# Patient Record
Sex: FEMALE | Race: BLACK OR AFRICAN AMERICAN | NOT HISPANIC OR LATINO | Employment: OTHER | URBAN - METROPOLITAN AREA
[De-identification: names, ages, dates, MRNs, and addresses within clinical notes are randomized per-mention and may not be internally consistent; named-entity substitution may affect disease eponyms.]

---

## 2017-01-10 ENCOUNTER — HOSPITAL ENCOUNTER (OUTPATIENT)
Dept: RADIOLOGY | Facility: HOSPITAL | Age: 67
Discharge: HOME | End: 2017-01-10
Attending: FAMILY MEDICINE
Payer: MEDICARE

## 2017-01-10 ENCOUNTER — CLINICAL SUPPORT (OUTPATIENT)
Dept: FAMILY MEDICINE | Facility: CLINIC | Age: 67
End: 2017-01-10
Payer: MEDICARE

## 2017-01-10 DIAGNOSIS — Z23 NEED FOR VACCINATION WITH 13-POLYVALENT PNEUMOCOCCAL CONJUGATE VACCINE: Primary | ICD-10-CM

## 2017-01-10 DIAGNOSIS — Z12.31 SCREENING MAMMOGRAM, ENCOUNTER FOR: ICD-10-CM

## 2017-01-10 PROCEDURE — 77067 SCR MAMMO BI INCL CAD: CPT | Mod: 26,,, | Performed by: RADIOLOGY

## 2017-01-10 PROCEDURE — 77067 SCR MAMMO BI INCL CAD: CPT | Mod: TC

## 2017-01-10 NOTE — PROGRESS NOTES
Pt received prevnar vaccine IM to left deltoid.  Instructed to wait for 15 minutes in patient waiting area to monitor for side effects

## 2017-01-13 ENCOUNTER — OFFICE VISIT (OUTPATIENT)
Dept: CARDIOLOGY | Facility: CLINIC | Age: 67
End: 2017-01-13
Payer: MEDICARE

## 2017-01-13 VITALS
WEIGHT: 175.94 LBS | HEART RATE: 86 BPM | DIASTOLIC BLOOD PRESSURE: 61 MMHG | BODY MASS INDEX: 32.37 KG/M2 | SYSTOLIC BLOOD PRESSURE: 131 MMHG | HEIGHT: 62 IN

## 2017-01-13 DIAGNOSIS — Z95.2 S/P AVR (AORTIC VALVE REPLACEMENT): ICD-10-CM

## 2017-01-13 DIAGNOSIS — I50.32 CHRONIC DIASTOLIC HEART FAILURE: ICD-10-CM

## 2017-01-13 DIAGNOSIS — I15.2 HYPERTENSION ASSOCIATED WITH DIABETES: ICD-10-CM

## 2017-01-13 DIAGNOSIS — I25.10 CORONARY ARTERY DISEASE INVOLVING NATIVE CORONARY ARTERY, ANGINA PRESENCE UNSPECIFIED, UNSPECIFIED WHETHER NATIVE OR TRANSPLANTED HEART: Primary | ICD-10-CM

## 2017-01-13 DIAGNOSIS — E11.59 HYPERTENSION ASSOCIATED WITH DIABETES: ICD-10-CM

## 2017-01-13 PROCEDURE — 99213 OFFICE O/P EST LOW 20 MIN: CPT | Mod: S$PBB,GC,,

## 2017-01-13 PROCEDURE — 99999 PR PBB SHADOW E&M-EST. PATIENT-LVL III: CPT | Mod: PBBFAC,GC,,

## 2017-01-13 PROCEDURE — 99213 OFFICE O/P EST LOW 20 MIN: CPT | Mod: PBBFAC

## 2017-01-13 NOTE — PROGRESS NOTES
Cardiology Clinic Note      Reason for Visit: follow-up for CAD    History of Present Illness: This is a 67 yo AAF with Hx of CAD (severe 3V disease not a candidate for PCI), HTN, CKD IV (transplant candidate), severe AS s/p TAVR (26mm CoreValve), DM, obese, falls. She presents for follow-up post-TAVR. She denies any complaints of chest pain, SOB, or orthopnea. She presents with her continual issue with LE weakness. The patient has undergone work-up for LE weakness but was discouraged from MRI due to TAVR. No other complaints of claudication as explanation for weakness, but might be due to statin though this is unlikely due to normal CPK/CRP.     EKG: NSR    Prior Cardiology Work up:   2D echo:   CONCLUSIONS     1 - Normal left ventricular systolic function (EF 60-65%).     2 - Mild left atrial enlargement.     3 - Left ventricular diastolic dysfunction.     4 - Normal right ventricular systolic function .     5 - S/p transcatheter AVR, effective prosthetic valve area corrected for BSA is 2.27 cm2.     6 - Mild to moderate mitral regurgitation.     Review of patient's allergies indicates:   Allergen Reactions    No known drug allergies      Past Medical History   Diagnosis Date    Acid reflux 1/7/2015    Anxiety 1/7/2015    Aortic valvar stenosis     Arthritis      osteo    Callus     Chronic anemia      followed by Dr. Addison    CKD (chronic kidney disease) stage 4, GFR 15-29 ml/min      followed by Nephrology    CKD (chronic kidney disease) stage 5, GFR less than 15 ml/min 8/7/2015    CKD (chronic kidney disease), stage IV 1/7/2015    Combined hyperlipidemia associated with type 2 diabetes mellitus     Coronary artery disease     Diabetes mellitus type II, controlled, with no complications      LBSL 108 today    Diabetes mellitus, type 2 - only on oral medications 1/7/2015    Encounter for blood transfusion     Gallbladder problem      gallbladder surgery    Heart murmur      Hyperparathyroidism, secondary renal 1/7/2015    Hypertension 8/7/2015    Hypertension associated with diabetes 11/12/2012    Kidney transplant candidate 1/7/2015    Overweight(278.02)     Urinary tract infection    .  Past Surgical History   Procedure Laterality Date    Hysterectomy  unknown    Ankle fracture surgery  1985    Cataract extraction w/  intraocular lens implant  2009    Yag cap -od      Bone biopsy      Cholecystectomy      Removal of colon polyps      Oophorectomy      Cystoscopy      Renal biopsy  10/2013    Eye surgery      Fracture surgery      Aortic valve replacement  05/2016     Family History   Problem Relation Age of Onset    Hypertension Mother     Heart disease Mother     Diabetes Mother     Glaucoma Mother     Aneurysm Father     Stroke Father     Kidney disease Sister     Heart disease Sister     Kidney disease Brother     Hypertension Brother     Diabetes Brother     Diabetes Sister     Kidney disease Sister     Hypertension Brother     Cancer Paternal Grandmother      breast    No Known Problems Maternal Aunt     No Known Problems Maternal Uncle     No Known Problems Paternal Aunt     No Known Problems Paternal Uncle     No Known Problems Maternal Grandmother     No Known Problems Maternal Grandfather     No Known Problems Paternal Grandfather     Anemia Neg Hx     Arrhythmia Neg Hx     Asthma Neg Hx     Clotting disorder Neg Hx     Fainting Neg Hx     Heart attack Neg Hx     Heart failure Neg Hx     Hyperlipidemia Neg Hx     Atrial Septal Defect Neg Hx      Social History   Substance Use Topics    Smoking status: Never Smoker    Smokeless tobacco: Never Used    Alcohol use No        (Not in a hospital admission)      Review of Systems:  Constitutional: negative  Respiratory: negative  Cardiovascular: negative  Gastrointestinal: negative    Vital Signs (Most Recent)  Pulse: 86 (01/13/17 1024)  BP: 131/61 (01/13/17 1024)    Physical  Exam:  General appearance: alert, appears stated age and cooperative  Head: Normocephalic, without obvious abnormality, atraumatic  Neck: no adenopathy, no carotid bruit, no JVD, supple, symmetrical, trachea midline and thyroid not enlarged, symmetric, no tenderness/mass/nodules  Lungs:  clear to auscultation bilaterally  Heart: regular rate and rhythm, S1, S2 normal, no murmur, click, rub or gallop  Abdomen: soft, non-tender; bowel sounds normal; no masses,  no organomegaly  Extremities: extremities normal, atraumatic, no cyanosis or edema  Pulses: 2+ and symmetric  Neurologic: Grossly normal    Laboratory:    Lab Results   Component Value Date    CHOL 177 09/27/2016    CHOL 274 (H) 09/06/2016    CHOL 181 06/03/2016     Lab Results   Component Value Date    HDL 53 09/27/2016    HDL 50 09/06/2016    HDL 51 06/03/2016     Lab Results   Component Value Date    LDLCALC 108.0 09/27/2016    LDLCALC 191.2 (H) 09/06/2016    LDLCALC 111.4 06/03/2016     Lab Results   Component Value Date    TRIG 80 09/27/2016    TRIG 164 (H) 09/06/2016    TRIG 93 06/03/2016     Lab Results   Component Value Date    CHOLHDL 29.9 09/27/2016    CHOLHDL 18.2 (L) 09/06/2016    CHOLHDL 28.2 06/03/2016         ASSESSMENT/PLAN:     // Follow-up for CAD in a 67 yo AAF with Hx of CAD (severe 3V disease not a candidate for PCI), HTN, CKD IV (transplant candidate), severe AS s/p TAVR (26mm CoreValve), DM, obese, falls.    - will recommend stopping statin for 1 month to see if this improves LE weakness, if weakness remains unchanged will switch to lipitor 40  - as regards to MRI safety with a Evolut CoreValve is as follows from the FDA:     Non-clinical testing demonstrated that the implant/device is MR Conditional. A patient with this implant/device can be scanned safely immediately  after placement under the following conditions:     -Static magnetic field of 3-Isi or less      -Maximum spatial gradient magnetic field of 720-Gauss/cm (a higher value  for the spatial gradient magnetic field may apply if properly calculated).     -Maximum MR system reported whole-body-averaged specific absorption rate (ANETA) of 2-W/kg for 15 minutes of scanning (per pulse sequence)    - would recommend discussing with radiologist regarding MRI settings at any particular location prior to MRI scan  - would continue ASA/plavix/imdur  - would continue losartan and nifedipine    Vicky Us MD  Cardiovascular Disease Fellow  PGY - VI

## 2017-01-13 NOTE — MR AVS SNAPSHOT
[facility]  [address]  [facility] [address]  Phone: [phone]                  Citlali Karimi   2017 10:40 AM   Office Visit    Description:  Female : 1950   Provider:  Vicyk Us MD   Department:  [facility]           Reason for Visit     S/P AVR ( aortic valve replacement)                To Do List           Future Appointments        Provider Department Dept Phone    2017 9:20 AM LAB[facility]ATORY, [place] [facility] [phone]    3/28/2017 8:15 AM LAB[facility]ATORY, [place] [facility] [phone]    2017 10:00 AM [facility]ATORY, [place] [facility] [phone]      Goals (5 Years of Data)     None      [facility] On Call     [facility] On Call Nurse Care Line -  Assistance  Registered nurses in the [facility] On Call Center provide clinical advisement, health education, appointment booking, and other advisory services.  Call for this free service at [phone].             Medications           Message regarding Medications     Verify the changes and/or additions to your medication regime listed below are the same as discussed with your clinician today.  If any of these changes or additions are incorrect, please notify your healthcare provider.             Verify that the below list of medications is an accurate representation of the medications you are currently taking.  If none reported, the list may be blank. If incorrect, please contact your healthcare provider. Carry this list with you in case of emergency.           Current Medications     allopurinol (ZYLOPRIM) 100 MG tablet Take 1 tablet (100 mg total) by mouth once daily.    aspirin (ECOTRIN) 81 MG EC tablet Take 81 mg by mouth once daily.    calcitRIOL (ROCALTROL) 0.5 MCG Cap TAKE ONE CAPSULE BY MOUTH DAILY    clopidogrel (PLAVIX) 75 mg tablet Take 4 pills tonight, the 1 pill daily    CRESTOR 40 mg Tab TAKE ONE TABLET BY MOUTH ONE  "TIME DAILY IN THE EVENING    cyanocobalamin (VITAMIN B-12) 1000 MCG tablet Take 1 tablet (1,000 mcg total) by mouth once daily.    ergocalciferol (ERGOCALCIFEROL) 50,000 unit Cap Take 1 capsule (50,000 Units total) by mouth every 30 days.    isosorbide mononitrate (IMDUR) 60 MG 24 hr tablet TAKE ONE TABLET BY MOUTH DAILY    losartan (COZAAR) 25 MG tablet TAKE ONE TABLET BY MOUTH DAILY    nifedipine (ADALAT CC) 90 MG TbSR Take 1 tablet (90 mg total) by mouth once daily.    omeprazole (PRILOSEC) 40 MG capsule Take 1 capsule (40 mg total) by mouth once daily.           Clinical Reference Information           Vital Signs - Last Recorded  Most recent update: 1/13/2017 10:25 AM by Jeri Gama MA    BP Pulse Ht Wt BMI    131/61 (BP Location: Left arm, Patient Position: Sitting, BP Method: Automatic) 86 5' 2" (1.575 m) 79.8 kg (175 lb 14.8 oz) 32.18 kg/m2      Blood Pressure          Most Recent Value    Right Arm BP - Sitting  137/65    Left Arm BP - Sitting  131/61    BP  131/61      Allergies as of 1/13/2017     No Known Drug Allergies      Immunizations Administered on Date of Encounter - 1/13/2017     None      Maintenance Dialysis History     Patient has no recorded history of maintenance dialysis.      Transplant Information        Txp Date Organ Coordinator Care Team     Kidney Luis F Whelan Jr., RN Referring Physician:  Magnus Vazquez MD   Current Nephrologist:  Magnus Vazquez MD         "

## 2017-01-18 ENCOUNTER — LAB VISIT (OUTPATIENT)
Dept: LAB | Facility: HOSPITAL | Age: 67
End: 2017-01-18
Attending: INTERNAL MEDICINE
Payer: MEDICARE

## 2017-01-18 DIAGNOSIS — D63.1 ANEMIA IN CHRONIC RENAL DISEASE: ICD-10-CM

## 2017-01-18 DIAGNOSIS — N18.9 ANEMIA IN CHRONIC RENAL DISEASE: ICD-10-CM

## 2017-01-18 LAB
BASOPHILS # BLD AUTO: 0.03 K/UL
BASOPHILS NFR BLD: 0.8 %
DIFFERENTIAL METHOD: ABNORMAL
EOSINOPHIL # BLD AUTO: 0 K/UL
EOSINOPHIL NFR BLD: 1 %
ERYTHROCYTE [DISTWIDTH] IN BLOOD BY AUTOMATED COUNT: 19.6 %
HCT VFR BLD AUTO: 32 %
HGB BLD-MCNC: 10.2 G/DL
LYMPHOCYTES # BLD AUTO: 1.4 K/UL
LYMPHOCYTES NFR BLD: 36 %
MCH RBC QN AUTO: 27.1 PG
MCHC RBC AUTO-ENTMCNC: 31.9 %
MCV RBC AUTO: 85 FL
MONOCYTES # BLD AUTO: 0.3 K/UL
MONOCYTES NFR BLD: 8.1 %
NEUTROPHILS # BLD AUTO: 2.2 K/UL
NEUTROPHILS NFR BLD: 54.1 %
PLATELET # BLD AUTO: 187 K/UL
PMV BLD AUTO: 9 FL
RBC # BLD AUTO: 3.76 M/UL
WBC # BLD AUTO: 3.97 K/UL

## 2017-01-18 PROCEDURE — 85025 COMPLETE CBC W/AUTO DIFF WBC: CPT | Mod: PO

## 2017-01-18 PROCEDURE — 36415 COLL VENOUS BLD VENIPUNCTURE: CPT | Mod: PO

## 2017-01-24 ENCOUNTER — OFFICE VISIT (OUTPATIENT)
Dept: PODIATRY | Facility: CLINIC | Age: 67
End: 2017-01-24
Payer: MEDICARE

## 2017-01-24 VITALS — BODY MASS INDEX: 32.37 KG/M2 | WEIGHT: 175.94 LBS | HEIGHT: 62 IN

## 2017-01-24 DIAGNOSIS — E11.49 TYPE II DIABETES MELLITUS WITH NEUROLOGICAL MANIFESTATIONS: Primary | ICD-10-CM

## 2017-01-24 DIAGNOSIS — L60.2 LONG TOENAIL: ICD-10-CM

## 2017-01-24 PROCEDURE — 99213 OFFICE O/P EST LOW 20 MIN: CPT | Mod: S$PBB,,, | Performed by: PODIATRIST

## 2017-01-24 PROCEDURE — 17999 UNLISTD PX SKN MUC MEMB SUBQ: CPT | Mod: CSM,,, | Performed by: PODIATRIST

## 2017-01-24 PROCEDURE — 99212 OFFICE O/P EST SF 10 MIN: CPT | Mod: PBBFAC,PO | Performed by: PODIATRIST

## 2017-01-24 PROCEDURE — 99999 PR PBB SHADOW E&M-EST. PATIENT-LVL II: CPT | Mod: PBBFAC,,, | Performed by: PODIATRIST

## 2017-01-24 NOTE — PROGRESS NOTES
Subjective:      Patient ID: Citlali Karimi is a 66 y.o. female.    Chief Complaint: Diabetic Foot Exam (AR Care and nails)    Citlali is a 66 y.o. female who presents to the clinic upon referral from Dr. Roberts  for evaluation and treatment of diabetic feet. Citlali has a past medical history of Acid reflux (1/7/2015); Anxiety (1/7/2015); Aortic valvar stenosis; Arthritis; Callus; Chronic anemia; CKD (chronic kidney disease) stage 4, GFR 15-29 ml/min; CKD (chronic kidney disease) stage 5, GFR less than 15 ml/min (8/7/2015); CKD (chronic kidney disease), stage IV (1/7/2015); Combined hyperlipidemia associated with type 2 diabetes mellitus; Coronary artery disease; Diabetes mellitus type II, controlled, with no complications; Diabetes mellitus, type 2 - only on oral medications (1/7/2015); Encounter for blood transfusion; Gallbladder problem; Heart murmur; Hyperparathyroidism, secondary renal (1/7/2015); Hypertension (8/7/2015); Hypertension associated with diabetes (11/12/2012); Kidney transplant candidate (1/7/2015); Overweight(278.02); and Urinary tract infection. Patient relates no major problem with feet. Only complaints today consist of long toenails.  Gradual onset, worsening over past several weeks, aggravated by increased weight bearing, shoe gear, pressure.  Periodic debridement/trimming helps symptoms.    PCP: Evelio Schuster MD    Date Last Seen by PCP:   Chief Complaint   Patient presents with    Diabetic Foot Exam     AR Care and nails         Current shoe gear: Casual shoes    Hemoglobin A1C   Date Value Ref Range Status   12/05/2016 6.6 (H) 4.5 - 6.2 % Final     Comment:     According to ADA guidelines, hemoglobin A1C <7.0% represents  optimal control in non-pregnant diabetic patients.  Different  metrics may apply to specific populations.   Standards of Medical Care in Diabetes - 2016.  For the purpose of screening for the presence of diabetes:  <5.7%     Consistent with the absence of  diabetes  5.7-6.4%  Consistent with increasing risk for diabetes   (prediabetes)  >or=6.5%  Consistent with diabetes  Currently no consensus exists for use of hemoglobin A1C  for diagnosis of diabetes for children.     06/03/2016 6.3 (H) 4.5 - 6.2 % Final   03/01/2016 6.6 (H) 4.5 - 6.2 % Final           Review of Systems   Constitution: Negative for chills, diaphoresis, fever, malaise/fatigue and night sweats.   Cardiovascular: Negative for claudication, cyanosis, leg swelling and syncope.   Skin: Negative for color change, dry skin, nail changes, rash, suspicious lesions and unusual hair distribution.   Musculoskeletal: Negative for falls, joint pain, joint swelling, muscle cramps, muscle weakness and stiffness.   Gastrointestinal: Negative for constipation, diarrhea, nausea and vomiting.   Neurological: Positive for sensory change. Negative for brief paralysis, disturbances in coordination, focal weakness, numbness, paresthesias and tremors.           Objective:      Physical Exam   Constitutional: She appears well-developed and well-nourished. She is cooperative. No distress.   Cardiovascular:   Pulses:       Popliteal pulses are 2+ on the right side, and 2+ on the left side.        Dorsalis pedis pulses are 2+ on the right side, and 2+ on the left side.        Posterior tibial pulses are 1+ on the right side, and 1+ on the left side.   Capillary refill 3 seconds all toes/distal feet, all toes/both feet warm to touch.      Negative lymphadenopathy bilateral popliteal fossa and tarsal tunnel.      Negavie lower extremity edema bilateral.     Musculoskeletal:        Right ankle: Normal. She exhibits normal range of motion, no swelling, no ecchymosis, no deformity, no laceration and normal pulse. Achilles tendon normal. Achilles tendon exhibits no pain, no defect and normal Mata's test results.   Normal angle, base, station of gait. All ten toes without clubbing, cyanosis, or signs of ischemia.  No pain to  palpation bilateral lower extremities.  Range of motion, stability, muscle strength, and muscle tone normal bilateral feet and legs.     Lymphadenopathy: No inguinal adenopathy noted on the right or left side.   Negative lymphadenopathy bilateral popliteal fossa and tarsal tunnel.   Neurological: She is alert. She has normal strength. She displays no atrophy and no tremor. A sensory deficit is present. She exhibits normal muscle tone. She displays no seizure activity. Gait normal.   Reflex Scores:       Patellar reflexes are 2+ on the right side and 2+ on the left side.       Achilles reflexes are 2+ on the right side and 2+ on the left side.  Negative tinel sign to percussion sural, superficial peroneal, deep peroneal, saphenous, and posterior tibial nerves right and left ankles and feet.    Decreased/absent vibratory sensation bilateral feet to 128Hz tuning fork.     Skin: Skin is warm, dry and intact. No abrasion, no bruising, no burn, no ecchymosis, no laceration, no lesion and no rash noted. She is not diaphoretic. No cyanosis or erythema. No pallor. Nails show no clubbing.     Skin is normal age and health appropriate color, turgor, texture, and temperature bilateral lower extremities without ulceration, hyperpigmentation, discoloration, masses nodules or cords palpated.  No ecchymosis, erythema, edema, or cardinal signs of infection bilateral lower extremities.      All ten toenails normal color and trophic qualities.              Assessment:       Encounter Diagnoses   Name Primary?    Type II diabetes mellitus with neurological manifestations Yes    Long toenail          Plan:       Citlali was seen today for diabetic foot exam.    Diagnoses and all orders for this visit:    Type II diabetes mellitus with neurological manifestations    Long toenail      I counseled the patient on her conditions, their implications and medical management.        - Shoe inspection. Diabetic Foot Education. Patient reminded of  the importance of good nutrition and blood sugar control to help prevent podiatric complications of diabetes. Patient instructed on proper foot hygeine. We discussed wearing proper shoe gear, daily foot inspections, never walking without protective shoe gear, never putting sharp instruments to feet, routine podiatric visits at least annually.    Non covered foot care.  - With patient's permission, nails were aggressively reduced and debrided x 10 to their soft tissue attachment mechanically, removing all offending nail and debris. Patient relates relief following the procedure. She will continue to monitor the areas daily, inspect her feet, wear protective shoe gear when ambulatory, moisturizer to maintain skin integrity and follow in this office p.r.n.          Return in about 1 year (around 1/24/2018) for AR exam.

## 2017-01-24 NOTE — MR AVS SNAPSHOT
[facility]  [address]  [place] [address]  Phone: [phone]                  Citlali Karimi   2017 2:45 PM   Office Visit    Description:  Female : 1950   Provider:  Tree Montero DPM   Department:  [facility]           Reason for Visit     Diabetic Foot Exam                To Do List           Future Appointments        Provider Department Dept Phone    3/28/2017 8:15 AM LAB[facility]ATORY, [place] [facility] [phone]    2017 10:00 AM [facility]ATORY, [place] [facility] [phone]      Goals (5 Years of Data)     None      Follow-Up and Disposition     Return in about 1 year (around 2018) for AR exam.      [facility]     [facility] Care [facility] -  Assistance  Registered nurses in the [facility] Center provide clinical advisement, health education, appointment booking, and other advisory services.  Call for this free service at [phone].             Medications           Message regarding Medications     Verify the changes and/or additions to your medication regime listed below are the same as discussed with your clinician today.  If any of these changes or additions are incorrect, please notify your healthcare provider.             Verify that the below list of medications is an accurate representation of the medications you are currently taking.  If none reported, the list may be blank. If incorrect, please contact your healthcare provider. Carry this list with you in case of emergency.           Current Medications     allopurinol (ZYLOPRIM) 100 MG tablet Take 1 tablet (100 mg total) by mouth once daily.    aspirin (ECOTRIN) 81 MG EC tablet Take 81 mg by mouth once daily.    calcitRIOL (ROCALTROL) 0.5 MCG Cap TAKE ONE CAPSULE BY MOUTH DAILY    clopidogrel (PLAVIX) 75 mg tablet Take 4 pills tonight, the 1 pill daily    cyanocobalamin (VITAMIN B-12) 1000 MCG tablet Take 1 tablet (1,000  "mcg total) by mouth once daily.    ergocalciferol (ERGOCALCIFEROL) 50,000 unit Cap Take 1 capsule (50,000 Units total) by mouth every 30 days.    isosorbide mononitrate (IMDUR) 60 MG 24 hr tablet TAKE ONE TABLET BY MOUTH DAILY    losartan (COZAAR) 25 MG tablet TAKE ONE TABLET BY MOUTH DAILY    nifedipine (ADALAT CC) 90 MG TbSR Take 1 tablet (90 mg total) by mouth once daily.    omeprazole (PRILOSEC) 40 MG capsule Take 1 capsule (40 mg total) by mouth once daily.    CRESTOR 40 mg Tab TAKE ONE TABLET BY MOUTH ONE TIME DAILY IN THE EVENING           Clinical Reference Information           Vital Signs - Last Recorded  Most recent update: 1/24/2017  2:30 PM by Tamiko Núñez LPN    Ht Wt BMI          5' 2" (1.575 m) 79.8 kg (175 lb 14.8 oz) 32.18 kg/m2        Allergies as of 1/24/2017     No Known Drug Allergies      Immunizations Administered on Date of Encounter - 1/24/2017     None      Maintenance Dialysis History     Patient has no recorded history of maintenance dialysis.      Transplant Information        Txp Date Organ Coordinator Care Team     Kidney Luis F Whelan Jr., RN Referring Physician:  Magnus Vazquez MD   Current Nephrologist:  Magnus Vazquez MD         "

## 2017-01-25 DIAGNOSIS — N18.9 ANEMIA IN CHRONIC RENAL DISEASE: Primary | ICD-10-CM

## 2017-01-25 DIAGNOSIS — D63.1 ANEMIA IN CHRONIC RENAL DISEASE: Primary | ICD-10-CM

## 2017-01-26 ENCOUNTER — OFFICE VISIT (OUTPATIENT)
Dept: NEUROLOGY | Facility: CLINIC | Age: 67
End: 2017-01-26
Payer: MEDICARE

## 2017-01-26 ENCOUNTER — TELEPHONE (OUTPATIENT)
Dept: HEMATOLOGY/ONCOLOGY | Facility: CLINIC | Age: 67
End: 2017-01-26

## 2017-01-26 VITALS
DIASTOLIC BLOOD PRESSURE: 80 MMHG | HEART RATE: 55 BPM | HEIGHT: 62 IN | RESPIRATION RATE: 18 BRPM | BODY MASS INDEX: 32.05 KG/M2 | TEMPERATURE: 97 F | WEIGHT: 174.19 LBS | SYSTOLIC BLOOD PRESSURE: 186 MMHG

## 2017-01-26 DIAGNOSIS — R26.2 AMBULATORY DYSFUNCTION: Primary | ICD-10-CM

## 2017-01-26 PROCEDURE — 99999 PR PBB SHADOW E&M-EST. PATIENT-LVL III: CPT | Mod: PBBFAC,,, | Performed by: PSYCHIATRY & NEUROLOGY

## 2017-01-26 PROCEDURE — 99213 OFFICE O/P EST LOW 20 MIN: CPT | Mod: PBBFAC,PN | Performed by: PSYCHIATRY & NEUROLOGY

## 2017-01-26 PROCEDURE — 99213 OFFICE O/P EST LOW 20 MIN: CPT | Mod: S$PBB,,, | Performed by: PSYCHIATRY & NEUROLOGY

## 2017-01-26 NOTE — MR AVS SNAPSHOT
[facility]  [address]  [place] [address]  Phone: [phone]  Fax: [phone]                  Citlali Karimi   2017 2:20 PM   Office Visit    Description:  Female : 1950   Provider:  Kristopher Hurd DO   Department:  [facility]           Diagnoses this Visit        Comments    Ambulatory dysfunction    -  Primary            To Do List           Future Appointments        Provider Department Dept Phone    2/15/2017 10:10 AM LAB[facility]ATORY, [place] [facility] [phone]    3/28/2017 8:15 AM LAB[facility]ATORY, [place] [facility] [phone]    2017 10:00 AM [facility]AT[facility], [place] [facility] [phone]      Goals (5 Years of Data)     None      Follow-Up and Disposition     Return in about 6 months (around 2017).      [facility]     [facility] Nurse Care Line -  Assistance  Registered nurses in the [facility] provide clinical advisement, health education, appointment booking, and other advisory services.  Call for this free service at [phone].             Medications           Message regarding Medications     Verify the changes and/or additions to your medication regime listed below are the same as discussed with your clinician today.  If any of these changes or additions are incorrect, please notify your healthcare provider.             Verify that the below list of medications is an accurate representation of the medications you are currently taking.  If none reported, the list may be blank. If incorrect, please contact your healthcare provider. Carry this list with you in case of emergency.           Current Medications     allopurinol (ZYLOPRIM) 100 MG tablet Take 1 tablet (100 mg total) by mouth once daily.    aspirin (ECOTRIN) 81 MG EC tablet Take 81 mg by mouth once daily.    calcitRIOL (ROCALTROL) 0.5 MCG Cap TAKE ONE CAPSULE BY MOUTH DAILY     "clopidogrel (PLAVIX) 75 mg tablet Take 4 pills tonight, the 1 pill daily    CRESTOR 40 mg Tab TAKE ONE TABLET BY MOUTH ONE TIME DAILY IN THE EVENING    cyanocobalamin (VITAMIN B-12) 1000 MCG tablet Take 1 tablet (1,000 mcg total) by mouth once daily.    ergocalciferol (ERGOCALCIFEROL) 50,000 unit Cap Take 1 capsule (50,000 Units total) by mouth every 30 days.    isosorbide mononitrate (IMDUR) 60 MG 24 hr tablet TAKE ONE TABLET BY MOUTH DAILY    losartan (COZAAR) 25 MG tablet TAKE ONE TABLET BY MOUTH DAILY    nifedipine (ADALAT CC) 90 MG TbSR Take 1 tablet (90 mg total) by mouth once daily.    omeprazole (PRILOSEC) 40 MG capsule Take 1 capsule (40 mg total) by mouth once daily.           Clinical Reference Information           Vital Signs - Last Recorded  Most recent update: 1/26/2017  2:08 PM by Lissette Haley MA    BP Pulse Temp Resp Ht Wt    (!) 186/80 (BP Location: Right arm, Patient Position: Sitting, BP Method: Automatic) (!) 55 97 °F (36.1 °C) (Oral) 18 5' 2" (1.575 m) 79 kg (174 lb 2.6 oz)    BMI                31.85 kg/m2          Blood Pressure          Most Recent Value    BP  (!)  186/80      Allergies as of 1/26/2017     No Known Drug Allergies      Immunizations Administered on Date of Encounter - 1/26/2017     None      Orders Placed During Today's Visit      Normal Orders This Visit    Ambulatory Referral to Physical/Occupational Therapy       Maintenance Dialysis History     Patient has no recorded history of maintenance dialysis.      Transplant Information        Txp Date Organ Coordinator Care Team     Kidney Luis F Whelan Jr., RN Referring Physician:  Magnus Vazquez MD   Current Nephrologist:  Magnus Vazquez MD         "

## 2017-01-26 NOTE — PROGRESS NOTES
"Subjective:         Patient ID: Citlali Karimi is a 66 y.o. right handed female who presents for follow up of balance difficulty    History of Present Illness    Pt initially seen in September for balance issues.  Did not appreciate any sensory loss or neuropathy on exam, mild distal atrophy, normal to increased DTR, hesitant and apprehensive walking. Referred to PT for ambulation, risk factor modification for vascular disease, detailed neuropsychological testing for subjective difficulty with memory. Did well in PT, improved functional scores, discharged from therapy.    Recently seen in cardiology, continues to c/o weakness in the LE.  Did not go for MRI, discouraged due to TAVR - see cardiology note regarding status of device as MR conditional    Severe CAD, stage IV CKD, DM2, chronic anemia, AV stenosis s/p TAVR, secondary hyperparathyroidism, obesity    She states that one week after finishing therapy, her walking deteriorated again to "the same old thing".  She admits to being apprehensive about falling, also thinks her legs are weak. Uses rolling walker at home.     Review of patient's allergies indicates:   Allergen Reactions    No known drug allergies      Current Outpatient Prescriptions   Medication Sig Dispense Refill    allopurinol (ZYLOPRIM) 100 MG tablet Take 1 tablet (100 mg total) by mouth once daily. 30 tablet 8    aspirin (ECOTRIN) 81 MG EC tablet Take 81 mg by mouth once daily.      calcitRIOL (ROCALTROL) 0.5 MCG Cap TAKE ONE CAPSULE BY MOUTH DAILY 90 capsule 5    clopidogrel (PLAVIX) 75 mg tablet Take 4 pills tonight, the 1 pill daily (Patient taking differently: 75 mg once. ) 30 tablet 11    CRESTOR 40 mg Tab TAKE ONE TABLET BY MOUTH ONE TIME DAILY IN THE EVENING 30 tablet 1    cyanocobalamin (VITAMIN B-12) 1000 MCG tablet Take 1 tablet (1,000 mcg total) by mouth once daily. 90 tablet 12    ergocalciferol (ERGOCALCIFEROL) 50,000 unit Cap Take 1 capsule (50,000 Units total) by mouth " every 30 days. 3 capsule 1    isosorbide mononitrate (IMDUR) 60 MG 24 hr tablet TAKE ONE TABLET BY MOUTH DAILY 30 tablet 9    losartan (COZAAR) 25 MG tablet TAKE ONE TABLET BY MOUTH DAILY 30 tablet 9    nifedipine (ADALAT CC) 90 MG TbSR Take 1 tablet (90 mg total) by mouth once daily. 30 tablet 11    omeprazole (PRILOSEC) 40 MG capsule Take 1 capsule (40 mg total) by mouth once daily. 30 capsule 11     No current facility-administered medications for this visit.          Review of Systems  Review of Systems    Objective:        Vitals:    01/26/17 1407   BP: (!) 186/80   Pulse: (!) 55   Resp: 18   Temp: 97 °F (36.1 °C)     Body mass index is 31.85 kg/(m^2).  Neurologic Exam     Motor Exam   Right arm pronator drift: absent  Left arm pronator drift: absent  Right leg tone: normal  Left leg tone: normal    Sensory Exam   Right leg light touch: normal  Left leg light touch: normal  Right leg vibration: normal  Left leg vibration: normal  Right leg proprioception: normal  Left leg proprioception: normal  Right leg pinprick: normal  Left leg pinprick: normal    Gait, Coordination, and Reflexes     Gait  Gait: shuffling (very apprehensive, hesitant to lift feet.  Short steps, arms to sides)    Tremor   Resting tremor: absent    Reflexes   Right biceps: 2+  Left biceps: 2+  Right patellar: 2+  Left patellar: 2+  Right achilles: 2+  Left achilles: 2+  Right plantar: normal  Left plantar: normal      Physical Exam   Neurological:   Reflex Scores:       Bicep reflexes are 2+ on the right side and 2+ on the left side.       Patellar reflexes are 2+ on the right side and 2+ on the left side.       Achilles reflexes are 2+ on the right side and 2+ on the left side.        Data Review:  Results for RUT MCCALLUM (MRN [id number]) as of 1/26/2017 14:20   Ref. Range 1/18/2017 09:00   WBC Latest Ref Range: 3.90 - 12.70 K/uL 3.97   RBC Latest Ref Range: 4.00 - 5.40 M/uL 3.76 (L)   Hemoglobin Latest Ref Range: 12.0 - 16.0 g/dL 10.2  (L)   Hematocrit Latest Ref Range: 37.0 - 48.5 % 32.0 (L)   MCV Latest Ref Range: 82 - 98 fL 85   MCH Latest Ref Range: 27.0 - 31.0 pg 27.1   MCHC Latest Ref Range: 32.0 - 36.0 % 31.9 (L)   RDW Latest Ref Range: 11.5 - 14.5 % 19.6 (H)   Platelets Latest Ref Range: 150 - 350 K/uL 187   MPV Latest Ref Range: 9.2 - 12.9 fL 9.0 (L)   Gran% Latest Ref Range: 38.0 - 73.0 % 54.1   Gran # Latest Ref Range: 1.8 - 7.7 K/uL 2.2   Lymph% Latest Ref Range: 18.0 - 48.0 % 36.0   Lymph # Latest Ref Range: 1.0 - 4.8 K/uL 1.4   Mono% Latest Ref Range: 4.0 - 15.0 % 8.1   Mono # Latest Ref Range: 0.3 - 1.0 K/uL 0.3   Eosinophil% Latest Ref Range: 0.0 - 8.0 % 1.0   Eos # Latest Ref Range: 0.0 - 0.5 K/uL 0.0   Basophil% Latest Ref Range: 0.0 - 1.9 % 0.8   Baso # Latest Ref Range: 0.00 - 0.20 K/uL 0.03     Assessment:        No weakness, sensory loss, abnormal tone or coordination.  She readily admits she is terrified of falling. Her short, cautious shuffling resembles how one might walk over ice - yet with encouragement she can take larger steps.  Would hold off on diagnostic studies, continue to work with PT to overcome fear of falling      Plan:         Ambulatory dysfunction  -     Ambulatory Referral to Physical/Occupational Therapy    Return in about 6 months (around 7/26/2017).       Thank you very much for the opportunity to assist in this patient's care.  If you have any questions or concerns, please do not hesitate to contact me at any time.     Sincerely,  Kristopher Hurd, DO

## 2017-01-26 NOTE — TELEPHONE ENCOUNTER
----- Message from David Addison MD sent at 1/25/2017  3:22 PM CST -----  Please let the patient know that her recent blood count looks okay.  She does not need any Procrit injection at this time.  I would like her to go to the [facility] again in 3 weeks and get CBC, iron studies, ESR, ferritin checked.  I just put the orders in.  We will call her with results to see what follow-up she needs.  Thank you.

## 2017-01-31 ENCOUNTER — OFFICE VISIT (OUTPATIENT)
Dept: FAMILY MEDICINE | Facility: CLINIC | Age: 67
End: 2017-01-31
Payer: MEDICARE

## 2017-01-31 VITALS
HEIGHT: 62 IN | SYSTOLIC BLOOD PRESSURE: 151 MMHG | HEART RATE: 63 BPM | TEMPERATURE: 98 F | WEIGHT: 174.63 LBS | DIASTOLIC BLOOD PRESSURE: 78 MMHG | BODY MASS INDEX: 32.14 KG/M2

## 2017-01-31 DIAGNOSIS — R29.898 WEAKNESS OF LOWER EXTREMITY, UNSPECIFIED LATERALITY: Primary | ICD-10-CM

## 2017-01-31 PROCEDURE — 99213 OFFICE O/P EST LOW 20 MIN: CPT | Mod: S$PBB,,, | Performed by: NURSE PRACTITIONER

## 2017-01-31 PROCEDURE — 99214 OFFICE O/P EST MOD 30 MIN: CPT | Mod: PBBFAC,PO | Performed by: NURSE PRACTITIONER

## 2017-01-31 PROCEDURE — 99999 PR PBB SHADOW E&M-EST. PATIENT-LVL IV: CPT | Mod: PBBFAC,,, | Performed by: NURSE PRACTITIONER

## 2017-01-31 NOTE — PROGRESS NOTES
Subjective:       Patient ID: Citlali Karimi is a 66 y.o. female.    Chief Complaint: Fall (11/2016)    Fall   The accident occurred more than 1 week ago (In November 2015). The fall occurred while walking. She fell from a height of 1 to 2 ft. She landed on hard floor. There was no blood loss. The point of impact was the buttocks. The patient is experiencing no pain. Pertinent negatives include no abdominal pain, bowel incontinence, fever, headaches, hearing loss, hematuria, loss of consciousness, nausea, numbness, tingling, visual change or vomiting. Associated symptoms comments: Pt states able to stand and ambulates but has to hold on to walk after a while. Treatments tried: Pt states has had bilateral leg weakness since fall; has done PT, which helped; states would like to try again.  The treatment provided moderate (Pt states has seen neurosurgery since her fall) relief.     Past Medical History   Diagnosis Date    Acid reflux 1/7/2015    Anxiety 1/7/2015    Aortic valvar stenosis     Arthritis      osteo    Callus     Chronic anemia      followed by Dr. Addison    CKD (chronic kidney disease) stage 4, GFR 15-29 ml/min      followed by Nephrology    CKD (chronic kidney disease) stage 5, GFR less than 15 ml/min 8/7/2015    CKD (chronic kidney disease), stage IV 1/7/2015    Combined hyperlipidemia associated with type 2 diabetes mellitus     Coronary artery disease     Diabetes mellitus type II, controlled, with no complications      LBSL 108 today    Diabetes mellitus, type 2 - only on oral medications 1/7/2015    Encounter for blood transfusion     Gallbladder problem      gallbladder surgery    Heart murmur     Hyperparathyroidism, secondary renal 1/7/2015    Hypertension 8/7/2015    Hypertension associated with diabetes 11/12/2012    Kidney transplant candidate 1/7/2015    Overweight(278.02)     Urinary tract infection      Social History     Social History    Marital status:       Spouse name: N/A    Number of children: N/A    Years of education: N/A     Occupational History    Retired  ([employer])      Social History Main Topics    Smoking status: Never Smoker    Smokeless tobacco: Never Used    Alcohol use No    Drug use: No    Sexual activity: Not on file     Social History Narrative    Retired - former principal     for 39 years    No children    No history of blood transfusions    No donors     Past Surgical History   Procedure Laterality Date    Hysterectomy  unknown    Ankle fracture surgery  1985    Cataract extraction w/  intraocular lens implant  2009    Yag cap -od      Bone biopsy      Cholecystectomy      Removal of colon polyps      Oophorectomy      Cystoscopy      Renal biopsy  10/2013    Eye surgery      Fracture surgery      Aortic valve replacement  05/2016       Review of Systems   Constitutional: Negative.  Negative for fever.   HENT: Negative.    Eyes: Negative.    Respiratory: Negative.    Cardiovascular: Negative.    Gastrointestinal: Negative.  Negative for abdominal pain, bowel incontinence, nausea and vomiting.   Endocrine: Negative.    Genitourinary: Negative.  Negative for hematuria.   Musculoskeletal:        Bilateral leg weakness since fall   Skin: Negative.    Allergic/Immunologic: Negative.    Neurological: Negative.  Negative for tingling, loss of consciousness, numbness and headaches.   Psychiatric/Behavioral: Negative.        Objective:      Physical Exam   Constitutional: She is oriented to person, place, and time. She appears well-developed and well-nourished.   HENT:   Head: Normocephalic.   Right Ear: External ear normal.   Left Ear: External ear normal.   Nose: Nose normal.   Mouth/Throat: Oropharynx is clear and moist.   Eyes: Conjunctivae are normal. Pupils are equal, round, and reactive to light.   Neck: Normal range of motion. Neck supple.   Cardiovascular: Normal rate, regular rhythm and normal heart  sounds.    Pulmonary/Chest: Effort normal and breath sounds normal.   Abdominal: Soft. Bowel sounds are normal.   Musculoskeletal: Normal range of motion.   Neurological: She is alert and oriented to person, place, and time.   Skin: Skin is warm and dry.   Psychiatric: She has a normal mood and affect. Her behavior is normal. Judgment and thought content normal.   Nursing note and vitals reviewed.      Assessment:       1. Weakness of lower extremity, unspecified laterality        Plan:           Citlali was seen today for fall.    Diagnoses and all orders for this visit:    Weakness of lower extremity, unspecified laterality  -     Ambulatory Referral to Physical/Occupational Therapy

## 2017-01-31 NOTE — MR AVS SNAPSHOT
[facility]  [address] [address]  Phone: [phone]  Fax: [phone]                  Citlali Karimi   2017 8:40 AM   Office Visit    Description:  Female : 1950   Provider:  Yenny Olmstead NP   Department:  [facility]           Reason for Visit     Fall           Diagnoses this Visit        Comments    Weakness of lower extremity, unspecified laterality    -  Primary            To Do List           Future Appointments        Provider Department Dept Phone    2/15/2017 10:10 AM [facility]AT[facility], [place] [facility] [phone]    3/6/2017 1:00 PM Galindo Louis MD [facility] [phone]    3/28/2017 8:15 AM [facility], [place] [facility] [phone]    2017 10:00 AM [facility], [place] [facility] [phone]      Goals (5 Years of Data)     None      [facility] On [facility]     [facility] Care Line -  Assistance  Registered nurses in the [facility] provide clinical advisement, health education, appointment booking, and other advisory services.  Call for this free service at [phone].             Medications           Message regarding Medications     Verify the changes and/or additions to your medication regime listed below are the same as discussed with your clinician today.  If any of these changes or additions are incorrect, please notify your healthcare provider.             Verify that the below list of medications is an accurate representation of the medications you are currently taking.  If none reported, the list may be blank. If incorrect, please contact your healthcare provider. Carry this list with you in case of emergency.           Current Medications     allopurinol (ZYLOPRIM) 100 MG tablet Take 1 tablet (100 mg total) by mouth once daily.    aspirin (ECOTRIN) 81 MG EC tablet Take 81 mg by mouth once daily.     "calcitRIOL (ROCALTROL) 0.5 MCG Cap TAKE ONE CAPSULE BY MOUTH DAILY    clopidogrel (PLAVIX) 75 mg tablet Take 4 pills tonight, the 1 pill daily    CRESTOR 40 mg Tab TAKE ONE TABLET BY MOUTH ONE TIME DAILY IN THE EVENING    cyanocobalamin (VITAMIN B-12) 1000 MCG tablet Take 1 tablet (1,000 mcg total) by mouth once daily.    ergocalciferol (ERGOCALCIFEROL) 50,000 unit Cap Take 1 capsule (50,000 Units total) by mouth every 30 days.    isosorbide mononitrate (IMDUR) 60 MG 24 hr tablet TAKE ONE TABLET BY MOUTH DAILY    losartan (COZAAR) 25 MG tablet TAKE ONE TABLET BY MOUTH DAILY    omeprazole (PRILOSEC) 40 MG capsule Take 1 capsule (40 mg total) by mouth once daily.    nifedipine (ADALAT CC) 90 MG TbSR Take 1 tablet (90 mg total) by mouth once daily.           Clinical Reference Information           Vital Signs - Last Recorded  Most recent update: 1/31/2017  8:51 AM by Fior Parker LPN    BP Pulse Temp Ht Wt BMI    (!) 151/78 63 98.4 °F (36.9 °C) 5' 2" (1.575 m) 79.2 kg (174 lb 9.7 oz) 31.94 kg/m2      Blood Pressure          Most Recent Value    BP  (!)  151/78      Allergies as of 1/31/2017     No Known Drug Allergies      Immunizations Administered on Date of Encounter - 1/31/2017     None      Orders Placed During Today's Visit      Normal Orders This Visit    Ambulatory Referral to Physical/Occupational Therapy       Maintenance Dialysis History     Patient has no recorded history of maintenance dialysis.      Transplant Information        Txp Date Organ Coordinator Care Team     Kidney Luis F Whelan Jr., RN Referring Physician:  Magnus Vazquez MD   Current Nephrologist:  Magnus Vazquez MD         "

## 2017-02-15 ENCOUNTER — LAB VISIT (OUTPATIENT)
Dept: LAB | Facility: HOSPITAL | Age: 67
End: 2017-02-15
Attending: INTERNAL MEDICINE
Payer: MEDICARE

## 2017-02-15 DIAGNOSIS — N18.9 ANEMIA IN CHRONIC RENAL DISEASE: ICD-10-CM

## 2017-02-15 DIAGNOSIS — D63.1 ANEMIA IN CHRONIC RENAL DISEASE: ICD-10-CM

## 2017-02-15 LAB
BASOPHILS # BLD AUTO: 0.03 K/UL
BASOPHILS NFR BLD: 0.8 %
DIFFERENTIAL METHOD: ABNORMAL
EOSINOPHIL # BLD AUTO: 0.1 K/UL
EOSINOPHIL NFR BLD: 2.1 %
ERYTHROCYTE [DISTWIDTH] IN BLOOD BY AUTOMATED COUNT: 19 %
ERYTHROCYTE [SEDIMENTATION RATE] IN BLOOD BY WESTERGREN METHOD: 45 MM/HR
FERRITIN SERPL-MCNC: 258 NG/ML
HCT VFR BLD AUTO: 31.8 %
HGB BLD-MCNC: 10 G/DL
IRON SERPL-MCNC: 54 UG/DL
LYMPHOCYTES # BLD AUTO: 1.2 K/UL
LYMPHOCYTES NFR BLD: 32.9 %
MCH RBC QN AUTO: 27 PG
MCHC RBC AUTO-ENTMCNC: 31.4 %
MCV RBC AUTO: 86 FL
MONOCYTES # BLD AUTO: 0.3 K/UL
MONOCYTES NFR BLD: 7.5 %
NEUTROPHILS # BLD AUTO: 2.1 K/UL
NEUTROPHILS NFR BLD: 56.7 %
PLATELET # BLD AUTO: 168 K/UL
PMV BLD AUTO: 9.8 FL
RBC # BLD AUTO: 3.71 M/UL
SATURATED IRON: 36 %
TOTAL IRON BINDING CAPACITY: 151 UG/DL
TRANSFERRIN SERPL-MCNC: 102 MG/DL
WBC # BLD AUTO: 3.74 K/UL

## 2017-02-15 PROCEDURE — 82728 ASSAY OF FERRITIN: CPT

## 2017-02-15 PROCEDURE — 85025 COMPLETE CBC W/AUTO DIFF WBC: CPT | Mod: PO

## 2017-02-15 PROCEDURE — 85651 RBC SED RATE NONAUTOMATED: CPT | Mod: PO

## 2017-02-15 PROCEDURE — 83540 ASSAY OF IRON: CPT

## 2017-02-15 PROCEDURE — 36415 COLL VENOUS BLD VENIPUNCTURE: CPT | Mod: PO

## 2017-02-15 RX ORDER — ROSUVASTATIN CALCIUM 40 MG/1
40 TABLET, COATED ORAL NIGHTLY
Qty: 30 TABLET | Refills: 1 | Status: SHIPPED | OUTPATIENT
Start: 2017-02-15 | End: 2017-03-28

## 2017-02-20 DIAGNOSIS — D63.1 ANEMIA IN CHRONIC RENAL DISEASE: Primary | ICD-10-CM

## 2017-02-20 DIAGNOSIS — N18.9 ANEMIA IN CHRONIC RENAL DISEASE: Primary | ICD-10-CM

## 2017-02-22 ENCOUNTER — TELEPHONE (OUTPATIENT)
Dept: HEMATOLOGY/ONCOLOGY | Facility: CLINIC | Age: 67
End: 2017-02-22

## 2017-02-22 NOTE — TELEPHONE ENCOUNTER
----- Message from David Addison MD sent at 2/20/2017  4:48 PM CST -----  Please let the patient know that recent lab results all look okay.  She does not need Procrit at this time.  I would like to see her back for follow-up in approximately 1 month at the '[place] [facility] with CBC on the day of her clinic visit.  Also please have her on the chemotherapy schedule for possible Procrit if she needs it on that day.  Thank you.

## 2017-02-22 NOTE — TELEPHONE ENCOUNTER
Called could not leave message for pt to call me back follow up schedule and mail to address on file

## 2017-02-24 ENCOUNTER — TELEPHONE (OUTPATIENT)
Dept: HEMATOLOGY/ONCOLOGY | Facility: CLINIC | Age: 67
End: 2017-02-24

## 2017-02-24 NOTE — TELEPHONE ENCOUNTER
Attempted to contact patient about appointments and results. Patient did not answer. No voicemail available.

## 2017-02-24 NOTE — TELEPHONE ENCOUNTER
----- Message from David Addison MD sent at 2/20/2017  4:48 PM CST -----  Please let the patient know that recent lab results all look okay.  She does not need Procrit at this time.  I would like to see her back for follow-up in approximately 1 month at the '[place] [facility] with CBC on the day of her clinic visit.  Also please have her on the chemotherapy schedule for possible Procrit if she needs it on that day.  Thank you.

## 2017-02-27 ENCOUNTER — TELEPHONE (OUTPATIENT)
Dept: HEMATOLOGY/ONCOLOGY | Facility: CLINIC | Age: 67
End: 2017-02-27

## 2017-02-27 NOTE — TELEPHONE ENCOUNTER
----- Message from David Addison MD sent at 2/20/2017  4:48 PM CST -----  Please let the patient know that recent lab results all look okay.  She does not need Procrit at this time.  I would like to see her back for follow-up in approximately 1 month at the '[place] [facility] with CBC on the day of her clinic visit.  Also please have her on the chemotherapy schedule for possible Procrit if she needs it on that day.  Thank you.

## 2017-03-15 ENCOUNTER — LAB VISIT (OUTPATIENT)
Dept: LAB | Facility: HOSPITAL | Age: 67
End: 2017-03-15
Attending: INTERNAL MEDICINE
Payer: MEDICARE

## 2017-03-15 DIAGNOSIS — N18.9 ANEMIA IN CHRONIC RENAL DISEASE: ICD-10-CM

## 2017-03-15 DIAGNOSIS — D63.1 ANEMIA IN CHRONIC RENAL DISEASE: ICD-10-CM

## 2017-03-15 LAB
BASOPHILS # BLD AUTO: 0.02 K/UL
BASOPHILS NFR BLD: 0.4 %
DIFFERENTIAL METHOD: ABNORMAL
EOSINOPHIL # BLD AUTO: 0 K/UL
EOSINOPHIL NFR BLD: 0.7 %
ERYTHROCYTE [DISTWIDTH] IN BLOOD BY AUTOMATED COUNT: 18.4 %
HCT VFR BLD AUTO: 30.2 %
HGB BLD-MCNC: 9.5 G/DL
LYMPHOCYTES # BLD AUTO: 1.4 K/UL
LYMPHOCYTES NFR BLD: 26.3 %
MCH RBC QN AUTO: 27.3 PG
MCHC RBC AUTO-ENTMCNC: 31.5 %
MCV RBC AUTO: 87 FL
MONOCYTES # BLD AUTO: 0.4 K/UL
MONOCYTES NFR BLD: 7.1 %
NEUTROPHILS # BLD AUTO: 3.5 K/UL
NEUTROPHILS NFR BLD: 65.5 %
PLATELET # BLD AUTO: 197 K/UL
PMV BLD AUTO: 9.4 FL
RBC # BLD AUTO: 3.48 M/UL
WBC # BLD AUTO: 5.36 K/UL

## 2017-03-15 PROCEDURE — 85025 COMPLETE CBC W/AUTO DIFF WBC: CPT | Mod: PO

## 2017-03-15 PROCEDURE — 36415 COLL VENOUS BLD VENIPUNCTURE: CPT | Mod: PO

## 2017-03-24 ENCOUNTER — INFUSION (OUTPATIENT)
Dept: INFUSION THERAPY | Facility: HOSPITAL | Age: 67
End: 2017-03-24
Attending: INTERNAL MEDICINE
Payer: MEDICARE

## 2017-03-24 ENCOUNTER — TELEPHONE (OUTPATIENT)
Dept: NEPHROLOGY | Facility: CLINIC | Age: 67
End: 2017-03-24

## 2017-03-24 ENCOUNTER — OFFICE VISIT (OUTPATIENT)
Dept: HEMATOLOGY/ONCOLOGY | Facility: CLINIC | Age: 67
End: 2017-03-24
Payer: MEDICARE

## 2017-03-24 VITALS
WEIGHT: 174.63 LBS | BODY MASS INDEX: 31.94 KG/M2 | DIASTOLIC BLOOD PRESSURE: 82 MMHG | SYSTOLIC BLOOD PRESSURE: 160 MMHG | TEMPERATURE: 99 F | OXYGEN SATURATION: 98 % | HEART RATE: 72 BPM

## 2017-03-24 DIAGNOSIS — D63.1 ANEMIA IN CHRONIC RENAL DISEASE: Primary | ICD-10-CM

## 2017-03-24 DIAGNOSIS — N18.9 ANEMIA IN CHRONIC RENAL DISEASE: Primary | ICD-10-CM

## 2017-03-24 PROCEDURE — 99214 OFFICE O/P EST MOD 30 MIN: CPT | Mod: S$PBB,,, | Performed by: INTERNAL MEDICINE

## 2017-03-24 PROCEDURE — 96372 THER/PROPH/DIAG INJ SC/IM: CPT

## 2017-03-24 PROCEDURE — 99999 PR PBB SHADOW E&M-EST. PATIENT-LVL II: CPT | Mod: PBBFAC,,, | Performed by: INTERNAL MEDICINE

## 2017-03-24 PROCEDURE — 63600175 PHARM REV CODE 636 W HCPCS: Mod: EC | Performed by: INTERNAL MEDICINE

## 2017-03-24 RX ADMIN — ERYTHROPOIETIN 20000 UNITS: 20000 INJECTION, SOLUTION INTRAVENOUS; SUBCUTANEOUS at 11:03

## 2017-03-24 NOTE — PROGRESS NOTES
Reason for visit: Follow up for anemia    HPI:   The patient is a 67-year-old  female who presents to the hematology oncology clinic today for follow up for anemia. She is currently undergoing physical therapy for generalized weakness and history of falls. She has been evaluated by neurology. She denies any back pain at this time. She denies any fever, chills, night sweats, loss of appetite or unintentional weight loss. She denies any chest pain or shortness of breath. She denies any fatigue. She denies any bowel or urinary complaints. She denies any melena, hematochezia, hematemesis, hemoptysis or hematuria. She did undergo valve replacement on May 10, 2016 at [facility] [place].  I have reviewed all of the patient's relevant clinical history available in EPIC.    PAST MEDICAL HISTORY:   1. Hypertension  2. Dyslipidemia  3. Type 2 diabetes mellitus  4. Vit b12 deficiency anemia  5. Severe aortic stenosis  6.  Nephrolithiasis  7.  Hematuria  8. CKD stage 4    SURGICAL HISTORY:   1. BON with BSO in 1980  2. Left ankle surgery due to accidental trauma in 1986  3. Bilateral cataract excision  4. Cholecystectomy  5. Transcatheter aortic valve replacement on 5/10/16    SOCIAL HISTORY: She denies tobacco use, alcohol use or recreational drug use. She is  and lives with her  in [place], [place]. She has 3 stepdaughters. 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    ALLERGIES: Reviewed on medication card.    MEDICATIONS: [Medcard has been reviewed and/or reconciled.]    REVIEW OF SYSTEMS:   GENERAL: [No fevers, chills or sweats. No fatigue, weight loss or loss of appetite.]  HEENT: [No blurred vision, tinnitus, nasal discharge, sorethroat or dysphagia.]  HEART: [No chest pain, palpitations or shortness of breath.]   LUNGS: [No cough, hemoptysis or breathing problems.]  ABDOMEN: [No abdominal pain, nausea, vomiting, diarrhea, constipation or melena.]  GENITOURINARY:  [No dysuria, bleeding or malodorous discharge.]  NEURO: [No headache, dizziness or vertigo.]  HEMATOLOGY: [No easy bruising, spontaneous bleeding or blood clots in the past].  MUSCULOSKELETAL: [No arthralgias, myalgias or bone pains.]  SKIN: [No rashes or skin lesions.]  PSYCHIATRY: [No depression or anxiety.]    PHYSICAL EXAMINATION:   VS: Reviewed on nurse's notes.  APPEARANCE: The patient is a well-developed, well-nourished and obese  female who appears in no acute distress. She was accompanied to this clinic visit by her .  HEENT: No scleral icterus. Both external auditory canals clear. No oral ulcers, lesions. Throat clear  HEAD: No sinus tenderness.  NECK: Supple. No palpable lymphadenopathy. Thyroid non-tender, no palpable masses  CHEST: Breath sounds clear bilaterally. No rales. No rhonchi. Unlabored respirations.  CARDIOVASCULAR: Normal S1, S2. Normal rate. Regular rhythm. 2/6 ESM noted  ABDOMEN: Bowel sounds normal. No tenderness. No abdominal distention. No hepatomegaly. No splenomegaly.  LYMPHATIC: No palpable supraclavicular, axillary nodes  EXTREMITIES: No clubbing, cyanosis, edema  SKIN: No lesions. No petechiae. No ecchymoses. No induration or nodules  NEUROLOGIC: No focal findings. Alert & Oriented x 3. Mood appropriate to affect    LABS:   Reviewed    IMPRESSION:  1. Chronic anemia  2. Chronic leukopenia without neutropenia  3. Alpha thalassemia carrier  4. History of vitamin B12 deficiency  5. CKD stage 4  6. Anemia due to CKD    PLAN:  1. I discussed the results of her most recent CBC in detail with her. She will continue procrit injections today as her Hb was <10 gm/dl. The patient has had an extensive workup for her chronic anemia in the past and no significant etiology for this has been found.  At this time it does appear likely that this might be related to her chronic kidney disease. She is agreeable to proceed with additional procrit injections as and when indicated.  The patient gets 20,000 units subcutaneously weekly x 4 weeks.   2. The patient has been taking vitamin B12 tablets.  3. The patient has had a chronic asymptomatic leukopenia. This has remained stable. This is likely benign in etiology and can be conservatively followed for now.  4. From the hematology oncology perspective the patient is cleared to proceed with her kidney transplantation when necessary.    Recheck labs in [facility] in 6 weeks. Possible procrit depending on results at follow up. She knows to call sooner for any additional questions or new problems.    David Addison MD

## 2017-03-24 NOTE — TELEPHONE ENCOUNTER
Called patient to get scheduled for lab work. No answer, left message for patient to return the call.

## 2017-03-24 NOTE — MR AVS SNAPSHOT
Patient Information     Patient Name Sex Citlali Shook Female 1950      Visit Information        Provider Department Dept Phone Center    3/24/2017 11:45 AM [facility] [phone] [facility]      Patient Instructions      [facility]  [address]  [address]  [phone] phone     [phone] fax  Hours of Operation: Monday- Friday 8:00am- 5:00pm  After hours phone  [phone]  Hematology / Oncology Physicians on call      Dr. Roel Silverman    Please call with any concerns regarding your appointment today.       Your Current Medications Are     allopurinol (ZYLOPRIM) 100 MG tablet    aspirin (ECOTRIN) 81 MG EC tablet    calcitRIOL (ROCALTROL) 0.5 MCG Cap    clopidogrel (PLAVIX) 75 mg tablet    cyanocobalamin (VITAMIN B-12) 1000 MCG tablet    ergocalciferol (ERGOCALCIFEROL) 50,000 unit Cap    isosorbide mononitrate (IMDUR) 60 MG 24 hr tablet    losartan (COZAAR) 25 MG tablet    nifedipine (ADALAT CC) 90 MG TbSR    omeprazole (PRILOSEC) 40 MG capsule    rosuvastatin (CRESTOR) 40 MG Tab      Facility-Administered Medications     epoetin carmen injection 20,000 Units      Appointments for Next Year     3/28/2017  8:15 AM FASTING LAB (5 min.) [facility]AT[facility], [place]    1. Do not eat or drink anything for TEN HOURS (10) PRIOR TO TEST. Do not chew gum or eat candy mints, even those claiming to be sugar free. Water is allowed but do not drink any other fluids 2. Take your regular daily medicines as your doctor has ordered. If you are diabetic, do not take your insulin or other diabetic medication until your blood is drawn and you are ready to eat. Your physician may have special instructions for diabetics. Check with your doctor if you have any questions.3. Alcoholic beverages are not allowed starting at 6:00pm the evening before your appointment.     3/28/2017  1:30 PM ESTABLISHED PATIENT (30 min.) [facility] Magnus Vazquez MD    Arrive at check-in approximately 15 minutes before your scheduled appointment time. Bring all outside medical records and imaging, along with a list of your current medications and insurance card.    (off [address]) 3th floor    3/31/2017  9:00 AM INFUSION 015 MIN (15 min.) [facility] CHAIR 05 [facility]    Arrive at check-in approximately 15 minutes before your scheduled appointment time. Bring all outside medical records and imaging, along with a list of your current medications and insurance card.    (off [address]) 1st floor    4/4/2017  3:00 PM ESTABLISHED PATIENT (20 min.) [facility] Galindo Louis MD    Arrive at check-in approximately 15 minutes before your scheduled appointment time. Bring all outside medical records and imaging, along with a list of your current medications and insurance card.    4/7/2017  9:00 AM INFUSION 015 MIN (15 min.) [facility] CHAIR 05 [facility]    Arrive at check-in approximately 15 minutes before your scheduled appointment time. Bring all outside medical records and imaging, along with a list of your current medications and insurance card.    (off [address]) 1st floor    4/13/2017  9:00 AM INFUSION 015 MIN (15 min.) [facility] CHAIR 01 [facility]    Arrive at check-in approximately 15 minutes before your scheduled appointment time. Bring all outside medical records and imaging, along with a list of your current medications and insurance card.    (off [address]) [facility] floor    5/5/2017  9:55 AM NON FASTING LAB (5 min.) [facility]    Arrive at check-in approximately 15 minutes before your scheduled appointment time. Bring all outside medical records and imaging, along with a list of your current medications and insurance card.    6/7/2017 10:00 AM NON FASTING LAB (5 min.) [facility]    Arrive at check-in approximately 15 minutes before your scheduled appointment time. Bring all outside medical records and imaging, along with a list of your current medications and insurance card.         Default Flowsheet Data (last 24 hours)      Amb Complex Vitals [place]        03/24/17 1040                Measurements    Weight 79.2 kg (174 lb 9.7 oz)        BP (!)  160/82        Temp 98.8 °F (37.1 °C)        Pulse 72        SpO2 98 %        Pain Assessment    Pain Score Zero                Allergies     No Known Drug Allergies       Medications You Received from 03/23/2017 1147 to 03/24/2017 1147        Date/Time Order Dose Route Action     03/24/2017 1144 epoetin carmen injection 20,000 Units 20,000 Units Subcutaneous Given      Current Discharge Medication List     Cannot display discharge medications since this is not an admission.

## 2017-03-27 ENCOUNTER — TELEPHONE (OUTPATIENT)
Dept: NEPHROLOGY | Facility: CLINIC | Age: 67
End: 2017-03-27

## 2017-03-27 NOTE — TELEPHONE ENCOUNTER
----- Message from Shivani Cochran sent at 3/27/2017  8:44 AM CDT -----  Contact: pt  Pt is calling Nurse staff regarding a request for lab orders to be seen in [place] today if  at all possible, or patient can do everything the same day. Pt call back to be advise  [phone] cell . thanks

## 2017-03-27 NOTE — TELEPHONE ENCOUNTER
Returned call to patient. Informed her that if she do her blood work in [place] on tomorrow the orders will not be back in time for Dr. Vazquez's appointment. Patient agreed to come in early to have her labs done at [facility].

## 2017-03-28 ENCOUNTER — LAB VISIT (OUTPATIENT)
Dept: LAB | Facility: HOSPITAL | Age: 67
End: 2017-03-28
Attending: FAMILY MEDICINE
Payer: MEDICARE

## 2017-03-28 ENCOUNTER — OFFICE VISIT (OUTPATIENT)
Dept: NEPHROLOGY | Facility: CLINIC | Age: 67
End: 2017-03-28
Payer: MEDICARE

## 2017-03-28 VITALS
WEIGHT: 175.5 LBS | HEIGHT: 62 IN | DIASTOLIC BLOOD PRESSURE: 66 MMHG | HEART RATE: 86 BPM | SYSTOLIC BLOOD PRESSURE: 140 MMHG | BODY MASS INDEX: 32.3 KG/M2

## 2017-03-28 DIAGNOSIS — N18.4 CHRONIC KIDNEY DISEASE (CKD), STAGE IV (SEVERE): Primary | ICD-10-CM

## 2017-03-28 DIAGNOSIS — E78.5 HYPERLIPIDEMIA, UNSPECIFIED HYPERLIPIDEMIA TYPE: ICD-10-CM

## 2017-03-28 DIAGNOSIS — N18.4 CHRONIC KIDNEY DISEASE (CKD), STAGE IV (SEVERE): ICD-10-CM

## 2017-03-28 DIAGNOSIS — N18.5 CKD (CHRONIC KIDNEY DISEASE) STAGE 5, GFR LESS THAN 15 ML/MIN: Primary | ICD-10-CM

## 2017-03-28 LAB
ALBUMIN SERPL BCP-MCNC: 3.7 G/DL
ALBUMIN SERPL BCP-MCNC: 3.7 G/DL
ALP SERPL-CCNC: 78 U/L
ALT SERPL W/O P-5'-P-CCNC: 11 U/L
ANION GAP SERPL CALC-SCNC: 10 MMOL/L
AST SERPL-CCNC: 14 U/L
BILIRUB DIRECT SERPL-MCNC: 0.1 MG/DL
BILIRUB SERPL-MCNC: 0.3 MG/DL
BUN SERPL-MCNC: 26 MG/DL
CALCIUM SERPL-MCNC: 10.4 MG/DL
CHLORIDE SERPL-SCNC: 113 MMOL/L
CHOLEST/HDLC SERPL: 4 {RATIO}
CO2 SERPL-SCNC: 22 MMOL/L
CREAT SERPL-MCNC: 2.7 MG/DL
EST. GFR  (AFRICAN AMERICAN): 20 ML/MIN/1.73 M^2
EST. GFR  (NON AFRICAN AMERICAN): 17 ML/MIN/1.73 M^2
GLUCOSE SERPL-MCNC: 98 MG/DL
HDL/CHOLESTEROL RATIO: 25 %
HDLC SERPL-MCNC: 216 MG/DL
HDLC SERPL-MCNC: 54 MG/DL
LDLC SERPL CALC-MCNC: 142 MG/DL
NONHDLC SERPL-MCNC: 162 MG/DL
PHOSPHATE SERPL-MCNC: 2.9 MG/DL
POTASSIUM SERPL-SCNC: 4.1 MMOL/L
PROT SERPL-MCNC: 7.6 G/DL
PTH-INTACT SERPL-MCNC: 219 PG/ML
SODIUM SERPL-SCNC: 145 MMOL/L
TRIGL SERPL-MCNC: 100 MG/DL
URATE SERPL-MCNC: 7.2 MG/DL

## 2017-03-28 PROCEDURE — 99999 PR PBB SHADOW E&M-EST. PATIENT-LVL III: CPT | Mod: PBBFAC,,, | Performed by: INTERNAL MEDICINE

## 2017-03-28 PROCEDURE — 99214 OFFICE O/P EST MOD 30 MIN: CPT | Mod: S$PBB,,, | Performed by: INTERNAL MEDICINE

## 2017-03-28 PROCEDURE — 99213 OFFICE O/P EST LOW 20 MIN: CPT | Mod: PBBFAC,PO | Performed by: INTERNAL MEDICINE

## 2017-03-28 RX ORDER — CLOPIDOGREL BISULFATE 75 MG/1
75 TABLET ORAL DAILY
COMMUNITY
End: 2017-07-11

## 2017-03-28 NOTE — MR AVS SNAPSHOT
[facility]  [address] [place] [address]  Phone: [phone]  Fax: [phone]                  Citlali Karimi   3/28/2017 1:30 PM   Office Visit    Description:  Female : 1950   Provider:  Magnus Vazquez MD   Department[facility]  [facility] - [facility]phrology           Reason for Visit     Chronic Kidney Disease     Follow-up           Diagnoses this Visit        Comments    Chronic kidney disease (CKD), stage IV (severe)    -  Primary            To Do List           Future Appointments        Provider Department Dept Phone    3/31/2017 9:00 AM CHAIR 05 [facility] [phone]    2017 3:00 PM Galindo Louis MD [facility] [phone]    2017 9:00 AM CHAIR 05 [facility] [phone]    2017 9:00 AM CHAIR 01 [facility] [phone]    2017 9:55 AM LABORATORY, [place] [facility] [phone]      Goals (5 Years of Data)     None      Follow-Up and Disposition     Return in about 4 months (around 2017).      [facility]     [facility] Nurse Care [facility] -  Assistance  Registered nurses in the [facility] provide clinical advisement, health education, appointment booking, and other advisory services.  Call for this free service at [phone].             Medications           Message regarding Medications     Verify the changes and/or additions to your medication regime listed below are the same as discussed with your clinician today.  If any of these changes or additions are incorrect, please notify your healthcare provider.        STOP taking these medications     rosuvastatin (CRESTOR) 40 MG Tab Take 1 tablet (40 mg total) by mouth every evening.           Verify that the below list of medications is an accurate representation of the medications you are currently taking.  If none reported, the list may be blank. If incorrect, please contact your  "healthcare provider. Carry this list with you in case of emergency.           Current Medications     allopurinol (ZYLOPRIM) 100 MG tablet Take 1 tablet (100 mg total) by mouth once daily.    aspirin (ECOTRIN) 81 MG EC tablet Take 81 mg by mouth once daily.    calcitRIOL (ROCALTROL) 0.5 MCG Cap TAKE ONE CAPSULE BY MOUTH DAILY    clopidogrel (PLAVIX) 75 mg tablet Take 75 mg by mouth once daily.    cyanocobalamin (VITAMIN B-12) 1000 MCG tablet Take 1 tablet (1,000 mcg total) by mouth once daily.    ergocalciferol (ERGOCALCIFEROL) 50,000 unit Cap Take 1 capsule (50,000 Units total) by mouth every 30 days.    isosorbide mononitrate (IMDUR) 60 MG 24 hr tablet TAKE ONE TABLET BY MOUTH DAILY    losartan (COZAAR) 25 MG tablet TAKE ONE TABLET BY MOUTH DAILY    nifedipine (ADALAT CC) 90 MG TbSR Take 1 tablet (90 mg total) by mouth once daily.    omeprazole (PRILOSEC) 40 MG capsule Take 1 capsule (40 mg total) by mouth once daily.           Clinical Reference Information           Your Vitals Were     BP Pulse Height Weight BMI    140/66 86 5' 2" (1.575 m) 79.6 kg (175 lb 7.8 oz) 32.1 kg/m2      Blood Pressure          Most Recent Value    BP  (!)  140/66      Allergies as of 3/28/2017     No Known Drug Allergies      Immunizations Administered on Date of Encounter - 3/28/2017     None      Orders Placed During Today's Visit     Future Labs/Procedures Expected by Expires    Protein / creatinine ratio, urine  3/28/2017 3/28/2018    PTH, intact  3/28/2017 5/27/2018    Renal function panel  3/28/2017 5/27/2018    Uric acid  3/28/2017 5/27/2018    Urinalysis  3/28/2017 3/28/2018      Maintenance Dialysis History     Patient has no recorded history of maintenance dialysis.      Transplant Information        Txp Date Organ Coordinator Care Team     Kidney Luis F Whelan Jr., RN Referring Physician:  Magnus Vazquez MD   Current Nephrologist:  Magnus Vazquez MD         Instructions    Avoid NSAID pain medications such as advil, " aleve, motrin, ibuprofen, naprosyn, meloxicam, diclofenac, mobic.     Fluid restriction about 40 ox a day     Low  salt diet                Language Assistance Services     ATTENTION: Language assistance services are available, free of charge. Please call [phone].      ATENCIÓN: Si habla susan, tiene a mccullough disposición servicios gratuitos de asistencia lingüística. Llame al [phone].     CHÚ Ý: N?u b?n nói Ti?ng Vi?t, có các d?ch v? h? tr? ngôn ng? mi?n phí dành cho b?n. G?i s? [phone].         [facility] complies with applicable Federal civil rights laws and does not discriminate on the basis of race, color, national origin, age, disability, or sex.

## 2017-03-28 NOTE — PROGRESS NOTES
Subjective:       Patient ID: Citlali Karimi is a 67 y.o.   female who presents for follow-up evaluation of CKD stage 4, HTN, SHPT, Anemia          Evelio Schuster MD      HPI: Citlali Karimi Is a pleasant 67-year-old  woman seen office today in f/u for above medical problems. I saw her about 3 months ago . Recent lab results were discussed with the patient and het  . Stable renal function with a serum creatinine of 2.7 mg/dL and GFR of about 20 mL per minute. Other provider notes in the interim were reviewed. S/p TAVR , Multiple family members with chronic kidney disease secondary to hypertension and diabetes. She denies recent hospitalizations or ER visits.  She has anemia of chronic kidney disease and receives Procrit injections in hematology Department.      Important Info :       She underwent a native kidney biopsy on 10/10/13. Final report of the kidney biopsy is negative for any specific etiology for acute renal failure. 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           Past Medical History:   Diagnosis Date    Acid reflux 1/7/2015    Anxiety 1/7/2015    Aortic valvar stenosis     Arthritis     osteo    Callus     Chronic anemia     followed by Dr. Addison    CKD (chronic kidney disease) stage 4, GFR 15-29 ml/min     followed by Nephrology    CKD (chronic kidney disease) stage 5, GFR less than 15 ml/min 8/7/2015    CKD (chronic kidney disease), stage IV 1/7/2015    Combined hyperlipidemia associated with type 2 diabetes mellitus     Coronary artery disease     Diabetes mellitus type II, controlled, with no complications     LBSL 108 today    Diabetes mellitus, type 2 - only on oral medications 1/7/2015    Encounter for blood transfusion     Gallbladder problem     gallbladder surgery    Heart murmur     Hyperparathyroidism, secondary renal 1/7/2015    Hypertension 8/7/2015    Hypertension associated with  diabetes 11/12/2012    Kidney transplant candidate 1/7/2015    Overweight(278.02)     Urinary tract infection          Current Outpatient Prescriptions on File Prior to Visit   Medication Sig Dispense Refill    allopurinol (ZYLOPRIM) 100 MG tablet Take 1 tablet (100 mg total) by mouth once daily. 30 tablet 8    aspirin (ECOTRIN) 81 MG EC tablet Take 81 mg by mouth once daily.      calcitRIOL (ROCALTROL) 0.5 MCG Cap TAKE ONE CAPSULE BY MOUTH DAILY 90 capsule 5    cyanocobalamin (VITAMIN B-12) 1000 MCG tablet Take 1 tablet (1,000 mcg total) by mouth once daily. 90 tablet 12    ergocalciferol (ERGOCALCIFEROL) 50,000 unit Cap Take 1 capsule (50,000 Units total) by mouth every 30 days. 3 capsule 1    isosorbide mononitrate (IMDUR) 60 MG 24 hr tablet TAKE ONE TABLET BY MOUTH DAILY 30 tablet 9    losartan (COZAAR) 25 MG tablet TAKE ONE TABLET BY MOUTH DAILY 30 tablet 9    nifedipine (ADALAT CC) 90 MG TbSR Take 1 tablet (90 mg total) by mouth once daily. 30 tablet 11    omeprazole (PRILOSEC) 40 MG capsule Take 1 capsule (40 mg total) by mouth once daily. 30 capsule 11    [DISCONTINUED] clopidogrel (PLAVIX) 75 mg tablet Take 4 pills tonight, the 1 pill daily (Patient taking differently: 75 mg once. ) 30 tablet 11    [DISCONTINUED] rosuvastatin (CRESTOR) 40 MG Tab Take 1 tablet (40 mg total) by mouth every evening. 30 tablet 1     No current facility-administered medications on file prior to visit.            Review of Systems  :      Constitutional: Negative for fever, activity change, appetite change , she has some fatigue.    HENT: Negative for congestion, sore throat, facial swelling, trouble swallowing and voice change.    Eyes: Negative for redness and visual disturbance.    Respiratory: Negative for apnea, cough, chest tightness, shortness of breath and wheezing.    Cardiovascular: Negative for chest pain, palpitations and leg swelling.    Gastrointestinal: Negative for nausea, vomiting, abdominal pain,  diarrhea, constipation, blood in stool and abdominal distention.    Genitourinary: Negative for dysuria, urgency, frequency, hematuria, flank pain, decreased urine volume, difficulty urinating and pelvic pain.    Musculoskeletal: Negative for back pain, joint swelling and gait problem.    Skin: Negative for color change and rash.    Neurological: Negative for dizziness, syncope, weakness and headaches.    Hematological: Does not bruise/bleed easily.    Psychiatric/Behavioral: Negative for behavioral problems, confusion and agitation. The patient is not nervous       Objective:         Vitals:    03/28/17 1336   BP: (!) 140/66   Pulse: 86       Weight 175 lbs , stable     Physical Exam  :    Constitutional: She is oriented to person, place, and time. She appears well-developed and well-nourished. No distress.    HENT: Head: Normocephalic and atraumatic.  Mouth/Throat: Oropharynx is clear and moist. No oropharyngeal exudate.    Eyes: Conjunctivae normal and EOM are normal. Pupils are equal, round, and reactive to light. No scleral icterus.    Neck: Normal range of motion. Neck supple. No JVD present. Carotid bruit is not present. No tracheal deviation present. No mass and no thyromegaly present.    Cardiovascular: Normal rate, regular rhythm and intact distal pulses. Exam reveals no gallop and no friction rub. No Murmurs    Pulmonary/Chest: Effort normal and breath sounds normal. No respiratory distress. She has no wheezes. She has no rales. She exhibits no tenderness.    Abdominal: Soft. Bowel sounds are normal. She exhibits no distension, no abdominal bruit, no ascites and no mass. There is no hepatosplenomegaly. There is no tenderness. There is no rebound, no guarding and no CVA tenderness.   Musculoskeletal: Normal range of motion. She exhibits no edema and no tenderness.   Lymphadenopathy: She has no cervical adenopathy.   Neurological: She is alert and oriented to person, place, and time. No cranial nerve  deficit. She exhibits normal muscle tone. Coordination normal.    She is in a wheel chair   Skin: Skin is warm and intact. No rash noted. No erythema. No pallor.   Psychiatric: She has a normal mood and affect. Her behavior is normal. Judgment normal.              Labs:    Lab Results   Component Value Date    CREATININE 2.7 (H) 03/28/2017    BUN 26 (H) 03/28/2017     03/28/2017    K 4.1 03/28/2017     (H) 03/28/2017    CO2 22 (L) 03/28/2017       Lab Results   Component Value Date    WBC 5.36 03/15/2017    HGB 9.5 (L) 03/15/2017    HCT 30.2 (L) 03/15/2017    MCV 87 03/15/2017     03/15/2017       Lab Results   Component Value Date    .0 (H) 12/20/2016    CALCIUM 10.4 03/28/2017    PHOS 2.9 03/28/2017       Lab Results   Component Value Date    ALBUMIN 3.7 03/28/2017    ALBUMIN 3.7 03/28/2017       Lab Results   Component Value Date    URICACID 7.2 (H) 03/28/2017       Lab Results   Component Value Date    HGBA1C 6.6 (H) 12/05/2016         Impression and plan - 67 -year-old  woman seen today in f/u for following medical problems ,       1. CKD stage 4 - Her serum creatinine fluctuates between 2 and 3 mg/dL ,  She has reflux nephropathy. I again advised patient to avoid NSAIDs. She is S/p renal biopsy 10/2013 - Kidney biopsy was negative for any specific cause. There is 40% interstitial fibrosis from reflux nephropathy. Most recent serum creatinine is 2.7 mg/dL.  recent serum creatinine is stable at 2.7 mg/dL.      2. Hypertension - Blood pressure is Controlled on current regimen. Will continue to follow.       3. Anemia of CKD - she is currently following with hematology department for the same. On Epogen        4. Aortic stenosis : She is closely followed by Cards, s/p TAVR       5. hyperparathyroidism - ? Primary , cont calcitriol 0.5 mcg daily. Follow serum calcium levels closely,       6. Diabetes mellitus type 2 - Well controlled. Off Metformin        7. Proteinuria -  mild, continue Cozaar.        8. Volume - discussed adequate fluid intake      9. Kidney stone - nonobstructive , not seen on recent renal U/S ,        10. Hyperuricemia - again discussed low purine diet , cont Allopurinol 100 mg daily.      11. Abnormal UA - no symptoms ,       More than 25 minutes was spent with the patient reviewing her lab results and discussing plan of care.  F/u in 4  months,       Magnus Vazquez MD

## 2017-03-28 NOTE — PATIENT INSTRUCTIONS
Avoid NSAID pain medications such as advil, aleve, motrin, ibuprofen, naprosyn, meloxicam, diclofenac, mobic.     Fluid restriction about 40 ox a day     Low  salt diet

## 2017-03-28 NOTE — PROGRESS NOTES
The lipid level is currently abnormal.   Due to this, I would like to ask the patient to start lipitor 40 mg a day mg po q day.     Give 1 month of medicine and refill it x 2 months and send the medicine in to the patient's pharmacy.  Book a lipid and liver panel in 3 months for the patient.    Ask the patient to follow a low fat diet and avoid red meat and fried foods.

## 2017-03-31 ENCOUNTER — INFUSION (OUTPATIENT)
Dept: INFUSION THERAPY | Facility: HOSPITAL | Age: 67
End: 2017-03-31
Attending: INTERNAL MEDICINE
Payer: MEDICARE

## 2017-03-31 VITALS
SYSTOLIC BLOOD PRESSURE: 146 MMHG | TEMPERATURE: 99 F | DIASTOLIC BLOOD PRESSURE: 68 MMHG | RESPIRATION RATE: 18 BRPM | HEART RATE: 80 BPM

## 2017-03-31 DIAGNOSIS — N18.9 ANEMIA IN CHRONIC RENAL DISEASE: Primary | ICD-10-CM

## 2017-03-31 DIAGNOSIS — D63.1 ANEMIA IN CHRONIC RENAL DISEASE: Primary | ICD-10-CM

## 2017-03-31 PROCEDURE — 96372 THER/PROPH/DIAG INJ SC/IM: CPT

## 2017-03-31 PROCEDURE — 63600175 PHARM REV CODE 636 W HCPCS: Mod: EC | Performed by: INTERNAL MEDICINE

## 2017-03-31 RX ORDER — NIFEDIPINE 90 MG/1
90 TABLET, FILM COATED, EXTENDED RELEASE ORAL DAILY
Qty: 30 TABLET | Refills: 11 | Status: ON HOLD | OUTPATIENT
Start: 2017-03-31 | End: 2018-06-11 | Stop reason: SDUPTHER

## 2017-03-31 RX ADMIN — ERYTHROPOIETIN 20000 UNITS: 20000 INJECTION, SOLUTION INTRAVENOUS; SUBCUTANEOUS at 08:03

## 2017-03-31 NOTE — MR AVS SNAPSHOT
Patient Information     Patient Name Sex Citlali Shook Female 1950      Visit Information        Provider Department Dept Phone Center    3/31/2017 9:00 AM [facility] [phone] [facility]      Patient Instructions      [facility]  [address]  [address] [address]  [phone] phone     [phone] fax  Hours of Operation: Monday- Friday 8:00am- 5:00pm  After hours phone  [phone]  Hematology / Oncology Physicians on call      Dr. Roel Silverman    Please call with any concerns regarding your appointment today.        FALL PREVENTION   Falls often occur due to slipping, tripping or losing your balance. Here are ways to reduce your risk of falling again.   Was there anything that caused your fall that can be fixed, removed or replaced?   Make your home safe by keeping walkways clear of objects you may trip over.   Use non-slip pads under rugs.   Do not walk in poorly lit areas.   Do not stand on chairs or wobbly ladders.   Use caution when reaching overhead or looking upward. This position can cause a loss of balance.   Be sure your shoes fit properly, have non-slip bottoms and are in good condition.   Be cautious when going up and down stairs, curbs, and when walking on uneven sidewalks.   If your balance is poor, consider using a cane or walker.   If your fall was related to alcohol use, stop or limit alcohol intake.   If your fall was related to use of sleeping medicines, talk to your doctor about this. You may need to reduce your dosage at bedtime if you awaken during the night to go to the bathroom.   To reduce the need for nighttime bathroom trips:   Avoid drinking fluids for several hours before going to bed   Empty your bladder before going to bed   Men can keep a urinal at the bedside   © 9841-0424 [employer], [address] [place] [address], [place] [address]. All  rights reserved. This information is not intended as a substitute for professional medical care. Always follow your healthcare professional's instructions.         Your Current Medications Are     allopurinol (ZYLOPRIM) 100 MG tablet    aspirin (ECOTRIN) 81 MG EC tablet    calcitRIOL (ROCALTROL) 0.5 MCG Cap    clopidogrel (PLAVIX) 75 mg tablet    cyanocobalamin (VITAMIN B-12) 1000 MCG tablet    ergocalciferol (ERGOCALCIFEROL) 50,000 unit Cap    isosorbide mononitrate (IMDUR) 60 MG 24 hr tablet    losartan (COZAAR) 25 MG tablet    nifedipine (ADALAT CC) 90 MG TbSR    omeprazole (PRILOSEC) 40 MG capsule      Facility-Administered Medications     epoetin carmen injection 20,000 Units      Appointments for Next Year     4/4/2017  3:00 PM ESTABLISHED PATIENT (20 min.) [facility] Galindo Louis MD    Arrive at check-in approximately 15 minutes before your scheduled appointment time. Bring all outside medical records and imaging, along with a list of your current medications and insurance card.    4/7/2017  9:00 AM INFUSION 015 MIN (15 min.) [facility] CHAIR 05 ON[facility]    Arrive at check-in approximately 15 minutes before your scheduled appointment time. Bring all outside medical records and imaging, along with a list of your current medications and insurance card.    (off [facility]) 1st floor    4/13/2017  9:00 AM INFUSION 015 MIN (15 min.) [facility] CHAIR 01 ONLH    Arrive at check-in approximately 15 minutes before your scheduled appointment time. Bring all outside medical records and imaging, along with a list of your current medications and insurance card.    (off [facility]) 1st floor    5/5/2017  9:55 AM NON FASTING LAB (5 min.) [facility], [place]    Arrive at check-in approximately 15 minutes before your scheduled appointment time. Bring all outside medical records and imaging, along with a list of your current medications and insurance  card.    6/7/2017 10:00 AM NON FASTING LAB (5 min.) [facility]    Arrive at check-in approximately 15 minutes before your scheduled appointment time. Bring all outside medical records and imaging, along with a list of your current medications and insurance card.    7/24/2017  8:35 AM NON FASTING LAB (5 min.) [facility]    Arrive at check-in approximately 15 minutes before your scheduled appointment time. Bring all outside medical records and imaging, along with a list of your current medications and insurance card.    7/24/2017  8:40 AM URINE (10 min.) [facility]    Arrive at check-in approximately 15 minutes before your scheduled appointment time. Bring all outside medical records and imaging, along with a list of your current medications and insurance card.    7/31/2017 10:00 AM ESTABLISHED PATIENT (30 min.) [facility] Magnus Vazquez MD    Arrive at check-in approximately 15 minutes before your scheduled appointment time. Bring all outside medical records and imaging, along with a list of your current medications and insurance card.    (off [employer]) 3th floor         Default Flowsheet Data (last 24 hours)      Amb Complex Vitals [place]        03/31/17 0853                Measurements    BP (!)  146/68        Temp 98.6 °F (37 °C)        Pulse 80        Resp 18        Pain Assessment    Pain Score Zero                Allergies     No Known Drug Allergies       Medications You Received from 03/30/2017 0901 to 03/31/2017 0901        Date/Time Order Dose Route Action     03/31/2017 0858 epoetin carmen injection 20,000 Units 20,000 Units Subcutaneous Given      Current Discharge Medication List     Cannot display discharge medications since this is not an admission.

## 2017-04-04 ENCOUNTER — OFFICE VISIT (OUTPATIENT)
Dept: CARDIOLOGY | Facility: CLINIC | Age: 67
End: 2017-04-04
Payer: MEDICARE

## 2017-04-04 VITALS — HEIGHT: 62 IN | DIASTOLIC BLOOD PRESSURE: 69 MMHG | SYSTOLIC BLOOD PRESSURE: 134 MMHG | HEART RATE: 93 BPM

## 2017-04-04 DIAGNOSIS — I50.32 CHRONIC DIASTOLIC HEART FAILURE: ICD-10-CM

## 2017-04-04 DIAGNOSIS — M79.605 PAIN IN BOTH LOWER EXTREMITIES: ICD-10-CM

## 2017-04-04 DIAGNOSIS — M79.604 PAIN IN BOTH LOWER EXTREMITIES: ICD-10-CM

## 2017-04-04 DIAGNOSIS — N18.5 CKD (CHRONIC KIDNEY DISEASE) STAGE 5, GFR LESS THAN 15 ML/MIN: Chronic | ICD-10-CM

## 2017-04-04 DIAGNOSIS — E11.59 HYPERTENSION ASSOCIATED WITH DIABETES: ICD-10-CM

## 2017-04-04 DIAGNOSIS — Z95.2 S/P AVR (AORTIC VALVE REPLACEMENT): Primary | ICD-10-CM

## 2017-04-04 DIAGNOSIS — E11.49 TYPE 2 DIABETES MELLITUS WITH NEUROLOGICAL MANIFESTATIONS: ICD-10-CM

## 2017-04-04 DIAGNOSIS — I15.2 HYPERTENSION ASSOCIATED WITH DIABETES: ICD-10-CM

## 2017-04-04 PROBLEM — M79.609 LIMB PAIN: Status: ACTIVE | Noted: 2017-04-04

## 2017-04-04 PROCEDURE — 99212 OFFICE O/P EST SF 10 MIN: CPT | Mod: PBBFAC,PO,TXP | Performed by: INTERNAL MEDICINE

## 2017-04-04 PROCEDURE — 99214 OFFICE O/P EST MOD 30 MIN: CPT | Mod: S$PBB,NTX,, | Performed by: INTERNAL MEDICINE

## 2017-04-04 PROCEDURE — 99999 PR PBB SHADOW E&M-EST. PATIENT-LVL II: CPT | Mod: PBBFAC,TXP,, | Performed by: INTERNAL MEDICINE

## 2017-04-04 NOTE — MR AVS SNAPSHOT
[facility]  [address]  [place] [address]  Phone: [phone]  Fax: [phone]                  Citlali Karimi   2017 3:00 PM   Office Visit    Description:  Female : 1950   Provider:  Galindo Louis MD   Department:  [facility]           Reason for Visit     Follow-up           Diagnoses this Visit        Comments    S/P AVR (aortic valve replacement)    -  Primary     Chronic diastolic heart failure         CKD (chronic kidney disease) stage 5, GFR less than 15 ml/min         Type 2 diabetes mellitus with neurological manifestations         Hypertension associated with diabetes         Pain in both lower extremities                To Do List           Future Appointments        Provider Department Dept Phone    2017 3:00 PM Galindo Louis MD [facility] [phone]    2017 9:00 AM CHAIR 05 ON[facility] [phone]    2017 9:00 AM CHAIR 01 [facility] [phone]    2017 9:55 AM LAB[facility]ATORY, [place] [facility] [phone]    2017 10:00 AM LAB[facility]ATORY, [place] [facility] [phone]      Goals (5 Years of Data)     None      Follow-Up and Disposition     Return in about 6 months (around 10/4/2017).      [facility]     [facility] Nurse Care Line -  Assistance  Unless otherwise directed by your provider, please contact [facility], our nurse care line that is available for  assistance.     Registered nurses in the [facility] provide: appointment scheduling, clinical advisement, health education, and other advisory services.  Call: [phone] (toll free)               Medications           Message regarding Medications     Verify the changes and/or additions to your medication regime listed below are the same as discussed with your clinician today.  If any of these changes or additions are incorrect,  "please notify your healthcare provider.             Verify that the below list of medications is an accurate representation of the medications you are currently taking.  If none reported, the list may be blank. If incorrect, please contact your healthcare provider. Carry this list with you in case of emergency.           Current Medications     allopurinol (ZYLOPRIM) 100 MG tablet Take 1 tablet (100 mg total) by mouth once daily.    aspirin (ECOTRIN) 81 MG EC tablet Take 81 mg by mouth once daily.    calcitRIOL (ROCALTROL) 0.5 MCG Cap TAKE ONE CAPSULE BY MOUTH DAILY    clopidogrel (PLAVIX) 75 mg tablet Take 75 mg by mouth once daily.    cyanocobalamin (VITAMIN B-12) 1000 MCG tablet Take 1 tablet (1,000 mcg total) by mouth once daily.    ergocalciferol (ERGOCALCIFEROL) 50,000 unit Cap Take 1 capsule (50,000 Units total) by mouth every 30 days.    isosorbide mononitrate (IMDUR) 60 MG 24 hr tablet TAKE ONE TABLET BY MOUTH DAILY    losartan (COZAAR) 25 MG tablet TAKE ONE TABLET BY MOUTH DAILY    nifedipine (ADALAT CC) 90 MG TbSR Take 1 tablet (90 mg total) by mouth once daily.    omeprazole (PRILOSEC) 40 MG capsule Take 1 capsule (40 mg total) by mouth once daily.           Clinical Reference Information           Your Vitals Were     BP Pulse Height             134/69 (BP Location: Right arm, Patient Position: Sitting, BP Method: Automatic) 93 5' 2" (1.575 m)         Blood Pressure          Most Recent Value    BP  134/69      Allergies as of 4/4/2017     No Known Drug Allergies      Immunizations Administered on Date of Encounter - 4/4/2017     None      Orders Placed During Today's Visit      Normal Orders This Visit    2D echo with color flow doppler     Future Labs/Procedures Expected by Expires    CAR Ultrasound doppler arterial legs bilat  As directed 4/4/2018      Maintenance Dialysis History     Patient has no recorded history of maintenance dialysis.      Transplant Information        Txp Date Organ " Coordinator Care Team     Kidney Luis F Whelan Jr., RN Referring Physician:  Magnus Vazquez MD   Current Nephrologist:  Magnus Vazquez MD         Language Assistance Services     ATTENTION: Language assistance services are available, free of charge. Please call [phone].      ATENCIÓN: Si habla español, tiene a mccullough disposición servicios gratuitos de asistencia lingüística. Llame al [phone].     CHÚ Ý: N?u b?n nói Ti?ng Vi?t, có các d?ch v? h? tr? ngôn ng? mi?n phí dành cho b?n. G?i s? [phone].         [facility] complies with applicable Federal civil rights laws and does not discriminate on the basis of race, color, national origin, age, disability, or sex.

## 2017-04-04 NOTE — PROGRESS NOTES
Subjective:   Patient ID:  Citlali Karimi is a 67 y.o. female who presents for follow-up since TAVR.  Patient denies CP, angina or anginal equivalent.Statins held secondary to LE weakness.    Hypertension   This is a chronic problem. The current episode started more than 1 year ago. The problem has been gradually improving since onset. The problem is controlled. Pertinent negatives include no chest pain, palpitations or shortness of breath. Past treatments include angiotensin blockers and calcium channel blockers. The current treatment provides moderate improvement. Compliance problems include exercise and medication side effects.    Hyperlipidemia   This is a chronic problem. The current episode started more than 1 year ago. The problem is controlled. Recent lipid tests were reviewed and are variable. Pertinent negatives include no chest pain or shortness of breath. She is currently on no antihyperlipidemic treatment. Compliance problems include medication side effects.        Review of Systems   Constitution: Negative. Negative for weight gain.   HENT: Negative.    Eyes: Negative.    Cardiovascular: Negative.  Negative for chest pain, leg swelling and palpitations.   Respiratory: Negative.  Negative for shortness of breath.    Endocrine: Negative.    Hematologic/Lymphatic: Negative.    Skin: Negative.    Musculoskeletal: Negative for muscle weakness.   Gastrointestinal: Negative.    Genitourinary: Negative.    Neurological: Negative.  Negative for dizziness.   Psychiatric/Behavioral: Negative.    Allergic/Immunologic: Negative.      Family History   Problem Relation Age of Onset    Hypertension Mother     Heart disease Mother     Diabetes Mother     Glaucoma Mother     Aneurysm Father     Stroke Father     Kidney disease Sister     Heart disease Sister     Kidney disease Brother     Hypertension Brother     Diabetes Brother     Diabetes Sister     Kidney disease Sister     Hypertension Brother      Cancer Paternal Grandmother      breast    No Known Problems Maternal Aunt     No Known Problems Maternal Uncle     No Known Problems Paternal Aunt     No Known Problems Paternal Uncle     No Known Problems Maternal Grandmother     No Known Problems Maternal Grandfather     No Known Problems Paternal Grandfather     Anemia Neg Hx     Arrhythmia Neg Hx     Asthma Neg Hx     Clotting disorder Neg Hx     Fainting Neg Hx     Heart attack Neg Hx     Heart failure Neg Hx     Hyperlipidemia Neg Hx     Atrial Septal Defect Neg Hx      Past Medical History:   Diagnosis Date    Acid reflux 1/7/2015    Anxiety 1/7/2015    Aortic valvar stenosis     Arthritis     osteo    Callus     Chronic anemia     followed by Dr. Addison    CKD (chronic kidney disease) stage 4, GFR 15-29 ml/min     followed by Nephrology    CKD (chronic kidney disease) stage 5, GFR less than 15 ml/min 8/7/2015    CKD (chronic kidney disease), stage IV 1/7/2015    Combined hyperlipidemia associated with type 2 diabetes mellitus     Coronary artery disease     Diabetes mellitus type II, controlled, with no complications     LBSL 108 today    Diabetes mellitus, type 2 - only on oral medications 1/7/2015    Encounter for blood transfusion     Gallbladder problem     gallbladder surgery    Heart murmur     Hyperparathyroidism, secondary renal 1/7/2015    Hypertension 8/7/2015    Hypertension associated with diabetes 11/12/2012    Kidney transplant candidate 1/7/2015    Overweight     Urinary tract infection      Current Outpatient Prescriptions on File Prior to Visit   Medication Sig Dispense Refill    allopurinol (ZYLOPRIM) 100 MG tablet Take 1 tablet (100 mg total) by mouth once daily. 30 tablet 8    aspirin (ECOTRIN) 81 MG EC tablet Take 81 mg by mouth once daily.      calcitRIOL (ROCALTROL) 0.5 MCG Cap TAKE ONE CAPSULE BY MOUTH DAILY 90 capsule 5    clopidogrel (PLAVIX) 75 mg tablet Take 75 mg by mouth once daily.       cyanocobalamin (VITAMIN B-12) 1000 MCG tablet Take 1 tablet (1,000 mcg total) by mouth once daily. 90 tablet 12    ergocalciferol (ERGOCALCIFEROL) 50,000 unit Cap Take 1 capsule (50,000 Units total) by mouth every 30 days. 3 capsule 1    isosorbide mononitrate (IMDUR) 60 MG 24 hr tablet TAKE ONE TABLET BY MOUTH DAILY 30 tablet 9    losartan (COZAAR) 25 MG tablet TAKE ONE TABLET BY MOUTH DAILY 30 tablet 9    nifedipine (ADALAT CC) 90 MG TbSR Take 1 tablet (90 mg total) by mouth once daily. 30 tablet 11    omeprazole (PRILOSEC) 40 MG capsule Take 1 capsule (40 mg total) by mouth once daily. 30 capsule 11     No current facility-administered medications on file prior to visit.      Review of patient's allergies indicates:   Allergen Reactions    No known drug allergies        Objective:     Physical Exam   Constitutional: She is oriented to person, place, and time. She appears well-developed and well-nourished.   HENT:   Head: Normocephalic and atraumatic.   Eyes: Conjunctivae and EOM are normal. Pupils are equal, round, and reactive to light.   Neck: Normal range of motion. Neck supple.   Cardiovascular: Normal rate, regular rhythm and intact distal pulses.    Murmur heard.   Systolic murmur is present with a grade of 1/6   Pulmonary/Chest: Effort normal and breath sounds normal.   Abdominal: Soft. Bowel sounds are normal.   Musculoskeletal: Normal range of motion.   Neurological: She is alert and oriented to person, place, and time.   Skin: Skin is warm and dry.   Psychiatric: She has a normal mood and affect.   Nursing note and vitals reviewed.      Assessment:     1. S/P AVR (aortic valve replacement)    2. Chronic diastolic heart failure    3. CKD (chronic kidney disease) stage 5, GFR less than 15 ml/min    4. Type 2 diabetes mellitus with neurological manifestations    5. Hypertension associated with diabetes    6. Pain in both lower extremities        Plan:     S/P AVR (aortic valve  replacement)    Chronic diastolic heart failure    CKD (chronic kidney disease) stage 5, GFR less than 15 ml/min    Type 2 diabetes mellitus with neurological manifestations    Hypertension associated with diabetes    Pain in both lower extremities  -     CAR Ultrasound doppler arterial legs bilat; Future    continue asa, plavix- TAVR  Continue losartan, nifidipine-htn  Echo  Vascular studies

## 2017-04-07 ENCOUNTER — INFUSION (OUTPATIENT)
Dept: INFUSION THERAPY | Facility: HOSPITAL | Age: 67
End: 2017-04-07
Payer: MEDICARE

## 2017-04-07 VITALS
HEART RATE: 81 BPM | TEMPERATURE: 98 F | SYSTOLIC BLOOD PRESSURE: 136 MMHG | RESPIRATION RATE: 18 BRPM | DIASTOLIC BLOOD PRESSURE: 70 MMHG

## 2017-04-07 DIAGNOSIS — N18.9 ANEMIA IN CHRONIC RENAL DISEASE: Primary | ICD-10-CM

## 2017-04-07 DIAGNOSIS — D63.1 ANEMIA IN CHRONIC RENAL DISEASE: Primary | ICD-10-CM

## 2017-04-07 PROCEDURE — 96372 THER/PROPH/DIAG INJ SC/IM: CPT

## 2017-04-07 PROCEDURE — 63600175 PHARM REV CODE 636 W HCPCS: Performed by: INTERNAL MEDICINE

## 2017-04-07 RX ADMIN — ERYTHROPOIETIN 20000 UNITS: 20000 INJECTION, SOLUTION INTRAVENOUS; SUBCUTANEOUS at 09:04

## 2017-04-07 NOTE — PATIENT INSTRUCTIONS
[facility]  [address]  [address] [address]  [phone] phone     [phone] fax  Hours of Operation: Monday- Friday 8:00am- 5:00pm  After hours phone  [phone]  Hematology / Oncology Physicians on call      Dr. Roel Silverman    Please call with any concerns regarding your appointment today.

## 2017-04-07 NOTE — MR AVS SNAPSHOT
Patient Information     Patient Name Sex Citlali Shook Female 1950      Visit Information        Provider Department Dept Phone Center    2017 9:00 AM [place] [facility] [phone] [facility]      Patient Instructions      [facility]  [address]  [address] [address]  [phone] phone     [phone] fax  Hours of Operation: Monday- Friday 8:00am- 5:00pm  After hours phone  [phone]  Hematology / Oncology Physicians on call      Dr. Roel Silverman    Please call with any concerns regarding your appointment today.       Your Current Medications Are     allopurinol (ZYLOPRIM) 100 MG tablet    aspirin (ECOTRIN) 81 MG EC tablet    calcitRIOL (ROCALTROL) 0.5 MCG Cap    clopidogrel (PLAVIX) 75 mg tablet    cyanocobalamin (VITAMIN B-12) 1000 MCG tablet    ergocalciferol (ERGOCALCIFEROL) 50,000 unit Cap    isosorbide mononitrate (IMDUR) 60 MG 24 hr tablet    losartan (COZAAR) 25 MG tablet    nifedipine (ADALAT CC) 90 MG TbSR    omeprazole (PRILOSEC) 40 MG capsule      Facility-Administered Medications     epoetin carmen injection 20,000 Units      Appointments for Next Year     2017  9:00 AM INFUSION 015 MIN (15 min.) [facility] 01 [facility]    Arrive at check-in approximately 15 minutes before your scheduled appointment time. Bring all outside medical records and imaging, along with a list of your current medications and insurance card.    (off [facility]) 1st floor    2017  9:45 AM COLOR FLOW DOPPLER ECHO (45 min.) [facility] SIMPSON, ECHO SCHEDULE    INSTRUCTIONS FOR COMPLETE ECHOCARDIOGRAM EXAM WITH COLOR FLOW DOPPLER You have been scheduled for a Complete Color Flow Doppler Echocardiogram. This test uses ultrasound waves (harmless sound waves) to give detailed images of heart size, function, and structure, and blood flow through the valves and  great vessels.  NO PREPARATION IS NECESSARY. YOU MAY EAT AND TAKE YOUR MEDICATIONS AS USUAL.  Please wear a two-piece outfit. You will be asked to undress from the waist up. Women will put on a patient gown opening to the front. Small pads (electrodes) will be put on your chest to monitor your heart beat. You will be asked to lie on your left side, and will need to remain quiet during the exam.  A transducer coated with a cool gel will be moved firmly over your chest. This transducer creates sound waves that make images of your heart. A computer changes the sound waves into images that are seen on a screen. You may hear loud noises from the echo machine; this is a normal occurrence. You may be asked to exhale and hold your breath for a few seconds. Air in your lungs can affect the images.  The images of your heart are recorded on video tape so the cardiologist can review them later. Your doctor will be informed of the test results. The test will take approximately one hour to complete.  If you are unable to keep your appointment, please call us at [phone] or [place] call [phone] so that another appointment can be scheduled for you. Please note that family members or friends are not allowed to accompany you while you are in the testing area.  Check your appointment slip for the location where the test will be done.    4/24/2017 10:30 AM ARTERIAL BILATERAL LEG (45 min.) [facility] AGUDELO, ECHO SCHEDULE    You have been scheduled for an arterial ultrasound.  This test uses ultrasound waves (harmless sound waves) to give detailed images of the arteries of the legs and to determine the presence of any plaque formation and characteristics and the blood flow within the arteries.  NO PREPARATION IS NECESSARY. YOU MAY EAT AND TAKE YOUR MEDICATION AS USUAL.  You will be asked to remove your pants, socks and shoes. You will be provided with a sheet to cover yourself. You will be asked to lie on your  back, and will need to remain quiet for the duration of the exam.  A transducer coated with a cool gel is placed at the groin and the artery is imaged throughout the course of the leg.  This transducer produces sound waves that create an image of the arteries in the leg. You may hear noises from the ultrasound machine; this is a normal occurrence.  A blood pressure reading will be taken on the arms and at the ankles.  The images are digitally recorded for the physician to review. Your doctor will be informed of the test results. His/her office will contact you with the results. The test can take up to 45 minutes to complete.  If you have been sick for one or two days before the test, and feel you may not be able to complete the test, or if you need to reschedule your appointment, please call us at the number below. Check your appointment slip for the location where the test will be done.  Please note that family members or friends are not allowed to accompany you while you are in the testing area.  Please be on time.    5/5/2017  9:55 AM NON FASTING LAB (5 min.) [facility], [place]    Arrive at check-in approximately 15 minutes before your scheduled appointment time. Bring all outside medical records and imaging, along with a list of your current medications and insurance card.    6/7/2017 10:00 AM NON FASTING LAB (5 min.) [facility], [place]    Arrive at check-in approximately 15 minutes before your scheduled appointment time. Bring all outside medical records and imaging, along with a list of your current medications and insurance card.    7/10/2017  9:40 AM ESTABLISHED PATIENT (20 min.) [facility] Galindo Louis MD    Arrive at check-in approximately 15 minutes before your scheduled appointment time. Bring all outside medical records and imaging, along with a list of your current medications and insurance card.    7/24/2017  8:35 AM  NON FASTING LAB (5 min.) [facility], [place]    Arrive at check-in approximately 15 minutes before your scheduled appointment time. Bring all outside medical records and imaging, along with a list of your current medications and insurance card.    7/24/2017  8:40 AM URINE (10 min.) [facility], [place]    Arrive at check-in approximately 15 minutes before your scheduled appointment time. Bring all outside medical records and imaging, along with a list of your current medications and insurance card.    7/31/2017 10:00 AM ESTABLISHED PATIENT (30 min.) [facility] Magnus Vazquez MD    Arrive at check-in approximately 15 minutes before your scheduled appointment time. Bring all outside medical records and imaging, along with a list of your current medications and insurance card.    [facility] 3th [facility]         Default Flowsheet Data (last 24 hours)      Amb Complex Vitals [facility]        04/07/17 0900                Measurements    /70        Temp 98.3 °F (36.8 °C)        Pulse 81        Resp 18        Pain Assessment    Pain Score Zero                Allergies     No Known Drug Allergies       Medications You Received from 04/06/2017 0922 to 04/07/2017 0922        Date/Time Order Dose Route Action     04/07/2017 0919 epoetin carmen injection 20,000 Units 20,000 Units Subcutaneous Given      Current Discharge Medication List     Cannot display discharge medications since this is not an admission.

## 2017-04-10 ENCOUNTER — LAB VISIT (OUTPATIENT)
Dept: LAB | Facility: HOSPITAL | Age: 67
End: 2017-04-10
Attending: INTERNAL MEDICINE
Payer: MEDICARE

## 2017-04-10 DIAGNOSIS — N18.4 CHRONIC KIDNEY DISEASE (CKD), STAGE IV (SEVERE): ICD-10-CM

## 2017-04-10 LAB
ALBUMIN SERPL BCP-MCNC: 3.5 G/DL
ANION GAP SERPL CALC-SCNC: 10 MMOL/L
BUN SERPL-MCNC: 28 MG/DL
CALCIUM SERPL-MCNC: 10.2 MG/DL
CHLORIDE SERPL-SCNC: 111 MMOL/L
CO2 SERPL-SCNC: 22 MMOL/L
CREAT SERPL-MCNC: 2.6 MG/DL
EST. GFR  (AFRICAN AMERICAN): 20.9 ML/MIN/1.73 M^2
EST. GFR  (NON AFRICAN AMERICAN): 18.2 ML/MIN/1.73 M^2
GLUCOSE SERPL-MCNC: 98 MG/DL
PHOSPHATE SERPL-MCNC: 3 MG/DL
POTASSIUM SERPL-SCNC: 4.3 MMOL/L
SODIUM SERPL-SCNC: 143 MMOL/L

## 2017-04-10 PROCEDURE — 36415 COLL VENOUS BLD VENIPUNCTURE: CPT | Mod: PO,TXP

## 2017-04-10 PROCEDURE — 80069 RENAL FUNCTION PANEL: CPT

## 2017-04-13 ENCOUNTER — INFUSION (OUTPATIENT)
Dept: INFUSION THERAPY | Facility: HOSPITAL | Age: 67
End: 2017-04-13
Attending: INTERNAL MEDICINE
Payer: MEDICARE

## 2017-04-13 VITALS
TEMPERATURE: 99 F | RESPIRATION RATE: 16 BRPM | SYSTOLIC BLOOD PRESSURE: 143 MMHG | HEART RATE: 84 BPM | WEIGHT: 175.5 LBS | BODY MASS INDEX: 32.1 KG/M2 | DIASTOLIC BLOOD PRESSURE: 79 MMHG

## 2017-04-13 DIAGNOSIS — D63.1 ANEMIA IN CHRONIC RENAL DISEASE: Primary | ICD-10-CM

## 2017-04-13 DIAGNOSIS — N18.9 ANEMIA IN CHRONIC RENAL DISEASE: Primary | ICD-10-CM

## 2017-04-13 PROCEDURE — 63600175 PHARM REV CODE 636 W HCPCS: Performed by: INTERNAL MEDICINE

## 2017-04-13 PROCEDURE — 96372 THER/PROPH/DIAG INJ SC/IM: CPT

## 2017-04-13 RX ADMIN — ERYTHROPOIETIN 20000 UNITS: 20000 INJECTION, SOLUTION INTRAVENOUS; SUBCUTANEOUS at 09:04

## 2017-04-13 NOTE — MR AVS SNAPSHOT
Patient Information     Patient Name Sex Citlali Shook Female 1950      Visit Information        Provider Department Dept Phone Center    2017 9:00 AM [facility] [facility] Infusion [phone] [facility]      Patient Instructions      [facility]  [address]  [address] [address]  [phone] phone     [phone] fax  Hours of Operation: Monday- Friday 8:00am- 5:00pm  After hours phone  [phone]  Hematology / Oncology Physicians on call      Dr. Roel Silverman    Please call with any concerns regarding your appointment today.       Your Current Medications Are     allopurinol (ZYLOPRIM) 100 MG tablet    aspirin (ECOTRIN) 81 MG EC tablet    calcitRIOL (ROCALTROL) 0.5 MCG Cap    clopidogrel (PLAVIX) 75 mg tablet    cyanocobalamin (VITAMIN B-12) 1000 MCG tablet    ergocalciferol (ERGOCALCIFEROL) 50,000 unit Cap    isosorbide mononitrate (IMDUR) 60 MG 24 hr tablet    losartan (COZAAR) 25 MG tablet    nifedipine (ADALAT CC) 90 MG TbSR    omeprazole (PRILOSEC) 40 MG capsule      Facility-Administered Medications     epoetin carmen injection 20,000 Units      Appointments for Next Year     2017  9:45 AM COLOR FLOW DOPPLER ECHO (45 min.) [facility] AGUDELO, ECHO SCHEDULE    INSTRUCTIONS FOR COMPLETE ECHOCARDIOGRAM EXAM WITH COLOR FLOW DOPPLER You have been scheduled for a Complete Color Flow Doppler Echocardiogram. This test uses ultrasound waves (harmless sound waves) to give detailed images of heart size, function, and structure, and blood flow through the valves and great vessels.  NO PREPARATION IS NECESSARY. YOU MAY EAT AND TAKE YOUR MEDICATIONS AS USUAL.  Please wear a two-piece outfit. You will be asked to undress from the waist up. Women will put on a patient gown opening to the front. Small pads (electrodes) will be put on your chest to monitor your heart beat. You will be  asked to lie on your left side, and will need to remain quiet during the exam.  A transducer coated with a cool gel will be moved firmly over your chest. This transducer creates sound waves that make images of your heart. A computer changes the sound waves into images that are seen on a screen. You may hear loud noises from the echo machine; this is a normal occurrence. You may be asked to exhale and hold your breath for a few seconds. Air in your lungs can affect the images.  The images of your heart are recorded on video tape so the cardiologist can review them later. Your doctor will be informed of the test results. The test will take approximately one hour to complete.  If you are unable to keep your appointment, please call us at [phone] or Tatiana call [phone] so that another appointment can be scheduled for you. Please note that family members or friends are not allowed to accompany you while you are in the testing area.  Check your appointment slip for the location where the test will be done.    4/24/2017 10:30 AM ARTERIAL BILATERAL LEG (45 min.) [facility] AGUDELO, ECHO SCHEDULE    You have been scheduled for an arterial ultrasound.  This test uses ultrasound waves (harmless sound waves) to give detailed images of the arteries of the legs and to determine the presence of any plaque formation and characteristics and the blood flow within the arteries.  NO PREPARATION IS NECESSARY. YOU MAY EAT AND TAKE YOUR MEDICATION AS USUAL.  You will be asked to remove your pants, socks and shoes. You will be provided with a sheet to cover yourself. You will be asked to lie on your back, and will need to remain quiet for the duration of the exam.  A transducer coated with a cool gel is placed at the groin and the artery is imaged throughout the course of the leg.  This transducer produces sound waves that create an image of the arteries in the leg. You may hear noises from the ultrasound machine;  this is a normal occurrence.  A blood pressure reading will be taken on the arms and at the ankles.  The images are digitally recorded for the physician to review. Your doctor will be informed of the test results. His/her office will contact you with the results. The test can take up to 45 minutes to complete.  If you have been sick for one or two days before the test, and feel you may not be able to complete the test, or if you need to reschedule your appointment, please call us at the number below. Check your appointment slip for the location where the test will be done.  Please note that family members or friends are not allowed to accompany you while you are in the testing area.  Please be on time.    5/5/2017  9:55 AM NON FASTING LAB (5 min.) [facility]    Arrive at check-in approximately 15 minutes before your scheduled appointment time. Bring all outside medical records and imaging, along with a list of your current medications and insurance card.    6/7/2017 10:00 AM NON FASTING LAB (5 min.) [facility]    Arrive at check-in approximately 15 minutes before your scheduled appointment time. Bring all outside medical records and imaging, along with a list of your current medications and insurance card.    7/10/2017  9:40 AM ESTABLISHED PATIENT (20 min.) [facility] Galindo Louis MD    Arrive at check-in approximately 15 minutes before your scheduled appointment time. Bring all outside medical records and imaging, along with a list of your current medications and insurance card.    7/24/2017  8:35 AM NON FASTING LAB (5 min.) [facility], [place]    Arrive at check-in approximately 15 minutes before your scheduled appointment time. Bring all outside medical records and imaging, along with a list of your current medications and insurance card.    7/24/2017  8:40 AM URINE (10 min.)  [facility]    Arrive at check-in approximately 15 minutes before your scheduled appointment time. Bring all outside medical records and imaging, along with a list of your current medications and insurance card.    7/31/2017 10:00 AM ESTABLISHED PATIENT (30 min.) [facility] Magnus Vazquez MD    Arrive at check-in approximately 15 minutes before your scheduled appointment time. Bring all outside medical records and imaging, along with a list of your current medications and insurance card.    [facility] 3th [facility]         Default Flowsheet Data (last 24 hours)      Amb Complex Vitals [facility]        04/13/17 0915                Measurements    Weight 79.6 kg (175 lb 7.8 oz)        BP (!)  143/79        Temp 98.6 °F (37 °C)        Pulse 84        Resp 16        Pain Assessment    Pain Score Zero                Allergies     No Known Drug Allergies       Medications You Received from 04/12/2017 0931 to 04/13/2017 0931        Date/Time Order Dose Route Action     04/13/2017 0929 epoetin carmen injection 20,000 Units 20,000 Units Subcutaneous Given      Current Discharge Medication List     Cannot display discharge medications since this is not an admission.

## 2017-04-13 NOTE — PATIENT INSTRUCTIONS
[facility]  [address]  [address] [address]  [phone] phone     [phone] fax  Hours of Operation: Monday- Friday 8:00am- 5:00pm  After hours phone  [phone]  Hematology / Oncology Physicians on call      Dr. Roel Silverman    Please call with any concerns regarding your appointment today.

## 2017-04-20 DIAGNOSIS — E78.5 HYPERLIPIDEMIA, UNSPECIFIED HYPERLIPIDEMIA TYPE: Primary | ICD-10-CM

## 2017-04-20 RX ORDER — ATORVASTATIN CALCIUM 40 MG/1
40 TABLET, FILM COATED ORAL DAILY
Qty: 30 TABLET | Refills: 2 | Status: SHIPPED | OUTPATIENT
Start: 2017-04-20 | End: 2017-05-18

## 2017-04-20 NOTE — TELEPHONE ENCOUNTER
----- Message from Evelio Schuster MD sent at 3/28/2017  4:16 PM CDT -----  The lipid level is currently abnormal.   Due to this, I would like to ask the patient to start lipitor 40 mg a day mg po q day.     Give 1 month of medicine and refill it x 2 months and send the medicine in to the patient's pharmacy.  Book a lipid and liver panel in 3 months for the patient.    Ask the patient to follow a low fat diet and avoid red meat and fried foods.

## 2017-04-24 ENCOUNTER — CLINICAL SUPPORT (OUTPATIENT)
Dept: CARDIOLOGY | Facility: CLINIC | Age: 67
End: 2017-04-24
Payer: MEDICARE

## 2017-04-24 ENCOUNTER — APPOINTMENT (OUTPATIENT)
Dept: CARDIOLOGY | Facility: CLINIC | Age: 67
End: 2017-04-24
Payer: MEDICARE

## 2017-04-24 DIAGNOSIS — M79.604 PAIN IN BOTH LOWER EXTREMITIES: ICD-10-CM

## 2017-04-24 DIAGNOSIS — M79.605 PAIN IN BOTH LOWER EXTREMITIES: ICD-10-CM

## 2017-04-24 LAB
MITRAL VALVE MOBILITY: NORMAL
RETIRED EF AND QEF - SEE NOTES: 60 (ref 55–65)

## 2017-04-24 PROCEDURE — 93306 TTE W/DOPPLER COMPLETE: CPT | Mod: 26,S$PBB,NTX, | Performed by: NUCLEAR MEDICINE

## 2017-04-24 PROCEDURE — 93925 LOWER EXTREMITY STUDY: CPT | Mod: PBBFAC,PO,NTX | Performed by: INTERNAL MEDICINE

## 2017-04-24 NOTE — TELEPHONE ENCOUNTER
I have signed for the following orders AND/OR meds.  Please call the patient and ask the patient to schedule the testing AND/OR inform about any medications that were sent.      Orders Placed This Encounter   Procedures    Lipid panel     Standing Status:   Future     Standing Expiration Date:   6/19/2018    Hepatic function panel     Standing Status:   Future     Standing Expiration Date:   6/19/2018         Medications Ordered This Encounter      atorvastatin (LIPITOR) 40 MG tablet          Sig: Take 1 tablet (40 mg total) by mouth once daily.          Dispense:  30 tablet          Refill:  2

## 2017-04-25 ENCOUNTER — TELEPHONE (OUTPATIENT)
Dept: FAMILY MEDICINE | Facility: CLINIC | Age: 67
End: 2017-04-25

## 2017-04-25 NOTE — TELEPHONE ENCOUNTER
----- Message from Zahida Alvarado sent at 4/25/2017  8:41 AM CDT -----  Contact: Roshni/Physical Therapy  Roshni needs to have pt's plan of care faxed to her. Fax number is [phone]. Roshni can be reached at [phone].

## 2017-04-30 DIAGNOSIS — Z76.82 ORGAN TRANSPLANT CANDIDATE: Primary | ICD-10-CM

## 2017-04-30 NOTE — PROGRESS NOTES
YEARLY LIST MANAGEMENT NOTE    Citlali Karimi's kidney transplant listing status reviewed.  Patient is due for follow-up appointments in June 2017.   Appointments will be scheduled per protocol.  HLA sample is past due at this time with sample last being received in November 2016

## 2017-05-02 DIAGNOSIS — N25.81 SECONDARY HYPERPARATHYROIDISM OF RENAL ORIGIN: ICD-10-CM

## 2017-05-02 RX ORDER — ERGOCALCIFEROL 1.25 MG/1
CAPSULE ORAL
Qty: 3 CAPSULE | Refills: 2 | Status: SHIPPED | OUTPATIENT
Start: 2017-05-02 | End: 2017-07-11

## 2017-05-05 ENCOUNTER — LAB VISIT (OUTPATIENT)
Dept: LAB | Facility: HOSPITAL | Age: 67
End: 2017-05-05
Attending: INTERNAL MEDICINE
Payer: MEDICARE

## 2017-05-05 DIAGNOSIS — D63.1 ANEMIA IN CHRONIC RENAL DISEASE: ICD-10-CM

## 2017-05-05 DIAGNOSIS — N18.9 ANEMIA IN CHRONIC RENAL DISEASE: ICD-10-CM

## 2017-05-05 LAB
BASOPHILS # BLD AUTO: 0.01 K/UL
BASOPHILS NFR BLD: 0.2 %
DIFFERENTIAL METHOD: ABNORMAL
EOSINOPHIL # BLD AUTO: 0.1 K/UL
EOSINOPHIL NFR BLD: 1.2 %
ERYTHROCYTE [DISTWIDTH] IN BLOOD BY AUTOMATED COUNT: 19 %
ERYTHROCYTE [SEDIMENTATION RATE] IN BLOOD BY WESTERGREN METHOD: 56 MM/HR
FERRITIN SERPL-MCNC: 186 NG/ML
HCT VFR BLD AUTO: 32 %
HGB BLD-MCNC: 10.3 G/DL
IRON SERPL-MCNC: 47 UG/DL
LYMPHOCYTES # BLD AUTO: 1.3 K/UL
LYMPHOCYTES NFR BLD: 29.4 %
MCH RBC QN AUTO: 28.2 PG
MCHC RBC AUTO-ENTMCNC: 32.2 %
MCV RBC AUTO: 88 FL
MONOCYTES # BLD AUTO: 0.3 K/UL
MONOCYTES NFR BLD: 6.7 %
NEUTROPHILS # BLD AUTO: 2.7 K/UL
NEUTROPHILS NFR BLD: 62.5 %
PLATELET # BLD AUTO: 241 K/UL
PMV BLD AUTO: 11 FL
RBC # BLD AUTO: 3.65 M/UL
SATURATED IRON: 30 %
TOTAL IRON BINDING CAPACITY: 158 UG/DL
TRANSFERRIN SERPL-MCNC: 107 MG/DL
WBC # BLD AUTO: 4.32 K/UL

## 2017-05-05 PROCEDURE — 82728 ASSAY OF FERRITIN: CPT | Mod: TXP

## 2017-05-05 PROCEDURE — 83540 ASSAY OF IRON: CPT | Mod: TXP

## 2017-05-05 PROCEDURE — 85025 COMPLETE CBC W/AUTO DIFF WBC: CPT | Mod: PO,TXP

## 2017-05-05 PROCEDURE — 36415 COLL VENOUS BLD VENIPUNCTURE: CPT | Mod: PO,TXP

## 2017-05-05 PROCEDURE — 85651 RBC SED RATE NONAUTOMATED: CPT | Mod: PO,TXP

## 2017-05-06 DIAGNOSIS — N18.9 ANEMIA IN CHRONIC RENAL DISEASE: Primary | ICD-10-CM

## 2017-05-06 DIAGNOSIS — D63.1 ANEMIA IN CHRONIC RENAL DISEASE: Primary | ICD-10-CM

## 2017-05-08 ENCOUNTER — TELEPHONE (OUTPATIENT)
Dept: HEMATOLOGY/ONCOLOGY | Facility: CLINIC | Age: 67
End: 2017-05-08

## 2017-05-08 NOTE — TELEPHONE ENCOUNTER
----- Message from David Addison MD sent at 5/6/2017 10:39 AM CDT -----  Please let the patient know that CBC results show hemoglobin greater than 10 g/dL.  Therefore she does not need Procrit at this time.  Iron studies also look okay.  Therefore no need of iron infusions at this time.  I would like to see the patient back for follow-up at the [facility] [facility] in 5 weeks with CBC on the day of clinic visit.  Please have her on the chemotherapy schedule for Procrit if needed on that day as well.  Thank you.

## 2017-05-12 ENCOUNTER — TELEPHONE (OUTPATIENT)
Dept: CARDIOLOGY | Facility: CLINIC | Age: 67
End: 2017-05-12

## 2017-05-18 ENCOUNTER — TELEPHONE (OUTPATIENT)
Dept: FAMILY MEDICINE | Facility: CLINIC | Age: 67
End: 2017-05-18

## 2017-05-18 NOTE — TELEPHONE ENCOUNTER
I advised patient of her results from the most recent L/L labs from Dr. Schuster. She states that Dr. Vazquez, and Dr. Armstrong did not want her taking Lipitor. Please advise.

## 2017-05-18 NOTE — TELEPHONE ENCOUNTER
It was just sent in, but she has not picked it up. Did you want her to try a different medication?

## 2017-05-18 NOTE — TELEPHONE ENCOUNTER
----- Message from Arianne Arvizu sent at 5/18/2017  2:26 PM CDT -----  Call pt at [phone]//returning your call ,please call me back//wing bruner

## 2017-05-24 ENCOUNTER — TELEPHONE (OUTPATIENT)
Dept: CARDIOLOGY | Facility: CLINIC | Age: 67
End: 2017-05-24

## 2017-05-29 RX ORDER — CALCITRIOL 0.5 UG/1
CAPSULE ORAL
Qty: 90 CAPSULE | Refills: 4 | Status: SHIPPED | OUTPATIENT
Start: 2017-05-29 | End: 2018-03-15 | Stop reason: HOSPADM

## 2017-06-07 ENCOUNTER — LAB VISIT (OUTPATIENT)
Dept: LAB | Facility: HOSPITAL | Age: 67
End: 2017-06-07
Attending: FAMILY MEDICINE
Payer: MEDICARE

## 2017-06-07 DIAGNOSIS — E11.9 CONTROLLED TYPE 2 DIABETES MELLITUS WITHOUT COMPLICATION, UNSPECIFIED LONG TERM INSULIN USE STATUS: ICD-10-CM

## 2017-06-07 PROCEDURE — 83036 HEMOGLOBIN GLYCOSYLATED A1C: CPT | Mod: TXP

## 2017-06-07 PROCEDURE — 36415 COLL VENOUS BLD VENIPUNCTURE: CPT | Mod: PO,TXP

## 2017-06-08 LAB
ESTIMATED AVG GLUCOSE: 131 MG/DL
HBA1C MFR BLD HPLC: 6.2 %

## 2017-06-09 ENCOUNTER — TELEPHONE (OUTPATIENT)
Dept: FAMILY MEDICINE | Facility: CLINIC | Age: 67
End: 2017-06-09

## 2017-06-09 DIAGNOSIS — E11.9 TYPE 2 DIABETES MELLITUS WITHOUT COMPLICATION, UNSPECIFIED LONG TERM INSULIN USE STATUS: Primary | ICD-10-CM

## 2017-06-09 NOTE — TELEPHONE ENCOUNTER
----- Message from Evelio Schuster MD sent at 6/9/2017  8:12 AM CDT -----  The a1c is normal.  Recheck in 6 months.

## 2017-06-12 NOTE — TELEPHONE ENCOUNTER
I have signed for the following orders AND/OR meds.  Please call the patient and ask the patient to schedule the testing AND/OR inform about any medications that were sent.      Orders Placed This Encounter   Procedures    Hemoglobin A1c     Standing Status:   Future     Standing Expiration Date:   8/8/2018

## 2017-06-13 ENCOUNTER — INFUSION (OUTPATIENT)
Dept: INFUSION THERAPY | Facility: HOSPITAL | Age: 67
End: 2017-06-13
Attending: INTERNAL MEDICINE
Payer: MEDICARE

## 2017-06-13 ENCOUNTER — OFFICE VISIT (OUTPATIENT)
Dept: HEMATOLOGY/ONCOLOGY | Facility: CLINIC | Age: 67
End: 2017-06-13
Payer: MEDICARE

## 2017-06-13 VITALS
HEART RATE: 78 BPM | WEIGHT: 170 LBS | BODY MASS INDEX: 31.28 KG/M2 | TEMPERATURE: 98 F | SYSTOLIC BLOOD PRESSURE: 131 MMHG | HEIGHT: 62 IN | DIASTOLIC BLOOD PRESSURE: 72 MMHG | OXYGEN SATURATION: 98 %

## 2017-06-13 DIAGNOSIS — D63.1 ANEMIA IN CHRONIC RENAL DISEASE: Primary | ICD-10-CM

## 2017-06-13 DIAGNOSIS — N18.9 ANEMIA IN CHRONIC RENAL DISEASE: Primary | ICD-10-CM

## 2017-06-13 DIAGNOSIS — D51.8 OTHER VITAMIN B12 DEFICIENCY ANEMIA: ICD-10-CM

## 2017-06-13 PROCEDURE — 1159F MED LIST DOCD IN RCRD: CPT | Mod: ,,, | Performed by: INTERNAL MEDICINE

## 2017-06-13 PROCEDURE — 99213 OFFICE O/P EST LOW 20 MIN: CPT | Mod: PBBFAC,PO,25 | Performed by: INTERNAL MEDICINE

## 2017-06-13 PROCEDURE — 96372 THER/PROPH/DIAG INJ SC/IM: CPT

## 2017-06-13 PROCEDURE — 99999 PR PBB SHADOW E&M-EST. PATIENT-LVL III: CPT | Mod: PBBFAC,,, | Performed by: INTERNAL MEDICINE

## 2017-06-13 PROCEDURE — 1126F AMNT PAIN NOTED NONE PRSNT: CPT | Mod: ,,, | Performed by: INTERNAL MEDICINE

## 2017-06-13 PROCEDURE — 99214 OFFICE O/P EST MOD 30 MIN: CPT | Mod: 25,S$PBB,, | Performed by: INTERNAL MEDICINE

## 2017-06-13 PROCEDURE — 63600175 PHARM REV CODE 636 W HCPCS: Mod: NTX | Performed by: INTERNAL MEDICINE

## 2017-06-13 RX ADMIN — EPOETIN ALFA 20000 UNITS: 20000 SOLUTION INTRAVENOUS; SUBCUTANEOUS at 11:06

## 2017-06-13 NOTE — PATIENT INSTRUCTIONS
[facility]  [address]  [address] [address]  [phone] phone     [phone] fax  Hours of Operation: Monday- Friday 8:00am- 5:00pm  After hours phone  [phone]  Hematology / Oncology Physicians on call      Dr. Roel Silverman    Please call with any concerns regarding your appointment today.

## 2017-06-13 NOTE — PROGRESS NOTES
Reason for visit: Follow up for anemia    HPI:   The patient is a 67-year-old  female who presents to the hematology oncology clinic today for follow up for anemia. She is currently undergoing physical therapy for generalized weakness and history of falls. She has been evaluated by neurology. She denies any back pain at this time. She denies any fever, chills, night sweats, loss of appetite or unintentional weight loss. She denies any chest pain or shortness of breath. She denies any fatigue. She denies any bowel or urinary complaints. She denies any melena, hematochezia, hematemesis, hemoptysis or hematuria. She did undergo valve replacement on May 10, 2016 at [facility] [place].  I have reviewed all of the patient's relevant clinical history available in EPIC.    PAST MEDICAL HISTORY:   1. Hypertension  2. Dyslipidemia  3. Type 2 diabetes mellitus  4. Vit b12 deficiency anemia  5. Severe aortic stenosis  6.  Nephrolithiasis  7.  Hematuria  8. CKD stage 4    SURGICAL HISTORY:   1. BON with BSO in 1980  2. Left ankle surgery due to accidental trauma in 1986  3. Bilateral cataract excision  4. Cholecystectomy  5. Transcatheter aortic valve replacement on 5/10/16    SOCIAL HISTORY: She denies tobacco use, alcohol use or recreational drug use. She is  and lives with her  in [place], [place]. She has 3 stepdaughters. 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    ALLERGIES: Reviewed on medication card.    MEDICATIONS: [Medcard has been reviewed and/or reconciled.]    REVIEW OF SYSTEMS:   GENERAL: [No fevers, chills or sweats. No fatigue, weight loss or loss of appetite.]  HEENT: [No blurred vision, tinnitus, nasal discharge, sorethroat or dysphagia.]  HEART: [No chest pain, palpitations or shortness of breath.]   LUNGS: [No cough, hemoptysis or breathing problems.]  ABDOMEN: [No abdominal pain, nausea, vomiting, diarrhea, constipation or melena.]  GENITOURINARY:  [No dysuria, bleeding or malodorous discharge.]  NEURO: [No headache, dizziness or vertigo.]  HEMATOLOGY: [No easy bruising, spontaneous bleeding or blood clots in the past].  MUSCULOSKELETAL: [No arthralgias, myalgias or bone pains.]  SKIN: [No rashes or skin lesions.]  PSYCHIATRY: [No depression or anxiety.]    PHYSICAL EXAMINATION:   VS: Reviewed on nurse's notes.  APPEARANCE: The patient is a well-developed, well-nourished and obese  female who appears in no acute distress. She was accompanied to this clinic visit by her .  HEENT: No scleral icterus. Both external auditory canals clear. No oral ulcers, lesions. Throat clear  HEAD: No sinus tenderness.  NECK: Supple. No palpable lymphadenopathy. Thyroid non-tender, no palpable masses  CHEST: Breath sounds clear bilaterally. No rales. No rhonchi. Unlabored respirations.  CARDIOVASCULAR: Normal S1, S2. Normal rate. Regular rhythm. 2/6 ESM noted  ABDOMEN: Bowel sounds normal. No tenderness. No abdominal distention. No hepatomegaly. No splenomegaly.  LYMPHATIC: No palpable supraclavicular, axillary nodes  EXTREMITIES: No clubbing, cyanosis, edema  SKIN: No lesions. No petechiae. No ecchymoses. No induration or nodules  NEUROLOGIC: No focal findings. Alert & Oriented x 3. Mood appropriate to affect    LABS:   Reviewed    IMPRESSION:  1. Chronic anemia  2. Chronic leukopenia without neutropenia  3. Alpha thalassemia carrier  4. History of vitamin B12 deficiency  5. CKD stage 4  6. Anemia due to CKD    PLAN:  1. I discussed the results of her most recent CBC in detail with her. She will continue procrit injections today as her Hb was <10 gm/dl. The patient has had an extensive workup for her chronic anemia in the past and no significant etiology for this has been found.  At this time it does appear likely that this might be related to her chronic kidney disease. She is agreeable to proceed with additional procrit injections as and when indicated.  The patient gets 20,000 units subcutaneously weekly x 4 weeks.   2. The patient has been taking vitamin B12 tablets.  3. The patient has had a chronic asymptomatic leukopenia. This has remained stable. This is likely benign in etiology and can be conservatively followed for now.  4. From the hematology oncology perspective the patient is cleared to proceed with her kidney transplantation when necessary.    Recheck labs in [facility] in 6 weeks. Possible procrit depending on results at follow up. She knows to call sooner for any additional questions or new problems.    David Addison MD

## 2017-06-20 ENCOUNTER — INFUSION (OUTPATIENT)
Dept: INFUSION THERAPY | Facility: HOSPITAL | Age: 67
End: 2017-06-20
Attending: INTERNAL MEDICINE
Payer: MEDICARE

## 2017-06-20 VITALS
BODY MASS INDEX: 31.28 KG/M2 | TEMPERATURE: 99 F | RESPIRATION RATE: 18 BRPM | DIASTOLIC BLOOD PRESSURE: 83 MMHG | HEIGHT: 62 IN | SYSTOLIC BLOOD PRESSURE: 173 MMHG | WEIGHT: 170 LBS | HEART RATE: 59 BPM

## 2017-06-20 DIAGNOSIS — Z76.82 AWAITING ORGAN TRANSPLANT STATUS: Primary | ICD-10-CM

## 2017-06-20 DIAGNOSIS — N18.9 ANEMIA IN CHRONIC RENAL DISEASE: Primary | ICD-10-CM

## 2017-06-20 DIAGNOSIS — D63.1 ANEMIA IN CHRONIC RENAL DISEASE: Primary | ICD-10-CM

## 2017-06-20 PROCEDURE — 96372 THER/PROPH/DIAG INJ SC/IM: CPT

## 2017-06-20 PROCEDURE — 63600175 PHARM REV CODE 636 W HCPCS: Performed by: INTERNAL MEDICINE

## 2017-06-20 RX ADMIN — EPOETIN ALFA 20000 UNITS: 20000 SOLUTION INTRAVENOUS; SUBCUTANEOUS at 09:06

## 2017-06-27 ENCOUNTER — TELEPHONE (OUTPATIENT)
Dept: FAMILY MEDICINE | Facility: CLINIC | Age: 67
End: 2017-06-27

## 2017-06-27 NOTE — TELEPHONE ENCOUNTER
----- Message from Skye Williamson sent at 6/27/2017  9:21 AM CDT -----  Contact: ida [phone]-sfhlhzupy-[phone]  Patient is currently on her way to be admitted to [facility] . Please call back at [phone].      Thanks,  Skye Williamson

## 2017-06-27 NOTE — TELEPHONE ENCOUNTER
----- Message from Jessica John sent at 6/27/2017  9:29 AM CDT -----  Contact: Patients Marog wall  Ms Aragon is returning a call, please call them back at [phone], patient is going to ER at [facility].

## 2017-06-27 NOTE — TELEPHONE ENCOUNTER
Patient's niece is calling to inform us that she is taking Ms. Karimi to the ER due to the fact Ms. Karimi is weak. States her vital signs are stable. She states she just wanted to communicate that with the pcp. I told her Dr. Schuster was out of the office for 2 weeks but that I would relay the message and for her to call if she need us.

## 2017-07-03 ENCOUNTER — TELEPHONE (OUTPATIENT)
Dept: CARDIOLOGY | Facility: CLINIC | Age: 67
End: 2017-07-03

## 2017-07-03 NOTE — TELEPHONE ENCOUNTER
Dr Louis is unavailable the week of the 17th in [place]. Will attempt to schedule patient at an alternate clinic if she agrees to it.  Unsuccessful at this time in reaching patient.

## 2017-07-03 NOTE — TELEPHONE ENCOUNTER
----- Message from Madeleine Van sent at 7/3/2017 11:14 AM CDT -----  Contact: ida marin/ [facility]/[phone]  Calling to reschedule the pt appt to the week of the 17th due to the pt being in [facility], nothing was available until 08/22/2017 but she requested to be worked in sooner/please call to advise/thanks

## 2017-07-10 ENCOUNTER — TELEPHONE (OUTPATIENT)
Dept: NEUROLOGY | Facility: CLINIC | Age: 67
End: 2017-07-10

## 2017-07-10 NOTE — TELEPHONE ENCOUNTER
----- Message from Yaritza Arvizu sent at 7/10/2017 11:37 AM CDT -----  Contact: Margo pt's Sulma  She's calling to see if pt can be worked into schedule between 7/24/17-8/7/17 for pt's 3-week hospital f/u, please advise [phone] (home) [phone] (work)

## 2017-07-10 NOTE — TELEPHONE ENCOUNTER
Pt was treated at [facility], Ct scan showed enlarged ventricles and abn ventricular flow. 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 Follow up scheduled. The pt has developed urinary incontinence since last seen in January.

## 2017-07-11 ENCOUNTER — LAB VISIT (OUTPATIENT)
Dept: LAB | Facility: HOSPITAL | Age: 67
End: 2017-07-11
Attending: INTERNAL MEDICINE
Payer: MEDICARE

## 2017-07-11 ENCOUNTER — OFFICE VISIT (OUTPATIENT)
Dept: CARDIOLOGY | Facility: CLINIC | Age: 67
End: 2017-07-11
Payer: MEDICARE

## 2017-07-11 VITALS
HEIGHT: 62 IN | HEART RATE: 91 BPM | DIASTOLIC BLOOD PRESSURE: 54 MMHG | SYSTOLIC BLOOD PRESSURE: 124 MMHG | BODY MASS INDEX: 30.73 KG/M2 | WEIGHT: 167 LBS

## 2017-07-11 DIAGNOSIS — I50.32 CHRONIC DIASTOLIC HEART FAILURE: ICD-10-CM

## 2017-07-11 DIAGNOSIS — Z95.2 S/P AVR (AORTIC VALVE REPLACEMENT): Primary | ICD-10-CM

## 2017-07-11 DIAGNOSIS — I15.2 HYPERTENSION ASSOCIATED WITH DIABETES: ICD-10-CM

## 2017-07-11 DIAGNOSIS — E11.59 HYPERTENSION ASSOCIATED WITH DIABETES: ICD-10-CM

## 2017-07-11 DIAGNOSIS — N18.5 CKD (CHRONIC KIDNEY DISEASE) STAGE 5, GFR LESS THAN 15 ML/MIN: Chronic | ICD-10-CM

## 2017-07-11 DIAGNOSIS — Z76.82 AWAITING ORGAN TRANSPLANT STATUS: ICD-10-CM

## 2017-07-11 PROCEDURE — 3044F HG A1C LEVEL LT 7.0%: CPT | Mod: NTX,,, | Performed by: INTERNAL MEDICINE

## 2017-07-11 PROCEDURE — 1159F MED LIST DOCD IN RCRD: CPT | Mod: NTX,,, | Performed by: INTERNAL MEDICINE

## 2017-07-11 PROCEDURE — 86833 HLA CLASS II HIGH DEFIN QUAL: CPT | Mod: TXP

## 2017-07-11 PROCEDURE — 3066F NEPHROPATHY DOC TX: CPT | Mod: NTX,,, | Performed by: INTERNAL MEDICINE

## 2017-07-11 PROCEDURE — 86832 HLA CLASS I HIGH DEFIN QUAL: CPT | Mod: TXP

## 2017-07-11 PROCEDURE — 99214 OFFICE O/P EST MOD 30 MIN: CPT | Mod: S$PBB,NTX,, | Performed by: INTERNAL MEDICINE

## 2017-07-11 PROCEDURE — 99212 OFFICE O/P EST SF 10 MIN: CPT | Mod: PBBFAC,PO,TXP | Performed by: INTERNAL MEDICINE

## 2017-07-11 PROCEDURE — 99999 PR PBB SHADOW E&M-EST. PATIENT-LVL II: CPT | Mod: PBBFAC,TXP,, | Performed by: INTERNAL MEDICINE

## 2017-07-11 RX ORDER — SODIUM BICARBONATE 650 MG/1
650 TABLET ORAL 2 TIMES DAILY
COMMUNITY
End: 2018-01-15 | Stop reason: SDUPTHER

## 2017-07-11 RX ORDER — ATORVASTATIN CALCIUM 20 MG/1
20 TABLET, FILM COATED ORAL DAILY
COMMUNITY
End: 2017-08-23

## 2017-07-11 RX ORDER — LACTULOSE 10 G/10G
30 SOLUTION ORAL
COMMUNITY
End: 2017-08-23

## 2017-07-11 NOTE — LETTER
IMPORTANT      July 14, 2017    Citlali Karimi  [address]  [place] [address]     Re: [id number]    Dear Mr/Mrs Karimi,    Thank you for choosing [facility] as your health care provider.  We are committed to assisting you with timely insurance filing and payment of your account.  To protect your liability, updated insurance information must be given to us at the time of service and we should be notified immediately if      · Your insurance benefits/plan changes.   You become eligible for any other benefits   Your current plan/coverage terms.    Also, please bring in a copy of your insurance premium payment if you have one of the following types of insurance:    · Coverage from a snf plan.  · Coverage from the Affordable Healthcare Act Plan.  · Coverage from a COBRA plan  · Premium paid by the National Kidney Foundation.    To ensure we have the correct insurance (Medical/Pharmacy) on file and to answer any questions regarding your benefits, please call us at [phone] or [phone] and ask to speak to the kidney  indicated below:      Transplant Dept  Best Lema   Heart   Alvina Matute   Kidney (A-K) and Lung  Brunoolivia Alexsander    Kidney (L-Z)  Qian Mendoza   Liver    We look forward to hearing from you soon.    Sincerely,      Transplant Financial Services

## 2017-07-11 NOTE — Clinical Note
July 11, 2017        Citlali FIERRO [address]  [place] [address]         Dear Citlali Karimi:    As part of our commitment to share important information with our transplant patients, we want to provide you with the most current kidney and kidney/pancreas transplant outcomes at the [facility] as published bi-annually by the Scientific Registry for Organ Recipients (SRTR).    For kidney transplants performed between 1/1/2014 and 6/30/2016 the 1-year patient and graft survival rates are as follows:   Kidney-Cadaveric Donor Kidney-Living Donor Kidney/Pancreas   Adults [facility] 1-Year Expected 1-Year National 1-Year [facility] 1-Year Expected 1-Year National 1-Year [facility] 1-Year Expected 1-Year National 1-Year   Graft Survival 93.09% 94.31% 93.92% 97.83% 97.54% 97.75% 98.18% 96.73% 96.15%   Patient Survival 96.56% 96.91% 96.57% 100% 98.88% 98.83% 98.18% 97.52% 97.54%         To learn more about transplant outcomes in the [place] you can go to www.srtr.org.     Please call the Kidney Transplant Office at [phone] or [phone] should you have any questions or if:     Your insurance changes in any way   Your address or phone number changes   There are changes in your health   You are in the hospital or Emergency Room for any reason      Sincerely,  Kidney Transplant Team

## 2017-07-11 NOTE — PROGRESS NOTES
Subjective:   Patient ID:  Citlali Karimi is a 67 y.o. female who presents for follow-up of echo-nml TAVR.  Patient denies CP, angina or anginal equivalent.[facility]  Vascular studies with mild PAD.    Hypertension   This is a chronic problem. The current episode started more than 1 year ago. The problem has been gradually improving since onset. The problem is controlled. Pertinent negatives include no chest pain, palpitations or shortness of breath. Past treatments include calcium channel blockers. The current treatment provides moderate improvement. There are no compliance problems.    Hyperlipidemia   This is a chronic problem. The current episode started more than 1 year ago. The problem is controlled. Recent lipid tests were reviewed and are variable. Pertinent negatives include no chest pain or shortness of breath. Current antihyperlipidemic treatment includes statins. The current treatment provides moderate improvement of lipids.       Review of Systems   Constitution: Negative. Negative for weight gain.   HENT: Negative.    Eyes: Negative.    Cardiovascular: Negative.  Negative for chest pain, leg swelling and palpitations.   Respiratory: Negative.  Negative for shortness of breath.    Endocrine: Negative.    Hematologic/Lymphatic: Negative.    Skin: Negative.    Musculoskeletal: Negative for muscle weakness.   Gastrointestinal: Negative.    Genitourinary: Negative.    Neurological: Negative.  Negative for dizziness.   Psychiatric/Behavioral: Negative.    Allergic/Immunologic: Negative.      Family History   Problem Relation Age of Onset    Hypertension Mother     Heart disease Mother     Diabetes Mother     Glaucoma Mother     Aneurysm Father     Stroke Father     Kidney disease Sister     Heart disease Sister     Kidney disease Brother     Hypertension Brother     Diabetes Brother     Diabetes Sister     Kidney disease Sister     Hypertension Brother      Cancer Paternal Grandmother      breast    No Known Problems Maternal Aunt     No Known Problems Maternal Uncle     No Known Problems Paternal Aunt     No Known Problems Paternal Uncle     No Known Problems Maternal Grandmother     No Known Problems Maternal Grandfather     No Known Problems Paternal Grandfather     Anemia Neg Hx     Arrhythmia Neg Hx     Asthma Neg Hx     Clotting disorder Neg Hx     Fainting Neg Hx     Heart attack Neg Hx     Heart failure Neg Hx     Hyperlipidemia Neg Hx     Atrial Septal Defect Neg Hx      Past Medical History:   Diagnosis Date    Acid reflux 1/7/2015    Anxiety 1/7/2015    Aortic valvar stenosis     Arthritis     osteo    Callus     Chronic anemia     followed by Dr. Addison    CKD (chronic kidney disease) stage 4, GFR 15-29 ml/min     followed by Nephrology    CKD (chronic kidney disease) stage 5, GFR less than 15 ml/min 8/7/2015    CKD (chronic kidney disease), stage IV 1/7/2015    Combined hyperlipidemia associated with type 2 diabetes mellitus     Coronary artery disease     Diabetes mellitus type II, controlled, with no complications     LBSL 108 today    Diabetes mellitus, type 2 - only on oral medications 1/7/2015    Encounter for blood transfusion     Gallbladder problem     gallbladder surgery    Heart murmur     Hyperparathyroidism, secondary renal 1/7/2015    Hypertension 8/7/2015    Hypertension associated with diabetes 11/12/2012    Kidney transplant candidate 1/7/2015    Overweight     Urinary tract infection      Current Outpatient Prescriptions on File Prior to Visit   Medication Sig Dispense Refill    aspirin (ECOTRIN) 81 MG EC tablet Take 81 mg by mouth once daily.      calcitRIOL (ROCALTROL) 0.5 MCG Cap TAKE ONE CAPSULE BY MOUTH DAILY 90 capsule 4    cyanocobalamin (VITAMIN B-12) 1000 MCG tablet Take 1 tablet (1,000 mcg total) by mouth once daily. 90 tablet 12    isosorbide mononitrate (IMDUR) 60 MG 24 hr tablet  TAKE ONE TABLET BY MOUTH DAILY 30 tablet 9    nifedipine (ADALAT CC) 90 MG TbSR Take 1 tablet (90 mg total) by mouth once daily. 30 tablet 11    omeprazole (PRILOSEC) 40 MG capsule Take 1 capsule (40 mg total) by mouth once daily. 30 capsule 11    [DISCONTINUED] allopurinol (ZYLOPRIM) 100 MG tablet Take 1 tablet (100 mg total) by mouth once daily. 30 tablet 8    [DISCONTINUED] clopidogrel (PLAVIX) 75 mg tablet Take 75 mg by mouth once daily.      [DISCONTINUED] ergocalciferol (ERGOCALCIFEROL) 50,000 unit Cap TAKE 1 CAPSULE BY MOUTH EVERY 30 DAYS. 3 capsule 2    [DISCONTINUED] losartan (COZAAR) 25 MG tablet TAKE ONE TABLET BY MOUTH DAILY 30 tablet 9     No current facility-administered medications on file prior to visit.      Review of patient's allergies indicates:   Allergen Reactions    Lipitor [atorvastatin]        Objective:     Physical Exam   Constitutional: She is oriented to person, place, and time. She appears well-developed and well-nourished.   HENT:   Head: Normocephalic and atraumatic.   Eyes: Conjunctivae and EOM are normal. Pupils are equal, round, and reactive to light.   Neck: Normal range of motion. Neck supple.   Cardiovascular: Normal rate, regular rhythm, normal heart sounds and intact distal pulses.    Pulmonary/Chest: Effort normal and breath sounds normal.   Abdominal: Soft. Bowel sounds are normal.   Musculoskeletal: Normal range of motion.   Neurological: She is alert and oriented to person, place, and time.   Skin: Skin is warm and dry.   Psychiatric: She has a normal mood and affect.   Nursing note and vitals reviewed.      Assessment:     1. S/P AVR (aortic valve replacement)    2. Chronic diastolic heart failure    3. CKD (chronic kidney disease) stage 5, GFR less than 15 ml/min    4. Hypertension associated with diabetes        Plan:     S/P AVR (aortic valve replacement)    Chronic diastolic heart failure    CKD (chronic kidney disease) stage 5, GFR less than 15  ml/min    Hypertension associated with diabetes      Continue asa- primary prevention  Continue statin-hlp  Continue nifidepene-htn

## 2017-07-12 ENCOUNTER — PATIENT OUTREACH (OUTPATIENT)
Dept: ADMINISTRATIVE | Facility: CLINIC | Age: 67
End: 2017-07-12

## 2017-07-12 ENCOUNTER — PATIENT OUTREACH (OUTPATIENT)
Dept: ADMINISTRATIVE | Facility: HOSPITAL | Age: 67
End: 2017-07-12

## 2017-07-12 NOTE — LETTER
July 12, 2017    Citlali OPAL Karimi  [address]  [place] [address]           [facility]  [address]  [facility] [address]  Phone: [phone] Dear Mrs. Karimi:    [facility] committed to your overall health.  To help you get the most out of each of your visits, we will review your information to make sure you are up to date on all of your recommended tests and/or procedures.      Evelio Schuster MD has found that you may be due for   Health Maintenance Due   Topic    Zoster Vaccine     DEXA SCAN       If you have had any of the above done at another facility, please bring the records or information with you so that your record at [facility] will be complete.    If you are currently taking medication, please bring it with you to your appointment for review.    We will be happy to assist you with scheduling any necessary appointments or you may contact the [facility] appointment desk at [phone] to schedule at your convenience.     Thank you for choosing [facility] for your healthcare needs,      If you have any questions or concerns, please don't hesitate to call.    Sincerely,    MARVA Andrew  Care Coordination Department  [facility]  [phone]

## 2017-07-12 NOTE — PATIENT INSTRUCTIONS
Fall Prevention  Falls often occur due to slipping, tripping or losing your balance. Millions of people fall every year and injure themselves. Here are ways to reduce your risk of falling again.  · Think about your fall, was there anything that caused your fall that can be fixed, removed, or replaced?  · Make your home safe by keeping walkways clear of objects you may trip over.  · Use non-slip pads under rugs. Do not use area rugs or small throw rugs.  · Use non-slip mats in bathtubs and showers.  · Install handrails and lights on staircases.  · Do not walk in poorly lit areas.  · Do not stand on chairs or wobbly ladders.  · Use caution when reaching overhead or looking upward. This position can cause a loss of balance.  · Be sure your shoes fit properly, have non-slip bottoms and are in good condition.   · Wear shoes both inside and out. Avoid going barefoot or wearing slippers.  · Be cautious when going up and down stairs, curbs, and when walking on uneven sidewalks.  · If your balance is poor, consider using a cane or walker.  · If your fall was related to alcohol use, stop or limit alcohol intake.   · If your fall was related to use of sleeping medicines, talk to your doctor about this. You may need to reduce your dosage at bedtime if you awaken during the night to go to the bathroom.    · To reduce the need for nighttime bathroom trips:  ¨ Avoid drinking fluids for several hours before going to bed  ¨ Empty your bladder before going to bed  ¨ Men can keep a urinal at the bedside  · Stay as active as you can. Balance, flexibility, strength, and endurance all come from exercise. They all play a role in preventing falls. Ask your healthcare provider which types of activity are right for you.  · Get your vision checked on a regular basis.  · If you have pets, know where they are before you stand up or walk so you don't trip over them.  · Use night lights.  Date Last Reviewed: 11/5/2015  © 9754-2682 [employer]. [address] [place] [address], [place] [address]. All rights reserved. This information is not intended as a substitute for professional medical care. Always follow your healthcare professional's instructions.

## 2017-07-14 NOTE — ADDENDUM NOTE
Encounter addended by: Aline Burgess on: 7/14/2017  9:57 AM<BR>    Actions taken: Letter status changed

## 2017-07-17 ENCOUNTER — LAB VISIT (OUTPATIENT)
Dept: LAB | Facility: HOSPITAL | Age: 67
End: 2017-07-17
Attending: INTERNAL MEDICINE
Payer: MEDICARE

## 2017-07-17 ENCOUNTER — OFFICE VISIT (OUTPATIENT)
Dept: HEMATOLOGY/ONCOLOGY | Facility: CLINIC | Age: 67
End: 2017-07-17
Payer: MEDICARE

## 2017-07-17 VITALS
RESPIRATION RATE: 18 BRPM | WEIGHT: 167.13 LBS | HEIGHT: 62 IN | DIASTOLIC BLOOD PRESSURE: 80 MMHG | BODY MASS INDEX: 30.76 KG/M2 | OXYGEN SATURATION: 98 % | TEMPERATURE: 98 F | SYSTOLIC BLOOD PRESSURE: 122 MMHG | HEART RATE: 70 BPM

## 2017-07-17 DIAGNOSIS — D63.1 ANEMIA IN CKD (CHRONIC KIDNEY DISEASE): ICD-10-CM

## 2017-07-17 DIAGNOSIS — N18.9 ANEMIA IN CKD (CHRONIC KIDNEY DISEASE): Primary | ICD-10-CM

## 2017-07-17 DIAGNOSIS — D63.1 ANEMIA IN CKD (CHRONIC KIDNEY DISEASE): Primary | ICD-10-CM

## 2017-07-17 DIAGNOSIS — D63.1 ANEMIA IN STAGE 4 CHRONIC KIDNEY DISEASE: ICD-10-CM

## 2017-07-17 DIAGNOSIS — N18.9 ANEMIA IN CKD (CHRONIC KIDNEY DISEASE): ICD-10-CM

## 2017-07-17 DIAGNOSIS — N18.4 ANEMIA IN STAGE 4 CHRONIC KIDNEY DISEASE: ICD-10-CM

## 2017-07-17 LAB
BASOPHILS # BLD AUTO: 0.01 K/UL
BASOPHILS NFR BLD: 0.2 %
DIFFERENTIAL METHOD: ABNORMAL
EOSINOPHIL # BLD AUTO: 0.1 K/UL
EOSINOPHIL NFR BLD: 1 %
ERYTHROCYTE [DISTWIDTH] IN BLOOD BY AUTOMATED COUNT: 18.2 %
ERYTHROCYTE [SEDIMENTATION RATE] IN BLOOD BY WESTERGREN METHOD: 60 MM/HR
FERRITIN SERPL-MCNC: 113 NG/ML
HCT VFR BLD AUTO: 31.3 %
HGB BLD-MCNC: 10 G/DL
IRON SERPL-MCNC: 33 UG/DL
LYMPHOCYTES # BLD AUTO: 1.2 K/UL
LYMPHOCYTES NFR BLD: 25.5 %
MCH RBC QN AUTO: 28 PG
MCHC RBC AUTO-ENTMCNC: 31.9 %
MCV RBC AUTO: 88 FL
MONOCYTES # BLD AUTO: 0.2 K/UL
MONOCYTES NFR BLD: 5 %
NEUTROPHILS # BLD AUTO: 3.3 K/UL
NEUTROPHILS NFR BLD: 68.3 %
PLATELET # BLD AUTO: 236 K/UL
PMV BLD AUTO: 9.2 FL
RBC # BLD AUTO: 3.57 M/UL
SATURATED IRON: 19 %
TOTAL IRON BINDING CAPACITY: 170 UG/DL
TRANSFERRIN SERPL-MCNC: 115 MG/DL
WBC # BLD AUTO: 4.82 K/UL

## 2017-07-17 PROCEDURE — 99999 PR PBB SHADOW E&M-EST. PATIENT-LVL III: CPT | Mod: PBBFAC,,, | Performed by: INTERNAL MEDICINE

## 2017-07-17 PROCEDURE — 1126F AMNT PAIN NOTED NONE PRSNT: CPT | Mod: ,,, | Performed by: INTERNAL MEDICINE

## 2017-07-17 PROCEDURE — 99214 OFFICE O/P EST MOD 30 MIN: CPT | Mod: S$PBB,,, | Performed by: INTERNAL MEDICINE

## 2017-07-17 PROCEDURE — 99213 OFFICE O/P EST LOW 20 MIN: CPT | Mod: PBBFAC,PO | Performed by: INTERNAL MEDICINE

## 2017-07-17 PROCEDURE — 1159F MED LIST DOCD IN RCRD: CPT | Mod: ,,, | Performed by: INTERNAL MEDICINE

## 2017-07-17 NOTE — PROGRESS NOTES
Reason for visit: Follow up for anemia    HPI:   The patient is a 67-year-old  female who presents to the hematology oncology clinic today for follow up for anemia. She is currently undergoing outpatient physical therapy for generalized weakness and history of falls. She has been evaluated by neurology previously and has had imaging and treatment including inpatient rehabilitation done with [facility]. She is scheduled to see Dr. Hurd with neurology at [facility] [place] for a second opinion. She denies any fever, chills, night sweats, loss of appetite or unintentional weight loss. She denies any chest pain or shortness of breath. She denies any fatigue. She denies any bowel or urinary complaints. She denies any melena, hematochezia, hematemesis, hemoptysis or hematuria. She did undergo valve replacement on May 10, 2016 at [facility] [place].  I have reviewed all of the patient's relevant clinical history available in [facility] inbcluding in care everywhere.    PAST MEDICAL HISTORY:   1. Hypertension  2. Dyslipidemia  3. Type 2 diabetes mellitus  4. Vit b12 deficiency anemia  5. Severe aortic stenosis  6.  Nephrolithiasis  7.  Hematuria  8. CKD stage 4    SURGICAL HISTORY:   1. BON with BSO in 1980  2. Left ankle surgery due to accidental trauma in 1986  3. Bilateral cataract excision  4. Cholecystectomy  5. Transcatheter aortic valve replacement on 5/10/16    SOCIAL HISTORY: She denies tobacco use, alcohol use or recreational drug use. She is  and lives with her  in [place], [place]. She has 3 stepdaughters. 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    ALLERGIES: Reviewed on medication card.    MEDICATIONS: [Medcard has been reviewed and/or reconciled.]    REVIEW OF SYSTEMS:   GENERAL: [No fevers, chills or sweats. No fatigue, weight loss or loss of appetite.]  HEENT: [No blurred vision, tinnitus, nasal discharge, sorethroat or dysphagia.]  HEART: [No  chest pain, palpitations or shortness of breath.]   LUNGS: [No cough, hemoptysis or breathing problems.]  ABDOMEN: [No abdominal pain, nausea, vomiting, diarrhea, constipation or melena.]  GENITOURINARY: [No dysuria, bleeding or malodorous discharge.]  NEURO: [No headache, dizziness or vertigo.]  HEMATOLOGY: [No easy bruising, spontaneous bleeding or blood clots in the past].  MUSCULOSKELETAL: [No arthralgias, myalgias or bone pains.]  SKIN: [No rashes or skin lesions.]  PSYCHIATRY: [No depression or anxiety.]    PHYSICAL EXAMINATION:   VS: Reviewed on nurse's notes.  APPEARANCE: The patient is a well-developed, well-nourished  female who appears in no acute distress. She was accompanied to this clinic visit by her . She present in a wheelchair. She is ambulating at home.  HEENT: No scleral icterus. Both external auditory canals clear. No oral ulcers, lesions. Throat clear  HEAD: No sinus tenderness.  NECK: Supple. No palpable lymphadenopathy. Thyroid non-tender, no palpable masses  CHEST: Breath sounds clear bilaterally. No rales. No rhonchi. Unlabored respirations.  CARDIOVASCULAR: Normal S1, S2. Normal rate. Regular rhythm. 2/6 ESM noted  ABDOMEN: Bowel sounds normal. No tenderness. No abdominal distention. No hepatomegaly. No splenomegaly.  LYMPHATIC: No palpable supraclavicular, axillary nodes  EXTREMITIES: No clubbing, cyanosis, edema  SKIN: No lesions. No petechiae. No ecchymoses. No induration or nodules  NEUROLOGIC: No focal findings. Alert & Oriented x 3. Mood appropriate to affect    LABS:   Reviewed    IMPRESSION:  1. Chronic anemia  2. Chronic leukopenia without neutropenia  3. Alpha thalassemia carrier  4. History of vitamin B12 deficiency  5. CKD stage 4  6. Anemia due to CKD    PLAN:  1. I discussed the results of her most recent CBC in detail with her. I will hold procrit today. She will resume procrit injections when Hb is <10 gm/dl. The patient has had an extensive workup  for her chronic anemia in the past and no significant etiology for this has been found.  At this time it does appear likely that this might be related to her chronic kidney disease. She is agreeable to proceed with additional procrit injections as and when indicated. The patient gets 20,000 units subcutaneously weekly x 4 weeks.   2. The patient has been taking vitamin B12 tablets.  3. The patient has had a chronic asymptomatic leukopenia. This has remained stable. This is likely benign in etiology and can be conservatively followed for now.  4. From the hematology oncology perspective the patient is cleared to proceed with her kidney transplantation when necessary.    Recheck CBC in [facility] in 2 weeks. Possible procrit depending on results at follow up. She knows to call sooner for any additional questions or new problems.    David Addison MD

## 2017-07-19 DIAGNOSIS — D50.9 IRON DEFICIENCY ANEMIA, UNSPECIFIED IRON DEFICIENCY ANEMIA TYPE: Primary | ICD-10-CM

## 2017-07-19 RX ORDER — HEPARIN 100 UNIT/ML
500 SYRINGE INTRAVENOUS
Status: CANCELLED | OUTPATIENT
Start: 2017-08-01

## 2017-07-19 RX ORDER — SODIUM CHLORIDE 0.9 % (FLUSH) 0.9 %
10 SYRINGE (ML) INJECTION
Status: CANCELLED | OUTPATIENT
Start: 2017-07-21

## 2017-07-19 RX ORDER — SODIUM CHLORIDE 0.9 % (FLUSH) 0.9 %
10 SYRINGE (ML) INJECTION
Status: CANCELLED | OUTPATIENT
Start: 2017-08-01

## 2017-07-19 RX ORDER — HEPARIN 100 UNIT/ML
500 SYRINGE INTRAVENOUS
Status: CANCELLED | OUTPATIENT
Start: 2017-07-21

## 2017-07-20 ENCOUNTER — TELEPHONE (OUTPATIENT)
Dept: HEMATOLOGY/ONCOLOGY | Facility: CLINIC | Age: 67
End: 2017-07-20

## 2017-07-20 NOTE — TELEPHONE ENCOUNTER
----- Message from David Addison MD sent at 7/19/2017  4:02 PM CDT -----  Please let the patient know the results of recent lab work do show iron deficiency.  She will need 2 weekly doses of IV Injectafer.  Orders in.  Please schedule this for the patient.  Thank you.

## 2017-07-24 ENCOUNTER — LAB VISIT (OUTPATIENT)
Dept: LAB | Facility: HOSPITAL | Age: 67
End: 2017-07-24
Attending: INTERNAL MEDICINE
Payer: MEDICARE

## 2017-07-24 DIAGNOSIS — Z76.82 ORGAN TRANSPLANT CANDIDATE: ICD-10-CM

## 2017-07-24 DIAGNOSIS — N18.5 CKD (CHRONIC KIDNEY DISEASE) STAGE 5, GFR LESS THAN 15 ML/MIN: ICD-10-CM

## 2017-07-24 DIAGNOSIS — N18.4 CHRONIC KIDNEY DISEASE (CKD), STAGE IV (SEVERE): ICD-10-CM

## 2017-07-24 LAB
ALBUMIN SERPL BCP-MCNC: 3.6 G/DL
ANION GAP SERPL CALC-SCNC: 9 MMOL/L
BUN SERPL-MCNC: 29 MG/DL
CALCIUM SERPL-MCNC: 10.2 MG/DL
CHLORIDE SERPL-SCNC: 108 MMOL/L
CO2 SERPL-SCNC: 26 MMOL/L
CREAT SERPL-MCNC: 2.7 MG/DL
EST. GFR  (AFRICAN AMERICAN): 20 ML/MIN/1.73 M^2
EST. GFR  (NON AFRICAN AMERICAN): 17.3 ML/MIN/1.73 M^2
GLUCOSE SERPL-MCNC: 102 MG/DL
PHOSPHATE SERPL-MCNC: 3.3 MG/DL
POTASSIUM SERPL-SCNC: 4.2 MMOL/L
PTH-INTACT SERPL-MCNC: 236 PG/ML
SODIUM SERPL-SCNC: 143 MMOL/L
URATE SERPL-MCNC: 8.9 MG/DL

## 2017-07-24 PROCEDURE — 84550 ASSAY OF BLOOD/URIC ACID: CPT | Mod: TXP

## 2017-07-24 PROCEDURE — 36415 COLL VENOUS BLD VENIPUNCTURE: CPT | Mod: PO,TXP

## 2017-07-24 PROCEDURE — 86706 HEP B SURFACE ANTIBODY: CPT | Mod: TXP

## 2017-07-24 PROCEDURE — 83970 ASSAY OF PARATHORMONE: CPT | Mod: TXP

## 2017-07-24 PROCEDURE — 80069 RENAL FUNCTION PANEL: CPT | Mod: TXP

## 2017-07-26 LAB
HEP. B SURF AB, QUAL: NEGATIVE
HEP. B SURF AB, QUANT.: <3 MIU/ML

## 2017-07-31 ENCOUNTER — INFUSION (OUTPATIENT)
Dept: INFUSION THERAPY | Facility: HOSPITAL | Age: 67
End: 2017-07-31
Attending: INTERNAL MEDICINE
Payer: MEDICARE

## 2017-07-31 ENCOUNTER — TELEPHONE (OUTPATIENT)
Dept: HEMATOLOGY/ONCOLOGY | Facility: CLINIC | Age: 67
End: 2017-07-31

## 2017-07-31 ENCOUNTER — OFFICE VISIT (OUTPATIENT)
Dept: NEPHROLOGY | Facility: CLINIC | Age: 67
End: 2017-07-31
Payer: MEDICARE

## 2017-07-31 ENCOUNTER — LAB VISIT (OUTPATIENT)
Dept: LAB | Facility: HOSPITAL | Age: 67
End: 2017-07-31
Attending: INTERNAL MEDICINE
Payer: MEDICARE

## 2017-07-31 VITALS
WEIGHT: 168 LBS | RESPIRATION RATE: 18 BRPM | BODY MASS INDEX: 30.91 KG/M2 | TEMPERATURE: 98 F | HEIGHT: 62 IN | HEART RATE: 76 BPM | DIASTOLIC BLOOD PRESSURE: 72 MMHG | OXYGEN SATURATION: 95 % | SYSTOLIC BLOOD PRESSURE: 125 MMHG

## 2017-07-31 VITALS
DIASTOLIC BLOOD PRESSURE: 74 MMHG | SYSTOLIC BLOOD PRESSURE: 132 MMHG | HEIGHT: 62 IN | WEIGHT: 168 LBS | HEART RATE: 100 BPM | BODY MASS INDEX: 30.91 KG/M2

## 2017-07-31 DIAGNOSIS — E55.9 VITAMIN D DEFICIENCY: ICD-10-CM

## 2017-07-31 DIAGNOSIS — D63.1 ANEMIA IN CHRONIC RENAL DISEASE: ICD-10-CM

## 2017-07-31 DIAGNOSIS — N18.4 ANEMIA IN STAGE 4 CHRONIC KIDNEY DISEASE: Primary | ICD-10-CM

## 2017-07-31 DIAGNOSIS — D50.9 IRON DEFICIENCY ANEMIA, UNSPECIFIED IRON DEFICIENCY ANEMIA TYPE: Primary | ICD-10-CM

## 2017-07-31 DIAGNOSIS — D63.1 ANEMIA IN STAGE 4 CHRONIC KIDNEY DISEASE: ICD-10-CM

## 2017-07-31 DIAGNOSIS — N18.9 ANEMIA IN CKD (CHRONIC KIDNEY DISEASE): ICD-10-CM

## 2017-07-31 DIAGNOSIS — N18.4 ANEMIA IN STAGE 4 CHRONIC KIDNEY DISEASE: ICD-10-CM

## 2017-07-31 DIAGNOSIS — D63.1 ANEMIA IN STAGE 4 CHRONIC KIDNEY DISEASE: Primary | ICD-10-CM

## 2017-07-31 DIAGNOSIS — N18.9 ANEMIA IN CHRONIC RENAL DISEASE: ICD-10-CM

## 2017-07-31 DIAGNOSIS — D63.1 ANEMIA IN CKD (CHRONIC KIDNEY DISEASE): ICD-10-CM

## 2017-07-31 DIAGNOSIS — N18.4 CHRONIC KIDNEY DISEASE (CKD), STAGE IV (SEVERE): Primary | ICD-10-CM

## 2017-07-31 LAB
BASOPHILS # BLD AUTO: 0.01 K/UL
BASOPHILS NFR BLD: 0.1 %
DIFFERENTIAL METHOD: ABNORMAL
EOSINOPHIL # BLD AUTO: 0.1 K/UL
EOSINOPHIL NFR BLD: 0.9 %
ERYTHROCYTE [DISTWIDTH] IN BLOOD BY AUTOMATED COUNT: 17.2 %
HCT VFR BLD AUTO: 30.6 %
HGB BLD-MCNC: 10 G/DL
LYMPHOCYTES # BLD AUTO: 1.8 K/UL
LYMPHOCYTES NFR BLD: 25.9 %
MCH RBC QN AUTO: 28.3 PG
MCHC RBC AUTO-ENTMCNC: 32.7 G/DL
MCV RBC AUTO: 87 FL
MONOCYTES # BLD AUTO: 0.5 K/UL
MONOCYTES NFR BLD: 7.1 %
NEUTROPHILS # BLD AUTO: 4.5 K/UL
NEUTROPHILS NFR BLD: 66 %
PLATELET # BLD AUTO: 193 K/UL
PMV BLD AUTO: 9.3 FL
RBC # BLD AUTO: 3.53 M/UL
WBC # BLD AUTO: 6.79 K/UL

## 2017-07-31 PROCEDURE — 36415 COLL VENOUS BLD VENIPUNCTURE: CPT | Mod: PO,TXP

## 2017-07-31 PROCEDURE — 99999 PR PBB SHADOW E&M-EST. PATIENT-LVL III: CPT | Mod: PBBFAC,,, | Performed by: INTERNAL MEDICINE

## 2017-07-31 PROCEDURE — 85025 COMPLETE CBC W/AUTO DIFF WBC: CPT | Mod: PO,TXP

## 2017-07-31 PROCEDURE — 25000003 PHARM REV CODE 250: Mod: PO | Performed by: INTERNAL MEDICINE

## 2017-07-31 PROCEDURE — 99214 OFFICE O/P EST MOD 30 MIN: CPT | Mod: S$PBB,,, | Performed by: INTERNAL MEDICINE

## 2017-07-31 PROCEDURE — 96365 THER/PROPH/DIAG IV INF INIT: CPT | Mod: PO

## 2017-07-31 PROCEDURE — 1126F AMNT PAIN NOTED NONE PRSNT: CPT | Mod: ,,, | Performed by: INTERNAL MEDICINE

## 2017-07-31 PROCEDURE — 1159F MED LIST DOCD IN RCRD: CPT | Mod: ,,, | Performed by: INTERNAL MEDICINE

## 2017-07-31 PROCEDURE — 63600175 PHARM REV CODE 636 W HCPCS: Mod: PO | Performed by: INTERNAL MEDICINE

## 2017-07-31 RX ADMIN — SODIUM CHLORIDE: 9 INJECTION, SOLUTION INTRAVENOUS at 12:07

## 2017-07-31 RX ADMIN — FERRIC CARBOXYMALTOSE INJECTION 750 MG: 50 INJECTION, SOLUTION INTRAVENOUS at 12:07

## 2017-07-31 NOTE — TELEPHONE ENCOUNTER
----- Message from David Addison MD sent at 7/31/2017 10:45 AM CDT -----  Please let the patient know that blood Lyles from today continues to be stable.  Proceed with IV iron infusions as recommended today and next week.  Please have the patient get labs checked in the [facility] lab in 6 weeks to include CBC, iron studies, ESR, ferritin.  I will review results and call the patient.  Please schedule.  Orders in.  Thank you.

## 2017-07-31 NOTE — PROGRESS NOTES
Subjective:       Patient ID: Citlali Karimi is a 67 y.o.   female who presents for follow-up evaluation of CKD stage 4, HTN, SHPT, Anemia          Evelio Schuster MD      HPI: Citlali Karimi Is a pleasant 67-year-old  woman seen in office today in f/u for above medical problems. I saw her about 4 months ago . Recent lab results were discussed with the patient and het  . Stable renal function with a serum creatinine of 2.7 mg/dL and GFR of about 20 mL per minute. Other provider notes in the interim were reviewed.  She was recently admitted in [facility] for weakness.  According to the patient and her  she had an evaluation by neurologist.  MRI of the brain shows dilated ventricles. she is scheduled to see Dr. Hurd with neurology at [facility] on 8/23/17  [place] for a second opinion.  She is currently undergoing rehabilitation therapy.  She also has history of anemia of chronic kidney disease and receives Procrit injection in hematology Department.     Important Info :       She underwent a native kidney biopsy on 10/10/13. Final report of the kidney biopsy is negative for any specific etiology for acute renal failure. 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         Past Medical History:   Diagnosis Date    Acid reflux 1/7/2015    Anxiety 1/7/2015    Aortic valvar stenosis     Arthritis     osteo    Callus     Chronic anemia     followed by Dr. Addison    CKD (chronic kidney disease) stage 4, GFR 15-29 ml/min     followed by Nephrology    CKD (chronic kidney disease) stage 5, GFR less than 15 ml/min 8/7/2015    CKD (chronic kidney disease), stage IV 1/7/2015    Combined hyperlipidemia associated with type 2 diabetes mellitus     Coronary artery disease     Diabetes mellitus type II, controlled, with no complications     LBSL 108 today    Diabetes mellitus, type 2 - only on oral medications  1/7/2015    Encounter for blood transfusion     Gallbladder problem     gallbladder surgery    Heart murmur     Hyperparathyroidism, secondary renal 1/7/2015    Hypertension 8/7/2015    Hypertension associated with diabetes 11/12/2012    Kidney transplant candidate 1/7/2015    Overweight(278.02)     Urinary tract infection          Current Outpatient Prescriptions on File Prior to Visit   Medication Sig Dispense Refill    aspirin (ECOTRIN) 81 MG EC tablet Take 81 mg by mouth once daily.      atorvastatin (LIPITOR) 20 MG tablet Take 20 mg by mouth once daily.      calcitRIOL (ROCALTROL) 0.5 MCG Cap TAKE ONE CAPSULE BY MOUTH DAILY 90 capsule 4    cyanocobalamin (VITAMIN B-12) 1000 MCG tablet Take 1 tablet (1,000 mcg total) by mouth once daily. 90 tablet 12    isosorbide mononitrate (IMDUR) 60 MG 24 hr tablet TAKE ONE TABLET BY MOUTH DAILY 30 tablet 9    lactulose (CEPHULAC) 10 gram packet Take 30 g by mouth as needed.      nifedipine (ADALAT CC) 90 MG TbSR Take 1 tablet (90 mg total) by mouth once daily. 30 tablet 11    omeprazole (PRILOSEC) 40 MG capsule Take 1 capsule (40 mg total) by mouth once daily. 30 capsule 11    sodium bicarbonate 650 MG tablet Take 650 mg by mouth 2 (two) times daily.       No current facility-administered medications on file prior to visit.          Review of Systems   Constitutional: Positive for activity change and unexpected weight change. Negative for appetite change, fatigue and fever.   HENT: Negative for congestion, facial swelling, sore throat, trouble swallowing and voice change.    Eyes: Negative for redness and visual disturbance.   Respiratory: Negative for apnea, cough, chest tightness, shortness of breath and wheezing.    Cardiovascular: Negative for chest pain, palpitations and leg swelling.   Gastrointestinal: Negative for abdominal distention, abdominal pain, blood in stool, constipation, diarrhea, nausea and vomiting.   Genitourinary: Negative for  decreased urine volume, difficulty urinating, dysuria, flank pain, frequency, hematuria, pelvic pain and urgency.   Musculoskeletal: Positive for arthralgias and gait problem. Negative for back pain and joint swelling.   Skin: Negative for color change and rash.   Neurological: Positive for weakness. Negative for dizziness, syncope and headaches.   Hematological: Does not bruise/bleed easily.   Psychiatric/Behavioral: Negative for agitation, behavioral problems and confusion. The patient is not nervous/anxious.      :            Objective:           Vitals:    07/31/17 1013   BP: 132/74   Pulse: 100       Respiratory rate 20, afebrile, weight 167 pounds, previous weight was 175 pounds      Physical Exam  :        Constitutional: She is oriented to person, place, and time. She appears well-developed and well-nourished. No distress.    HENT: Head: Normocephalic and atraumatic.  Mouth/Throat: Oropharynx is clear and moist. No oropharyngeal exudate.    Eyes: Conjunctivae normal and EOM are normal. Pupils are equal, round, and reactive to light. No scleral icterus.    Neck: Normal range of motion. Neck supple. No JVD present. Carotid bruit is not present. No tracheal deviation present. No mass and no thyromegaly present.    Cardiovascular: Normal rate, regular rhythm and intact distal pulses. Exam reveals no gallop and no friction rub. No Murmurs    Pulmonary/Chest: Effort normal and breath sounds normal. No respiratory distress. She has no wheezes. She has no rales. She exhibits no tenderness.    Abdominal: Soft. Bowel sounds are normal. She exhibits no distension, no abdominal bruit, no ascites and no mass. There is no hepatosplenomegaly. There is no tenderness. There is no rebound, no guarding and no CVA tenderness.   Musculoskeletal: Normal range of motion. She exhibits no edema and no tenderness.   Lymphadenopathy: She has no cervical adenopathy.   Neurological: She is alert and oriented to person, place, and time.  No cranial nerve deficit. She exhibits normal muscle tone. Coordination normal.    She is in a wheel chair , limited exam, gait not tested   Skin: Skin is warm and intact. No rash noted. No erythema. No pallor.   Psychiatric: She has a normal mood and affect. Her behavior is normal. Judgment normal.           Labs:    Lab Results   Component Value Date    CREATININE 2.7 (H) 07/24/2017    BUN 29 (H) 07/24/2017     07/24/2017    K 4.2 07/24/2017     07/24/2017    CO2 26 07/24/2017       Lab Results   Component Value Date    WBC 6.79 07/31/2017    HGB 10.0 (L) 07/31/2017    HCT 30.6 (L) 07/31/2017    MCV 87 07/31/2017     07/31/2017       Lab Results   Component Value Date    .0 (H) 07/24/2017    CALCIUM 10.2 07/24/2017    PHOS 3.3 07/24/2017       Lab Results   Component Value Date    ALBUMIN 3.6 07/24/2017       Lab Results   Component Value Date    URICACID 8.9 (H) 07/24/2017       Lab Results   Component Value Date    HGBA1C 6.2 06/07/2017       Impression and plan - 67 -year-old  woman seen today in f/u for following medical problems ,       1. CKD stage 4 - Her serum creatinine fluctuates between 2 and 3 mg/dL ,  She has reflux nephropathy. I again advised patient to avoid NSAIDs. She is S/p renal biopsy 10/2013 - Kidney biopsy was negative for any specific cause. There is 40% interstitial fibrosis from reflux nephropathy. Most recent serum creatinine is 2.7 mg/dL.  recent serum creatinine is stable at 2.7 mg/dL. she was referred for a kidney transplant evaluation.  In the of recent weakness and pending neurology evaluation we can hold  this process.      2. Hypertension - Blood pressure is Controlled on current regimen. Will continue to follow.       3. Anemia of CKD - she is currently following with hematology department for the same. On Epogen        4. Aortic stenosis : She is closely followed by Cards, s/p TAVR       5. hyperparathyroidism - ? Primary , cont  calcitriol 0.5 mcg daily. Follow serum calcium levels closely,       6. Diabetes mellitus type 2 - Well controlled. Off Metformin        7. Proteinuria - mild, continue Cozaar.        8. Volume - discussed adequate fluid intake      9. Kidney stone - nonobstructive , not seen on recent renal U/S ,        10. Hyperuricemia - again discussed low purine diet , cont Allopurinol 100 mg daily.     11. Weakness , ? NPH , recent MRI of the brain shows dilated ventricles.  She has an appointment with neurology for a second opinion.         More than 25 minutes was spent with the patient reviewing her lab results and discussing plan of care.  F/u in 4  months,       Magnus Vazquez MD

## 2017-07-31 NOTE — PLAN OF CARE
"Problem: Patient Care Overview  Goal: Plan of Care Review  Outcome: Ongoing (interventions implemented as appropriate)  Pt. Stated,"I'm doing alright."      "

## 2017-07-31 NOTE — PATIENT INSTRUCTIONS
Avoid NSAID pain medications such as advil, aleve, motrin, ibuprofen, naprosyn, meloxicam, diclofenac, mobic.    1) goal blood pressure is less than 130/80    2) please bring BP machine at next appt    3) keep a track of daily BPs and bring at next appt    4) bring all meds in a bag at next appt

## 2017-08-01 DIAGNOSIS — Z76.82 ORGAN TRANSPLANT CANDIDATE: ICD-10-CM

## 2017-08-07 ENCOUNTER — INFUSION (OUTPATIENT)
Dept: INFUSION THERAPY | Facility: HOSPITAL | Age: 67
End: 2017-08-07
Attending: INTERNAL MEDICINE
Payer: MEDICARE

## 2017-08-07 VITALS — SYSTOLIC BLOOD PRESSURE: 124 MMHG | HEART RATE: 87 BPM | DIASTOLIC BLOOD PRESSURE: 75 MMHG

## 2017-08-07 DIAGNOSIS — N18.4 ANEMIA IN STAGE 4 CHRONIC KIDNEY DISEASE: ICD-10-CM

## 2017-08-07 DIAGNOSIS — D63.1 ANEMIA IN STAGE 4 CHRONIC KIDNEY DISEASE: ICD-10-CM

## 2017-08-07 DIAGNOSIS — D50.9 IRON DEFICIENCY ANEMIA, UNSPECIFIED IRON DEFICIENCY ANEMIA TYPE: Primary | ICD-10-CM

## 2017-08-07 PROCEDURE — 63600175 PHARM REV CODE 636 W HCPCS: Performed by: INTERNAL MEDICINE

## 2017-08-07 PROCEDURE — 96365 THER/PROPH/DIAG IV INF INIT: CPT

## 2017-08-07 PROCEDURE — 25000003 PHARM REV CODE 250: Performed by: INTERNAL MEDICINE

## 2017-08-07 RX ADMIN — SODIUM CHLORIDE: 9 INJECTION, SOLUTION INTRAVENOUS at 09:08

## 2017-08-07 RX ADMIN — FERRIC CARBOXYMALTOSE INJECTION 750 MG: 50 INJECTION, SOLUTION INTRAVENOUS at 09:08

## 2017-08-07 NOTE — PLAN OF CARE
Problem: Patient Care Overview  Goal: Plan of Care Review  Outcome: Ongoing (interventions implemented as appropriate)  Pt states she has been sleeping/lying on her sofa a lot of the time.

## 2017-08-07 NOTE — PATIENT INSTRUCTIONS
[facility]  [address]  [address] [address]  [phone] phone     [phone] fax  Hours of Operation: Monday- Friday 8:00am- 5:00pm  After hours phone  [phone]  Hematology / Oncology Physicians on call      Dr. Roel Silverman                      Please call with any concerns regarding your appointment today.    FALL PREVENTION   Falls often occur due to slipping, tripping or losing your balance. Here are ways to reduce your risk of falling again.   Was there anything that caused your fall that can be fixed, removed or replaced?   Make your home safe by keeping walkways clear of objects you may trip over.   Use non-slip pads under rugs.   Do not walk in poorly lit areas.   Do not stand on chairs or wobbly ladders.   Use caution when reaching overhead or looking upward. This position can cause a loss of balance.   Be sure your shoes fit properly, have non-slip bottoms and are in good condition.   Be cautious when going up and down stairs, curbs, and when walking on uneven sidewalks.   If your balance is poor, consider using a cane or walker.   If your fall was related to alcohol use, stop or limit alcohol intake.   If your fall was related to use of sleeping medicines, talk to your doctor about this. You may need to reduce your dosage at bedtime if you awaken during the night to go to the bathroom.   To reduce the need for nighttime bathroom trips:   Avoid drinking fluids for several hours before going to bed   Empty your bladder before going to bed   Men can keep a urinal at the bedside   © 8557-2630 [employer], [address] [place] [address], [place] [address]. All rights reserved. This information is not intended as a substitute for professional medical care. Always follow your healthcare professional's instructions.

## 2017-08-08 ENCOUNTER — TELEPHONE (OUTPATIENT)
Dept: FAMILY MEDICINE | Facility: CLINIC | Age: 67
End: 2017-08-08

## 2017-08-08 DIAGNOSIS — R29.898 WEAKNESS OF LOWER EXTREMITY, UNSPECIFIED LATERALITY: Primary | ICD-10-CM

## 2017-08-08 NOTE — TELEPHONE ENCOUNTER
I have signed for the following orders AND/OR meds.  Please call the patient and ask the patient to schedule the testing AND/OR inform about any medications that were sent.      Orders Placed This Encounter   Procedures    Ambulatory Referral to Physical/Occupational Therapy     Referral Priority:   Routine     Referral Type:   Physical Medicine     Referral Reason:   Specialty Services Required     Requested Specialty:   Physical Therapy     Number of Visits Requested:   1

## 2017-08-08 NOTE — TELEPHONE ENCOUNTER
----- Message from Madeleine Van sent at 8/8/2017  9:58 AM CDT -----  Contact: /[phone]  [facility] [phone]  Pt is requesting an updated referral to continue to see [facility] for physical therapy/please call to advise further/please advise/thanks

## 2017-08-10 LAB — HPRA INTERPRETATION: NORMAL

## 2017-08-18 LAB
CLASS I ANTIBODIES - LUMINEX: NORMAL
CLASS I ANTIBODY COMMENTS - LUMINEX: NORMAL
CLASS II ANTIBODIES - LUMINEX: NEGATIVE
CPRA %: 72
SERUM COLLECTION DT - LUMINEX CLASS I: NORMAL
SERUM COLLECTION DT - LUMINEX CLASS II: NORMAL
SPCL1 TESTING DATE: NORMAL
SPCL2 TESTING DATE: NORMAL
SPCLU TESTING DATE: NORMAL

## 2017-08-23 ENCOUNTER — OFFICE VISIT (OUTPATIENT)
Dept: NEUROLOGY | Facility: CLINIC | Age: 67
End: 2017-08-23
Payer: MEDICARE

## 2017-08-23 ENCOUNTER — LAB VISIT (OUTPATIENT)
Dept: LAB | Facility: HOSPITAL | Age: 67
End: 2017-08-23
Attending: PSYCHIATRY & NEUROLOGY
Payer: MEDICARE

## 2017-08-23 VITALS
DIASTOLIC BLOOD PRESSURE: 78 MMHG | RESPIRATION RATE: 20 BRPM | SYSTOLIC BLOOD PRESSURE: 130 MMHG | HEART RATE: 86 BPM | HEIGHT: 62 IN

## 2017-08-23 DIAGNOSIS — E55.9 VITAMIN D DEFICIENCY: ICD-10-CM

## 2017-08-23 DIAGNOSIS — R26.2 AMBULATORY DYSFUNCTION: ICD-10-CM

## 2017-08-23 DIAGNOSIS — R29.898 LEFT LEG WEAKNESS: ICD-10-CM

## 2017-08-23 DIAGNOSIS — R26.2 AMBULATORY DYSFUNCTION: Primary | ICD-10-CM

## 2017-08-23 LAB — 25(OH)D3+25(OH)D2 SERPL-MCNC: 20 NG/ML

## 2017-08-23 PROCEDURE — 1159F MED LIST DOCD IN RCRD: CPT | Mod: ,,, | Performed by: PSYCHIATRY & NEUROLOGY

## 2017-08-23 PROCEDURE — 99213 OFFICE O/P EST LOW 20 MIN: CPT | Mod: PBBFAC,PN | Performed by: PSYCHIATRY & NEUROLOGY

## 2017-08-23 PROCEDURE — 36415 COLL VENOUS BLD VENIPUNCTURE: CPT | Mod: PO,TXP

## 2017-08-23 PROCEDURE — 1126F AMNT PAIN NOTED NONE PRSNT: CPT | Mod: ,,, | Performed by: PSYCHIATRY & NEUROLOGY

## 2017-08-23 PROCEDURE — 3075F SYST BP GE 130 - 139MM HG: CPT | Mod: ,,, | Performed by: PSYCHIATRY & NEUROLOGY

## 2017-08-23 PROCEDURE — 99214 OFFICE O/P EST MOD 30 MIN: CPT | Mod: S$PBB,,, | Performed by: PSYCHIATRY & NEUROLOGY

## 2017-08-23 PROCEDURE — 3078F DIAST BP <80 MM HG: CPT | Mod: ,,, | Performed by: PSYCHIATRY & NEUROLOGY

## 2017-08-23 PROCEDURE — 99999 PR PBB SHADOW E&M-EST. PATIENT-LVL III: CPT | Mod: PBBFAC,,, | Performed by: PSYCHIATRY & NEUROLOGY

## 2017-08-23 PROCEDURE — 82306 VITAMIN D 25 HYDROXY: CPT | Mod: TXP

## 2017-08-23 NOTE — ASSESSMENT & PLAN NOTE
Deconditioning, problem with easy fatiguability    AChR Ab   EMG/NCS LLE - question left femoral vs L4

## 2017-08-23 NOTE — PROGRESS NOTES
"Subjective:         Patient ID: Citlali Karimi is a 67 y.o.  female who presents for follow up of balance difficulty    From last visit 1/26/17:  Pt initially seen in September for balance issues.  Did not appreciate any sensory loss or neuropathy on exam, mild distal atrophy, normal to increased DTR, hesitant and apprehensive walking. Referred to PT for ambulation, risk factor modification for vascular disease, detailed neuropsychological testing for subjective difficulty with memory. Did well in PT, improved functional scores, discharged from therapy.     Recently seen in cardiology, continues to c/o weakness in the LE.  Did not go for MRI, discouraged due to TAVR - see cardiology note regarding status of device as MR conditional     Severe CAD, stage IV CKD, DM2, chronic anemia, AV stenosis s/p TAVR, secondary hyperparathyroidism, obesity     She states that one week after finishing therapy, her walking deteriorated again to "the same old thing".  She admits to being apprehensive about falling, also thinks her legs are weak. Uses rolling walker at home.     Interval history since last visit:    Review of patient's allergies indicates:   Allergen Reactions    Lipitor [atorvastatin]      Current Outpatient Prescriptions   Medication Sig Dispense Refill    aspirin (ECOTRIN) 81 MG EC tablet Take 81 mg by mouth once daily.      calcitRIOL (ROCALTROL) 0.5 MCG Cap TAKE ONE CAPSULE BY MOUTH DAILY 90 capsule 4    cyanocobalamin (VITAMIN B-12) 1000 MCG tablet Take 1 tablet (1,000 mcg total) by mouth once daily. 90 tablet 12    isosorbide mononitrate (IMDUR) 60 MG 24 hr tablet TAKE ONE TABLET BY MOUTH DAILY 30 tablet 9    nifedipine (ADALAT CC) 90 MG TbSR Take 1 tablet (90 mg total) by mouth once daily. 30 tablet 11    omeprazole (PRILOSEC) 40 MG capsule Take 1 capsule (40 mg total) by mouth once daily. 30 capsule 11    sodium bicarbonate 650 MG tablet Take 650 mg by mouth 2 (two) times daily.       No current " facility-administered medications for this visit.          Review of Systems  Review of Systems    Objective:        Vitals:    08/23/17 1106   BP: 130/78   Pulse: 86   Resp: 20     There is no height or weight on file to calculate BMI.  Neurologic Exam     Mental Status   Oriented to person, place, and time.   Level of consciousness: alert    Sensory Exam   Light touch normal.   Vibration normal.   Proprioception normal.   Pinprick normal.   At the toes, had normal pin, temp,. Vib, proprioception     Gait, Coordination, and Reflexes     Gait  Gait: (apprehensive, very short stride length.  Needs encouragement to get heel past toes.  )    Coordination   Romberg: positive    Reflexes   Right patellar: 2+  Left patellar: 2+  Right achilles: 1+  Left achilles: 2+  Right plantar: normal  Left plantar: normalWalked about 200 feet; starts to fatigue left leg, does not clear the opposite foot, starts to give.        Physical Exam   Constitutional: She is oriented to person, place, and time.   Neurological: She is oriented to person, place, and time. She has an abnormal Romberg Test.   Reflex Scores:       Patellar reflexes are 2+ on the right side and 2+ on the left side.       Achilles reflexes are 1+ on the right side and 2+ on the left side.        Data Review:  Results for RUT MCCALLUM (MRN [id number]) as of 9/8/2017 07:49   Ref. Range 7/31/2017 10:02 8/23/2017 12:19   WBC Latest Ref Range: 3.90 - 12.70 K/uL 6.79    RBC Latest Ref Range: 4.00 - 5.40 M/uL 3.53 (L)    Hemoglobin Latest Ref Range: 12.0 - 16.0 g/dL 10.0 (L)    Hematocrit Latest Ref Range: 37.0 - 48.5 % 30.6 (L)    MCV Latest Ref Range: 82 - 98 fL 87    MCH Latest Ref Range: 27.0 - 31.0 pg 28.3    MCHC Latest Ref Range: 32.0 - 36.0 g/dL 32.7    RDW Latest Ref Range: 11.5 - 14.5 % 17.2 (H)    Platelets Latest Ref Range: 150 - 350 K/uL 193    MPV Latest Ref Range: 9.2 - 12.9 fL 9.3    Gran% Latest Ref Range: 38.0 - 73.0 % 66.0    Gran # Latest Ref Range: 1.8  - 7.7 K/uL 4.5    Lymph% Latest Ref Range: 18.0 - 48.0 % 25.9    Lymph # Latest Ref Range: 1.0 - 4.8 K/uL 1.8    Mono% Latest Ref Range: 4.0 - 15.0 % 7.1    Mono # Latest Ref Range: 0.3 - 1.0 K/uL 0.5    Eosinophil% Latest Ref Range: 0.0 - 8.0 % 0.9    Eos # Latest Ref Range: 0.0 - 0.5 K/uL 0.1    Basophil% Latest Ref Range: 0.0 - 1.9 % 0.1    Baso # Latest Ref Range: 0.00 - 0.20 K/uL 0.01    Vit D, 25-Hydroxy Latest Ref Range: 30 - 96 ng/mL  20 (L)   Acetylchol Modul Ab Latest Units: %  0   AChR Binding Ab, Serum Latest Ref Range: <=0.02 nmol/L  0.00   MG Adult Interpretation Unknown  SEE BELOW   Striated Muscle Ab Latest Ref Range: <1:120 titer  Negative     Assessment:        1. Ambulatory dysfunction    2. Left leg weakness    3. Vitamin D deficiency            Plan:         Problem List Items Addressed This Visit        Endocrine    Vitamin D deficiency    Relevant Orders    VITAMIN D (Completed)       Orthopedic    Left leg weakness    Relevant Orders    EMG 1 Extremity    Nerve conduction test    MYASTHENIA GRAVIS PANEL, ADULT (Completed)       Other    Ambulatory dysfunction - Primary    Overview     Fearful of falling         Current Assessment & Plan     Deconditioning, problem with easy fatiguability    AChR Ab   EMG/NCS LLE - question left femoral vs L4          Relevant Orders    MYASTHENIA GRAVIS PANEL, ADULT (Completed)      Other Visit Diagnoses    None.         Return in about 3 months (around 11/23/2017).       Thank you very much for the opportunity to assist in this patient's care.  If you have any questions or concerns, please do not hesitate to contact me at any time.     Sincerely,  Kristopher Hurd, DO

## 2017-08-25 ENCOUNTER — HOSPITAL ENCOUNTER (OUTPATIENT)
Dept: RADIOLOGY | Facility: HOSPITAL | Age: 67
Discharge: HOME | End: 2017-08-25
Attending: INTERNAL MEDICINE
Payer: MEDICARE

## 2017-08-25 ENCOUNTER — OFFICE VISIT (OUTPATIENT)
Dept: TRANSPLANT | Facility: CLINIC | Age: 67
End: 2017-08-25
Payer: MEDICARE

## 2017-08-25 VITALS
WEIGHT: 165.13 LBS | HEART RATE: 86 BPM | BODY MASS INDEX: 31.18 KG/M2 | SYSTOLIC BLOOD PRESSURE: 154 MMHG | DIASTOLIC BLOOD PRESSURE: 65 MMHG | HEIGHT: 61 IN | OXYGEN SATURATION: 98 % | TEMPERATURE: 98 F | RESPIRATION RATE: 16 BRPM

## 2017-08-25 DIAGNOSIS — E11.21 DIABETIC NEPHROPATHY ASSOCIATED WITH TYPE 2 DIABETES MELLITUS: ICD-10-CM

## 2017-08-25 DIAGNOSIS — Z76.82 ORGAN TRANSPLANT CANDIDATE: Chronic | ICD-10-CM

## 2017-08-25 DIAGNOSIS — Z95.2 S/P AVR (AORTIC VALVE REPLACEMENT): ICD-10-CM

## 2017-08-25 DIAGNOSIS — R54 FRAIL ELDERLY: ICD-10-CM

## 2017-08-25 DIAGNOSIS — N18.5 CKD (CHRONIC KIDNEY DISEASE) STAGE 5, GFR LESS THAN 15 ML/MIN: Primary | Chronic | ICD-10-CM

## 2017-08-25 DIAGNOSIS — Z76.82 ORGAN TRANSPLANT CANDIDATE: ICD-10-CM

## 2017-08-25 DIAGNOSIS — N25.81 HYPERPARATHYROIDISM, SECONDARY RENAL: ICD-10-CM

## 2017-08-25 DIAGNOSIS — R26.2 AMBULATORY DYSFUNCTION: ICD-10-CM

## 2017-08-25 PROCEDURE — 3077F SYST BP >= 140 MM HG: CPT | Mod: TXP,,, | Performed by: INTERNAL MEDICINE

## 2017-08-25 PROCEDURE — 1159F MED LIST DOCD IN RCRD: CPT | Mod: TXP,,, | Performed by: INTERNAL MEDICINE

## 2017-08-25 PROCEDURE — 71020 XR CHEST PA AND LATERAL: CPT | Mod: 26,TXP,, | Performed by: RADIOLOGY

## 2017-08-25 PROCEDURE — 3044F HG A1C LEVEL LT 7.0%: CPT | Mod: TXP,,, | Performed by: INTERNAL MEDICINE

## 2017-08-25 PROCEDURE — 1126F AMNT PAIN NOTED NONE PRSNT: CPT | Mod: TXP,,, | Performed by: INTERNAL MEDICINE

## 2017-08-25 PROCEDURE — 99999 PR PBB SHADOW E&M-EST. PATIENT-LVL III: CPT | Mod: PBBFAC,TXP,,

## 2017-08-25 PROCEDURE — 3066F NEPHROPATHY DOC TX: CPT | Mod: TXP,,, | Performed by: INTERNAL MEDICINE

## 2017-08-25 PROCEDURE — 76770 US EXAM ABDO BACK WALL COMP: CPT | Mod: 26,GC,TXP, | Performed by: RADIOLOGY

## 2017-08-25 PROCEDURE — 99215 OFFICE O/P EST HI 40 MIN: CPT | Mod: S$PBB,TXP,, | Performed by: INTERNAL MEDICINE

## 2017-08-25 PROCEDURE — 3078F DIAST BP <80 MM HG: CPT | Mod: TXP,,, | Performed by: INTERNAL MEDICINE

## 2017-08-25 PROCEDURE — 99213 OFFICE O/P EST LOW 20 MIN: CPT | Mod: PBBFAC,25,TXP

## 2017-08-25 NOTE — PROGRESS NOTES
"   Kidney Transplant Recipient Reevalulation    Referring Physician: Magnus Vazquez  Current Nephrologist: Magnus Vazquez  Waitlist Status: inactive  Dialysis Start Date: (Not currently on dialysis)    Subjective:     CC:  Annual reassessment of kidney transplant candidacy.    HPI:  Ms. Karimi is a 67 y.o. year old Black or  female with advanced kidney disease secondary to diabetic nephropathy.  She has been on the wait list for a kidney transplant at [facility] since 7/21/2015. Patient is currently.     Patient was admitted in June 2016 to [facility] with a "blood infection", mental status changes.  states that she also has some "memory problems" and was hospitalized for 1 week and after that transfered to a rehab facility for "10 days". currenlty patient overall feels weak, looks chronically debilitated and obvious frailty at inspection.    Please see below the discharge summary from [facility]       "67-year-old female presenting   to our Emergency Room accompanied by the spouse with complaints of weakness on  her extremities. The symptoms, she said, have been ongoing for more than a   year, but that for the last couple of days or weeks, she became extremely weak  to her extremities and symptoms became worse, and she decided to present to   the Emergency Room for evaluation and treatment. She acknowledges inability   to bear weight, symptoms not relieved with movement. She denied gallops.   Denied rheumatoid arthritis. She also had a significant history of diabetes   mellitus. The patient was admitted to our facility with diagnoses of :  1. Weakness to both legs.  2. Frequent falling.  3. History of chronic kidney disease."    Please see below assessment of physical therapy:    "Pt is limited by impaired cognition, impaired problem solving skills, impaired sequencing, poor carry over for tasks, decreased strength and endurance, impaired dynamic sitting and standing balance, " "impaired safety awareness, poor insight of condition, and self limiting ability to complete tasks. Pt is at high risk for falls; therefore, 24 hour supervision is recommended upon D/C. Pt would benefit from continued OT in OP to continue to progress towards [place].         Review of Systems   Constitutional: Positive for activity change, appetite change, fatigue and unexpected weight change. Negative for fever.   HENT: Negative for hearing loss, mouth sores and sore throat.    Eyes: Negative for photophobia and visual disturbance.   Respiratory: Negative for cough, chest tightness, shortness of breath and wheezing.    Cardiovascular: Positive for leg swelling. Negative for chest pain and palpitations.   Gastrointestinal: Negative for abdominal distention, abdominal pain, blood in stool, constipation, diarrhea, nausea and vomiting.   Genitourinary: Negative for decreased urine volume, difficulty urinating, dysuria, frequency, hematuria, menstrual problem and urgency.   Musculoskeletal: Positive for arthralgias and gait problem. Negative for back pain and joint swelling.   Skin: Negative for pallor, rash and wound.   Neurological: Positive for weakness. Negative for dizziness, tremors, syncope, light-headedness and headaches.   Hematological: Negative for adenopathy. Does not bruise/bleed easily.   Psychiatric/Behavioral: Positive for decreased concentration. Negative for confusion, dysphoric mood and sleep disturbance. The patient is not nervous/anxious.        Objective:   body mass index is 31.46 kg/m².    Physical Exam   Constitutional: She is oriented to person, place, and time. She appears well-developed and well-nourished. No distress.   BP (!) 154/65 (BP Location: Left arm, Patient Position: Sitting, BP Method: Medium (Automatic))   Pulse 86   Temp 97.8 °F (36.6 °C) (Oral)   Resp 16   Ht 5' 0.75" (1.543 m)   Wt 74.9 kg (165 lb 2 oz)   SpO2 98%   BMI 31.46 kg/m² \      Very nice lady with her "  today. In no acute distress. She is on a wheel chair in a good mood     HENT:   Head: Normocephalic and atraumatic.   Right Ear: External ear normal.   Left Ear: External ear normal.   Nose: Nose normal.   Mouth/Throat: Oropharynx is clear and moist. No oropharyngeal exudate.   Eyes: Conjunctivae and EOM are normal. Pupils are equal, round, and reactive to light.   Neck: Normal range of motion. Neck supple. No JVD present. No thyromegaly present.   Cardiovascular: Normal rate, regular rhythm and intact distal pulses.  Exam reveals no gallop and no friction rub.    No murmur heard.  DESHAWN murmur   Pulmonary/Chest: Effort normal and breath sounds normal. No respiratory distress. She has no wheezes. She has no rales.   Abdominal: Soft. Bowel sounds are normal. She exhibits no distension and no mass. There is no tenderness. There is no rebound and no guarding.   Musculoskeletal: She exhibits edema. She exhibits no tenderness.   Neurological: She is alert and oriented to person, place, and time. She has normal reflexes. She displays normal reflexes. No cranial nerve deficit. She exhibits normal muscle tone. Coordination normal.   On a wheel chair, unable to walk at the present time with bilateral leg edema   Skin: Skin is warm and dry. No rash noted. No pallor.   Psychiatric: She has a normal mood and affect. Her behavior is normal. Judgment and thought content normal.   Nursing note and vitals reviewed.      Labs:  Lab Results   Component Value Date    WBC 6.79 07/31/2017    HGB 10.0 (L) 07/31/2017    HCT 30.6 (L) 07/31/2017     07/24/2017    K 4.2 07/24/2017     07/24/2017    CO2 26 07/24/2017    BUN 29 (H) 07/24/2017    CREATININE 2.7 (H) 07/24/2017    EGFRNONAA 17.3 (A) 07/24/2017    CALCIUM 10.2 07/24/2017    PHOS 3.3 07/24/2017    MG 1.9 05/11/2016    ALBUMIN 3.6 07/24/2017    AST 14 03/28/2017    ALT 11 03/28/2017    UTPCR 1.15 (H) 07/24/2017    .0 (H) 07/24/2017       Lab Results    Component Value Date    BILIRUBINUA Negative 07/24/2017    PROTEINUA 3+ (A) 07/24/2017    NITRITE Negative 07/24/2017    RBCUA 0 07/24/2017    WBCUA 1 07/24/2017       No results found for: HLAABCTYPE    Lab Results   Component Value Date    CPRA 72 07/11/2017    NS7IFAH  07/11/2017     B8,B65,B64,B59,B18,B75,B54,B39,B61,B76,B78,A26,B13,A34,A1,A43,B62,B72,A66,B35,B41,B48    CIABCLM WEAK B45, A3, A36, B37 07/11/2017    CIIAB Negative 07/11/2017       Labs were reviewed with the patient.    Pre-transplant Workup:   Reviewed with the patient.    Assessment:     No diagnosis found.    Plan:     Transplant Candidacy:   Ms. Karimi is an unacceptable kidney transplant candidate.  She will be removed from the waiting list. No longer a kidney transplant candiidate..    I spoke with this very nice couple for almost 40 minutes    Unfortunately she is not  A kidney transplant candidate due to:    High frailty index    Severely decreased functional capacity    H/o heart disease with TAVR    Recent acute illness with neurocognitive compromise    Advanced age. No living donors. Prohibitive risks for morbidity and mortality with immunosuppression and kidney transplant surgery    This was explained to the patient and  and they agreed that kidney transplantation is not on her best interest      Education provided      All questions answered    Sanket Collins MD       Follow-up:   In addition to the tests noted in the plan, Ms. Karimi will continue to have reevaluation as per the standing pre-kidney transplant protocol:  1. Monthly blood for PRA  2. Annual return to clinic, except HIV positive, > 65 years of age, or pancreas transplant candidates who will be scheduled to see transplant every 6 months while in pre-transplant phase  3. Annual re-testing: CXR, EKG, yearly mammograms for women over 40 and PSA for males over 40, cardiology follow-up as recommended by initial cardiology pre-transplant evaluation  4. Renal  ultrasound every 2 years  5. Baseline colonoscopy after age 50 and repeated as recommended    UNOS Patient Status  Functional Status: 40% - Disabled: requires special care and assistance  Physical Capacity: Wheelchair bound or more limited

## 2017-08-25 NOTE — PROGRESS NOTES
Unfortunately she is not a candidate fir kidney transplant. I spoke with her today in clinic. Kidney US is consistent with CKD, will send this to her nephrologist

## 2017-08-28 ENCOUNTER — OFFICE VISIT (OUTPATIENT)
Dept: OBSTETRICS AND GYNECOLOGY | Facility: CLINIC | Age: 67
End: 2017-08-28
Payer: MEDICARE

## 2017-08-28 VITALS
HEIGHT: 62 IN | BODY MASS INDEX: 30.55 KG/M2 | DIASTOLIC BLOOD PRESSURE: 62 MMHG | WEIGHT: 166 LBS | SYSTOLIC BLOOD PRESSURE: 124 MMHG

## 2017-08-28 DIAGNOSIS — Z01.419 ENCOUNTER FOR GYNECOLOGICAL EXAMINATION WITHOUT ABNORMAL FINDING: Primary | ICD-10-CM

## 2017-08-28 LAB
ACHR BIND AB SER-SCNC: 0 NMOL/L
ACHR MOD AB/ACHR TOTAL SFR SER: 0 %
STRIA MUS AB TITR SER: NEGATIVE TITER
VGCC-N BIND AB SER-SCNC: NORMAL PMOL/L

## 2017-08-28 PROCEDURE — 99999 PR PBB SHADOW E&M-EST. PATIENT-LVL III: CPT | Mod: PBBFAC,,, | Performed by: NURSE PRACTITIONER

## 2017-08-28 PROCEDURE — G0101 CA SCREEN;PELVIC/BREAST EXAM: HCPCS | Mod: PBBFAC,PO | Performed by: NURSE PRACTITIONER

## 2017-08-28 PROCEDURE — G0101 CA SCREEN;PELVIC/BREAST EXAM: HCPCS | Mod: S$PBB,,, | Performed by: NURSE PRACTITIONER

## 2017-08-28 PROCEDURE — 99213 OFFICE O/P EST LOW 20 MIN: CPT | Mod: PBBFAC,PO | Performed by: NURSE PRACTITIONER

## 2017-08-28 NOTE — PROGRESS NOTES
CC: Well woman exam    Citlali Karimi is a 67 y.o. female  presents for a well woman exam.  No issues, problems, or complaints.      Past Medical History:   Diagnosis Date    Acid reflux 2015    Anxiety 2015    Aortic valvar stenosis     Arthritis     osteo    Callus     Chronic anemia     followed by Dr. Addison    CKD (chronic kidney disease) stage 4, GFR 15-29 ml/min     followed by Nephrology    CKD (chronic kidney disease) stage 5, GFR less than 15 ml/min 2015    CKD (chronic kidney disease), stage IV 2015    Combined hyperlipidemia associated with type 2 diabetes mellitus     Coronary artery disease     Diabetes mellitus type II, controlled, with no complications     LBSL 108 today    Diabetes mellitus, type 2 - only on oral medications 2015    Encounter for blood transfusion     Frail elderly with impaired mobility, wheel chair bound 2017    Gallbladder problem     gallbladder surgery    Heart murmur     Hyperparathyroidism, secondary renal 2015    Hypertension 2015    Hypertension associated with diabetes 2012    Kidney transplant candidate 2015    Overweight(278.02)     Urinary tract infection      Past Surgical History:   Procedure Laterality Date    ANKLE FRACTURE SURGERY  1985    AORTIC VALVE REPLACEMENT  2016    BONE BIOPSY      CATARACT EXTRACTION W/  INTRAOCULAR LENS IMPLANT  2009    CHOLECYSTECTOMY      CYSTOSCOPY      EYE SURGERY      FRACTURE SURGERY      HYSTERECTOMY  unknown    OOPHORECTOMY      removal of colon polyps      RENAL BIOPSY  10/2013    YAG cap -OD       Family History   Problem Relation Age of Onset    Hypertension Mother     Heart disease Mother     Diabetes Mother     Glaucoma Mother     Aneurysm Father     Stroke Father     Kidney disease Sister     Heart disease Sister     Kidney disease Brother     Hypertension Brother     Diabetes Brother     Diabetes Sister     Kidney  "disease Sister     Hypertension Brother     Cancer Paternal Grandmother      breast    No Known Problems Maternal Aunt     No Known Problems Maternal Uncle     No Known Problems Paternal Aunt     No Known Problems Paternal Uncle     Colon cancer Maternal Grandmother     No Known Problems Maternal Grandfather     No Known Problems Paternal Grandfather     Anemia Neg Hx     Arrhythmia Neg Hx     Asthma Neg Hx     Clotting disorder Neg Hx     Fainting Neg Hx     Heart attack Neg Hx     Heart failure Neg Hx     Hyperlipidemia Neg Hx     Atrial Septal Defect Neg Hx      Social History   Substance Use Topics    Smoking status: Never Smoker    Smokeless tobacco: Never Used    Alcohol use No     OB History      Para Term  AB Living    0 0 0 0 0 0    SAB TAB Ectopic Multiple Live Births    0 0 0 0            /62   Ht 5' 2" (1.575 m)   Wt 75.3 kg (166 lb 0.1 oz)   BMI 30.36 kg/m²     ROS:  GENERAL: Denies weight gain or weight loss. Feeling well overall.   SKIN: Denies rash or lesions.   HEAD: Denies head injury or headache.   NODES: Denies enlarged lymph nodes.   CHEST: Denies chest pain or shortness of breath.   CARDIOVASCULAR: Denies palpitations or left sided chest pain.   ABDOMEN: No abdominal pain, constipation, diarrhea, nausea, vomiting or rectal bleeding.   URINARY: No frequency, dysuria, hematuria, or burning on urination.  REPRODUCTIVE: See HPI.   BREASTS: The patient performs breast self-examination and denies pain, lumps, or nipple discharge.   HEMATOLOGIC: No easy bruisability or excessive bleeding.   MUSCULOSKELETAL: Denies joint pain or swelling.   NEUROLOGIC: Denies syncope or weakness.   PSYCHIATRIC: Denies depression, anxiety or mood swings.    PE:   APPEARANCE: Well nourished, well developed, in no acute distress.  AFFECT: WNL, alert and oriented x 3.  SKIN: No acne or hirsutism.  NECK: Neck symmetric without masses or thyromegaly.  NODES: No inguinal, cervical, " axillary or femoral lymph node enlargement.  CHEST: Good respiratory effort.   ABDOMEN: Soft. No tenderness or masses. No hepatosplenomegaly. No hernias.  BREASTS: Symmetrical, no skin changes or visible lesions. No palpable masses, nipple discharge bilaterally.  PELVIC: Normal external female genitalia without lesions. Normal hair distribution. Adequate perineal body, normal urethral meatus. Vagina atrophic without lesions or discharge. No significant cystocele or rectocele. Bimanual exam shows uterus and cervix to be surgically absent. Adnexa absent.     1. Encounter for gynecological examination without abnormal finding      and PLAN:    Patient was counseled today on A.C.S. Pap guidelines and recommendations for yearly pelvic exams, mammograms and monthly self breast exams; to see her PCP for other health maintenance.

## 2017-08-28 NOTE — PROGRESS NOTES
Transplant Recipient Adult Psychosocial Assessment (UPDATE-LAST SEEN 1/7/2015)    Citlali FIERRO [address]  [place] [address]    Telephone Information:   Mobile [phone]   Home  [phone] (home)  Work  [phone] (work)  E-mail  [email or web address]    Sex: female  YOB: 1950  Age: 67 y.o.    Encounter Date: 8/25/2017  U[place] Citizen: yes  Primary Language: English   Needed: no    Emergency Contact:  hanh Gipson, [facility], drives/own car, works at [employer] [phone]. Cell: [phone] home: [phone]    Family/Social Support:   Number of dependents/: none  Marital history:  to Richie Karimi 39 years  Other family dynamics: Pt is  but has no children.  Pt reports supportive family and friend systems.    Household Composition:  Richie Karimi, 67 yo , does drive/own car, retired. [phone]    Do you and your caregivers have access to reliable transportation? yes  PRIMARY CAREGIVER: Richie Karimi, , will be primary caregiver, phone number [phone]. Caregiver states understanding all aspects of caregiver role/commitment.  Caregiver states is able/willing/committed to being caregiver to the fullest extent necessary.  provided in-depth information to patient and caregiver regarding pre- and post-transplant caregiver role.  Patient and caregiver verbalizes understanding of the education provided today.   strongly encourages patient and caregiver to have concrete plan regarding post-transplant care giving, including back-up caregiver(s) to ensure care giving needs are met as needed.  Patient and caregiver verbalizes understanding of caregiver responsibilities.   remains available. Patient and caregiver agree to contact  in a timely manner if concerns arise.  Able to take time off work without financial concerns: yes.     Additional Significant Others who will Assist with  "Transplant:    Dr. Margo marin, [facility] [place], drives/own car  works at [employer] [phone].   Cell: [phone] home: [phone]      Living Will: no  Healthcare Power of : no Pt reports trusting  with medical decisions  Advance Directives on file: <<no information> per medical record.  Verbally reviewed LW/HCPA information.   provided patient with copy of LW/HCPA documents and provided education on completion of forms    Living Donors: No. Education and resource information given to patient.    Highest Education Level: Attended College/Technical School  Reading Ability: college  Reports difficulty with: memory Pt reports forgetting to take her medications "sometimes" and believes this was due to the falls. SW communicated concern to medical staff.   Learns Best By:  Reading, seeing and doing.     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  Also worked as a .  Retired     Disabled: no, retired    Monthly Income:  $6500.  Able to afford all costs now and if transplanted, including medications: yes  Patient verbalizes understanding of personal responsibilities related to transplant costs and the importance of having a financial plan to ensure that patients transplant costs are fully covered.   provided fundraising information/education.  Patient verbalizes understanding.   remains available.    Insurance:   Payor/Plan Subscr  Sex Relation Sub. Ins. ID Effective Group Num   1. MEDICARE - ME* RUT MCCALLUM OPAL 1950 Female  [id number]                                    [address]   2. BLUE CROSS BL* RUT MCCALLUM 1950 Female  [id number]                                   [address] [address] " "    Primary Insurance (for UNOS reporting): Public Insurance - Medicare FFS (Fee For Service)  Secondary Insurance (for UNOS reporting): Private Insurance  Patient verbalizes clear understanding that patient may experience difficulty obtaining and/or be denied insurance coverage post-surgery. This includes and is not limited to disability insurance, life insurance, health insurance, burial insurance, long term care insurance, and other insurances.  Patient also reports understanding that future health concerns related to or unrelated to transplantation may not be covered by patient's insurance.  Resources and information provided and reviewed.      Patient provides verbal permission to release any necessary information to outside resources for patient care and discharge planning.  Resources and information provided are reviewed.      Dialysis History:  Type: pre-dialysis  Dr. Vazquez, nephrologist.    Infusion Service: patient utilizing? no  Home Health: patient utilizing? yes, [facility] 1 x week for vital signs  DME: yes wheelchair, rolator, walker, BPC,shower seat and bsc.   Pulmonary/Cardiac Rehab: pt denies  ADLS: Pt reports currently receiving assistance with driving, walking ,housekeeping and learning. Pt reports falling 2x in her home in the last month. Pt presented in a wheelchair due to injuries received from falls. Pt reports utilizing a wheelchair "sometimes".     Adherence:   Pt reports high compliance with all medical instructions.  Adherence education and counseling provided.     Per History Section:  Past Medical History:   Diagnosis Date    Acid reflux 1/7/2015    Anxiety 1/7/2015    Aortic valvar stenosis     Arthritis     osteo    Callus     Chronic anemia     followed by Dr. Addison    CKD (chronic kidney disease) stage 4, GFR 15-29 ml/min     followed by Nephrology    CKD (chronic kidney disease) stage 5, GFR less than 15 ml/min 8/7/2015    CKD (chronic kidney disease), stage IV 1/7/2015 "    Combined hyperlipidemia associated with type 2 diabetes mellitus     Coronary artery disease     Diabetes mellitus type II, controlled, with no complications     LBSL 108 today    Diabetes mellitus, type 2 - only on oral medications 1/7/2015    Encounter for blood transfusion     Frail elderly with impaired mobility, wheel chair bound 8/25/2017    Gallbladder problem     gallbladder surgery    Heart murmur     Hyperparathyroidism, secondary renal 1/7/2015    Hypertension 8/7/2015    Hypertension associated with diabetes 11/12/2012    Kidney transplant candidate 1/7/2015    Overweight(278.02)     Urinary tract infection      Social History   Substance Use Topics    Smoking status: Never Smoker    Smokeless tobacco: Never Used    Alcohol use No     History   Drug Use No     History   Sexual Activity    Sexual activity: Not on file       Per Today's Psychosocial:  Tobacco: none, patient denies any use.  Alcohol: none, patient denies any use.  Illicit Drugs/Non-prescribed Medications: none, patient denies any use.    Patient states clear understanding of the potential impact of substance use as it relates to transplant candidacy and is aware of possible random substance screening.  Substance abstinence/cessation counseling, education and resources provided and reviewed.     Arrests/DWI/Treatment/Rehab: patient denies    Psychiatric History:    Mental Health: Pt denies any mental health history.  Psychiatrist/Counselor: pt denies  Medications:  Pt denies  Suicide/Homicide Issues:pt denies any si/hi history     Knowledge: Patient states having clear understanding and realistic expectations regarding the potential risks and potential benefits of organ transplantation and organ donation and agrees to discuss with health care team members and support system members, as well as to utilize available resources and express questions and/or concerns in order to further facilitate the pt informed  decision-making.  Resources and information provided and reviewed.     Patient is aware of [facility]'s affiliation and/or partnership with agencies in home health care, LTAC, SNF, [facility], and other hospitals and clinics.    Understanding: Patient reports having a clear understanding of the many lifetime commitments involved with being a transplant recipient, including costs, compliance, medications, lab work, procedures, appointments, concrete and financial planning, preparedness, timely and appropriate communication of concerns, abstinence (ETOH, tobacco, illicit non-prescribed drugs), adherence to all health care team recommendations, support system and caregiver involvement, appropriate and timely resource utilization and follow-through, mental health counseling as needed/recommended, and patient and caregiver responsibilities.  Social Service Handbook, resources and detailed educational information provided and reviewed.  Educational information provided.    Patient also reports current and expected compliance with health care regime, and patient states having a clear understanding of the importance of compliance.  Patient reports a clear understanding that risks and benefits may be involved with organ transplantation and with organ donation.  Patient also reports clear understanding that psychosocial risk factors may affect patient, and include but are not limited to feelings of depression, generalized anxiety, anxiety regarding dependence on others, post traumatic stress disorder, feelings of guilt and other emotional and/or mental concerns, and/or exacerbation of existing mental health concerns.  Detailed resources provided and discussed.  Patient agrees to access appropriate resources in a timely manner as needed and/or as recommended, and to communicate concerns appropriately.  Patient also reports a clear understanding of treatment options available.      Patient and caregiver received education in a group  setting.   reviewed education, provided additional information, and answered questions.    Feelings or Concerns: Pt denies any overwhelming feelings or concerns regarding organ transplant.  Pt reports high motivation to pursue organ transplant.    Coping: Pt reports is coping well with ESRD.  Pt reports robert best by remaining active with family and friends; pt reports loves to watch sports on tv -- especially baseball.    Goals: Pt reports hope to be transplanted before needing dialysis.    Interview Behavior: Patient presents as alert and oriented x 4, pleasant, good eye contact, well groomed, recall good, concentration/judgement good, average intelligence, calm, communicative, cooperative and asking and answering questions appropriately.  Pt's  in room with permission.  Pt and  very engaged in interview and report high motivation to pursue organ transplant.         Transplant Social Work - Candidacy  Assessment/Plan:     Psychosocial Suitability: Patient presents as a suitable candidate for kidney transplant at this time. Based on psychosocial risk factors, patient presents as low risk, due to pt reporting having caregiver/transportation plan, medical insurance plan, and plan to afford transplant costs all in place.    Recommendations/Additional Comments: Pt lives away and will need lodging post transplant; lodging was reviewed and discussed in detail.    Angel Milton, MSW, LMSW

## 2017-08-28 NOTE — PROGRESS NOTES
LISTED PATIENT EDUCATION NOTE    Ms. Citlali Karimi was seen in listed pre-kidney transplant clinic for evaluation for kidney, kidney/pancreas or pancreas only transplant.  The patient attended a group education session that discussed/reviewed the following aspects of transplantation: evaluation and selection committee process, UNOS waitlist management/multiple listings, types of organs offered (KDPI < 85%, KDPI > 85%, PHS increased risk, DCD), financial aspects, surgical procedures, dietary instruction pre- and post-transplant, health maintenance pre- and post-transplant, post-transplant hospitalization and outpatient follow-up, potential to participate in a research protocol, and medication management and side effects.  A question and answer session was provided after the presentation.    The patient was seen by all members of the multi-disciplinary team to include: Nephrologist/PA, Surgeon, , Transplant Coordinator, , Pharmacist and Dietician (if applicable).    The patient reviewed and signed all consents for evaluation which were witnessed and sent to scanning into the EPIC chart.    The patient was given an education book and plan for further evaluation based on her individual assessment.      The patient was encouraged to call with any questions or concerns.

## 2017-08-29 ENCOUNTER — TELEPHONE (OUTPATIENT)
Dept: FAMILY MEDICINE | Facility: CLINIC | Age: 67
End: 2017-08-29

## 2017-08-29 ENCOUNTER — TELEPHONE (OUTPATIENT)
Dept: NEUROLOGY | Facility: CLINIC | Age: 67
End: 2017-08-29

## 2017-08-29 NOTE — TELEPHONE ENCOUNTER
----- Message from Nanci Parada sent at 8/28/2017  3:16 PM CDT -----  Returning your call.  Please call patient at [phone].

## 2017-08-29 NOTE — TELEPHONE ENCOUNTER
----- Message from Liliana Holcomb sent at 8/29/2017 12:22 PM CDT -----  Pt vic like to be fit in on tomorrow to check her blood , pt was offered another provider and declined/please call [phone]/ma

## 2017-08-29 NOTE — TELEPHONE ENCOUNTER
----- Message from Lacie Longoria sent at 8/28/2017 12:19 PM CDT -----  Contact: pt [phone]  Patient called and states she is returning your call back.

## 2017-08-29 NOTE — TELEPHONE ENCOUNTER
Patient notified of recent labs and recommendation on Vit D supplement. Patient also confirmed appt on 12/7/17.

## 2017-08-30 DIAGNOSIS — N18.4 ANEMIA IN STAGE 4 CHRONIC KIDNEY DISEASE: Primary | ICD-10-CM

## 2017-08-30 DIAGNOSIS — D63.1 ANEMIA IN STAGE 4 CHRONIC KIDNEY DISEASE: Primary | ICD-10-CM

## 2017-08-31 ENCOUNTER — LAB VISIT (OUTPATIENT)
Dept: LAB | Facility: HOSPITAL | Age: 67
End: 2017-08-31
Attending: INTERNAL MEDICINE
Payer: MEDICARE

## 2017-08-31 ENCOUNTER — OFFICE VISIT (OUTPATIENT)
Dept: HEMATOLOGY/ONCOLOGY | Facility: CLINIC | Age: 67
End: 2017-08-31
Payer: MEDICARE

## 2017-08-31 ENCOUNTER — TELEPHONE (OUTPATIENT)
Dept: ENDOSCOPY | Facility: HOSPITAL | Age: 67
End: 2017-08-31

## 2017-08-31 VITALS
SYSTOLIC BLOOD PRESSURE: 140 MMHG | HEIGHT: 62 IN | HEART RATE: 83 BPM | TEMPERATURE: 98 F | WEIGHT: 166.69 LBS | DIASTOLIC BLOOD PRESSURE: 66 MMHG | BODY MASS INDEX: 30.67 KG/M2

## 2017-08-31 DIAGNOSIS — N18.4 ANEMIA IN STAGE 4 CHRONIC KIDNEY DISEASE: ICD-10-CM

## 2017-08-31 DIAGNOSIS — D51.8 OTHER VITAMIN B12 DEFICIENCY ANEMIA: ICD-10-CM

## 2017-08-31 DIAGNOSIS — D63.1 ANEMIA IN STAGE 4 CHRONIC KIDNEY DISEASE: Primary | ICD-10-CM

## 2017-08-31 DIAGNOSIS — N18.4 ANEMIA IN STAGE 4 CHRONIC KIDNEY DISEASE: Primary | ICD-10-CM

## 2017-08-31 DIAGNOSIS — D63.1 ANEMIA IN STAGE 4 CHRONIC KIDNEY DISEASE: ICD-10-CM

## 2017-08-31 LAB
BASOPHILS # BLD AUTO: 0.01 K/UL
BASOPHILS NFR BLD: 0.2 %
DIFFERENTIAL METHOD: ABNORMAL
EOSINOPHIL # BLD AUTO: 0 K/UL
EOSINOPHIL NFR BLD: 0.7 %
ERYTHROCYTE [DISTWIDTH] IN BLOOD BY AUTOMATED COUNT: 18.4 %
ERYTHROCYTE [SEDIMENTATION RATE] IN BLOOD BY WESTERGREN METHOD: 53 MM/HR
HCT VFR BLD AUTO: 31.2 %
HGB BLD-MCNC: 10 G/DL
IRON SERPL-MCNC: 75 UG/DL
LYMPHOCYTES # BLD AUTO: 1.4 K/UL
LYMPHOCYTES NFR BLD: 23.5 %
MCH RBC QN AUTO: 28.2 PG
MCHC RBC AUTO-ENTMCNC: 32.1 G/DL
MCV RBC AUTO: 88 FL
MONOCYTES # BLD AUTO: 0.4 K/UL
MONOCYTES NFR BLD: 5.9 %
NEUTROPHILS # BLD AUTO: 4.3 K/UL
NEUTROPHILS NFR BLD: 69.7 %
PLATELET # BLD AUTO: 176 K/UL
PMV BLD AUTO: 9.6 FL
RBC # BLD AUTO: 3.55 M/UL
SATURATED IRON: 67 %
TOTAL IRON BINDING CAPACITY: 112 UG/DL
TRANSFERRIN SERPL-MCNC: 76 MG/DL
WBC # BLD AUTO: 6.12 K/UL

## 2017-08-31 PROCEDURE — 85025 COMPLETE CBC W/AUTO DIFF WBC: CPT | Mod: TXP

## 2017-08-31 PROCEDURE — 3078F DIAST BP <80 MM HG: CPT | Mod: ,,, | Performed by: INTERNAL MEDICINE

## 2017-08-31 PROCEDURE — 85651 RBC SED RATE NONAUTOMATED: CPT | Mod: TXP

## 2017-08-31 PROCEDURE — 99213 OFFICE O/P EST LOW 20 MIN: CPT | Mod: PBBFAC,PO | Performed by: INTERNAL MEDICINE

## 2017-08-31 PROCEDURE — 82728 ASSAY OF FERRITIN: CPT | Mod: TXP

## 2017-08-31 PROCEDURE — 99214 OFFICE O/P EST MOD 30 MIN: CPT | Mod: S$PBB,,, | Performed by: INTERNAL MEDICINE

## 2017-08-31 PROCEDURE — 1126F AMNT PAIN NOTED NONE PRSNT: CPT | Mod: ,,, | Performed by: INTERNAL MEDICINE

## 2017-08-31 PROCEDURE — 99999 PR PBB SHADOW E&M-EST. PATIENT-LVL III: CPT | Mod: PBBFAC,,, | Performed by: INTERNAL MEDICINE

## 2017-08-31 PROCEDURE — 83540 ASSAY OF IRON: CPT | Mod: TXP

## 2017-08-31 PROCEDURE — 3077F SYST BP >= 140 MM HG: CPT | Mod: ,,, | Performed by: INTERNAL MEDICINE

## 2017-08-31 PROCEDURE — 1159F MED LIST DOCD IN RCRD: CPT | Mod: ,,, | Performed by: INTERNAL MEDICINE

## 2017-08-31 PROCEDURE — 36415 COLL VENOUS BLD VENIPUNCTURE: CPT | Mod: PO,TXP

## 2017-08-31 NOTE — TELEPHONE ENCOUNTER
Her blood pressure was high at the [facility] doc's visit.  Please call her to get her to come to [place] to recheck it soon.

## 2017-08-31 NOTE — PROGRESS NOTES
Reason for visit: Follow up for anemia    HPI:   The patient is a 67-year-old  female who presents to the hematology oncology clinic today for follow up for anemia. She is currently undergoing outpatient physical therapy for generalized weakness and history of falls. She is being evaluated by neurology and has had imaging and treatment including inpatient rehabilitation done with [facility]. She is following up with Dr. Hurd with neurology at [facility], [place]. She is still having a lot of problems with strength and coordination. Additional testing is ongoing. She denies any fever, chills, night sweats, loss of appetite or unintentional weight loss. She denies any chest pain or shortness of breath. She denies any fatigue. She denies any bowel or urinary complaints. She denies any melena, hematochezia, hematemesis, hemoptysis or hematuria. She did undergo valve replacement on May 10, 2016 at [facility], [place].  I have reviewed all of the patient's relevant clinical history available in [facility] inbcluding in care everywhere.    PAST MEDICAL HISTORY:   1. Hypertension  2. Dyslipidemia  3. Type 2 diabetes mellitus  4. Vit b12 deficiency anemia  5. Severe aortic stenosis  6.  Nephrolithiasis  7.  Hematuria  8. CKD stage 4    SURGICAL HISTORY:   1. BON with BSO in 1980  2. Left ankle surgery due to accidental trauma in 1986  3. Bilateral cataract excision  4. Cholecystectomy  5. Transcatheter aortic valve replacement on 5/10/16    SOCIAL HISTORY: She denies tobacco use, alcohol use or recreational drug use. She is  and lives with her  in [place], [place]. She has 3 stepdaughters. 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    ALLERGIES: Reviewed on medication card.    MEDICATIONS: [Medcard has been reviewed and/or reconciled.]    REVIEW OF SYSTEMS:   GENERAL: [No fevers, chills or sweats. No fatigue, weight loss or loss of appetite.]  HEENT: [No blurred  vision, tinnitus, nasal discharge, sorethroat or dysphagia.]  HEART: [No chest pain, palpitations or shortness of breath.]   LUNGS: [No cough, hemoptysis or breathing problems.]  ABDOMEN: [No abdominal pain, nausea, vomiting, diarrhea, constipation or melena.]  GENITOURINARY: [No dysuria, bleeding or malodorous discharge.]  NEURO: [No headache, dizziness or vertigo.]  HEMATOLOGY: [No easy bruising, spontaneous bleeding or blood clots in the past].  MUSCULOSKELETAL: [No arthralgias, myalgias or bone pains.]  SKIN: [No rashes or skin lesions.]  PSYCHIATRY: [No depression or anxiety.]    PHYSICAL EXAMINATION:   VS: Reviewed on nurse's notes.  APPEARANCE: The patient is a well-developed, well-nourished  female who appears in no acute distress. She was accompanied to this clinic visit by her . She present in a wheelchair. She is ambulating at home.  HEENT: No scleral icterus. Both external auditory canals clear. No oral ulcers, lesions. Throat clear  HEAD: No sinus tenderness.  NECK: Supple. No palpable lymphadenopathy. Thyroid non-tender, no palpable masses  CHEST: Breath sounds clear bilaterally. No rales. No rhonchi. Unlabored respirations.  CARDIOVASCULAR: Normal S1, S2. Normal rate. Regular rhythm. 2/6 ESM noted  ABDOMEN: Bowel sounds normal. No tenderness. No abdominal distention. No hepatomegaly. No splenomegaly.  LYMPHATIC: No palpable supraclavicular, axillary nodes  EXTREMITIES: No clubbing, cyanosis, edema  SKIN: No lesions. No petechiae. No ecchymoses. No induration or nodules  NEUROLOGIC: No focal findings. Alert & Oriented x 3. Mood appropriate to affect    LABS:   Reviewed    IMPRESSION:  1. Chronic anemia  2. Chronic leukopenia without neutropenia  3. Alpha thalassemia carrier  4. History of vitamin B12 deficiency  5. CKD stage 4  6. Anemia due to CKD    PLAN:  1. I discussed the results of her most recent CBC from today in detail with her. Her Hb continues to be stable at 10 gm/dl.  I have discussed with her that her anemia is not contributing to her weakness at this time. I will continue to hold procrit today. She will resume procrit injections when Hb is <10 gm/dl. The patient has had an extensive workup for her chronic anemia in the past and no significant etiology for this has been found.  At this time it does appear likely that this might be related to her chronic kidney disease. She is agreeable to proceed with additional procrit injections as and when indicated. The patient gets 20,000 units subcutaneously weekly x 4 weeks.   2. The patient has been taking vitamin B12 tablets.  3. The patient has had a chronic asymptomatic leukopenia. This has remained stable. This is likely benign in etiology and can be conservatively followed for now. This is normal on CBC today.    Recheck CBC in [facility] on 9/18/17. Possible procrit depending on results at follow up. She knows to call sooner for any additional questions or new problems.    David Addison MD

## 2017-09-01 LAB — FERRITIN SERPL-MCNC: 2642 NG/ML

## 2017-09-05 ENCOUNTER — CLINICAL SUPPORT (OUTPATIENT)
Dept: FAMILY MEDICINE | Facility: CLINIC | Age: 67
End: 2017-09-05
Payer: MEDICARE

## 2017-09-05 VITALS — DIASTOLIC BLOOD PRESSURE: 59 MMHG | HEART RATE: 93 BPM | SYSTOLIC BLOOD PRESSURE: 120 MMHG

## 2017-09-05 DIAGNOSIS — I10 ESSENTIAL HYPERTENSION: Primary | ICD-10-CM

## 2017-09-05 PROCEDURE — 99212 OFFICE O/P EST SF 10 MIN: CPT | Mod: PBBFAC,PO

## 2017-09-05 PROCEDURE — 99999 PR PBB SHADOW E&M-EST. PATIENT-LVL II: CPT | Mod: PBBFAC,,,

## 2017-09-05 PROCEDURE — 99499 UNLISTED E&M SERVICE: CPT | Mod: S$PBB,,, | Performed by: FAMILY MEDICINE

## 2017-09-11 ENCOUNTER — TELEPHONE (OUTPATIENT)
Dept: NEUROLOGY | Facility: CLINIC | Age: 67
End: 2017-09-11

## 2017-09-18 ENCOUNTER — LAB VISIT (OUTPATIENT)
Dept: LAB | Facility: HOSPITAL | Age: 67
End: 2017-09-18
Attending: INTERNAL MEDICINE
Payer: MEDICARE

## 2017-09-18 DIAGNOSIS — D63.1 ANEMIA IN STAGE 4 CHRONIC KIDNEY DISEASE: Primary | ICD-10-CM

## 2017-09-18 DIAGNOSIS — D51.8 OTHER VITAMIN B12 DEFICIENCY ANEMIA: ICD-10-CM

## 2017-09-18 DIAGNOSIS — N18.4 ANEMIA IN STAGE 4 CHRONIC KIDNEY DISEASE: Primary | ICD-10-CM

## 2017-09-18 DIAGNOSIS — D63.1 ANEMIA IN STAGE 4 CHRONIC KIDNEY DISEASE: ICD-10-CM

## 2017-09-18 DIAGNOSIS — N18.4 ANEMIA IN STAGE 4 CHRONIC KIDNEY DISEASE: ICD-10-CM

## 2017-09-18 LAB
BASOPHILS # BLD AUTO: 0.02 K/UL
BASOPHILS NFR BLD: 0.3 %
DIFFERENTIAL METHOD: ABNORMAL
EOSINOPHIL # BLD AUTO: 0 K/UL
EOSINOPHIL NFR BLD: 0.6 %
ERYTHROCYTE [DISTWIDTH] IN BLOOD BY AUTOMATED COUNT: 18.2 %
FOLATE SERPL-MCNC: 8.2 NG/ML
HCT VFR BLD AUTO: 28.5 %
HGB BLD-MCNC: 9.2 G/DL
LYMPHOCYTES # BLD AUTO: 1.6 K/UL
LYMPHOCYTES NFR BLD: 25.8 %
MCH RBC QN AUTO: 28.8 PG
MCHC RBC AUTO-ENTMCNC: 32.3 G/DL
MCV RBC AUTO: 89 FL
MONOCYTES # BLD AUTO: 0.4 K/UL
MONOCYTES NFR BLD: 6.4 %
NEUTROPHILS # BLD AUTO: 4.2 K/UL
NEUTROPHILS NFR BLD: 66.9 %
PLATELET # BLD AUTO: 176 K/UL
PMV BLD AUTO: 9.1 FL
RBC # BLD AUTO: 3.2 M/UL
VIT B12 SERPL-MCNC: >2000 PG/ML
WBC # BLD AUTO: 6.21 K/UL

## 2017-09-18 PROCEDURE — 85025 COMPLETE CBC W/AUTO DIFF WBC: CPT | Mod: PO

## 2017-09-18 PROCEDURE — 82607 VITAMIN B-12: CPT

## 2017-09-18 PROCEDURE — 36415 COLL VENOUS BLD VENIPUNCTURE: CPT | Mod: PO

## 2017-09-18 PROCEDURE — 82746 ASSAY OF FOLIC ACID SERUM: CPT

## 2017-09-19 ENCOUNTER — CLINICAL SUPPORT (OUTPATIENT)
Dept: FAMILY MEDICINE | Facility: CLINIC | Age: 67
End: 2017-09-19
Payer: MEDICARE

## 2017-09-19 ENCOUNTER — TELEPHONE (OUTPATIENT)
Dept: HEMATOLOGY/ONCOLOGY | Facility: CLINIC | Age: 67
End: 2017-09-19

## 2017-09-19 VITALS — DIASTOLIC BLOOD PRESSURE: 72 MMHG | HEART RATE: 88 BPM | SYSTOLIC BLOOD PRESSURE: 137 MMHG

## 2017-09-19 DIAGNOSIS — I10 ESSENTIAL HYPERTENSION: Primary | ICD-10-CM

## 2017-09-19 PROCEDURE — 99212 OFFICE O/P EST SF 10 MIN: CPT | Mod: PBBFAC,PO

## 2017-09-19 PROCEDURE — 99999 PR PBB SHADOW E&M-EST. PATIENT-LVL II: CPT | Mod: PBBFAC,,,

## 2017-09-19 PROCEDURE — 99499 UNLISTED E&M SERVICE: CPT | Mod: S$PBB,,, | Performed by: FAMILY MEDICINE

## 2017-09-19 NOTE — TELEPHONE ENCOUNTER
----- Message from David Addison MD sent at 9/18/2017 12:35 PM CDT -----  Please schedule the patient for 4 weekly doses of Procrit at the cancer Center starting this week.  I would like to see her in 4 weeks with CBC on the day of clinic visit at the cancer center. Possible Procrit at follow up as well. Thank you

## 2017-09-20 ENCOUNTER — INFUSION (OUTPATIENT)
Dept: INFUSION THERAPY | Facility: HOSPITAL | Age: 67
End: 2017-09-20
Attending: INTERNAL MEDICINE
Payer: MEDICARE

## 2017-09-20 VITALS
OXYGEN SATURATION: 97 % | SYSTOLIC BLOOD PRESSURE: 142 MMHG | HEART RATE: 80 BPM | TEMPERATURE: 99 F | DIASTOLIC BLOOD PRESSURE: 70 MMHG | RESPIRATION RATE: 18 BRPM

## 2017-09-20 DIAGNOSIS — D63.1 ANEMIA IN CHRONIC RENAL DISEASE: Primary | ICD-10-CM

## 2017-09-20 DIAGNOSIS — N18.9 ANEMIA IN CHRONIC RENAL DISEASE: Primary | ICD-10-CM

## 2017-09-20 PROCEDURE — 63600175 PHARM REV CODE 636 W HCPCS: Performed by: INTERNAL MEDICINE

## 2017-09-20 PROCEDURE — 96372 THER/PROPH/DIAG INJ SC/IM: CPT

## 2017-09-20 RX ADMIN — EPOETIN ALFA 20000 UNITS: 20000 SOLUTION INTRAVENOUS; SUBCUTANEOUS at 11:09

## 2017-09-20 NOTE — PATIENT INSTRUCTIONS
[facility]  [address]  [address] [address]  [phone] phone     [phone] fax  Hours of Operation: Monday- Friday 8:00am- 5:00pm  After hours phone  [phone]  Hematology / Oncology Physicians on call      Dr. Roel Hearn                        Please call with any concerns regarding your appointment today.  FALL PREVENTION   Falls often occur due to slipping, tripping or losing your balance. Here are ways to reduce your risk of falling again.   Was there anything that caused your fall that can be fixed, removed or replaced?   Make your home safe by keeping walkways clear of objects you may trip over.   Use non-slip pads under rugs.   Do not walk in poorly lit areas.   Do not stand on chairs or wobbly ladders.   Use caution when reaching overhead or looking upward. This position can cause a loss of balance.   Be sure your shoes fit properly, have non-slip bottoms and are in good condition.   Be cautious when going up and down stairs, curbs, and when walking on uneven sidewalks.   If your balance is poor, consider using a cane or walker.   If your fall was related to alcohol use, stop or limit alcohol intake.   If your fall was related to use of sleeping medicines, talk to your doctor about this. You may need to reduce your dosage at bedtime if you awaken during the night to go to the bathroom.   To reduce the need for nighttime bathroom trips:   Avoid drinking fluids for several hours before going to bed   Empty your bladder before going to bed   Men can keep a urinal at the bedside   © 6812-2702 [employer], [address] [place] [address], [place] [address]. All rights reserved. This information is not intended as a substitute for professional medical care. Always follow your healthcare professional's instructions.

## 2017-09-27 ENCOUNTER — INFUSION (OUTPATIENT)
Dept: INFUSION THERAPY | Facility: HOSPITAL | Age: 67
End: 2017-09-27
Attending: INTERNAL MEDICINE
Payer: MEDICARE

## 2017-09-27 VITALS
RESPIRATION RATE: 18 BRPM | SYSTOLIC BLOOD PRESSURE: 131 MMHG | TEMPERATURE: 97 F | HEART RATE: 86 BPM | DIASTOLIC BLOOD PRESSURE: 68 MMHG | OXYGEN SATURATION: 98 %

## 2017-09-27 DIAGNOSIS — N18.9 ANEMIA IN CHRONIC RENAL DISEASE: Primary | ICD-10-CM

## 2017-09-27 DIAGNOSIS — D63.1 ANEMIA IN CHRONIC RENAL DISEASE: Primary | ICD-10-CM

## 2017-09-27 PROCEDURE — 96372 THER/PROPH/DIAG INJ SC/IM: CPT

## 2017-09-27 PROCEDURE — 63600175 PHARM REV CODE 636 W HCPCS: Performed by: INTERNAL MEDICINE

## 2017-09-27 RX ADMIN — EPOETIN ALFA 20000 UNITS: 20000 SOLUTION INTRAVENOUS; SUBCUTANEOUS at 10:09

## 2017-09-27 NOTE — PATIENT INSTRUCTIONS
[facility]  [address]  [address] [address]  [phone] phone     [phone] fax  Hours of Operation: Monday- Friday 8:00am- 5:00pm  After hours phone  [phone]  Hematology / Oncology Physicians on call      Dr. Roel Hearn                        Please call with any concerns regarding your appointment today.  FALL PREVENTION   Falls often occur due to slipping, tripping or losing your balance. Here are ways to reduce your risk of falling again.   Was there anything that caused your fall that can be fixed, removed or replaced?   Make your home safe by keeping walkways clear of objects you may trip over.   Use non-slip pads under rugs.   Do not walk in poorly lit areas.   Do not stand on chairs or wobbly ladders.   Use caution when reaching overhead or looking upward. This position can cause a loss of balance.   Be sure your shoes fit properly, have non-slip bottoms and are in good condition.   Be cautious when going up and down stairs, curbs, and when walking on uneven sidewalks.   If your balance is poor, consider using a cane or walker.   If your fall was related to alcohol use, stop or limit alcohol intake.   If your fall was related to use of sleeping medicines, talk to your doctor about this. You may need to reduce your dosage at bedtime if you awaken during the night to go to the bathroom.   To reduce the need for nighttime bathroom trips:   Avoid drinking fluids for several hours before going to bed   Empty your bladder before going to bed   Men can keep a urinal at the bedside   © 0783-3814 [employer]iz[employer] Delaney, [address] [place] [address], [place] [address]. All rights reserved. This information is not intended as a substitute for professional medical care. Always follow your healthcare professional's instructions.

## 2017-10-04 ENCOUNTER — INFUSION (OUTPATIENT)
Dept: INFUSION THERAPY | Facility: HOSPITAL | Age: 67
End: 2017-10-04
Attending: INTERNAL MEDICINE
Payer: MEDICARE

## 2017-10-04 VITALS
HEART RATE: 57 BPM | DIASTOLIC BLOOD PRESSURE: 79 MMHG | BODY MASS INDEX: 30.67 KG/M2 | TEMPERATURE: 98 F | SYSTOLIC BLOOD PRESSURE: 186 MMHG | RESPIRATION RATE: 16 BRPM | HEIGHT: 62 IN | WEIGHT: 166.69 LBS | OXYGEN SATURATION: 99 %

## 2017-10-04 DIAGNOSIS — D63.1 ANEMIA IN CHRONIC RENAL DISEASE: Primary | ICD-10-CM

## 2017-10-04 DIAGNOSIS — N18.9 ANEMIA IN CHRONIC RENAL DISEASE: Primary | ICD-10-CM

## 2017-10-04 PROCEDURE — 96372 THER/PROPH/DIAG INJ SC/IM: CPT

## 2017-10-04 PROCEDURE — 63600175 PHARM REV CODE 636 W HCPCS: Performed by: INTERNAL MEDICINE

## 2017-10-04 RX ADMIN — EPOETIN ALFA 20000 UNITS: 20000 SOLUTION INTRAVENOUS; SUBCUTANEOUS at 11:10

## 2017-10-04 NOTE — NURSING
1100  10/4/17  Injection given without difficulties.Bandaid applied. Patient instructed to stay in the clinic for 15 minutes. Patient verbalized understanding and will notify nurse with any complaints.
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
## 2017-10-11 ENCOUNTER — INFUSION (OUTPATIENT)
Dept: INFUSION THERAPY | Facility: HOSPITAL | Age: 67
End: 2017-10-11
Attending: INTERNAL MEDICINE
Payer: MEDICARE

## 2017-10-11 VITALS
HEIGHT: 62 IN | WEIGHT: 166.69 LBS | HEART RATE: 76 BPM | RESPIRATION RATE: 16 BRPM | DIASTOLIC BLOOD PRESSURE: 65 MMHG | OXYGEN SATURATION: 99 % | TEMPERATURE: 97 F | SYSTOLIC BLOOD PRESSURE: 124 MMHG | BODY MASS INDEX: 30.67 KG/M2

## 2017-10-11 DIAGNOSIS — D63.1 ANEMIA IN CHRONIC RENAL DISEASE: Primary | ICD-10-CM

## 2017-10-11 DIAGNOSIS — N18.9 ANEMIA IN CHRONIC RENAL DISEASE: Primary | ICD-10-CM

## 2017-10-11 PROCEDURE — 96372 THER/PROPH/DIAG INJ SC/IM: CPT

## 2017-10-11 PROCEDURE — 63600175 PHARM REV CODE 636 W HCPCS: Performed by: INTERNAL MEDICINE

## 2017-10-11 RX ADMIN — EPOETIN ALFA 20000 UNITS: 20000 SOLUTION INTRAVENOUS; SUBCUTANEOUS at 10:10

## 2017-10-11 NOTE — PATIENT INSTRUCTIONS
[facility]  [address]  [address] [address]  [phone] phone     [phone] fax  Hours of Operation: Monday- Friday 8:00am- 5:00pm  After hours phone  [phone]  Hematology / Oncology Physicians on call      Dr. Roel Hearn                        Please call with any concerns regarding your appointment today.  FALL PREVENTION   Falls often occur due to slipping, tripping or losing your balance. Here are ways to reduce your risk of falling again.   Was there anything that caused your fall that can be fixed, removed or replaced?   Make your home safe by keeping walkways clear of objects you may trip over.   Use non-slip pads under rugs.   Do not walk in poorly lit areas.   Do not stand on chairs or wobbly ladders.   Use caution when reaching overhead or looking upward. This position can cause a loss of balance.   Be sure your shoes fit properly, have non-slip bottoms and are in good condition.   Be cautious when going up and down stairs, curbs, and when walking on uneven sidewalks.   If your balance is poor, consider using a cane or walker.   If your fall was related to alcohol use, stop or limit alcohol intake.   If your fall was related to use of sleeping medicines, talk to your doctor about this. You may need to reduce your dosage at bedtime if you awaken during the night to go to the bathroom.   To reduce the need for nighttime bathroom trips:   Avoid drinking fluids for several hours before going to bed   Empty your bladder before going to bed   Men can keep a urinal at the bedside   © 7349-6093 [employer]iaz[employer] Del[employer], [address] [place] [address], [place] [address]. All rights reserved. This information is not intended as a substitute for professional medical care. Always follow your healthcare professional's instructions.

## 2017-10-17 ENCOUNTER — LAB VISIT (OUTPATIENT)
Dept: LAB | Facility: HOSPITAL | Age: 67
End: 2017-10-17
Attending: INTERNAL MEDICINE
Payer: MEDICARE

## 2017-10-17 ENCOUNTER — OFFICE VISIT (OUTPATIENT)
Dept: HEMATOLOGY/ONCOLOGY | Facility: CLINIC | Age: 67
End: 2017-10-17
Payer: MEDICARE

## 2017-10-17 VITALS
BODY MASS INDEX: 29.72 KG/M2 | HEART RATE: 84 BPM | DIASTOLIC BLOOD PRESSURE: 72 MMHG | WEIGHT: 161.5 LBS | SYSTOLIC BLOOD PRESSURE: 139 MMHG | HEIGHT: 62 IN | TEMPERATURE: 97 F | OXYGEN SATURATION: 97 %

## 2017-10-17 DIAGNOSIS — D63.1 ANEMIA IN STAGE 4 CHRONIC KIDNEY DISEASE: Primary | ICD-10-CM

## 2017-10-17 DIAGNOSIS — D63.1 ANEMIA IN STAGE 4 CHRONIC KIDNEY DISEASE: ICD-10-CM

## 2017-10-17 DIAGNOSIS — D51.8 OTHER VITAMIN B12 DEFICIENCY ANEMIA: ICD-10-CM

## 2017-10-17 DIAGNOSIS — N18.4 ANEMIA IN STAGE 4 CHRONIC KIDNEY DISEASE: ICD-10-CM

## 2017-10-17 DIAGNOSIS — N18.4 ANEMIA IN STAGE 4 CHRONIC KIDNEY DISEASE: Primary | ICD-10-CM

## 2017-10-17 LAB
BASOPHILS # BLD AUTO: 0.02 K/UL
BASOPHILS NFR BLD: 0.3 %
DIFFERENTIAL METHOD: ABNORMAL
EOSINOPHIL # BLD AUTO: 0 K/UL
EOSINOPHIL NFR BLD: 0.5 %
ERYTHROCYTE [DISTWIDTH] IN BLOOD BY AUTOMATED COUNT: 19.9 %
HCT VFR BLD AUTO: 38.1 %
HGB BLD-MCNC: 12.4 G/DL
LYMPHOCYTES # BLD AUTO: 1.3 K/UL
LYMPHOCYTES NFR BLD: 17.5 %
MCH RBC QN AUTO: 29.3 PG
MCHC RBC AUTO-ENTMCNC: 32.5 G/DL
MCV RBC AUTO: 90 FL
MONOCYTES # BLD AUTO: 0.3 K/UL
MONOCYTES NFR BLD: 3.6 %
NEUTROPHILS # BLD AUTO: 5.8 K/UL
NEUTROPHILS NFR BLD: 78.1 %
PLATELET # BLD AUTO: 167 K/UL
PMV BLD AUTO: 9.6 FL
RBC # BLD AUTO: 4.23 M/UL
WBC # BLD AUTO: 7.48 K/UL

## 2017-10-17 PROCEDURE — 99214 OFFICE O/P EST MOD 30 MIN: CPT | Mod: S$PBB,,, | Performed by: INTERNAL MEDICINE

## 2017-10-17 PROCEDURE — 85025 COMPLETE CBC W/AUTO DIFF WBC: CPT

## 2017-10-17 PROCEDURE — 36415 COLL VENOUS BLD VENIPUNCTURE: CPT | Mod: PO

## 2017-10-17 PROCEDURE — G0008 ADMIN INFLUENZA VIRUS VAC: HCPCS | Mod: PBBFAC,PO

## 2017-10-17 PROCEDURE — 99999 PR PBB SHADOW E&M-EST. PATIENT-LVL III: CPT | Mod: PBBFAC,,, | Performed by: INTERNAL MEDICINE

## 2017-10-17 PROCEDURE — 99213 OFFICE O/P EST LOW 20 MIN: CPT | Mod: PBBFAC,PO | Performed by: INTERNAL MEDICINE

## 2017-10-17 NOTE — PROGRESS NOTES
Reason for visit: Follow up for anemia    HPI:   The patient is a 67-year-old  female who presents to the hematology oncology clinic today for follow up for anemia. She is currently undergoing outpatient physical therapy for generalized weakness and history of falls. She is being evaluated by neurology and has had imaging and treatment including inpatient rehabilitation done with [facility]. She is following up with Dr. Hurd with neurology at [facility], [place]. She is still having a lot of problems with strength and coordination. Additional testing is ongoing. She denies any fever, chills, night sweats, loss of appetite or unintentional weight loss. She denies any chest pain or shortness of breath. She denies any fatigue. She denies any bowel or urinary complaints. She denies any melena, hematochezia, hematemesis, hemoptysis or hematuria. She did undergo valve replacement on May 10, 2016 at [facility], [place].  I have reviewed all of the patient's relevant clinical history available in [facility] inbcluding in care everywhere.    PAST MEDICAL HISTORY:   1. Hypertension  2. Dyslipidemia  3. Type 2 diabetes mellitus  4. Vit b12 deficiency anemia  5. Severe aortic stenosis  6.  Nephrolithiasis  7.  Hematuria  8. CKD stage 4    SURGICAL HISTORY:   1. BON with BSO in 1980  2. Left ankle surgery due to accidental trauma in 1986  3. Bilateral cataract excision  4. Cholecystectomy  5. Transcatheter aortic valve replacement on 5/10/16    SOCIAL HISTORY: She denies tobacco use, alcohol use or recreational drug use. She is  and lives with her  in [place], [place]. She has 3 stepdaughters. 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    ALLERGIES: Reviewed on medication card.    MEDICATIONS: [Medcard has been reviewed and/or reconciled.]    REVIEW OF SYSTEMS:   GENERAL: [No fevers, chills or sweats. No fatigue, weight loss or loss of appetite.]  HEENT: [No blurred  vision, tinnitus, nasal discharge, sorethroat or dysphagia.]  HEART: [No chest pain, palpitations or shortness of breath.]   LUNGS: [No cough, hemoptysis or breathing problems.]  ABDOMEN: [No abdominal pain, nausea, vomiting, diarrhea, constipation or melena.]  GENITOURINARY: [No dysuria, bleeding or malodorous discharge.]  NEURO: [No headache, dizziness or vertigo.]  HEMATOLOGY: [No easy bruising, spontaneous bleeding or blood clots in the past].  MUSCULOSKELETAL: [No arthralgias, myalgias or bone pains.]  SKIN: [No rashes or skin lesions.]  PSYCHIATRY: [No depression or anxiety.]    PHYSICAL EXAMINATION:   VS: Reviewed on nurse's notes.  APPEARANCE: The patient is a well-developed, well-nourished  female who appears in no acute distress. She was accompanied to this clinic visit by her . She present in a wheelchair. She is ambulating at home.  HEENT: No scleral icterus. Both external auditory canals clear. No oral ulcers, lesions. Throat clear  HEAD: No sinus tenderness.  NECK: Supple. No palpable lymphadenopathy. Thyroid non-tender, no palpable masses  CHEST: Breath sounds clear bilaterally. No rales. No rhonchi. Unlabored respirations.  CARDIOVASCULAR: Normal S1, S2. Normal rate. Regular rhythm. 2/6 ESM noted  ABDOMEN: Bowel sounds normal. No tenderness. No abdominal distention. No hepatomegaly. No splenomegaly.  LYMPHATIC: No palpable supraclavicular, axillary nodes  EXTREMITIES: No clubbing, cyanosis, edema  SKIN: No lesions. No petechiae. No ecchymoses. No induration or nodules  NEUROLOGIC: No focal findings. Alert & Oriented x 3. Mood appropriate to affect    LABS:   Reviewed    IMPRESSION:  1. Chronic anemia  2. Chronic leukopenia without neutropenia  3. Alpha thalassemia carrier  4. History of vitamin B12 deficiency  5. CKD stage 4  6. Anemia due to CKD    PLAN:  1. I discussed the results of her most recent CBC from today in detail with her. Her Hb is >10 gm/dl. I have discussed with  her that her anemia is not contributing to her weakness at this time. I will continue to hold procrit today. She will resume procrit injections when Hb is <10 gm/dl. The patient has had an extensive workup for her chronic anemia in the past and no significant etiology for this has been found.  At this time it does appear likely that this might be related to her chronic kidney disease. She is agreeable to proceed with additional procrit injections as and when indicated. The patient gets 20,000 units subcutaneously weekly x 4 weeks.   2. The patient has been taking vitamin B12 tablets.  3. The patient has had a chronic asymptomatic leukopenia. This has remained stable. This is likely benign in etiology and can be conservatively followed for now. This is normal on CBC today.     Follow up in first week of Dec 2017 with labs. Possible procrit at follow up. She knows to call sooner for any additional questions or new problems.    David Addison MD

## 2017-11-01 ENCOUNTER — TELEPHONE (OUTPATIENT)
Dept: FAMILY MEDICINE | Facility: CLINIC | Age: 67
End: 2017-11-01

## 2017-11-01 DIAGNOSIS — F03.90 DEMENTIA WITHOUT BEHAVIORAL DISTURBANCE, UNSPECIFIED DEMENTIA TYPE: Primary | ICD-10-CM

## 2017-11-01 NOTE — TELEPHONE ENCOUNTER
----- Message from Qian Cope sent at 11/1/2017  7:33 AM CDT -----  Contact: Margo Hua/duke  States she has Dementia and its getting worse. States she needs Home Health for her Physical Therapy and a  to discuss her care. Please call Margo Hua at [phone]. Thank you

## 2017-11-02 NOTE — TELEPHONE ENCOUNTER
I have signed for the following orders AND/OR meds.  Please call the patient and ask the patient to schedule the testing AND/OR inform about any medications that were sent.      Orders Placed This Encounter   Procedures    Ambulatory referral to Home Health     Referral Priority:   Routine     Referral Type:   Home Health     Referral Reason:   Specialty Services Required     Requested Specialty:   Home Health Services     Number of Visits Requested:   1

## 2017-11-10 ENCOUNTER — TELEPHONE (OUTPATIENT)
Dept: NEUROLOGY | Facility: CLINIC | Age: 67
End: 2017-11-10

## 2017-11-10 NOTE — TELEPHONE ENCOUNTER
Attempted to call patient about rescheduling to follow up with Dr. Payton or waiting until January to reschedule with Dr. Hurd; unable to leave voicemail.

## 2017-11-13 RX ORDER — OMEPRAZOLE 40 MG/1
40 CAPSULE, DELAYED RELEASE ORAL DAILY
Qty: 30 CAPSULE | Refills: 11 | Status: SHIPPED | OUTPATIENT
Start: 2017-11-13

## 2017-11-14 ENCOUNTER — TELEPHONE (OUTPATIENT)
Dept: NEUROLOGY | Facility: CLINIC | Age: 67
End: 2017-11-14

## 2017-11-14 NOTE — TELEPHONE ENCOUNTER
Called patient to let them know that Dr. Hurd will be out of the office on Dec. 5th. I offered for her to see Dr. Payton sooner but if she wanted to see him then she would have to push back her appointment till Jan 10th. Patient expressed understanding and wanted to wait to see Dr. Hurd.

## 2017-11-21 ENCOUNTER — TELEPHONE (OUTPATIENT)
Dept: NEUROLOGY | Facility: CLINIC | Age: 67
End: 2017-11-21

## 2017-12-01 ENCOUNTER — OFFICE VISIT (OUTPATIENT)
Dept: HEMATOLOGY/ONCOLOGY | Facility: CLINIC | Age: 67
End: 2017-12-01
Payer: MEDICARE

## 2017-12-01 ENCOUNTER — LAB VISIT (OUTPATIENT)
Dept: LAB | Facility: HOSPITAL | Age: 67
End: 2017-12-01
Attending: INTERNAL MEDICINE
Payer: MEDICARE

## 2017-12-01 VITALS
BODY MASS INDEX: 29.62 KG/M2 | DIASTOLIC BLOOD PRESSURE: 80 MMHG | HEART RATE: 91 BPM | OXYGEN SATURATION: 98 % | TEMPERATURE: 98 F | SYSTOLIC BLOOD PRESSURE: 146 MMHG | WEIGHT: 160.94 LBS | HEIGHT: 62 IN

## 2017-12-01 DIAGNOSIS — D63.1 ANEMIA IN STAGE 4 CHRONIC KIDNEY DISEASE: Primary | ICD-10-CM

## 2017-12-01 DIAGNOSIS — D51.8 OTHER VITAMIN B12 DEFICIENCY ANEMIA: ICD-10-CM

## 2017-12-01 DIAGNOSIS — N18.4 ANEMIA IN STAGE 4 CHRONIC KIDNEY DISEASE: ICD-10-CM

## 2017-12-01 DIAGNOSIS — D63.1 ANEMIA IN STAGE 4 CHRONIC KIDNEY DISEASE: ICD-10-CM

## 2017-12-01 DIAGNOSIS — N18.4 ANEMIA IN STAGE 4 CHRONIC KIDNEY DISEASE: Primary | ICD-10-CM

## 2017-12-01 LAB
BASOPHILS # BLD AUTO: 0.01 K/UL
BASOPHILS NFR BLD: 0.1 %
CRP SERPL-MCNC: 24 MG/L
DIFFERENTIAL METHOD: ABNORMAL
EOSINOPHIL # BLD AUTO: 0.1 K/UL
EOSINOPHIL NFR BLD: 1.1 %
ERYTHROCYTE [DISTWIDTH] IN BLOOD BY AUTOMATED COUNT: 16.8 %
HCT VFR BLD AUTO: 35.4 %
HGB BLD-MCNC: 11.4 G/DL
IRON SERPL-MCNC: 59 UG/DL
LYMPHOCYTES # BLD AUTO: 1.6 K/UL
LYMPHOCYTES NFR BLD: 18.9 %
MCH RBC QN AUTO: 28.8 PG
MCHC RBC AUTO-ENTMCNC: 32.2 G/DL
MCV RBC AUTO: 89 FL
MONOCYTES # BLD AUTO: 0.4 K/UL
MONOCYTES NFR BLD: 4.4 %
NEUTROPHILS # BLD AUTO: 6.4 K/UL
NEUTROPHILS NFR BLD: 75.5 %
PLATELET # BLD AUTO: 161 K/UL
PMV BLD AUTO: 9.5 FL
RBC # BLD AUTO: 3.96 M/UL
SATURATED IRON: 53 %
TOTAL IRON BINDING CAPACITY: 112 UG/DL
TRANSFERRIN SERPL-MCNC: 76 MG/DL
WBC # BLD AUTO: 8.41 K/UL

## 2017-12-01 PROCEDURE — 82728 ASSAY OF FERRITIN: CPT

## 2017-12-01 PROCEDURE — 99213 OFFICE O/P EST LOW 20 MIN: CPT | Mod: PBBFAC | Performed by: INTERNAL MEDICINE

## 2017-12-01 PROCEDURE — 85025 COMPLETE CBC W/AUTO DIFF WBC: CPT

## 2017-12-01 PROCEDURE — 83540 ASSAY OF IRON: CPT

## 2017-12-01 PROCEDURE — 86140 C-REACTIVE PROTEIN: CPT

## 2017-12-01 PROCEDURE — 99999 PR PBB SHADOW E&M-EST. PATIENT-LVL III: CPT | Mod: PBBFAC,,, | Performed by: INTERNAL MEDICINE

## 2017-12-01 PROCEDURE — 36415 COLL VENOUS BLD VENIPUNCTURE: CPT

## 2017-12-01 PROCEDURE — 99214 OFFICE O/P EST MOD 30 MIN: CPT | Mod: S$PBB,,, | Performed by: INTERNAL MEDICINE

## 2017-12-01 NOTE — PROGRESS NOTES
Reason for visit: Follow up for anemia    HPI:   The patient is a 67-year-old  female who presents to the hematology oncology clinic today for follow up for anemia. She is currently undergoing outpatient physical therapy for generalized weakness and history of falls. She is being evaluated by neurology and has had imaging and treatment including inpatient rehabilitation done with [facility]. She is following up with Dr. Hurd with neurology at [facility], [place]. She is still having a lot of problems with strength and coordination. Additional testing is ongoing. She denies any fever, chills, night sweats, loss of appetite or unintentional weight loss. She denies any chest pain or shortness of breath. She denies any fatigue. She denies any bowel or urinary complaints. She denies any melena, hematochezia, hematemesis, hemoptysis or hematuria. She did undergo valve replacement on May 10, 2016 at [facility], [place].  I have reviewed all of the patient's relevant clinical history available in [facility] inbcluding in care everywhere.    PAST MEDICAL HISTORY:   1. Hypertension  2. Dyslipidemia  3. Type 2 diabetes mellitus  4. Vit b12 deficiency anemia  5. Severe aortic stenosis  6.  Nephrolithiasis  7.  Hematuria  8. CKD stage 4    SURGICAL HISTORY:   1. BON with BSO in 1980  2. Left ankle surgery due to accidental trauma in 1986  3. Bilateral cataract excision  4. Cholecystectomy  5. Transcatheter aortic valve replacement on 5/10/16    SOCIAL HISTORY: She denies tobacco use, alcohol use or recreational drug use. She is  and lives with her  in [place], [place]. She has 3 stepdaughters. 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    ALLERGIES: Reviewed on medication card.    MEDICATIONS: [Medcard has been reviewed and/or reconciled.]    REVIEW OF SYSTEMS:   GENERAL: [No fevers, chills or sweats. No fatigue, weight loss or loss of appetite.]  HEENT: [No blurred  vision, tinnitus, nasal discharge, sorethroat or dysphagia.]  HEART: [No chest pain, palpitations or shortness of breath.]   LUNGS: [No cough, hemoptysis or breathing problems.]  ABDOMEN: [No abdominal pain, nausea, vomiting, diarrhea, constipation or melena.]  GENITOURINARY: [No dysuria, bleeding or malodorous discharge.]  NEURO: [No headache, dizziness or vertigo.]  HEMATOLOGY: [No easy bruising, spontaneous bleeding or blood clots in the past].  MUSCULOSKELETAL: [No arthralgias, myalgias or bone pains.]  SKIN: [No rashes or skin lesions.]  PSYCHIATRY: [No depression or anxiety.]    PHYSICAL EXAMINATION:   VS: Reviewed on nurse's notes.  APPEARANCE: The patient is a well-developed, well-nourished  female who appears in no acute distress. She was accompanied to this clinic visit by her . She present in a wheelchair. She is ambulating at home.  HEENT: No scleral icterus. Both external auditory canals clear. No oral ulcers, lesions. Throat clear  HEAD: No sinus tenderness.  NECK: Supple. No palpable lymphadenopathy. Thyroid non-tender, no palpable masses  CHEST: Breath sounds clear bilaterally. No rales. No rhonchi. Unlabored respirations.  CARDIOVASCULAR: Normal S1, S2. Normal rate. Regular rhythm. 2/6 ESM noted  ABDOMEN: Bowel sounds normal. No tenderness. No abdominal distention. No hepatomegaly. No splenomegaly.  LYMPHATIC: No palpable supraclavicular, axillary nodes  EXTREMITIES: No clubbing, cyanosis, edema  SKIN: No lesions. No petechiae. No ecchymoses. No induration or nodules  NEUROLOGIC: No focal findings. Alert & Oriented x 3. Mood appropriate to affect    LABS:   Reviewed    IMPRESSION:  1. Chronic anemia  2. Chronic leukopenia without neutropenia  3. Alpha thalassemia carrier  4. History of vitamin B12 deficiency  5. CKD stage 4  6. Anemia due to CKD    PLAN:  1. I discussed the results of her most recent CBC from today in detail with her. Her Hb is >10 gm/dl. I have discussed with  her that her anemia is not contributing to her weakness at this time. I will continue to hold procrit today. She will resume procrit injections when Hb is <10 gm/dl. The patient has had an extensive workup for her chronic anemia in the past and no significant etiology for this has been found.  At this time it does appear likely that this might be related to her chronic kidney disease. She is agreeable to proceed with additional procrit injections as and when indicated. The patient gets 20,000 units subcutaneously weekly x 4 weeks.   2. The patient has been taking vitamin B12 tablets.  3. The patient has had a chronic asymptomatic leukopenia. This has remained stable. This is likely benign in etiology and can be conservatively followed for now. This is normal on CBC today.     Follow up in second week of Jan 2018 with labs. Possible procrit at follow up. She knows to call sooner for any additional questions or new problems.    David Addison MD

## 2017-12-02 LAB — FERRITIN SERPL-MCNC: 1751 NG/ML

## 2017-12-13 ENCOUNTER — TELEPHONE (OUTPATIENT)
Dept: FAMILY MEDICINE | Facility: CLINIC | Age: 67
End: 2017-12-13

## 2017-12-13 NOTE — TELEPHONE ENCOUNTER
Call [facility] rehab and speak with their intake nurse to get her evaluated at home for admission by their team.

## 2017-12-13 NOTE — TELEPHONE ENCOUNTER
Spoke w/pt's . He states that she is having difficulty standing for any length of time and the walking therapy is not effective. States she has been in [facility] before and had good results. Would like to try that again.

## 2017-12-13 NOTE — TELEPHONE ENCOUNTER
----- Message from Zonia Lazaro sent at 12/13/2017  8:44 AM CST -----  Contact: Brock/  Brock called and stated the patient has been having physical therapy through home health but he doesn't think it's helping and thinks she needs to be inpatient for her therapy. He can be contacted at [phone].    Thanks,  Zonia

## 2017-12-14 ENCOUNTER — TELEPHONE (OUTPATIENT)
Dept: FAMILY MEDICINE | Facility: CLINIC | Age: 67
End: 2017-12-14

## 2017-12-14 NOTE — TELEPHONE ENCOUNTER
----- Message from Yamile Diaz sent at 12/14/2017 11:23 AM CST -----  Contact: Lisa/ [facility]  Caller needs verification that pt has seen Nurse between 8/04/2017 to 12/03/2017. Fax [phone] Questions phone [phone]

## 2017-12-19 ENCOUNTER — TELEPHONE (OUTPATIENT)
Dept: FAMILY MEDICINE | Facility: CLINIC | Age: 67
End: 2017-12-19

## 2017-12-20 ENCOUNTER — TELEPHONE (OUTPATIENT)
Dept: FAMILY MEDICINE | Facility: CLINIC | Age: 67
End: 2017-12-20

## 2017-12-20 NOTE — TELEPHONE ENCOUNTER
----- Message from Faiza Griffin sent at 12/20/2017  8:36 AM CST -----  Contact: [place]   Please fax a copy of the pt med list and insurance info to [phone], states this info was not received yesterday with the other info that was faxed, can be reached at [phone]///[id number]

## 2017-12-20 NOTE — TELEPHONE ENCOUNTER
----- Message from Nathaniel Erickson sent at 12/20/2017  7:27 AM CST -----  Contact: Lynn/ Luis F [phone]  Pt's  was calling to ask if Dr. Schuster had any updates on finding a rehab for Mr. Karimi/doc call/thanks.

## 2018-01-05 ENCOUNTER — TELEPHONE (OUTPATIENT)
Dept: FAMILY MEDICINE | Facility: CLINIC | Age: 68
End: 2018-01-05

## 2018-01-05 NOTE — TELEPHONE ENCOUNTER
----- Message from Georgina Mata sent at 1/5/2018  8:13 AM CST -----  Please call Di at [facility] at [phone] in regards to pt being admitted to rehab on today.

## 2018-01-12 ENCOUNTER — PATIENT OUTREACH (OUTPATIENT)
Dept: ADMINISTRATIVE | Facility: CLINIC | Age: 68
End: 2018-01-12

## 2018-01-12 RX ORDER — METOPROLOL TARTRATE 25 MG/1
25 TABLET, FILM COATED ORAL 2 TIMES DAILY
COMMUNITY
End: 2018-03-09 | Stop reason: SDUPTHER

## 2018-01-12 NOTE — PATIENT INSTRUCTIONS
Fall Prevention  Falls often occur due to slipping, tripping or losing your balance. Millions of people fall every year and injure themselves. Here are ways to reduce your risk of falling again.  · Think about your fall, was there anything that caused your fall that can be fixed, removed, or replaced?  · Make your home safe by keeping walkways clear of objects you may trip over.  · Use non-slip pads under rugs. Do not use area rugs or small throw rugs.  · Use non-slip mats in bathtubs and showers.  · Install handrails and lights on staircases.  · Do not walk in poorly lit areas.  · Do not stand on chairs or wobbly ladders.  · Use caution when reaching overhead or looking upward. This position can cause a loss of balance.  · Be sure your shoes fit properly, have non-slip bottoms and are in good condition.   · Wear shoes both inside and out. Avoid going barefoot or wearing slippers.  · Be cautious when going up and down stairs, curbs, and when walking on uneven sidewalks.  · If your balance is poor, consider using a cane or walker.  · If your fall was related to alcohol use, stop or limit alcohol intake.   · If your fall was related to use of sleeping medicines, talk to your doctor about this. You may need to reduce your dosage at bedtime if you awaken during the night to go to the bathroom.    · To reduce the need for nighttime bathroom trips:  ¨ Avoid drinking fluids for several hours before going to bed  ¨ Empty your bladder before going to bed  ¨ Men can keep a urinal at the bedside  · Stay as active as you can. Balance, flexibility, strength, and endurance all come from exercise. They all play a role in preventing falls. Ask your healthcare provider which types of activity are right for you.  · Get your vision checked on a regular basis.  · If you have pets, know where they are before you stand up or walk so you don't trip over them.  · Use night lights.  Date Last Reviewed: 11/5/2015  © 3469-0241 [employer]. [address] [place] [address], [place] [address]. All rights reserved. This information is not intended as a substitute for professional medical care. Always follow your healthcare professional's instructions.

## 2018-01-15 RX ORDER — SODIUM BICARBONATE 650 MG/1
650 TABLET ORAL 2 TIMES DAILY
Refills: 0 | COMMUNITY
Start: 2018-01-15 | End: 2018-04-10 | Stop reason: SDUPTHER

## 2018-01-15 NOTE — PATIENT INSTRUCTIONS
[facility]  [address]  [address] [address]  [phone] phone     [phone] fax  Hours of Operation: Monday- Friday 8:00am- 5:00pm  After hours phone  [phone]  Hematology / Oncology Physicians on call      Dr. Roel Silverman    Please call with any concerns regarding your appointment today.   Admitted

## 2018-01-15 NOTE — TELEPHONE ENCOUNTER
I have refilled the patient's requested medication x 1 month.  However, the patient is due for an evaluation in the office.  Call the patient on the phone and book the patient with EITHER Donita [place] NP or Catracho Brennan NP for a visit.    PLEASE DOCUMENT THE FACT THAT YOU HAVE CONTACTED THE PATIENT IN THE CHART FOR FUTURE REFERENCE.    Health Maintenance Due   Topic Date Due    Zoster Vaccine  02/01/2008    DEXA SCAN  03/03/2017    Mammogram  01/10/2018    Hemoglobin A1c  01/11/2018    Foot Exam  01/24/2018

## 2018-01-22 ENCOUNTER — HOSPITAL ENCOUNTER (OUTPATIENT)
Dept: RADIOLOGY | Facility: HOSPITAL | Age: 68
Discharge: HOME | End: 2018-01-22
Attending: FAMILY MEDICINE
Payer: MEDICARE

## 2018-01-22 ENCOUNTER — OFFICE VISIT (OUTPATIENT)
Dept: FAMILY MEDICINE | Facility: CLINIC | Age: 68
End: 2018-01-22
Payer: MEDICARE

## 2018-01-22 VITALS — HEIGHT: 63 IN | BODY MASS INDEX: 28.79 KG/M2 | WEIGHT: 162.5 LBS

## 2018-01-22 VITALS
HEIGHT: 63 IN | HEART RATE: 81 BPM | DIASTOLIC BLOOD PRESSURE: 58 MMHG | SYSTOLIC BLOOD PRESSURE: 95 MMHG | BODY MASS INDEX: 28.79 KG/M2 | TEMPERATURE: 98 F | WEIGHT: 162.5 LBS

## 2018-01-22 DIAGNOSIS — Z12.31 ENCOUNTER FOR SCREENING MAMMOGRAM FOR BREAST CANCER: ICD-10-CM

## 2018-01-22 DIAGNOSIS — N18.4 ANEMIA IN STAGE 4 CHRONIC KIDNEY DISEASE: ICD-10-CM

## 2018-01-22 DIAGNOSIS — N18.4 CONTROLLED TYPE 2 DIABETES MELLITUS WITH STAGE 4 CHRONIC KIDNEY DISEASE, WITHOUT LONG-TERM CURRENT USE OF INSULIN: ICD-10-CM

## 2018-01-22 DIAGNOSIS — I50.32 CHRONIC DIASTOLIC HEART FAILURE: ICD-10-CM

## 2018-01-22 DIAGNOSIS — Z78.0 ASYMPTOMATIC AGE-RELATED POSTMENOPAUSAL STATE: ICD-10-CM

## 2018-01-22 DIAGNOSIS — E11.21 DIABETIC NEPHROPATHY ASSOCIATED WITH TYPE 2 DIABETES MELLITUS: ICD-10-CM

## 2018-01-22 DIAGNOSIS — N18.5 CKD (CHRONIC KIDNEY DISEASE) STAGE 5, GFR LESS THAN 15 ML/MIN: Primary | Chronic | ICD-10-CM

## 2018-01-22 DIAGNOSIS — E11.49 TYPE 2 DIABETES MELLITUS WITH NEUROLOGICAL MANIFESTATIONS: ICD-10-CM

## 2018-01-22 DIAGNOSIS — D56.8 OTHER THALASSEMIA: ICD-10-CM

## 2018-01-22 DIAGNOSIS — E11.22 TYPE 2 DIABETES MELLITUS WITH STAGE 4 CHRONIC KIDNEY DISEASE, WITHOUT LONG-TERM CURRENT USE OF INSULIN: ICD-10-CM

## 2018-01-22 DIAGNOSIS — E11.42 DIABETIC POLYNEUROPATHY ASSOCIATED WITH TYPE 2 DIABETES MELLITUS: ICD-10-CM

## 2018-01-22 DIAGNOSIS — D63.1 ANEMIA IN STAGE 4 CHRONIC KIDNEY DISEASE: ICD-10-CM

## 2018-01-22 DIAGNOSIS — N25.81 HYPERPARATHYROIDISM, SECONDARY RENAL: ICD-10-CM

## 2018-01-22 DIAGNOSIS — E11.22 CONTROLLED TYPE 2 DIABETES MELLITUS WITH STAGE 4 CHRONIC KIDNEY DISEASE, WITHOUT LONG-TERM CURRENT USE OF INSULIN: ICD-10-CM

## 2018-01-22 DIAGNOSIS — N18.4 TYPE 2 DIABETES MELLITUS WITH STAGE 4 CHRONIC KIDNEY DISEASE, WITHOUT LONG-TERM CURRENT USE OF INSULIN: ICD-10-CM

## 2018-01-22 PROCEDURE — 77080 DXA BONE DENSITY AXIAL: CPT | Mod: 26,,, | Performed by: RADIOLOGY

## 2018-01-22 PROCEDURE — 77067 SCR MAMMO BI INCL CAD: CPT | Mod: 26,,, | Performed by: RADIOLOGY

## 2018-01-22 PROCEDURE — 99213 OFFICE O/P EST LOW 20 MIN: CPT | Mod: PBBFAC,PO | Performed by: FAMILY MEDICINE

## 2018-01-22 PROCEDURE — 99999 PR PBB SHADOW E&M-EST. PATIENT-LVL III: CPT | Mod: PBBFAC,,, | Performed by: FAMILY MEDICINE

## 2018-01-22 PROCEDURE — 99495 TRANSJ CARE MGMT MOD F2F 14D: CPT | Mod: S$PBB,,, | Performed by: FAMILY MEDICINE

## 2018-01-22 PROCEDURE — 77063 BREAST TOMOSYNTHESIS BI: CPT | Mod: 26,,, | Performed by: RADIOLOGY

## 2018-01-22 PROCEDURE — 99496 TRANSJ CARE MGMT HIGH F2F 7D: CPT | Mod: PBBFAC,PO | Performed by: FAMILY MEDICINE

## 2018-01-22 PROCEDURE — 77080 DXA BONE DENSITY AXIAL: CPT | Mod: TC,PO

## 2018-01-22 PROCEDURE — 77067 SCR MAMMO BI INCL CAD: CPT | Mod: TC,PO

## 2018-01-22 RX ORDER — TALC
3 POWDER (GRAM) TOPICAL
COMMUNITY
Start: 2018-01-10 | End: 2018-02-09

## 2018-01-22 RX ORDER — AMOXICILLIN 250 MG
1 CAPSULE ORAL
COMMUNITY
Start: 2018-01-10 | End: 2018-02-09

## 2018-01-22 NOTE — PROGRESS NOTES
Subjective:      Patient ID: Citlali Karimi is a 67 y.o. female.    Chief Complaint: TCC follow up.  HPI The patient was recently hospitalized at [facility] for deconditioning.  The discharge summary from the hospitalization is copied below for reference and completeness.      Transitional Care Note    Family and/or Caretaker present at visit?  Yes.  Diagnostic tests reviewed/disposition: No diagnosic tests pending after this hospitalization.  Disease/illness education: she understands what she had.  Home health/community services discussion/referrals: Patient has home health established at [facility]..   Establishment or re-establishment of referral orders for community resources: No other necessary community resources.   Discussion with other health care providers: No discussion with other health care providers necessary.   She does have therapy seeing her.     She did see dr. Bermeo in the past and she is overdue to see him.      Lab Results   Component Value Date    WBC 8.41 12/01/2017    HGB 11.4 (L) 12/01/2017    HCT 35.4 (L) 12/01/2017    MCV 89 12/01/2017     12/01/2017     9.7/29.3 was her H/H in early January at [facility].  She did get a procrit shot in the hospital, she states.     Discharge Summaries  - in this encounter    Haylie Roa MD - 01/10/2018 8:54 AM CST  Formatting of this note may be different from the original.  [facility] PHYSIATRY/REHAB DISCHARGE SUMMARY    Patient Information: Citlali Karimi  67 y.o.  female  1950  [id number]    Admitting Diagnosis:Muscular deconditioning [R29.898]    Brief Summary of Admission History: Citlali Karimi is a 67 y.o. female who was admitted to [facility] on 1/5/2018 with functional deficits due to medical deconditioning. She was discharged home on 1/10/2018 with continued home health therapies.    Consults:   Consultants   Provider Service Role Specialty   Ben Ac MD Nephrology Consulting Physician  Nephrology   Dai West DO - Consulting Physician Nephrology   Laisha De La Cruz MD Internal Medicine Consulting Physician Internal Medicine   Shaheen Ackerman NP - Nurse Practitioner Nurse Practitioner [facility] Course: Citlali Karimi is a 67 y.o. female who was admitted to [facility] on 1/5/2018 with functional deficits due to medical deconditioning. The following issues were monitored and managed during this hospitalization:      1. Rehabilitation: Patient was admitted to [facility] from 1/5/2018 - 1/10/2018 for comprehensive therapies to include physical therapy, occupational therapy to help with mobility, transfers, endurance, strength, stair climbing, gait/balance, AD evaluation, family training. Patient's PCP has been consulted to assist with medial management of co-morbidities and we appreciate their help in the care of Citlali Karimi.   2. CVD: Continued 81mg ASA PO qDay.  3. Medical Deconditioning: Continued aggressive therapies working on endurance, balance and stamina with a minimum of 3 hours/day of therapies (15 hours/week).  4. HTN: Continued imdur 60mg PO qDay, losartan 25mg PO qDay, nifedipine 90mg PO qDay. Blood pressure was well controlled while in rehab.  5. B12 deficiency: Continued cyanocobalamin 1,000 mcg PO qDay.  6. DM: Last A1c was 5.9 (6/27/2017). Continued SSI level 1 (0-5 units) and ACHS accuchecks.   7. CKD-4: Cr 2.96 and BUN 30 (1/7). Dr. Ac with the nephrology service consulted (1/5). Encouraged oral hydration. Repeat BMP ordered as outpatient with results sent to PCP for continued monitoring.  8. Hypokalemia: Patient's most recent potassium was 3.7 (1/7) improved from 3.2 (1/6). Repeat BMP ordered as outpatient with results sent to PCP for continued monitoring.  9. HypERcalcemia: Patient's most recent calcium was 10.7 (1/7) trending up from 10.3 (1/6). Encouraged oral hydration and recommend PCP continue to monitor. Repeat BMP ordered as  outpatient with results sent to PCP for continued monitoring.  10. Leukocytosis: Resolved. Patient's WBC was most recently 8.0 (1/7). Currently remains afebrile and will continue to monitor for other SIRS criteria.  11. Anemia: Patient's Hgb is most recently 9.7 (1/7). Recommend PCP continue to monitor, as this may negatively affect their endurance level with therapy. Repeat CBC ordered as outpatient.  12. Pain Management: Currently receiving tylenol 650mg PO q4 PRN. Will continue to monitor and adjust medications as needed to control pain and allow full participation in therapies.   13. Bowel: Will start patient on a bowel program using senokot 1 tab QHS lactulose 20g PO qDay PRN to be given if patient has not had a BM within 48 hours. Monitored and titrated bowel meds as needed.  14. Bladder: Will continue to monitor for increased urinary frequency, dysuria, back or lower abdominal pain, fever, incontinence, and/or hematuria and will check urine for infection if clinically indicated.   15. Skin/pressure: Will keep head of bed at or below 30 degrees to reduce shearing forces and risk for pressure ulcers. Recommend q2 hour turns to prevent skin breakdown and OT/PT to continue range of motion exercises to prevent contractures.   16. Hearing loss: Taught the patient compensatory techniques to improve quality of life, maintain safety, and prevent injury.  17. Nausea: Offered zofran ODT 8mg PO BID PRN.   18. GERD: Continued protonix 40mg PO qDay.   19. Nutrition: Patient was on a cardiac/low cholesterol 2000 mercedes DM diet. Dietitian consulted and will follow while in rehab.   20. Sleep: Offered melatonin 3mg PO QHS and trazodone 50mg PO QHS (starting 1/8). Slept well, no concerns while in rehab.  21. DVT Prophylaxis: Patient was on Evelio Hose, SCDs, and plexi pulse to prevent DVTs while in rehab. Encouraged frequent and early ambulation with therapies while in rehab.    I discussed with the patient and the family disease  process and treatment.     I have personally seen and examined the patient, Citlali Karimi, in a face to face encounter on the date of discharge.     She is cleared for discharge with instructions to follow up as directed.   Total time in the care and discharge planning of this patient was greater than 30 minutes.    Discharge Condition: Stable    Discharge Physical Exam:  Vitals:   01/10/18 1141   BP: 120/58   Pulse: 64   Resp: 18   Temp: 98.4 °F (36.9 °C)   SpO2: 98%     Body mass index is 28.31 kg/m².     Gen: Alert & Oriented X 3, No Acute Distress   HEENT: NCAT, PERRL, EOMI, MMM   Neck: Supple, no lymphadenopathy   Heart: Regular Rate & Rhythm, no MRG   Lungs: Clear to auscultation bilaterally   Abdomen: Soft/non-tender/non-distended/+BS   : No pandey present  Ext: No clubbing / No cyanosis / No edema / No calf pain   Skin: No rash or evidence of skin breakdown.  MS: Full ROM of all joints  Neuro: Flat affect. No clonus, no spasticity. Normal coordination. MMT with generalized weakness, no focal deficits.        Follow Up Visit: Evelio Schuster MD  [address] [address]  [phone]    Go on 1/29/2018  F/U @ 1020 AM    Please bring all D/C paperwork and prescription medications to this appointment. If the time/date is not convenient for you please feel free to reschedule at any time.    Mika Vaz MD  [address]  [address]  [place] [address]  [phone]    Schedule an appointment as soon as possible for a visit in 6 week(s)    Home, [facility]  [address]  [address]  [place] [address]  [phone]    PT/OT/ST/NURSE/AIDE/SW    Laboratory Findings and X-Rays:  BMP:    Lab Results (Last 7 Days)  Lab 01/07/18  0450   GLUCOSE 114*   BUN 30*   CREATININE 2.96*   SODIUM 141   POTASSIUM 3.7   CHLORIDE 108   CO2 25   CALCIUM 10.7*   ANIONGAP 8   CBC:  Lab Results (Last 7 Days)  Lab 01/07/18  0450 01/06/18  0510   WBC 8.0 6.0   RBC 3.31* 3.29*   HGB 9.7* 9.5*   HEMATOCRIT 29.3* 29.2*     135   MCV 88.6 88.9   MCH 29.5 29.0   MCHC 33.2 32.6*     No results found.    The patient had advanced to the following levels of independence in therapy:    LEVELS OF INDEPENDENCE   1 = Dependent  2 = Maximum Assistance  3 = Moderate Assistance  4 = Minimum Assistance  5 = Standby Assistance  6 = Modified Assistance  7 = Independent    PHYSICAL THERAPY TREATMENT PLAN AND DISCHARGE SUMMARY:     THERAPY   LEVEL OF INDEPENDENCE   ADMIT    LEVEL OF INDEPENDENCE  DISCHARGE    Transfers: 1 1   Gait: 1 1   Equipment Rolling walker Rolling walker   Distance 2 3   Wheelchair: 1 1   Stairs: 0 (NT secondary to safety) 0   Problem Solvin 1   Social Interaction: 3 3   Procedural Memory: 1 1     OCCUPATIONAL THERAPY TREATMENT PLAN AND DISCHARGE SUMMARY:    THERAPY   LEVEL OF INDEPENDENCE   ADMIT    LEVEL OF INDEPENDENCE  DISCHARGE    Feeding and Eatin 5   Upper Extremity Dressin 1   Lower Extremity Dressin 1   Toiletin 1   Bathin 1   Grooming/Hygiene: 4 4   Toilet/Bedside Commode Transfer: 1 1   Tub Transfer: Tub Transfer-FIM Admit: (not recorded) Tub Transfer-FIM Discharge: (not recorded)   Shower Transfer: 1 1   Problem Solvin 1   Social Interaction: 3 3   Procedural Memory: 1 1   Manuel Cognitive Level: ACLS Score: (not recorded) ACLS Score: (not recorded)     SPEECH THERAPY TREATMENT PLAN AND DISCHARGE SUMMARY:    THERAPY   LEVEL OF INDEPENDENCE   ADMIT    LEVEL OF INDEPENDENCE  DISCHARGE    Memory: 1 1   Comprehension: 2 2   Expression: 4 3   Problem Solvin 1   Social Interaction: 3 3     Discharge Medications:   Current Discharge Medication List     START taking these medications   Details   acetaminophen (TYLENOL) 325 mg tablet Take 2 tablets (650 mg total) by mouth every 4 (four) hours as needed (mild pain (1-5), headache, fever, mild non-cardiac pain).  Qty: 60 tablet, Refills: 0     melatonin 3 mg tablet Take 1 tablet (3 mg total) by mouth nightly.  Qty: 30 tablet, Refills: 0      metoprolol tartrate (LOPRESSOR) 25 MG tablet Take 1 tablet (25 mg total) by mouth 2 (two) times daily.  Qty: 60 tablet, Refills: 0     senna-docusate (SENOKOT-S) 8.6-50 mg per tablet Take 1 tablet by mouth nightly.  Qty: 30 tablet, Refills: 0       CONTINUE these medications which have CHANGED   Details   aspirin EC (ECOTRIN) 81 MG EC tablet Take 1 tablet (81 mg total) by mouth daily.  Qty: 30 tablet, Refills: 0     calcitriol (ROCALTROL) 0.5 MCG capsule Take 1 capsule (0.5 mcg total) by mouth daily.  Qty: 30 capsule, Refills: 0     cyanocobalamin (VITAMIN B-12) 1000 MCG tablet Take 1 tablet (1,000 mcg total) by mouth daily.  Qty: 30 tablet, Refills: 0     isosorbide mononitrate (IMDUR) 60 MG 24 hr tablet Take 1 tablet (60 mg total) by mouth daily.  Qty: 30 tablet, Refills: 0     lactulose (CHRONULAC) 20 gram/30 mL Soln solution Take 30 mLs (20 g total) by mouth daily as needed (please take a dose if you have not had a BM in 48 hours to prevent constipation).  Qty: 90 mL, Refills: 0     NIFEdipine (PROCARDIA-XL) 90 MG (OSM) 24 hr tablet Take 1 tablet (90 mg total) by mouth daily.  Qty: 30 tablet, Refills: 0     sodium bicarbonate 650 MG tablet Take 1 tablet (650 mg total) by mouth 2 (two) times daily.  Qty: 60 tablet, Refills: 0       CONTINUE these medications which have NOT CHANGED   Details   omeprazole (PRILOSEC) 40 MG capsule Take 40 mg by mouth daily.       STOP taking these medications     losartan (COZAAR) 25 MG tablet     atorvastatin (LIPITOR) 20 MG tablet     calcium carbonate (TUMS) 200 mg calcium (500 mg) chewable tablet         Allergies at Discharge: No Known Allergies    Discharge Orders:   Discharge Orders   Future Labs/Procedures Expected by Expires   CBC and differential 1/17/2018 1/10/2019   Questions:   [facility] Container Type:   Comprehensive metabolic panel 1/17/2018 1/10/2019   Questions:   [facility] Has the patient fasted?:   Activity as tolerated As directed   Diet (Select type) Cardiac/Low  Chol/KRISTI; GLUCERNA EACH MEAL TRAY As directed   Questions:   Diet Type: Cardiac/Low Chol/KRISTI   Other Restriction(s):   Liquid Consistency:   Sodium Restriction:   Fluid restriction:   Preferences: GLUCERNA EACH MEAL TRAY   Diet (Select type) Diabetic; 1500 mercedes ADA; GLUCERNA EACH MEAL TRAY As directed   Questions:   Diet Type: Diabetic   Diet, Diabetic: 1500 mercedes ADA   Diabetic selection:   Liquid Consistency:   Other Restriction(s):   Sodium Restriction:   Fluid restriction:   Preferences: GLUCERNA EACH MEAL TRAY   Fall precautions As directed   Home Health Aide As directed   Comments:   OT, PT, SLP, nurse, aide and SW: 5-6 times per week for 4-6 weeks and then reassess.   Questions:   Evaluate and Treat:   Home Health begin on: 1/11/2018   Notify Home Health As directed   Comments:   OT, PT, SLP, nurse, aide and SW: 5-6 times per week for 4-6 weeks and then reassess.   Questions:   Evaluate and Treat:   Home Health begin on: 1/11/2018   Skin care precautions As directed   Up ONLY with assistance As directed       Discharge Therapies: Home health OT, PT, SLP, nurse, aide and SW    Discharge Equipment: TBD    Discharge Problem List:   Patient Active Problem List   Diagnosis Date Noted    Hypokalemia 01/06/2018    Congestive heart failure 06/30/2017    H/O aortic valve replacement 06/30/2017    Essential hypertension 06/30/2017    Type 2 diabetes mellitus with renal complication 06/30/2017    Chronic anemia 06/30/2017    Chronic kidney disease, stage IV (severe) 06/30/2017    Spinal stenosis 06/30/2017    Frequent falls 06/30/2017    Osteoarthritis of multiple joints 06/30/2017    Urinary tract infection due to Klebsiella species 06/30/2017    Hypercalcemia 06/30/2017    Secondary hyperparathyroidism 06/30/2017    Flat affect 06/30/2017    Hypomagnesemia 06/30/2017    B12 deficiency 06/30/2017    Neuropathy due to medical condition 06/30/2017    Metabolic acidosis 06/30/2017    Urinary incontinence  06/30/2017    Unintentional weight loss 06/30/2017    Obesity (BMI 30-39.9) 06/30/2017    Fasciculations 06/30/2017    Receptive expressive language disorder 06/30/2017    Confusion 06/30/2017    Cognitive safety issue 06/30/2017    Poor memory 06/30/2017    Muscular deconditioning 06/29/2017    UTI (urinary tract infection) 06/28/2017    Falling episodes 06/27/2017    Weakness of extremity 06/27/2017             Discharge Instructions  - in this encounter      The following attachments cannot be sent through Care Everywhere.    ASPIRIN, ASA ORAL TABLETS (ENGLISH)  CALCITRIOL CAPSULES (ENGLISH)  CYANOCOBALAMIN, VITAMIN B12 INJECTION (ENGLISH)  INSULIN LISPRO INJECTION (ENGLISH)  ISOSORBIDE MONONITRATE EXTENDED-RELEASE TABLETS (ENGLISH)  LOSARTAN TABLETS (ENGLISH)  NIFEDIPINE EXTENDED-RELEASE TABLETS (ENGLISH)  PANTOPRAZOLE TABLETS (ENGLISH)  SODIUM BICARBONATE TABLETS (ENGLISH)  ACETAMINOPHEN TABLETS OR CAPLETS (ENGLISH)  ALUMINUM HYDROXIDE; MAGNESIUM HYDROXIDE; SIMETHICONE ORAL SUSPENSION (ENGLISH)  DEXTROSE, GLUCOSE INJECTION SOLUTION (ENGLISH)  GLUCAGON INJECTION (ENGLISH)  LACTULOSE ORAL SOLUTION (ENGLISH)  ONDANSETRON ORAL DISSOLVING TABLET (ENGLISH)  Spinal Stenosis Easy-to-Read (English)  Urinary Incontinence (English)  Diabetes Mellitus and Food (English)  Diabetes Mellitus and Exercise (English)  Chronic Kidney Disease Adult Easy-to-Read (English)  Fall Prevention in Hospitals Adult (English)  Anemia (English)  Hypertension Easy-to-Read (English)  Osteoarthritis (English)  What You Need to Know About Osteoarthritis (English)    Medications at Time of Discharge  - as of this encounter      Medication Sig. Disp. Refills Start Date End Date   acetaminophen (TYLENOL) 325 mg tablet   Take 2 tablets (650 mg total) by mouth every 4 (four) hours as needed (mild pain (1-5), headache, fever, mild non-cardiac pain). 60 tablet   0 01/10/2018 02/09/2018   aspirin EC (ECOTRIN) 81 MG EC tablet   Take 1 tablet  (81 mg total) by mouth daily. 30 tablet   0 01/10/2018 02/09/2018   calcitriol (ROCALTROL) 0.5 MCG capsule   Take 1 capsule (0.5 mcg total) by mouth daily. 30 capsule   0 01/10/2018 02/09/2018   cyanocobalamin (VITAMIN B-12) 1000 MCG tablet   Take 1 tablet (1,000 mcg total) by mouth daily. 30 tablet   0 01/10/2018 02/09/2018   isosorbide mononitrate (IMDUR) 60 MG 24 hr tablet   Take 1 tablet (60 mg total) by mouth daily. 30 tablet   0 01/10/2018 02/09/2018   lactulose (CHRONULAC) 20 gram/30 mL Soln solution   Take 30 mLs (20 g total) by mouth daily as needed (please take a dose if you have not had a BM in 48 hours to prevent constipation). 90 mL   0 01/10/2018     melatonin 3 mg tablet   Take 1 tablet (3 mg total) by mouth nightly. 30 tablet   0 01/10/2018 02/09/2018   metoprolol tartrate (LOPRESSOR) 25 MG tablet   Take 1 tablet (25 mg total) by mouth 2 (two) times daily. 60 tablet   0 01/10/2018 02/09/2018   NIFEdipine (PROCARDIA-XL) 90 MG (OSM) 24 hr tablet   Take 1 tablet (90 mg total) by mouth daily. 30 tablet   0 01/10/2018 02/09/2018   omeprazole (PRILOSEC) 40 MG capsule   Take 40 mg by mouth daily.           senna-docusate (SENOKOT-S) 8.6-50 mg per tablet   Take 1 tablet by mouth nightly. 30 tablet   0 01/10/2018 02/09/2018   sodium bicarbonate 650 MG tablet   Take 1 tablet (650 mg total) by mouth 2 (two) times daily. 60 tablet   0 01/10/2018 02/09/2018     Ordered Prescriptions  - in this encounter      Prescription Sig. Disp. Refills Start Date End Date   calcitriol (ROCALTROL) 0.5 MCG capsule   Take 1 capsule (0.5 mcg total) by mouth daily. 30 capsule   0 01/10/2018 02/09/2018   cyanocobalamin (VITAMIN B-12) 1000 MCG tablet   Take 1 tablet (1,000 mcg total) by mouth daily. 30 tablet   0 01/10/2018 02/09/2018   isosorbide mononitrate (IMDUR) 60 MG 24 hr tablet   Take 1 tablet (60 mg total) by mouth daily. 30 tablet   0 01/10/2018 02/09/2018   aspirin EC (ECOTRIN) 81 MG EC tablet   Take 1 tablet (81 mg  total) by mouth daily. 30 tablet   0 01/10/2018 02/09/2018   melatonin 3 mg tablet   Take 1 tablet (3 mg total) by mouth nightly. 30 tablet   0 01/10/2018 02/09/2018   senna-docusate (SENOKOT-S) 8.6-50 mg per tablet   Take 1 tablet by mouth nightly. 30 tablet   0 01/10/2018 02/09/2018   lactulose (CHRONULAC) 20 gram/30 mL Soln solution   Take 30 mLs (20 g total) by mouth daily as needed (please take a dose if you have not had a BM in 48 hours to prevent constipation). 90 mL   0 01/10/2018     NIFEdipine (PROCARDIA-XL) 90 MG (OSM) 24 hr tablet   Take 1 tablet (90 mg total) by mouth daily. 30 tablet   0 01/10/2018 02/09/2018   metoprolol tartrate (LOPRESSOR) 25 MG tablet   Take 1 tablet (25 mg total) by mouth 2 (two) times daily. 60 tablet   0 01/10/2018 02/09/2018   sodium bicarbonate 650 MG tablet   Take 1 tablet (650 mg total) by mouth 2 (two) times daily. 60 tablet   0 01/10/2018 02/09/2018   acetaminophen (TYLENOL) 325 mg tablet   Take 2 tablets (650 mg total) by mouth every 4 (four) hours as needed (mild pain (1-5), headache, fever, mild non-cardiac pain). 60 tablet   0 01/10/2018 02/09/2018     Discharge Disposition  - in this encounter      Disposition Code Departure Means Destination Comments   Home Health     [facility]           Health Maintenance Due   Topic Date Due    Zoster Vaccine  02/01/2008    DEXA SCAN  03/03/2017    Hemoglobin A1c  01/11/2018    Mammogram  01/10/2018       Past Medical History:  Past Medical History:   Diagnosis Date    Acid reflux 1/7/2015    Anxiety 1/7/2015    Aortic valvar stenosis     Arthritis     osteo    Callus     Chronic anemia     followed by Dr. Addison    CKD (chronic kidney disease) stage 4, GFR 15-29 ml/min     followed by Nephrology    CKD (chronic kidney disease) stage 5, GFR less than 15 ml/min 8/7/2015    CKD (chronic kidney disease), stage IV 1/7/2015    Combined hyperlipidemia associated with type 2 diabetes mellitus     Coronary  artery disease     Diabetes mellitus type II, controlled, with no complications     LBSL 108 today    Diabetes mellitus, type 2 - only on oral medications 1/7/2015    Encounter for blood transfusion     Frail elderly with impaired mobility, wheel chair bound 8/25/2017    Gallbladder problem     gallbladder surgery    Heart murmur     Hyperparathyroidism, secondary renal 1/7/2015    Hypertension 8/7/2015    Hypertension associated with diabetes 11/12/2012    Kidney transplant candidate 1/7/2015    Overweight(278.02)     Urinary tract infection      Past Surgical History:   Procedure Laterality Date    ANKLE FRACTURE SURGERY  1985    AORTIC VALVE REPLACEMENT  05/2016    BONE BIOPSY      CATARACT EXTRACTION W/  INTRAOCULAR LENS IMPLANT  2009    CHOLECYSTECTOMY      CYSTOSCOPY      EYE SURGERY      FRACTURE SURGERY      HYSTERECTOMY  unknown    OOPHORECTOMY      removal of colon polyps      RENAL BIOPSY  10/2013    YAG cap -OD       Review of patient's allergies indicates:   Allergen Reactions    Lipitor [atorvastatin]      Current Outpatient Prescriptions on File Prior to Visit   Medication Sig Dispense Refill    aspirin (ECOTRIN) 81 MG EC tablet Take 81 mg by mouth once daily.      calcitRIOL (ROCALTROL) 0.5 MCG Cap TAKE ONE CAPSULE BY MOUTH DAILY 90 capsule 4    cyanocobalamin (VITAMIN B-12) 1000 MCG tablet Take 1 tablet (1,000 mcg total) by mouth once daily. 90 tablet 12    isosorbide mononitrate (IMDUR) 60 MG 24 hr tablet TAKE ONE TABLET BY MOUTH DAILY 30 tablet 9    lactulose (CEPHULAC) 20 gram Pack Take 20 g by mouth as needed.      metoprolol tartrate (LOPRESSOR) 25 MG tablet Take 25 mg by mouth 2 (two) times daily.      nifedipine (ADALAT CC) 90 MG TbSR Take 1 tablet (90 mg total) by mouth once daily. 30 tablet 11    omeprazole (PRILOSEC) 40 MG capsule Take 1 capsule (40 mg total) by mouth once daily. 30 capsule 11    sodium bicarbonate 650 MG tablet Take 1 tablet (650 mg  total) by mouth 2 (two) times daily.  0     No current facility-administered medications on file prior to visit.      Social History     Social History    Marital status:      Spouse name: N/A    Number of children: N/A    Years of education: N/A     Occupational History    Retired  ([employer])      Social History Main Topics    Smoking status: Never Smoker    Smokeless tobacco: Never Used    Alcohol use No    Drug use: No    Sexual activity: Not Currently     Birth control/ protection: See Surgical Hx     Other Topics Concern    Not on file     Social History Narrative    Retired - former principal     for 39 years    No children    No history of blood transfusions    No donors     Family History   Problem Relation Age of Onset    Hypertension Mother     Heart disease Mother     Diabetes Mother     Glaucoma Mother     Aneurysm Father     Stroke Father     Kidney disease Sister     Heart disease Sister     Kidney disease Brother     Hypertension Brother     Diabetes Brother     Diabetes Sister     Kidney disease Sister     Hypertension Brother     Cancer Paternal Grandmother      breast    No Known Problems Maternal Aunt     No Known Problems Maternal Uncle     No Known Problems Paternal Aunt     No Known Problems Paternal Uncle     Colon cancer Maternal Grandmother     No Known Problems Maternal Grandfather     No Known Problems Paternal Grandfather     Anemia Neg Hx     Arrhythmia Neg Hx     Asthma Neg Hx     Clotting disorder Neg Hx     Fainting Neg Hx     Heart attack Neg Hx     Heart failure Neg Hx     Hyperlipidemia Neg Hx     Atrial Septal Defect Neg Hx              Review of Systems   Constitutional: Positive for fatigue. Negative for fever.   Respiratory: Negative for cough, shortness of breath and wheezing.    Cardiovascular: Negative for chest pain and palpitations.   Gastrointestinal: Negative for abdominal pain, constipation, diarrhea,  "nausea and vomiting.   Genitourinary: Negative for difficulty urinating, dysuria and hematuria.       Objective:   BP (!) 95/58   Pulse 81   Temp 98.2 °F (36.8 °C) (Oral)   Ht 5' 2.5" (1.588 m)   Wt 73.7 kg (162 lb 7.7 oz)   BMI 29.24 kg/m²     Physical Exam   Constitutional: She appears well-developed and well-nourished. She is cooperative.   HENT:   Head: Normocephalic and atraumatic.   Right Ear: Tympanic membrane, external ear and ear canal normal.   Left Ear: Tympanic membrane, external ear and ear canal normal.   Nose: Nose normal.   Mouth/Throat: Uvula is midline and mucous membranes are normal. No oral lesions. No oropharyngeal exudate, posterior oropharyngeal edema or posterior oropharyngeal erythema.   Eyes: EOM and lids are normal. Pupils are equal, round, and reactive to light. Right eye exhibits no discharge. Left eye exhibits no discharge. Right conjunctiva is not injected. Right conjunctiva has no hemorrhage. Left conjunctiva is not injected. Left conjunctiva has no hemorrhage. No scleral icterus. Right eye exhibits no nystagmus. Left eye exhibits no nystagmus.   Neck: Normal range of motion and full passive range of motion without pain. Neck supple. No JVD present. No tracheal tenderness present. Carotid bruit is not present. No tracheal deviation present. No thyroid mass and no thyromegaly present.   Cardiovascular: Normal rate, regular rhythm, S1 normal and S2 normal.    No murmur heard.  Pulses:       Carotid pulses are 2+ on the right side, and 2+ on the left side.       Radial pulses are 2+ on the right side, and 2+ on the left side.        Dorsalis pedis pulses are 2+ on the right side, and 2+ on the left side.        Posterior tibial pulses are 2+ on the right side, and 2+ on the left side.   Pulmonary/Chest: Effort normal and breath sounds normal. No respiratory distress. She has no wheezes. She has no rhonchi. She has no rales.   Abdominal: Soft. Normal appearance, normal aorta and " bowel sounds are normal. She exhibits no distension, no abdominal bruit, no pulsatile midline mass and no mass. There is no hepatosplenomegaly. There is no tenderness. There is no rebound.   Musculoskeletal:        Right knee: She exhibits no swelling. No tenderness found.        Left knee: She exhibits no swelling. No tenderness found.        Right foot: There is normal range of motion and no deformity.        Left foot: There is normal range of motion and no deformity.   Feet:   Right Foot:   Protective Sensation: 10 sites tested. 8 sites sensed.   Skin Integrity: Negative for ulcer, blister, skin breakdown, erythema, warmth, callus or dry skin.   Left Foot:   Protective Sensation: 10 sites tested. 8 sites sensed.   Skin Integrity: Negative for ulcer, blister, skin breakdown, erythema, warmth, callus or dry skin.   Lymphadenopathy:        Head (right side): No submental and no submandibular adenopathy present.        Head (left side): No submental and no submandibular adenopathy present.     She has no cervical adenopathy.   Neurological: She is alert. She has normal strength. No cranial nerve deficit or sensory deficit.   Skin: Skin is warm and dry. No rash noted. No cyanosis. Nails show no clubbing.   Psychiatric: She has a normal mood and affect. Her speech is normal and behavior is normal. Thought content normal. Cognition and memory are normal.       Assessment:     1. CKD (chronic kidney disease) stage 5, GFR less than 15 ml/min    2. Type 2 diabetes mellitus with stage 4 chronic kidney disease, without long-term current use of insulin    3. Anemia in stage 4 chronic kidney disease    4. Chronic diastolic heart failure    5. Other thalassemia    6. Controlled type 2 diabetes mellitus with stage 4 chronic kidney disease, without long-term current use of insulin    7. Hyperparathyroidism, secondary renal    8. Type 2 diabetes mellitus with neurological manifestations    9. Diabetic polyneuropathy associated  with type 2 diabetes mellitus    10. Diabetic nephropathy associated with type 2 diabetes mellitus    11. Encounter for screening mammogram for breast cancer    12. Asymptomatic age-related postmenopausal state        Plan:    Citlali was seen today for annual exam.    Diagnoses and all orders for this visit:    CKD (chronic kidney disease) stage 5, GFR less than 15 ml/min  -     Ambulatory referral to Nephrology    Type 2 diabetes mellitus with stage 4 chronic kidney disease, without long-term current use of insulin  -     Hemoglobin A1c; Future    Anemia in stage 4 chronic kidney disease  -     Ambulatory referral to Hematology / Oncology    Chronic diastolic heart failure    Other thalassemia  -     Ambulatory referral to Hematology / Oncology    Controlled type 2 diabetes mellitus with stage 4 chronic kidney disease, without long-term current use of insulin    Hyperparathyroidism, secondary renal  -     Ambulatory referral to Nephrology    Type 2 diabetes mellitus with neurological manifestations    Diabetic polyneuropathy associated with type 2 diabetes mellitus  -     Ambulatory referral to Nephrology    Diabetic nephropathy associated with type 2 diabetes mellitus  -     Ambulatory referral to Nephrology    Encounter for screening mammogram for breast cancer  -     Mammo Digital Screening Bilat with CAD; Future    Asymptomatic age-related postmenopausal state  -     DXA Bone Density Spine And Hip; Future      Cont home health for her issues of weakness.   Cont all meds.

## 2018-01-23 RX ORDER — FERROUS SULFATE, DRIED 160(50) MG
1 TABLET, EXTENDED RELEASE ORAL
COMMUNITY
Start: 2018-01-23 | End: 2018-03-15 | Stop reason: HOSPADM

## 2018-01-23 RX ORDER — FERROUS SULFATE, DRIED 160(50) MG
1 TABLET, EXTENDED RELEASE ORAL
COMMUNITY
End: 2018-01-23 | Stop reason: SDUPTHER

## 2018-01-23 NOTE — TELEPHONE ENCOUNTER
----- Message from Evelio Schuster MD sent at 1/22/2018  6:05 PM CST -----  The bone density test (DEXA scan) is showing osteopenia.    Please start oscal 500+d taking 1 by mouth three times a day which is an over the counter medication-put it on the medcard.  Please recheck the DEXA scan in 2 years.

## 2018-01-25 ENCOUNTER — TELEPHONE (OUTPATIENT)
Dept: FAMILY MEDICINE | Facility: CLINIC | Age: 68
End: 2018-01-25

## 2018-01-25 NOTE — TELEPHONE ENCOUNTER
Spoke w/pt's niece. The med sent to pharmacy is Sodium Bicarb and it is an OTC medication. Niece states that they have picked that medication already. There are no other medications in question at this time.

## 2018-01-25 NOTE — TELEPHONE ENCOUNTER
----- Message from Nathaniel Erickson sent at 1/25/2018  9:34 AM CST -----  Contact: self [phone]  Pt said that [facility] in [place] still hasn't received her rx that was called in on the 15th/pls call/thanks.

## 2018-02-09 RX ORDER — LOSARTAN POTASSIUM 25 MG/1
TABLET ORAL
Qty: 90 TABLET | Status: SHIPPED | OUTPATIENT
Start: 2018-02-09

## 2018-02-21 ENCOUNTER — TELEPHONE (OUTPATIENT)
Dept: HEMATOLOGY/ONCOLOGY | Facility: CLINIC | Age: 68
End: 2018-02-21

## 2018-02-21 NOTE — TELEPHONE ENCOUNTER
----- Message from David Addison MD sent at 2/20/2018  4:52 PM CST -----  Looks like she missed her last lab appointment/follow-up with me.  Please schedule the patient to follow-up with me when possible sometime in the next 2-3 weeks either in [place] or at the cancer Center.  Thank you.  ----- Message -----  From: David Addison MD  Sent: 7/31/2017  10:45 AM  To: David Addison MD    Check lab results

## 2018-03-09 RX ORDER — METOPROLOL TARTRATE 25 MG/1
25 TABLET, FILM COATED ORAL 2 TIMES DAILY
Qty: 60 TABLET | Refills: 2 | Status: ON HOLD | OUTPATIENT
Start: 2018-03-09 | End: 2018-06-11 | Stop reason: SDUPTHER

## 2018-03-09 NOTE — TELEPHONE ENCOUNTER
----- Message from Nathaniel Erickson sent at 3/9/2018 12:02 PM CST -----  Contact: self [phone] or [phone]  Pt needs a refill on her Metoprolol 25 mg rx that was originally given to her by Dr. Haylie Roa from the rehab center. She was told that she needs to get with her pcp to see if he wants her to continue taking the rx/ [facility] in [place]/pls call/thanks.

## 2018-03-13 ENCOUNTER — TELEPHONE (OUTPATIENT)
Dept: FAMILY MEDICINE | Facility: CLINIC | Age: 68
End: 2018-03-13

## 2018-03-13 NOTE — TELEPHONE ENCOUNTER
----- Message from Skye Williamson sent at 3/13/2018  4:11 PM CDT -----  Contact: home health nurse  Would like to consult with nurse regarding status of medication refill. Please call back at [phone].    Pt uses:  [id number] - [place], [place] - [address]  [address] [address]  Phone: [phone] Fax: [phone]    thanks,  Skye Williamson

## 2018-03-13 NOTE — TELEPHONE ENCOUNTER
Confirmed with pharmacy, a prescription for metoprolol 25 mg BID was filled for the pt on 03/09/18 and has not been picked up.  nurse notified.

## 2018-03-15 ENCOUNTER — OFFICE VISIT (OUTPATIENT)
Dept: HEMATOLOGY/ONCOLOGY | Facility: CLINIC | Age: 68
End: 2018-03-15
Payer: MEDICARE

## 2018-03-15 ENCOUNTER — INFUSION (OUTPATIENT)
Dept: INFUSION THERAPY | Facility: HOSPITAL | Age: 68
End: 2018-03-15
Attending: INTERNAL MEDICINE
Payer: MEDICARE

## 2018-03-15 ENCOUNTER — TELEPHONE (OUTPATIENT)
Dept: NEPHROLOGY | Facility: CLINIC | Age: 68
End: 2018-03-15

## 2018-03-15 VITALS
DIASTOLIC BLOOD PRESSURE: 63 MMHG | BODY MASS INDEX: 27.57 KG/M2 | OXYGEN SATURATION: 99 % | HEART RATE: 73 BPM | TEMPERATURE: 98 F | WEIGHT: 155.63 LBS | HEIGHT: 63 IN | SYSTOLIC BLOOD PRESSURE: 140 MMHG

## 2018-03-15 DIAGNOSIS — D63.1 ANEMIA IN STAGE 4 CHRONIC KIDNEY DISEASE: Primary | ICD-10-CM

## 2018-03-15 DIAGNOSIS — N18.4 ANEMIA IN STAGE 4 CHRONIC KIDNEY DISEASE: Primary | ICD-10-CM

## 2018-03-15 DIAGNOSIS — D51.8 OTHER VITAMIN B12 DEFICIENCY ANEMIA: ICD-10-CM

## 2018-03-15 PROCEDURE — 63600175 PHARM REV CODE 636 W HCPCS: Mod: JG | Performed by: INTERNAL MEDICINE

## 2018-03-15 PROCEDURE — 99214 OFFICE O/P EST MOD 30 MIN: CPT | Mod: 25,S$PBB,, | Performed by: INTERNAL MEDICINE

## 2018-03-15 PROCEDURE — 96372 THER/PROPH/DIAG INJ SC/IM: CPT

## 2018-03-15 PROCEDURE — 99999 PR PBB SHADOW E&M-EST. PATIENT-LVL III: CPT | Mod: PBBFAC,,, | Performed by: INTERNAL MEDICINE

## 2018-03-15 PROCEDURE — 99213 OFFICE O/P EST LOW 20 MIN: CPT | Mod: PBBFAC,25 | Performed by: INTERNAL MEDICINE

## 2018-03-15 RX ADMIN — ERYTHROPOIETIN 20000 UNITS: 40000 INJECTION, SOLUTION INTRAVENOUS; SUBCUTANEOUS at 11:03

## 2018-03-15 NOTE — PATIENT INSTRUCTIONS
[facility]  [address]  [address] [address]  [phone] phone     [phone] fax  Hours of Operation: Monday- Friday 8:00am- 5:00pm  After hours phone  [phone]  Hematology / Oncology Physicians on call      Dr. Roel Hearn      Please call with any concerns regarding your appointment today.

## 2018-03-15 NOTE — TELEPHONE ENCOUNTER
----- Message from Magnus Vazquez MD sent at 3/15/2018 12:44 PM CDT -----  Can I see her today in [facility] clinic ,     Dr RIVERS

## 2018-03-15 NOTE — PROGRESS NOTES
Reason for visit: Follow up for anemia    HPI:   The patient is a 68-year-old  female who presents to the hematology oncology clinic today for follow up for anemia. She is currently undergoing outpatient physical/occupational therapy for generalized weakness and history of falls. She is being evaluated by neurology, physical medicine and has had imaging and treatment including inpatient rehabilitation done with [facility]. She is still having a lot of problems with strength and coordination. Additional testing is ongoing. She denies any fever, chills, night sweats, loss of appetite or unintentional weight loss. She denies any chest pain or shortness of breath. She denies any fatigue. She denies any bowel or urinary complaints. She denies any melena, hematochezia, hematemesis, hemoptysis or hematuria. She did undergo valve replacement on May 10, 2016 at [facility], [place].  I have reviewed all of the patient's relevant clinical history available in [facility] inbcluding in care everywhere.    PAST MEDICAL HISTORY:   1. Hypertension  2. Dyslipidemia  3. Type 2 diabetes mellitus  4. Vit b12 deficiency anemia  5. Severe aortic stenosis  6.  Nephrolithiasis  7.  Hematuria  8. CKD stage 4    SURGICAL HISTORY:   1. BON with BSO in 1980  2. Left ankle surgery due to accidental trauma in 1986  3. Bilateral cataract excision  4. Cholecystectomy  5. Transcatheter aortic valve replacement on 5/10/16    SOCIAL HISTORY: She denies tobacco use, alcohol use or recreational drug use. She is  and lives with her  in [place], [place]. She has 3 stepdaughters. 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    ALLERGIES: Reviewed on medication card.    MEDICATIONS: [Medcard has been reviewed and/or reconciled.]    REVIEW OF SYSTEMS:   GENERAL: [No fevers, chills or sweats. No fatigue, weight loss or loss of appetite.]  HEENT: [No blurred vision, tinnitus, nasal discharge, sorethroat or  dysphagia.]  HEART: [No chest pain, palpitations or shortness of breath.]   LUNGS: [No cough, hemoptysis or breathing problems.]  ABDOMEN: [No abdominal pain, nausea, vomiting, diarrhea, constipation or melena.]  GENITOURINARY: [No dysuria, bleeding or malodorous discharge.]  NEURO: [No headache, dizziness or vertigo.]  HEMATOLOGY: [No easy bruising, spontaneous bleeding or blood clots in the past].  MUSCULOSKELETAL: [No arthralgias, myalgias or bone pains.]  SKIN: [No rashes or skin lesions.]  PSYCHIATRY: [No depression or anxiety.]    PHYSICAL EXAMINATION:   VS: Reviewed on nurse's notes.  APPEARANCE: The patient is a well-developed, well-nourished  female who appears in no acute distress. She was accompanied to this clinic visit by her . She present in a wheelchair. She is ambulating at home.  HEENT: No scleral icterus. Both external auditory canals clear. No oral ulcers, lesions. Throat clear  HEAD: No sinus tenderness.  NECK: Supple. No palpable lymphadenopathy. Thyroid non-tender, no palpable masses  CHEST: Breath sounds clear bilaterally. No rales. No rhonchi. Unlabored respirations.  CARDIOVASCULAR: Normal S1, S2. Normal rate. Regular rhythm. 2/6 ESM noted  ABDOMEN: Bowel sounds normal. No tenderness. No abdominal distention. No hepatomegaly. No splenomegaly.  LYMPHATIC: No palpable supraclavicular, axillary nodes  EXTREMITIES: No clubbing, cyanosis, edema  SKIN: No lesions. No petechiae. No ecchymoses. No induration or nodules  NEUROLOGIC: No focal findings. Alert & Oriented x 3. Mood appropriate to affect    LABS:   Reviewed    IMPRESSION:  1. Chronic anemia  2. Chronic leukopenia without neutropenia  3. Alpha thalassemia carrier  4. History of vitamin B12 deficiency  5. CKD stage 4  6. Anemia due to CKD    PLAN:  1. I discussed the results of her most recent CBC from today in detail with her. Her Hb is <10 gm/dl. She will resume weekly procrit injections when Hb is <10 gm/dl. The  patient has had an extensive workup for her chronic anemia in the past and no significant etiology for this has been found.  At this time it does appear likely that this might be related to her chronic kidney disease. She is agreeable to proceed with additional procrit injections as and when indicated. The patient gets 20,000 units subcutaneously weekly x 4 weeks.   2. The patient has been taking vitamin B12 tablets.  3. The patient has had a chronic asymptomatic leukopenia. This has remained stable. This is likely benign in etiology and can be conservatively followed for now. This is normal on CBC today.  4. I will follow up with pending iron studies.  5. I have sent a message to Dr. Vazquez with nephrology as she is due for follow up. I will check CMP today.     Follow up in 4 weeks with CBC. Possible procrit at follow up. She knows to call sooner for any additional questions or new problems.    David Addison MD

## 2018-03-15 NOTE — TELEPHONE ENCOUNTER
----- Message from Magnus Vazquez MD sent at 3/15/2018  4:33 PM CDT -----  Please tell patient to stop calcitriol and all calcium and vitamin D supplements.

## 2018-03-15 NOTE — TELEPHONE ENCOUNTER
----- Message from David Addison MD sent at 3/15/2018 10:57 AM CDT -----  Please have your nurse contact her to schedule follow up as soon as possible. She missed her last appointment in Jan 2018. Thanks

## 2018-03-15 NOTE — PROGRESS NOTES
Lab Results   Component Value Date    CREATININE 4.4 (H) 03/15/2018    BUN 49 (H) 03/15/2018     03/15/2018    K 4.3 03/15/2018     03/15/2018    CO2 22 (L) 03/15/2018       Lab Results   Component Value Date    .0 (H) 07/24/2017    CALCIUM 11.6 (H) 03/15/2018    PHOS 3.3 07/24/2017       Lab Results   Component Value Date    ALBUMIN 4.1 03/15/2018       Current Outpatient Prescriptions on File Prior to Visit   Medication Sig Dispense Refill    aspirin (ECOTRIN) 81 MG EC tablet Take 81 mg by mouth once daily.      calcitRIOL (ROCALTROL) 0.5 MCG Cap TAKE ONE CAPSULE BY MOUTH DAILY 90 capsule 4    calcium-vitamin D3 (CALCIUM 500 + D) 500 mg(1,250mg) -200 unit per tablet Take 1 tablet by mouth 3 (three) times daily with meals.      cyanocobalamin (VITAMIN B-12) 1000 MCG tablet Take 1 tablet (1,000 mcg total) by mouth once daily. 90 tablet 12    isosorbide mononitrate (IMDUR) 60 MG 24 hr tablet TAKE ONE TABLET BY MOUTH DAILY 30 tablet 9    lactulose (CEPHULAC) 20 gram Pack Take 20 g by mouth as needed.      losartan (COZAAR) 25 MG tablet TAKE ONE TABLET BY MOUTH DAILY 90 tablet PRN    metoprolol tartrate (LOPRESSOR) 25 MG tablet Take 1 tablet (25 mg total) by mouth 2 (two) times daily. 60 tablet 2    nifedipine (ADALAT CC) 90 MG TbSR Take 1 tablet (90 mg total) by mouth once daily. 30 tablet 11    omeprazole (PRILOSEC) 40 MG capsule Take 1 capsule (40 mg total) by mouth once daily. 30 capsule 11    sodium bicarbonate 650 MG tablet Take 1 tablet (650 mg total) by mouth 2 (two) times daily.  0     Current Facility-Administered Medications on File Prior to Visit   Medication Dose Route Frequency Provider Last Rate Last Dose    [COMPLETED] epoetin carmen injection 20,000 Units  20,000 Units Subcutaneous Once David Addison MD   20,000 Units at 03/15/18 1126    [DISCONTINUED] epoetin carmen injection 20,000 Units  20,000 Units Subcutaneous 1 time in Clinic/HOD David Addison MD         [DISCONTINUED] epoetin carmen injection 40,000 Units  40,000 Units Subcutaneous Once David Addison MD           Labs reviewed , MAYLIN on CKD , likely due to hypercalcemia and volume depletion,  advise patient to stop calcitriol and calcium plus vitamin D supplements, also she was lost to follow-up, she needs an appointment in clinic PAULIE Vazquez MD

## 2018-03-16 ENCOUNTER — TELEPHONE (OUTPATIENT)
Dept: NEPHROLOGY | Facility: CLINIC | Age: 68
End: 2018-03-16

## 2018-03-16 NOTE — TELEPHONE ENCOUNTER
----- Message from Magnus Vazquez MD sent at 3/15/2018 12:44 PM CDT -----  Can I see her today in [facility] clinic ,     Dr RIVERS

## 2018-03-19 ENCOUNTER — OFFICE VISIT (OUTPATIENT)
Dept: CARDIOLOGY | Facility: CLINIC | Age: 68
End: 2018-03-19
Payer: MEDICARE

## 2018-03-19 VITALS — HEART RATE: 85 BPM | DIASTOLIC BLOOD PRESSURE: 71 MMHG | HEIGHT: 62 IN | SYSTOLIC BLOOD PRESSURE: 153 MMHG

## 2018-03-19 DIAGNOSIS — E11.59 HYPERTENSION ASSOCIATED WITH DIABETES: Primary | ICD-10-CM

## 2018-03-19 DIAGNOSIS — N18.5 CKD (CHRONIC KIDNEY DISEASE) STAGE 5, GFR LESS THAN 15 ML/MIN: Chronic | ICD-10-CM

## 2018-03-19 DIAGNOSIS — I15.2 HYPERTENSION ASSOCIATED WITH DIABETES: Primary | ICD-10-CM

## 2018-03-19 DIAGNOSIS — I50.32 CHRONIC DIASTOLIC HEART FAILURE: ICD-10-CM

## 2018-03-19 DIAGNOSIS — I25.10 CORONARY ARTERY DISEASE INVOLVING NATIVE CORONARY ARTERY OF NATIVE HEART WITHOUT ANGINA PECTORIS: ICD-10-CM

## 2018-03-19 PROCEDURE — 99212 OFFICE O/P EST SF 10 MIN: CPT | Mod: PBBFAC,PO | Performed by: INTERNAL MEDICINE

## 2018-03-19 PROCEDURE — 99214 OFFICE O/P EST MOD 30 MIN: CPT | Mod: S$PBB,,, | Performed by: INTERNAL MEDICINE

## 2018-03-19 PROCEDURE — 99999 PR PBB SHADOW E&M-EST. PATIENT-LVL II: CPT | Mod: PBBFAC,,, | Performed by: INTERNAL MEDICINE

## 2018-03-19 RX ORDER — SIMVASTATIN 20 MG/1
20 TABLET, FILM COATED ORAL NIGHTLY
Qty: 90 TABLET | Refills: 3 | Status: SHIPPED | OUTPATIENT
Start: 2018-03-19 | End: 2019-03-19

## 2018-03-19 NOTE — PROGRESS NOTES
Subjective:   Patient ID:  Citlali Karimi is a 68 y.o. female who presents for follow-up of Follow-up (past due follow up)  Patient denies CP, angina or anginal equivalent.TAVR last year, post echo wnl.     Hypertension   This is a chronic problem. The current episode started more than 1 year ago. The problem has been gradually improving since onset. The problem is controlled. Pertinent negatives include no chest pain, palpitations or shortness of breath. Past treatments include calcium channel blockers, beta blockers and angiotensin blockers. Compliance problems include exercise.    Hyperlipidemia   This is a chronic problem. The current episode started more than 1 year ago. Recent lipid tests were reviewed and are variable. Pertinent negatives include no chest pain or shortness of breath. She is currently on no antihyperlipidemic treatment. The current treatment provides mild improvement of lipids. Compliance problems include medication side effects.        Review of Systems   Constitution: Negative. Negative for weight gain.   HENT: Negative.    Eyes: Negative.    Cardiovascular: Negative.  Negative for chest pain, leg swelling and palpitations.   Respiratory: Negative.  Negative for shortness of breath.    Endocrine: Negative.    Hematologic/Lymphatic: Negative.    Skin: Negative.    Musculoskeletal: Negative for muscle weakness.   Gastrointestinal: Negative.    Genitourinary: Negative.    Neurological: Negative.  Negative for dizziness.   Psychiatric/Behavioral: Negative.    Allergic/Immunologic: Negative.      Family History   Problem Relation Age of Onset    Hypertension Mother     Heart disease Mother     Diabetes Mother     Glaucoma Mother     Aneurysm Father     Stroke Father     Kidney disease Sister     Heart disease Sister     Kidney disease Brother     Hypertension Brother     Diabetes Brother     Diabetes Sister     Kidney disease Sister     Hypertension Brother     Cancer Paternal  Grandmother      breast    No Known Problems Maternal Aunt     No Known Problems Maternal Uncle     No Known Problems Paternal Aunt     No Known Problems Paternal Uncle     Colon cancer Maternal Grandmother     No Known Problems Maternal Grandfather     No Known Problems Paternal Grandfather     Anemia Neg Hx     Arrhythmia Neg Hx     Asthma Neg Hx     Clotting disorder Neg Hx     Fainting Neg Hx     Heart attack Neg Hx     Heart failure Neg Hx     Hyperlipidemia Neg Hx     Atrial Septal Defect Neg Hx      Past Medical History:   Diagnosis Date    Acid reflux 1/7/2015    Anxiety 1/7/2015    Aortic valvar stenosis     Arthritis     osteo    Callus     Chronic anemia     followed by Dr. Addison    CKD (chronic kidney disease) stage 4, GFR 15-29 ml/min     followed by Nephrology    CKD (chronic kidney disease) stage 5, GFR less than 15 ml/min 8/7/2015    CKD (chronic kidney disease), stage IV 1/7/2015    Combined hyperlipidemia associated with type 2 diabetes mellitus     Coronary artery disease     Diabetes mellitus type II, controlled, with no complications     LBSL 108 today    Diabetes mellitus, type 2 - only on oral medications 1/7/2015    Encounter for blood transfusion     Frail elderly with impaired mobility, wheel chair bound 8/25/2017    Gallbladder problem     gallbladder surgery    Heart murmur     Hyperparathyroidism, secondary renal 1/7/2015    Hypertension 8/7/2015    Hypertension associated with diabetes 11/12/2012    Kidney transplant candidate 1/7/2015    Overweight(278.02)     Urinary tract infection      Current Outpatient Prescriptions on File Prior to Visit   Medication Sig Dispense Refill    aspirin (ECOTRIN) 81 MG EC tablet Take 81 mg by mouth once daily.      cyanocobalamin (VITAMIN B-12) 1000 MCG tablet Take 1 tablet (1,000 mcg total) by mouth once daily. 90 tablet 12    isosorbide mononitrate (IMDUR) 60 MG 24 hr tablet TAKE ONE TABLET BY MOUTH DAILY  30 tablet 9    lactulose (CEPHULAC) 20 gram Pack Take 20 g by mouth as needed.      losartan (COZAAR) 25 MG tablet TAKE ONE TABLET BY MOUTH DAILY 90 tablet PRN    metoprolol tartrate (LOPRESSOR) 25 MG tablet Take 1 tablet (25 mg total) by mouth 2 (two) times daily. 60 tablet 2    nifedipine (ADALAT CC) 90 MG TbSR Take 1 tablet (90 mg total) by mouth once daily. 30 tablet 11    omeprazole (PRILOSEC) 40 MG capsule Take 1 capsule (40 mg total) by mouth once daily. 30 capsule 11    sodium bicarbonate 650 MG tablet Take 1 tablet (650 mg total) by mouth 2 (two) times daily.  0     No current facility-administered medications on file prior to visit.      Review of patient's allergies indicates:   Allergen Reactions    Lipitor [atorvastatin]        Objective:     Physical Exam   Constitutional: She is oriented to person, place, and time. She appears well-developed and well-nourished.   HENT:   Head: Normocephalic and atraumatic.   Eyes: Conjunctivae and EOM are normal. Pupils are equal, round, and reactive to light.   Neck: Normal range of motion. Neck supple.   Cardiovascular: Normal rate, regular rhythm, normal heart sounds and intact distal pulses.    Pulmonary/Chest: Effort normal and breath sounds normal.   Abdominal: Soft. Bowel sounds are normal.   Musculoskeletal: Normal range of motion.   Neurological: She is alert and oriented to person, place, and time.   Skin: Skin is warm and dry.   Psychiatric: She has a normal mood and affect.   Nursing note and vitals reviewed.      Assessment:     1. Hypertension associated with diabetes    2. Chronic diastolic heart failure    3. CKD (chronic kidney disease) stage 5, GFR less than 15 ml/min        Plan:     Hypertension associated with diabetes    Chronic diastolic heart failure    CKD (chronic kidney disease) stage 5, GFR less than 15 ml/min    Continue asa- primary prevention  Try simvistatin-hlp  Continue nifidepen, metoprolol, losartan -htn

## 2018-03-21 NOTE — PROGRESS NOTES
Patient Citlali Karimi, MRN [id number], was dependent on dialysis (ICD10 Z99.2) at the time of this visit on 3/19/18. This addendum is made to the medical record on 03/21/2018.

## 2018-03-23 ENCOUNTER — INFUSION (OUTPATIENT)
Dept: INFUSION THERAPY | Facility: HOSPITAL | Age: 68
End: 2018-03-23
Attending: INTERNAL MEDICINE
Payer: MEDICARE

## 2018-03-23 VITALS
RESPIRATION RATE: 18 BRPM | SYSTOLIC BLOOD PRESSURE: 146 MMHG | OXYGEN SATURATION: 98 % | HEART RATE: 60 BPM | DIASTOLIC BLOOD PRESSURE: 72 MMHG | TEMPERATURE: 98 F

## 2018-03-23 DIAGNOSIS — D63.1 ANEMIA IN STAGE 4 CHRONIC KIDNEY DISEASE: Primary | ICD-10-CM

## 2018-03-23 DIAGNOSIS — N18.4 ANEMIA IN STAGE 4 CHRONIC KIDNEY DISEASE: Primary | ICD-10-CM

## 2018-03-23 PROCEDURE — 96372 THER/PROPH/DIAG INJ SC/IM: CPT

## 2018-03-23 PROCEDURE — 63600175 PHARM REV CODE 636 W HCPCS: Mod: JG | Performed by: INTERNAL MEDICINE

## 2018-03-23 RX ADMIN — EPOETIN ALFA 20000 UNITS: 20000 SOLUTION INTRAVENOUS; SUBCUTANEOUS at 08:03

## 2018-03-26 ENCOUNTER — OFFICE VISIT (OUTPATIENT)
Dept: NEPHROLOGY | Facility: CLINIC | Age: 68
End: 2018-03-26
Payer: MEDICARE

## 2018-03-26 ENCOUNTER — LAB VISIT (OUTPATIENT)
Dept: LAB | Facility: HOSPITAL | Age: 68
End: 2018-03-26
Attending: INTERNAL MEDICINE
Payer: MEDICARE

## 2018-03-26 VITALS
HEART RATE: 79 BPM | SYSTOLIC BLOOD PRESSURE: 134 MMHG | DIASTOLIC BLOOD PRESSURE: 70 MMHG | OXYGEN SATURATION: 96 % | BODY MASS INDEX: 28.52 KG/M2 | WEIGHT: 155 LBS | RESPIRATION RATE: 14 BRPM | HEIGHT: 62 IN

## 2018-03-26 DIAGNOSIS — N17.9 AKI (ACUTE KIDNEY INJURY): ICD-10-CM

## 2018-03-26 DIAGNOSIS — N18.4 CHRONIC KIDNEY DISEASE (CKD), STAGE IV (SEVERE): Primary | ICD-10-CM

## 2018-03-26 DIAGNOSIS — E55.9 VITAMIN D DEFICIENCY: ICD-10-CM

## 2018-03-26 LAB
ALBUMIN SERPL BCP-MCNC: 4 G/DL
ANION GAP SERPL CALC-SCNC: 10 MMOL/L
BUN SERPL-MCNC: 41 MG/DL
CALCIUM SERPL-MCNC: 11.8 MG/DL
CHLORIDE SERPL-SCNC: 108 MMOL/L
CO2 SERPL-SCNC: 24 MMOL/L
CREAT SERPL-MCNC: 4.2 MG/DL
EST. GFR  (AFRICAN AMERICAN): 11.6 ML/MIN/1.73 M^2
EST. GFR  (NON AFRICAN AMERICAN): 10.1 ML/MIN/1.73 M^2
GLUCOSE SERPL-MCNC: 105 MG/DL
PHOSPHATE SERPL-MCNC: 4.2 MG/DL
POTASSIUM SERPL-SCNC: 4.2 MMOL/L
SODIUM SERPL-SCNC: 142 MMOL/L

## 2018-03-26 PROCEDURE — 36415 COLL VENOUS BLD VENIPUNCTURE: CPT | Mod: PO

## 2018-03-26 PROCEDURE — 99999 PR PBB SHADOW E&M-EST. PATIENT-LVL III: CPT | Mod: PBBFAC,,, | Performed by: INTERNAL MEDICINE

## 2018-03-26 PROCEDURE — 99213 OFFICE O/P EST LOW 20 MIN: CPT | Mod: PBBFAC,PO | Performed by: INTERNAL MEDICINE

## 2018-03-26 PROCEDURE — 99214 OFFICE O/P EST MOD 30 MIN: CPT | Mod: S$PBB,,, | Performed by: INTERNAL MEDICINE

## 2018-03-26 PROCEDURE — 80069 RENAL FUNCTION PANEL: CPT

## 2018-03-26 NOTE — PROGRESS NOTES
Subjective:       Patient ID: Citlali Karimi is a 68 y.o.   female who presents for follow-up evaluation of MAYLIN , CKD stage 4 , HTN     Evelio Schuster MD      HPI:  Citlali Karimi Is a pleasant 68 -year-old  woman seen in office today in f/u for above medical problems. I saw her about 9 months ago , she was lost to follow-up.  Patient today's accompanied to office with her .  She has been hospitalized in undergoing physical therapy for weakness.  So she missed an appointment due to the same.  She falls followed up in the hematology Department, follow-up labs showed acute kidney injury, serum creatinine increased from a baseline of about 2.5-4.4 mg/dL, significant hypercalcemia was noted, calcium and vitamin D were discontinued, repeat labs from today pending at this time.        Important Info :       In the past she was recently admitted in [facility] for weakness.  According to the patient and her  she had an evaluation by neurologist.  MRI of the brain shows dilated ventricles. she saw  Dr. Hurd with neurology at [facility] .  She is currently undergoing rehabilitation therapy         She underwent a native kidney biopsy on 10/10/13. Final report of the kidney biopsy is negative for any specific etiology for acute renal failure. 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           Past Medical History:   Diagnosis Date    Acid reflux 1/7/2015    Anxiety 1/7/2015    Aortic valvar stenosis     Arthritis     osteo    Callus     Chronic anemia     followed by Dr. Addison    CKD (chronic kidney disease) stage 4, GFR 15-29 ml/min     followed by Nephrology    CKD (chronic kidney disease) stage 5, GFR less than 15 ml/min 8/7/2015    CKD (chronic kidney disease), stage IV 1/7/2015    Combined hyperlipidemia associated with type 2 diabetes mellitus     Coronary artery disease     Diabetes mellitus type  II, controlled, with no complications     LBSL 108 today    Diabetes mellitus, type 2 - only on oral medications 1/7/2015    Encounter for blood transfusion     Frail elderly with impaired mobility, wheel chair bound 8/25/2017    Gallbladder problem     gallbladder surgery    Heart murmur     Hyperparathyroidism, secondary renal 1/7/2015    Hypertension 8/7/2015    Hypertension associated with diabetes 11/12/2012    Kidney transplant candidate 1/7/2015    Overweight(278.02)     Urinary tract infection          Current Outpatient Prescriptions on File Prior to Visit   Medication Sig Dispense Refill    aspirin (ECOTRIN) 81 MG EC tablet Take 81 mg by mouth once daily.      cyanocobalamin (VITAMIN B-12) 1000 MCG tablet Take 1 tablet (1,000 mcg total) by mouth once daily. 90 tablet 12    isosorbide mononitrate (IMDUR) 60 MG 24 hr tablet TAKE ONE TABLET BY MOUTH DAILY 30 tablet 9    lactulose (CEPHULAC) 20 gram Pack Take 20 g by mouth as needed.      losartan (COZAAR) 25 MG tablet TAKE ONE TABLET BY MOUTH DAILY 90 tablet PRN    metoprolol tartrate (LOPRESSOR) 25 MG tablet Take 1 tablet (25 mg total) by mouth 2 (two) times daily. 60 tablet 2    nifedipine (ADALAT CC) 90 MG TbSR Take 1 tablet (90 mg total) by mouth once daily. 30 tablet 11    omeprazole (PRILOSEC) 40 MG capsule Take 1 capsule (40 mg total) by mouth once daily. 30 capsule 11    simvastatin (ZOCOR) 20 MG tablet Take 1 tablet (20 mg total) by mouth every evening. 90 tablet 3    sodium bicarbonate 650 MG tablet Take 1 tablet (650 mg total) by mouth 2 (two) times daily.  0     No current facility-administered medications on file prior to visit.          Review of Systems  :    Constitutional: Positive for activity change and unexpected weight change. Negative for appetite change, fatigue and fever.   HENT: Negative for congestion, facial swelling, sore throat, trouble swallowing and voice change.    Eyes: Negative for redness and visual  disturbance.   Respiratory: Negative for apnea, cough, chest tightness, shortness of breath and wheezing.    Cardiovascular: Negative for chest pain, palpitations and leg swelling.   Gastrointestinal: Negative for abdominal distention, abdominal pain, blood in stool, constipation, diarrhea, nausea and vomiting.   Genitourinary: Negative for decreased urine volume, difficulty urinating, dysuria, flank pain, frequency, hematuria, pelvic pain and urgency.   Musculoskeletal: Positive for arthralgias and gait problem. Negative for back pain and joint swelling.   Skin: Negative for color change and rash.   Neurological: Positive for weakness. Negative for dizziness, syncope and headaches.   Hematological: Does not bruise/bleed easily.   Psychiatric/Behavioral: Negative for agitation, behavioral problems and confusion. The patient is not nervous/anxious.            Objective:         Vitals:    03/26/18 1004   BP: 134/70   Pulse: 79   Resp: 14       Weight 155 lbs , last weight 167 lbs       Physical Exam  :    Constitutional: She is oriented to person, place, and time. She appears well-developed and well-nourished. No distress.    HENT: Head: Normocephalic and atraumatic.  Mouth/Throat: Oropharynx is clear and moist. No oropharyngeal exudate.    Eyes: Conjunctivae normal and EOM are normal. Pupils are equal, round, and reactive to light. No scleral icterus.    Neck: Normal range of motion. Neck supple. No JVD present. Carotid bruit is not present. No tracheal deviation present. No mass and no thyromegaly present.    Cardiovascular: Normal rate, regular rhythm and intact distal pulses. Exam reveals no gallop and no friction rub. No Murmurs    Pulmonary/Chest: Effort normal and breath sounds normal. No respiratory distress. She has no wheezes. She has no rales. She exhibits no tenderness.    Abdominal: Soft. Bowel sounds are normal. She exhibits no distension, no abdominal bruit, no ascites and no mass. There is no  hepatosplenomegaly. There is no tenderness. There is no rebound, no guarding and no CVA tenderness.   Musculoskeletal: Normal range of motion. She exhibits no edema and no tenderness.   Lymphadenopathy: She has no cervical adenopathy.   Neurological: She is alert and oriented to person, place, and time. No cranial nerve deficit.  She is in a wheel chair , limited exam, gait not tested   Skin: Skin is warm and intact. No rash noted. No erythema. No pallor.   Psychiatric: She has a normal mood and affect. Her behavior is normal. Judgment normal.          Labs:    Lab Results   Component Value Date    CREATININE 4.4 (H) 03/15/2018    BUN 49 (H) 03/15/2018     03/15/2018    K 4.3 03/15/2018     03/15/2018    CO2 22 (L) 03/15/2018       Lab Results   Component Value Date    WBC 5.64 03/15/2018    HGB 8.5 (L) 03/15/2018    HCT 26.2 (L) 03/15/2018    MCV 92 03/15/2018     03/15/2018       Lab Results   Component Value Date    .0 (H) 07/24/2017    CALCIUM 11.6 (H) 03/15/2018    PHOS 3.3 07/24/2017       Lab Results   Component Value Date    URICACID 8.9 (H) 07/24/2017     Lab Results   Component Value Date    HGBA1C 6.2 06/07/2017       Impression and plan - 68 -year-old  woman seen today in f/u for following medical problems ,       1. MAYLIN on CKD stage 4 - etiology unclear, likely from volume depletion, can be from hypercalcemia, calcium and vitamin D supplements were discontinued, repeat labs from today pending at this time,     Her serum creatinine fluctuates between 2 and 3 mg/dL ,  She has reflux nephropathy. I again advised patient to avoid NSAIDs. She is S/p renal biopsy 10/2013 - Kidney biopsy was negative for any specific cause. There is 40% interstitial fibrosis from reflux nephropathy.  Workup for kidney transplant is discontinued due to progressive weakness,      2. Hypertension - Blood pressure is Controlled on current regimen. Will continue to follow.       3. Anemia  of CKD - she is currently following with hematology department for the same. On Epogen        4. Aortic stenosis : She is closely followed by [facility], s/p TAVR       5. Hypercalcemia : Calcium and vitamin D supplements discontinued, follow-up  PTH level, likely has primary hyperparathyroidism,      6. Diabetes mellitus type 2 - Well controlled.       7. Proteinuria - mild, continue Cozaar.        8. Volume - discussed adequate fluid intake      9. Kidney stone - nonobstructive , not seen on recent renal U/S ,        10. Hyperuricemia - again discussed low purine diet , cont Allopurinol 100 mg daily.      11. Weakness , doing PT ,      12.  Metabolic acidosis - on sodium bicarbonate supplements.      More than 25 minutes was spent with the patient reviewing her lab results and discussing plan of care.  F/u in 4  weeks, will contact patient once lab results available.       Magnus Vazquez MD

## 2018-03-27 DIAGNOSIS — E21.0 HYPERPARATHYROIDISM, PRIMARY: Primary | ICD-10-CM

## 2018-03-27 DIAGNOSIS — N17.9 AKI (ACUTE KIDNEY INJURY): ICD-10-CM

## 2018-03-27 RX ORDER — ISOSORBIDE MONONITRATE 60 MG/1
TABLET, EXTENDED RELEASE ORAL
Qty: 30 TABLET | Refills: 8 | Status: SHIPPED | OUTPATIENT
Start: 2018-03-27

## 2018-03-27 NOTE — PROGRESS NOTES
Lab Results   Component Value Date    CREATININE 4.2 (H) 03/26/2018    BUN 41 (H) 03/26/2018     03/26/2018    K 4.2 03/26/2018     03/26/2018    CO2 24 03/26/2018       Lab Results   Component Value Date    .0 (H) 07/24/2017    CALCIUM 11.8 (H) 03/26/2018    PHOS 4.2 03/26/2018       Discussed labs with the patient, slight improvement in renal function, still has hypercalcemia, will likely will benefit from partial parathyroid to me.  We will schedule an appointment with general surgery for further evaluation.    Magnus Vazquez MD

## 2018-03-29 ENCOUNTER — INFUSION (OUTPATIENT)
Dept: INFUSION THERAPY | Facility: HOSPITAL | Age: 68
End: 2018-03-29
Attending: INTERNAL MEDICINE
Payer: MEDICARE

## 2018-03-29 VITALS — WEIGHT: 155 LBS | BODY MASS INDEX: 28.52 KG/M2 | HEIGHT: 62 IN

## 2018-03-29 DIAGNOSIS — D63.1 ANEMIA IN STAGE 4 CHRONIC KIDNEY DISEASE: Primary | ICD-10-CM

## 2018-03-29 DIAGNOSIS — N18.4 ANEMIA IN STAGE 4 CHRONIC KIDNEY DISEASE: Primary | ICD-10-CM

## 2018-03-29 PROCEDURE — 96372 THER/PROPH/DIAG INJ SC/IM: CPT

## 2018-03-29 PROCEDURE — 63600175 PHARM REV CODE 636 W HCPCS: Mod: JG | Performed by: INTERNAL MEDICINE

## 2018-03-29 RX ADMIN — EPOETIN ALFA 20000 UNITS: 20000 SOLUTION INTRAVENOUS; SUBCUTANEOUS at 09:03
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
## 2018-03-29 NOTE — NURSING
0907  3/29/18  Injection given without difficulties.Bandaid applied. Patient instructed to stay in the clinic for 15 minutes. Patient verbalized understanding and will notify nurse with any complaints.

## 2018-04-06 ENCOUNTER — INFUSION (OUTPATIENT)
Dept: INFUSION THERAPY | Facility: HOSPITAL | Age: 68
End: 2018-04-06
Attending: INTERNAL MEDICINE
Payer: MEDICARE

## 2018-04-06 VITALS
RESPIRATION RATE: 18 BRPM | SYSTOLIC BLOOD PRESSURE: 134 MMHG | HEART RATE: 86 BPM | OXYGEN SATURATION: 97 % | TEMPERATURE: 99 F | DIASTOLIC BLOOD PRESSURE: 71 MMHG

## 2018-04-06 DIAGNOSIS — D63.1 ANEMIA IN STAGE 4 CHRONIC KIDNEY DISEASE: Primary | ICD-10-CM

## 2018-04-06 DIAGNOSIS — N18.4 ANEMIA IN STAGE 4 CHRONIC KIDNEY DISEASE: Primary | ICD-10-CM

## 2018-04-06 PROCEDURE — 96372 THER/PROPH/DIAG INJ SC/IM: CPT

## 2018-04-06 PROCEDURE — 63600175 PHARM REV CODE 636 W HCPCS: Mod: JG,EC | Performed by: INTERNAL MEDICINE

## 2018-04-06 RX ADMIN — EPOETIN ALFA 20000 UNITS: 20000 SOLUTION INTRAVENOUS; SUBCUTANEOUS at 08:04

## 2018-04-06 NOTE — NURSING
Injection given without difficulties.Bandaid applied. Patient instructed to stay in the clinic for 15 minutes. Patient verbalized understanding and will notify nurse with any complaints.    Attempted todraw lab from left hand. Unsuccessful. Pt wishes to wait until next week to attempt again. Pressure dsg applied

## 2018-04-06 NOTE — PATIENT INSTRUCTIONS
FALL PREVENTION   Falls often occur due to slipping, tripping or losing your balance. Here are ways to reduce your risk of falling again.   Was there anything that caused your fall that can be fixed, removed or replaced?   Make your home safe by keeping walkways clear of objects you may trip over.   Use non-slip pads under rugs.   Do not walk in poorly lit areas.   Do not stand on chairs or wobbly ladders.   Use caution when reaching overhead or looking upward. This position can cause a loss of balance.   Be sure your shoes fit properly, have non-slip bottoms and are in good condition.   Be cautious when going up and down stairs, curbs, and when walking on uneven sidewalks.   If your balance is poor, consider using a cane or walker.   If your fall was related to alcohol use, stop or limit alcohol intake.   If your fall was related to use of sleeping medicines, talk to your doctor about this. You may need to reduce your dosage at bedtime if you awaken during the night to go to the bathroom.   To reduce the need for nighttime bathroom trips:   Avoid drinking fluids for several hours before going to bed   Empty your bladder before going to bed   Men can keep a urinal at the bedside   © 4660-0616 [employer] [facility], [address] [place] [address], [place] [address]. All rights reserved. This information is not intended as a substitute for professional medical care. Always follow your healthcare professional's instructions.

## 2018-04-10 RX ORDER — SODIUM BICARBONATE 650 MG/1
650 TABLET ORAL 2 TIMES DAILY
Refills: 0 | COMMUNITY
Start: 2018-04-10 | End: 2018-04-20 | Stop reason: SDUPTHER

## 2018-04-11 ENCOUNTER — OFFICE VISIT (OUTPATIENT)
Dept: SURGERY | Facility: CLINIC | Age: 68
End: 2018-04-11
Payer: MEDICARE

## 2018-04-11 VITALS
BODY MASS INDEX: 29.08 KG/M2 | TEMPERATURE: 98 F | DIASTOLIC BLOOD PRESSURE: 70 MMHG | WEIGHT: 158.06 LBS | HEIGHT: 62 IN | SYSTOLIC BLOOD PRESSURE: 155 MMHG | HEART RATE: 61 BPM

## 2018-04-11 DIAGNOSIS — E21.3 HYPERPARATHYROIDISM: Primary | ICD-10-CM

## 2018-04-11 DIAGNOSIS — E83.52 HYPERCALCEMIA: ICD-10-CM

## 2018-04-11 PROCEDURE — 99213 OFFICE O/P EST LOW 20 MIN: CPT | Mod: PBBFAC | Performed by: SURGERY

## 2018-04-11 PROCEDURE — 99999 PR PBB SHADOW E&M-EST. PATIENT-LVL III: CPT | Mod: PBBFAC,,, | Performed by: SURGERY

## 2018-04-11 PROCEDURE — 99203 OFFICE O/P NEW LOW 30 MIN: CPT | Mod: S$PBB,,, | Performed by: SURGERY

## 2018-04-11 NOTE — LETTER
April 12, 2018      Magnus Vazquez MD  [address]  [facility] [address]           [facility]  [address] [place], [place] [address] [facility] [address]  Phone: [phone]  Fax: [phone]          Patient: Citlali Karimi   MR Number: [id number]   YOB: 1950   Date of Visit: 4/11/2018       Dear Dr. Magnus Vazquez:    Thank you for referring Citlali Karimi to me for evaluation. Attached you will find relevant portions of my assessment and plan of care.    If you have questions, please do not hesitate to call me. I look forward to following Citlali Karimi along with you.    Sincerely,    Levy Samuel MD    Enclosure  CC:  No Recipients    If you would like to receive this communication electronically, please contact [email or web address] or [phone] to request more information on [facility] Link access.    For providers and/or their staff who would like to refer a patient to [facility], please contact us through our one-stop-shop provider referral line, [facility], at [phone].    If you feel you have received this communication in error or would no longer like to receive these types of communications, please e-mail [email or web address]

## 2018-04-12 NOTE — PROGRESS NOTES
History & Physical    SUBJECTIVE:     History of Present Illness:  Patient is a 68 y.o. female referred for hyperparathyroidism and hypercalcemia. The patient has CKD stage 4, GFR 11.6, Calcium 11.8 . She denies any recent kidney stones, bone fractures (remote history).     Chief Complaint   Patient presents with    Consult     parathyroid       Review of patient's allergies indicates:   Allergen Reactions    Lipitor [atorvastatin]        Current Outpatient Prescriptions   Medication Sig Dispense Refill    aspirin (ECOTRIN) 81 MG EC tablet Take 81 mg by mouth once daily.      cyanocobalamin (VITAMIN B-12) 1000 MCG tablet Take 1 tablet (1,000 mcg total) by mouth once daily. 90 tablet 12    isosorbide mononitrate (IMDUR) 60 MG 24 hr tablet TAKE  ONE TABLET BY MOUTH DAILY 30 tablet 8    lactulose (CEPHULAC) 20 gram Pack Take 20 g by mouth as needed.      losartan (COZAAR) 25 MG tablet TAKE ONE TABLET BY MOUTH DAILY 90 tablet PRN    metoprolol tartrate (LOPRESSOR) 25 MG tablet Take 1 tablet (25 mg total) by mouth 2 (two) times daily. 60 tablet 2    nifedipine (ADALAT CC) 90 MG TbSR Take 1 tablet (90 mg total) by mouth once daily. 30 tablet 11    omeprazole (PRILOSEC) 40 MG capsule Take 1 capsule (40 mg total) by mouth once daily. 30 capsule 11    simvastatin (ZOCOR) 20 MG tablet Take 1 tablet (20 mg total) by mouth every evening. 90 tablet 3    sodium bicarbonate 650 MG tablet Take 1 tablet (650 mg total) by mouth 2 (two) times daily.  0     No current facility-administered medications for this visit.        Past Medical History:   Diagnosis Date    Acid reflux 1/7/2015    Anxiety 1/7/2015    Aortic valvar stenosis     Arthritis     osteo    Callus     Chronic anemia     followed by Dr. Addison    CKD (chronic kidney disease) stage 4, GFR 15-29 ml/min     followed by Nephrology    CKD (chronic kidney disease) stage 5, GFR less than 15 ml/min 8/7/2015    CKD (chronic kidney disease), stage  IV 1/7/2015    Combined hyperlipidemia associated with type 2 diabetes mellitus     Coronary artery disease     Diabetes mellitus type II, controlled, with no complications     LBSL 108 today    Diabetes mellitus, type 2 - only on oral medications 1/7/2015    Encounter for blood transfusion     Frail elderly with impaired mobility, wheel chair bound 8/25/2017    Gallbladder problem     gallbladder surgery    Heart murmur     Hyperparathyroidism, secondary renal 1/7/2015    Hypertension 8/7/2015    Hypertension associated with diabetes 11/12/2012    Kidney transplant candidate 1/7/2015    Overweight(278.02)     Urinary tract infection      Past Surgical History:   Procedure Laterality Date    ANKLE FRACTURE SURGERY  1985    AORTIC VALVE REPLACEMENT  05/2016    BONE BIOPSY      CATARACT EXTRACTION W/  INTRAOCULAR LENS IMPLANT  2009    CHOLECYSTECTOMY      CYSTOSCOPY      EYE SURGERY      FRACTURE SURGERY      HYSTERECTOMY  unknown    OOPHORECTOMY      removal of colon polyps      RENAL BIOPSY  10/2013    YAG cap -OD       Family History   Problem Relation Age of Onset    Hypertension Mother     Heart disease Mother     Diabetes Mother     Glaucoma Mother     Aneurysm Father     Stroke Father     Kidney disease Sister     Heart disease Sister     Kidney disease Brother     Hypertension Brother     Diabetes Brother     Diabetes Sister     Kidney disease Sister     Hypertension Brother     Cancer Paternal Grandmother      breast    No Known Problems Maternal Aunt     No Known Problems Maternal Uncle     No Known Problems Paternal Aunt     No Known Problems Paternal Uncle     Colon cancer Maternal Grandmother     No Known Problems Maternal Grandfather     No Known Problems Paternal Grandfather     Anemia Neg Hx     Arrhythmia Neg Hx     Asthma Neg Hx     Clotting disorder Neg Hx     Fainting Neg Hx     Heart attack Neg Hx     Heart failure Neg Hx     Hyperlipidemia  "Neg Hx     Atrial Septal Defect Neg Hx      Social History   Substance Use Topics    Smoking status: Never Smoker    Smokeless tobacco: Never Used    Alcohol use No        Review of Systems:  Review of Systems   Constitutional: Negative for chills, fatigue, fever and unexpected weight change.   Respiratory: Negative for cough, shortness of breath, wheezing and stridor.    Cardiovascular: Negative for chest pain, palpitations and leg swelling.   Gastrointestinal: Negative for abdominal distention, abdominal pain, constipation, diarrhea, nausea and vomiting.   Genitourinary: Negative for difficulty urinating, dysuria, frequency, hematuria and urgency.   Skin: Negative for color change, pallor, rash and wound.   Hematological: Does not bruise/bleed easily.       OBJECTIVE:     Vital Signs (Most Recent)  Temp: 98.4 °F (36.9 °C) (04/11/18 0848)  Pulse: 61 (04/11/18 0848)  BP: (!) 155/70 (04/11/18 0848)  5' 2" (1.575 m)  71.7 kg (158 lb 1.1 oz)     Physical Exam:  Physical Exam   Constitutional: She is oriented to person, place, and time. She appears well-developed and well-nourished. No distress.   Frail elderly   HENT:   Head: Normocephalic and atraumatic.   Eyes: EOM are normal.   Neck: Normal range of motion. Neck supple. No tracheal deviation present. No thyromegaly present.   Cardiovascular: Normal rate and regular rhythm.    Pulmonary/Chest: Effort normal and breath sounds normal.   Abdominal: Soft. Bowel sounds are normal. She exhibits no distension. There is no tenderness.   Neurological: She is alert and oriented to person, place, and time.   Skin: Skin is warm and dry. She is not diaphoretic.   Vitals reviewed.      ASSESSMENT/PLAN:     69 y/o female with CKD stage 4, GFR 11, hypercalcemia, hyperparathyroidism    PLAN:Plan     U/s neck sestamibi scan  eval for parathyroid glands. Discussed indications for treatment of hypercalcemia/hyperparathyroism as well as risks and benefits of the procedure.  Cardiac " eval for surgery clearance previous TAVR  Will call patient to review results and discuss/schedule surgery

## 2018-04-16 ENCOUNTER — OFFICE VISIT (OUTPATIENT)
Dept: HEMATOLOGY/ONCOLOGY | Facility: CLINIC | Age: 68
End: 2018-04-16
Payer: MEDICARE

## 2018-04-16 VITALS
TEMPERATURE: 97 F | WEIGHT: 156.5 LBS | SYSTOLIC BLOOD PRESSURE: 149 MMHG | HEIGHT: 62 IN | DIASTOLIC BLOOD PRESSURE: 76 MMHG | OXYGEN SATURATION: 98 % | BODY MASS INDEX: 28.8 KG/M2 | HEART RATE: 70 BPM

## 2018-04-16 DIAGNOSIS — D63.1 ANEMIA IN STAGE 4 CHRONIC KIDNEY DISEASE: Primary | ICD-10-CM

## 2018-04-16 DIAGNOSIS — N18.4 ANEMIA IN STAGE 4 CHRONIC KIDNEY DISEASE: Primary | ICD-10-CM

## 2018-04-16 DIAGNOSIS — D51.8 OTHER VITAMIN B12 DEFICIENCY ANEMIA: ICD-10-CM

## 2018-04-16 PROCEDURE — 99213 OFFICE O/P EST LOW 20 MIN: CPT | Mod: PBBFAC | Performed by: INTERNAL MEDICINE

## 2018-04-16 PROCEDURE — 99214 OFFICE O/P EST MOD 30 MIN: CPT | Mod: S$PBB,,, | Performed by: INTERNAL MEDICINE

## 2018-04-16 PROCEDURE — 99999 PR PBB SHADOW E&M-EST. PATIENT-LVL III: CPT | Mod: PBBFAC,,, | Performed by: INTERNAL MEDICINE

## 2018-04-16 NOTE — PROGRESS NOTES
Reason for visit: Follow up for anemia    HPI:   The patient is a 68-year-old  female who presents to the hematology oncology clinic today for follow up for anemia. She is currently undergoing outpatient physical/occupational therapy for generalized weakness and history of falls. She has been evaluated by neurology, physical medicine and has had imaging and treatment including inpatient rehabilitation done with [facility]. She is still having a lot of problems with strength and coordination. She denies any fever, chills, night sweats, loss of appetite or unintentional weight loss. She denies any chest pain or shortness of breath. She denies any fatigue. She denies any bowel or urinary complaints. She denies any melena, hematochezia, hematemesis, hemoptysis or hematuria. She did undergo valve replacement on May 10, 2016 at [facility], [place]. She is in the process of being evaluated for parathyroidectomy by Dr. Samuel.  I have reviewed all of the patient's relevant clinical history available in [facility] inbcluding in care everywhere.    PAST MEDICAL HISTORY:   1. Hypertension  2. Dyslipidemia  3. Type 2 diabetes mellitus  4. Vit b12 deficiency anemia  5. Severe aortic stenosis  6.  Nephrolithiasis  7.  Hematuria  8. CKD stage 4    SURGICAL HISTORY:   1. BON with BSO in 1980  2. Left ankle surgery due to accidental trauma in 1986  3. Bilateral cataract excision  4. Cholecystectomy  5. Transcatheter aortic valve replacement on 5/10/16    SOCIAL HISTORY: She denies tobacco use, alcohol use or recreational drug use. She is  and lives with her  in [place], [place]. She has 3 stepdaughters. 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    ALLERGIES: Reviewed on medication card.    MEDICATIONS: [Medcard has been reviewed and/or reconciled.]    REVIEW OF SYSTEMS:   GENERAL: [No fevers, chills or sweats. No fatigue, weight loss or loss of appetite.]  HEENT: [No blurred vision,  tinnitus, nasal discharge, sorethroat or dysphagia.]  HEART: [No chest pain, palpitations or shortness of breath.]   LUNGS: [No cough, hemoptysis or breathing problems.]  ABDOMEN: [No abdominal pain, nausea, vomiting, diarrhea, constipation or melena.]  GENITOURINARY: [No dysuria, bleeding or malodorous discharge.]  NEURO: [No headache, dizziness or vertigo.]  HEMATOLOGY: [No easy bruising, spontaneous bleeding or blood clots in the past].  MUSCULOSKELETAL: [No arthralgias, myalgias or bone pains.]  SKIN: [No rashes or skin lesions.]  PSYCHIATRY: [No depression or anxiety.]    PHYSICAL EXAMINATION:   VS: Reviewed on nurse's notes.  APPEARANCE: The patient is a well-developed, well-nourished  female who appears in no acute distress. She was accompanied to this clinic visit by her . She present in a wheelchair. She is ambulating at home with assistance.  HEENT: No scleral icterus. Both external auditory canals clear. No oral ulcers, lesions. Throat clear  HEAD: No sinus tenderness.  NECK: Supple. No palpable lymphadenopathy. Thyroid non-tender, no palpable masses  CHEST: Breath sounds clear bilaterally. No rales. No rhonchi. Unlabored respirations.  CARDIOVASCULAR: Normal S1, S2. Normal rate. Regular rhythm. 2/6 ESM noted  ABDOMEN: Bowel sounds normal. No tenderness. No abdominal distention. No hepatomegaly. No splenomegaly.  LYMPHATIC: No palpable supraclavicular, axillary nodes  EXTREMITIES: No clubbing, cyanosis, edema  SKIN: No lesions. No petechiae. No ecchymoses. No induration or nodules  NEUROLOGIC: No focal findings. Alert & Oriented x 3. Mood appropriate to affect    LABS:   Reviewed    IMPRESSION:  1. Chronic anemia  2. Chronic leukopenia without neutropenia  3. Alpha thalassemia carrier  4. History of vitamin B12 deficiency  5. CKD stage 4  6. Anemia due to CKD    PLAN:  1. I discussed the results of her most recent CBC from today in detail with her. Her Hb is >10 gm/dl. She will  resume weekly procrit injections when Hb is <10 gm/dl. The patient has had an extensive workup for her chronic anemia in the past and no significant etiology for this has been found.  At this time it does appear likely that this might be related to her chronic kidney disease. She is agreeable to proceed with additional procrit injections as and when indicated. The patient gets 20,000 units subcutaneously weekly x 4 weeks.   2. The patient has been taking vitamin B12 tablets.  3. The patient has had a chronic asymptomatic leukopenia. This has remained stable. This is likely benign in etiology and can be conservatively followed for now. This is normal on CBC today.    Recheck labs in 4 weeks at [place] lab. Possible procrit depending on review of results. She knows to call sooner for any additional questions or new problems.    David Addison MD

## 2018-04-20 RX ORDER — SODIUM BICARBONATE 650 MG/1
650 TABLET ORAL 2 TIMES DAILY
Qty: 60 TABLET | Refills: 11 | COMMUNITY
Start: 2018-04-20

## 2018-04-23 ENCOUNTER — HOSPITAL ENCOUNTER (OUTPATIENT)
Dept: RADIOLOGY | Facility: HOSPITAL | Age: 68
Discharge: HOME | End: 2018-04-23
Attending: SURGERY
Payer: MEDICARE

## 2018-04-23 DIAGNOSIS — E21.3 HYPERPARATHYROIDISM: ICD-10-CM

## 2018-04-23 PROCEDURE — 76536 US EXAM OF HEAD AND NECK: CPT | Mod: TC

## 2018-04-23 PROCEDURE — A9500 TC99M SESTAMIBI: HCPCS

## 2018-04-24 ENCOUNTER — TELEPHONE (OUTPATIENT)
Dept: FAMILY MEDICINE | Facility: CLINIC | Age: 68
End: 2018-04-24

## 2018-04-24 DIAGNOSIS — E04.1 THYROID NODULE: Primary | ICD-10-CM

## 2018-04-24 NOTE — TELEPHONE ENCOUNTER
----- Message from Faiza Griffin sent at 4/24/2018 12:37 PM CDT -----  Contact: [facility]  The pt request a return call, no additional info given, can be reached at [phone]///[email or web address]

## 2018-04-24 NOTE — PROGRESS NOTES
Discussed sestamibi and neck ultrasound results with patient:    Sestamibi scan: No parathyroid adenoma localized on this exam.      Ultrasound impression:  1.  Large nodule seen in the left and right lobes of the thyroid gland as described above.  Ultrasound-guided FNA is recommended for further assessment.  2.  No parathyroid adenoma visualized on this exam.    Schedule patient for FNA of thyroid nodules  Call with results and to further discuss parathyroid surgery

## 2018-04-30 ENCOUNTER — TELEPHONE (OUTPATIENT)
Dept: ADMINISTRATIVE | Facility: CLINIC | Age: 68
End: 2018-04-30

## 2018-05-02 ENCOUNTER — HOSPITAL ENCOUNTER (OUTPATIENT)
Dept: RADIOLOGY | Facility: HOSPITAL | Age: 68
Discharge: HOME | End: 2018-05-02
Attending: SURGERY
Payer: MEDICARE

## 2018-05-02 DIAGNOSIS — E04.1 THYROID NODULE: ICD-10-CM

## 2018-05-02 PROCEDURE — 10022 US FINE NEEDLE ASPIRATION THYROID MULTI SITE (XPD): CPT

## 2018-05-02 PROCEDURE — 88173 CYTOPATH EVAL FNA REPORT: CPT | Performed by: PATHOLOGY

## 2018-05-02 PROCEDURE — 88173 CYTOPATH EVAL FNA REPORT: CPT | Mod: 26,,, | Performed by: PATHOLOGY

## 2018-05-02 NOTE — DISCHARGE SUMMARY
Sterile technique was performed in the anterior neck, lidocaine was used as a local anesthetic.  Multiple samples taken from the dominant bilateral thyroid nodules.  Pt tolerated the procedure well without immediate complications.  Please see radiologist report for details. F/u with PCP and/or ordering physician.

## 2018-05-03 ENCOUNTER — OFFICE VISIT (OUTPATIENT)
Dept: NEPHROLOGY | Facility: CLINIC | Age: 68
End: 2018-05-03
Payer: MEDICARE

## 2018-05-03 ENCOUNTER — LAB VISIT (OUTPATIENT)
Dept: LAB | Facility: HOSPITAL | Age: 68
End: 2018-05-03
Attending: INTERNAL MEDICINE
Payer: MEDICARE

## 2018-05-03 VITALS
BODY MASS INDEX: 27.83 KG/M2 | HEART RATE: 84 BPM | WEIGHT: 151.25 LBS | DIASTOLIC BLOOD PRESSURE: 78 MMHG | RESPIRATION RATE: 16 BRPM | SYSTOLIC BLOOD PRESSURE: 140 MMHG | HEIGHT: 62 IN

## 2018-05-03 DIAGNOSIS — N18.4 CHRONIC KIDNEY DISEASE (CKD), STAGE IV (SEVERE): Primary | ICD-10-CM

## 2018-05-03 DIAGNOSIS — N18.4 CHRONIC KIDNEY DISEASE (CKD), STAGE IV (SEVERE): ICD-10-CM

## 2018-05-03 LAB
ALBUMIN SERPL BCP-MCNC: 3.9 G/DL
ANION GAP SERPL CALC-SCNC: 13 MMOL/L
BUN SERPL-MCNC: 49 MG/DL
CALCIUM SERPL-MCNC: 12 MG/DL
CHLORIDE SERPL-SCNC: 110 MMOL/L
CO2 SERPL-SCNC: 18 MMOL/L
CREAT SERPL-MCNC: 3.9 MG/DL
EST. GFR  (AFRICAN AMERICAN): 12.7 ML/MIN/1.73 M^2
EST. GFR  (NON AFRICAN AMERICAN): 11 ML/MIN/1.73 M^2
GLUCOSE SERPL-MCNC: 105 MG/DL
PHOSPHATE SERPL-MCNC: 4.4 MG/DL
POTASSIUM SERPL-SCNC: 3.8 MMOL/L
PTH-INTACT SERPL-MCNC: 103 PG/ML
SODIUM SERPL-SCNC: 141 MMOL/L

## 2018-05-03 PROCEDURE — 99213 OFFICE O/P EST LOW 20 MIN: CPT | Mod: PBBFAC,PO | Performed by: INTERNAL MEDICINE

## 2018-05-03 PROCEDURE — 83970 ASSAY OF PARATHORMONE: CPT

## 2018-05-03 PROCEDURE — 80069 RENAL FUNCTION PANEL: CPT

## 2018-05-03 PROCEDURE — 99999 PR PBB SHADOW E&M-EST. PATIENT-LVL III: CPT | Mod: PBBFAC,,, | Performed by: INTERNAL MEDICINE

## 2018-05-03 PROCEDURE — 99214 OFFICE O/P EST MOD 30 MIN: CPT | Mod: S$PBB,,, | Performed by: INTERNAL MEDICINE

## 2018-05-03 PROCEDURE — 36415 COLL VENOUS BLD VENIPUNCTURE: CPT | Mod: PO

## 2018-05-03 NOTE — PROGRESS NOTES
Subjective:       Patient ID: Citlali Karimi is a 68 y.o.   female who presents for follow-up evaluation of CKD stage 4 , HTN , HPT, Hypercalcemia     Evelio Schuster MD      HPI: Citlali Karimi Is a pleasant 68 -year-old  woman seen in office today in f/u for above medical problems. I saw her about a month ago , patient today is accompanied to office with her .  She has been hospitalized in undergoing physical therapy for weakness.  So she missed an appointment due to the same.  She falls followed up in the hematology Department, follow-up labs showed acute kidney injury, serum creatinine increased from a baseline of about 2.5 to 4 mg/dL, significant hypercalcemia was noted, calcium and vitamin D were discontinued, patient was evaluated by general surgery for partial parathyroidectomy, she had an ultrasound of the neck with incidental finding of thyroid nodules in both thyroid lobes, status post FNAC, results pending,        Important Info :         In the past she was recently admitted in [facility] for weakness.  According to the patient and her  she had an evaluation by neurologist.  MRI of the brain shows dilated ventricles. she saw  Dr. Hurd with neurology at [facility] .        She underwent a native kidney biopsy on 10/10/13. Final report of the kidney biopsy is negative for any specific etiology for acute renal failure. 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            Past Medical History:   Diagnosis Date    Acid reflux 1/7/2015    Anxiety 1/7/2015    Aortic valvar stenosis     Arthritis     osteo    Callus     Chronic anemia     followed by Dr. Addison    CKD (chronic kidney disease) stage 4, GFR 15-29 ml/min     followed by Nephrology    CKD (chronic kidney disease) stage 5, GFR less than 15 ml/min 8/7/2015    CKD (chronic kidney disease), stage IV 1/7/2015    Combined hyperlipidemia  associated with type 2 diabetes mellitus     Coronary artery disease     Diabetes mellitus type II, controlled, with no complications     LBSL 108 today    Diabetes mellitus, type 2 - only on oral medications 1/7/2015    Encounter for blood transfusion     Frail elderly with impaired mobility, wheel chair bound 8/25/2017    Gallbladder problem     gallbladder surgery    Heart murmur     Hyperparathyroidism, secondary renal 1/7/2015    Hypertension 8/7/2015    Hypertension associated with diabetes 11/12/2012    Kidney transplant candidate 1/7/2015    Overweight(278.02)     Urinary tract infection        Current Outpatient Prescriptions on File Prior to Visit   Medication Sig Dispense Refill    aspirin (ECOTRIN) 81 MG EC tablet Take 81 mg by mouth once daily.      cyanocobalamin (VITAMIN B-12) 1000 MCG tablet Take 1 tablet (1,000 mcg total) by mouth once daily. 90 tablet 12    isosorbide mononitrate (IMDUR) 60 MG 24 hr tablet TAKE  ONE TABLET BY MOUTH DAILY 30 tablet 8    lactulose (CEPHULAC) 20 gram Pack Take 20 g by mouth as needed.      losartan (COZAAR) 25 MG tablet TAKE ONE TABLET BY MOUTH DAILY 90 tablet PRN    metoprolol tartrate (LOPRESSOR) 25 MG tablet Take 1 tablet (25 mg total) by mouth 2 (two) times daily. 60 tablet 2    nifedipine (ADALAT CC) 90 MG TbSR Take 1 tablet (90 mg total) by mouth once daily. 30 tablet 11    omeprazole (PRILOSEC) 40 MG capsule Take 1 capsule (40 mg total) by mouth once daily. 30 capsule 11    simvastatin (ZOCOR) 20 MG tablet Take 1 tablet (20 mg total) by mouth every evening. 90 tablet 3    sodium bicarbonate 650 MG tablet Take 1 tablet (650 mg total) by mouth 2 (two) times daily. 60 tablet 11     No current facility-administered medications on file prior to visit.        Review of Systems  :    Constitutional: Positive for activity change and unexpected weight change. Negative for appetite change, fatigue and fever.   HENT: Negative for congestion, facial  swelling, sore throat, trouble swallowing and voice change.    Eyes: Negative for redness and visual disturbance.   Respiratory: Negative for apnea, cough, chest tightness, shortness of breath and wheezing.    Cardiovascular: Negative for chest pain, palpitations and leg swelling.   Gastrointestinal: Negative for abdominal distention, abdominal pain, blood in stool, constipation, diarrhea, nausea and vomiting.   Genitourinary: Negative for decreased urine volume, difficulty urinating, dysuria, flank pain, frequency, hematuria, pelvic pain and urgency.   Musculoskeletal: Positive for arthralgias and gait problem. Negative for back pain and joint swelling.   Skin: Negative for color change and rash.   Neurological: Positive for weakness. Negative for dizziness, syncope and headaches.   Hematological: Does not bruise/bleed easily.   Psychiatric/Behavioral: Negative for agitation, behavioral problems and confusion. The patient is not nervous/anxious.        Objective:           Vitals:    05/03/18 0925   BP: (!) 140/78   Pulse: 84   Resp: 16       Weight 151 lbs , last weight 154 lbs       Physical Exam  :      Constitutional: She is oriented to person, place, and time. She appears well-developed and well-nourished. No distress.    HENT: Head: Normocephalic and atraumatic.  Mouth/Throat: Oropharynx is clear and moist. No oropharyngeal exudate.    Eyes: Conjunctivae normal and EOM are normal. Pupils are equal, round, and reactive to light. No scleral icterus.    Neck: Normal range of motion. Neck supple. No JVD present. Carotid bruit is not present. No tracheal deviation present. No mass and no thyromegaly present.    Cardiovascular: Normal rate, regular rhythm and intact distal pulses. Exam reveals no gallop and no friction rub. No Murmurs    Pulmonary/Chest: Effort normal and breath sounds normal. No respiratory distress. She has no wheezes. She has no rales. She exhibits no tenderness.    Abdominal: Soft. Bowel sounds  are normal. She exhibits no distension, no abdominal bruit, no ascites and no mass. There is no hepatosplenomegaly. There is no tenderness. There is no rebound, no guarding and no CVA tenderness.   Musculoskeletal: Normal range of motion. She exhibits no edema and no tenderness. Limited exam, in a wheel chair   Lymphadenopathy: She has no cervical adenopathy.   Neurological: She is alert and oriented to person, place, and time. No cranial nerve deficit.  She is in a wheel chair , limited exam, gait not tested   Skin: Skin is warm and intact. No rash noted. No erythema. No pallor.   Psychiatric: She has a normal mood and affect. Her behavior is normal. Judgment normal.                  Labs from today are pending     Lab Results   Component Value Date    CREATININE 4.2 (H) 03/26/2018    BUN 41 (H) 03/26/2018     03/26/2018    K 4.2 03/26/2018     03/26/2018    CO2 24 03/26/2018       Lab Results   Component Value Date    WBC 5.74 04/16/2018    HGB 10.6 (L) 04/16/2018    HCT 32.8 (L) 04/16/2018    MCV 94 04/16/2018     04/16/2018       Lab Results   Component Value Date    .0 (H) 07/24/2017    CALCIUM 11.8 (H) 03/26/2018    PHOS 4.2 03/26/2018       Lab Results   Component Value Date    ALBUMIN 4.0 03/26/2018       Lab Results   Component Value Date    HGBA1C 6.2 06/07/2017       Lab Results   Component Value Date    URICACID 8.9 (H) 07/24/2017       Impression and plan - 68 -year-old  woman seen today in f/u for following medical problems ,       1. MAYLIN on CKD stage 4 - Etiology unclear, likely from volume depletion, can be from hypercalcemia, calcium and vitamin D supplements were discontinued, recent serum cr is 4.2 mg/dl ,       She has reflux nephropathy. S/p renal biopsy 10/2013 - Kidney biopsy was negative for any specific cause. There is 40% interstitial fibrosis from reflux nephropathy.  Workup for kidney transplant is discontinued due to progressive weakness,      2.  Hypertension - Blood pressure is Controlled on current regimen. Will continue to follow.       3. Anemia of CKD - she is currently following with hematology department for the same. On Epogen        4. Aortic stenosis : She is closely followed by [facility], s/p TAVR       5. Hypercalcemia : Calcium and vitamin D supplements discontinued, follow-up  PTH level,  has primary hyperparathyroidism, pending parathyroid surgery,      6. Diabetes mellitus type 2 - Well controlled.       7. Proteinuria - mild, continue Cozaar.        8. Volume - discussed adequate fluid intake      9. Kidney stone - nonobstructive , not seen on recent renal U/S ,        10. Hyperuricemia - again discussed low purine diet , cont Allopurinol 100 mg daily.      11. S/p FNAC for nodules on thyroid      12.  Metabolic acidosis - on sodium bicarbonate supplements.      More than 25 minutes was spent with the patient reviewing her lab results and discussing plan of care.  F/u in 8  weeks,       Magnus Vazquez MD

## 2018-05-14 ENCOUNTER — LAB VISIT (OUTPATIENT)
Dept: LAB | Facility: HOSPITAL | Age: 68
End: 2018-05-14
Attending: INTERNAL MEDICINE
Payer: MEDICARE

## 2018-05-14 DIAGNOSIS — N18.4 ANEMIA IN STAGE 4 CHRONIC KIDNEY DISEASE: ICD-10-CM

## 2018-05-14 DIAGNOSIS — D63.1 ANEMIA IN STAGE 4 CHRONIC KIDNEY DISEASE: ICD-10-CM

## 2018-05-14 LAB
BASOPHILS # BLD AUTO: 0.02 K/UL
BASOPHILS NFR BLD: 0.4 %
CRP SERPL-MCNC: 2.7 MG/L
DIFFERENTIAL METHOD: ABNORMAL
EOSINOPHIL # BLD AUTO: 0 K/UL
EOSINOPHIL NFR BLD: 0.4 %
ERYTHROCYTE [DISTWIDTH] IN BLOOD BY AUTOMATED COUNT: 15.3 %
FERRITIN SERPL-MCNC: 862 NG/ML
HCT VFR BLD AUTO: 30.4 %
HGB BLD-MCNC: 9.6 G/DL
IRON SERPL-MCNC: 57 UG/DL
LYMPHOCYTES # BLD AUTO: 1.6 K/UL
LYMPHOCYTES NFR BLD: 29.7 %
MCH RBC QN AUTO: 28.5 PG
MCHC RBC AUTO-ENTMCNC: 31.6 G/DL
MCV RBC AUTO: 90 FL
MONOCYTES # BLD AUTO: 0.4 K/UL
MONOCYTES NFR BLD: 7.5 %
NEUTROPHILS # BLD AUTO: 3.3 K/UL
NEUTROPHILS NFR BLD: 62 %
PLATELET # BLD AUTO: 181 K/UL
PMV BLD AUTO: 9.9 FL
RBC # BLD AUTO: 3.37 M/UL
SATURATED IRON: 46 %
TOTAL IRON BINDING CAPACITY: 123 UG/DL
TRANSFERRIN SERPL-MCNC: 83 MG/DL
WBC # BLD AUTO: 5.36 K/UL

## 2018-05-14 PROCEDURE — 83540 ASSAY OF IRON: CPT

## 2018-05-14 PROCEDURE — 86140 C-REACTIVE PROTEIN: CPT

## 2018-05-14 PROCEDURE — 85025 COMPLETE CBC W/AUTO DIFF WBC: CPT | Mod: PO

## 2018-05-14 PROCEDURE — 36415 COLL VENOUS BLD VENIPUNCTURE: CPT | Mod: PO

## 2018-05-14 PROCEDURE — 82728 ASSAY OF FERRITIN: CPT

## 2018-05-17 ENCOUNTER — INFUSION (OUTPATIENT)
Dept: INFUSION THERAPY | Facility: HOSPITAL | Age: 68
End: 2018-05-17
Attending: INTERNAL MEDICINE
Payer: MEDICARE

## 2018-05-17 ENCOUNTER — OFFICE VISIT (OUTPATIENT)
Dept: HEMATOLOGY/ONCOLOGY | Facility: CLINIC | Age: 68
End: 2018-05-17
Payer: MEDICARE

## 2018-05-17 ENCOUNTER — TELEPHONE (OUTPATIENT)
Dept: SURGERY | Facility: CLINIC | Age: 68
End: 2018-05-17

## 2018-05-17 VITALS
WEIGHT: 161.38 LBS | BODY MASS INDEX: 29.7 KG/M2 | TEMPERATURE: 97 F | DIASTOLIC BLOOD PRESSURE: 62 MMHG | OXYGEN SATURATION: 97 % | HEART RATE: 57 BPM | SYSTOLIC BLOOD PRESSURE: 132 MMHG | HEIGHT: 62 IN

## 2018-05-17 DIAGNOSIS — D63.1 ANEMIA IN STAGE 4 CHRONIC KIDNEY DISEASE: Primary | ICD-10-CM

## 2018-05-17 DIAGNOSIS — N18.4 ANEMIA IN STAGE 4 CHRONIC KIDNEY DISEASE: Primary | ICD-10-CM

## 2018-05-17 PROCEDURE — 96372 THER/PROPH/DIAG INJ SC/IM: CPT

## 2018-05-17 PROCEDURE — 99213 OFFICE O/P EST LOW 20 MIN: CPT | Mod: PBBFAC,25 | Performed by: INTERNAL MEDICINE

## 2018-05-17 PROCEDURE — 99214 OFFICE O/P EST MOD 30 MIN: CPT | Mod: 25,S$PBB,, | Performed by: INTERNAL MEDICINE

## 2018-05-17 PROCEDURE — 99999 PR PBB SHADOW E&M-EST. PATIENT-LVL III: CPT | Mod: PBBFAC,,, | Performed by: INTERNAL MEDICINE

## 2018-05-17 PROCEDURE — 63600175 PHARM REV CODE 636 W HCPCS: Mod: JG,EC | Performed by: INTERNAL MEDICINE

## 2018-05-17 RX ADMIN — EPOETIN ALFA 20000 UNITS: 20000 SOLUTION INTRAVENOUS; SUBCUTANEOUS at 11:05

## 2018-05-17 NOTE — PATIENT INSTRUCTIONS
[facility]  [address]  [address] [address]  [phone] phone     [phone] fax  Hours of Operation: Monday- Friday 8:00am- 5:00pm  After hours phone  [phone]  Hematology / Oncology Physicians on call      Dr. Roel Hearn                        Please call with any concerns regarding your appointment today.  FALL PREVENTION   Falls often occur due to slipping, tripping or losing your balance. Here are ways to reduce your risk of falling again.   Was there anything that caused your fall that can be fixed, removed or replaced?   Make your home safe by keeping walkways clear of objects you may trip over.   Use non-slip pads under rugs.   Do not walk in poorly lit areas.   Do not stand on chairs or wobbly ladders.   Use caution when reaching overhead or looking upward. This position can cause a loss of balance.   Be sure your shoes fit properly, have non-slip bottoms and are in good condition.   Be cautious when going up and down stairs, curbs, and when walking on uneven sidewalks.   If your balance is poor, consider using a cane or walker.   If your fall was related to alcohol use, stop or limit alcohol intake.   If your fall was related to use of sleeping medicines, talk to your doctor about this. You may need to reduce your dosage at bedtime if you awaken during the night to go to the bathroom.   To reduce the need for nighttime bathroom trips:   Avoid drinking fluids for several hours before going to bed   Empty your bladder before going to bed   Men can keep a urinal at the bedside   © 4486-5382 [employer], [address] [place] [address], [place] [address]. All rights reserved. This information is not intended as a substitute for professional medical care. Always follow your healthcare professional's instructions.

## 2018-05-17 NOTE — PROGRESS NOTES
Reason for visit: Follow up for anemia    HPI:   The patient is a 68-year-old  female who presents to the hematology oncology clinic today for follow up for anemia. She is currently undergoing outpatient physical/occupational therapy for generalized weakness and history of falls. She has been evaluated by neurology, physical medicine and has had imaging and treatment including inpatient rehabilitation done with [place]. She is still having a lot of problems with strength and coordination. She denies any fever, chills, night sweats, loss of appetite or unintentional weight loss. She denies any chest pain or shortness of breath. She denies any fatigue. She denies any bowel or urinary complaints. She denies any melena, hematochezia, hematemesis, hemoptysis or hematuria. She did undergo valve replacement on May 10, 2016 at [facility], [place]. She is in the process of being evaluated for parathyroidectomy by Dr. Samuel.  I have reviewed all of the patient's relevant clinical history available in [facility] inbcluding in care everywhere.    PAST MEDICAL HISTORY:   1. Hypertension  2. Dyslipidemia  3. Type 2 diabetes mellitus  4. Vit b12 deficiency anemia  5. Severe aortic stenosis  6.  Nephrolithiasis  7.  Hematuria  8. CKD stage 4    SURGICAL HISTORY:   1. BON with BSO in 1980  2. Left ankle surgery due to accidental trauma in 1986  3. Bilateral cataract excision  4. Cholecystectomy  5. Transcatheter aortic valve replacement on 5/10/16    SOCIAL HISTORY: She denies tobacco use, alcohol use or recreational drug use. She is  and lives with her  in [place], [place]. She has 3 stepdaughters. 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    ALLERGIES: Reviewed on medication card.    MEDICATIONS: [Medcard has been reviewed and/or reconciled.]    REVIEW OF SYSTEMS:   GENERAL: [No fevers, chills or sweats. No fatigue, weight loss or loss of appetite.]  HEENT: [No blurred vision,  tinnitus, nasal discharge, sorethroat or dysphagia.]  HEART: [No chest pain, palpitations or shortness of breath.]   LUNGS: [No cough, hemoptysis or breathing problems.]  ABDOMEN: [No abdominal pain, nausea, vomiting, diarrhea, constipation or melena.]  GENITOURINARY: [No dysuria, bleeding or malodorous discharge.]  NEURO: [No headache, dizziness or vertigo.]  HEMATOLOGY: [No easy bruising, spontaneous bleeding or blood clots in the past].  MUSCULOSKELETAL: [No arthralgias, myalgias or bone pains.]  SKIN: [No rashes or skin lesions.]  PSYCHIATRY: [No depression or anxiety.]    PHYSICAL EXAMINATION:   VS: Reviewed on nurse's notes.  APPEARANCE: The patient is a well-developed, well-nourished  female who appears in no acute distress. She was accompanied to this clinic visit by her . She present in a wheelchair. She is ambulating at home with assistance.  HEENT: No scleral icterus. Both external auditory canals clear. No oral ulcers, lesions. Throat clear  HEAD: No sinus tenderness.  NECK: Supple. No palpable lymphadenopathy. Thyroid non-tender, no palpable masses  CHEST: Breath sounds clear bilaterally. No rales. No rhonchi. Unlabored respirations.  CARDIOVASCULAR: Normal S1, S2. Normal rate. Regular rhythm. 2/6 ESM noted  ABDOMEN: Bowel sounds normal. No tenderness. No abdominal distention. No hepatomegaly. No splenomegaly.  LYMPHATIC: No palpable supraclavicular, axillary nodes  EXTREMITIES: No clubbing, cyanosis, edema  SKIN: No lesions. No petechiae. No ecchymoses. No induration or nodules  NEUROLOGIC: No focal findings. Alert & Oriented x 3. Mood appropriate to affect    LABS:   Reviewed    IMPRESSION:  1. Chronic anemia  2. Chronic leukopenia without neutropenia  3. Alpha thalassemia carrier  4. History of vitamin B12 deficiency  5. CKD stage 4  6. Anemia due to CKD    PLAN:  1. I discussed the results of her most recent CBC from today in detail with her. Her Hb is <10 gm/dl. She will  resume weekly procrit injections when Hb is <10 gm/dl. The patient has had an extensive workup for her chronic anemia in the past and no significant etiology for this has been found.  At this time it does appear likely that this might be related to her chronic kidney disease. She is agreeable to proceed with additional procrit injections as and when indicated. The patient gets 20,000 units subcutaneously weekly x 4 weeks.   2. The patient has been taking vitamin B12 tablets.  3. The patient has had a chronic asymptomatic leukopenia. This has remained stable. This is likely benign in etiology and can be conservatively followed for now. This is normal on CBC today.    Recheck labs in 8 weeks at [place] lab. Possible procrit depending on review of results. She knows to call sooner for any additional questions or new problems.    David Addison MD

## 2018-05-18 ENCOUNTER — TELEPHONE (OUTPATIENT)
Dept: HEMATOLOGY/ONCOLOGY | Facility: CLINIC | Age: 68
End: 2018-05-18

## 2018-05-23 ENCOUNTER — OFFICE VISIT (OUTPATIENT)
Dept: OTOLARYNGOLOGY | Facility: CLINIC | Age: 68
End: 2018-05-23
Payer: MEDICARE

## 2018-05-23 ENCOUNTER — OFFICE VISIT (OUTPATIENT)
Dept: SURGERY | Facility: CLINIC | Age: 68
End: 2018-05-23
Payer: MEDICARE

## 2018-05-23 ENCOUNTER — CLINICAL SUPPORT (OUTPATIENT)
Dept: CARDIOLOGY | Facility: CLINIC | Age: 68
End: 2018-05-23
Payer: MEDICARE

## 2018-05-23 VITALS
TEMPERATURE: 99 F | BODY MASS INDEX: 29.45 KG/M2 | HEIGHT: 62 IN | DIASTOLIC BLOOD PRESSURE: 69 MMHG | HEART RATE: 54 BPM | SYSTOLIC BLOOD PRESSURE: 153 MMHG

## 2018-05-23 VITALS
SYSTOLIC BLOOD PRESSURE: 188 MMHG | BODY MASS INDEX: 29.45 KG/M2 | DIASTOLIC BLOOD PRESSURE: 70 MMHG | HEART RATE: 50 BPM | TEMPERATURE: 98 F | WEIGHT: 161 LBS

## 2018-05-23 DIAGNOSIS — E21.3 HYPERPARATHYROIDISM: Primary | ICD-10-CM

## 2018-05-23 DIAGNOSIS — E21.3 HYPERPARATHYROIDISM: ICD-10-CM

## 2018-05-23 DIAGNOSIS — R47.02 DYSPHASIA: Primary | ICD-10-CM

## 2018-05-23 DIAGNOSIS — E83.52 HYPERCALCEMIA: ICD-10-CM

## 2018-05-23 DIAGNOSIS — R53.1 LEFT-SIDED WEAKNESS: ICD-10-CM

## 2018-05-23 DIAGNOSIS — R13.10 DYSPHAGIA, UNSPECIFIED TYPE: ICD-10-CM

## 2018-05-23 PROCEDURE — 99999 PR PBB SHADOW E&M-EST. PATIENT-LVL V: CPT | Mod: PBBFAC,,, | Performed by: SURGERY

## 2018-05-23 PROCEDURE — 99215 OFFICE O/P EST HI 40 MIN: CPT | Mod: PBBFAC,27 | Performed by: SURGERY

## 2018-05-23 PROCEDURE — 99213 OFFICE O/P EST LOW 20 MIN: CPT | Mod: PBBFAC | Performed by: PHYSICIAN ASSISTANT

## 2018-05-23 PROCEDURE — 93010 ELECTROCARDIOGRAM REPORT: CPT | Mod: S$PBB,,, | Performed by: INTERNAL MEDICINE

## 2018-05-23 PROCEDURE — 99213 OFFICE O/P EST LOW 20 MIN: CPT | Mod: S$PBB,,, | Performed by: SURGERY

## 2018-05-23 PROCEDURE — 99203 OFFICE O/P NEW LOW 30 MIN: CPT | Mod: S$PBB,,, | Performed by: PHYSICIAN ASSISTANT

## 2018-05-23 PROCEDURE — 93005 ELECTROCARDIOGRAM TRACING: CPT | Mod: PBBFAC | Performed by: INTERNAL MEDICINE

## 2018-05-23 PROCEDURE — 99999 PR PBB SHADOW E&M-EST. PATIENT-LVL III: CPT | Mod: PBBFAC,,, | Performed by: PHYSICIAN ASSISTANT

## 2018-05-23 RX ORDER — SODIUM CHLORIDE 9 MG/ML
INJECTION, SOLUTION INTRAVENOUS CONTINUOUS
Status: CANCELLED | OUTPATIENT
Start: 2018-05-23

## 2018-05-23 NOTE — LETTER
May 23, 2018      Levy Samuel MD  [address] [place] [address] Dr Elena [place] [id number]           [facility]  [address] [place] [address] Min[place] [address]  Phone: [phone]  Fax: [phone]          Patient: Citlali Karimi   MR Number: [id number]   YOB: 1950   Date of Visit: 5/23/2018       Dear Dr. Levy Samuel:    Thank you for referring Citlali Karimi to me for evaluation. Attached you will find relevant portions of my assessment and plan of care.    If you have questions, please do not hesitate to call me. I look forward to following Citlali Karimi along with you.    Sincerely,    Rachell Hunt PA-C    Enclosure  CC:  No Recipients    If you would like to receive this communication electronically, please contact [email or web address] or [phone] to request more information on [facility] [employer] access.    For providers and/or their staff who would like to refer a patient to [facility], please contact us through our one-stop-shop provider referral line, [facility] , at [phone].    If you feel you have received this communication in error or would no longer like to receive these types of communications, please e-mail [email or web address]

## 2018-05-23 NOTE — PROGRESS NOTES
"Subjective:       Patient ID: Citlali Karimi is a 68 y.o. female.    Chief Complaint: No chief complaint on file.    Ms. Karimi is a pleasant 67 yo female here to see me today for occasional difficulty swallowing. She is scheduled to have her parathyroid out by Dr. Samuel on 6/12/18. She states " I don't know why I am here."  She occasionally has trouble swallowing large pills. She has also been having some runny nose. She has a complex past medical history including but not limited to  previous stroke, DM, HTN, CKD.       Review of Systems   Constitutional: Negative for chills, fatigue, fever and unexpected weight change.   HENT: Positive for rhinorrhea and trouble swallowing. Negative for congestion, dental problem, ear discharge, ear pain, facial swelling, hearing loss, nosebleeds, postnasal drip, sinus pressure, sneezing, sore throat, tinnitus and voice change.    Eyes: Negative for redness, itching and visual disturbance.   Respiratory: Negative for cough, choking, shortness of breath and wheezing.    Cardiovascular: Negative for chest pain and palpitations.   Gastrointestinal: Negative for abdominal pain.        No reflux.   Musculoskeletal: Negative for gait problem.   Skin: Negative for rash.   Neurological: Positive for weakness (let sided). Negative for dizziness, light-headedness and headaches.       Objective:      Physical Exam   Constitutional: She is oriented to person, place, and time. She appears well-developed and well-nourished. No distress.   Wheel chair   HENT:   Head: Normocephalic and atraumatic.   Right Ear: Tympanic membrane, external ear and ear canal normal.   Left Ear: Tympanic membrane, external ear and ear canal normal.   Nose: Nose normal. No mucosal edema, rhinorrhea, nasal deformity or septal deviation. No epistaxis. Right sinus exhibits no maxillary sinus tenderness and no frontal sinus tenderness. Left sinus exhibits no maxillary sinus tenderness and no frontal sinus tenderness. "   Mouth/Throat: Uvula is midline, oropharynx is clear and moist and mucous membranes are normal. Mucous membranes are not pale and not dry. No dental caries. No oropharyngeal exudate or posterior oropharyngeal erythema.   Eyes: Conjunctivae, EOM and lids are normal. Pupils are equal, round, and reactive to light. Right eye exhibits no chemosis. Left eye exhibits no chemosis. Right conjunctiva is not injected. Left conjunctiva is not injected. No scleral icterus. Right eye exhibits normal extraocular motion and no nystagmus. Left eye exhibits normal extraocular motion and no nystagmus.   Neck: Trachea normal and phonation normal. No tracheal tenderness present. No tracheal deviation present. No thyroid mass and no thyromegaly present.   Cardiovascular: Intact distal pulses.    Pulmonary/Chest: Effort normal. No stridor. No respiratory distress.   Abdominal: She exhibits no distension.   Lymphadenopathy:        Head (right side): No submental, no submandibular, no preauricular, no posterior auricular and no occipital adenopathy present.        Head (left side): No submental, no submandibular, no preauricular, no posterior auricular and no occipital adenopathy present.     She has no cervical adenopathy.   Neurological: She is alert and oriented to person, place, and time. No cranial nerve deficit.   Skin: Skin is warm and dry. No rash noted. No erythema.   Psychiatric: She has a normal mood and affect. Her behavior is normal.         I spoke with Dr. Samuel who will be performing her scheduled parathyroidectomy. He was concerned with her dysphagia. Sh ehas left sided weakness due to a previous stroke. We discussed scoping to evaluate her vocal cords and I will order a barium swallow study.   Assessment:       1. Dysphasia    2. Left-sided weakness        Plan:       Dysphagia: As stated above, after speaking with surgeon, we will plan on seeing her next week and scoping her to evaluate vocal cords. I have also ordered  a barium swallow study. She is scheduled for surgery on June 12th to have her parathyroid removed.

## 2018-05-24 ENCOUNTER — HOSPITAL ENCOUNTER (OUTPATIENT)
Dept: RADIOLOGY | Facility: HOSPITAL | Age: 68
Discharge: HOME | End: 2018-05-24
Attending: PHYSICIAN ASSISTANT
Payer: MEDICARE

## 2018-05-24 ENCOUNTER — TELEPHONE (OUTPATIENT)
Dept: ADMINISTRATIVE | Facility: CLINIC | Age: 68
End: 2018-05-24

## 2018-05-24 ENCOUNTER — INFUSION (OUTPATIENT)
Dept: INFUSION THERAPY | Facility: HOSPITAL | Age: 68
End: 2018-05-24
Attending: INTERNAL MEDICINE
Payer: MEDICARE

## 2018-05-24 VITALS
RESPIRATION RATE: 18 BRPM | SYSTOLIC BLOOD PRESSURE: 144 MMHG | HEART RATE: 62 BPM | OXYGEN SATURATION: 98 % | DIASTOLIC BLOOD PRESSURE: 64 MMHG | TEMPERATURE: 98 F

## 2018-05-24 DIAGNOSIS — D63.1 ANEMIA IN STAGE 4 CHRONIC KIDNEY DISEASE: Primary | ICD-10-CM

## 2018-05-24 DIAGNOSIS — R47.02 DYSPHASIA: ICD-10-CM

## 2018-05-24 DIAGNOSIS — N18.4 ANEMIA IN STAGE 4 CHRONIC KIDNEY DISEASE: Primary | ICD-10-CM

## 2018-05-24 PROCEDURE — 96372 THER/PROPH/DIAG INJ SC/IM: CPT

## 2018-05-24 PROCEDURE — 63600175 PHARM REV CODE 636 W HCPCS: Mod: JG | Performed by: INTERNAL MEDICINE

## 2018-05-24 PROCEDURE — 74230 X-RAY XM SWLNG FUNCJ C+: CPT | Mod: TC

## 2018-05-24 PROCEDURE — G8996 SWALLOW CURRENT STATUS: HCPCS | Mod: CI

## 2018-05-24 PROCEDURE — G8997 SWALLOW GOAL STATUS: HCPCS | Mod: CI

## 2018-05-24 PROCEDURE — G8998 SWALLOW D/C STATUS: HCPCS | Mod: CI

## 2018-05-24 PROCEDURE — 92611 MOTION FLUOROSCOPY/SWALLOW: CPT

## 2018-05-24 RX ADMIN — EPOETIN ALFA 20000 UNITS: 20000 SOLUTION INTRAVENOUS; SUBCUTANEOUS at 11:05

## 2018-05-24 NOTE — PROGRESS NOTES
History & Physical    SUBJECTIVE:     History of Present Illness:  Patient is a 68 y.o. female presents to discuss results of tests and parathyroid surgery. Patient was noted to have thyroid nodules on neck U/S and subsequent fna was negative, no parathyroid enlargement or adenoma identified on U/S or sestamibi scan.  She does report dysphagia and increasing difficulty with swallowing. Prior CVA with left sided weakness.    Initially referred for hyperparathyroidism and hypercalcemia. The patient has CKD stage 4, GFR 11.6, Calcium 11.8 . She denies any recent kidney stones, bone fractures (remote history).     Chief Complaint   Patient presents with    Consult    Thyroid Problem       Review of patient's allergies indicates:   Allergen Reactions    Lipitor [atorvastatin]        Current Outpatient Prescriptions   Medication Sig Dispense Refill    aspirin (ECOTRIN) 81 MG EC tablet Take 81 mg by mouth once daily.      cyanocobalamin (VITAMIN B-12) 1000 MCG tablet Take 1 tablet (1,000 mcg total) by mouth once daily. 90 tablet 12    isosorbide mononitrate (IMDUR) 60 MG 24 hr tablet TAKE  ONE TABLET BY MOUTH DAILY 30 tablet 8    lactulose (CEPHULAC) 20 gram Pack Take 20 g by mouth as needed.      losartan (COZAAR) 25 MG tablet TAKE ONE TABLET BY MOUTH DAILY 90 tablet PRN    metoprolol tartrate (LOPRESSOR) 25 MG tablet Take 1 tablet (25 mg total) by mouth 2 (two) times daily. 60 tablet 2    nifedipine (ADALAT CC) 90 MG TbSR Take 1 tablet (90 mg total) by mouth once daily. 30 tablet 11    omeprazole (PRILOSEC) 40 MG capsule Take 1 capsule (40 mg total) by mouth once daily. 30 capsule 11    simvastatin (ZOCOR) 20 MG tablet Take 1 tablet (20 mg total) by mouth every evening. 90 tablet 3    sodium bicarbonate 650 MG tablet Take 1 tablet (650 mg total) by mouth 2 (two) times daily. 60 tablet 11     No current facility-administered medications for this visit.        Past Medical History:   Diagnosis Date     Acid reflux 1/7/2015    Anxiety 1/7/2015    Aortic valvar stenosis     Arthritis     osteo    Callus     Chronic anemia     followed by Dr. Addison    CKD (chronic kidney disease) stage 4, GFR 15-29 ml/min     followed by Nephrology    CKD (chronic kidney disease) stage 5, GFR less than 15 ml/min 8/7/2015    CKD (chronic kidney disease), stage IV 1/7/2015    Combined hyperlipidemia associated with type 2 diabetes mellitus     Coronary artery disease     Diabetes mellitus type II, controlled, with no complications     LBSL 108 today    Diabetes mellitus, type 2 - only on oral medications 1/7/2015    Encounter for blood transfusion     Frail elderly with impaired mobility, wheel chair bound 8/25/2017    Gallbladder problem     gallbladder surgery    Heart murmur     Hyperparathyroidism, secondary renal 1/7/2015    Hypertension 8/7/2015    Hypertension associated with diabetes 11/12/2012    Kidney transplant candidate 1/7/2015    Overweight(278.02)     Urinary tract infection      Past Surgical History:   Procedure Laterality Date    ANKLE FRACTURE SURGERY  1985    AORTIC VALVE REPLACEMENT  05/2016    BONE BIOPSY      CATARACT EXTRACTION W/  INTRAOCULAR LENS IMPLANT  2009    CHOLECYSTECTOMY      CYSTOSCOPY      EYE SURGERY      FRACTURE SURGERY      HYSTERECTOMY  unknown    OOPHORECTOMY      removal of colon polyps      RENAL BIOPSY  10/2013    YAG cap -OD       Family History   Problem Relation Age of Onset    Hypertension Mother     Heart disease Mother     Diabetes Mother     Glaucoma Mother     Aneurysm Father     Stroke Father     Kidney disease Sister     Heart disease Sister     Kidney disease Brother     Hypertension Brother     Diabetes Brother     Diabetes Sister     Kidney disease Sister     Hypertension Brother     Cancer Paternal Grandmother         breast    No Known Problems Maternal Aunt     No Known Problems Maternal Uncle     No Known Problems  Paternal Aunt     No Known Problems Paternal Uncle     Colon cancer Maternal Grandmother     No Known Problems Maternal Grandfather     No Known Problems Paternal Grandfather     Anemia Neg Hx     Arrhythmia Neg Hx     Asthma Neg Hx     Clotting disorder Neg Hx     Fainting Neg Hx     Heart attack Neg Hx     Heart failure Neg Hx     Hyperlipidemia Neg Hx     Atrial Septal Defect Neg Hx      Social History   Substance Use Topics    Smoking status: Never Smoker    Smokeless tobacco: Never Used    Alcohol use No        Review of Systems:  Review of Systems   Constitutional: Negative for chills, fatigue, fever and unexpected weight change.   Respiratory: Negative for cough, shortness of breath, wheezing and stridor.    Cardiovascular: Negative for chest pain, palpitations and leg swelling.   Gastrointestinal: Negative for abdominal distention, abdominal pain, constipation, diarrhea, nausea and vomiting.   Genitourinary: Negative for difficulty urinating, dysuria, frequency, hematuria and urgency.   Skin: Negative for color change, pallor, rash and wound.   Hematological: Does not bruise/bleed easily.       OBJECTIVE:     Vital Signs (Most Recent)  Temp: 97.6 °F (36.4 °C) (05/23/18 0954)  Pulse: (!) 50 (05/23/18 0954)  BP: (!) 188/70 (05/23/18 0954)     73 kg (161 lb)     Physical Exam:  Physical Exam   Constitutional: She is oriented to person, place, and time. She appears well-developed and well-nourished. No distress.   Frail elderly   HENT:   Head: Normocephalic and atraumatic.   Eyes: EOM are normal.   Neck: Normal range of motion. Neck supple. No tracheal deviation present. No thyromegaly present.   Cardiovascular: Normal rate and regular rhythm.    Pulmonary/Chest: Effort normal and breath sounds normal.   Abdominal: Soft. Bowel sounds are normal. She exhibits no distension. There is no tenderness.   Neurological: She is alert and oriented to person, place, and time.   Skin: Skin is warm and dry.  She is not diaphoretic.   Vitals reviewed.    U/S:  1.  Large nodule seen in the left and right lobes of the thyroid gland as described above.  Ultrasound-guided FNA is recommended for further assessment.  2.  No parathyroid adenoma visualized on this exam.    Sestamibi scan:  No parathyroid adenoma localized on this exam.    Pathology:  1  FNA Right Thyroid Nodule :  BENIGN.  Follicular epithelial cells, colloid, and macrophages present.  2  FNA Left Thyroid Nodule :  BENIGN.  Follicular epithelial cells, colloid, and macrophages present.    ASSESSMENT/PLAN:     69 y/o female with CKD stage 4, GFR 11, hypercalcemia, hyperparathyroidism    PLAN:Plan   Parathyroidectomy possible 3 and a half gland removal 6/12/18  U/s neck sestamibi scan  eval for parathyroid glands. Discussed indications for treatment of hypercalcemia/hyperparathyroism as well as risks and benefits of the procedure.  Preop: labs reviewed; EKG Cardiac clearance previous TAVR, ENT referral eval vocal cords and dysphagia/prior CVA  Risks and benefits discussed with patient including: pain, bleeding, infection, injury to underlying nerves/vessels, hoarseness, stroke and MI, cardiac events, hypocalcemia, recurrent hyperparathyroidism, possibility of not being able to find all the glands

## 2018-05-24 NOTE — PATIENT INSTRUCTIONS
[facility]  [address]  [address] [address]  [phone] phone     [phone] fax  Hours of Operation: Monday- Friday 8:00am- 5:00pm  After hours phone  [phone]  Hematology / Oncology Physicians on call      Dr. Roel Hearn      Please call with any concerns regarding your appointment today.      FALL PREVENTION   Falls often occur due to slipping, tripping or losing your balance. Here are ways to reduce your risk of falling again.   Was there anything that caused your fall that can be fixed, removed or replaced?   Make your home safe by keeping walkways clear of objects you may trip over.   Use non-slip pads under rugs.   Do not walk in poorly lit areas.   Do not stand on chairs or wobbly ladders.   Use caution when reaching overhead or looking upward. This position can cause a loss of balance.   Be sure your shoes fit properly, have non-slip bottoms and are in good condition.   Be cautious when going up and down stairs, curbs, and when walking on uneven sidewalks.   If your balance is poor, consider using a cane or walker.   If your fall was related to alcohol use, stop or limit alcohol intake.   If your fall was related to use of sleeping medicines, talk to your doctor about this. You may need to reduce your dosage at bedtime if you awaken during the night to go to the bathroom.   To reduce the need for nighttime bathroom trips:   Avoid drinking fluids for several hours before going to bed   Empty your bladder before going to bed   Men can keep a urinal at the bedside   © 1585-5207 [employer], [address] [place] [address], [place] [address]. All rights reserved. This information is not intended as a substitute for professional medical care. Always follow your healthcare professional's instructions.

## 2018-05-24 NOTE — PROCEDURES
Modified Barium Swallow    Patient Name:  Citlali Karimi   MRN:  [id number]      Recommendations:     Recommendations:                General Recommendations:  Follow-up not indicated  Diet recommendations: Regular consistency with thin liquids  Aspiration Precautions: 1 bite/sip at a time, HOB to 90 degrees, Remain upright 30 minutes post meal and Small bites/sips   General Precautions: Standard,    Communication strategies:  none    Referral     Reason for Referral  Patient was referred for a Modified Barium Swallow Study to assess the efficiency of his/her swallow function, rule out aspiration and make recommendations regarding safe dietary consistencies, effective compensatory strategies, and safe eating environment.     Diagnosis: The encounter diagnosis was Dysphasia.    History:     Past Medical History:   Diagnosis Date    Acid reflux 1/7/2015    Anxiety 1/7/2015    Aortic valvar stenosis     Arthritis     osteo    Callus     Chronic anemia     followed by Dr. Addison    CKD (chronic kidney disease) stage 4, GFR 15-29 ml/min     followed by Nephrology    CKD (chronic kidney disease) stage 5, GFR less than 15 ml/min 8/7/2015    CKD (chronic kidney disease), stage IV 1/7/2015    Combined hyperlipidemia associated with type 2 diabetes mellitus     Coronary artery disease     Diabetes mellitus type II, controlled, with no complications     LBSL 108 today    Diabetes mellitus, type 2 - only on oral medications 1/7/2015    Encounter for blood transfusion     Frail elderly with impaired mobility, wheel chair bound 8/25/2017    Gallbladder problem     gallbladder surgery    Heart murmur     Hyperparathyroidism, secondary renal 1/7/2015    Hypertension 8/7/2015    Hypertension associated with diabetes 11/12/2012    Kidney transplant candidate 1/7/2015    Overweight(278.02)     Urinary tract infection        Objective:     Current Respiratory Status:  Pt tolerating room air, no O2  support    Alert: yes    Cooperative: yes    Follows Directions: yes    Visualization  · Patient was seen in the lateral view    Consistencies Assessed  · Thin liquids, puree, and solids with barium was assessed     Oral Preparation/Oral Phase  · WFL- Pt with adequate bolus acceptance, containment, control and timely A-P transfer across consistencies     Pharyngeal Phase   · WFL - Pt with adequate laryngeal elevation, bolus propulsion and clearance, and airway protection   · No aspiration or penetration was viewed    Cervical Esophageal Phase  · UES appeared to accommodate all bolus types without stasis or retrograde movement observed     Assessment:     Impressions  ·  Patient with oral and pharyngeal phases of swallowing deemed WFL.  Pt complained of difficulty swallowing pills at times, however no evidence of difficulty visualized.  No further ST warranted at this time.      Education  Results were discussed with patient and family.    Time Tracking:   SLP Treatment Date:  5/24/2018    Speech Start Time:   10:40  Speech Stop Time:      11:00  Speech Total Time (min):   20 min    Jacque Bernstein CCC-SLP  05/24/2018

## 2018-05-24 NOTE — PROGRESS NOTES
Home Health Recertification with [facility]. Dr. Evelio Schuster. SN, PT and OT services.

## 2018-05-25 ENCOUNTER — TELEPHONE (OUTPATIENT)
Dept: OTOLARYNGOLOGY | Facility: CLINIC | Age: 68
End: 2018-05-25

## 2018-05-25 NOTE — TELEPHONE ENCOUNTER
Please let Ms. Karimi know her swallow study was normal.  Keep scheduled appointment with me next week for scope exam.

## 2018-05-28 ENCOUNTER — OFFICE VISIT (OUTPATIENT)
Dept: OTOLARYNGOLOGY | Facility: CLINIC | Age: 68
End: 2018-05-28
Payer: MEDICARE

## 2018-05-28 VITALS — TEMPERATURE: 99 F | SYSTOLIC BLOOD PRESSURE: 145 MMHG | HEART RATE: 91 BPM | DIASTOLIC BLOOD PRESSURE: 74 MMHG

## 2018-05-28 DIAGNOSIS — R53.1 LEFT-SIDED WEAKNESS: ICD-10-CM

## 2018-05-28 DIAGNOSIS — R13.10 DYSPHAGIA, UNSPECIFIED TYPE: Primary | ICD-10-CM

## 2018-05-28 DIAGNOSIS — N25.81 HYPERPARATHYROIDISM, SECONDARY RENAL: ICD-10-CM

## 2018-05-28 PROCEDURE — 31575 DIAGNOSTIC LARYNGOSCOPY: CPT | Mod: PBBFAC | Performed by: ORTHOPAEDIC SURGERY

## 2018-05-28 PROCEDURE — 31575 DIAGNOSTIC LARYNGOSCOPY: CPT | Mod: S$PBB,,, | Performed by: ORTHOPAEDIC SURGERY

## 2018-05-28 PROCEDURE — 99999 PR PBB SHADOW E&M-EST. PATIENT-LVL III: CPT | Mod: PBBFAC,,, | Performed by: ORTHOPAEDIC SURGERY

## 2018-05-28 PROCEDURE — 99213 OFFICE O/P EST LOW 20 MIN: CPT | Mod: 25,S$PBB,, | Performed by: ORTHOPAEDIC SURGERY

## 2018-05-28 PROCEDURE — 99213 OFFICE O/P EST LOW 20 MIN: CPT | Mod: PBBFAC | Performed by: ORTHOPAEDIC SURGERY

## 2018-05-28 NOTE — PROGRESS NOTES
Subjective:      Patient ID: Citlali Karimi is a 68 y.o. female.    Chief Complaint: Scope exam    Patient is a very pleasant 5-year-old female here to see me today for evaluation of her vocal cords prior to her upcoming parathyroid surgery. She was last seen here several weeks ago, at which time she was also complaining of difficulty swallowing large pills.  Today, she has no further complaints of difficulty swallowing.  She has had a modified barium swallow study in the interim, which was normal. She denies any hoarseness or changes in her voice, and has not noted any breathy nose to her voice.  She does have a previous history of a CVA with a residual left-sided weakness.  She denies any coughing or choking when she tries to swallow.          Review of Systems   Constitutional: Negative for chills, fatigue, fever and unexpected weight change.   HENT: Negative for congestion, dental problem, ear discharge, ear pain, facial swelling, hearing loss, nosebleeds, postnasal drip, rhinorrhea, sinus pressure, sneezing, sore throat, tinnitus, trouble swallowing and voice change.    Eyes: Negative for redness, itching and visual disturbance.   Respiratory: Negative for cough, choking, shortness of breath and wheezing.    Cardiovascular: Negative for chest pain and palpitations.   Gastrointestinal: Negative for abdominal pain.        No reflux.   Musculoskeletal: Negative for gait problem.   Skin: Negative for rash.   Neurological: Positive for dizziness (Occasionally). Negative for light-headedness and headaches.       Objective:       Physical Exam   Constitutional: She is oriented to person, place, and time. She appears well-developed and well-nourished. No distress.   Wheel chair   HENT:   Head: Normocephalic and atraumatic.   Right Ear: Tympanic membrane, external ear and ear canal normal.   Left Ear: Tympanic membrane, external ear and ear canal normal.   Nose: Nose normal. No mucosal edema, rhinorrhea, nasal  deformity or septal deviation. No epistaxis. Right sinus exhibits no maxillary sinus tenderness and no frontal sinus tenderness. Left sinus exhibits no maxillary sinus tenderness and no frontal sinus tenderness.   Mouth/Throat: Uvula is midline, oropharynx is clear and moist and mucous membranes are normal. Mucous membranes are not pale and not dry. Dental caries present. No oropharyngeal exudate or posterior oropharyngeal erythema.   Voice strong, not breathy   Eyes: Conjunctivae, EOM and lids are normal. Pupils are equal, round, and reactive to light. Right eye exhibits no chemosis. Left eye exhibits no chemosis. Right conjunctiva is not injected. Left conjunctiva is not injected. No scleral icterus. Right eye exhibits normal extraocular motion and no nystagmus. Left eye exhibits normal extraocular motion and no nystagmus.   Neck: Trachea normal and phonation normal. No tracheal tenderness present. No tracheal deviation present. No thyroid mass and no thyromegaly present.   Cardiovascular: Intact distal pulses.    Pulmonary/Chest: Effort normal. No stridor. No respiratory distress.   Abdominal: She exhibits no distension.   Lymphadenopathy:        Head (right side): No submental, no submandibular, no preauricular, no posterior auricular and no occipital adenopathy present.        Head (left side): No submental, no submandibular, no preauricular, no posterior auricular and no occipital adenopathy present.     She has no cervical adenopathy.   Neurological: She is alert and oriented to person, place, and time. No cranial nerve deficit.   Skin: Skin is warm and dry. No rash noted. No erythema.   Psychiatric: She has a normal mood and affect. Her behavior is normal.           MBSS:  Impressions   Patient with oral and pharyngeal phases of swallowing deemed WFL.  Pt complained of difficulty swallowing pills at times, however no evidence of difficulty visualized.  No further ST warranted at this time.          Due to  indication in patient's history, presentation or risk factors,  a fiber optic exam was performed.    SEPARATE PROCEDURE NOTE:    ANESTHESIA:  Topical xylocaine with manolo-synephrine  FINDINGS:  Normal exam      PROCEDURE:  After verbal consent was obtained, the flexible scope was passed through the patient's nasal cavity without difficulty.  The nasopharynx (adenoid pad) and eustachian tube orifices were first visualized and were found to be normal, without masses or irregularity.  The posterior pharyngeal wall and base of tongue were then examined and no mass or irregular tissue was seen.  The scope was then advanced to the larynx, and the epiglottis, valleculae, and piriform sinuses were normal, without masses or mucosal irregularity.  The false vocal folds and true vocal folds were then examined and were found to have normal mobility (full abduction and adduction) and no masses or mucosal irregularity was seen.  The arytenoids and the interarytenoid area were normal.      Assessment:       1. Dysphagia, unspecified type    2. Hyperparathyroidism, secondary renal    3. Left-sided weakness        Plan:     Dysphagia, unspecified type:  She has had a normal swallow study, and both of her vocal cords are mobile on today's exam.  Continue with a regular diet.      Hyperparathyroidism, secondary renal:  She is cleared from an ENT standpoint for her upcoming parathyroid surgery. No weakness of either vocal cord.    Left-sided weakness

## 2018-05-28 NOTE — LETTER
May 28, 2018      Rachell Hunt PA-C  [address] [facility] [address]           [facility]  [id number] [place] [address]  Phone: [phone]  Fax: [phone]          Patient: Citlali Karimi   MR Number: [id number]   YOB: 1950   Date of Visit: 5/28/2018       Dear Rachell Hunt:    Thank you for referring Citlali Karimi to me for evaluation. Attached you will find relevant portions of my assessment and plan of care.    If you have questions, please do not hesitate to call me. I look forward to following Citlali Karimi along with you.    Sincerely,    Daniella Blancas MD    Enclosure  CC:  No Recipients    If you would like to receive this communication electronically, please contact [email or web address] or [phone] to request more information on [employer] access.    For providers and/or their staff who would like to refer a patient to [facility], please contact us through our one-stop-shop provider referral line, [facility], at [phone].    If you feel you have received this communication in error or would no longer like to receive these types of communications, please e-mail [email or web address]

## 2018-05-31 ENCOUNTER — INFUSION (OUTPATIENT)
Dept: INFUSION THERAPY | Facility: HOSPITAL | Age: 68
End: 2018-05-31
Attending: INTERNAL MEDICINE
Payer: MEDICARE

## 2018-05-31 VITALS
HEART RATE: 83 BPM | DIASTOLIC BLOOD PRESSURE: 82 MMHG | TEMPERATURE: 97 F | OXYGEN SATURATION: 98 % | SYSTOLIC BLOOD PRESSURE: 164 MMHG | RESPIRATION RATE: 18 BRPM

## 2018-05-31 DIAGNOSIS — D63.1 ANEMIA IN STAGE 4 CHRONIC KIDNEY DISEASE: Primary | ICD-10-CM

## 2018-05-31 DIAGNOSIS — N18.4 ANEMIA IN STAGE 4 CHRONIC KIDNEY DISEASE: Primary | ICD-10-CM

## 2018-05-31 PROCEDURE — 63600175 PHARM REV CODE 636 W HCPCS: Mod: JG | Performed by: INTERNAL MEDICINE

## 2018-05-31 PROCEDURE — 63600175 PHARM REV CODE 636 W HCPCS: Mod: JG,EC | Performed by: INTERNAL MEDICINE

## 2018-05-31 PROCEDURE — 96372 THER/PROPH/DIAG INJ SC/IM: CPT

## 2018-05-31 RX ADMIN — EPOETIN ALFA 20000 UNITS: 20000 SOLUTION INTRAVENOUS; SUBCUTANEOUS at 11:05

## 2018-06-07 ENCOUNTER — INFUSION (OUTPATIENT)
Dept: INFUSION THERAPY | Facility: HOSPITAL | Age: 68
End: 2018-06-07
Attending: INTERNAL MEDICINE
Payer: MEDICARE

## 2018-06-07 VITALS
DIASTOLIC BLOOD PRESSURE: 74 MMHG | HEART RATE: 62 BPM | OXYGEN SATURATION: 98 % | SYSTOLIC BLOOD PRESSURE: 165 MMHG | RESPIRATION RATE: 18 BRPM | TEMPERATURE: 98 F

## 2018-06-07 DIAGNOSIS — N18.4 ANEMIA IN STAGE 4 CHRONIC KIDNEY DISEASE: Primary | ICD-10-CM

## 2018-06-07 DIAGNOSIS — D63.1 ANEMIA IN STAGE 4 CHRONIC KIDNEY DISEASE: Primary | ICD-10-CM

## 2018-06-07 PROCEDURE — 96372 THER/PROPH/DIAG INJ SC/IM: CPT

## 2018-06-07 PROCEDURE — 63600175 PHARM REV CODE 636 W HCPCS: Mod: JG,EC | Performed by: INTERNAL MEDICINE

## 2018-06-07 RX ADMIN — EPOETIN ALFA 20000 UNITS: 20000 SOLUTION INTRAVENOUS; SUBCUTANEOUS at 10:06

## 2018-06-07 NOTE — PATIENT INSTRUCTIONS
[facility]  [address]  [address] [address]  [phone] phone     [phone] fax  Hours of Operation: Monday- Friday 8:00am- 5:00pm  After hours phone  [phone]  Hematology / Oncology Physicians on call      Dr. Roel Hearn                        Please call with any concerns regarding your appointment today.  FALL PREVENTION   Falls often occur due to slipping, tripping or losing your balance. Here are ways to reduce your risk of falling again.   Was there anything that caused your fall that can be fixed, removed or replaced?   Make your home safe by keeping walkways clear of objects you may trip over.   Use non-slip pads under rugs.   Do not walk in poorly lit areas.   Do not stand on chairs or wobbly ladders.   Use caution when reaching overhead or looking upward. This position can cause a loss of balance.   Be sure your shoes fit properly, have non-slip bottoms and are in good condition.   Be cautious when going up and down stairs, curbs, and when walking on uneven sidewalks.   If your balance is poor, consider using a cane or walker.   If your fall was related to alcohol use, stop or limit alcohol intake.   If your fall was related to use of sleeping medicines, talk to your doctor about this. You may need to reduce your dosage at bedtime if you awaken during the night to go to the bathroom.   To reduce the need for nighttime bathroom trips:   Avoid drinking fluids for several hours before going to bed   Empty your bladder before going to bed   Men can keep a urinal at the bedside   © 4237-3442 [employer], [address] [place] [address], [place] [address]. All rights reserved. This information is not intended as a substitute for professional medical care. Always follow your healthcare professional's instructions.

## 2018-06-07 NOTE — NURSING
Injection given without difficulties.Bandaid applied. Patient instructed to stay in the clinic for 15 minutes. Patient verbalized understanding and will notify nurse with any complaints.    Assessment questions answered per patients  due to aphasia . Pt states feels good and laughs when talked to

## 2018-06-08 ENCOUNTER — TELEPHONE (OUTPATIENT)
Dept: SURGERY | Facility: CLINIC | Age: 68
End: 2018-06-08

## 2018-06-08 NOTE — TELEPHONE ENCOUNTER
----- Message from Galindo Louis MD sent at 5/25/2018  7:47 PM CDT -----  Pt cleared for surgery at moderate cv risk  ----- Message -----  From: Estefania Finnegan MA  Sent: 5/25/2018   9:24 AM  To: Galindo Louis MD    parathyroidectomy  ----- Message -----  From: Galindo Louis MD  Sent: 5/24/2018   9:27 PM  To: Estefania Finnegan MA    What surgery is he getting?    ----- Message -----  From: Estefania Finnegan MA  Sent: 5/24/2018   9:36 AM  To: Galindo Louis MD    Is this patient cleared for surgery?    ----- Message -----  From: Levy Samuel MD  Sent: 5/23/2018   8:55 PM  To: Estefania Finnegan MA    Can you see if we got patient cleared by cards during her last visit. She needs cards clearance as she had previous TAVR but I couldn't remember if we had already done this. I didn't see any notes showing we had but didn't know if I was overlooking anything.  Thanks

## 2018-06-08 NOTE — PRE-PROCEDURE INSTRUCTIONS
Pre op instructions reviewed with patient per phone:    To confirm, Your surgeon has instructed you:  Surgery is scheduled 6/12/18 at 1015.      Please report to [facility] 1st floor main lobby by 0845  Pre admit office will call afternoon prior to surgery for final arrival time.      INSTRUCTIONS IMPORTANT!!!  ¨ No smoking after 12 midnight, the night before surgery.  ¨ No solid food after 12 midnight, but you may have clear liquids up until 3 hours prior to surgery.  This includes: grape, cranberry, and apple juice (not orange, and no coffee.)   ¨ OK to brush teeth, but no gum, candy or mints!    ¨ Take only these medicines with a small swallow of water-morning of surgery.  Imdur, Losartan, Metoprolol, Nifedipine, Prilosec    ____  Do not wear makeup, including mascara.  ____  No powder, lotions or creams to surgical area.  ____  Please remove all jewelry, including piercings and leave at home.  ____  No money or valuables needed. Please leave at home.  ____  Please bring identification and insurance information to hospital.  ____  If going home the same day, arrange for a ride home. You will not be able to   drive if Anesthesia was used.  ____  Children, under 12 years old, must remain in the waiting room with an adult.  They are not allowed in patient areas.  ____  Wear loose fitting clothing. Allow for dressings, bandages.  ____  Stop Aspirin, Ibuprofen, Motrin and Aleve at least 5-7 days before surgery, unless otherwise instructed by your doctor, or the nurse.   You MAY use Tylenol/acetaminophen until day of surgery.  ____  If you take diabetic medication, do not take am of surgery unless instructed by   Doctor.  ____ Stop taking any Fish Oil supplement or any Vitamins that contain Vitamin E at least 5 days prior to surgery.          Bathing Instructions-- The night before surgery and the morning prior to coming to the hospital:   -Do not shave the surgical area.   -Shower and wash  your hair and body as usual with your regular soap and shampoo.   -Rinse your hair and body completely.   -Use one packet of hibiclens to wash the surgical site (using your hand) gently for 5 minutes.  Do not scrub you skin too hard.   -Do not use hibiclens on your head, face, or genitals.   -Do not wash with regular soap after you use the hibiclens.   -Rinse your body thoroughly.   -Dry with clean, soft towel.  Do not use lotion, cream, deodorant, or powders on   the surgical site.    Use antibacterial soap in place of hibiclens if your surgery is on the head, face or genitals.         Surgical Site Infection    Prevention of surgical site infections:     -Keep incisions clean and dry.   -Do not soak/submerge incisions in water until completely healed.   -Do not apply lotions, powders, creams, or deodorants to site.   -Always make sure hands are cleaned with antibacterial soap/ alcohol-based   prior to touching the surgical site.  (This includes doctors, nurses, staff, and yourself.)    Signs and symptoms:   -Redness and pain around the area where you had surgery   -Drainage of cloudy fluid from your surgical wound   -Fever over 100.4  I have read or had read and explained to me, and understand the above information.

## 2018-06-12 ENCOUNTER — ANESTHESIA (OUTPATIENT)
Dept: SURGERY | Facility: HOSPITAL | Age: 68
DRG: 981 | End: 2018-06-12
Payer: MEDICARE

## 2018-06-12 ENCOUNTER — ANESTHESIA EVENT (OUTPATIENT)
Dept: SURGERY | Facility: HOSPITAL | Age: 68
DRG: 981 | End: 2018-06-12
Payer: MEDICARE

## 2018-06-12 ENCOUNTER — HOSPITAL ENCOUNTER (INPATIENT)
Facility: HOSPITAL | Age: 68
LOS: 17 days | Discharge: HOSPICE | DRG: 981 | End: 2018-06-30
Attending: SURGERY | Admitting: SURGERY
Payer: MEDICARE

## 2018-06-12 ENCOUNTER — SURGERY (OUTPATIENT)
Age: 68
End: 2018-06-12

## 2018-06-12 DIAGNOSIS — Z95.2 H/O AORTIC VALVE REPLACEMENT: Chronic | ICD-10-CM

## 2018-06-12 DIAGNOSIS — R54 FRAIL ELDERLY: ICD-10-CM

## 2018-06-12 DIAGNOSIS — I49.01 CARDIAC ARREST WITH VENTRICULAR FIBRILLATION: ICD-10-CM

## 2018-06-12 DIAGNOSIS — E87.8 ELECTROLYTE IMBALANCE: ICD-10-CM

## 2018-06-12 DIAGNOSIS — Z98.890 S/P PARATHYROIDECTOMY: ICD-10-CM

## 2018-06-12 DIAGNOSIS — Z98.890 S/P EVACUATION OF HEMATOMA: ICD-10-CM

## 2018-06-12 DIAGNOSIS — R07.9 CHEST PAIN: ICD-10-CM

## 2018-06-12 DIAGNOSIS — Z99.11 ON MECHANICALLY ASSISTED VENTILATION: ICD-10-CM

## 2018-06-12 DIAGNOSIS — A04.72 C. DIFFICILE DIARRHEA: ICD-10-CM

## 2018-06-12 DIAGNOSIS — I21.3 STEMI (ST ELEVATION MYOCARDIAL INFARCTION): ICD-10-CM

## 2018-06-12 DIAGNOSIS — D62 ACUTE BLOOD LOSS ANEMIA: ICD-10-CM

## 2018-06-12 DIAGNOSIS — E11.22 TYPE 2 DIABETES MELLITUS WITH STAGE 4 CHRONIC KIDNEY DISEASE, WITHOUT LONG-TERM CURRENT USE OF INSULIN: Chronic | ICD-10-CM

## 2018-06-12 DIAGNOSIS — I21.9 MI (MYOCARDIAL INFARCTION): ICD-10-CM

## 2018-06-12 DIAGNOSIS — J98.8 ACUTE AIRWAY OBSTRUCTION: ICD-10-CM

## 2018-06-12 DIAGNOSIS — N18.9 CHRONIC KIDNEY DISEASE: ICD-10-CM

## 2018-06-12 DIAGNOSIS — G62.81 CRITICAL ILLNESS POLYNEUROPATHY: ICD-10-CM

## 2018-06-12 DIAGNOSIS — R93.89 ABNORMAL CHEST X-RAY: ICD-10-CM

## 2018-06-12 DIAGNOSIS — E83.52 HYPERCALCEMIA: ICD-10-CM

## 2018-06-12 DIAGNOSIS — I48.19 PERSISTENT ATRIAL FIBRILLATION: ICD-10-CM

## 2018-06-12 DIAGNOSIS — N18.6 ESRD (END STAGE RENAL DISEASE): ICD-10-CM

## 2018-06-12 DIAGNOSIS — N17.9 ACUTE RENAL FAILURE SUPERIMPOSED ON STAGE 5 CHRONIC KIDNEY DISEASE, NOT ON CHRONIC DIALYSIS, UNSPECIFIED ACUTE RENAL FAILURE TYPE: ICD-10-CM

## 2018-06-12 DIAGNOSIS — I46.9 CARDIAC ARREST WITH VENTRICULAR FIBRILLATION: ICD-10-CM

## 2018-06-12 DIAGNOSIS — N18.5 CKD (CHRONIC KIDNEY DISEASE) STAGE 5, GFR LESS THAN 15 ML/MIN: Chronic | ICD-10-CM

## 2018-06-12 DIAGNOSIS — E21.3 HYPERPARATHYROIDISM: Primary | ICD-10-CM

## 2018-06-12 DIAGNOSIS — R06.03 RESPIRATORY DISTRESS: ICD-10-CM

## 2018-06-12 DIAGNOSIS — Z95.2 PRESENCE OF PROSTHETIC HEART VALVE: ICD-10-CM

## 2018-06-12 DIAGNOSIS — R57.0 CARDIOGENIC SHOCK: ICD-10-CM

## 2018-06-12 DIAGNOSIS — I21.3 ST ELEVATION MYOCARDIAL INFARCTION (STEMI), UNSPECIFIED ARTERY: ICD-10-CM

## 2018-06-12 DIAGNOSIS — N18.4 TYPE 2 DIABETES MELLITUS WITH STAGE 4 CHRONIC KIDNEY DISEASE, WITHOUT LONG-TERM CURRENT USE OF INSULIN: Chronic | ICD-10-CM

## 2018-06-12 DIAGNOSIS — R06.02 SOB (SHORTNESS OF BREATH): ICD-10-CM

## 2018-06-12 DIAGNOSIS — R79.89 ELEVATED TROPONIN: ICD-10-CM

## 2018-06-12 DIAGNOSIS — J96.01 ACUTE HYPOXEMIC RESPIRATORY FAILURE: ICD-10-CM

## 2018-06-12 DIAGNOSIS — I25.10 CORONARY ARTERY DISEASE INVOLVING NATIVE CORONARY ARTERY WITHOUT ANGINA PECTORIS, UNSPECIFIED WHETHER NATIVE OR TRANSPLANTED HEART: Chronic | ICD-10-CM

## 2018-06-12 DIAGNOSIS — Z86.74 PERSONAL HISTORY OF SUDDEN CARDIAC ARREST: ICD-10-CM

## 2018-06-12 DIAGNOSIS — I21.4 NSTEMI (NON-ST ELEVATED MYOCARDIAL INFARCTION): ICD-10-CM

## 2018-06-12 DIAGNOSIS — N17.9 ACUTE RENAL FAILURE, UNSPECIFIED ACUTE RENAL FAILURE TYPE: ICD-10-CM

## 2018-06-12 DIAGNOSIS — K72.00 SHOCK LIVER: ICD-10-CM

## 2018-06-12 DIAGNOSIS — N18.5 ACUTE RENAL FAILURE SUPERIMPOSED ON STAGE 5 CHRONIC KIDNEY DISEASE, NOT ON CHRONIC DIALYSIS, UNSPECIFIED ACUTE RENAL FAILURE TYPE: ICD-10-CM

## 2018-06-12 DIAGNOSIS — Z90.89 S/P PARATHYROIDECTOMY: ICD-10-CM

## 2018-06-12 PROBLEM — D63.1 ANEMIA OF RENAL DISEASE: Status: ACTIVE | Noted: 2018-06-12

## 2018-06-12 PROBLEM — I63.9 CVA (CEREBRAL VASCULAR ACCIDENT): Status: ACTIVE | Noted: 2018-06-12

## 2018-06-12 LAB
ANION GAP SERPL CALC-SCNC: 11 MMOL/L
BASOPHILS # BLD AUTO: 0.01 K/UL
BASOPHILS NFR BLD: 0.1 %
BNP SERPL-MCNC: 279 PG/ML
BUN SERPL-MCNC: 33 MG/DL
CALCIUM SERPL-MCNC: 11.4 MG/DL
CHLORIDE SERPL-SCNC: 107 MMOL/L
CK MB SERPL-MCNC: 8.1 NG/ML
CK MB SERPL-RTO: 1.2 %
CK SERPL-CCNC: 656 U/L
CO2 SERPL-SCNC: 22 MMOL/L
CREAT SERPL-MCNC: 3.5 MG/DL
DIFFERENTIAL METHOD: ABNORMAL
EOSINOPHIL # BLD AUTO: 0 K/UL
EOSINOPHIL NFR BLD: 0 %
ERYTHROCYTE [DISTWIDTH] IN BLOOD BY AUTOMATED COUNT: 16.1 %
EST. GFR  (AFRICAN AMERICAN): 15 ML/MIN/1.73 M^2
EST. GFR  (NON AFRICAN AMERICAN): 13 ML/MIN/1.73 M^2
GLUCOSE SERPL-MCNC: 160 MG/DL
HCT VFR BLD AUTO: 40.6 %
HGB BLD-MCNC: 12.6 G/DL
LYMPHOCYTES # BLD AUTO: 0.5 K/UL
LYMPHOCYTES NFR BLD: 4.6 %
MCH RBC QN AUTO: 28.9 PG
MCHC RBC AUTO-ENTMCNC: 31 G/DL
MCV RBC AUTO: 93 FL
MONOCYTES # BLD AUTO: 0.5 K/UL
MONOCYTES NFR BLD: 5 %
NEUTROPHILS # BLD AUTO: 9.7 K/UL
NEUTROPHILS NFR BLD: 90.6 %
PLATELET # BLD AUTO: 159 K/UL
PMV BLD AUTO: 9.6 FL
POCT GLUCOSE: 101 MG/DL (ref 70–110)
POCT GLUCOSE: 120 MG/DL (ref 70–110)
POTASSIUM SERPL-SCNC: 3.8 MMOL/L
PTH-INTACT SERPL-MCNC: 170.1 PG/ML
PTH-INTACT SERPL-MCNC: 32 PG/ML
RBC # BLD AUTO: 4.36 M/UL
SODIUM SERPL-SCNC: 140 MMOL/L
TROPONIN I SERPL DL<=0.01 NG/ML-MCNC: 0.11 NG/ML
TROPONIN I SERPL DL<=0.01 NG/ML-MCNC: 34.68 NG/ML
WBC # BLD AUTO: 10.72 K/UL

## 2018-06-12 PROCEDURE — 93010 ELECTROCARDIOGRAM REPORT: CPT | Mod: 76,,, | Performed by: INTERNAL MEDICINE

## 2018-06-12 PROCEDURE — 63600175 PHARM REV CODE 636 W HCPCS: Performed by: NURSE PRACTITIONER

## 2018-06-12 PROCEDURE — 82553 CREATINE MB FRACTION: CPT

## 2018-06-12 PROCEDURE — 37000008 HC ANESTHESIA 1ST 15 MINUTES: Performed by: SURGERY

## 2018-06-12 PROCEDURE — 83880 ASSAY OF NATRIURETIC PEPTIDE: CPT

## 2018-06-12 PROCEDURE — 25000003 PHARM REV CODE 250: Performed by: NURSE ANESTHETIST, CERTIFIED REGISTERED

## 2018-06-12 PROCEDURE — 88331 PATH CONSLTJ SURG 1 BLK 1SPC: CPT | Performed by: PATHOLOGY

## 2018-06-12 PROCEDURE — 25000003 PHARM REV CODE 250: Performed by: SURGERY

## 2018-06-12 PROCEDURE — 27201423 OPTIME MED/SURG SUP & DEVICES STERILE SUPPLY: Performed by: SURGERY

## 2018-06-12 PROCEDURE — 63600175 PHARM REV CODE 636 W HCPCS: Performed by: ANESTHESIOLOGY

## 2018-06-12 PROCEDURE — 36000706: Performed by: SURGERY

## 2018-06-12 PROCEDURE — 36415 COLL VENOUS BLD VENIPUNCTURE: CPT

## 2018-06-12 PROCEDURE — 88305 TISSUE EXAM BY PATHOLOGIST: CPT | Mod: 26,,, | Performed by: PATHOLOGY

## 2018-06-12 PROCEDURE — 63600175 PHARM REV CODE 636 W HCPCS: Performed by: NURSE ANESTHETIST, CERTIFIED REGISTERED

## 2018-06-12 PROCEDURE — 93010 ELECTROCARDIOGRAM REPORT: CPT | Mod: ,,, | Performed by: INTERNAL MEDICINE

## 2018-06-12 PROCEDURE — 94799 UNLISTED PULMONARY SVC/PX: CPT

## 2018-06-12 PROCEDURE — 27000221 HC OXYGEN, UP TO 24 HOURS

## 2018-06-12 PROCEDURE — 36000707: Performed by: SURGERY

## 2018-06-12 PROCEDURE — 71000039 HC RECOVERY, EACH ADD'L HOUR: Performed by: SURGERY

## 2018-06-12 PROCEDURE — 63600175 PHARM REV CODE 636 W HCPCS: Performed by: SURGERY

## 2018-06-12 PROCEDURE — 82550 ASSAY OF CK (CPK): CPT

## 2018-06-12 PROCEDURE — 71000033 HC RECOVERY, INTIAL HOUR: Performed by: SURGERY

## 2018-06-12 PROCEDURE — 25000003 PHARM REV CODE 250: Performed by: ANESTHESIOLOGY

## 2018-06-12 PROCEDURE — 84484 ASSAY OF TROPONIN QUANT: CPT

## 2018-06-12 PROCEDURE — 37000009 HC ANESTHESIA EA ADD 15 MINS: Performed by: SURGERY

## 2018-06-12 PROCEDURE — 83970 ASSAY OF PARATHORMONE: CPT

## 2018-06-12 PROCEDURE — 0GTM0ZZ RESECTION OF LEFT SUPERIOR PARATHYROID GLAND, OPEN APPROACH: ICD-10-PCS | Performed by: SURGERY

## 2018-06-12 PROCEDURE — 80048 BASIC METABOLIC PNL TOTAL CA: CPT

## 2018-06-12 PROCEDURE — 99900035 HC TECH TIME PER 15 MIN (STAT)

## 2018-06-12 PROCEDURE — 88331 PATH CONSLTJ SURG 1 BLK 1SPC: CPT | Mod: 26,,, | Performed by: PATHOLOGY

## 2018-06-12 PROCEDURE — 88305 TISSUE EXAM BY PATHOLOGIST: CPT | Performed by: PATHOLOGY

## 2018-06-12 PROCEDURE — 84484 ASSAY OF TROPONIN QUANT: CPT | Mod: 91

## 2018-06-12 PROCEDURE — 85025 COMPLETE CBC W/AUTO DIFF WBC: CPT

## 2018-06-12 PROCEDURE — 60500 EXPLORE PARATHYROID GLANDS: CPT | Mod: 80,,, | Performed by: SURGERY

## 2018-06-12 PROCEDURE — 60500 EXPLORE PARATHYROID GLANDS: CPT | Mod: ,,, | Performed by: SURGERY

## 2018-06-12 PROCEDURE — 93005 ELECTROCARDIOGRAM TRACING: CPT

## 2018-06-12 PROCEDURE — 25000003 PHARM REV CODE 250: Performed by: NURSE PRACTITIONER

## 2018-06-12 PROCEDURE — 83036 HEMOGLOBIN GLYCOSYLATED A1C: CPT

## 2018-06-12 PROCEDURE — 83970 ASSAY OF PARATHORMONE: CPT | Mod: 91

## 2018-06-12 PROCEDURE — 87040 BLOOD CULTURE FOR BACTERIA: CPT

## 2018-06-12 PROCEDURE — 0GTP0ZZ RESECTION OF LEFT INFERIOR PARATHYROID GLAND, OPEN APPROACH: ICD-10-PCS | Performed by: SURGERY

## 2018-06-12 RX ORDER — CEFAZOLIN SODIUM 2 G/50ML
2 SOLUTION INTRAVENOUS
Status: COMPLETED | OUTPATIENT
Start: 2018-06-12 | End: 2018-06-12

## 2018-06-12 RX ORDER — BUPIVACAINE HYDROCHLORIDE 2.5 MG/ML
INJECTION, SOLUTION EPIDURAL; INFILTRATION; INTRACAUDAL
Status: DISCONTINUED | OUTPATIENT
Start: 2018-06-12 | End: 2018-06-12 | Stop reason: HOSPADM

## 2018-06-12 RX ORDER — SODIUM CHLORIDE 0.9 % (FLUSH) 0.9 %
3 SYRINGE (ML) INJECTION
Status: DISCONTINUED | OUTPATIENT
Start: 2018-06-12 | End: 2018-06-30 | Stop reason: HOSPADM

## 2018-06-12 RX ORDER — HYDROCODONE BITARTRATE AND ACETAMINOPHEN 10; 325 MG/1; MG/1
1 TABLET ORAL EVERY 4 HOURS PRN
Status: DISCONTINUED | OUTPATIENT
Start: 2018-06-12 | End: 2018-06-13

## 2018-06-12 RX ORDER — CLONIDINE HYDROCHLORIDE 0.1 MG/1
0.1 TABLET ORAL EVERY 6 HOURS PRN
Status: DISCONTINUED | OUTPATIENT
Start: 2018-06-12 | End: 2018-06-30 | Stop reason: HOSPADM

## 2018-06-12 RX ORDER — METOPROLOL TARTRATE 25 MG/1
25 TABLET, FILM COATED ORAL 2 TIMES DAILY
Status: DISCONTINUED | OUTPATIENT
Start: 2018-06-12 | End: 2018-06-30 | Stop reason: HOSPADM

## 2018-06-12 RX ORDER — MEPERIDINE HYDROCHLORIDE 50 MG/ML
12.5 INJECTION INTRAMUSCULAR; INTRAVENOUS; SUBCUTANEOUS ONCE AS NEEDED
Status: DISCONTINUED | OUTPATIENT
Start: 2018-06-12 | End: 2018-06-12 | Stop reason: HOSPADM

## 2018-06-12 RX ORDER — LACTULOSE 10 G/15ML
20 SOLUTION ORAL EVERY 6 HOURS PRN
Status: DISCONTINUED | OUTPATIENT
Start: 2018-06-12 | End: 2018-06-30 | Stop reason: HOSPADM

## 2018-06-12 RX ORDER — ONDANSETRON 8 MG/1
8 TABLET, ORALLY DISINTEGRATING ORAL EVERY 8 HOURS PRN
Status: DISCONTINUED | OUTPATIENT
Start: 2018-06-12 | End: 2018-06-13

## 2018-06-12 RX ORDER — SODIUM CHLORIDE 0.9 % (FLUSH) 0.9 %
3 SYRINGE (ML) INJECTION EVERY 8 HOURS
Status: DISCONTINUED | OUTPATIENT
Start: 2018-06-12 | End: 2018-06-12 | Stop reason: HOSPADM

## 2018-06-12 RX ORDER — NIFEDIPINE 90 MG/1
90 TABLET, FILM COATED, EXTENDED RELEASE ORAL DAILY
Qty: 30 TABLET | Refills: 0 | Status: SHIPPED | OUTPATIENT
Start: 2018-06-12

## 2018-06-12 RX ORDER — METOPROLOL TARTRATE 25 MG/1
25 TABLET, FILM COATED ORAL 2 TIMES DAILY
Qty: 60 TABLET | Refills: 0 | Status: SHIPPED | OUTPATIENT
Start: 2018-06-12

## 2018-06-12 RX ORDER — BISACODYL 10 MG
10 SUPPOSITORY, RECTAL RECTAL DAILY PRN
Status: DISCONTINUED | OUTPATIENT
Start: 2018-06-12 | End: 2018-06-30 | Stop reason: HOSPADM

## 2018-06-12 RX ORDER — DEXAMETHASONE SODIUM PHOSPHATE 4 MG/ML
INJECTION, SOLUTION INTRA-ARTICULAR; INTRALESIONAL; INTRAMUSCULAR; INTRAVENOUS; SOFT TISSUE
Status: DISCONTINUED | OUTPATIENT
Start: 2018-06-12 | End: 2018-06-12

## 2018-06-12 RX ORDER — RAMELTEON 8 MG/1
8 TABLET ORAL NIGHTLY PRN
Status: DISCONTINUED | OUTPATIENT
Start: 2018-06-12 | End: 2018-06-13

## 2018-06-12 RX ORDER — IBUPROFEN 200 MG
16 TABLET ORAL
Status: DISCONTINUED | OUTPATIENT
Start: 2018-06-12 | End: 2018-06-30 | Stop reason: HOSPADM

## 2018-06-12 RX ORDER — FENTANYL CITRATE 50 UG/ML
INJECTION, SOLUTION INTRAMUSCULAR; INTRAVENOUS
Status: DISCONTINUED | OUTPATIENT
Start: 2018-06-12 | End: 2018-06-12

## 2018-06-12 RX ORDER — HYDRALAZINE HYDROCHLORIDE 20 MG/ML
10 INJECTION INTRAMUSCULAR; INTRAVENOUS ONCE
Status: COMPLETED | OUTPATIENT
Start: 2018-06-12 | End: 2018-06-12

## 2018-06-12 RX ORDER — SIMVASTATIN 20 MG/1
20 TABLET, FILM COATED ORAL NIGHTLY
Status: DISCONTINUED | OUTPATIENT
Start: 2018-06-12 | End: 2018-06-15

## 2018-06-12 RX ORDER — OXYCODONE HYDROCHLORIDE 5 MG/1
5 TABLET ORAL
Status: DISCONTINUED | OUTPATIENT
Start: 2018-06-12 | End: 2018-06-12 | Stop reason: HOSPADM

## 2018-06-12 RX ORDER — METOCLOPRAMIDE HYDROCHLORIDE 5 MG/ML
10 INJECTION INTRAMUSCULAR; INTRAVENOUS EVERY 10 MIN PRN
Status: DISCONTINUED | OUTPATIENT
Start: 2018-06-12 | End: 2018-06-12 | Stop reason: HOSPADM

## 2018-06-12 RX ORDER — PROPOFOL 10 MG/ML
VIAL (ML) INTRAVENOUS
Status: DISCONTINUED | OUTPATIENT
Start: 2018-06-12 | End: 2018-06-12

## 2018-06-12 RX ORDER — LIDOCAINE HYDROCHLORIDE 10 MG/ML
INJECTION, SOLUTION EPIDURAL; INFILTRATION; INTRACAUDAL; PERINEURAL
Status: DISCONTINUED | OUTPATIENT
Start: 2018-06-12 | End: 2018-06-12 | Stop reason: HOSPADM

## 2018-06-12 RX ORDER — SODIUM CHLORIDE 9 MG/ML
INJECTION, SOLUTION INTRAVENOUS CONTINUOUS
Status: DISCONTINUED | OUTPATIENT
Start: 2018-06-12 | End: 2018-06-12

## 2018-06-12 RX ORDER — FUROSEMIDE 10 MG/ML
20 INJECTION INTRAMUSCULAR; INTRAVENOUS ONCE
Status: COMPLETED | OUTPATIENT
Start: 2018-06-12 | End: 2018-06-12

## 2018-06-12 RX ORDER — PANTOPRAZOLE SODIUM 40 MG/1
40 TABLET, DELAYED RELEASE ORAL DAILY
Status: DISCONTINUED | OUTPATIENT
Start: 2018-06-12 | End: 2018-06-15

## 2018-06-12 RX ORDER — ISOSORBIDE MONONITRATE 60 MG/1
60 TABLET, EXTENDED RELEASE ORAL DAILY
Status: DISCONTINUED | OUTPATIENT
Start: 2018-06-12 | End: 2018-06-15

## 2018-06-12 RX ORDER — CALCIUM CARBONATE 200(500)MG
1000 TABLET,CHEWABLE ORAL 2 TIMES DAILY
Status: DISCONTINUED | OUTPATIENT
Start: 2018-06-12 | End: 2018-06-14

## 2018-06-12 RX ORDER — IBUPROFEN 200 MG
24 TABLET ORAL
Status: DISCONTINUED | OUTPATIENT
Start: 2018-06-12 | End: 2018-06-30 | Stop reason: HOSPADM

## 2018-06-12 RX ORDER — DIPHENHYDRAMINE HYDROCHLORIDE 50 MG/ML
25 INJECTION INTRAMUSCULAR; INTRAVENOUS EVERY 4 HOURS PRN
Status: DISCONTINUED | OUTPATIENT
Start: 2018-06-12 | End: 2018-06-13

## 2018-06-12 RX ORDER — HYDRALAZINE HYDROCHLORIDE 20 MG/ML
10 INJECTION INTRAMUSCULAR; INTRAVENOUS ONCE
Status: DISCONTINUED | OUTPATIENT
Start: 2018-06-12 | End: 2018-06-12 | Stop reason: HOSPADM

## 2018-06-12 RX ORDER — CHLORHEXIDINE GLUCONATE ORAL RINSE 1.2 MG/ML
10 SOLUTION DENTAL 2 TIMES DAILY
Status: DISCONTINUED | OUTPATIENT
Start: 2018-06-12 | End: 2018-06-17

## 2018-06-12 RX ORDER — GLUCAGON 1 MG
1 KIT INJECTION
Status: DISCONTINUED | OUTPATIENT
Start: 2018-06-12 | End: 2018-06-30 | Stop reason: HOSPADM

## 2018-06-12 RX ORDER — LIDOCAINE HYDROCHLORIDE 20 MG/ML
INJECTION, SOLUTION EPIDURAL; INFILTRATION; INTRACAUDAL; PERINEURAL
Status: DISCONTINUED | OUTPATIENT
Start: 2018-06-12 | End: 2018-06-12

## 2018-06-12 RX ORDER — SODIUM CHLORIDE, SODIUM LACTATE, POTASSIUM CHLORIDE, CALCIUM CHLORIDE 600; 310; 30; 20 MG/100ML; MG/100ML; MG/100ML; MG/100ML
INJECTION, SOLUTION INTRAVENOUS CONTINUOUS PRN
Status: DISCONTINUED | OUTPATIENT
Start: 2018-06-12 | End: 2018-06-12

## 2018-06-12 RX ORDER — MORPHINE SULFATE 2 MG/ML
2 INJECTION, SOLUTION INTRAMUSCULAR; INTRAVENOUS ONCE
Status: COMPLETED | OUTPATIENT
Start: 2018-06-12 | End: 2018-06-12

## 2018-06-12 RX ORDER — SUCCINYLCHOLINE CHLORIDE 20 MG/ML
INJECTION INTRAMUSCULAR; INTRAVENOUS
Status: DISCONTINUED | OUTPATIENT
Start: 2018-06-12 | End: 2018-06-12

## 2018-06-12 RX ORDER — SODIUM BICARBONATE 650 MG/1
650 TABLET ORAL 2 TIMES DAILY
Status: DISCONTINUED | OUTPATIENT
Start: 2018-06-12 | End: 2018-06-30 | Stop reason: HOSPADM

## 2018-06-12 RX ORDER — ACETAMINOPHEN 325 MG/1
650 TABLET ORAL EVERY 6 HOURS PRN
Status: DISCONTINUED | OUTPATIENT
Start: 2018-06-12 | End: 2018-06-15

## 2018-06-12 RX ORDER — METOPROLOL TARTRATE 25 MG/1
25 TABLET, FILM COATED ORAL ONCE
Status: COMPLETED | OUTPATIENT
Start: 2018-06-12 | End: 2018-06-12

## 2018-06-12 RX ORDER — INSULIN ASPART 100 [IU]/ML
0-5 INJECTION, SOLUTION INTRAVENOUS; SUBCUTANEOUS
Status: DISCONTINUED | OUTPATIENT
Start: 2018-06-12 | End: 2018-06-30 | Stop reason: HOSPADM

## 2018-06-12 RX ORDER — ASPIRIN 81 MG/1
81 TABLET ORAL DAILY
Status: DISCONTINUED | OUTPATIENT
Start: 2018-06-12 | End: 2018-06-13

## 2018-06-12 RX ORDER — HYDROCODONE BITARTRATE AND ACETAMINOPHEN 5; 325 MG/1; MG/1
1 TABLET ORAL EVERY 4 HOURS PRN
Status: DISCONTINUED | OUTPATIENT
Start: 2018-06-12 | End: 2018-06-13

## 2018-06-12 RX ORDER — SODIUM CHLORIDE, SODIUM LACTATE, POTASSIUM CHLORIDE, CALCIUM CHLORIDE 600; 310; 30; 20 MG/100ML; MG/100ML; MG/100ML; MG/100ML
INJECTION, SOLUTION INTRAVENOUS CONTINUOUS
Status: DISCONTINUED | OUTPATIENT
Start: 2018-06-12 | End: 2018-06-12

## 2018-06-12 RX ORDER — PHENYLEPHRINE HYDROCHLORIDE 10 MG/ML
INJECTION INTRAVENOUS
Status: DISCONTINUED | OUTPATIENT
Start: 2018-06-12 | End: 2018-06-12

## 2018-06-12 RX ORDER — MORPHINE SULFATE 2 MG/ML
2 INJECTION, SOLUTION INTRAMUSCULAR; INTRAVENOUS EVERY 5 MIN PRN
Status: DISCONTINUED | OUTPATIENT
Start: 2018-06-12 | End: 2018-06-12 | Stop reason: HOSPADM

## 2018-06-12 RX ORDER — ONDANSETRON 2 MG/ML
INJECTION INTRAMUSCULAR; INTRAVENOUS
Status: DISCONTINUED | OUTPATIENT
Start: 2018-06-12 | End: 2018-06-12

## 2018-06-12 RX ORDER — LOSARTAN POTASSIUM 25 MG/1
25 TABLET ORAL DAILY
Status: DISCONTINUED | OUTPATIENT
Start: 2018-06-12 | End: 2018-06-13

## 2018-06-12 RX ORDER — AMOXICILLIN 250 MG
1 CAPSULE ORAL 2 TIMES DAILY
Status: DISCONTINUED | OUTPATIENT
Start: 2018-06-12 | End: 2018-06-18

## 2018-06-12 RX ADMIN — PIPERACILLIN AND TAZOBACTAM 4.5 G: 4; .5 INJECTION, POWDER, LYOPHILIZED, FOR SOLUTION INTRAVENOUS; PARENTERAL at 07:06

## 2018-06-12 RX ADMIN — LIDOCAINE HYDROCHLORIDE 80 MG: 20 INJECTION, SOLUTION EPIDURAL; INFILTRATION; INTRACAUDAL; PERINEURAL at 09:06

## 2018-06-12 RX ADMIN — Medication 30 MG: at 12:06

## 2018-06-12 RX ADMIN — FUROSEMIDE 20 MG: 10 INJECTION, SOLUTION INTRAMUSCULAR; INTRAVENOUS at 02:06

## 2018-06-12 RX ADMIN — MORPHINE SULFATE 2 MG: 2 INJECTION, SOLUTION INTRAMUSCULAR; INTRAVENOUS at 11:06

## 2018-06-12 RX ADMIN — SODIUM CHLORIDE, SODIUM LACTATE, POTASSIUM CHLORIDE, AND CALCIUM CHLORIDE: 600; 310; 30; 20 INJECTION, SOLUTION INTRAVENOUS at 09:06

## 2018-06-12 RX ADMIN — PROPOFOL 100 MG: 10 INJECTION, EMULSION INTRAVENOUS at 10:06

## 2018-06-12 RX ADMIN — LIDOCAINE HYDROCHLORIDE 10 ML: 10 INJECTION, SOLUTION EPIDURAL; INFILTRATION; INTRACAUDAL; PERINEURAL at 10:06

## 2018-06-12 RX ADMIN — Medication 25 MG: at 10:06

## 2018-06-12 RX ADMIN — HYDRALAZINE HYDROCHLORIDE 10 MG: 20 INJECTION INTRAMUSCULAR; INTRAVENOUS at 01:06

## 2018-06-12 RX ADMIN — PHENYLEPHRINE HYDROCHLORIDE 100 MCG: 10 INJECTION INTRAVENOUS at 10:06

## 2018-06-12 RX ADMIN — FENTANYL CITRATE 50 MCG: 50 INJECTION, SOLUTION INTRAMUSCULAR; INTRAVENOUS at 10:06

## 2018-06-12 RX ADMIN — CEFAZOLIN SODIUM 2 G: 2 SOLUTION INTRAVENOUS at 10:06

## 2018-06-12 RX ADMIN — LOSARTAN POTASSIUM 25 MG: 25 TABLET, FILM COATED ORAL at 06:06

## 2018-06-12 RX ADMIN — FENTANYL CITRATE 50 MCG: 50 INJECTION, SOLUTION INTRAMUSCULAR; INTRAVENOUS at 11:06

## 2018-06-12 RX ADMIN — ONDANSETRON 4 MG: 2 INJECTION, SOLUTION INTRAMUSCULAR; INTRAVENOUS at 11:06

## 2018-06-12 RX ADMIN — FENTANYL CITRATE 50 MCG: 50 INJECTION, SOLUTION INTRAMUSCULAR; INTRAVENOUS at 09:06

## 2018-06-12 RX ADMIN — SODIUM CHLORIDE, SODIUM LACTATE, POTASSIUM CHLORIDE, AND CALCIUM CHLORIDE: 600; 310; 30; 20 INJECTION, SOLUTION INTRAVENOUS at 10:06

## 2018-06-12 RX ADMIN — SUCCINYLCHOLINE CHLORIDE 140 MG: 20 INJECTION, SOLUTION INTRAMUSCULAR; INTRAVENOUS at 09:06

## 2018-06-12 RX ADMIN — METOPROLOL TARTRATE 25 MG: 25 TABLET, FILM COATED ORAL at 09:06

## 2018-06-12 RX ADMIN — Medication 20 MG: at 11:06

## 2018-06-12 RX ADMIN — ISOSORBIDE MONONITRATE 60 MG: 60 TABLET, EXTENDED RELEASE ORAL at 06:06

## 2018-06-12 RX ADMIN — DEXAMETHASONE SODIUM PHOSPHATE 4 MG: 4 INJECTION, SOLUTION INTRA-ARTICULAR; INTRALESIONAL; INTRAMUSCULAR; INTRAVENOUS; SOFT TISSUE at 10:06

## 2018-06-12 RX ADMIN — PANTOPRAZOLE SODIUM 40 MG: 40 TABLET, DELAYED RELEASE ORAL at 06:06

## 2018-06-12 RX ADMIN — ASPIRIN 81 MG: 81 TABLET, COATED ORAL at 06:06

## 2018-06-12 RX ADMIN — BUPIVACAINE HYDROCHLORIDE 10 ML: 2.5 INJECTION, SOLUTION EPIDURAL; INFILTRATION; INTRACAUDAL; PERINEURAL at 10:06

## 2018-06-12 RX ADMIN — PROPOFOL 50 MG: 10 INJECTION, EMULSION INTRAVENOUS at 10:06

## 2018-06-12 RX ADMIN — NIFEDIPINE 90 MG: 30 TABLET, FILM COATED, EXTENDED RELEASE ORAL at 06:06

## 2018-06-12 RX ADMIN — PROPOFOL 100 MG: 10 INJECTION, EMULSION INTRAVENOUS at 09:06

## 2018-06-12 NOTE — ASSESSMENT & PLAN NOTE
-BUN/Creatinine 33/3.5  -baseline 2.7-4.4  -will monitor   -sodium bicarb continued   -pt has been followed outpatient by Dr. Vazquez (Nephrology)  -will consult Nephrology if needed  - pt was candidate for renal transplant however work up discontinued due to progressive weakness

## 2018-06-12 NOTE — PROGRESS NOTES
Alejandrinanp in to see pt and family. Notified of troponin and generalized behavior and lung sounds.

## 2018-06-12 NOTE — HPI
Citlali Karimi is a 68 y.o female with PMHx of CKD (IV), HTN, DM, CVA (left sided weakness), and CAD who initially presented for hyperparathyroidism and hypercalcemia requiring surgical intervention.  Pt underwent parathyroidectomy today per Dr. Tracy (General Surgery). Pt admitted to Medical Surgical Unit post procedure and Hospital Medicine consulted for medical management.  Chest xray post procedure showed mild asymmetric CHF versus RUL pneumonia. Troponin 0.113 and BNP >270 noted.  Pt given IV Lasix in PACU prior to unit arrival.

## 2018-06-12 NOTE — PLAN OF CARE
Problem: Patient Care Overview  Goal: Plan of Care Review  Outcome: Ongoing (interventions implemented as appropriate)  Dx hyperparathyroidectomy  poc instructed on dbc 10 x q1h wa. Instructed on turning q2h. Instructed on pain meds and management. Instructed on fall risk and precautions. Pt resting. Does open eyes when spoken too. Will monitor.

## 2018-06-12 NOTE — INTERVAL H&P NOTE
The patient has been examined and the H&P has been reviewed:    I concur with the findings and no changes have occurred since H&P was written.    Anesthesia/Surgery risks, benefits and alternative options discussed and understood by patient/family.          Active Hospital Problems    Diagnosis  POA    Hyperparathyroidism [E21.3]  Yes      Resolved Hospital Problems    Diagnosis Date Resolved POA   No resolved problems to display.

## 2018-06-12 NOTE — ANESTHESIA POSTPROCEDURE EVALUATION
"Anesthesia Post Evaluation    Patient: Citlali Karimi    Procedure(s) Performed: Procedure(s) (LRB):  PARATHYROIDECTOMY (N/A)    Final Anesthesia Type: general  Patient location during evaluation: PACU  Patient participation: Yes- Able to Participate  Level of consciousness: awake and alert  Post-procedure vital signs: reviewed and stable  Pain management: adequate  Airway patency: patent  PONV status at discharge: No PONV  Anesthetic complications: yes  Perioperative Events: prolonged PACU stay - patient condition  Saloni-operative Events Comments: Ordered EKG and CXR because of EKG changes on monitor.  12 lead EKG showed ST changes anterior and laterally.  Dr. Louis assured me that EKG changes were due to LV strain pattern because pt has exhibited these changes before while being asymptomatic.  Pt denied chest pain or shortness of breath.  On auscultation, crackles were present.  Lasix 20mg IV given in PACU.  Spo2 94% on 2L NC.  Chest x ray ordered and showed    Possible mild asymmetric CHF versus right upper lobe pneumonia. Pt will go to monitored bed for overnight stay.  Will ask hospital medicine to follow pt.  Cardiac enzymes drawn in PACU.  Case discussed with Dr. Tracy.    Cardiovascular status: blood pressure returned to baseline and hemodynamically stable  Respiratory status: unassisted  Follow-up not needed.        Visit Vitals  BP (!) 158/79   Pulse 101   Temp 36.6 °C (97.8 °F) (Axillary)   Resp (!) 25   Ht 5' 2" (1.575 m)   Wt 69.7 kg (153 lb 10.6 oz)   SpO2 95%   Breastfeeding? No   BMI 28.10 kg/m²       Pain/Suri Score: Pain Assessment Performed: Yes (6/12/2018  2:30 PM)  Presence of Pain: non-verbal indicators absent (6/12/2018  2:30 PM)  Suri Score: 9 (6/12/2018  2:30 PM)      "

## 2018-06-12 NOTE — SUBJECTIVE & OBJECTIVE
Past Medical History:   Diagnosis Date    Acid reflux 1/7/2015    Anxiety 1/7/2015    Aortic valvar stenosis     Arthritis     osteo    Callus     Chronic anemia     followed by Dr. Addison    CKD (chronic kidney disease) stage 5, GFR less than 15 ml/min 8/7/2015    Combined hyperlipidemia associated with type 2 diabetes mellitus     Coronary artery disease     Diabetes mellitus type II, controlled, with no complications     LBSL 108 today    Encounter for blood transfusion     Frail elderly with impaired mobility, wheel chair bound 8/25/2017    Gallbladder problem     gallbladder surgery    Heart murmur     Hyperparathyroidism, secondary renal 1/7/2015    Hypertension 8/7/2015    Hypertension associated with diabetes 11/12/2012    Kidney transplant candidate 1/7/2015    Overweight(278.02)     Urinary tract infection        Past Surgical History:   Procedure Laterality Date    ANKLE FRACTURE SURGERY Left 1985    AORTIC VALVE REPLACEMENT  05/2016    BONE BIOPSY      CARDIAC SURGERY      CATARACT EXTRACTION W/  INTRAOCULAR LENS IMPLANT  2009    CHOLECYSTECTOMY      CYSTOSCOPY      EYE SURGERY      FRACTURE SURGERY      HYSTERECTOMY  unknown    OOPHORECTOMY      removal of colon polyps      RENAL BIOPSY  10/2013    YAG cap -OD         Review of patient's allergies indicates:   Allergen Reactions    Lipitor [atorvastatin] Swelling     Lips         No current facility-administered medications on file prior to encounter.      Current Outpatient Prescriptions on File Prior to Encounter   Medication Sig    cyanocobalamin (VITAMIN B-12) 1000 MCG tablet Take 1 tablet (1,000 mcg total) by mouth once daily.    isosorbide mononitrate (IMDUR) 60 MG 24 hr tablet TAKE  ONE TABLET BY MOUTH DAILY    lactulose (CEPHULAC) 20 gram Pack Take 20 g by mouth as needed.    losartan (COZAAR) 25 MG tablet TAKE ONE TABLET BY MOUTH DAILY    omeprazole (PRILOSEC) 40 MG capsule Take 1 capsule (40 mg total)  by mouth once daily.    simvastatin (ZOCOR) 20 MG tablet Take 1 tablet (20 mg total) by mouth every evening.    sodium bicarbonate 650 MG tablet Take 1 tablet (650 mg total) by mouth 2 (two) times daily.    aspirin (ECOTRIN) 81 MG EC tablet Take 81 mg by mouth once daily.     Family History     Problem Relation (Age of Onset)    Aneurysm Father    Cancer Paternal Grandmother    Colon cancer Maternal Grandmother    Diabetes Mother, Brother, Sister    Glaucoma Mother    Heart disease Mother, Sister    Hypertension Mother, Brother, Brother    Kidney disease Sister, Brother, Sister    No Known Problems Maternal Aunt, Maternal Uncle, Paternal Aunt, Paternal Uncle, Maternal Grandfather, Paternal Grandfather    Stroke Father        Social History Main Topics    Smoking status: Never Smoker    Smokeless tobacco: Never Used    Alcohol use No    Drug use: No    Sexual activity: Not Currently     Birth control/ protection: See Surgical Hx     Review of Systems   Constitutional: Negative for activity change, appetite change and fatigue.   HENT: Negative for congestion, postnasal drip and trouble swallowing.    Respiratory: Negative for cough, shortness of breath and wheezing.    Cardiovascular: Negative for chest pain, palpitations and leg swelling.   Gastrointestinal: Negative for abdominal distention, abdominal pain, diarrhea, nausea and vomiting.   Skin: Positive for wound. Negative for color change.   Allergic/Immunologic: Negative for environmental allergies and food allergies.   Neurological: Positive for weakness (left sided (chronic)). Negative for dizziness and headaches.   Hematological: Does not bruise/bleed easily.   Psychiatric/Behavioral: Negative for agitation, confusion and sleep disturbance. The patient is not nervous/anxious.      Objective:     Vital Signs (Most Recent):  Temp: 96.9 °F (36.1 °C) (06/12/18 1559)  Pulse: 66 (06/12/18 1559)  Resp: 16 (06/12/18 1700)  BP: (!) 167/76 (06/12/18 1559)  SpO2:  97 % (06/12/18 1700) Vital Signs (24h Range):  Temp:  [96.9 °F (36.1 °C)-97.9 °F (36.6 °C)] 96.9 °F (36.1 °C)  Pulse:  [] 66  Resp:  [16-29] 16  SpO2:  [88 %-100 %] 97 %  BP: (139-225)/() 167/76     Weight: 69.7 kg (153 lb 10.6 oz)  Body mass index is 28.1 kg/m².    Physical Exam   Constitutional: She appears well-developed and well-nourished. She appears lethargic.   HENT:   Head: Normocephalic.   Mouth/Throat: Mucous membranes are dry.   Eyes: Conjunctivae are normal.   Neck: Normal range of motion. Neck supple.   Cardiovascular: Normal rate, regular rhythm, normal heart sounds and intact distal pulses.    Pulmonary/Chest: No tachypnea. No respiratory distress. She has decreased breath sounds in the right lower field and the left lower field. She has no rhonchi.   Coarse breath sounds to upper airway noted   Abdominal: Soft. Bowel sounds are normal. She exhibits no distension. There is no tenderness.   Genitourinary:   Genitourinary Comments: Deferred    Musculoskeletal:   Left sided weakness   Neurological: She appears lethargic. No cranial nerve deficit.   Left sided weakness noted    Skin: Skin is warm and dry.   Surgical incision to cervical area   Psychiatric:   Pt lethargic post procedure but answered questions appropriately when questioned       Significant Labs:   CBC:   Recent Labs  Lab 06/12/18  1718   WBC 10.72   HGB 12.6   HCT 40.6        CMP:   Recent Labs  Lab 06/12/18  1444      K 3.8      CO2 22*   *   BUN 33*   CREATININE 3.5*   CALCIUM 11.4*   ANIONGAP 11   EGFRNONAA 13*       Significant Imaging:   Imaging Results    None

## 2018-06-12 NOTE — ANESTHESIA PREPROCEDURE EVALUATION
06/12/2018  Citlali Karimi is a 68 y.o., female.    Anesthesia Evaluation    I have reviewed the Patient Summary Reports.    I have reviewed the Nursing Notes.   I have reviewed the Medications.     Review of Systems  Anesthesia Hx:  No problems with previous Anesthesia  Denies Family Hx of Anesthesia complications.   Denies Personal Hx of Anesthesia complications.   Cardiovascular:   Hypertension CAD   ECG has been reviewed.   CONCLUSIONS     1 - No wall motion abnormalities.     2 - Normal left ventricular systolic function (EF 60-65%).     3 - Normal right ventricular systolic function .    Pulmonary:  Pulmonary Normal    Renal/:   Chronic Renal Disease    Neurological:   TIA, Neuromuscular Disease,    Endocrine:   Diabetes        Physical Exam  General:  Well nourished    Airway/Jaw/Neck:  Airway Findings: Mouth Opening: Normal Tongue: Normal  Mallampati: II      Dental:  DENTAL FINDINGS: Normal   Chest/Lungs:  Chest/Lungs Findings: Normal Respiratory Rate     Heart/Vascular:  Heart Findings: Normal       Mental Status:  Mental Status Findings:  Alert and Oriented         Anesthesia Plan  Type of Anesthesia, risks & benefits discussed:  Anesthesia Type:  general  Patient's Preference:   Intra-op Monitoring Plan:   Intra-op Monitoring Plan Comments:   Post Op Pain Control Plan:   Post Op Pain Control Plan Comments:   Induction:    Beta Blocker:  Patient is on a Beta-Blocker and has received one dose within the past 24 hours (No further documentation required).       Informed Consent: Patient understands risks and agrees with Anesthesia plan.  Questions answered. Anesthesia consent signed with patient.  ASA Score: 3     Day of Surgery Review of History & Physical: I have interviewed and examined the patient. I have reviewed the patient's H&P dated:  There are no significant changes.      Anesthesia Plan  Notes: Elevated BP.  Will give metoprolol dose now.        Ready For Surgery From Anesthesia Perspective.

## 2018-06-12 NOTE — PROGRESS NOTES
Pt tranferred from pacu. Pt drowsy. Pt does open eyes and then closes again, neck incision wnl. dermabond to neck. No signs of chest pain. Heart with rrr. Pulses palpable. Pt with crackles to lungs throughout. o2 at 2l nc. Abdomen soft. Normoactive. Buttocks wnl. Skin wnl. Iv to rt and lt arm wnl. Brief in place. Pt cleansed of urine. Hob at 30 degrees.  at bedside. Oriented to room and call light , water, phone and call light in reach. No signs of pain at this time.

## 2018-06-12 NOTE — CONSULTS
[facility] Hospital Medicine  Consult Note    Patient Name: Citlali Karimi  MRN: [id number]  Admission Date: 6/12/2018  Hospital Length of Stay: 0 days  Attending Physician: Levy Samuel MD   Primary Care Provider: Evelio Schuster MD           Patient information was obtained from patient, spouse/SO, past medical records and ER records.     Consults  Subjective:     Principal Problem: Hyperparathyroidism    Chief Complaint: s/p parathyroidectomy     HPI: Citlali Karimi is a 68 y.o female with PMHx of CKD (IV), HTN, DM, CVA (left sided weakness), and CAD who initially presented for hyperparathyroidism and hypercalcemia requiring surgical intervention.  Pt underwent parathyroidectomy today per Dr. Tracy (General Surgery). Pt admitted to Medical Surgical Unit post procedure and Hospital Medicine consulted for medical management.  Chest xray post procedure showed mild asymmetric CHF versus RUL pneumonia. Troponin 0.113 and BNP >270 noted.  Pt given IV Lasix in PACU prior to unit arrival.      Past Medical History:   Diagnosis Date    Acid reflux 1/7/2015    Anxiety 1/7/2015    Aortic valvar stenosis     Arthritis     osteo    Callus     Chronic anemia     followed by Dr. Addison    CKD (chronic kidney disease) stage 5, GFR less than 15 ml/min 8/7/2015    Combined hyperlipidemia associated with type 2 diabetes mellitus     Coronary artery disease     Diabetes mellitus type II, controlled, with no complications     LBSL 108 today    Encounter for blood transfusion     Frail elderly with impaired mobility, wheel chair bound 8/25/2017    Gallbladder problem     gallbladder surgery    Heart murmur     Hyperparathyroidism, secondary renal 1/7/2015    Hypertension 8/7/2015    Hypertension associated with diabetes 11/12/2012    Kidney transplant candidate 1/7/2015    Overweight(278.02)     Urinary tract infection        Past Surgical History:   Procedure Laterality Date    ANKLE FRACTURE  SURGERY Left 1985    AORTIC VALVE REPLACEMENT  05/2016    BONE BIOPSY      CARDIAC SURGERY      CATARACT EXTRACTION W/  INTRAOCULAR LENS IMPLANT  2009    CHOLECYSTECTOMY      CYSTOSCOPY      EYE SURGERY      FRACTURE SURGERY      HYSTERECTOMY  unknown    OOPHORECTOMY      removal of colon polyps      RENAL BIOPSY  10/2013    YAG cap -OD         Review of patient's allergies indicates:   Allergen Reactions    Lipitor [atorvastatin] Swelling     Lips         No current facility-administered medications on file prior to encounter.      Current Outpatient Prescriptions on File Prior to Encounter   Medication Sig    cyanocobalamin (VITAMIN B-12) 1000 MCG tablet Take 1 tablet (1,000 mcg total) by mouth once daily.    isosorbide mononitrate (IMDUR) 60 MG 24 hr tablet TAKE  ONE TABLET BY MOUTH DAILY    lactulose (CEPHULAC) 20 gram Pack Take 20 g by mouth as needed.    losartan (COZAAR) 25 MG tablet TAKE ONE TABLET BY MOUTH DAILY    omeprazole (PRILOSEC) 40 MG capsule Take 1 capsule (40 mg total) by mouth once daily.    simvastatin (ZOCOR) 20 MG tablet Take 1 tablet (20 mg total) by mouth every evening.    sodium bicarbonate 650 MG tablet Take 1 tablet (650 mg total) by mouth 2 (two) times daily.    aspirin (ECOTRIN) 81 MG EC tablet Take 81 mg by mouth once daily.     Family History     Problem Relation (Age of Onset)    Aneurysm Father    Cancer Paternal Grandmother    Colon cancer Maternal Grandmother    Diabetes Mother, Brother, Sister    Glaucoma Mother    Heart disease Mother, Sister    Hypertension Mother, Brother, Brother    Kidney disease Sister, Brother, Sister    No Known Problems Maternal Aunt, Maternal Uncle, Paternal Aunt, Paternal Uncle, Maternal Grandfather, Paternal Grandfather    Stroke Father        Social History Main Topics    Smoking status: Never Smoker    Smokeless tobacco: Never Used    Alcohol use No    Drug use: No    Sexual activity: Not Currently     Birth control/  protection: See Surgical Hx     Review of Systems   Constitutional: Negative for activity change, appetite change and fatigue.   HENT: Negative for congestion, postnasal drip and trouble swallowing.    Respiratory: Negative for cough, shortness of breath and wheezing.    Cardiovascular: Negative for chest pain, palpitations and leg swelling.   Gastrointestinal: Negative for abdominal distention, abdominal pain, diarrhea, nausea and vomiting.   Skin: Positive for wound. Negative for color change.   Allergic/Immunologic: Negative for environmental allergies and food allergies.   Neurological: Positive for weakness (left sided (chronic)). Negative for dizziness and headaches.   Hematological: Does not bruise/bleed easily.   Psychiatric/Behavioral: Negative for agitation, confusion and sleep disturbance. The patient is not nervous/anxious.      Objective:     Vital Signs (Most Recent):  Temp: 96.9 °F (36.1 °C) (06/12/18 1559)  Pulse: 66 (06/12/18 1559)  Resp: 16 (06/12/18 1700)  BP: (!) 167/76 (06/12/18 1559)  SpO2: 97 % (06/12/18 1700) Vital Signs (24h Range):  Temp:  [96.9 °F (36.1 °C)-97.9 °F (36.6 °C)] 96.9 °F (36.1 °C)  Pulse:  [] 66  Resp:  [16-29] 16  SpO2:  [88 %-100 %] 97 %  BP: (139-225)/() 167/76     Weight: 69.7 kg (153 lb 10.6 oz)  Body mass index is 28.1 kg/m².    Physical Exam   Constitutional: She appears well-developed and well-nourished. She appears lethargic.   HENT:   Head: Normocephalic.   Mouth/Throat: Mucous membranes are dry.   Eyes: Conjunctivae are normal.   Neck: Normal range of motion. Neck supple.   Cardiovascular: Normal rate, regular rhythm, normal heart sounds and intact distal pulses.    Pulmonary/Chest: No tachypnea. No respiratory distress. She has decreased breath sounds in the right lower field and the left lower field. She has no rhonchi.   Coarse breath sounds to upper airway noted   Abdominal: Soft. Bowel sounds are normal. She exhibits no distension. There is no  tenderness.   Genitourinary:   Genitourinary Comments: Deferred    Musculoskeletal:   Left sided weakness   Neurological: She appears lethargic. No cranial nerve deficit.   Left sided weakness noted    Skin: Skin is warm and dry.   Surgical incision to cervical area   Psychiatric:   Pt lethargic post procedure but answered questions appropriately when questioned       Significant Labs:   CBC:   Recent Labs  Lab 06/12/18  1718   WBC 10.72   HGB 12.6   HCT 40.6        CMP:   Recent Labs  Lab 06/12/18  1444      K 3.8      CO2 22*   *   BUN 33*   CREATININE 3.5*   CALCIUM 11.4*   ANIONGAP 11   EGFRNONAA 13*       Significant Imaging:   Imaging Results    None       Assessment/Plan:     Hypercalcemia    -hx of hyperparathyroidism   - s/p parathyroidectomy   - Ca 11.4  - will consult Nephrology if needed          Abnormal chest x-ray    - chest xray showed possible mild asymmetric CHF versus right upper lobe pneumonia.  -pt given IV Lasix in PACU  - will initiate IV antibiotics for suspected aspiration   - pt recently seen by RAMIRO Mcnally (Otolaryngology) for Dysphagia  - pt afebrile, WBC normal  - will repeat xray in am   - SLP evaluation in am           Elevated troponin    -initial 0.113  -pt denies CP and SOB  - EKG results pending   -will follow serial results             CVA (cerebral vascular accident)    -hx of 1 yr ago per   -left sided weakness residual  -ASA and Statin continued           Hyperparathyroidism    -Pt admitted to Extended Recovery   -s/p Parathyroidectomy   -managed by General Surgery -primary team  - PTH- 32  - Ca 11.4  - analgesia prn   - antiemetics prn  - specimens sent for analysis            Coronary artery disease involving native coronary artery    - ASA, Statin, and Lopressor continued           CKD (chronic kidney disease) stage 5, GFR less than 15 ml/min    -BUN/Creatinine 33/3.5  -baseline 2.7-4.4  -will monitor   -sodium bicarb continued   -pt has  been followed outpatient by Dr. Vazquez (Nephrology)  -will consult Nephrology if needed  - pt was candidate for renal transplant however work up discontinued due to progressive weakness          Hypertension associated with diabetes    - home medications continued           Diabetic nephropathy associated with type 2 diabetes mellitus    -Accuchecks and SSI continued   -HbA1c pending   -cardiac/diabetic diet          Anemia in stage 4 chronic kidney disease    -stable post procedure  -will monitor  -CBC in am   -pt followed outpatient by Heme/Onc          Aortic valvar stenosis    -s/p TAVR  - Echo in 04/2017 with EF 60%  -followed outpatient by Cardiology             VTE Risk Mitigation     None              Thank you for your consult. I will follow-up with patient. Please contact us if you have any additional questions.    Rach Wheatley NP  Department of Hospital Medicine   [facility]

## 2018-06-12 NOTE — ASSESSMENT & PLAN NOTE
-hx of hyperparathyroidism   - s/p parathyroidectomy   - Ca 11.4  - will consult Nephrology if needed

## 2018-06-12 NOTE — TRANSFER OF CARE
"Anesthesia Transfer of Care Note    Patient: Citlali Karimi    Procedure(s) Performed: Procedure(s) (LRB):  PARATHYROIDECTOMY (N/A)    Patient location: PACU    Anesthesia Type: MAC    Transport from OR: Transported from OR on room air with adequate spontaneous ventilation    Post pain: adequate analgesia    Post assessment: no apparent anesthetic complications    Post vital signs: stable    Level of consciousness: awake    Nausea/Vomiting: no nausea/vomiting    Complications: none    Transfer of care protocol was followed      Last vitals:   Visit Vitals  BP (!) 208/89 (BP Location: Right arm, Patient Position: Sitting)   Pulse (!) 55   Temp 36.6 °C (97.9 °F) (Temporal)   Resp 20   Ht 5' 2" (1.575 m)   Wt 69.7 kg (153 lb 10.6 oz)   SpO2 97%   Breastfeeding? No   BMI 28.10 kg/m²     "

## 2018-06-12 NOTE — ASSESSMENT & PLAN NOTE
- chest xray showed possible mild asymmetric CHF versus right upper lobe pneumonia.  -pt given IV Lasix in PACU  - will initiate IV antibiotics for suspected aspiration   - pt recently seen by RAMIRO Mcnally (Otolaryngology) for Dysphagia  - pt afebrile, WBC normal  - will repeat xray in am   - SLP evaluation in am

## 2018-06-12 NOTE — ASSESSMENT & PLAN NOTE
-Pt admitted to Extended Recovery   -s/p Parathyroidectomy   -managed by General Surgery -primary team  - PTH- 32  - Ca 11.4  - analgesia prn   - antiemetics prn  - specimens sent for analysis

## 2018-06-13 ENCOUNTER — TELEPHONE (OUTPATIENT)
Dept: HEMATOLOGY/ONCOLOGY | Facility: CLINIC | Age: 68
End: 2018-06-13

## 2018-06-13 PROBLEM — I21.4 NSTEMI (NON-ST ELEVATED MYOCARDIAL INFARCTION): Status: ACTIVE | Noted: 2018-06-13

## 2018-06-13 LAB
ALBUMIN SERPL BCP-MCNC: 3.2 G/DL
ANION GAP SERPL CALC-SCNC: 10 MMOL/L
ANION GAP SERPL CALC-SCNC: 12 MMOL/L
APTT BLDCRRT: 150 SEC
APTT BLDCRRT: 28.5 SEC
APTT BLDCRRT: 37.8 SEC
APTT BLDCRRT: >150 SEC
BASOPHILS # BLD AUTO: 0.01 K/UL
BASOPHILS # BLD AUTO: 0.01 K/UL
BASOPHILS NFR BLD: 0.1 %
BASOPHILS NFR BLD: 0.1 %
BUN SERPL-MCNC: 37 MG/DL
BUN SERPL-MCNC: 42 MG/DL
CALCIUM SERPL-MCNC: 10.4 MG/DL
CALCIUM SERPL-MCNC: 10.5 MG/DL
CHLORIDE SERPL-SCNC: 106 MMOL/L
CHLORIDE SERPL-SCNC: 107 MMOL/L
CO2 SERPL-SCNC: 23 MMOL/L
CO2 SERPL-SCNC: 24 MMOL/L
CREAT SERPL-MCNC: 3.6 MG/DL
CREAT SERPL-MCNC: 4.1 MG/DL
DIFFERENTIAL METHOD: ABNORMAL
DIFFERENTIAL METHOD: ABNORMAL
EOSINOPHIL # BLD AUTO: 0 K/UL
EOSINOPHIL # BLD AUTO: 0 K/UL
EOSINOPHIL NFR BLD: 0 %
EOSINOPHIL NFR BLD: 0 %
ERYTHROCYTE [DISTWIDTH] IN BLOOD BY AUTOMATED COUNT: 16.4 %
ERYTHROCYTE [DISTWIDTH] IN BLOOD BY AUTOMATED COUNT: 16.6 %
EST. GFR  (AFRICAN AMERICAN): 12 ML/MIN/1.73 M^2
EST. GFR  (AFRICAN AMERICAN): 14 ML/MIN/1.73 M^2
EST. GFR  (NON AFRICAN AMERICAN): 10 ML/MIN/1.73 M^2
EST. GFR  (NON AFRICAN AMERICAN): 12 ML/MIN/1.73 M^2
ESTIMATED AVG GLUCOSE: 108 MG/DL
GLUCOSE SERPL-MCNC: 105 MG/DL
GLUCOSE SERPL-MCNC: 126 MG/DL
HBA1C MFR BLD HPLC: 5.4 %
HCT VFR BLD AUTO: 33.3 %
HCT VFR BLD AUTO: 34.2 %
HGB BLD-MCNC: 10.7 G/DL
HGB BLD-MCNC: 10.9 G/DL
INR PPP: 1
LACTATE SERPL-SCNC: 1.5 MMOL/L
LYMPHOCYTES # BLD AUTO: 1.3 K/UL
LYMPHOCYTES # BLD AUTO: 1.6 K/UL
LYMPHOCYTES NFR BLD: 11.7 %
LYMPHOCYTES NFR BLD: 13.6 %
MCH RBC QN AUTO: 28.9 PG
MCH RBC QN AUTO: 29.2 PG
MCHC RBC AUTO-ENTMCNC: 31.9 G/DL
MCHC RBC AUTO-ENTMCNC: 32.1 G/DL
MCV RBC AUTO: 91 FL
MCV RBC AUTO: 91 FL
MONOCYTES # BLD AUTO: 0.9 K/UL
MONOCYTES # BLD AUTO: 0.9 K/UL
MONOCYTES NFR BLD: 7.6 %
MONOCYTES NFR BLD: 8.2 %
NEUTROPHILS # BLD AUTO: 9 K/UL
NEUTROPHILS # BLD AUTO: 9 K/UL
NEUTROPHILS NFR BLD: 78.7 %
NEUTROPHILS NFR BLD: 80 %
PHOSPHATE SERPL-MCNC: 5.1 MG/DL
PLATELET # BLD AUTO: 179 K/UL
PLATELET # BLD AUTO: 179 K/UL
PLATELET # BLD AUTO: 183 K/UL
PMV BLD AUTO: 10 FL
PMV BLD AUTO: 9.6 FL
PMV BLD AUTO: 9.6 FL
POCT GLUCOSE: 104 MG/DL (ref 70–110)
POCT GLUCOSE: 121 MG/DL (ref 70–110)
POCT GLUCOSE: 133 MG/DL (ref 70–110)
POCT GLUCOSE: 166 MG/DL (ref 70–110)
POTASSIUM SERPL-SCNC: 4.2 MMOL/L
POTASSIUM SERPL-SCNC: 4.3 MMOL/L
PROTHROMBIN TIME: 10.7 SEC
PTH-INTACT SERPL-MCNC: 18.5 PG/ML
RBC # BLD AUTO: 3.67 M/UL
RBC # BLD AUTO: 3.77 M/UL
SODIUM SERPL-SCNC: 141 MMOL/L
SODIUM SERPL-SCNC: 141 MMOL/L
TROPONIN I SERPL DL<=0.01 NG/ML-MCNC: 41.66 NG/ML
TROPONIN I SERPL DL<=0.01 NG/ML-MCNC: >50 NG/ML
WBC # BLD AUTO: 11.22 K/UL
WBC # BLD AUTO: 11.39 K/UL

## 2018-06-13 PROCEDURE — 97530 THERAPEUTIC ACTIVITIES: CPT

## 2018-06-13 PROCEDURE — 36415 COLL VENOUS BLD VENIPUNCTURE: CPT

## 2018-06-13 PROCEDURE — 25000003 PHARM REV CODE 250: Performed by: NURSE PRACTITIONER

## 2018-06-13 PROCEDURE — 63600175 PHARM REV CODE 636 W HCPCS: Performed by: NURSE PRACTITIONER

## 2018-06-13 PROCEDURE — 92610 EVALUATE SWALLOWING FUNCTION: CPT

## 2018-06-13 PROCEDURE — 97802 MEDICAL NUTRITION INDIV IN: CPT

## 2018-06-13 PROCEDURE — 80069 RENAL FUNCTION PANEL: CPT

## 2018-06-13 PROCEDURE — 92523 SPEECH SOUND LANG COMPREHEN: CPT

## 2018-06-13 PROCEDURE — 85730 THROMBOPLASTIN TIME PARTIAL: CPT | Mod: 91

## 2018-06-13 PROCEDURE — 94761 N-INVAS EAR/PLS OXIMETRY MLT: CPT

## 2018-06-13 PROCEDURE — 25000003 PHARM REV CODE 250: Performed by: SURGERY

## 2018-06-13 PROCEDURE — 99900035 HC TECH TIME PER 15 MIN (STAT)

## 2018-06-13 PROCEDURE — G8978 MOBILITY CURRENT STATUS: HCPCS | Mod: CN

## 2018-06-13 PROCEDURE — 84484 ASSAY OF TROPONIN QUANT: CPT | Mod: 91

## 2018-06-13 PROCEDURE — 63600175 PHARM REV CODE 636 W HCPCS: Performed by: INTERNAL MEDICINE

## 2018-06-13 PROCEDURE — 84484 ASSAY OF TROPONIN QUANT: CPT

## 2018-06-13 PROCEDURE — G8979 MOBILITY GOAL STATUS: HCPCS | Mod: CL

## 2018-06-13 PROCEDURE — 85025 COMPLETE CBC W/AUTO DIFF WBC: CPT

## 2018-06-13 PROCEDURE — 83605 ASSAY OF LACTIC ACID: CPT

## 2018-06-13 PROCEDURE — 93010 ELECTROCARDIOGRAM REPORT: CPT | Mod: ,,, | Performed by: INTERNAL MEDICINE

## 2018-06-13 PROCEDURE — 25000242 PHARM REV CODE 250 ALT 637 W/ HCPCS: Performed by: INTERNAL MEDICINE

## 2018-06-13 PROCEDURE — 85730 THROMBOPLASTIN TIME PARTIAL: CPT

## 2018-06-13 PROCEDURE — 99222 1ST HOSP IP/OBS MODERATE 55: CPT | Mod: ,,, | Performed by: INTERNAL MEDICINE

## 2018-06-13 PROCEDURE — 99223 1ST HOSP IP/OBS HIGH 75: CPT | Mod: ,,, | Performed by: INTERNAL MEDICINE

## 2018-06-13 PROCEDURE — 21400001 HC TELEMETRY ROOM

## 2018-06-13 PROCEDURE — 27000221 HC OXYGEN, UP TO 24 HOURS

## 2018-06-13 PROCEDURE — 85610 PROTHROMBIN TIME: CPT

## 2018-06-13 PROCEDURE — 80048 BASIC METABOLIC PNL TOTAL CA: CPT

## 2018-06-13 PROCEDURE — 93306 TTE W/DOPPLER COMPLETE: CPT | Mod: 26,,, | Performed by: INTERNAL MEDICINE

## 2018-06-13 PROCEDURE — 93005 ELECTROCARDIOGRAM TRACING: CPT

## 2018-06-13 PROCEDURE — 97163 PT EVAL HIGH COMPLEX 45 MIN: CPT

## 2018-06-13 PROCEDURE — 93306 TTE W/DOPPLER COMPLETE: CPT

## 2018-06-13 RX ORDER — HEPARIN SODIUM,PORCINE/D5W 25000/250
16 INTRAVENOUS SOLUTION INTRAVENOUS CONTINUOUS
Status: DISCONTINUED | OUTPATIENT
Start: 2018-06-13 | End: 2018-06-14

## 2018-06-13 RX ORDER — ASPIRIN 325 MG
325 TABLET ORAL ONCE
Status: COMPLETED | OUTPATIENT
Start: 2018-06-13 | End: 2018-06-13

## 2018-06-13 RX ORDER — ONDANSETRON 2 MG/ML
4 INJECTION INTRAMUSCULAR; INTRAVENOUS EVERY 8 HOURS PRN
Status: DISCONTINUED | OUTPATIENT
Start: 2018-06-13 | End: 2018-06-30 | Stop reason: HOSPADM

## 2018-06-13 RX ORDER — ACETYLCYSTEINE 200 MG/ML
600 SOLUTION ORAL; RESPIRATORY (INHALATION) 2 TIMES DAILY
Status: DISCONTINUED | OUTPATIENT
Start: 2018-06-13 | End: 2018-06-14

## 2018-06-13 RX ORDER — ASPIRIN 81 MG/1
81 TABLET ORAL DAILY
Status: DISCONTINUED | OUTPATIENT
Start: 2018-06-14 | End: 2018-06-14

## 2018-06-13 RX ORDER — METOPROLOL TARTRATE 1 MG/ML
2.5 INJECTION, SOLUTION INTRAVENOUS ONCE
Status: COMPLETED | OUTPATIENT
Start: 2018-06-13 | End: 2018-06-13

## 2018-06-13 RX ORDER — MORPHINE SULFATE 2 MG/ML
2 INJECTION, SOLUTION INTRAMUSCULAR; INTRAVENOUS EVERY 6 HOURS PRN
Status: DISCONTINUED | OUTPATIENT
Start: 2018-06-13 | End: 2018-06-15

## 2018-06-13 RX ADMIN — PIPERACILLIN AND TAZOBACTAM 4.5 G: 4; .5 INJECTION, POWDER, LYOPHILIZED, FOR SOLUTION INTRAVENOUS; PARENTERAL at 08:06

## 2018-06-13 RX ADMIN — PIPERACILLIN AND TAZOBACTAM 4.5 G: 4; .5 INJECTION, POWDER, LYOPHILIZED, FOR SOLUTION INTRAVENOUS; PARENTERAL at 07:06

## 2018-06-13 RX ADMIN — CALCIUM CARBONATE (ANTACID) CHEW TAB 500 MG 1000 MG: 500 CHEW TAB at 09:06

## 2018-06-13 RX ADMIN — ISOSORBIDE MONONITRATE 60 MG: 60 TABLET, EXTENDED RELEASE ORAL at 09:06

## 2018-06-13 RX ADMIN — STANDARDIZED SENNA CONCENTRATE AND DOCUSATE SODIUM 1 TABLET: 8.6; 5 TABLET, FILM COATED ORAL at 09:06

## 2018-06-13 RX ADMIN — SODIUM BICARBONATE 650 MG TABLET 650 MG: at 09:06

## 2018-06-13 RX ADMIN — PANTOPRAZOLE SODIUM 40 MG: 40 TABLET, DELAYED RELEASE ORAL at 09:06

## 2018-06-13 RX ADMIN — ACETYLCYSTEINE 600 MG: 200 INHALANT RESPIRATORY (INHALATION) at 04:06

## 2018-06-13 RX ADMIN — CHLORHEXIDINE GLUCONATE 10 ML: 1.2 RINSE ORAL at 09:06

## 2018-06-13 RX ADMIN — HEPARIN SODIUM AND DEXTROSE 16 UNITS/KG/HR: 10000; 5 INJECTION INTRAVENOUS at 08:06

## 2018-06-13 RX ADMIN — METOPROLOL TARTRATE 2.5 MG: 5 INJECTION, SOLUTION INTRAVENOUS at 09:06

## 2018-06-13 RX ADMIN — ASPIRIN 325 MG: 325 TABLET ORAL at 09:06

## 2018-06-13 RX ADMIN — NIFEDIPINE 90 MG: 30 TABLET, FILM COATED, EXTENDED RELEASE ORAL at 09:06

## 2018-06-13 RX ADMIN — MORPHINE SULFATE 2 MG: 2 INJECTION, SOLUTION INTRAMUSCULAR; INTRAVENOUS at 01:06

## 2018-06-13 RX ADMIN — METOPROLOL TARTRATE 25 MG: 25 TABLET, FILM COATED ORAL at 09:06

## 2018-06-13 RX ADMIN — METOPROLOL TARTRATE 25 MG: 25 TABLET, FILM COATED ORAL at 08:06

## 2018-06-13 RX ADMIN — NITROGLYCERIN 0.5 INCH: 20 OINTMENT TOPICAL at 09:06

## 2018-06-13 RX ADMIN — LOSARTAN POTASSIUM 25 MG: 25 TABLET, FILM COATED ORAL at 09:06

## 2018-06-13 RX ADMIN — SIMVASTATIN 20 MG: 20 TABLET, FILM COATED ORAL at 08:06

## 2018-06-13 RX ADMIN — CALCIUM CARBONATE (ANTACID) CHEW TAB 500 MG 1000 MG: 500 CHEW TAB at 08:06

## 2018-06-13 RX ADMIN — MORPHINE SULFATE 2 MG: 2 INJECTION, SOLUTION INTRAMUSCULAR; INTRAVENOUS at 05:06

## 2018-06-13 RX ADMIN — SODIUM BICARBONATE 650 MG TABLET 650 MG: at 08:06

## 2018-06-13 RX ADMIN — STANDARDIZED SENNA CONCENTRATE AND DOCUSATE SODIUM 1 TABLET: 8.6; 5 TABLET, FILM COATED ORAL at 08:06

## 2018-06-13 NOTE — ASSESSMENT & PLAN NOTE
-Cardiology consulted   - EKG showed diffuse ST depression EKG and troponin increased from 0.113 >> 34>>50, consistent with NSTEMI  -Denies chest pain or SOB  -BP elevated yesterday, HTN likely triggered clot rupture/MI  -Known CAD on previous cath not amenable to PCI at that time  -Continue ASA, Imdur, Metoprolol, statin, heparin gtt  -Will need repeat angiogram to re-assess coronaries/underlying ischemia  -Nephrology consulted due to high risk of contrast induced nephropathy- creatinine 3.6  -Renal consult requested for recommendations/clearance

## 2018-06-13 NOTE — SUBJECTIVE & OBJECTIVE
"Interval History: pt is at baseline per  and denies CP/SOB during exam.  Heparin infusion continued with Imdur increased per Cardiology.  Nephrology consulted due to elevated creatinine.  Intervention to be considered pending Echo results and recommendations.   reported "heartburn", swallowing changes, and noncompliance with medication prior to admission.       Review of Systems   Constitutional: Negative for activity change, appetite change and fatigue.   HENT: Positive for trouble swallowing. Negative for congestion and postnasal drip.    Respiratory: Negative for cough, shortness of breath and wheezing.    Cardiovascular: Negative for chest pain, palpitations and leg swelling.   Gastrointestinal: Negative for abdominal distention, abdominal pain, diarrhea, nausea and vomiting.        Heartburn   Skin: Positive for wound. Negative for color change.   Allergic/Immunologic: Negative for environmental allergies and food allergies.   Neurological: Positive for weakness (left sided (chronic)). Negative for dizziness and headaches.   Hematological: Does not bruise/bleed easily.   Psychiatric/Behavioral: Negative for agitation, confusion and sleep disturbance. The patient is not nervous/anxious.      Objective:     Vital Signs (Most Recent):  Temp: 97.6 °F (36.4 °C) (06/13/18 1139)  Pulse: 68 (06/13/18 1139)  Resp: 18 (06/13/18 1139)  BP: (!) 92/51 (06/13/18 1139)  SpO2: 96 % (06/13/18 1139) Vital Signs (24h Range):  Temp:  [96.9 °F (36.1 °C)-98.8 °F (37.1 °C)] 97.6 °F (36.4 °C)  Pulse:  [] 68  Resp:  [16-29] 18  SpO2:  [88 %-100 %] 96 %  BP: ()/() 92/51     Weight: 69.7 kg (153 lb 10.6 oz)  Body mass index is 28.1 kg/m².    Intake/Output Summary (Last 24 hours) at 06/13/18 1151  Last data filed at 06/13/18 0819   Gross per 24 hour   Intake           948.79 ml   Output               10 ml   Net           938.79 ml      Physical Exam   Constitutional: She appears well-developed and " well-nourished.   HENT:   Head: Normocephalic.   Mouth/Throat: Mucous membranes are dry.   Eyes: Conjunctivae are normal.   Neck: Normal range of motion. Neck supple.   Cardiovascular: Normal rate, regular rhythm, normal heart sounds and intact distal pulses.    Pulmonary/Chest: No tachypnea. No respiratory distress. She has decreased breath sounds in the right lower field and the left lower field. She has no rhonchi.   Coarse breath sounds to upper airway noted   Abdominal: Soft. Bowel sounds are normal. She exhibits no distension. There is no tenderness.   Genitourinary:   Genitourinary Comments: Deferred    Musculoskeletal:   Left sided weakness   Neurological: She is alert. No cranial nerve deficit.   Left sided weakness noted    Skin: Skin is warm and dry.   Surgical incision to cervical area   Psychiatric: She has a normal mood and affect. Her behavior is normal.   Pt alert and answers appropriately when questioned       Significant Labs:   CBC:   Recent Labs  Lab 06/12/18  1718 06/13/18  0506 06/13/18  0724   WBC 10.72 11.22 11.39   HGB 12.6 10.9* 10.7*   HCT 40.6 34.2* 33.3*    183 179  179     CMP:   Recent Labs  Lab 06/12/18  1444 06/13/18  0505    141   K 3.8 4.3    107   CO2 22* 24   * 105   BUN 33* 37*   CREATININE 3.5* 3.6*   CALCIUM 11.4* 10.5   ANIONGAP 11 10   EGFRNONAA 13* 12*     Troponin:   Recent Labs  Lab 06/12/18  1444 06/12/18  2053 06/13/18  0220   TROPONINI 0.113* 34.681* >50.000*       Significant Imaging:   Imaging Results    None

## 2018-06-13 NOTE — SIGNIFICANT EVENT
Was notified by primary nurse of troponin >50 this am. At this time patient is not symptomatic and not complaining of any chest pain or other pain. Patient recently received morphine for neck pain earlier which patient reports resolved pain. Dr. Major discussed case with cardiology Dr. Payton who recommends heparin drip. Heparin Drip ordered. Dr. Samuel general surgery  notified of change as he is primary.

## 2018-06-13 NOTE — PROGRESS NOTES
[facility] -   General Surgery  Progress Note    Subjective:     History of Present Illness:  No notes on file    Post-Op Info:  Procedure(s) (LRB):  PARATHYROIDECTOMY (N/A)   1 Day Post-Op     Interval History: incisional pain controlled with medications, no hoarseness, troponins elevated starting on heparin drip, patient denies any shortness of breath or chest pain    Medications:  Continuous Infusions:   heparin (porcine) in D5W 16 Units/kg/hr (06/13/18 0819)     Scheduled Meds:   [START ON 6/14/2018] aspirin  81 mg Oral Daily    aspirin  325 mg Oral Once    calcium carbonate  1,000 mg Oral BID    chlorhexidine  10 mL Mouth/Throat BID    isosorbide mononitrate  60 mg Oral Daily    losartan  25 mg Oral Daily    metoprolol tartrate  25 mg Oral BID    NIFEdipine  90 mg Oral Daily    nozaseptin   Each Nare BID    pantoprazole  40 mg Oral Daily    piperacillin-tazobactam (ZOSYN) IVPB  4.5 g Intravenous Q12H    senna-docusate 8.6-50 mg  1 tablet Oral BID    simvastatin  20 mg Oral QHS    sodium bicarbonate  650 mg Oral BID     PRN Meds:acetaminophen, bisacodyl, cloNIDine, dextrose 50%, dextrose 50%, glucagon (human recombinant), glucose, glucose, heparin (PORCINE), heparin (PORCINE), insulin aspart U-100, lactulose, morphine, ondansetron, sodium chloride 0.9%     Review of patient's allergies indicates:   Allergen Reactions    Lipitor [atorvastatin] Swelling     Lips       Objective:     Vital Signs (Most Recent):  Temp: 97.6 °F (36.4 °C) (06/13/18 0736)  Pulse: 74 (06/13/18 0822)  Resp: 17 (06/13/18 0736)  BP: 105/68 (06/13/18 0736)  SpO2: 99 % (06/13/18 0822) Vital Signs (24h Range):  Temp:  [96.9 °F (36.1 °C)-98.8 °F (37.1 °C)] 97.6 °F (36.4 °C)  Pulse:  [] 74  Resp:  [16-29] 17  SpO2:  [88 %-100 %] 99 %  BP: (105-225)/() 105/68     Weight: 69.7 kg (153 lb 10.6 oz)  Body mass index is 28.1 kg/m².    Intake/Output - Last 3 Shifts       06/11 0700 - 06/12 0659 06/12 0700 -  06/13 0659 06/13 0700 - 06/14 0659    P.O.  80     I.V. (mL/kg)  1500 (21.5)     IV Piggyback  100     Total Intake(mL/kg)  1680 (24.1)     Blood  10     Total Output   10      Net   +1670             Urine Occurrence  5 x     Stool Occurrence  0 x           Physical Exam   Constitutional: She appears well-developed and well-nourished. No distress.   HENT:   Voice no hoarseness, at baseline per patient   Neck: Neck supple.   Incision dressing intact, neck soft   Cardiovascular: Normal rate and regular rhythm.    Pulmonary/Chest: Effort normal and breath sounds normal.   Vitals reviewed.      Significant Labs:  CBC:   Recent Labs  Lab 06/13/18  0724   WBC 11.39   RBC 3.67*   HGB 10.7*   HCT 33.3*     179   MCV 91   MCH 29.2   MCHC 32.1     BMP:   Recent Labs  Lab 06/13/18  0505         K 4.3      CO2 24   BUN 37*   CREATININE 3.6*   CALCIUM 10.5     PTH 18.5    Troponin > 50    Assessment/Plan:     CVA (cerebral vascular accident)    Prior CVA, patient with increased difficulty swelling medications at home prior to hospitalization, underwent evaluation with ENT prior to surgery  Speech therapy consult for swallowing        Hyperparathyroidism    Monitor calcium, PTH levels normal  Tums taper        Coronary artery disease involving native coronary artery    Cardiology consult elevated troponin   Heparin GTT started        CKD (chronic kidney disease) stage 5, GFR less than 15 ml/min    Monitor urine output creatinine and electrolytes        Hypertension associated with diabetes    Antihypertensive medications        Diabetic nephropathy associated with type 2 diabetes mellitus    Insulin sliding scale            Levy Samuel MD  General Surgery  [facility]

## 2018-06-13 NOTE — PLAN OF CARE
06/13/18 1649   Discharge Assessment   Assessment Type Discharge Planning Assessment   Confirmed/corrected address and phone number on facesheet? Yes   Assessment information obtained from? Patient   Communicated expected length of stay with patient/caregiver no   Prior to hospitilization cognitive status: Unable to Assess   Lives With spouse   Able to Return to Prior Arrangements unable to determine at this time (comments)   Is patient able to care for self after discharge? Unable to determine at this time (comments)   Who are your caregiver(s) and their phone number(s)? Luis F Karimi spouse [phone]   Patient currently being followed by outpatient case management? Unable to determine (comments)   Patient currently receives any other outside agency services? No   Equipment Currently Used at Home walker, standard   Do you have any problems affording any of your prescribed medications? No   Discharge Plan A (To be determined )   Patient/Family In Agreement With Plan unable to assess

## 2018-06-13 NOTE — ASSESSMENT & PLAN NOTE
-Pt admitted to Extended Recovery   -s/p Parathyroidectomy   -managed by General Surgery -primary team  - PTH- 32  - Ca 11.4>>10.5  - analgesia prn   - antiemetics prn  - specimens sent for analysis

## 2018-06-13 NOTE — HOSPITAL COURSE
Pt admitted to Outpatient Extended Recovery post procedure.  Elevated noted post procedure and results trended yielding significant positive response.  Cardiology consulted and Heparin infusion initiated.  Hx of CAD, multiple vessels noted with home medications continued and Imdur dose increased.  Nephrology consulted due to elevated creatinine and [facility] recommended.  Echo results pending.  Heart catheterization procedure considered with family refusing due to concern for worsening kidney function as patient does not want to be on dialysis.  Decreased oral intake and difficulty swallowing noted.  Speech therapist to bedside.  Pt made NPO and General Surgery notified.      6/15  Patient worsening status. ET changed per Pulmonary CC. Patient s/p Code Blue - VFib with recovery. Cardiology taking patient to CATH lab. Discussed with CM plan for post acute care in progress.    6/16  Patient improved o/n. Patient awake and able to follow simple command. Breathing trials in progress. Discussed with Pulmonary CC    6/17  Patient remains intubated.  at bedside. Cardiology at bedside as well. Discussed with CC Team    6/18  Patient responds to command but remains intubated. Swelling to neck continues to be prominent. Discussed with CC Team. Worsening renal function. Possible HD need.    6/19  Patient remains intubated. Worsening renal function. No events Discussed with CC Team    6/21  Positive cough leak and plan for extubation today .   6/22  Patient continue to be confused . Will consider ct head if didn't improve . Continue treat infection/c difficle .Consulted vascular surgery dr maciel to remove femoral dialysis and put new one subclavian ?. To avoid risk of infection in groin. Patient will continue to need dialysis    6/23  Patient is seen and examined today . More awake today. dialysis catheter was removed from groin and new one will be place in upper body ij vs subclavian. Patient diarrhea improved .    6/24  Patient very lethargic to participate in slp . S/p vascath for hemodialysis . SLP will visit tomorrow . Patient still has ng tube. Diarrhea improved   6/25  Patient lethargic . SLP recommended peg tube . Placement of peg tube tomorrow . After peg tube possible discharge to [facility]   6/26  Patient was seen and examined today. cxr shows mid and upper right lobe . Very weak cough reflex. Will start treatment with unisyn for aspiration pneumonia and follow up culture ,procal if no improvement will broaden coverage . Patient also being treated for  c diff . Hematology oncology also ordered eliquis and hit panel . Peg tube delayed   6/27  Discussed critical illness with  bedside he said he will discuss with his niece to guide us with further plan of care  . Patient leukocytosis and platelet dropped - sepsis -pneumonia . Repeat cxr .   6/28  Plan of care was discussed with patient niece and  bedside in detail .They wish to continue with current treatment plan  And will  Wait on making decision regarding palliative care .No clinical improvement . Patient more lethargic . Plan for dialysis today  6/29  Patient was seen and examined today. Continue to decline with broad spectrum antibotic. Wbc and procal continue to trend up . Her pneumonia continue to get worse she has very weak cough reflex and can not clear up secretions . Nephrology decided comfort care with  bedside .  decided on inpatient hospice . Case management for arrangements

## 2018-06-13 NOTE — MEDICAL/APP STUDENT
[facility]  General Surgery  Progress Note     Subjective:      History of Present Illness:  Patient is a 68 y.o. female presents to discuss results of tests and parathyroid surgery. Patient was noted to have thyroid nodules on neck U/S and subsequent fna was negative, no parathyroid enlargement or adenoma identified on U/S or sestamibi scan.  She does report dysphagia and increasing difficulty with swallowing. Prior CVA with left sided weakness.     Initially referred for hyperparathyroidism and hypercalcemia. The patient has CKD stage 4, GFR 11.6, Calcium 11.8 . She denies any recent kidney stones, bone fractures (remote history).      Post-Op Info:  Procedure:   Parathyroidectomy        Interval History:  Troponin level >50 this morning. She is not complaining of any chest pain or other pain. Cardiologist Dr. Payton recommended heparin drip which was ordered this am. Surgical wound pain has been well managed with morphine.     No current facility-administered medications on file prior to encounter.      Current Outpatient Prescriptions on File Prior to Encounter   Medication Sig Dispense Refill    cyanocobalamin (VITAMIN B-12) 1000 MCG tablet Take 1 tablet (1,000 mcg total) by mouth once daily. 90 tablet 12    isosorbide mononitrate (IMDUR) 60 MG 24 hr tablet TAKE  ONE TABLET BY MOUTH DAILY 30 tablet 8    lactulose (CEPHULAC) 20 gram Pack Take 20 g by mouth as needed.      losartan (COZAAR) 25 MG tablet TAKE ONE TABLET BY MOUTH DAILY 90 tablet PRN    omeprazole (PRILOSEC) 40 MG capsule Take 1 capsule (40 mg total) by mouth once daily. 30 capsule 11    simvastatin (ZOCOR) 20 MG tablet Take 1 tablet (20 mg total) by mouth every evening. 90 tablet 3    sodium bicarbonate 650 MG tablet Take 1 tablet (650 mg total) by mouth 2 (two) times daily. 60 tablet 11    aspirin (ECOTRIN) 81 MG EC tablet Take 81 mg by mouth once daily.                Review of patient's allergies indicates:   Allergen  Reactions    Review of patient's allergies indicates:   Allergen Reactions    Lipitor [atorvastatin] Swelling     Lips              Objective:      Vital Signs (Most Recent):  Temp: 97.8  Pulse: 72  Resp: 18  BP: 130/65  SpO2: 100% 2L nasal canula Vital Signs (24h Range):  Temp:  96.9-98.8  Pulse:   Resp:  16-29  SpO2: %  BP: 127/62 - 225/103      Weight: 69.7             Intake/Output - Last 3 Shifts        06/10 0700 - 06/11 0659 06/11 0700 - 06/12 0659     I.V. (mL/kg)   1500     IV Piggyback   100     Total Intake(mL/kg)   1680     Net   +1670                       Physical Exam   Constitutional:  Appears lethargic. Not appropriately following commands.  Head: Normocephalic and atraumatic.   Eyes: EOM are normal. Pupils are equal, round, and reactive to light.   Neck: Normal range of motion. Neck supple. Incision site clean   Cardiovascular: Normal rate and regular rhythm.    Pulmonary/Chest: Decreased breath sounds on lower left and right lung lobes. Crackles heard.   Abdominal: Soft. Bowel sounds normal.  Musculoskeletal: Left sided weakness  Neurological: Not able to answer orientation questions    Skin: Skin is warm and dry.  Mucus membranes dry  Psychiatric: He has a normal mood and affect. His behavior is normal. Judgment and thought content normal.   Vitals reviewed.        Significant Labs:  CBC:   Recent Labs  Lab 06/12/18  0438   WBC  11.22   RBC 3.77   HGB 10.9   HCT 34.2      MCV 91   MCH 28.9   MCHC 31.9      CMP:   Recent Labs  Lab 06/12/18  0438      CALCIUM 10.5    PROT 6.2       K 4.3   CO2 24       BUN 37    CREATININE 3.6    TROPONIN >50   PTH 18.5         IMAGING:    CXR: possible mild asymmetric CHF vs right upper lobe pneumonia     Assessment/Plan:          S/p parathyroidectomy     Ca 10.5  PTH 18.5  Analgesia prn  Antiemetics prn  Specimens sent for analysis           Abnormal chest x-ray     - chest xray showed possible mild asymmetric CHF versus  right upper lobe pneumonia.  - pt given IV Lasix in PACU  - will initiate IV antibiotics for suspected aspiration   - pt recently seen by RAMIRO Mcnally (Otolaryngology) for Dysphagia  - pt afebrile, WBC normal  - will repeat xray today  - SLP evaluation in am              Elevated troponin     -initial 0.113  -pt denies CP and SOB  - EKG 6/12 at 14:27 shows ST depression in inferior and anterior-lateral leads  - EKG 6/12 at 18:44 shows nonspecific ST and T wave abnormality  -will follow serial results               Viridiana Castle  General Surgery  [facility]

## 2018-06-13 NOTE — ASSESSMENT & PLAN NOTE
- chest xray showed possible mild asymmetric CHF versus right upper lobe pneumonia.  -pt given IV Lasix in PACU  - IV antibiotics for suspected aspiration continued   - pt recently seen by RAMIRO Mcnally (Otolaryngology) for Dysphagia  -   - pt afebrile, WBC normal  - repeat xray results pending  - SLP evaluation results pending

## 2018-06-13 NOTE — CONSULTS
[facility] -   Cardiology  Consult Note    Patient Name: Citlali Karimi  MRN: [id number]  Admission Date: 6/12/2018  Hospital Length of Stay: 0 days  Code Status: Prior   Attending Provider: Lan Leahy MD   Consulting Provider: Vesna Pimentel PA-C  Primary Care Physician: Evelio Schuster MD  Principal Problem:<principal problem not specified>    Patient information was obtained from patient, past medical records and ER records.     Inpatient consult to Cardiology  Consult performed by: VESNA PIMENTEL  Consult ordered by: ADELINA LOFTON    Inpatient consult to Cardiology  Consult performed by: VESNA PIMENTEL  Consult ordered by: LAN LEAHY        Subjective:     Chief Complaint:  Elevated troponin    HPI:   Ms. Karimi is a 68 year old female patient with a PMHx of CKD stage IV, HTN, DM, CVA (with residual left-sided weakness), CAD, anemia, hyperlipidemia, s/p TAVR, and CAD who presented to [facility] yesterday for elective parathyroidectomy. Post-procedure, patient had ischemic changes on her EKG. Troponin was drawn and trended upward and cardiology consulted to assist with management. Patient seen and examined today, resting in bed. Reports malaise/fatige and surgical soreness. No real cardiac complaints. Denies chest pain or increased SOB. No palpitations. , who was present during exam, does report patient has been having issues with swallowing and may not have been taking full doses of her prescribed medications. BP noted to be elevated yesterday 225/103. Chart reviewed. Diffuse ST depression noted on post-op EKG, has since resolved. Troponin 0.113>34>50. 2D echo pending. Review of chart shows previous cath showed severe CAD (proximal LCX 99%, mid 50%, RCA mid 90%, distal with subtotal lesion). Medical mgmt recommended at that time.    Past Medical History:   Diagnosis Date    Acid reflux 1/7/2015    Anxiety 1/7/2015    Aortic valvar stenosis     Arthritis     osteo    Callus      Chronic anemia     followed by Dr. Addison    CKD (chronic kidney disease) stage 5, GFR less than 15 ml/min 8/7/2015    Combined hyperlipidemia associated with type 2 diabetes mellitus     Coronary artery disease     Diabetes mellitus type II, controlled, with no complications     LBSL 108 today    Encounter for blood transfusion     Frail elderly with impaired mobility, wheel chair bound 8/25/2017    Gallbladder problem     gallbladder surgery    Heart murmur     Hyperparathyroidism, secondary renal 1/7/2015    Hypertension 8/7/2015    Hypertension associated with diabetes 11/12/2012    Kidney transplant candidate 1/7/2015    Overweight(278.02)     Urinary tract infection        Past Surgical History:   Procedure Laterality Date    ANKLE FRACTURE SURGERY Left 1985    AORTIC VALVE REPLACEMENT  05/2016    BONE BIOPSY      CARDIAC SURGERY      CATARACT EXTRACTION W/  INTRAOCULAR LENS IMPLANT  2009    CHOLECYSTECTOMY      CYSTOSCOPY      EYE SURGERY      FRACTURE SURGERY      HYSTERECTOMY  unknown    OOPHORECTOMY      removal of colon polyps      RENAL BIOPSY  10/2013    YAG cap -OD         Review of patient's allergies indicates:   Allergen Reactions    Lipitor [atorvastatin] Swelling     Lips         No current facility-administered medications on file prior to encounter.      Current Outpatient Prescriptions on File Prior to Encounter   Medication Sig    cyanocobalamin (VITAMIN B-12) 1000 MCG tablet Take 1 tablet (1,000 mcg total) by mouth once daily.    isosorbide mononitrate (IMDUR) 60 MG 24 hr tablet TAKE  ONE TABLET BY MOUTH DAILY    lactulose (CEPHULAC) 20 gram Pack Take 20 g by mouth as needed.    losartan (COZAAR) 25 MG tablet TAKE ONE TABLET BY MOUTH DAILY    omeprazole (PRILOSEC) 40 MG capsule Take 1 capsule (40 mg total) by mouth once daily.    simvastatin (ZOCOR) 20 MG tablet Take 1 tablet (20 mg total) by mouth every evening.    sodium bicarbonate 650 MG tablet  Take 1 tablet (650 mg total) by mouth 2 (two) times daily.    aspirin (ECOTRIN) 81 MG EC tablet Take 81 mg by mouth once daily.     Family History     Problem Relation (Age of Onset)    Aneurysm Father    Cancer Paternal Grandmother    Colon cancer Maternal Grandmother    Diabetes Mother, Brother, Sister    Glaucoma Mother    Heart disease Mother, Sister    Hypertension Mother, Brother, Brother    Kidney disease Sister, Brother, Sister    No Known Problems Maternal Aunt, Maternal Uncle, Paternal Aunt, Paternal Uncle, Maternal Grandfather, Paternal Grandfather    Stroke Father        Social History Main Topics    Smoking status: Never Smoker    Smokeless tobacco: Never Used    Alcohol use No    Drug use: No    Sexual activity: Not Currently     Birth control/ protection: See Surgical Hx     Review of Systems   Constitution: Positive for weakness and malaise/fatigue.   HENT:        Surgical incision soreness     Cardiovascular: Negative.    Respiratory: Negative.    Endocrine: Negative.    Hematologic/Lymphatic: Negative.    Skin: Negative.    Musculoskeletal: Negative.    Gastrointestinal: Negative.    Genitourinary: Negative.    Neurological:        Left-sided weakness from previous CVA   Psychiatric/Behavioral: Negative.    Allergic/Immunologic: Negative.      Objective:     Vital Signs (Most Recent):  Temp: 97.6 °F (36.4 °C) (06/13/18 0736)  Pulse: 66 (06/13/18 0936)  Resp: 17 (06/13/18 0736)  BP: 105/68 (06/13/18 0736)  SpO2: 99 % (06/13/18 0822) Vital Signs (24h Range):  Temp:  [96.9 °F (36.1 °C)-98.8 °F (37.1 °C)] 97.6 °F (36.4 °C)  Pulse:  [] 66  Resp:  [16-29] 17  SpO2:  [88 %-100 %] 99 %  BP: (105-225)/() 105/68     Weight: 69.7 kg (153 lb 10.6 oz)  Body mass index is 28.1 kg/m².    SpO2: 99 %  O2 Device (Oxygen Therapy): nasal cannula      Intake/Output Summary (Last 24 hours) at 06/13/18 1121  Last data filed at 06/13/18 0819   Gross per 24 hour   Intake           948.79 ml   Output                10 ml   Net           938.79 ml       Lines/Drains/Airways     Peripheral Intravenous Line                 Peripheral IV - Single Lumen 06/12/18 0926 Right Forearm 1 day         Peripheral IV - Single Lumen 06/12/18 0954 Left Forearm 1 day                Physical Exam   Constitutional: She is oriented to person, place, and time. She appears well-developed and well-nourished. No distress.   HENT:   Head: Normocephalic and atraumatic.   Eyes: Pupils are equal, round, and reactive to light. Right eye exhibits no discharge. Left eye exhibits no discharge.   Neck: Neck supple. No JVD present.   Surgical incision C/D/I; no drainage   Cardiovascular: Normal rate, regular rhythm, S1 normal and S2 normal.  Exam reveals no gallop, no S3, no S4 and no friction rub.    Murmur heard.   Harsh midsystolic murmur is present  at the upper right sternal border radiating to the neck  Pulmonary/Chest: Effort normal and breath sounds normal. No respiratory distress. She has no wheezes. She has no rales.   Abdominal: Soft. She exhibits no distension. There is no tenderness. There is no rebound.   Musculoskeletal: She exhibits no edema.   Neurological: She is alert and oriented to person, place, and time.   Skin: Skin is warm and dry. She is not diaphoretic. No erythema.   Psychiatric: She has a normal mood and affect. Her behavior is normal. Thought content normal.   Nursing note and vitals reviewed.      Significant Labs:   CMP   Recent Labs  Lab 06/12/18  1444 06/13/18  0505    141   K 3.8 4.3    107   CO2 22* 24   * 105   BUN 33* 37*   CREATININE 3.5* 3.6*   CALCIUM 11.4* 10.5   ANIONGAP 11 10   ESTGFRAFRICA 15* 14*   EGFRNONAA 13* 12*   , CBC   Recent Labs  Lab 06/12/18  1718 06/13/18  0506 06/13/18  0724   WBC 10.72 11.22 11.39   HGB 12.6 10.9* 10.7*   HCT 40.6 34.2* 33.3*    183 179  179   , Troponin   Recent Labs  Lab 06/12/18  1444 06/12/18  2053 06/13/18  0220   TROPONINI 0.113* 34.681*  >50.000*    and All pertinent lab results from the last 24 hours have been reviewed.    Significant Imaging: Echocardiogram:   2D echo with color flow doppler:   Results for orders placed or performed in visit on 04/04/17   2D echo with color flow doppler   Result Value Ref Range    EF 60 55 - 65    Mitral Valve Mobility NORMAL     and X-Ray: CXR: X-Ray Chest 1 View (CXR): No results found for this visit on 06/12/18. and X-Ray Chest PA and Lateral (CXR): No results found for this visit on 06/12/18.    Assessment and Plan:   Patient with known CAD who presents with NSTEMI likely triggered by HTN. Stable at present time, angina free. Continue current medical mgmt. Will need repeat angio but at high risk of contrast induced nephropathy, appreciate nephrology's assistance.     NSTEMI (non-ST elevated myocardial infarction)    -Presents with diffuse ST depression on post-procedure EKG and elevated troponin > 50, consistent with NSTEMI  -Denies chest pain or SOB  -BP elevated yesterday, HTN likely triggered clot rupture/MI  -Known CAD on previous cath not amenable to PCI at that time  -Continue ASA, Imdur, Metoprolol, statin, heparin gtt  -Will need repeat angiogram to re-assess coronaries/underlying ischemia however patient with CKD/creatinine of 3.6 and is at high risk of contrast induced nephropathy   -Renal consult requested for recommendations/clearance          Coronary artery disease involving native coronary artery    -Denies chest pain or SOB  -See plan under NSTEMI        CKD (chronic kidney disease) stage 5, GFR less than 15 ml/min    -Creatinine 3.6 this AM        Hypertension associated with diabetes    -BP elevated yesterday  -Continue BB, Imdur  -Add hydralazine or amlodipine if needed        Aortic valvar stenosis    -s/p TAVR  -Stable, 2D echo pending            VTE Risk Mitigation         Ordered     heparin 25,000 units in dextrose 5% 250 mL (100 units/mL) infusion; FEMALE  Continuous      06/13/18 0698      heparin 25,000 units in dextrose 5% 250 mL (100 units/mL) bolus from bag; PRN BOLUS  As needed (PRN)      06/13/18 0659     heparin 25,000 units in dextrose 5% 250 mL (100 units/mL) bolus from bag; PRN BOLUS  As needed (PRN)      06/13/18 0659     Place sequential compression device  Until discontinued      06/12/18 2389          Thank you for your consult. I will follow-up with patient. Please contact us if you have any additional questions.    Vesna Covington PA-C  Cardiology   [facility] -     Chart reviewed. Dr. Louis examined patient and agrees with plan as outlined above.

## 2018-06-13 NOTE — OP NOTE
[facility]  Surgery Department  Operative Note    SUMMARY     Date of Procedure: 6/12/2018     Procedure: Procedure(s) (LRB):  PARATHYROIDECTOMY (N/A)     Surgeon(s) and Role:     * Levy Samuel MD - Primary     * Henrry Pineda MD - Assisting        Pre-Operative Diagnosis: Hypercalcemia [E83.52]  Hyperparathyroidism [E21.3]    Post-Operative Diagnosis: Post-Op Diagnosis Codes:     * Hypercalcemia [E83.52]     * Hyperparathyroidism [E21.3]    Anesthesia: General    Technical Procedures Used: parathyroidectomy    Description of the Findings of the Procedure: 2 right sided enlarged parathyroid glands 1 normal appearing left sided parathyroid gland    Complications: No    Estimated Blood Loss (EBL): 10 mL           Implants: * No implants in log *    Specimens:   Specimen (12h ago through future)    Start     Ordered    06/12/18 1203  Specimen to Pathology - Surgery  Once     Comments:  1.  Left inferior parathyroid (frozen)2.  Left superior parathyroid (frozen)3.  Right superior parathyroid (frozen)4.  Right inferior parathyroid (frozen)5.  Left superior parathyroid (frozen)6.  Left parathyroid nodule (frozen)7.  Questionable right inferior parathyroid (frozen)8.  Left inferior parathyroid remainder (perm) Dx:  hyperparathyroidism      06/12/18 1202                  Condition: Good    Disposition: PACU - hemodynamically stable.    Procedure in Detail:  The patient is brought into the operating room placed on the operating table in the supine position.  Endotracheal anesthesia was induced.  IV antibiotics were administered.  Pneumatic compression devices placed on the lower extremities.  The neck was placed in extension.  The upper chest and neck were prepped and draped in the standard fashion     A timeout was performed.  A collar incision was made between the sternocleidomastoid muscles.  Subcutaneous tissues were dissected using electrocautery.  Subplatysmal flaps were raised.  The strap muscle  was identified and opened in the midline.  The strap muscles were freed from the undersurface of the left thyroid gland.     We dissected around the lower pole vessels and identified an enlarged parathyroid gland.  This was biopsied and sent for frozen section left inferior gland.  Further dissection along the upper pole vessels revealed an enlarged parathyroid gland. This was biopsied and sent for frozen section left superior gland     We then dissected the right strap muscles from the undersurface of the thyroid gland and identified an enlarged nodule along the lower pole vessels. We felt this was the inferior gland and was confirmed to be parathyroid on frozen section. We biopsied a second nodule which on frozen section showed this to be lymph node. We then dissected further along the upper pole, the midpole vessels, thymus, tracheal esophageal groove, carotid sheath but failed to identify the  4th gland Biopsied nodule on frozen sections were consistent with lymph node.      We then removed the left superior gland which appeared 3 times normal size and the left inferior gland which appeared 5-6 times normal size in their entirety and sent it for permanent pathologic analysis. A PTH level was drawn and noted to be 32 down from 170 prior to incision. With the parathyroid hormone at normal levels we left the right parathyroid gland inplace as it appeared normal in size and were unable to find a fourth gland. We marked it with a 2-0 prolene suture.     After ensuring hemostasis Surgicel was placed.  The strap muscles were closed with 2-0 Vicryl in a figure-of-eight fashion.  Marcaine was infiltrated. Platysma was closed with 3-0 Vicryl.  The skin was closed with 4-0 Monocryl in a subcuticular manner.  A derma Flex dressing was placed The patient tolerated the procedure in a stable and satisfactory condition and was transferred to recovery post op.

## 2018-06-13 NOTE — SUBJECTIVE & OBJECTIVE
Interval History: incisional pain controlled with medications, no hoarseness, troponins elevated starting on heparin drip, patient denies any shortness of breath or chest pain    Medications:  Continuous Infusions:   heparin (porcine) in D5W 16 Units/kg/hr (06/13/18 0819)     Scheduled Meds:   [START ON 6/14/2018] aspirin  81 mg Oral Daily    aspirin  325 mg Oral Once    calcium carbonate  1,000 mg Oral BID    chlorhexidine  10 mL Mouth/Throat BID    isosorbide mononitrate  60 mg Oral Daily    losartan  25 mg Oral Daily    metoprolol tartrate  25 mg Oral BID    NIFEdipine  90 mg Oral Daily    nozaseptin   Each Nare BID    pantoprazole  40 mg Oral Daily    piperacillin-tazobactam (ZOSYN) IVPB  4.5 g Intravenous Q12H    senna-docusate 8.6-50 mg  1 tablet Oral BID    simvastatin  20 mg Oral QHS    sodium bicarbonate  650 mg Oral BID     PRN Meds:acetaminophen, bisacodyl, cloNIDine, dextrose 50%, dextrose 50%, glucagon (human recombinant), glucose, glucose, heparin (PORCINE), heparin (PORCINE), insulin aspart U-100, lactulose, morphine, ondansetron, sodium chloride 0.9%     Review of patient's allergies indicates:   Allergen Reactions    Lipitor [atorvastatin] Swelling     Lips       Objective:     Vital Signs (Most Recent):  Temp: 97.6 °F (36.4 °C) (06/13/18 0736)  Pulse: 74 (06/13/18 0822)  Resp: 17 (06/13/18 0736)  BP: 105/68 (06/13/18 0736)  SpO2: 99 % (06/13/18 0822) Vital Signs (24h Range):  Temp:  [96.9 °F (36.1 °C)-98.8 °F (37.1 °C)] 97.6 °F (36.4 °C)  Pulse:  [] 74  Resp:  [16-29] 17  SpO2:  [88 %-100 %] 99 %  BP: (105-225)/() 105/68     Weight: 69.7 kg (153 lb 10.6 oz)  Body mass index is 28.1 kg/m².    Intake/Output - Last 3 Shifts       06/11 0700 - 06/12 0659 06/12 0700 - 06/13 0659 06/13 0700 - 06/14 0659    P.O.  80     I.V. (mL/kg)  1500 (21.5)     IV Piggyback  100     Total Intake(mL/kg)  1680 (24.1)     Blood  10     Total Output   10      Net   +1670             Urine  Occurrence  5 x     Stool Occurrence  0 x           Physical Exam   Constitutional: She appears well-developed and well-nourished. No distress.   HENT:   Voice no hoarseness, at baseline per patient   Neck: Neck supple.   Incision dressing intact, neck soft   Cardiovascular: Normal rate and regular rhythm.    Pulmonary/Chest: Effort normal and breath sounds normal.   Vitals reviewed.      Significant Labs:  CBC:   Recent Labs  Lab 06/13/18  0724   WBC 11.39   RBC 3.67*   HGB 10.7*   HCT 33.3*     179   MCV 91   MCH 29.2   MCHC 32.1     BMP:   Recent Labs  Lab 06/13/18  0505         K 4.3      CO2 24   BUN 37*   CREATININE 3.6*   CALCIUM 10.5     PTH 18.5    Troponin > 50

## 2018-06-13 NOTE — PROGRESS NOTES
Pt lying in bed awake and alert. Attempted to administer night medication crushed in applesauce. Pt holding medication in her mouth refusing to swallow. Pt unable to swallow at this time. Food removed from pts mouth. Head of bed at elevated.  remains at bedside. Notified yazmin Marlow. New order for NPO diet, Morphine 2mg IV  x 1 dose now and speech evaluation.  Will continue to monitor.

## 2018-06-13 NOTE — ASSESSMENT & PLAN NOTE
-Creatinine 3.6  -baseline 2.7-4.4  -will monitor   -sodium bicarb continued   -pt has been followed outpatient by Dr. Vazquez (Nephrology)  -Nephrology consulted - pt may benefit from repeat angio due to NSTEMI but at high risk of contrast induced nephropathy  - pt was candidate for renal transplant however work up discontinued due to progressive weakness

## 2018-06-13 NOTE — ASSESSMENT & PLAN NOTE
- home medications continued   -hx of noncompliance and inconsistent dosing at home per   - uncontrolled upon arrival- improved with Hydralazine in PACU

## 2018-06-13 NOTE — PLAN OF CARE
Problem: Patient Care Overview  Goal: Plan of Care Review  Outcome: Ongoing (interventions implemented as appropriate)  Pt alert disoriented to situation, place and time. IV abt administered per orders. Pt NPO at this time. Oxygen 2 l NC. Dressing to neck CDI.  Pt remained free of falls during shift. Turn Q 2 hour. Bed alarm set , call light in reach, room free of clutter, side rails  up x2, pt on telemetry monitor SR , will continue to monitor.

## 2018-06-13 NOTE — PT/OT/SLP EVAL
Speech Language Pathology Evaluation  Cognitive/Bedside Swallow    Patient Name:  Citlali Karimi   MRN:  [id number]  Admitting Diagnosis: <principal problem not specified>    Recommendations:                  General Recommendations:  Speech/language therapy and Cognitive-linguistic therapy  Diet recommendations:  Puree, Thin   Aspiration Precautions: 1 bite/sip at a time, HOB to 90 degrees, Remain upright 30 minutes post meal and Small bites/sips   General Precautions: Standard, fall    History:     Past Medical History:   Diagnosis Date    Acid reflux 1/7/2015    Anxiety 1/7/2015    Aortic valvar stenosis     Arthritis     osteo    Callus     Chronic anemia     followed by Dr. Addison    CKD (chronic kidney disease) stage 5, GFR less than 15 ml/min 8/7/2015    Combined hyperlipidemia associated with type 2 diabetes mellitus     Coronary artery disease     Diabetes mellitus type II, controlled, with no complications     LBSL 108 today    Encounter for blood transfusion     Frail elderly with impaired mobility, wheel chair bound 8/25/2017    Gallbladder problem     gallbladder surgery    Heart murmur     Hyperparathyroidism, secondary renal 1/7/2015    Hypertension 8/7/2015    Hypertension associated with diabetes 11/12/2012    Kidney transplant candidate 1/7/2015    Overweight(278.02)     Urinary tract infection        Past Surgical History:   Procedure Laterality Date    ANKLE FRACTURE SURGERY Left 1985    AORTIC VALVE REPLACEMENT  05/2016    BONE BIOPSY      CARDIAC SURGERY      CATARACT EXTRACTION W/  INTRAOCULAR LENS IMPLANT  2009    CHOLECYSTECTOMY      CYSTOSCOPY      EYE SURGERY      FRACTURE SURGERY      HYSTERECTOMY  unknown    OOPHORECTOMY      removal of colon polyps      RENAL BIOPSY  10/2013    YAG cap -OD         Social History: Patient lives with her .    Chest X-Rays: Possible mild asymmetric CHF versus right upper lobe pneumonia.    Prior diet: Pt's   reports that pt was tolerating a regular diet prior to admit with oral dysphagia presents.  He stated that pt would hold pills and food in her mouth and needed cueing to swallow.  No reports of coughing during meals.   He also stated that pt's health has declined over the last month.  She was getting [facility] home health services for cognition per     Subjective   Pt was seen lying in bed with her  present.  She is very sleepy but will wake with stimulus and falls back asleep easily.     Patient goals: none reported     Pain/Comfort:  · Pain Rating 1: 0/10    Objective:     Cognitive Status:    Arousal/Alertness Delayed responses (verbal) and Inconsistent responses with all stimulus  Attention Selective attention deficit (pt kept her eyes closed most of the time and did not respond at times)  Orientation Person  Memory Hard to assess due to lack of particiaption by pt  Problem Solving Unable to assess      Receptive Language:   Comprehension:      It appeared that pt couldcomprehend all that was being said and asked of her, however she presented with littlw motivation to participate    Pragmatics:    pt kept closing her eyes when ST spoke with her    Expressive Language:  Verbal:    Pt was able to speak a few words that were intelligibile and correct, however little particiaption made it hard to assess    Motor Speech:  WFL    Voice:   WFL    Visual-Spatial:  YEFRI as pt had decreased participation in eval    Reading:   DNT     Written Expression:   DNT    Oral Musculature Evaluation  · Oral Musculature: WFL    Bedside Swallow Eval:   Consistencies Assessed:  · Thin liquids, puree, and solids    Oral Phase:   · pt needed mod encouragement to participate but she was able to suck through a straw and tolerate all consistencies without oral holding or pocketing    Pharyngeal Phase:   · no overt clinical signs/symptoms of aspiration  · no overt clinical signs/symptoms of pharyngeal dysphagia    Assessment:      Citlali Karimi is a 68 y.o. female with an SLP diagnosis of Cognitive-Linguistic Impairment.  She presents with little motivation and participation.  Pt was receiving home health ST for cognitive-linguistic impairment, however it is unclear to this ST if pt is at her baseline.      Goals:    SLP Goals        Problem: SLP Goal    Goal Priority Disciplines Outcome   SLP Goal     SLP    Description:  1. Pt will participate in cognitive tasks with mod cueing to increase her safety awareness, problem solving, organization, and memory  2. Pt will complete expressive speech tasks with 100% responses to questions and conversations                      Plan:     · Patient to be seen:  3 x/week   · Plan of Care expires:  06/20/18  · Plan of Care reviewed with:  spouse   · SLP Follow-Up:  Yes       Discharge recommendations:  Discharge Facility/Level Of Care Needs: nursing facility, skilled     Time Tracking:     SLP Treatment Date:   06/13/18  Speech Start Time:  1115  Speech Stop Time:  1145     Speech Total Time (min):  30 min    Billable Minutes: Eval 15  and Eval Swallow and Oral Function 15    Jacque Bernstein CCC-SLP  06/13/2018

## 2018-06-13 NOTE — PLAN OF CARE
Discussed with Dr. Louis. Recommend adding Plavix to medical regimen if ok with General Surgery given recent procedure.

## 2018-06-13 NOTE — ASSESSMENT & PLAN NOTE
Prior CVA, patient with increased difficulty swelling medications at home prior to hospitalization, underwent evaluation with ENT prior to surgery  Speech therapy consult for swallowing

## 2018-06-13 NOTE — SUBJECTIVE & OBJECTIVE
Past Medical History:   Diagnosis Date    Acid reflux 1/7/2015    Anxiety 1/7/2015    Aortic valvar stenosis     Arthritis     osteo    Callus     Chronic anemia     followed by Dr. Addison    CKD (chronic kidney disease) stage 5, GFR less than 15 ml/min 8/7/2015    Combined hyperlipidemia associated with type 2 diabetes mellitus     Coronary artery disease     Diabetes mellitus type II, controlled, with no complications     LBSL 108 today    Encounter for blood transfusion     Frail elderly with impaired mobility, wheel chair bound 8/25/2017    Gallbladder problem     gallbladder surgery    Heart murmur     Hyperparathyroidism, secondary renal 1/7/2015    Hypertension 8/7/2015    Hypertension associated with diabetes 11/12/2012    Kidney transplant candidate 1/7/2015    Overweight(278.02)     Urinary tract infection        Past Surgical History:   Procedure Laterality Date    ANKLE FRACTURE SURGERY Left 1985    AORTIC VALVE REPLACEMENT  05/2016    BONE BIOPSY      CARDIAC SURGERY      CATARACT EXTRACTION W/  INTRAOCULAR LENS IMPLANT  2009    CHOLECYSTECTOMY      CYSTOSCOPY      EYE SURGERY      FRACTURE SURGERY      HYSTERECTOMY  unknown    OOPHORECTOMY      removal of colon polyps      RENAL BIOPSY  10/2013    YAG cap -OD         Review of patient's allergies indicates:   Allergen Reactions    Lipitor [atorvastatin] Swelling     Lips         No current facility-administered medications on file prior to encounter.      Current Outpatient Prescriptions on File Prior to Encounter   Medication Sig    cyanocobalamin (VITAMIN B-12) 1000 MCG tablet Take 1 tablet (1,000 mcg total) by mouth once daily.    isosorbide mononitrate (IMDUR) 60 MG 24 hr tablet TAKE  ONE TABLET BY MOUTH DAILY    lactulose (CEPHULAC) 20 gram Pack Take 20 g by mouth as needed.    losartan (COZAAR) 25 MG tablet TAKE ONE TABLET BY MOUTH DAILY    omeprazole (PRILOSEC) 40 MG capsule Take 1 capsule (40 mg total)  by mouth once daily.    simvastatin (ZOCOR) 20 MG tablet Take 1 tablet (20 mg total) by mouth every evening.    sodium bicarbonate 650 MG tablet Take 1 tablet (650 mg total) by mouth 2 (two) times daily.    aspirin (ECOTRIN) 81 MG EC tablet Take 81 mg by mouth once daily.     Family History     Problem Relation (Age of Onset)    Aneurysm Father    Cancer Paternal Grandmother    Colon cancer Maternal Grandmother    Diabetes Mother, Brother, Sister    Glaucoma Mother    Heart disease Mother, Sister    Hypertension Mother, Brother, Brother    Kidney disease Sister, Brother, Sister    No Known Problems Maternal Aunt, Maternal Uncle, Paternal Aunt, Paternal Uncle, Maternal Grandfather, Paternal Grandfather    Stroke Father        Social History Main Topics    Smoking status: Never Smoker    Smokeless tobacco: Never Used    Alcohol use No    Drug use: No    Sexual activity: Not Currently     Birth control/ protection: See Surgical Hx     Review of Systems   Constitution: Positive for weakness and malaise/fatigue.   HENT:        Surgical incision soreness     Cardiovascular: Negative.    Respiratory: Negative.    Endocrine: Negative.    Hematologic/Lymphatic: Negative.    Skin: Negative.    Musculoskeletal: Negative.    Gastrointestinal: Negative.    Genitourinary: Negative.    Neurological:        Left-sided weakness from previous CVA   Psychiatric/Behavioral: Negative.    Allergic/Immunologic: Negative.      Objective:     Vital Signs (Most Recent):  Temp: 97.6 °F (36.4 °C) (06/13/18 0736)  Pulse: 66 (06/13/18 0936)  Resp: 17 (06/13/18 0736)  BP: 105/68 (06/13/18 0736)  SpO2: 99 % (06/13/18 0822) Vital Signs (24h Range):  Temp:  [96.9 °F (36.1 °C)-98.8 °F (37.1 °C)] 97.6 °F (36.4 °C)  Pulse:  [] 66  Resp:  [16-29] 17  SpO2:  [88 %-100 %] 99 %  BP: (105-225)/() 105/68     Weight: 69.7 kg (153 lb 10.6 oz)  Body mass index is 28.1 kg/m².    SpO2: 99 %  O2 Device (Oxygen Therapy): nasal  cannula      Intake/Output Summary (Last 24 hours) at 06/13/18 1121  Last data filed at 06/13/18 0819   Gross per 24 hour   Intake           948.79 ml   Output               10 ml   Net           938.79 ml       Lines/Drains/Airways     Peripheral Intravenous Line                 Peripheral IV - Single Lumen 06/12/18 0926 Right Forearm 1 day         Peripheral IV - Single Lumen 06/12/18 0954 Left Forearm 1 day                Physical Exam   Constitutional: She is oriented to person, place, and time. She appears well-developed and well-nourished. No distress.   HENT:   Head: Normocephalic and atraumatic.   Eyes: Pupils are equal, round, and reactive to light. Right eye exhibits no discharge. Left eye exhibits no discharge.   Neck: Neck supple. No JVD present.   Surgical incision C/D/I; no drainage   Cardiovascular: Normal rate, regular rhythm, S1 normal and S2 normal.  Exam reveals no gallop, no S3, no S4 and no friction rub.    Murmur heard.   Harsh midsystolic murmur is present  at the upper right sternal border radiating to the neck  Pulmonary/Chest: Effort normal and breath sounds normal. No respiratory distress. She has no wheezes. She has no rales.   Abdominal: Soft. She exhibits no distension. There is no tenderness. There is no rebound.   Musculoskeletal: She exhibits no edema.   Neurological: She is alert and oriented to person, place, and time.   Skin: Skin is warm and dry. She is not diaphoretic. No erythema.   Psychiatric: She has a normal mood and affect. Her behavior is normal. Thought content normal.   Nursing note and vitals reviewed.      Significant Labs:   CMP   Recent Labs  Lab 06/12/18  1444 06/13/18  0505    141   K 3.8 4.3    107   CO2 22* 24   * 105   BUN 33* 37*   CREATININE 3.5* 3.6*   CALCIUM 11.4* 10.5   ANIONGAP 11 10   ESTGFRAFRICA 15* 14*   EGFRNONAA 13* 12*   , CBC   Recent Labs  Lab 06/12/18  1718 06/13/18  0506 06/13/18  0724   WBC 10.72 11.22 11.39   HGB 12.6  10.9* 10.7*   HCT 40.6 34.2* 33.3*    183 179  179   , Troponin   Recent Labs  Lab 06/12/18  1444 06/12/18  2053 06/13/18  0220   TROPONINI 0.113* 34.681* >50.000*    and All pertinent lab results from the last 24 hours have been reviewed.    Significant Imaging: Echocardiogram:   2D echo with color flow doppler:   Results for orders placed or performed in visit on 04/04/17   2D echo with color flow doppler   Result Value Ref Range    EF 60 55 - 65    Mitral Valve Mobility NORMAL     and X-Ray: CXR: X-Ray Chest 1 View (CXR): No results found for this visit on 06/12/18. and X-Ray Chest PA and Lateral (CXR): No results found for this visit on 06/12/18.

## 2018-06-13 NOTE — HPI
Ms. Karimi is a 68 year old female patient with a PMHx of CKD stage IV, HTN, DM, CVA (with residual left-sided weakness), CAD, anemia, hyperlipidemia, s/p TAVR, and CAD who presented to [facility] yesterday for elective parathyroidectomy. Post-procedure, patient had ischemic changes on her EKG. Troponin was drawn and trended upward and cardiology consulted to assist with management. Patient seen and examined today, resting in bed. Reports malaise/fatige and surgical soreness. No real cardiac complaints. Denies chest pain or increased SOB. No palpitations. , who was present during exam, does report patient has been having issues with swallowing and may not have been taking full doses of her prescribed medications. BP noted to be elevated yesterday 225/103. Chart reviewed. Diffuse ST depression noted on post-op EKG, has since resolved. Troponin 0.113>34>50. 2D echo pending. Review of chart shows previous cath showed severe CAD (proximal LCX 99%, mid 50%, RCA mid 90%, distal with subtotal lesion). Medical mgmt recommended at that time.

## 2018-06-13 NOTE — PROGRESS NOTES
Notified Dr. Major, Troponin >50.  Dr. Major spoke with cardiology.  Pt to be started on Heparin gtt.  Will continue to monitor.

## 2018-06-13 NOTE — PT/OT/SLP EVAL
Physical Therapy Evaluation    Patient Name:  Citlali Karimi   MRN:  [id number]    Recommendations:     Discharge Recommendations:  nursing facility, skilled (IF PT WILLING TO PARTICIPATE)   Discharge Equipment Recommendations: none   Barriers to discharge: None    Assessment:     Citlali Karimi is a 68 y.o. female admitted with a medical diagnosis of <principal problem not specified>.  She presents with the following impairments/functional limitations:  weakness, impaired endurance, gait instability, impaired functional mobilty, impaired balance, decreased coordination, decreased safety awareness.    Rehab Prognosis:  FAIR/POOR; patient would benefit from acute skilled PT services to address these deficits and reach maximum level of function.      Recent Surgery: Procedure(s) (LRB):  PARATHYROIDECTOMY (N/A) 1 Day Post-Op    Plan:     During this hospitalization, patient to be seen 5 x/week to address the above listed problems via gait training, therapeutic activities, therapeutic exercises  · Plan of Care Expires:  06/20/18   Plan of Care Reviewed with: patient, spouse    Subjective     Communicated with NURSE JEFFERSON prior to session.  Patient found SUPINE IN BED upon PT entry to room, agreeable to evaluation.      Chief Complaint: NONE VERBALIZED  Patient comments/goals: NONE VERBALIZED  Pain/Comfort:  · Pain Rating 1: 0/10    Patients cultural, spiritual, Taoist conflicts given the current situation:     Living Environment:  PT LIVES WITH  WHO IS ABLE TO PHYSICALLY ASSIST AND PROVIDE 24 HOUR CARE, PT DECLINED SPEAKING TO THERAPIST DESPITE ENCOURAGEMENT SO HISTORY/PLO OBTAINED FROM  WHO REPORTS PT HAS HAD DECLINE IN FUNCTIONAL MOBILITY FOR LAST MONTH, PT IS W/C BOUND AND REQUIRES ASSISTANCE FOR BED MOBILITY AND W/C TF'S, PT REQUIRES ASSISTANCE FOR ADL'S  Prior to admission, patients level of function was MODA.  Patient has the following equipment: wheelchair, bedside commode, bath bench.  DME  owned (not currently used): rolling walker and ROLLATOR WALKER.  Upon discharge, patient will have assistance from .    Objective:     Patient found with: telemetry, peripheral IV     General Precautions: Standard, fall, other (see comments) (HEPARIN DRIP)   Orthopedic Precautions:N/A   Braces: N/A     Exams:  · Cognitive Exam:  Patient is oriented to Person, Place, Time, Situation and VERY LITTLE TO NO VERBALIZATION, PT REFUSING TO ANSWER QUESTIONS OR SPEAK TO THERAPIST BUT APPARENT PT COULD COGNITIVELY UNDERSTAND QUESTIONS AND COMMANDS and follows 10% of SIMPLE commands   · Postural Exam:  Patient presented with the following abnormalities:    · -       Rounded shoulders  · -       Forward head  · Sensation:    · -       Intact  · RLE ROM: WFL  · RLE Strength: GROSSLY 3+/5  · LLE ROM: WFL  · LLE Strength: GROSSLY 3+/5    Functional Mobility:  · Bed Mobility:     · Rolling Left:  total assistance and of 2 persons  · Rolling Right: total assistance and of 2 persons  · Scooting: total assistance and of 2 persons  · Supine to Sit: total assistance and of 2 persons  · Sit to Supine: total assistance and of 2 persons  · Transfers:     · Sit to Stand:  REFUSED TO TRY, FEARFUL OF FALLING, RESISTANT TO ALL MOVEMENT with no AD  · Gait: REFUSED TO TRY  · Balance: POOR+ STATIC SIT    AM-PAC 6 CLICK MOBILITY  Total Score:8     Therapeutic Activities and Exercises:   PT APPEARS TO HAVE POOR MOTIVATION AND IS SELF LIMITING DESPITE CONSTANT ENCOURAGEMENT DURING EVAL, PT RESISTANT TO ALL MOVEMENT, PT EDUCATED IN IMPORTANCE OF PARTICIPATION IN THERAPY POC IN ACUTE SETTING AS WELL AS SNF SETTING    Patient left supine with all lines intact, call button in reach, NURSE notified and  present.    GOALS:    Physical Therapy Goals        Problem: Physical Therapy Goal           Problem: Physical Therapy Goal    Goal Priority Disciplines Outcome Goal Variances Interventions   Physical Therapy Goal     PT/OT, PT       Description:  LTG'S TO BE MET IN 7 DAYS (6-20-18)  1. PT WILL REQUIRE MAXA FOR BED MOBILITY  2. PT WILL REQUIRE MAXA FOR SIT<>STAND TF USING RW  3. PT WILL AMB 10' WITH RW AND MAXA  4. PT WILL TOLERATE BLE THEREX X 20 REPS AAROM  5. PT WILL DEMO P+ DYNAMIC BALANCE DURING TF                    History:     Past Medical History:   Diagnosis Date    Acid reflux 1/7/2015    Anxiety 1/7/2015    Aortic valvar stenosis     Arthritis     osteo    Callus     Chronic anemia     followed by Dr. Addison    CKD (chronic kidney disease) stage 5, GFR less than 15 ml/min 8/7/2015    Combined hyperlipidemia associated with type 2 diabetes mellitus     Coronary artery disease     Diabetes mellitus type II, controlled, with no complications     LBSL 108 today    Encounter for blood transfusion     Frail elderly with impaired mobility, wheel chair bound 8/25/2017    Gallbladder problem     gallbladder surgery    Heart murmur     Hyperparathyroidism, secondary renal 1/7/2015    Hypertension 8/7/2015    Hypertension associated with diabetes 11/12/2012    Kidney transplant candidate 1/7/2015    Overweight(278.02)     Urinary tract infection        Past Surgical History:   Procedure Laterality Date    ANKLE FRACTURE SURGERY Left 1985    AORTIC VALVE REPLACEMENT  05/2016    BONE BIOPSY      CARDIAC SURGERY      CATARACT EXTRACTION W/  INTRAOCULAR LENS IMPLANT  2009    CHOLECYSTECTOMY      CYSTOSCOPY      EYE SURGERY      FRACTURE SURGERY      HYSTERECTOMY  unknown    OOPHORECTOMY      removal of colon polyps      RENAL BIOPSY  10/2013    YAG cap -OD         Clinical Decision Making:     History  Co-morbidities and personal factors that may impact the plan of care Examination  Body Structures and Functions, activity limitations and participation restrictions that may impact the plan of care Clinical Presentation   Decision Making/ Complexity Score   Co-morbidities:   [] Time since onset of injury /  illness / exacerbation  [] Status of current condition  []Patient's cognitive status and safety concerns    [] Multiple Medical Problems (see med hx)  Personal Factors:   [] Patient's age  [] Prior Level of function   [] Patient's home situation (environment and family support)  [] Patient's level of motivation  [] Expected progression of patient      HISTORY:(criteria)    [] 85050 - no personal factors/history    [] 54556 - has 1-2 personal factor/comorbidity     [] 91710 - has >3 personal factor/comorbidity     Body Regions:  [] Objective examination findings  [] Head     []  Neck  [] Trunk   [] Upper Extremity  [] Lower Extremity    Body Systems:  [] For communication ability, affect, cognition, language, and learning style: the assessment of the ability to make needs known, consciousness, orientation (person, place, and time), expected emotional /behavioral responses, and learning preferences (eg, learning barriers, education  needs)  [] For the neuromuscular system: a general assessment of gross coordinated movement (eg, balance, gait, locomotion, transfers, and transitions) and motor function  (motor control and motor learning)  [] For the musculoskeletal system: the assessment of gross symmetry, gross range of motion, gross strength, height, and weight  [] For the integumentary system: the assessment of pliability(texture), presence of scar formation, skin color, and skin integrity  [] For cardiovascular/pulmonary system: the assessment of heart rate, respiratory rate, blood pressure, and edema     Activity limitations:    [] Patient's cognitive status and saf ety concerns          [] Status of current condition      [] Weight bearing restriction  [] Cardiopulmunary Restriction    Participation Restrictions:   [] Goals and goal agreement with the patient     [] Rehab potential (prognosis) and probable outcome      Examination of Body System: (criteria)    [] 97685 - addressing 1-2 elements    [] 62495 -  addressing a total of 3 or more elements     [] 70489 -  Addressing a total of 4 or more elements         Clinical Presentation: (criteria)  Choose one     On examination of body system using standardized tests and measures patient presents with (CHOOSE ONE) elements from any of the following: body structures and functions, activity limitations, and/or participation restrictions.  Leading to a clinical presentation that is considered (CHOOSE ONE)                              Clinical Decision Making  (Eval Complexity):  Choose One     Time Tracking:     PT Received On: 06/13/18  PT Start Time: 0745     PT Stop Time: 0810  PT Total Time (min): 25 min     Billable Minutes: Evaluation 15 and Therapeutic Activity 10     PT ENCOURAGED TO CALL FOR ASSISTANCE WITH ALL NEEDS DUE TO FALL RISK STATUS, PT AGREEABLE    Aditi Robledo, PT  06/13/2018

## 2018-06-13 NOTE — PROGRESS NOTES
Pt on NC 2lpm Sat 95%, uncooperative with IS instruction, family at bedside, will try again later.

## 2018-06-13 NOTE — PLAN OF CARE
Problem: Patient Care Overview  Goal: Plan of Care Review  Outcome: Ongoing (interventions implemented as appropriate)  Recommendations    Recommendation/Intervention:   1. When medically able, and if safe for PO intake, ADAT to Diabetic/Renal diet w/ textures per speech.   2. Will continue to monitor    Goals: Diet advancement w/in 72 hrs  Nutrition Goal Status: new  Communication of RD Recs:  (POC review, sticky note)

## 2018-06-13 NOTE — ASSESSMENT & PLAN NOTE
- ASA, Statin, and Lopressor continued   -Imdur dose increased   -previous Cath showed severe CAD (proximal LCX 99%, mid 50%, RCA mid 90%, distal with subtotal lesion).  - Cardiology consulted with medical management continued

## 2018-06-13 NOTE — PROGRESS NOTES
"[facility] Hospital Medicine  Progress Note    Patient Name: Citlali Karimi  MRN: [id number]  Patient Class: IP- Inpatient   Admission Date: 6/12/2018  Length of Stay: 0 days  Attending Physician: Levy Samuel MD  Primary Care Provider: Evelio Schuster MD        Subjective:     Principal Problem: Hyperparathyroidism    HPI:  Citlali Karimi is a 68 y.o female with PMHx of CKD (IV), HTN, DM, CVA (left sided weakness), and CAD who initially presented for hyperparathyroidism and hypercalcemia requiring surgical intervention.  Pt underwent parathyroidectomy today per Dr. Tracy (General Surgery). Pt admitted to Medical Surgical Unit post procedure and Hospital Medicine consulted for medical management.  Chest xray post procedure showed mild asymmetric CHF versus RUL pneumonia. Troponin 0.113 and BNP >270 noted.  Pt given IV Lasix in PACU prior to unit arrival.      Hospital Course:  Pt admitted to Outpatient Extended Recovery post procedure.  Elevated noted post procedure and results trended yielding significant positive response.  Cardiology consulted and Heparin infusion initiated for NSTEMI.  Hx of CAD, multiple vessels noted with home medications continued and Imdur dose increased.  Nephrology consulted due to elevated creatinine.  Echo results pending.  Heart catheterization procedure to be considered pending Echo results and recommendations.      Interval History: pt is at baseline per  and denies CP/SOB during exam.  Heparin infusion continued with Imdur increased per Cardiology.  Nephrology consulted due to elevated creatinine and high risk for renal complication with repeat Angio.  Intervention to be considered pending Echo results and recommendations.   reported "heartburn", swallowing changes, and noncompliance with medication due to inconsistent dosing prior to admission.       Review of Systems   Constitutional: Negative for activity change, appetite change and fatigue.   HENT: " Positive for trouble swallowing. Negative for congestion and postnasal drip.    Respiratory: Negative for cough, shortness of breath and wheezing.    Cardiovascular: Negative for chest pain, palpitations and leg swelling.   Gastrointestinal: Negative for abdominal distention, abdominal pain, diarrhea, nausea and vomiting.        Heartburn   Skin: Positive for wound. Negative for color change.   Allergic/Immunologic: Negative for environmental allergies and food allergies.   Neurological: Positive for weakness (left sided (chronic)). Negative for dizziness and headaches.   Hematological: Does not bruise/bleed easily.   Psychiatric/Behavioral: Negative for agitation, confusion and sleep disturbance. The patient is not nervous/anxious.      Objective:     Vital Signs (Most Recent):  Temp: 97.6 °F (36.4 °C) (06/13/18 1139)  Pulse: 68 (06/13/18 1139)  Resp: 18 (06/13/18 1139)  BP: (!) 92/51 (06/13/18 1139)  SpO2: 96 % (06/13/18 1139) Vital Signs (24h Range):  Temp:  [96.9 °F (36.1 °C)-98.8 °F (37.1 °C)] 97.6 °F (36.4 °C)  Pulse:  [] 68  Resp:  [16-29] 18  SpO2:  [88 %-100 %] 96 %  BP: ()/() 92/51     Weight: 69.7 kg (153 lb 10.6 oz)  Body mass index is 28.1 kg/m².    Intake/Output Summary (Last 24 hours) at 06/13/18 1151  Last data filed at 06/13/18 0819   Gross per 24 hour   Intake           948.79 ml   Output               10 ml   Net           938.79 ml      Physical Exam   Constitutional: She appears well-developed and well-nourished.   HENT:   Head: Normocephalic.   Mouth/Throat: Mucous membranes are dry.   Eyes: Conjunctivae are normal.   Neck: Normal range of motion. Neck supple.   Cardiovascular: Normal rate, regular rhythm, normal heart sounds and intact distal pulses.    Pulmonary/Chest: No tachypnea. No respiratory distress. She has decreased breath sounds in the right lower field and the left lower field. She has no rhonchi.   Coarse breath sounds to upper airway noted   Abdominal: Soft.  Bowel sounds are normal. She exhibits no distension. There is no tenderness.   Genitourinary:   Genitourinary Comments: Deferred    Musculoskeletal:   Left sided weakness   Neurological: She is alert. No cranial nerve deficit.   Left sided weakness noted    Skin: Skin is warm and dry.   Surgical incision to cervical area   Psychiatric: She has a normal mood and affect. Her behavior is normal.   Pt alert and answers appropriately when questioned       Significant Labs:   CBC:   Recent Labs  Lab 06/12/18  1718 06/13/18  0506 06/13/18  0724   WBC 10.72 11.22 11.39   HGB 12.6 10.9* 10.7*   HCT 40.6 34.2* 33.3*    183 179  179     CMP:   Recent Labs  Lab 06/12/18  1444 06/13/18  0505    141   K 3.8 4.3    107   CO2 22* 24   * 105   BUN 33* 37*   CREATININE 3.5* 3.6*   CALCIUM 11.4* 10.5   ANIONGAP 11 10   EGFRNONAA 13* 12*     Troponin:   Recent Labs  Lab 06/12/18  1444 06/12/18  2053 06/13/18  0220   TROPONINI 0.113* 34.681* >50.000*       Significant Imaging:   Imaging Results    None       Assessment/Plan:      NSTEMI (non-ST elevated myocardial infarction)    -Cardiology consulted   - EKG showed diffuse ST depression EKG and troponin increased from 0.113 >> 34>>50, consistent with NSTEMI  -Denies chest pain or SOB  -BP elevated yesterday, HTN likely triggered clot rupture/MI  -Known CAD on previous cath not amenable to PCI at that time  -Continue ASA, Imdur, Metoprolol, statin, heparin gtt  -Will need repeat angiogram to re-assess coronaries/underlying ischemia  -Nephrology consulted due to high risk of contrast induced nephropathy- creatinine 3.6  -Renal consult requested for recommendations/clearance          Hypercalcemia    -hx of hyperparathyroidism   - s/p parathyroidectomy   - Ca 11.4>>10.5  - Nephrology consulted          Abnormal chest x-ray    - chest xray showed possible mild asymmetric CHF versus right upper lobe pneumonia.  -pt given IV Lasix in PACU  - IV antibiotics for  suspected aspiration continued   - pt recently seen by RAMIRO Mcnally (Otolaryngology) for Dysphagia  -   - pt afebrile, WBC normal  - repeat xray results pending  - SLP evaluation results pending           Elevated troponin    -initial 0.113>>34>>50  -pt denies CP and SOB  - EKG results showed diffuse ST changes    -serial results revealed NSTEMI with Cardiology consulted             CVA (cerebral vascular accident)    -hx of 1 yr ago per   -left sided weakness residual  -ASA and Statin continued           Hyperparathyroidism    -Pt admitted to Extended Recovery   -s/p Parathyroidectomy   -managed by General Surgery -primary team  - PTH- 32  - Ca 11.4>>10.5  - analgesia prn   - antiemetics prn  - specimens sent for analysis            Coronary artery disease involving native coronary artery    - ASA, Statin, and Lopressor continued   -Imdur dose increased   -previous Cath showed severe CAD (proximal LCX 99%, mid 50%, RCA mid 90%, distal with subtotal lesion).  - Cardiology consulted with medical management continued           CKD (chronic kidney disease) stage 5, GFR less than 15 ml/min    -Creatinine 3.6  -baseline 2.7-4.4  -will monitor   -sodium bicarb continued   -pt has been followed outpatient by Dr. Vazquez (Nephrology)  -Nephrology consulted - pt may benefit from repeat angio due to NSTEMI but at high risk of contrast induced nephropathy  - pt was candidate for renal transplant however work up discontinued due to progressive weakness          Hypertension associated with diabetes    - home medications continued   -hx of noncompliance and inconsistent dosing at home per   - uncontrolled upon arrival- improved with Hydralazine in PACU           Diabetic nephropathy associated with type 2 diabetes mellitus    -Accuchecks and SSI continued   -HbA1c pending   -cardiac/diabetic diet          Anemia in stage 4 chronic kidney disease    -stable post procedure  -will monitor  -CBC in am   -pt  followed outpatient by Heme/Onc          Aortic valvar stenosis    -s/p TAVR  - Echo in 04/2017 with EF 60%  -Cardiology following  - repeat Echo results pending             VTE Risk Mitigation         Ordered     heparin 25,000 units in dextrose 5% 250 mL (100 units/mL) infusion; FEMALE  Continuous      06/13/18 0659     heparin 25,000 units in dextrose 5% 250 mL (100 units/mL) bolus from bag; PRN BOLUS  As needed (PRN)      06/13/18 0659     heparin 25,000 units in dextrose 5% 250 mL (100 units/mL) bolus from bag; PRN BOLUS  As needed (PRN)      06/13/18 0659     Place sequential compression device  Until discontinued      06/12/18 5363              Rach Wheatley NP  Department of Hospital Medicine   [facility]

## 2018-06-13 NOTE — PROGRESS NOTES
Patient's PTT is >150. Infusion will be held for 1 hr, then restarted per nomogram.   FLOWER Wheatley NP notified.   Will continue to monitor.

## 2018-06-13 NOTE — PROGRESS NOTES
[facility]   Adult Nutrition  Progress Note    SUMMARY       Recommendations    Recommendation/Intervention:   1. When medically able, and if safe for PO intake, ADAT to Diabetic/Renal diet w/ textures per speech.   2. Will continue to monitor    Goals: Diet advancement w/in 72 hrs  Nutrition Goal Status: new  Communication of RD Recs:  (POC review, sticky note)    Reason for Assessment    Reason for Assessment: identified at risk by screening criteria (dysphagia/difficulty swallowing)  Dx:  1. Hyperparathyroidism    2. Hypercalcemia    3. SOB (shortness of breath)    4. NSTEMI (non-ST elevated myocardial infarction)    5. Elevated troponin      Past Medical History:   Diagnosis Date    Acid reflux 1/7/2015    Anxiety 1/7/2015    Aortic valvar stenosis     Arthritis     osteo    Callus     Chronic anemia     followed by Dr. Addison    CKD (chronic kidney disease) stage 5, GFR less than 15 ml/min 8/7/2015    Combined hyperlipidemia associated with type 2 diabetes mellitus     Coronary artery disease     Diabetes mellitus type II, controlled, with no complications     LBSL 108 today    Encounter for blood transfusion     Frail elderly with impaired mobility, wheel chair bound 8/25/2017    Gallbladder problem     gallbladder surgery    Heart murmur     Hyperparathyroidism, secondary renal 1/7/2015    Hypertension 8/7/2015    Hypertension associated with diabetes 11/12/2012    Kidney transplant candidate 1/7/2015    Overweight(278.02)     Urinary tract infection      General Information Comments: Pt has difficulty speaking, information primarily obtained from pt's . Pt is currently NPO, was on Renal diet yesterday, tolerated well. Pt's  reports that pt eats well, but has trouble swallowing medications, even when crushed and in applesauce. Speech eval is ordered.     Nutrition Discharge Planning: Too soon to determine, SLP eval pending    Nutrition Risk  "Screen    Nutrition Risk Screen: dysphagia or difficulty swallowing    Nutrition/Diet History    Do you have any cultural, spiritual, Episcopal conflicts, given your current situation?: none    Anthropometrics    Temp: 97.6 °F (36.4 °C)  Height Method: Stated  Height: 5' 2" (157.5 cm)  Height (inches): 62 in  Weight Method: Bed Scale  Weight: 69.7 kg (153 lb 10.6 oz)  Weight (lb): 153.66 lb  Ideal Body Weight (IBW), Female: 110 lb  % Ideal Body Weight, Female (lb): 139.69 lb  BMI (Calculated): 28.2  BMI Grade: 25 - 29.9 - overweight       Lab/Procedures/Meds    Pertinent Labs Reviewed: reviewed  Lab Results   Component Value Date    HGB 10.7 (L) 06/13/2018     Lab Results   Component Value Date    HCT 33.3 (L) 06/13/2018     BMP  Lab Results   Component Value Date     06/13/2018    K 4.3 06/13/2018     06/13/2018    CO2 24 06/13/2018    BUN 37 (H) 06/13/2018    CREATININE 3.6 (H) 06/13/2018    CALCIUM 10.5 06/13/2018    ANIONGAP 10 06/13/2018    ESTGFRAFRICA 14 (A) 06/13/2018    EGFRNONAA 12 (A) 06/13/2018     Lab Results   Component Value Date    CALCIUM 10.5 06/13/2018    PHOS 4.4 05/03/2018     Lab Results   Component Value Date    ALBUMIN 3.9 05/03/2018       Recent Labs  Lab 06/13/18  1140   POCTGLUCOSE 133*     Lab Results   Component Value Date    HGBA1C 6.2 06/07/2017     Lab Results   Component Value Date    CRP 2.7 05/14/2018     Pertinent Medications Reviewed: reviewed  Scheduled Meds:   [START ON 6/14/2018] aspirin  81 mg Oral Daily    calcium carbonate  1,000 mg Oral BID    chlorhexidine  10 mL Mouth/Throat BID    isosorbide mononitrate  60 mg Oral Daily    losartan  25 mg Oral Daily    metoprolol tartrate  25 mg Oral BID    NIFEdipine  90 mg Oral Daily    nozaseptin   Each Nare BID    pantoprazole  40 mg Oral Daily    piperacillin-tazobactam (ZOSYN) IVPB  4.5 g Intravenous Q12H    senna-docusate 8.6-50 mg  1 tablet Oral BID    simvastatin  20 mg Oral QHS    sodium bicarbonate  " 650 mg Oral BID     Continuous Infusions:   heparin (porcine) in D5W 16 Units/kg/hr (06/13/18 0819)     PRN Meds:.acetaminophen, bisacodyl, cloNIDine, dextrose 50%, dextrose 50%, glucagon (human recombinant), glucose, glucose, heparin (PORCINE), heparin (PORCINE), insulin aspart U-100, lactulose, morphine, ondansetron, sodium chloride 0.9%      Physical Findings/Assessment    Overall Physical Appearance: on oxygen therapy, overweight  Oral/Mouth Cavity: decay/carries, tooth/teeth missing, no dental appliances present  Skin: incision(s)    Estimated/Assessed Needs    Weight Used For Calorie Calculations: 69.7 kg (153 lb 10.6 oz)  Energy Calorie Requirements (kcal): 8699-3684 kcal/day  Energy Need Method: Kcal/kg (30-35)  Protein Requirements: 56-70 gm/day  Weight Used For Protein Calculations: 69.7 kg (153 lb 10.6 oz) (x0.8-1 g/kg)     Fluid Need Method: other (see comments) (UO + 1000 mL or per MD)  RDA Method (mL): 2091  CHO Requirement: 50% EEN/EPN      Nutrition Prescription Ordered    Current Diet Order: NPO    Evaluation of Received Nutrient/Fluid Intake    Intake/Output Summary (Last 24 hours) at 06/13/18 1234  Last data filed at 06/13/18 1200   Gross per 24 hour   Intake           610.04 ml   Output                0 ml   Net           610.04 ml          % Intake of Estimated Energy Needs: 0 - 25 %  % Meal Intake: NPO    Nutrition Risk    Level of Risk/Frequency of Follow-up:  (F/U x2 weekly)     Assessment and Plan    Nutrition Problem  Inadequate oral intake    Related to (etiology):   Pending speech eval    Signs and Symptoms (as evidenced by):   Current diet order (NPO)    Interventions/Recommendations (treatment strategy):  -When medically able, ADAT to Diabetic/Renal w/ textures per speech    Nutrition Diagnosis Status:   New        Monitor and Evaluation    Food and Nutrient Intake: energy intake, food and beverage intake  Food and Nutrient Adminstration: diet order  Knowledge/Beliefs/Attitudes: food  and nutrition knowledge/skill  Physical Activity and Function: nutrition-related ADLs and IADLs  Anthropometric Measurements: weight  Biochemical Data, Medical Tests and Procedures: electrolyte and renal panel, glucose/endocrine profile, gastrointestinal profile  Nutrition-Focused Physical Findings: overall appearance     Nutrition Follow-Up    RD Follow-up?: Yes x2 weekly

## 2018-06-13 NOTE — ASSESSMENT & PLAN NOTE
-Presents with diffuse ST depression on post-procedure EKG and elevated troponin > 50, consistent with NSTEMI  -Denies chest pain or SOB  -BP elevated yesterday, HTN likely triggered clot rupture/MI  -Known CAD on previous cath not amenable to PCI at that time  -Continue ASA, Imdur, Metoprolol, statin, heparin gtt  -Will need repeat angiogram to re-assess coronaries/underlying ischemia however patient with CKD/creatinine of 3.6 and is at high risk of contrast induced nephropathy   -Renal consult requested for recommendations/clearance

## 2018-06-13 NOTE — ASSESSMENT & PLAN NOTE
-initial 0.113>>34>>50  -pt denies CP and SOB  - EKG results showed diffuse ST changes    -serial results revealed NSTEMI with Cardiology consulted

## 2018-06-14 ENCOUNTER — SURGERY (OUTPATIENT)
Age: 68
End: 2018-06-14

## 2018-06-14 ENCOUNTER — ANESTHESIA (OUTPATIENT)
Dept: SURGERY | Facility: HOSPITAL | Age: 68
DRG: 981 | End: 2018-06-14
Payer: MEDICARE

## 2018-06-14 ENCOUNTER — TELEPHONE (OUTPATIENT)
Dept: INFUSION THERAPY | Facility: HOSPITAL | Age: 68
End: 2018-06-14

## 2018-06-14 ENCOUNTER — ANESTHESIA EVENT (OUTPATIENT)
Dept: SURGERY | Facility: HOSPITAL | Age: 68
DRG: 981 | End: 2018-06-14
Payer: MEDICARE

## 2018-06-14 PROBLEM — Z90.89 S/P PARATHYROIDECTOMY: Status: ACTIVE | Noted: 2018-06-14

## 2018-06-14 PROBLEM — K72.00 SHOCK LIVER: Status: ACTIVE | Noted: 2018-06-14

## 2018-06-14 PROBLEM — R57.0 CARDIOGENIC SHOCK: Status: ACTIVE | Noted: 2018-06-14

## 2018-06-14 PROBLEM — I48.19 PERSISTENT ATRIAL FIBRILLATION: Status: ACTIVE | Noted: 2018-06-14

## 2018-06-14 PROBLEM — I46.9 CARDIAC ARREST WITH VENTRICULAR FIBRILLATION: Status: ACTIVE | Noted: 2018-06-14

## 2018-06-14 PROBLEM — J96.01 ACUTE HYPOXEMIC RESPIRATORY FAILURE: Status: ACTIVE | Noted: 2018-06-14

## 2018-06-14 PROBLEM — I49.01 CARDIAC ARREST WITH VENTRICULAR FIBRILLATION: Status: ACTIVE | Noted: 2018-06-14

## 2018-06-14 PROBLEM — E87.8 ELECTROLYTE IMBALANCE: Status: ACTIVE | Noted: 2018-06-14

## 2018-06-14 PROBLEM — Z98.890 S/P EVACUATION OF HEMATOMA: Status: ACTIVE | Noted: 2018-06-14

## 2018-06-14 PROBLEM — I21.3 STEMI (ST ELEVATION MYOCARDIAL INFARCTION): Status: ACTIVE | Noted: 2018-06-13

## 2018-06-14 LAB
ALBUMIN SERPL BCP-MCNC: 2.7 G/DL
ALBUMIN SERPL BCP-MCNC: 3.1 G/DL
ALLENS TEST: ABNORMAL
ALLENS TEST: ABNORMAL
ALP SERPL-CCNC: 48 U/L
ALT SERPL W/O P-5'-P-CCNC: 218 U/L
ANION GAP SERPL CALC-SCNC: 15 MMOL/L
ANION GAP SERPL CALC-SCNC: 16 MMOL/L
ANION GAP SERPL CALC-SCNC: 18 MMOL/L
APTT BLDCRRT: 112.3 SEC
APTT BLDCRRT: 119.3 SEC
APTT BLDCRRT: 77 SEC
APTT BLDCRRT: 86.5 SEC
AST SERPL-CCNC: 346 U/L
BASOPHILS # BLD AUTO: 0.01 K/UL
BASOPHILS NFR BLD: 0.1 %
BASOPHILS NFR BLD: 0.1 %
BASOPHILS NFR BLD: 0.2 %
BILIRUB SERPL-MCNC: 0.4 MG/DL
BNP SERPL-MCNC: 479 PG/ML
BUN SERPL-MCNC: 40 MG/DL
BUN SERPL-MCNC: 41 MG/DL
BUN SERPL-MCNC: 43 MG/DL
CALCIUM SERPL-MCNC: 10.1 MG/DL
CALCIUM SERPL-MCNC: 9.3 MG/DL
CALCIUM SERPL-MCNC: 9.5 MG/DL
CHLORIDE SERPL-SCNC: 105 MMOL/L
CHLORIDE SERPL-SCNC: 107 MMOL/L
CHLORIDE SERPL-SCNC: 107 MMOL/L
CO2 SERPL-SCNC: 17 MMOL/L
CO2 SERPL-SCNC: 19 MMOL/L
CO2 SERPL-SCNC: 20 MMOL/L
CREAT SERPL-MCNC: 3.7 MG/DL
CREAT SERPL-MCNC: 3.8 MG/DL
CREAT SERPL-MCNC: 4.1 MG/DL
DELSYS: ABNORMAL
DELSYS: ABNORMAL
DIASTOLIC DYSFUNCTION: YES
DIFFERENTIAL METHOD: ABNORMAL
EOSINOPHIL # BLD AUTO: 0 K/UL
EOSINOPHIL NFR BLD: 0 %
EOSINOPHIL NFR BLD: 0 %
EOSINOPHIL NFR BLD: 0.2 %
ERYTHROCYTE [DISTWIDTH] IN BLOOD BY AUTOMATED COUNT: 16.5 %
ERYTHROCYTE [DISTWIDTH] IN BLOOD BY AUTOMATED COUNT: 16.6 %
ERYTHROCYTE [DISTWIDTH] IN BLOOD BY AUTOMATED COUNT: 16.7 %
ERYTHROCYTE [SEDIMENTATION RATE] IN BLOOD BY WESTERGREN METHOD: 16 MM/H
EST. GFR  (AFRICAN AMERICAN): 12 ML/MIN/1.73 M^2
EST. GFR  (AFRICAN AMERICAN): 13 ML/MIN/1.73 M^2
EST. GFR  (AFRICAN AMERICAN): 14 ML/MIN/1.73 M^2
EST. GFR  (NON AFRICAN AMERICAN): 10 ML/MIN/1.73 M^2
EST. GFR  (NON AFRICAN AMERICAN): 11 ML/MIN/1.73 M^2
EST. GFR  (NON AFRICAN AMERICAN): 12 ML/MIN/1.73 M^2
FACT X PPP CHRO-ACNC: 0.94 IU/ML
FIO2: 100
FIO2: 90
GLUCOSE SERPL-MCNC: 103 MG/DL
GLUCOSE SERPL-MCNC: 192 MG/DL
GLUCOSE SERPL-MCNC: 196 MG/DL (ref 70–110)
GLUCOSE SERPL-MCNC: 220 MG/DL
HCO3 UR-SCNC: 20.1 MMOL/L (ref 24–28)
HCO3 UR-SCNC: 20.5 MMOL/L (ref 24–28)
HCT VFR BLD AUTO: 25.2 %
HCT VFR BLD AUTO: 29.5 %
HCT VFR BLD AUTO: 31.5 %
HCT VFR BLD CALC: 22 %PCV (ref 36–54)
HGB BLD-MCNC: 8 G/DL
HGB BLD-MCNC: 9.4 G/DL
HGB BLD-MCNC: 9.8 G/DL
LYMPHOCYTES # BLD AUTO: 0.5 K/UL
LYMPHOCYTES # BLD AUTO: 0.8 K/UL
LYMPHOCYTES # BLD AUTO: 1.8 K/UL
LYMPHOCYTES NFR BLD: 20.8 %
LYMPHOCYTES NFR BLD: 7.2 %
LYMPHOCYTES NFR BLD: 8 %
MAGNESIUM SERPL-MCNC: 1.3 MG/DL
MAGNESIUM SERPL-MCNC: 2.1 MG/DL
MCH RBC QN AUTO: 28.2 PG
MCH RBC QN AUTO: 28.3 PG
MCH RBC QN AUTO: 28.6 PG
MCHC RBC AUTO-ENTMCNC: 31.1 G/DL
MCHC RBC AUTO-ENTMCNC: 31.7 G/DL
MCHC RBC AUTO-ENTMCNC: 31.9 G/DL
MCV RBC AUTO: 89 FL
MCV RBC AUTO: 90 FL
MCV RBC AUTO: 91 FL
MODE: ABNORMAL
MONOCYTES # BLD AUTO: 0.4 K/UL
MONOCYTES # BLD AUTO: 0.6 K/UL
MONOCYTES # BLD AUTO: 0.9 K/UL
MONOCYTES NFR BLD: 10.6 %
MONOCYTES NFR BLD: 5.3 %
MONOCYTES NFR BLD: 5.9 %
NEUTROPHILS # BLD AUTO: 5.3 K/UL
NEUTROPHILS # BLD AUTO: 6.1 K/UL
NEUTROPHILS # BLD AUTO: 9.8 K/UL
NEUTROPHILS NFR BLD: 68.3 %
NEUTROPHILS NFR BLD: 85.9 %
NEUTROPHILS NFR BLD: 87.4 %
PCO2 BLDA: 36.2 MMHG (ref 35–45)
PCO2 BLDA: 37.3 MMHG (ref 35–45)
PEEP: 5
PH SMN: 7.35 [PH] (ref 7.35–7.45)
PH SMN: 7.35 [PH] (ref 7.35–7.45)
PHOSPHATE SERPL-MCNC: 5.2 MG/DL
PLATELET # BLD AUTO: 141 K/UL
PLATELET # BLD AUTO: 160 K/UL
PLATELET # BLD AUTO: 171 K/UL
PMV BLD AUTO: 9.7 FL
PMV BLD AUTO: 9.7 FL
PMV BLD AUTO: 9.9 FL
PO2 BLDA: 78 MMHG (ref 80–100)
PO2 BLDA: 87 MMHG (ref 80–100)
POC BE: -5 MMOL/L
POC BE: -5 MMOL/L
POC IONIZED CALCIUM: 1.28 MMOL/L (ref 1.06–1.42)
POC SATURATED O2: 95 % (ref 95–100)
POC SATURATED O2: 96 % (ref 95–100)
POCT GLUCOSE: 143 MG/DL (ref 70–110)
POCT GLUCOSE: 156 MG/DL (ref 70–110)
POCT GLUCOSE: 229 MG/DL (ref 70–110)
POTASSIUM BLD-SCNC: 3 MMOL/L (ref 3.5–5.1)
POTASSIUM SERPL-SCNC: 3.2 MMOL/L
POTASSIUM SERPL-SCNC: 3.7 MMOL/L
POTASSIUM SERPL-SCNC: 4.4 MMOL/L
PROT SERPL-MCNC: 5.2 G/DL
RBC # BLD AUTO: 2.83 M/UL
RBC # BLD AUTO: 3.29 M/UL
RBC # BLD AUTO: 3.47 M/UL
RETIRED EF AND QEF - SEE NOTES: 60 (ref 55–65)
SAMPLE: ABNORMAL
SAMPLE: ABNORMAL
SITE: ABNORMAL
SITE: ABNORMAL
SODIUM BLD-SCNC: 141 MMOL/L (ref 136–145)
SODIUM SERPL-SCNC: 139 MMOL/L
SODIUM SERPL-SCNC: 142 MMOL/L
SODIUM SERPL-SCNC: 143 MMOL/L
VT: 400
WBC # BLD AUTO: 11.22 K/UL
WBC # BLD AUTO: 6.13 K/UL
WBC # BLD AUTO: 8.86 K/UL

## 2018-06-14 PROCEDURE — 5A12012 PERFORMANCE OF CARDIAC OUTPUT, SINGLE, MANUAL: ICD-10-PCS | Performed by: SURGERY

## 2018-06-14 PROCEDURE — 25000242 PHARM REV CODE 250 ALT 637 W/ HCPCS: Performed by: SURGERY

## 2018-06-14 PROCEDURE — 25000003 PHARM REV CODE 250: Performed by: NURSE ANESTHETIST, CERTIFIED REGISTERED

## 2018-06-14 PROCEDURE — 93010 ELECTROCARDIOGRAM REPORT: CPT | Mod: ,,, | Performed by: INTERNAL MEDICINE

## 2018-06-14 PROCEDURE — 36556 INSERT NON-TUNNEL CV CATH: CPT | Mod: ,,, | Performed by: NURSE PRACTITIONER

## 2018-06-14 PROCEDURE — 27201423 OPTIME MED/SURG SUP & DEVICES STERILE SUPPLY: Performed by: SURGERY

## 2018-06-14 PROCEDURE — 99291 CRITICAL CARE FIRST HOUR: CPT | Mod: ,,, | Performed by: INTERNAL MEDICINE

## 2018-06-14 PROCEDURE — 36620 INSERTION CATHETER ARTERY: CPT | Mod: 59,,, | Performed by: NURSE PRACTITIONER

## 2018-06-14 PROCEDURE — 63600175 PHARM REV CODE 636 W HCPCS: Performed by: INTERNAL MEDICINE

## 2018-06-14 PROCEDURE — 99900035 HC TECH TIME PER 15 MIN (STAT)

## 2018-06-14 PROCEDURE — 63600175 PHARM REV CODE 636 W HCPCS: Performed by: NURSE PRACTITIONER

## 2018-06-14 PROCEDURE — 36600 WITHDRAWAL OF ARTERIAL BLOOD: CPT

## 2018-06-14 PROCEDURE — 85025 COMPLETE CBC W/AUTO DIFF WBC: CPT | Mod: 91

## 2018-06-14 PROCEDURE — 99900039 HC SLP GENERIC THERAPY SCREENING (STAT)

## 2018-06-14 PROCEDURE — 94002 VENT MGMT INPAT INIT DAY: CPT

## 2018-06-14 PROCEDURE — 27000221 HC OXYGEN, UP TO 24 HOURS

## 2018-06-14 PROCEDURE — 25000003 PHARM REV CODE 250: Performed by: NURSE PRACTITIONER

## 2018-06-14 PROCEDURE — 10180 I&D COMPLEX PO WOUND INFCTJ: CPT | Mod: ,,, | Performed by: SURGERY

## 2018-06-14 PROCEDURE — 37000009 HC ANESTHESIA EA ADD 15 MINS: Performed by: SURGERY

## 2018-06-14 PROCEDURE — 0BH18EZ INSERTION OF ENDOTRACHEAL AIRWAY INTO TRACHEA, VIA NATURAL OR ARTIFICIAL OPENING ENDOSCOPIC: ICD-10-PCS | Performed by: SURGERY

## 2018-06-14 PROCEDURE — 83880 ASSAY OF NATRIURETIC PEPTIDE: CPT

## 2018-06-14 PROCEDURE — 63600175 PHARM REV CODE 636 W HCPCS

## 2018-06-14 PROCEDURE — 36000706: Performed by: SURGERY

## 2018-06-14 PROCEDURE — 99223 1ST HOSP IP/OBS HIGH 75: CPT | Mod: ,,, | Performed by: INTERNAL MEDICINE

## 2018-06-14 PROCEDURE — 80048 BASIC METABOLIC PNL TOTAL CA: CPT

## 2018-06-14 PROCEDURE — 36000707: Performed by: SURGERY

## 2018-06-14 PROCEDURE — 94640 AIRWAY INHALATION TREATMENT: CPT

## 2018-06-14 PROCEDURE — 0W9600Z DRAINAGE OF NECK WITH DRAINAGE DEVICE, OPEN APPROACH: ICD-10-PCS | Performed by: SURGERY

## 2018-06-14 PROCEDURE — 85730 THROMBOPLASTIN TIME PARTIAL: CPT | Mod: 91

## 2018-06-14 PROCEDURE — 5A2204Z RESTORATION OF CARDIAC RHYTHM, SINGLE: ICD-10-PCS | Performed by: SURGERY

## 2018-06-14 PROCEDURE — 99291 CRITICAL CARE FIRST HOUR: CPT | Mod: 25,,, | Performed by: NURSE PRACTITIONER

## 2018-06-14 PROCEDURE — 5A1955Z RESPIRATORY VENTILATION, GREATER THAN 96 CONSECUTIVE HOURS: ICD-10-PCS | Performed by: SURGERY

## 2018-06-14 PROCEDURE — 99232 SBSQ HOSP IP/OBS MODERATE 35: CPT | Mod: ,,, | Performed by: INTERNAL MEDICINE

## 2018-06-14 PROCEDURE — 80069 RENAL FUNCTION PANEL: CPT

## 2018-06-14 PROCEDURE — 63600175 PHARM REV CODE 636 W HCPCS: Performed by: ANESTHESIOLOGY

## 2018-06-14 PROCEDURE — 20000000 HC ICU ROOM

## 2018-06-14 PROCEDURE — 63600175 PHARM REV CODE 636 W HCPCS: Performed by: NURSE ANESTHETIST, CERTIFIED REGISTERED

## 2018-06-14 PROCEDURE — 99292 CRITICAL CARE ADDL 30 MIN: CPT | Mod: 25,,, | Performed by: NURSE PRACTITIONER

## 2018-06-14 PROCEDURE — 37000008 HC ANESTHESIA 1ST 15 MINUTES: Performed by: SURGERY

## 2018-06-14 PROCEDURE — 82803 BLOOD GASES ANY COMBINATION: CPT

## 2018-06-14 PROCEDURE — 80053 COMPREHEN METABOLIC PANEL: CPT

## 2018-06-14 PROCEDURE — 25000003 PHARM REV CODE 250: Performed by: SURGERY

## 2018-06-14 PROCEDURE — 85520 HEPARIN ASSAY: CPT

## 2018-06-14 PROCEDURE — 25000003 PHARM REV CODE 250: Performed by: ANESTHESIOLOGY

## 2018-06-14 PROCEDURE — 93005 ELECTROCARDIOGRAM TRACING: CPT

## 2018-06-14 PROCEDURE — P9045 ALBUMIN (HUMAN), 5%, 250 ML: HCPCS | Mod: JG | Performed by: NURSE ANESTHETIST, CERTIFIED REGISTERED

## 2018-06-14 PROCEDURE — 97530 THERAPEUTIC ACTIVITIES: CPT

## 2018-06-14 PROCEDURE — 94761 N-INVAS EAR/PLS OXIMETRY MLT: CPT

## 2018-06-14 PROCEDURE — 85730 THROMBOPLASTIN TIME PARTIAL: CPT

## 2018-06-14 PROCEDURE — 83735 ASSAY OF MAGNESIUM: CPT | Mod: 91

## 2018-06-14 PROCEDURE — 71000033 HC RECOVERY, INTIAL HOUR: Performed by: SURGERY

## 2018-06-14 PROCEDURE — 36415 COLL VENOUS BLD VENIPUNCTURE: CPT

## 2018-06-14 PROCEDURE — 25000003 PHARM REV CODE 250

## 2018-06-14 PROCEDURE — C1729 CATH, DRAINAGE: HCPCS | Performed by: SURGERY

## 2018-06-14 PROCEDURE — 83735 ASSAY OF MAGNESIUM: CPT

## 2018-06-14 RX ORDER — HYDRALAZINE HYDROCHLORIDE 20 MG/ML
10 INJECTION INTRAMUSCULAR; INTRAVENOUS EVERY 8 HOURS PRN
Status: DISCONTINUED | OUTPATIENT
Start: 2018-06-14 | End: 2018-06-30 | Stop reason: HOSPADM

## 2018-06-14 RX ORDER — IPRATROPIUM BROMIDE AND ALBUTEROL SULFATE 2.5; .5 MG/3ML; MG/3ML
3 SOLUTION RESPIRATORY (INHALATION) EVERY 4 HOURS
Status: DISCONTINUED | OUTPATIENT
Start: 2018-06-14 | End: 2018-06-14

## 2018-06-14 RX ORDER — EPINEPHRINE 0.1 MG/ML
INJECTION INTRAVENOUS
Status: COMPLETED | OUTPATIENT
Start: 2018-06-14 | End: 2018-06-14

## 2018-06-14 RX ORDER — CISATRACURIUM BESYLATE 2 MG/ML
INJECTION, SOLUTION INTRAVENOUS
Status: DISCONTINUED | OUTPATIENT
Start: 2018-06-14 | End: 2018-06-14

## 2018-06-14 RX ORDER — MIDAZOLAM HYDROCHLORIDE 1 MG/ML
INJECTION, SOLUTION INTRAMUSCULAR; INTRAVENOUS
Status: DISCONTINUED | OUTPATIENT
Start: 2018-06-14 | End: 2018-06-14

## 2018-06-14 RX ORDER — SODIUM BICARBONATE 1 MEQ/ML
SYRINGE (ML) INTRAVENOUS
Status: DISCONTINUED | OUTPATIENT
Start: 2018-06-14 | End: 2018-06-29

## 2018-06-14 RX ORDER — POTASSIUM CHLORIDE 29.8 MG/ML
40 INJECTION INTRAVENOUS ONCE
Status: COMPLETED | OUTPATIENT
Start: 2018-06-14 | End: 2018-06-14

## 2018-06-14 RX ORDER — SODIUM CHLORIDE, SODIUM LACTATE, POTASSIUM CHLORIDE, CALCIUM CHLORIDE 600; 310; 30; 20 MG/100ML; MG/100ML; MG/100ML; MG/100ML
INJECTION, SOLUTION INTRAVENOUS CONTINUOUS PRN
Status: DISCONTINUED | OUTPATIENT
Start: 2018-06-14 | End: 2018-06-14

## 2018-06-14 RX ORDER — ROCURONIUM BROMIDE 10 MG/ML
INJECTION, SOLUTION INTRAVENOUS
Status: DISCONTINUED | OUTPATIENT
Start: 2018-06-14 | End: 2018-06-14

## 2018-06-14 RX ORDER — FUROSEMIDE 10 MG/ML
60 INJECTION INTRAMUSCULAR; INTRAVENOUS ONCE
Status: COMPLETED | OUTPATIENT
Start: 2018-06-14 | End: 2018-06-14

## 2018-06-14 RX ORDER — MAGNESIUM SULFATE HEPTAHYDRATE 40 MG/ML
2 INJECTION, SOLUTION INTRAVENOUS ONCE
Status: COMPLETED | OUTPATIENT
Start: 2018-06-14 | End: 2018-06-14

## 2018-06-14 RX ORDER — NOREPINEPHRINE BITARTRATE/D5W 8 MG/250ML
0.4 PLASTIC BAG, INJECTION (ML) INTRAVENOUS CONTINUOUS
Status: DISCONTINUED | OUTPATIENT
Start: 2018-06-14 | End: 2018-06-15

## 2018-06-14 RX ORDER — PHENYLEPHRINE HYDROCHLORIDE 10 MG/ML
INJECTION INTRAVENOUS
Status: DISCONTINUED | OUTPATIENT
Start: 2018-06-14 | End: 2018-06-14

## 2018-06-14 RX ORDER — FENTANYL CITRATE 50 UG/ML
INJECTION, SOLUTION INTRAMUSCULAR; INTRAVENOUS
Status: DISCONTINUED | OUTPATIENT
Start: 2018-06-14 | End: 2018-06-14

## 2018-06-14 RX ORDER — ALBUMIN HUMAN 50 G/1000ML
SOLUTION INTRAVENOUS CONTINUOUS PRN
Status: DISCONTINUED | OUTPATIENT
Start: 2018-06-14 | End: 2018-06-14

## 2018-06-14 RX ORDER — CALCIUM CARBONATE 200(500)MG
1000 TABLET,CHEWABLE ORAL DAILY
Status: DISCONTINUED | OUTPATIENT
Start: 2018-06-14 | End: 2018-06-15

## 2018-06-14 RX ORDER — SUCCINYLCHOLINE CHLORIDE 20 MG/ML
INJECTION INTRAMUSCULAR; INTRAVENOUS
Status: DISCONTINUED | OUTPATIENT
Start: 2018-06-14 | End: 2018-06-14

## 2018-06-14 RX ORDER — LIDOCAINE HYDROCHLORIDE 10 MG/ML
INJECTION INFILTRATION; PERINEURAL
Status: DISCONTINUED | OUTPATIENT
Start: 2018-06-14 | End: 2018-06-14

## 2018-06-14 RX ORDER — NAPROXEN SODIUM 220 MG/1
81 TABLET, FILM COATED ORAL DAILY
Status: DISCONTINUED | OUTPATIENT
Start: 2018-06-15 | End: 2018-06-30 | Stop reason: HOSPADM

## 2018-06-14 RX ORDER — BUPIVACAINE HYDROCHLORIDE 2.5 MG/ML
INJECTION, SOLUTION EPIDURAL; INFILTRATION; INTRACAUDAL
Status: DISCONTINUED | OUTPATIENT
Start: 2018-06-14 | End: 2018-06-14 | Stop reason: HOSPADM

## 2018-06-14 RX ORDER — LABETALOL HYDROCHLORIDE 5 MG/ML
INJECTION, SOLUTION INTRAVENOUS
Status: DISCONTINUED | OUTPATIENT
Start: 2018-06-14 | End: 2018-06-14

## 2018-06-14 RX ORDER — PROPOFOL 10 MG/ML
VIAL (ML) INTRAVENOUS
Status: DISCONTINUED | OUTPATIENT
Start: 2018-06-14 | End: 2018-06-14

## 2018-06-14 RX ORDER — HEPARIN SODIUM,PORCINE/D5W 25000/250
12 INTRAVENOUS SOLUTION INTRAVENOUS CONTINUOUS
Status: DISCONTINUED | OUTPATIENT
Start: 2018-06-14 | End: 2018-06-15

## 2018-06-14 RX ADMIN — EPINEPHRINE 1 MG: 0.1 INJECTION, SOLUTION ENDOTRACHEAL; INTRACARDIAC; INTRAVENOUS at 01:06

## 2018-06-14 RX ADMIN — PIPERACILLIN AND TAZOBACTAM 4.5 G: 4; .5 INJECTION, POWDER, LYOPHILIZED, FOR SOLUTION INTRAVENOUS; PARENTERAL at 06:06

## 2018-06-14 RX ADMIN — NITROGLYCERIN 0.5 INCH: 20 OINTMENT TOPICAL at 06:06

## 2018-06-14 RX ADMIN — SUCCINYLCHOLINE CHLORIDE 100 MG: 20 INJECTION, SOLUTION INTRAMUSCULAR; INTRAVENOUS at 11:06

## 2018-06-14 RX ADMIN — ALBUMIN (HUMAN): 12.5 SOLUTION INTRAVENOUS at 12:06

## 2018-06-14 RX ADMIN — Medication 40 MG: at 11:06

## 2018-06-14 RX ADMIN — PIPERACILLIN AND TAZOBACTAM 4.5 G: 4; .5 INJECTION, POWDER, LYOPHILIZED, FOR SOLUTION INTRAVENOUS; PARENTERAL at 08:06

## 2018-06-14 RX ADMIN — POTASSIUM CHLORIDE 40 MEQ: 29.8 INJECTION, SOLUTION INTRAVENOUS at 03:06

## 2018-06-14 RX ADMIN — AMIODARONE HYDROCHLORIDE 0.5 MG/MIN: 1.8 INJECTION, SOLUTION INTRAVENOUS at 09:06

## 2018-06-14 RX ADMIN — SODIUM BICARBONATE 50 MEQ: 84 INJECTION, SOLUTION INTRAVENOUS at 01:06

## 2018-06-14 RX ADMIN — SODIUM CHLORIDE, SODIUM LACTATE, POTASSIUM CHLORIDE, AND CALCIUM CHLORIDE: 600; 310; 30; 20 INJECTION, SOLUTION INTRAVENOUS at 11:06

## 2018-06-14 RX ADMIN — BUPIVACAINE HYDROCHLORIDE 30 ML: 2.5 INJECTION, SOLUTION EPIDURAL; INFILTRATION; INTRACAUDAL; PERINEURAL at 12:06

## 2018-06-14 RX ADMIN — AMIODARONE HYDROCHLORIDE 1 MG/MIN: 1.8 INJECTION, SOLUTION INTRAVENOUS at 03:06

## 2018-06-14 RX ADMIN — LABETALOL HYDROCHLORIDE 5 MG: 5 INJECTION, SOLUTION INTRAVENOUS at 12:06

## 2018-06-14 RX ADMIN — MIDAZOLAM 2 MG: 1 INJECTION INTRAMUSCULAR; INTRAVENOUS at 12:06

## 2018-06-14 RX ADMIN — LIDOCAINE HYDROCHLORIDE 80 MG: 10 INJECTION, SOLUTION INFILTRATION; PERINEURAL at 11:06

## 2018-06-14 RX ADMIN — FUROSEMIDE 60 MG: 10 INJECTION, SOLUTION INTRAMUSCULAR; INTRAVENOUS at 10:06

## 2018-06-14 RX ADMIN — Medication 30 MG: at 12:06

## 2018-06-14 RX ADMIN — PHENYLEPHRINE HYDROCHLORIDE 200 MCG: 10 INJECTION INTRAVENOUS at 12:06

## 2018-06-14 RX ADMIN — PHENYLEPHRINE HYDROCHLORIDE 300 MCG: 10 INJECTION INTRAVENOUS at 12:06

## 2018-06-14 RX ADMIN — HEPARIN SODIUM 12 UNITS/KG/HR: 10000 INJECTION, SOLUTION INTRAVENOUS at 03:06

## 2018-06-14 RX ADMIN — CISATRACURIUM BESYLATE 10 MG: 2 INJECTION INTRAVENOUS at 12:06

## 2018-06-14 RX ADMIN — Medication 40 MG: at 12:06

## 2018-06-14 RX ADMIN — Medication 0.3 MCG/KG/MIN: at 02:06

## 2018-06-14 RX ADMIN — INSULIN ASPART 2 UNITS: 100 INJECTION, SOLUTION INTRAVENOUS; SUBCUTANEOUS at 05:06

## 2018-06-14 RX ADMIN — PROPOFOL 40 MG: 10 INJECTION, EMULSION INTRAVENOUS at 11:06

## 2018-06-14 RX ADMIN — PHENYLEPHRINE HYDROCHLORIDE 100 MCG: 10 INJECTION INTRAVENOUS at 12:06

## 2018-06-14 RX ADMIN — HYDRALAZINE HYDROCHLORIDE 10 MG: 20 INJECTION INTRAMUSCULAR; INTRAVENOUS at 10:06

## 2018-06-14 RX ADMIN — FENTANYL CITRATE 50 MCG: 50 INJECTION, SOLUTION INTRAMUSCULAR; INTRAVENOUS at 11:06

## 2018-06-14 RX ADMIN — MIDAZOLAM 2 MG: 1 INJECTION INTRAMUSCULAR; INTRAVENOUS at 01:06

## 2018-06-14 RX ADMIN — HEPARIN SODIUM AND DEXTROSE 13 UNITS/KG/HR: 10000; 5 INJECTION INTRAVENOUS at 07:06

## 2018-06-14 RX ADMIN — MAGNESIUM SULFATE IN WATER 2 G: 40 INJECTION, SOLUTION INTRAVENOUS at 03:06

## 2018-06-14 RX ADMIN — Medication 50 MG: at 11:06

## 2018-06-14 RX ADMIN — AMIODARONE HYDROCHLORIDE 150 MG: 1.5 INJECTION, SOLUTION INTRAVENOUS at 03:06

## 2018-06-14 RX ADMIN — CHLORHEXIDINE GLUCONATE 10 ML: 1.2 RINSE ORAL at 08:06

## 2018-06-14 RX ADMIN — NITROGLYCERIN 0.5 INCH: 20 OINTMENT TOPICAL at 05:06

## 2018-06-14 RX ADMIN — FENTANYL CITRATE 50 MCG: 50 INJECTION, SOLUTION INTRAMUSCULAR; INTRAVENOUS at 12:06

## 2018-06-14 RX ADMIN — IPRATROPIUM BROMIDE AND ALBUTEROL SULFATE 3 ML: .5; 3 SOLUTION RESPIRATORY (INHALATION) at 08:06

## 2018-06-14 NOTE — PROGRESS NOTES
[facility] -   Cardiology  Progress Note    Patient Name: Citlali Karimi  MRN: [id number]  Admission Date: 6/12/2018  Hospital Length of Stay: 1 days  Code Status: Full Code   Attending Physician: Levy Samuel MD   Primary Care Physician: Evelio Schuster MD  Expected Discharge Date:   Principal Problem:Cardiogenic shock    Subjective:     Hospital Course:   6/14/18-Patient seen and examined today, sleepy/lethargic. Dyspneic with throat swelling, surgery notified. Recommended angiogram, risks/benefits discussed.  and family had detailed discussion with nephrology and decided against LHC due to increased risk of contrast induced nephropathy/dialysis.    Patient subsequently taken back to OR this AM for re-exploration/washout given throat swelling. Coded post procedure.   6/14/18  Pt with V fib post surgery with DCCV to NSR, IV amiodarone started,vasopressors also started SBP 160s  Repeat EKG in ICU with infpost injury pattern. Discussed with  and currently does not want LHC, will need to start IV heparin and continue medical therapy     Interval History:     Review of Systems   Constitution: Negative. Negative for weight gain.   HENT: Negative.    Eyes: Negative.    Cardiovascular: Negative.  Negative for chest pain, leg swelling and palpitations.   Respiratory: Negative.  Negative for shortness of breath.    Endocrine: Negative.    Hematologic/Lymphatic: Negative.    Skin: Negative.    Musculoskeletal: Negative for muscle weakness.   Gastrointestinal: Negative.    Genitourinary: Negative.    Neurological: Negative.  Negative for dizziness.   Psychiatric/Behavioral: Negative.    Allergic/Immunologic: Negative.      Objective:     Vital Signs (Most Recent):  Temp: 97.4 °F (36.3 °C) (06/14/18 1400)  Pulse: (!) 121 (06/14/18 1505)  Resp: 16 (06/14/18 1505)  BP: 125/62 (06/14/18 1505)  SpO2: 100 % (06/14/18 1505) Vital Signs (24h Range):  Temp:  [97.1 °F (36.2 °C)-98.4 °F (36.9 °C)] 97.4 °F  (36.3 °C)  Pulse:  [0-143] 121  Resp:  [11-20] 16  SpO2:  [86 %-100 %] 100 %  BP: ()/() 125/62     Weight: 73.9 kg (162 lb 14.7 oz)  Body mass index is 29.8 kg/m².     SpO2: 100 %  O2 Device (Oxygen Therapy): ventilator      Intake/Output Summary (Last 24 hours) at 06/14/18 1530  Last data filed at 06/14/18 1500   Gross per 24 hour   Intake          2037.87 ml   Output              155 ml   Net          1882.87 ml       Lines/Drains/Airways     Central Venous Catheter Line                 Percutaneous Central Line Insertion/Assessment - triple lumen  06/14/18 right femoral less than 1 day          Drain                 Drain/Device  06/14/18 1239 neck collapsible closed device less than 1 day         Urethral Catheter 06/14/18 1113 less than 1 day          Airway                 Airway - Non-Surgical 06/14/18 1150 Endotracheal Tube less than 1 day          Arterial Line                 Arterial Line 06/14/18 1427 Right Femoral less than 1 day          Peripheral Intravenous Line                 Peripheral IV - Single Lumen 06/12/18 0926 Right Forearm 2 days         Peripheral IV - Single Lumen 06/12/18 0954 Left Forearm 2 days                Physical Exam   Constitutional: She is oriented to person, place, and time. She appears well-developed and well-nourished.   HENT:   Head: Normocephalic and atraumatic.   Eyes: Conjunctivae and EOM are normal. Pupils are equal, round, and reactive to light.   Neck: Normal range of motion. Neck supple.   Cardiovascular: Normal rate, normal heart sounds and intact distal pulses.  An irregularly irregular rhythm present.   Pulmonary/Chest: Effort normal and breath sounds normal.   Abdominal: Soft. Bowel sounds are normal.   Musculoskeletal: Normal range of motion.   Neurological: She is alert and oriented to person, place, and time.   Skin: Skin is warm and dry.   Psychiatric: She has a normal mood and affect.   Nursing note and vitals reviewed.      Significant Labs:  All pertinent lab results from the last 24 hours have been reviewed.    Significant Imaging: X-Ray: CXR: X-Ray Chest 1 View (CXR):   Results for orders placed or performed during the hospital encounter of 06/12/18   X-Ray Chest 1 View    Narrative    EXAMINATION:  XR CHEST 1 VIEW    CLINICAL HISTORY:  hypoxemia;    COMPARISON:  06/13/2018    FINDINGS:  The current examination is limited secondary to the patient's chin being projected over the thorax.  The size of the heart is prominent.  There has been interval development of streaky opacities in both lungs.  There is persistent mild elevation of the left hemidiaphragm.  There is no pneumothorax.  The costophrenic angles are sharp.      Impression    1. The current examination is limited secondary to the patient's chin being projected over the thorax.  2. There has been interval development of streaky opacities in both lungs.  This is characteristic of subsegmental atelectasis.  3. There is persistent mild elevation of the left hemidiaphragm.  This is characteristic of a phrenic nerve injury.  4. The size of the heart is prominent.  This may be secondary to magnification.  .      Electronically signed by: Orion Enamorado MD  Date:    06/14/2018  Time:    11:57     Assessment and Plan:     Brief HPI:     STEMI (ST elevation myocardial infarction)    -Presents with diffuse ST depression on post-procedure EKG and elevated troponin > 50, consistent with NSTEMI  -Denies chest pain or SOB  -BP elevated yesterday, HTN likely triggered clot rupture/MI  -Known CAD on previous cath not amenable to PCI at that time  -Continue ASA, Imdur, Metoprolol, statin, heparin gtt  -Angiogram recommended but patient and family had prolonged discussion with nephrology and decided against [facility] given risk of contrast induced nephropathy/dialysis    Restart IV heparin          Coronary artery disease involving native coronary artery    -Denies chest pain or SOB  -See plan under NSTEMI         CKD (chronic kidney disease) stage 5, GFR less than 15 ml/min    -mgmt as per nephrology        Hypertension associated with diabetes    -BP elevated yesterday  -Continue BB, Imdur  -Add hydralazine or amlodipine if needed        Aortic valvar stenosis    -s/p TAVR  -Stable, 2D echo pending            VTE Risk Mitigation         Ordered     heparin 25,000 units in dextrose 5% 250 mL (100 units/mL) infusion; FEMALE  Continuous      06/14/18 1526     heparin 25,000 units in dextrose 5% 250 mL (100 units/mL) bolus from bag; PRN BOLUS  As needed (PRN)      06/14/18 1526     heparin 25,000 units in dextrose 5% 250 mL (100 units/mL) bolus from bag; PRN BOLUS  As needed (PRN)      06/14/18 1526     Place sequential compression device  Until discontinued      06/12/18 0418          Galindo Louis MD  Cardiology  [facility]

## 2018-06-14 NOTE — EICU
Received from PACU ventilations per AMBU bag via ETT, with PACU staff at bedside, portable O2 and monitoring. Critical care monitoring enabled. All lines reconciled. Initial physical assessment initiated. Connected to mechanical ventilator AC-16  FiO2 60% +5peep.

## 2018-06-14 NOTE — CONSULTS
[facility]  Hematology/Oncology  Consult Note    Patient Name: Citlali Karimi  MRN: [id number]  Admission Date: 6/12/2018  Hospital Length of Stay: 1 days  Code Status: Prior   Attending Provider: Levy Samuel MD  Consulting Provider: Petra Perez NP  Primary Care Physician: Evelio Schuster MD  Principal Problem:Hyperparathyroidism    Consults  Subjective:     HPI:  68 year old female, PMHx of CKD (IV), HTN, DM, CVA (left sided weakness), and CAD who initially presented for hyperparathyroidism and hypercalcemia requiring surgical intervention.  Pt underwent parathyroidectomy on 6/14/18 per Dr. Tracy (General Surgery). Pt admitted to Medical Surgical Unit post procedure and Hospital Medicine consulted for medical management.  Chest xray post procedure showed mild asymmetric CHF versus RUL pneumonia. Troponin 0.113 and BNP >270 noted.  Pt given IV Lasix in PACU prior to unit arrival.       Oncology Treatment Plan:   [No treatment plan]    Medications:  Continuous Infusions:   heparin (porcine) in D5W 13 Units/kg/hr (06/14/18 0722)     Scheduled Meds:   albuterol-ipratropium  3 mL Nebulization Q4H    aspirin  81 mg Oral Daily    calcium carbonate  1,000 mg Oral Daily    chlorhexidine  10 mL Mouth/Throat BID    isosorbide mononitrate  60 mg Oral Daily    metoprolol tartrate  25 mg Oral BID    NIFEdipine  90 mg Oral Daily    nitroGLYCERIN 2% TD oint  0.5 inch Topical (Top) Q6H    nozaseptin   Each Nare BID    pantoprazole  40 mg Oral Daily    piperacillin-tazobactam (ZOSYN) IVPB  4.5 g Intravenous Q12H    senna-docusate 8.6-50 mg  1 tablet Oral BID    simvastatin  20 mg Oral QHS    sodium bicarbonate  650 mg Oral BID     PRN Meds:acetaminophen, bisacodyl, cloNIDine, dextrose 50%, dextrose 50%, glucagon (human recombinant), glucose, glucose, heparin (PORCINE), heparin (PORCINE), hydrALAZINE, insulin aspart U-100, lactulose, morphine, ondansetron, sodium chloride 0.9%     Review of  patient's allergies indicates:   Allergen Reactions    Lipitor [atorvastatin] Swelling     Lips          Past Medical History:   Diagnosis Date    Acid reflux 1/7/2015    Anxiety 1/7/2015    Aortic valvar stenosis     Arthritis     osteo    Callus     Chronic anemia     followed by Dr. Addison    CKD (chronic kidney disease) stage 5, GFR less than 15 ml/min 8/7/2015    Combined hyperlipidemia associated with type 2 diabetes mellitus     Coronary artery disease     Diabetes mellitus type II, controlled, with no complications     LBSL 108 today    Encounter for blood transfusion     Frail elderly with impaired mobility, wheel chair bound 8/25/2017    Gallbladder problem     gallbladder surgery    Heart murmur     Hyperparathyroidism, secondary renal 1/7/2015    Hypertension 8/7/2015    Hypertension associated with diabetes 11/12/2012    Kidney transplant candidate 1/7/2015    Overweight(278.02)     Urinary tract infection      Past Surgical History:   Procedure Laterality Date    ANKLE FRACTURE SURGERY Left 1985    AORTIC VALVE REPLACEMENT  05/2016    BONE BIOPSY      CARDIAC SURGERY      CATARACT EXTRACTION W/  INTRAOCULAR LENS IMPLANT  2009    CHOLECYSTECTOMY      CYSTOSCOPY      EYE SURGERY      FRACTURE SURGERY      HYSTERECTOMY  unknown    OOPHORECTOMY      PARATHYROIDECTOMY N/A 6/12/2018    Procedure: PARATHYROIDECTOMY;  Surgeon: Levy Samuel MD;  Location: [facility];  Service: General;  Laterality: N/A;    removal of colon polyps      RENAL BIOPSY  10/2013    YAG cap -OD       Family History     Problem Relation (Age of Onset)    Aneurysm Father    Cancer Paternal Grandmother    Colon cancer Maternal Grandmother    Diabetes Mother, Brother, Sister    Glaucoma Mother    Heart disease Mother, Sister    Hypertension Mother, Brother, Brother    Kidney disease Sister, Brother, Sister    No Known Problems Maternal Aunt, Maternal Uncle, Paternal Aunt, Paternal Uncle, Maternal  Grandfather, Paternal Grandfather    Stroke Father        Social History Main Topics    Smoking status: Never Smoker    Smokeless tobacco: Never Used    Alcohol use No    Drug use: No    Sexual activity: Not Currently     Birth control/ protection: See Surgical Hx       Review of Systems   Constitutional: Positive for activity change, appetite change and fatigue. Negative for chills, diaphoresis, fever and unexpected weight change.   HENT: Positive for sore throat and trouble swallowing. Negative for congestion, hearing loss and voice change.    Eyes: Negative for discharge and visual disturbance.   Respiratory: Negative for apnea, choking, chest tightness and shortness of breath.    Cardiovascular: Negative for chest pain and palpitations.   Gastrointestinal: Negative for blood in stool, constipation, diarrhea, nausea and vomiting.   Endocrine: Negative for cold intolerance and heat intolerance.   Genitourinary: Negative for difficulty urinating, dyspareunia and hematuria.   Musculoskeletal: Positive for arthralgias and back pain. Negative for myalgias.   Skin: Negative.    Neurological: Negative for dizziness, weakness, light-headedness and headaches.   Hematological: Negative for adenopathy. Does not bruise/bleed easily.   Psychiatric/Behavioral: Negative for agitation, behavioral problems and confusion. The patient is nervous/anxious.      Objective:     Vital Signs (Most Recent):  Temp: 97.9 °F (36.6 °C) (06/14/18 0749)  Pulse: 101 (06/14/18 1053)  Resp: 18 (06/14/18 0827)  BP: (!) 149/67 (06/14/18 1053)  SpO2: 97 % (06/14/18 1053) Vital Signs (24h Range):  Temp:  [97.5 °F (36.4 °C)-98.4 °F (36.9 °C)] 97.9 °F (36.6 °C)  Pulse:  [] 101  Resp:  [18-20] 18  SpO2:  [97 %-100 %] 97 %  BP: (108-188)/(63-79) 149/67     Weight: 69.7 kg (153 lb 10.6 oz)  Body mass index is 28.1 kg/m².  Body surface area is 1.75 meters squared.      Intake/Output Summary (Last 24 hours) at 06/14/18 1141  Last data filed at  06/14/18 0600   Gross per 24 hour   Intake           449.12 ml   Output                0 ml   Net           449.12 ml       Physical Exam   Constitutional: She is oriented to person, place, and time. She appears well-developed and well-nourished. She appears lethargic. No distress.   HENT:   Head: Normocephalic and atraumatic.   Right Ear: Hearing and external ear normal.   Left Ear: Hearing and external ear normal.   Nose: No rhinorrhea or sinus tenderness. Right sinus exhibits no maxillary sinus tenderness and no frontal sinus tenderness. Left sinus exhibits no maxillary sinus tenderness and no frontal sinus tenderness.   Mouth/Throat: Uvula is midline, oropharynx is clear and moist and mucous membranes are normal. No oral lesions. No uvula swelling.   Eyes: Conjunctivae are normal. Pupils are equal, round, and reactive to light. Right eye exhibits no discharge. Left eye exhibits no discharge.   Neck: Normal range of motion. Carotid bruit is not present. No tracheal deviation present. No thyromegaly present.   Cardiovascular: Normal rate, regular rhythm, S1 normal, S2 normal, normal heart sounds and intact distal pulses.    No murmur heard.  Pulses:       Dorsalis pedis pulses are 2+ on the right side, and 2+ on the left side.   Pulmonary/Chest: Effort normal and breath sounds normal. No respiratory distress.   Abdominal: Soft. Bowel sounds are normal. She exhibits no distension and no mass. There is no tenderness.   Musculoskeletal: Normal range of motion. She exhibits no edema.   Lymphadenopathy:     She has no cervical adenopathy.        Right: No supraclavicular adenopathy present.        Left: No supraclavicular adenopathy present.   Neurological: She is oriented to person, place, and time. She has normal strength. She appears lethargic. No sensory deficit. Coordination and gait normal.   Skin: Skin is warm and dry. Capillary refill takes less than 2 seconds. No rash noted. She is not diaphoretic. No pallor.    Psychiatric: Her speech is normal and behavior is normal. Judgment and thought content normal. Her mood appears anxious. Her affect is labile. Cognition and memory are normal. She exhibits a depressed mood.   Nursing note and vitals reviewed.      Significant Labs:   CBC:   Recent Labs  Lab 06/13/18  0506 06/13/18  0724 06/14/18  0236   WBC 11.22 11.39 8.86   HGB 10.9* 10.7* 9.8*   HCT 34.2* 33.3* 31.5*    179  179 171    and CMP:   Recent Labs  Lab 06/13/18  0505 06/13/18  1623 06/14/18  0236    141 143   K 4.3 4.2 3.7    106 107   CO2 24 23 20*    126* 103   BUN 37* 42* 43*   CREATININE 3.6* 4.1* 4.1*   CALCIUM 10.5 10.4 10.1   ALBUMIN  --  3.2* 3.1*   ANIONGAP 10 12 16   EGFRNONAA 12* 10* 10*       Diagnostic Results:  I have reviewed all pertinent imaging results/findings within the past 24 hours.    Assessment/Plan:     Anemia in stage 4 chronic kidney disease    Patient has been treated with Procrit 20,000 units weekly for maintenance therapy.    --Daily CBC, CMP  --Transfuse for Hgb <7.0, or <8.0 if symptomatic            Thank you for your consult. I will follow-up with patient. Please contact us if you have any additional questions.    Petra Perez NP  Hematology/Oncology  [facility]

## 2018-06-14 NOTE — PROGRESS NOTES
Pt with perioperative NSTEMI and on heparin drip had neck exploration for hematoma and airway compromise.  V Fib on arrival to PACU. PT was on ventilator at time of code. Code started at 13:21 with all drugs and defibrillation documented.  After ST rhythm returned, phenylephrine and levophed drips started.  AAA panel checked.  Central line and art line started.  Pt transported to ICU.  Discussed status with pt's .

## 2018-06-14 NOTE — NURSING TRANSFER
Nursing Transfer Note      6/13/2018     Patient received from Observation unit. Bedside report and skin assessment completed with Heidy SHIRLEY LPN. Tele monitor #[id number] in place, Heparin drip on hold since critical PTT of 150 at 1508hr. Blood drawn at 1623 hr drawn but not posted by lab due to no order placed by Heidy DURHAM as was the understanding from the danyell Smith at the patient bedside at 1623hr. The add on order was obtained and place by Heidy DURHAM and posted critical at 150. Corry KRUSE notified of critical result and Heparin drip on hold. Stat order placed approximately 1830 for PTT which resulted at 37.8. Notified Jered KRUSE of last PTT, order received to start Heparin drip without bolus per [employer]. Heparin infusing at 16u/kg. Next PTT scheduled for 0200hr.

## 2018-06-14 NOTE — ASSESSMENT & PLAN NOTE
-s/p TAVR  - Echo in 04/2017 with EF 60%  -Cardiology following  - repeat Echo results showed EF 60% with diastolic dysfunction

## 2018-06-14 NOTE — PLAN OF CARE
Problem: Patient Care Overview  Goal: Plan of Care Review  Outcome: Ongoing (interventions implemented as appropriate)  The patient has been sinus rhythm on the monitor. Blood glucose monitored. Heparin gtt infusing at 16 units/kg/hr. Pt does not follow commands. Pt only oriented to self. Pt is resting quietly, will continue to monitor.

## 2018-06-14 NOTE — ASSESSMENT & PLAN NOTE
- home medications continued   -hx of noncompliance and inconsistent dosing at home per   - uncontrolled upon arrival- improved with Hydralazine in PACU   - Hydralazine prn

## 2018-06-14 NOTE — HOSPITAL COURSE
6/14 - Admitted to ICU on mech ventilation and on Levophed infusion.  She was in A-Fib with ST changes.  Cards called and patient placed on Amiodarone and Heparin infusion.    6/15 - Weaned off Levophed yesterday afternoon.  Still on vent much improved oxygenation.  Airway leak of #6 OET.  Fully awake and following commands.  Still on Heparin infusion.  6/16 - Resting on vent with sedation tolerates SAT/SBT but still neck swelling noted.  LHC yesterday revealed diffuse CAD not amenable for PCI, no changes from 2016.  VSS  6/17 - Still on mech vent for airway protection tolerating SAT/SBT.  Baldomero TF  6/18 - Resting on mech vent and sedation.  Neck swelling improving.  Worsening UOP and creatine.  Awake, alert and responsive on sedation.  Spouse at bedside.   6/19 - Still resting on vent with intermittent Morphine.  Neck swelling improving.  Oliguria and creatine worsening.  Now with diarrhea last 24 hours  6/20 - VasCath placed yesterday and started on RRT.  Diarrhea + C-diff per PCR.  Baldomero TF.  No air leak around OET with cuff deflated this AM  06/21: +ve leak. Plan for extubation  06/22: Weak, no overnight events, Has NG  06/23: Awake , smiles, communicates well, tolerate tube feeds: awaits placement  06/24: awaits placement, No respiratory concerns.NG tube feeding, too weak forSLP  06/25: Awaiting placement. No acute intervai detrimental events.   06/26: No acute detrimental interval issues.

## 2018-06-14 NOTE — CONSULTS
[facility] -   Critical Care Medicine  Consult Note    Patient Name: Citlali aKrimi  MRN: [id number]  Admission Date: 6/12/2018  Hospital Length of Stay: 1 days  Code Status: Full Code  Attending Physician: Levy Samuel MD   Primary Care Provider: Evelio Schuster MD   Principal Problem: Cardiogenic shock      Subjective:     HPI:  Ms Karimi is a 69 yo BF with a PMH of HTN, Hyperparathyroidism, DM2, CKD5, CVA, CAD and HLD.  She was seen in clinic by surgery for hyperparathyroidism and hypercalcemia and was electively admitted 6/12 taken to OR undergoing parathyroidectomy finding 2 right sided enlarged parathyroid glands.  Intra and post op patient developed EKG changes and had elevated troponin.  Cards consulted.  Patient admitted due to swallowing issues she was not taking all her meds as prescribed and had BP as high as 225/103 during hospitalization.  Troponin increased to > 50.  Cards diagnosed with NSTEMI and she was Rx with ASA, Imdur, Lopressor, statin and Heparin infusion with plans for repeat LHC if cleared by Renal given CKD5.  Today, she had progressively worsening neck swelling and SOB with worsening dysphagia.  She was taken back to OR and had post op neck hematoma evacuation.  In PACU she had witnessed V-Fib cardiac arrest and CODE BLUE called.  After 3 rounds of CPR, Epi X 3 and Defib X 3 ROSC was attained.  She was still intubated and on vent post op.  CL and arterial line placed in right groin during code per myself.  Cards and Surgery at bedside.      Hospital/ICU Course:  6/14 - [facility].  She was in A-Fib with ST changes.  Cards called and patient placed on Amiodarone and Heparin infusion.      Past Medical History:   Diagnosis Date    Acid reflux 1/7/2015    Anxiety 1/7/2015    Aortic valvar stenosis     Arthritis     osteo    Callus     Chronic anemia     followed by Dr. Addison    CKD (chronic kidney disease) stage 5,  GFR less than 15 ml/min 8/7/2015    Combined hyperlipidemia associated with type 2 diabetes mellitus     Coronary artery disease     Diabetes mellitus type II, controlled, with no complications     LBSL 108 today    Encounter for blood transfusion     Frail elderly with impaired mobility, wheel chair bound 8/25/2017    Gallbladder problem     gallbladder surgery    Heart murmur     Hyperparathyroidism, secondary renal 1/7/2015    Hypertension 8/7/2015    Hypertension associated with diabetes 11/12/2012    Kidney transplant candidate 1/7/2015    Overweight(278.02)     Urinary tract infection        Past Surgical History:   Procedure Laterality Date    ANKLE FRACTURE SURGERY Left 1985    AORTIC VALVE REPLACEMENT  05/2016    BONE BIOPSY      CARDIAC SURGERY      CATARACT EXTRACTION W/  INTRAOCULAR LENS IMPLANT  2009    CHOLECYSTECTOMY      CYSTOSCOPY      EYE SURGERY      FRACTURE SURGERY      HYSTERECTOMY  unknown    OOPHORECTOMY      PARATHYROIDECTOMY N/A 6/12/2018    Procedure: PARATHYROIDECTOMY;  Surgeon: Levy Samuel MD;  Location: [facility];  Service: General;  Laterality: N/A;    removal of colon polyps      RENAL BIOPSY  10/2013    YAG cap -OD         Review of patient's allergies indicates:   Allergen Reactions    Lipitor [atorvastatin] Swelling     Lips         Family History     Problem Relation (Age of Onset)    Aneurysm Father    Cancer Paternal Grandmother    Colon cancer Maternal Grandmother    Diabetes Mother, Brother, Sister    Glaucoma Mother    Heart disease Mother, Sister    Hypertension Mother, Brother, Brother    Kidney disease Sister, Brother, Sister    No Known Problems Maternal Aunt, Maternal Uncle, Paternal Aunt, Paternal Uncle, Maternal Grandfather, Paternal Grandfather    Stroke Father        Social History Main Topics    Smoking status: Never Smoker    Smokeless tobacco: Never Used    Alcohol use No    Drug use: No    Sexual activity: Not Currently     Birth  control/ protection: See Surgical Hx         Review of Systems   Unable to perform ROS: Intubated     Objective:     Vital Signs (Most Recent):  Temp: 97.4 °F (36.3 °C) (06/14/18 1400)  Pulse: (!) 111 (06/14/18 1545)  Resp: 16 (06/14/18 1545)  BP: 112/67 (06/14/18 1545)  SpO2: 99 % (06/14/18 1545) Vital Signs (24h Range):  Temp:  [97.1 °F (36.2 °C)-98.4 °F (36.9 °C)] 97.4 °F (36.3 °C)  Pulse:  [0-143] 111  Resp:  [11-20] 16  SpO2:  [86 %-100 %] 99 %  BP: ()/() 112/67     Weight: 73.9 kg (162 lb 14.7 oz)  Body mass index is 29.8 kg/m².      Intake/Output Summary (Last 24 hours) at 06/14/18 1552  Last data filed at 06/14/18 1500   Gross per 24 hour   Intake          2037.87 ml   Output              155 ml   Net          1882.87 ml       Physical Exam   Constitutional: She appears well-developed and well-nourished. She appears lethargic.  Non-toxic appearance. She has a sickly appearance. She appears ill. No distress. She is intubated and restrained.   HENT:   Head: Normocephalic and atraumatic.   Mouth/Throat: Oropharynx is clear and moist and mucous membranes are normal.   Eyes: EOM and lids are normal. Pupils are equal, round, and reactive to light.   Neck: Trachea normal. Neck supple. Carotid bruit is not present.       Cardiovascular: Normal heart sounds.  An irregular rhythm present. Tachycardia present.    Pulses:       Radial pulses are 2+ on the right side, and 2+ on the left side.        Dorsalis pedis pulses are 1+ on the right side, and 1+ on the left side.   Pulmonary/Chest: Effort normal. No accessory muscle usage. She is intubated. No respiratory distress. She has decreased breath sounds.   Abdominal: Soft. Normal appearance. She exhibits no distension. Bowel sounds are absent. There is no tenderness.   Genitourinary:   Genitourinary Comments: Fair in place   Musculoskeletal:        Right foot: There is no deformity.        Left foot: There is no deformity.   No edema   Lymphadenopathy:      She has no cervical adenopathy.   Neurological: She appears lethargic.   Opens eyes and tracking to stimuli but does not follow commands   Skin: Skin is warm, dry and intact. Capillary refill takes less than 2 seconds. No rash noted. No cyanosis.            Vents:  Vent Mode: A/C (06/14/18 1533)  Set Rate: 16 bmp (06/14/18 1533)  Vt Set: 400 mL (06/14/18 1533)  Pressure Support: 0 cmH20 (06/14/18 1533)  PEEP/CPAP: 5 cmH20 (06/14/18 1533)  Oxygen Concentration (%): 45 (06/14/18 1545)  Peak Airway Pressure: 19 cmH2O (06/14/18 1533)  Plateau Pressure: 0 cmH20 (06/14/18 1533)  Total Ve: 6.74 mL (06/14/18 1533)  F/VT Ratio<105 (RSBI): (!) 44.2 (06/14/18 1533)    Lines/Drains/Airways     Central Venous Catheter Line                 Percutaneous Central Line Insertion/Assessment - triple lumen  06/14/18 right femoral less than 1 day          Drain                 Drain/Device  06/14/18 1239 neck collapsible closed device less than 1 day         Urethral Catheter 06/14/18 1113 less than 1 day          Airway                 Airway - Non-Surgical 06/14/18 1150 Endotracheal Tube less than 1 day          Arterial Line                 Arterial Line 06/14/18 1427 Right Femoral less than 1 day          Peripheral Intravenous Line                 Peripheral IV - Single Lumen 06/12/18 0926 Right Forearm 2 days         Peripheral IV - Single Lumen 06/12/18 0954 Left Forearm 2 days                Significant Labs:    CBC/Anemia Profile:    Recent Labs  Lab 06/13/18  0724 06/14/18  0236 06/14/18  1402 06/14/18  1422   WBC 11.39 8.86  --  6.13   HGB 10.7* 9.8*  --  9.4*   HCT 33.3* 31.5* 22* 29.5*     179 171  --  141*   MCV 91 91  --  90   RDW 16.6* 16.6*  --  16.7*        Chemistries:    Recent Labs  Lab 06/13/18  1623 06/14/18  0236 06/14/18  1422    143 142   K 4.2 3.7 3.2*    107 107   CO2 23 20* 17*   BUN 42* 43* 40*   CREATININE 4.1* 4.1* 3.7*   CALCIUM 10.4 10.1 9.3   ALBUMIN 3.2* 3.1* 2.7*   PROT  --   --   5.2*   BILITOT  --   --  0.4   ALKPHOS  --   --  48*   ALT  --   --  218*   AST  --   --  346*   MG  --   --  1.3*   PHOS 5.1* 5.2*  --        ABGs:   Recent Labs  Lab 06/14/18  1402   PH 7.347*   PCO2 37.3   HCO3 20.5*   POCSATURATED 96   BE -5     Coagulation:   Recent Labs  Lab 06/13/18  0724  06/14/18  1105   INR 1.0  --   --    APTT 28.5  < > 86.5*   < > = values in this interval not displayed.  Troponin:   Recent Labs  Lab 06/12/18  2053 06/13/18  0220 06/13/18  1508   TROPONINI 34.681* >50.000* 41.662*     All pertinent labs within the past 24 hours have been reviewed.    Significant Imaging:   CXR: I have reviewed all pertinent results/findings within the past 24 hours and my personal findings are:  pending    Assessment/Plan:     Pulmonary   Acute hypoxemic respiratory failure    Vent settings reviewed and adjusted  Cont full vent support  SBT in AM  Weaning FiO2        Cardiac/Vascular   * Cardiogenic shock    Cards following  Wean Levophed as tolerated        H/O aortic valve replacement    On Heparin infusion  Cards following        Persistent atrial fibrillation    Per Cards  Unable to give B-Blocker given shock\  Started on Amiodarone infusion        STEMI (ST elevation myocardial infarction)    Per Cards  Resuming Heparin infusion per Cards  Cont ASA and statin  Resume NTG top, Lopressor, and Imdur once BP more stable        Cardiac arrest with ventricular fibrillation    Cont Levophed and replace electrolytes  ICU cardiac monitoring  Patient starting to awaken and making purposeful movements will hold on TTM        Renal/   CKD (chronic kidney disease) stage 5, GFR less than 15 ml/min    Renal following  BMP in AM        Electrolyte imbalance    Replace K+ and Mg+  Follow up labs        Oncology   Anemia in stage 4 chronic kidney disease    Follow up H/H and transfuse as needed  Conservative transfusion protocol  Monitor for bleeding post op on Heparin infusion        Endocrine   Diabetes mellitus,  type 2 - only on oral medications    Add SSI          Hyperparathyroidism    POD #2 S/P parathyroidectomy  Surgery following        GI   Shock liver    Support BP and repeat LFTs in AM        Other   S/P evacuation of hematoma    Per surgery  Monitor neck circumference  Follow up H/H with resuming Heparin infusion           Preventive Measures and Monitoring:   Stress Ulcer: PPI  Nutrition: NPO  Glucose control: SSI  Bowel prophylaxis: PRN Dulcolax  DVT prophylaxis: Heparin infusion  Hx CAD on B-Blocker: Lopressor  Head of Bed/Reposition: Elevate HOB and turn Q1-2 hours   Early Mobility: Bed rest  SAT/SBT: Daily   Vent Day: #1  OG Day: #1  Central Line Right Fem Day: #1  Right Femoral Arterial Line Day: #1  Fair Day: #1  IVAB Day: #2  Code Status: Full  Pneumonia Vaccine: UTD    Counseling/Consultation:I have discussed the care of this patient in detail with the bedside nursing staff and Dr. May and Dr. Samuel and Dr. Louis.    Critical Care Time: 90 minutes  Critical secondary to Patient has a condition that poses threat to life and bodily function: Acute Myocardial Infarction and Acute Resp Failure [facility]  Patient has an abrupt change in neurologic status: Encephalopathy post arrest  Patient is currently on drug therapy requiring intensive monitoring for toxicity: Levophed infusion     Critical care was time spent personally by me on the following activities: development of treatment plan with patient or surrogate and bedside caregivers, discussions with consultants, evaluation of patient's response to treatment, examination of patient, ordering and performing treatments and interventions, ordering and review of laboratory studies, ordering and review of radiographic studies, pulse oximetry, re-evaluation of patient's condition. This critical care time did not overlap with that of any other provider or involve time for any procedures.    Thank you for your consult. I will follow-up with  patient. Please contact us if you have any additional questions.     Joseph Talavera NP  Critical Care Medicine  [facility]

## 2018-06-14 NOTE — HPI
68 year old female, PMHx of CKD (IV), HTN, DM, CVA (left sided weakness), and CAD who initially presented for hyperparathyroidism and hypercalcemia requiring surgical intervention.  She was seen in clinic by surgery for hyperparathyroidism and hypercalcemia and was electively admitted 6/12 taken to OR undergoing parathyroidectomy finding 2 right sided enlarged parathyroid glands.  Intra and post op patient developed EKG changes and had elevated troponin.  Cards consulted.  Patient admitted due to swallowing issues she was not taking all her meds as prescribed and had BP as high as 225/103 during hospitalization.  Troponin increased to > 50.  Cards diagnosed with NSTEMI and she was Rx with ASA, Imdur, Lopressor, statin and Heparin infusion with plans for repeat LHC if cleared by Renal given CKD5.  Today, she had progressively worsening neck swelling and SOB with worsening dysphagia.  She was taken back to OR and had post op neck hematoma evacuation.  In PACU she had witnessed V-Fib cardiac arrest and CODE BLUE called.  After 3 rounds of CPR, Epi X 3 and Defib X 3 ROSC was attained.  She was still intubated and on vent post op

## 2018-06-14 NOTE — PROGRESS NOTES
Spoke with a woman at [phone] who stated the entire family was upset that no one had spoke to them about a heart cath and informed me that her POA Brock Quiroz () had not given consent. Explained to her that heart cath is not scheduled at this time and the cardiologist would speak with them first. She stated the patient is on mucomyst therefore they would not be doing that unless they were planning on doing the heart cath. Unsure of where she got that information. Also stated the  was worried about her renal function. The 's phone number is [phone] or [phone].

## 2018-06-14 NOTE — SUBJECTIVE & OBJECTIVE
Review of Systems   Unable to perform ROS: acuity of condition     Objective:     Vital Signs (Most Recent):  Temp: 97.9 °F (36.6 °C) (06/14/18 0749)  Pulse: 101 (06/14/18 1053)  Resp: 18 (06/14/18 0827)  BP: (!) 149/67 (06/14/18 1053)  SpO2: 97 % (06/14/18 1053) Vital Signs (24h Range):  Temp:  [97.5 °F (36.4 °C)-98.4 °F (36.9 °C)] 97.9 °F (36.6 °C)  Pulse:  [] 101  Resp:  [18-20] 18  SpO2:  [97 %-100 %] 97 %  BP: (108-188)/(63-79) 149/67     Weight: 69.7 kg (153 lb 10.6 oz)  Body mass index is 28.1 kg/m².     SpO2: 97 %  O2 Device (Oxygen Therapy): Non Rebreather Mask      Intake/Output Summary (Last 24 hours) at 06/14/18 1342  Last data filed at 06/14/18 1230   Gross per 24 hour   Intake           537.87 ml   Output                0 ml   Net           537.87 ml       Lines/Drains/Airways     Drain                 Drain/Device  06/14/18 1239 neck collapsible closed device less than 1 day         Urethral Catheter 06/14/18 1113 less than 1 day          Airway                 Airway - Non-Surgical 06/14/18 1150 Endotracheal Tube less than 1 day          Peripheral Intravenous Line                 Peripheral IV - Single Lumen 06/12/18 0926 Right Forearm 2 days         Peripheral IV - Single Lumen 06/12/18 0954 Left Forearm 2 days                Physical Exam   Constitutional: She is oriented to person, place, and time. She appears well-developed. No distress.   HENT:   Head: Normocephalic and atraumatic.   Eyes: Pupils are equal, round, and reactive to light. Right eye exhibits no discharge. Left eye exhibits no discharge.   Neck:   +swelling     Cardiovascular: S1 normal and S2 normal.    Murmur heard.   Harsh midsystolic murmur is present  at the upper right sternal border radiating to the neck  Pulmonary/Chest: No respiratory distress. She has no wheezes.   Dyspneic     Abdominal: Soft. Bowel sounds are normal. She exhibits no distension. There is no tenderness.   Neurological: She is alert and oriented  to person, place, and time.   Skin: Skin is warm and dry. She is not diaphoretic.   Psychiatric: She has a normal mood and affect. Her behavior is normal. Thought content normal.   Nursing note and vitals reviewed.      Significant Labs:   CMP   Recent Labs  Lab 06/13/18  0505 06/13/18  1623 06/14/18  0236    141 143   K 4.3 4.2 3.7    106 107   CO2 24 23 20*    126* 103   BUN 37* 42* 43*   CREATININE 3.6* 4.1* 4.1*   CALCIUM 10.5 10.4 10.1   ALBUMIN  --  3.2* 3.1*   ANIONGAP 10 12 16   ESTGFRAFRICA 14* 12* 12*   EGFRNONAA 12* 10* 10*   , CBC   Recent Labs  Lab 06/13/18  0506 06/13/18  0724 06/14/18  0236   WBC 11.22 11.39 8.86   HGB 10.9* 10.7* 9.8*   HCT 34.2* 33.3* 31.5*    179  179 171   , Troponin   Recent Labs  Lab 06/12/18  2053 06/13/18  0220 06/13/18  1508   TROPONINI 34.681* >50.000* 41.662*    and All pertinent lab results from the last 24 hours have been reviewed.    Significant Imaging: Echocardiogram:   2D echo with color flow doppler:   Results for orders placed or performed in visit on 04/04/17   2D echo with color flow doppler   Result Value Ref Range    EF 60 55 - 65    Mitral Valve Mobility NORMAL     and X-Ray: CXR: X-Ray Chest 1 View (CXR):   Results for orders placed or performed during the hospital encounter of 06/12/18   X-Ray Chest 1 View    Narrative    EXAMINATION:  XR CHEST 1 VIEW    CLINICAL HISTORY:  hypoxemia;    COMPARISON:  06/13/2018    FINDINGS:  The current examination is limited secondary to the patient's chin being projected over the thorax.  The size of the heart is prominent.  There has been interval development of streaky opacities in both lungs.  There is persistent mild elevation of the left hemidiaphragm.  There is no pneumothorax.  The costophrenic angles are sharp.      Impression    1. The current examination is limited secondary to the patient's chin being projected over the thorax.  2. There has been interval development of streaky opacities  in both lungs.  This is characteristic of subsegmental atelectasis.  3. There is persistent mild elevation of the left hemidiaphragm.  This is characteristic of a phrenic nerve injury.  4. The size of the heart is prominent.  This may be secondary to magnification.  .      Electronically signed by: Orion Enamorado MD  Date:    06/14/2018  Time:    11:57    and X-Ray Chest PA and Lateral (CXR): No results found for this visit on 06/12/18.

## 2018-06-14 NOTE — PROGRESS NOTES
[facility] Hospital Medicine  Progress Note    Patient Name: Citlali Karimi  MRN: [id number]  Patient Class: IP- Inpatient   Admission Date: 6/12/2018  Length of Stay: 1 days  Attending Physician: Levy Samuel MD  Primary Care Provider: Evelio Schuster MD        Subjective:     Principal Problem:Cardiogenic shock    HPI:  Citlali Karimi is a 68 y.o female with PMHx of CKD (IV), HTN, DM, CVA (left sided weakness), and CAD who initially presented for hyperparathyroidism and hypercalcemia requiring surgical intervention.  Pt underwent parathyroidectomy today per Dr. Tracy (General Surgery). Pt admitted to Medical Surgical Unit post procedure and Hospital Medicine consulted for medical management.  Chest xray post procedure showed mild asymmetric CHF versus RUL pneumonia. Troponin 0.113 and BNP >270 noted.  Pt given IV Lasix in PACU prior to unit arrival.      Hospital Course:  Pt admitted to Outpatient Extended Recovery post procedure.  Elevated noted post procedure and results trended yielding significant positive response.  Cardiology consulted and Heparin infusion initiated.  Hx of CAD, multiple vessels noted with home medications continued and Imdur dose increased.  Nephrology consulted due to elevated creatinine and [facility] recommended.  Echo results pending.  Heart catheterization procedure considered with family refusing due to concern for worsening kidney function as patient does not want to be on dialysis.  Decreased oral intake and difficulty swallowing noted.  Speech therapist to bedside.  Pt made NPO and General Surgery notified.      Interval History: RN reported pt not tolerating oral medications.  Pt made NPO and Speech Therapist to bedside for repeat evaluation.  NP/MD notified of edema to surgical site by ST/RN.  RN asked to contact General Surgery.  NP to bedside with swelling to surgical site noted.  Pt stable with O2 sat at 97% on 2-3L NC.  100% NRB placed for support.  Surgery PA called  by NP with no answer.      Review of Systems   Constitutional: Negative for activity change, appetite change and fatigue.   HENT: Positive for trouble swallowing. Negative for congestion and postnasal drip.    Respiratory: Negative for cough, shortness of breath and wheezing.    Cardiovascular: Negative for chest pain, palpitations and leg swelling.   Gastrointestinal: Negative for abdominal distention, abdominal pain, diarrhea, nausea and vomiting.        Heartburn   Skin: Positive for wound. Negative for color change.   Allergic/Immunologic: Negative for environmental allergies and food allergies.   Neurological: Positive for weakness (left sided (chronic)). Negative for dizziness and headaches.   Hematological: Does not bruise/bleed easily.   Psychiatric/Behavioral: Negative for agitation, confusion and sleep disturbance. The patient is not nervous/anxious.      Objective:     Vital Signs (Most Recent):  Temp: 97.4 °F (36.3 °C) (06/14/18 1400)  Pulse: (!) 118 (06/14/18 1430)  Resp: 19 (06/14/18 1430)  BP: 125/72 (06/14/18 1422)  SpO2: 98 % (06/14/18 1430) Vital Signs (24h Range):  Temp:  [97.1 °F (36.2 °C)-98.4 °F (36.9 °C)] 97.4 °F (36.3 °C)  Pulse:  [] 118  Resp:  [14-20] 19  SpO2:  [96 %-100 %] 98 %  BP: ()/(48-79) 125/72     Weight: 73.9 kg (162 lb 14.7 oz)  Body mass index is 29.8 kg/m².    Intake/Output Summary (Last 24 hours) at 06/14/18 1450  Last data filed at 06/14/18 1415   Gross per 24 hour   Intake          2037.87 ml   Output              120 ml   Net          1917.87 ml      Physical Exam   Constitutional: She appears well-developed. No distress.   HENT:   Head: Normocephalic and atraumatic.       Mouth/Throat: Oropharynx is clear and moist. No oropharyngeal exudate.   Dressings on neck with swelling    Eyes: Conjunctivae are normal. Pupils are equal, round, and reactive to light.   Neck: No JVD present. Carotid bruit is not present. Edema and decreased range of motion present. No  tracheal deviation present. No thyroid mass and no thyromegaly present.       Cardiovascular: Regular rhythm and intact distal pulses.  Tachycardia present.  Exam reveals no gallop and no friction rub.    Murmur heard.  Pulmonary/Chest: No respiratory distress. She has no wheezes. She has rales. She exhibits no tenderness.   Abdominal: Soft. Bowel sounds are normal. She exhibits no distension, no abdominal bruit, no ascites and no mass. There is no hepatosplenomegaly. There is no tenderness. There is no rebound, no guarding and no CVA tenderness.   Musculoskeletal: She exhibits no edema or tenderness.   Left sided weakness noted   Lymphadenopathy:     She has no cervical adenopathy.   Neurological: She is alert. She exhibits normal muscle tone. Coordination normal.   Pt nodded appropriately when questioned   Skin: Skin is warm and intact. No rash noted. No erythema. No pallor.       Significant Labs:   CBC:   Recent Labs  Lab 06/13/18  0724 06/14/18  0236 06/14/18  1402 06/14/18  1422   WBC 11.39 8.86  --  6.13   HGB 10.7* 9.8*  --  9.4*   HCT 33.3* 31.5* 22* 29.5*     179 171  --  141*     CMP:   Recent Labs  Lab 06/13/18  1623 06/14/18  0236 06/14/18  1422    143 142   K 4.2 3.7 3.2*    107 107   CO2 23 20* 17*   * 103 192*   BUN 42* 43* 40*   CREATININE 4.1* 4.1* 3.7*   CALCIUM 10.4 10.1 9.3   PROT  --   --  5.2*   ALBUMIN 3.2* 3.1* 2.7*   BILITOT  --   --  0.4   ALKPHOS  --   --  48*   AST  --   --  346*   ALT  --   --  218*   ANIONGAP 12 16 18*   EGFRNONAA 10* 10* 12*       Significant Imaging:   Imaging Results    None       Assessment/Plan:      STEMI (ST elevation myocardial infarction)    -Cardiology consulted   - EKG showed diffuse ST depression EKG and troponin increased from 0.113 >> 34>>50, consistent with NSTEMI  -Denies chest pain or SOB  -BP elevated yesterday, HTN likely triggered clot rupture/MI  -Known CAD on previous cath not amenable to PCI at that time  -Continue ASA,  Imdur, Metoprolol, statin, heparin gtt  -Will need repeat angiogram to re-assess coronaries/underlying ischemia  -Nephrology consulted due to high risk of contrast induced nephropathy- creatinine 3.6  -Renal consult requested for recommendations/clearance for Heart Cath  -Family refused Heart Cath due to concern for worsening kidney function          Hypercalcemia    -hx of hyperparathyroidism   - s/p parathyroidectomy   - Ca 11.4>>10.5>>9.3  - Nephrology consulted          Abnormal chest x-ray    - chest xray showed possible mild asymmetric CHF versus right upper lobe pneumonia.  -pt given IV Lasix in PACU  - IV antibiotics for suspected aspiration continued   - pt recently seen by RAMIRO Mcnally (Otolaryngology) for Dysphagia  -   - pt afebrile, WBC normal  - repeat xray results showed improved aeration RUL with no adverse interval changes in comparison to previous study  - SLP evaluation performed and thin, pureed recommended on 6/13/18          Elevated troponin    -initial 0.113>>34>>50>>41  -pt denies CP and SOB  - EKG results showed diffuse ST changes    -serial results revealed NSTEMI with Cardiology consulted             CVA (cerebral vascular accident)    -hx of 1 yr ago per   -left sided weakness residual  -ASA and Statin continued           Hyperparathyroidism    -Pt admitted to Extended Recovery then transferred to Inpatient due to NSTEMI  -s/p Parathyroidectomy   -managed by General Surgery -primary team  - PTH- 32  - Ca 11.4>>10.5  - analgesia prn   - antiemetics prn  - specimens sent for analysis            Coronary artery disease involving native coronary artery    - ASA, Statin, and Lopressor continued   -Imdur dose increased   -previous Cath showed severe CAD (proximal LCX 99%, mid 50%, RCA mid 90%, distal with subtotal lesion).  - Cardiology consulted with medical management continued   - LHC proposed pending Nephrology recommendations  -family refused LHC due to concern for  worsening kidney function as pt had expressed that she did not want to be on dialysis           CKD (chronic kidney disease) stage 5, GFR less than 15 ml/min    -Creatinine 3.6>>4.1  -baseline 2.7-4.4  -will monitor   -sodium bicarb continued   -pt has been followed outpatient by Dr. Vazquez (Nephrology)  -Nephrology consulted - pt may benefit from repeat angio due to NSTEMI but at high risk of contrast induced nephropathy  - pt was candidate for renal transplant however work up discontinued due to progressive weakness  - [facility] recommended           Hypertension associated with diabetes    - home medications continued   -hx of noncompliance and inconsistent dosing at home per   - uncontrolled upon arrival- improved with Hydralazine in PACU   - Hydralazine prn          Diabetic nephropathy associated with type 2 diabetes mellitus    -Accuchecks and SSI continued   -HbA1c 5.4  -cardiac/diabetic diet          Anemia in stage 4 chronic kidney disease    -stable post procedure  -will monitor  -CBC in am   -pt followed outpatient by Heme/Onc          Aortic valvar stenosis    -s/p TAVR  - Echo in 04/2017 with EF 60%  -Cardiology following  - repeat Echo results showed EF 60% with diastolic dysfunction            VTE Risk Mitigation         Ordered     heparin 25,000 units in dextrose 5% 250 mL (100 units/mL) infusion; FEMALE  Continuous      06/14/18 1526     heparin 25,000 units in dextrose 5% 250 mL (100 units/mL) bolus from bag; PRN BOLUS  As needed (PRN)      06/14/18 1526     heparin 25,000 units in dextrose 5% 250 mL (100 units/mL) bolus from bag; PRN BOLUS  As needed (PRN)      06/14/18 1526     Place sequential compression device  Until discontinued      06/12/18 8393          Rach Wheatley NP  Department of Hospital Medicine   [facility]

## 2018-06-14 NOTE — PROCEDURES
"Citlali Karimi is a 68 y.o. female patient.    Temp: 97.4 °F (36.3 °C) (06/14/18 1505)  Pulse: (!) 114 (06/14/18 1605)  Resp: 16 (06/14/18 1605)  BP: 117/60 (06/14/18 1605)  SpO2: 98 % (06/14/18 1605)  Weight: 73.9 kg (162 lb 14.7 oz) (06/14/18 1415)  Height: 5' 2" (157.5 cm) (06/13/18 1222)       Central Line  Date/Time: 6/14/2018 1:22 PM  Location procedure was performed: [facility] PACU  Performed by: SHADI TALAVERA  Pre-operative Diagnosis: cardiac arrest  Post-operative diagnosis: Cardiogenic shock  Consent Done: Emergent Situation  Time out: Immediately prior to procedure a "time out" was called to verify the correct patient, procedure, equipment, support staff and site/side marked as required.  Indications: vascular access and med administration  Preparation: skin prepped with ChloraPrep  Skin prep agent dried: skin prep agent completely dried prior to procedure  Sterile barriers: all five maximum sterile barriers used - cap, mask, sterile gown, sterile gloves, and large sterile sheet  Hand hygiene: hand hygiene performed prior to central venous catheter insertion  Location details: right femoral  Site selection rationale: emergent arrest wtih recent neck surgery  Catheter type: triple lumen  Catheter size: 7 Fr  Catheter Length: 16cm    Ultrasound guidance: yes  Vessel Caliber: small, patent, compressibility normal  Vascular Doppler: not done  Needle advanced into vessel with real time Ultrasound guidance.  Guidewire confirmed in vessel.  Manometry: No   Number of attempts: 1  Assessment: successful placement  Complications: none  Estimated blood loss (mL): 3  Specimens: No  Implants: No  Post-procedure: line sutured,  chlorhexidine patch,  sterile dressing applied and blood return through all ports  Complications: No          Shadi Talavera  6/14/2018  "

## 2018-06-14 NOTE — PROGRESS NOTES
[facility]  Cardiology  Progress Note    Patient Name: Citlali Karimi  MRN: [id number]  Admission Date: 6/12/2018  Hospital Length of Stay: 1 days  Code Status: Prior   Attending Physician: Levy Samuel MD   Primary Care Physician: Evelio Schuster MD  Expected Discharge Date:   Principal Problem:Hyperparathyroidism    Subjective:   HPI:  Ms. Karimi is a 68 year old female patient with a PMHx of CKD stage IV, HTN, DM, CVA (with residual left-sided weakness), CAD, anemia, hyperlipidemia, s/p TAVR, and CAD who presented to [facility] yesterday for elective parathyroidectomy. Post-procedure, patient had ischemic changes on her EKG. Troponin was drawn and trended upward and cardiology consulted to assist with management. Patient seen and examined today, resting in bed. Reports malaise/fatige and surgical soreness. No real cardiac complaints. Denies chest pain or increased SOB. No palpitations. , who was present during exam, does report patient has been having issues with swallowing and may not have been taking full doses of her prescribed medications. BP noted to be elevated yesterday 225/103. Chart reviewed. Diffuse ST depression noted on post-op EKG, has since resolved. Troponin 0.113>34>50. 2D echo pending. Review of chart shows previous cath showed severe CAD (proximal LCX 99%, mid 50%, RCA mid 90%, distal with subtotal lesion). Medical mgmt recommended at that time.    Hospital Course:   6/14/18-Patient seen and examined today, sleepy/lethargic. Dyspneic with throat swelling, surgery notified. Recommended angiogram, risks/benefits discussed.  and family had detailed discussion with nephrology and decided against [facility] due to increased risk of contrast induced nephropathy/dialysis.    Patient subsequently taken back to OR this AM for re-exploration/washout given throat swelling. Coded post procedure.         Review of Systems   Unable to perform ROS: acuity of condition     Objective:      Vital Signs (Most Recent):  Temp: 97.9 °F (36.6 °C) (06/14/18 0749)  Pulse: 101 (06/14/18 1053)  Resp: 18 (06/14/18 0827)  BP: (!) 149/67 (06/14/18 1053)  SpO2: 97 % (06/14/18 1053) Vital Signs (24h Range):  Temp:  [97.5 °F (36.4 °C)-98.4 °F (36.9 °C)] 97.9 °F (36.6 °C)  Pulse:  [] 101  Resp:  [18-20] 18  SpO2:  [97 %-100 %] 97 %  BP: (108-188)/(63-79) 149/67     Weight: 69.7 kg (153 lb 10.6 oz)  Body mass index is 28.1 kg/m².     SpO2: 97 %  O2 Device (Oxygen Therapy): Non Rebreather Mask      Intake/Output Summary (Last 24 hours) at 06/14/18 1342  Last data filed at 06/14/18 1230   Gross per 24 hour   Intake           537.87 ml   Output                0 ml   Net           537.87 ml       Lines/Drains/Airways     Drain                 Drain/Device  06/14/18 1239 neck collapsible closed device less than 1 day         Urethral Catheter 06/14/18 1113 less than 1 day          Airway                 Airway - Non-Surgical 06/14/18 1150 Endotracheal Tube less than 1 day          Peripheral Intravenous Line                 Peripheral IV - Single Lumen 06/12/18 0926 Right Forearm 2 days         Peripheral IV - Single Lumen 06/12/18 0954 Left Forearm 2 days                Physical Exam   Constitutional: She is oriented to person, place, and time. She appears well-developed. No distress.   HENT:   Head: Normocephalic and atraumatic.   Eyes: Pupils are equal, round, and reactive to light. Right eye exhibits no discharge. Left eye exhibits no discharge.   Neck:   +swelling     Cardiovascular: S1 normal and S2 normal.    Murmur heard.   Harsh midsystolic murmur is present  at the upper right sternal border radiating to the neck  Pulmonary/Chest: No respiratory distress. She has no wheezes.   Dyspneic     Abdominal: Soft. Bowel sounds are normal. She exhibits no distension. There is no tenderness.   Neurological: She is alert and oriented to person, place, and time.   Skin: Skin is warm and dry. She is not diaphoretic.    Psychiatric: She has a normal mood and affect. Her behavior is normal. Thought content normal.   Nursing note and vitals reviewed.      Significant Labs:   CMP   Recent Labs  Lab 06/13/18  0505 06/13/18  1623 06/14/18  0236    141 143   K 4.3 4.2 3.7    106 107   CO2 24 23 20*    126* 103   BUN 37* 42* 43*   CREATININE 3.6* 4.1* 4.1*   CALCIUM 10.5 10.4 10.1   ALBUMIN  --  3.2* 3.1*   ANIONGAP 10 12 16   ESTGFRAFRICA 14* 12* 12*   EGFRNONAA 12* 10* 10*   , CBC   Recent Labs  Lab 06/13/18  0506 06/13/18  0724 06/14/18  0236   WBC 11.22 11.39 8.86   HGB 10.9* 10.7* 9.8*   HCT 34.2* 33.3* 31.5*    179  179 171   , Troponin   Recent Labs  Lab 06/12/18  2053 06/13/18  0220 06/13/18  1508   TROPONINI 34.681* >50.000* 41.662*    and All pertinent lab results from the last 24 hours have been reviewed.    Significant Imaging: Echocardiogram:   2D echo with color flow doppler:   Results for orders placed or performed in visit on 04/04/17   2D echo with color flow doppler   Result Value Ref Range    EF 60 55 - 65    Mitral Valve Mobility NORMAL     and X-Ray: CXR: X-Ray Chest 1 View (CXR):   Results for orders placed or performed during the hospital encounter of 06/12/18   X-Ray Chest 1 View    Narrative    EXAMINATION:  XR CHEST 1 VIEW    CLINICAL HISTORY:  hypoxemia;    COMPARISON:  06/13/2018    FINDINGS:  The current examination is limited secondary to the patient's chin being projected over the thorax.  The size of the heart is prominent.  There has been interval development of streaky opacities in both lungs.  There is persistent mild elevation of the left hemidiaphragm.  There is no pneumothorax.  The costophrenic angles are sharp.      Impression    1. The current examination is limited secondary to the patient's chin being projected over the thorax.  2. There has been interval development of streaky opacities in both lungs.  This is characteristic of subsegmental atelectasis.  3. There is  persistent mild elevation of the left hemidiaphragm.  This is characteristic of a phrenic nerve injury.  4. The size of the heart is prominent.  This may be secondary to magnification.  .      Electronically signed by: Orion Enamorado MD  Date:    06/14/2018  Time:    11:57    and X-Ray Chest PA and Lateral (CXR): No results found for this visit on 06/12/18.    Assessment and Plan:   Patient who presents with post-procedure MI. Angiogram recommended, risks/benefits discussed. Patient and family decided not to proceed given high risk of contrast induced nephropathy/dialysis. Continue current medical mgmt.    Patient subsequently coded post re-exploration/washout this AM and was in process of being stabilized in PACU, Dr. Louis present and gave rec's.     NSTEMI (non-ST elevated myocardial infarction)    -Presents with diffuse ST depression on post-procedure EKG and elevated troponin > 50, consistent with NSTEMI  -Denies chest pain or SOB  -BP elevated yesterday, HTN likely triggered clot rupture/MI  -Known CAD on previous cath not amenable to PCI at that time  -Continue ASA, Imdur, Metoprolol, statin, heparin gtt  -Angiogram recommended but patient and family had prolonged discussion with nephrology and decided against LHC given risk of contrast induced nephropathy/dialysis          Coronary artery disease involving native coronary artery    -Denies chest pain or SOB  -See plan under NSTEMI        CKD (chronic kidney disease) stage 5, GFR less than 15 ml/min    -mgmt as per nephrology        Hypertension associated with diabetes    -BP elevated yesterday  -Continue BB, Imdur  -Add hydralazine or amlodipine if needed        Aortic valvar stenosis    -s/p TAVR  -Stable, 2D echo pending            VTE Risk Mitigation         Ordered     heparin 25,000 units in dextrose 5% 250 mL (100 units/mL) infusion; FEMALE  Continuous      06/13/18 0659     heparin 25,000 units in dextrose 5% 250 mL (100 units/mL) bolus from bag;  PRN BOLUS  As needed (PRN)      06/13/18 0659     heparin 25,000 units in dextrose 5% 250 mL (100 units/mL) bolus from bag; PRN BOLUS  As needed (PRN)      06/13/18 0659     Place sequential compression device  Until discontinued      06/12/18 6018          Vesna Covington PA-C  Cardiology  [facility]     Chart reviewed. Dr. Louis examined patient and agrees with plan as outlined above.

## 2018-06-14 NOTE — OP NOTE
[facility]  Surgery Department  Operative Note    SUMMARY     Date of Procedure: 6/14/2018     Procedure: Procedure(s) (LRB):  EVACUATION, HEMATOMA (N/A)  EXPLORATION, WOUND (N/A)     Surgeon(s) and Role:     * Levy Samuel MD - Primary    Assisting Surgeon: Lewis Barahona MD    Pre-Operative Diagnosis: Respiratory distress [R06.03]  S/P parathyroidectomy [E89.2]    Post-Operative Diagnosis: Post-Op Diagnosis Codes:     * Respiratory distress [R06.03]     * S/P parathyroidectomy [E89.2]    Anesthesia: General    Technical Procedures Used: wound washout    Description of the Findings of the Procedure: liquid blood causing tracheal compression, ooze no active bleeding    Complications: No    Estimated Blood Loss (EBL): 20 mL           Implants: * No implants in log *    Specimens:   Specimen (12h ago through future)    None                  Condition: Good    Disposition: ICU - intubated and critically ill.    Procedure in Detail:  The patient was brought to the OR and underwent general anesthesia. Her neck was prepped and draped in the usual sterile fashion. The sutures at her previous incision were cut opening the muscle layers of the neck. Liquid old blood was found more predominantly on the patient's right however no clot was noted. The area was irrigated and and thoroughly washed out.We evaluated the previous surgical fields bilaterally noting no active bleeding. There was a small amount of ooze from cut surfaces likely from patient's anticoagulation which was controlled with electrocautery.Surgicel was placed in the to the posterior right and left of the thyroid. We monitored this for a few minutes and noted no bleeding. A 10fr gustavo drain was placed and secured with a 2-0 silk suture. The strap muscles were closed with a 3-0 vicryl suture. The platysma was closed with a 3-0 vicryl followed by 4-0 monocryl subcuticular. Sterile dressing was applied. The patient was transported to ICU post op  intubated.

## 2018-06-14 NOTE — ASSESSMENT & PLAN NOTE
Patient has been treated with Procrit 20,000 units weekly for maintenance therapy.    --Daily CBC, CMP  --Transfuse for Hgb <7.0, or <8.0 if symptomatic

## 2018-06-14 NOTE — ASSESSMENT & PLAN NOTE
Monitor calcium, PTH levels normal  Tums taper  Increased with neck swelling to OR for neck exploration/washout

## 2018-06-14 NOTE — CONSULTS
[facility] -   Nephrology  Consult Note      Patient Name: Citlali Karimi  MRN: [id number]  Admission Date: 6/12/2018  Hospital Length of Stay: 0 days  Attending Provider: Levy Samuel MD   Primary Care Physician: Evelio Schuster MD  Principal Problem:<principal problem not specified>    Consults  Subjective:     HPI:  Citlali Karimi Is a pleasant 68 -year-old  woman with history of chronic kidney disease stage 4, patient was admitted to the hospital for an elective  partial parathyroidectomy, following her surgery during observation.  She had some chest pain, workup revealed non ST elevated MI, patient was admitted to the hospital for further management.  We were consulted due to advanced renal insufficiency, baseline serum creatinine about 3.5-4,            Important Info :        She underwent a native kidney biopsy on 10/10/13. Final report of the kidney biopsy is negative for any specific etiology for acute renal failure. 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         Past Medical History:   Diagnosis Date    Acid reflux 1/7/2015    Anxiety 1/7/2015    Aortic valvar stenosis     Arthritis     osteo    Callus     Chronic anemia     followed by Dr. Addison    CKD (chronic kidney disease) stage 5, GFR less than 15 ml/min 8/7/2015    Combined hyperlipidemia associated with type 2 diabetes mellitus     Coronary artery disease     Diabetes mellitus type II, controlled, with no complications     LBSL 108 today    Encounter for blood transfusion     Frail elderly with impaired mobility, wheel chair bound 8/25/2017    Gallbladder problem     gallbladder surgery    Heart murmur     Hyperparathyroidism, secondary renal 1/7/2015    Hypertension 8/7/2015    Hypertension associated with diabetes 11/12/2012    Kidney transplant candidate 1/7/2015    Overweight(278.02)     Urinary tract infection        Past Surgical History:    Procedure Laterality Date    ANKLE FRACTURE SURGERY Left 1985    AORTIC VALVE REPLACEMENT  05/2016    BONE BIOPSY      CARDIAC SURGERY      CATARACT EXTRACTION W/  INTRAOCULAR LENS IMPLANT  2009    CHOLECYSTECTOMY      CYSTOSCOPY      EYE SURGERY      FRACTURE SURGERY      HYSTERECTOMY  unknown    OOPHORECTOMY      PARATHYROIDECTOMY N/A 6/12/2018    Procedure: PARATHYROIDECTOMY;  Surgeon: Levy Samuel MD;  Location: [facility];  Service: General;  Laterality: N/A;    removal of colon polyps      RENAL BIOPSY  10/2013    YAG cap -OD         Review of patient's allergies indicates:   Allergen Reactions    Lipitor [atorvastatin] Swelling     Lips       Current Facility-Administered Medications   Medication Frequency    acetaminophen tablet 650 mg Q6H PRN    acetylcysteine 200 mg/ml (20%) solution 600 mg BID    [START ON 6/14/2018] aspirin EC tablet 81 mg Daily    bisacodyl suppository 10 mg Daily PRN    calcium carbonate 200 mg calcium (500 mg) chewable tablet 1,000 mg BID    chlorhexidine 0.12 % solution 10 mL BID    cloNIDine tablet 0.1 mg Q6H PRN    dextrose 50% injection 12.5 g PRN    dextrose 50% injection 25 g PRN    glucagon (human recombinant) injection 1 mg PRN    glucose chewable tablet 16 g PRN    glucose chewable tablet 24 g PRN    heparin 25,000 units in dextrose 5% 250 mL (100 units/mL) bolus from bag; PRN BOLUS PRN    heparin 25,000 units in dextrose 5% 250 mL (100 units/mL) bolus from bag; PRN BOLUS PRN    heparin 25,000 units in dextrose 5% 250 mL (100 units/mL) infusion; FEMALE Continuous    insulin aspart U-100 pen 0-5 Units QID (AC + HS) PRN    isosorbide mononitrate 24 hr tablet 60 mg Daily    lactulose 20 gram/30 mL solution Soln 20 g Q6H PRN    metoprolol tartrate (LOPRESSOR) tablet 25 mg BID    morphine injection 2 mg Q6H PRN    NIFEdipine 24 hr tablet 90 mg Daily    nitroGLYCERIN 2% TD oint ointment 0.5 inch Q6H    nozaseptin (NOZIN) nasal  BID     ondansetron injection 4 mg Q8H PRN    pantoprazole EC tablet 40 mg Daily    piperacillin-tazobactam 4.5 g in dextrose 5 % 100 mL IVPB (ready to mix system) Q12H    senna-docusate 8.6-50 mg per tablet 1 tablet BID    simvastatin tablet 20 mg QHS    sodium bicarbonate tablet 650 mg BID    sodium chloride 0.9% flush 3 mL PRN     Family History     Problem Relation (Age of Onset)    Aneurysm Father    Cancer Paternal Grandmother    Colon cancer Maternal Grandmother    Diabetes Mother, Brother, Sister    Glaucoma Mother    Heart disease Mother, Sister    Hypertension Mother, Brother, Brother    Kidney disease Sister, Brother, Sister    No Known Problems Maternal Aunt, Maternal Uncle, Paternal Aunt, Paternal Uncle, Maternal Grandfather, Paternal Grandfather    Stroke Father        Social History Main Topics    Smoking status: Never Smoker    Smokeless tobacco: Never Used    Alcohol use No    Drug use: No    Sexual activity: Not Currently     Birth control/ protection: See Surgical Hx     Review of Systems   Unable to perform ROS: Other       Full review of systems could not be obtained during my exam as patient was lethargic,    Objective:     Vital Signs (Most Recent):  Temp: 98 °F (36.7 °C) (06/13/18 1922)  Pulse: 73 (06/13/18 1922)  Resp: 18 (06/13/18 1922)  BP: 139/65 (06/13/18 1922)  SpO2: 98 % (06/13/18 2023)  O2 Device (Oxygen Therapy): nasal cannula (06/13/18 2023) Vital Signs (24h Range):  Temp:  [97.5 °F (36.4 °C)-98.8 °F (37.1 °C)] 98 °F (36.7 °C)  Pulse:  [65-74] 73  Resp:  [17-18] 18  SpO2:  [96 %-100 %] 98 %  BP: ()/(51-69) 139/65     Weight: 69.7 kg (153 lb 10.6 oz) (06/13/18 1222)  Body mass index is 28.1 kg/m².  Body surface area is 1.75 meters squared.    I/O last 3 completed shifts:  In: 2110 [P.O.:320; I.V.:1541.3; IV Piggyback:248.8]  Out: 10 [Blood:10]    Physical Exam   Constitutional: She appears well-developed. No distress.   HENT:   Head: Normocephalic and atraumatic.    Mouth/Throat: Oropharynx is clear and moist. No oropharyngeal exudate.   Dressings on neck    Eyes: Conjunctivae are normal. Pupils are equal, round, and reactive to light.   Neck: Normal range of motion. Neck supple. No JVD present. Carotid bruit is not present. No tracheal deviation present. No thyroid mass and no thyromegaly present.   Cardiovascular: Normal rate, regular rhythm and intact distal pulses.  Exam reveals no gallop and no friction rub.    Murmur heard.  Pulmonary/Chest: Breath sounds normal. No respiratory distress. She has no wheezes. She has no rales. She exhibits no tenderness.   Abdominal: Soft. Bowel sounds are normal. She exhibits no distension, no abdominal bruit, no ascites and no mass. There is no hepatosplenomegaly. There is no tenderness. There is no rebound, no guarding and no CVA tenderness.   Musculoskeletal: She exhibits no edema or tenderness.   Lymphadenopathy:     She has no cervical adenopathy.   Neurological: No cranial nerve deficit. She exhibits normal muscle tone. Coordination normal.   Skin: Skin is warm and intact. No rash noted. No erythema. No pallor.       Significant Labs:  CBC:   Recent Labs  Lab 06/13/18  0724   WBC 11.39   RBC 3.67*   HGB 10.7*   HCT 33.3*     179   MCV 91   MCH 29.2   MCHC 32.1     CMP:   Recent Labs  Lab 06/13/18  1623   *   CALCIUM 10.4   ALBUMIN 3.2*      K 4.2   CO2 23      BUN 42*   CREATININE 4.1*     Coagulation:   Recent Labs  Lab 06/13/18  0724  06/13/18  1847   INR 1.0  --   --    APTT 28.5  < > 37.8*   < > = values in this interval not displayed.  LFTs:   Recent Labs  Lab 06/13/18  1623   ALBUMIN 3.2*     All labs within the past 24 hours have been reviewed.    Lab Results   Component Value Date    PTH 18.5 06/12/2018    CALCIUM 10.4 06/13/2018    PHOS 5.1 (H) 06/13/2018       Lab Results   Component Value Date    ALBUMIN 3.2 (L) 06/13/2018       Recent Labs  Lab 06/12/18  1444  06/13/18  1508   *  --    --    CPKMB 8.1*  --   --    TROPONINI 0.113*  < > 41.662*   MB 1.2  --   --    < > = values in this interval not displayed.      Significant Imaging: reviewed     Assessment/Plan:     CKD (chronic kidney disease) stage 5, GFR less than 15 ml/min    1. CKD stage 4/5 :  Renal function at baseline, will continue to monitor, no acute indication for renal replacement therapy,    2.  Acute MI - cardiology notes reviewed, I personally think that patient will benefit from left heart catheterization and further evaluation of her coronaries, high risk for severe MI and death, previous [facility] was in 2016     agree with high risk for worsening renal function due to IV contrast exposure ,   will start patient on oral Mucomyst ,  will need gentle hydration 1 hr before and about 6 hr following her catheterization,     3. SHPT :  Status post partial parathyroidectomy surgery, will continue to monitor her serum calcium levels,    4.  Anemia of chronic kidney disease - stable Hb     5. Stable lytes ,             Thank you for your consult. I will follow-up with patient. Please contact us if you have any additional questions.     Total time spent 70 minutes including time needed to review the records,  patient  evaluation, documentation, face-to-face discussion with the primary team and Cardiology , more than 50% of the time was spent on coordination of care and counseling.       Magnus Vazquez MD   Nephrology  [facility] -

## 2018-06-14 NOTE — ASSESSMENT & PLAN NOTE
Follow up H/H and transfuse as needed  Conservative transfusion protocol  Monitor for bleeding post op on Heparin infusion

## 2018-06-14 NOTE — SUBJECTIVE & OBJECTIVE
Interval History: RN reported pt not tolerating oral medications.  Pt made NPO and Speech Therapist to bedside for repeat evaluation.  NP/MD notified of edema to surgical site by ST/RN.  RN asked to contact General Surgery.  NP to bedside with swelling to surgical site noted.  Pt stable with O2 sat at 97% on 2-3L NC.  100% NRB placed for support.  Surgery PA called by NP with no answer.      Review of Systems   Constitutional: Negative for activity change, appetite change and fatigue.   HENT: Positive for trouble swallowing. Negative for congestion and postnasal drip.    Respiratory: Negative for cough, shortness of breath and wheezing.    Cardiovascular: Negative for chest pain, palpitations and leg swelling.   Gastrointestinal: Negative for abdominal distention, abdominal pain, diarrhea, nausea and vomiting.        Heartburn   Skin: Positive for wound. Negative for color change.   Allergic/Immunologic: Negative for environmental allergies and food allergies.   Neurological: Positive for weakness (left sided (chronic)). Negative for dizziness and headaches.   Hematological: Does not bruise/bleed easily.   Psychiatric/Behavioral: Negative for agitation, confusion and sleep disturbance. The patient is not nervous/anxious.      Objective:     Vital Signs (Most Recent):  Temp: 97.4 °F (36.3 °C) (06/14/18 1400)  Pulse: (!) 118 (06/14/18 1430)  Resp: 19 (06/14/18 1430)  BP: 125/72 (06/14/18 1422)  SpO2: 98 % (06/14/18 1430) Vital Signs (24h Range):  Temp:  [97.1 °F (36.2 °C)-98.4 °F (36.9 °C)] 97.4 °F (36.3 °C)  Pulse:  [] 118  Resp:  [14-20] 19  SpO2:  [96 %-100 %] 98 %  BP: ()/(48-79) 125/72     Weight: 73.9 kg (162 lb 14.7 oz)  Body mass index is 29.8 kg/m².    Intake/Output Summary (Last 24 hours) at 06/14/18 1450  Last data filed at 06/14/18 1415   Gross per 24 hour   Intake          2037.87 ml   Output              120 ml   Net          1917.87 ml      Physical Exam   Constitutional: She appears  well-developed. No distress.   HENT:   Head: Normocephalic and atraumatic.       Mouth/Throat: Oropharynx is clear and moist. No oropharyngeal exudate.   Dressings on neck with swelling    Eyes: Conjunctivae are normal. Pupils are equal, round, and reactive to light.   Neck: No JVD present. Carotid bruit is not present. Edema and decreased range of motion present. No tracheal deviation present. No thyroid mass and no thyromegaly present.       Cardiovascular: Regular rhythm and intact distal pulses.  Tachycardia present.  Exam reveals no gallop and no friction rub.    Murmur heard.  Pulmonary/Chest: No respiratory distress. She has no wheezes. She has rales. She exhibits no tenderness.   Abdominal: Soft. Bowel sounds are normal. She exhibits no distension, no abdominal bruit, no ascites and no mass. There is no hepatosplenomegaly. There is no tenderness. There is no rebound, no guarding and no CVA tenderness.   Musculoskeletal: She exhibits no edema or tenderness.   Left sided weakness noted   Lymphadenopathy:     She has no cervical adenopathy.   Neurological: She is alert. She exhibits normal muscle tone. Coordination normal.   Pt nodded appropriately when questioned   Skin: Skin is warm and intact. No rash noted. No erythema. No pallor.       Significant Labs:   CBC:   Recent Labs  Lab 06/13/18  0724 06/14/18  0236 06/14/18  1402 06/14/18  1422   WBC 11.39 8.86  --  6.13   HGB 10.7* 9.8*  --  9.4*   HCT 33.3* 31.5* 22* 29.5*     179 171  --  141*     CMP:   Recent Labs  Lab 06/13/18  1623 06/14/18  0236 06/14/18  1422    143 142   K 4.2 3.7 3.2*    107 107   CO2 23 20* 17*   * 103 192*   BUN 42* 43* 40*   CREATININE 4.1* 4.1* 3.7*   CALCIUM 10.4 10.1 9.3   PROT  --   --  5.2*   ALBUMIN 3.2* 3.1* 2.7*   BILITOT  --   --  0.4   ALKPHOS  --   --  48*   AST  --   --  346*   ALT  --   --  218*   ANIONGAP 12 16 18*   EGFRNONAA 10* 10* 12*       Significant Imaging:   Imaging Results    None

## 2018-06-14 NOTE — SUBJECTIVE & OBJECTIVE
Oncology Treatment Plan:   [No treatment plan]    Medications:  Continuous Infusions:   heparin (porcine) in D5W 13 Units/kg/hr (06/14/18 0722)     Scheduled Meds:   albuterol-ipratropium  3 mL Nebulization Q4H    aspirin  81 mg Oral Daily    calcium carbonate  1,000 mg Oral Daily    chlorhexidine  10 mL Mouth/Throat BID    isosorbide mononitrate  60 mg Oral Daily    metoprolol tartrate  25 mg Oral BID    NIFEdipine  90 mg Oral Daily    nitroGLYCERIN 2% TD oint  0.5 inch Topical (Top) Q6H    nozaseptin   Each Nare BID    pantoprazole  40 mg Oral Daily    piperacillin-tazobactam (ZOSYN) IVPB  4.5 g Intravenous Q12H    senna-docusate 8.6-50 mg  1 tablet Oral BID    simvastatin  20 mg Oral QHS    sodium bicarbonate  650 mg Oral BID     PRN Meds:acetaminophen, bisacodyl, cloNIDine, dextrose 50%, dextrose 50%, glucagon (human recombinant), glucose, glucose, heparin (PORCINE), heparin (PORCINE), hydrALAZINE, insulin aspart U-100, lactulose, morphine, ondansetron, sodium chloride 0.9%     Review of patient's allergies indicates:   Allergen Reactions    Lipitor [atorvastatin] Swelling     Lips          Past Medical History:   Diagnosis Date    Acid reflux 1/7/2015    Anxiety 1/7/2015    Aortic valvar stenosis     Arthritis     osteo    Callus     Chronic anemia     followed by Dr. Addison    CKD (chronic kidney disease) stage 5, GFR less than 15 ml/min 8/7/2015    Combined hyperlipidemia associated with type 2 diabetes mellitus     Coronary artery disease     Diabetes mellitus type II, controlled, with no complications     LBSL 108 today    Encounter for blood transfusion     Frail elderly with impaired mobility, wheel chair bound 8/25/2017    Gallbladder problem     gallbladder surgery    Heart murmur     Hyperparathyroidism, secondary renal 1/7/2015    Hypertension 8/7/2015    Hypertension associated with diabetes 11/12/2012    Kidney transplant candidate 1/7/2015    Overweight(278.02)      Urinary tract infection      Past Surgical History:   Procedure Laterality Date    ANKLE FRACTURE SURGERY Left 1985    AORTIC VALVE REPLACEMENT  05/2016    BONE BIOPSY      CARDIAC SURGERY      CATARACT EXTRACTION W/  INTRAOCULAR LENS IMPLANT  2009    CHOLECYSTECTOMY      CYSTOSCOPY      EYE SURGERY      FRACTURE SURGERY      HYSTERECTOMY  unknown    OOPHORECTOMY      PARATHYROIDECTOMY N/A 6/12/2018    Procedure: PARATHYROIDECTOMY;  Surgeon: Levy Samuel MD;  [facility];  Service: General;  Laterality: N/A;    removal of colon polyps      RENAL BIOPSY  10/2013    YAG cap -OD       Family History     Problem Relation (Age of Onset)    Aneurysm Father    Cancer Paternal Grandmother    Colon cancer Maternal Grandmother    Diabetes Mother, Brother, Sister    Glaucoma Mother    Heart disease Mother, Sister    Hypertension Mother, Brother, Brother    Kidney disease Sister, Brother, Sister    No Known Problems Maternal Aunt, Maternal Uncle, Paternal Aunt, Paternal Uncle, Maternal Grandfather, Paternal Grandfather    Stroke Father        Social History Main Topics    Smoking status: Never Smoker    Smokeless tobacco: Never Used    Alcohol use No    Drug use: No    Sexual activity: Not Currently     Birth control/ protection: See Surgical Hx       Review of Systems   Constitutional: Positive for activity change, appetite change and fatigue. Negative for chills, diaphoresis, fever and unexpected weight change.   HENT: Positive for sore throat and trouble swallowing. Negative for congestion, hearing loss and voice change.    Eyes: Negative for discharge and visual disturbance.   Respiratory: Negative for apnea, choking, chest tightness and shortness of breath.    Cardiovascular: Negative for chest pain and palpitations.   Gastrointestinal: Negative for blood in stool, constipation, diarrhea, nausea and vomiting.   Endocrine: Negative for cold intolerance and heat intolerance.   Genitourinary:  Negative for difficulty urinating, dyspareunia and hematuria.   Musculoskeletal: Positive for arthralgias and back pain. Negative for myalgias.   Skin: Negative.    Neurological: Negative for dizziness, weakness, light-headedness and headaches.   Hematological: Negative for adenopathy. Does not bruise/bleed easily.   Psychiatric/Behavioral: Negative for agitation, behavioral problems and confusion. The patient is nervous/anxious.      Objective:     Vital Signs (Most Recent):  Temp: 97.9 °F (36.6 °C) (06/14/18 0749)  Pulse: 101 (06/14/18 1053)  Resp: 18 (06/14/18 0827)  BP: (!) 149/67 (06/14/18 1053)  SpO2: 97 % (06/14/18 1053) Vital Signs (24h Range):  Temp:  [97.5 °F (36.4 °C)-98.4 °F (36.9 °C)] 97.9 °F (36.6 °C)  Pulse:  [] 101  Resp:  [18-20] 18  SpO2:  [97 %-100 %] 97 %  BP: (108-188)/(63-79) 149/67     Weight: 69.7 kg (153 lb 10.6 oz)  Body mass index is 28.1 kg/m².  Body surface area is 1.75 meters squared.      Intake/Output Summary (Last 24 hours) at 06/14/18 1141  Last data filed at 06/14/18 0600   Gross per 24 hour   Intake           449.12 ml   Output                0 ml   Net           449.12 ml       Physical Exam   Constitutional: She is oriented to person, place, and time. She appears well-developed and well-nourished. She appears lethargic. No distress.   HENT:   Head: Normocephalic and atraumatic.   Right Ear: Hearing and external ear normal.   Left Ear: Hearing and external ear normal.   Nose: No rhinorrhea or sinus tenderness. Right sinus exhibits no maxillary sinus tenderness and no frontal sinus tenderness. Left sinus exhibits no maxillary sinus tenderness and no frontal sinus tenderness.   Mouth/Throat: Uvula is midline, oropharynx is clear and moist and mucous membranes are normal. No oral lesions. No uvula swelling.   Eyes: Conjunctivae are normal. Pupils are equal, round, and reactive to light. Right eye exhibits no discharge. Left eye exhibits no discharge.   Neck: Normal range of  motion. Carotid bruit is not present. No tracheal deviation present. No thyromegaly present.   Cardiovascular: Normal rate, regular rhythm, S1 normal, S2 normal, normal heart sounds and intact distal pulses.    No murmur heard.  Pulses:       Dorsalis pedis pulses are 2+ on the right side, and 2+ on the left side.   Pulmonary/Chest: Effort normal and breath sounds normal. No respiratory distress.   Abdominal: Soft. Bowel sounds are normal. She exhibits no distension and no mass. There is no tenderness.   Musculoskeletal: Normal range of motion. She exhibits no edema.   Lymphadenopathy:     She has no cervical adenopathy.        Right: No supraclavicular adenopathy present.        Left: No supraclavicular adenopathy present.   Neurological: She is oriented to person, place, and time. She has normal strength. She appears lethargic. No sensory deficit. Coordination and gait normal.   Skin: Skin is warm and dry. Capillary refill takes less than 2 seconds. No rash noted. She is not diaphoretic. No pallor.   Psychiatric: Her speech is normal and behavior is normal. Judgment and thought content normal. Her mood appears anxious. Her affect is labile. Cognition and memory are normal. She exhibits a depressed mood.   Nursing note and vitals reviewed.      Significant Labs:   CBC:   Recent Labs  Lab 06/13/18  0506 06/13/18  0724 06/14/18  0236   WBC 11.22 11.39 8.86   HGB 10.9* 10.7* 9.8*   HCT 34.2* 33.3* 31.5*    179  179 171    and CMP:   Recent Labs  Lab 06/13/18  0505 06/13/18  1623 06/14/18  0236    141 143   K 4.3 4.2 3.7    106 107   CO2 24 23 20*    126* 103   BUN 37* 42* 43*   CREATININE 3.6* 4.1* 4.1*   CALCIUM 10.5 10.4 10.1   ALBUMIN  --  3.2* 3.1*   ANIONGAP 10 12 16   EGFRNONAA 12* 10* 10*       Diagnostic Results:  I have reviewed all pertinent imaging results/findings within the past 24 hours.

## 2018-06-14 NOTE — PROCEDURES
"Citlali Karimi is a 68 y.o. female patient.    Temp: 97.4 °F (36.3 °C) (06/14/18 1505)  Pulse: (!) 114 (06/14/18 1605)  Resp: 16 (06/14/18 1605)  BP: 117/60 (06/14/18 1605)  SpO2: 98 % (06/14/18 1605)  Weight: 73.9 kg (162 lb 14.7 oz) (06/14/18 1415)  Height: 5' 2" (157.5 cm) (06/13/18 1222)       Arterial Line  Date/Time: 6/14/2018 1:35 PM  Location procedure was performed: [facility] PACU  Performed by: SHADI TALAVERA  Authorized by: SHADI TALAVERA   Pre-op Diagnosis: Cardiac Arrest  Post-operative diagnosis: Cardiogenic Shock  Consent Done: Emergent Situation  Preparation: Patient was prepped and draped in the usual sterile fashion.  Indications: multiple ABGs, respiratory failure and hemodynamic monitoring  Location: right femoral  Patient sedated: no  Needle gauge: 20  Seldinger technique: Seldinger technique used  Number of attempts: 3  Complications: No  Estimated blood loss (mL): 4  Specimens: No  Implants: No  Post-procedure: line sutured and dressing applied  Post-procedure CMS: unchanged  Patient tolerance: Patient tolerated the procedure well with no immediate complications          Shadi Talavera  6/14/2018  "

## 2018-06-14 NOTE — ASSESSMENT & PLAN NOTE
-hx of hyperparathyroidism   - s/p parathyroidectomy   - Ca 11.4>>10.5>>9.3  - Nephrology consulted

## 2018-06-14 NOTE — ASSESSMENT & PLAN NOTE
-Presents with diffuse ST depression on post-procedure EKG and elevated troponin > 50, consistent with NSTEMI  -Denies chest pain or SOB  -BP elevated yesterday, HTN likely triggered clot rupture/MI  -Known CAD on previous cath not amenable to PCI at that time  -Continue ASA, Imdur, Metoprolol, statin, heparin gtt  -Angiogram recommended but patient and family had prolonged discussion with nephrology and decided against LHC given risk of contrast induced nephropathy/dialysis

## 2018-06-14 NOTE — PLAN OF CARE
Problem: Physical Therapy Goal  Goal: Physical Therapy Goal  LTG'S TO BE MET IN 7 DAYS (6-20-18)  1. PT WILL REQUIRE MAXA FOR BED MOBILITY  2. PT WILL REQUIRE MAXA FOR SIT<>STAND TF USING RW  3. PT WILL AMB 10' WITH RW AND MAXA  4. PT WILL TOLERATE BLE THEREX X 20 REPS AAROM  5. PT WILL DEMO P+ DYNAMIC BALANCE DURING TF   Outcome: Ongoing (interventions implemented as appropriate)  Patient more participatory in today's session, beginning to make progress toward therapy goals.

## 2018-06-14 NOTE — ASSESSMENT & PLAN NOTE
-Cardiology consulted   - EKG showed diffuse ST depression EKG and troponin increased from 0.113 >> 34>>50, consistent with NSTEMI  -Denies chest pain or SOB  -BP elevated yesterday, HTN likely triggered clot rupture/MI  -Known CAD on previous cath not amenable to PCI at that time  -Continue ASA, Imdur, Metoprolol, statin, heparin gtt  -Will need repeat angiogram to re-assess coronaries/underlying ischemia  -Nephrology consulted due to high risk of contrast induced nephropathy- creatinine 3.6  -Renal consult requested for recommendations/clearance for Heart Cath  -Family refused Heart Cath due to concern for worsening kidney function

## 2018-06-14 NOTE — SUBJECTIVE & OBJECTIVE
Interval History:     Review of Systems   Constitution: Negative. Negative for weight gain.   HENT: Negative.    Eyes: Negative.    Cardiovascular: Negative.  Negative for chest pain, leg swelling and palpitations.   Respiratory: Negative.  Negative for shortness of breath.    Endocrine: Negative.    Hematologic/Lymphatic: Negative.    Skin: Negative.    Musculoskeletal: Negative for muscle weakness.   Gastrointestinal: Negative.    Genitourinary: Negative.    Neurological: Negative.  Negative for dizziness.   Psychiatric/Behavioral: Negative.    Allergic/Immunologic: Negative.      Objective:     Vital Signs (Most Recent):  Temp: 97.4 °F (36.3 °C) (06/14/18 1400)  Pulse: (!) 121 (06/14/18 1505)  Resp: 16 (06/14/18 1505)  BP: 125/62 (06/14/18 1505)  SpO2: 100 % (06/14/18 1505) Vital Signs (24h Range):  Temp:  [97.1 °F (36.2 °C)-98.4 °F (36.9 °C)] 97.4 °F (36.3 °C)  Pulse:  [0-143] 121  Resp:  [11-20] 16  SpO2:  [86 %-100 %] 100 %  BP: ()/() 125/62     Weight: 73.9 kg (162 lb 14.7 oz)  Body mass index is 29.8 kg/m².     SpO2: 100 %  O2 Device (Oxygen Therapy): ventilator      Intake/Output Summary (Last 24 hours) at 06/14/18 1530  Last data filed at 06/14/18 1500   Gross per 24 hour   Intake          2037.87 ml   Output              155 ml   Net          1882.87 ml       Lines/Drains/Airways     Central Venous Catheter Line                 Percutaneous Central Line Insertion/Assessment - triple lumen  06/14/18 right femoral less than 1 day          Drain                 Drain/Device  06/14/18 1239 neck collapsible closed device less than 1 day         Urethral Catheter 06/14/18 1113 less than 1 day          Airway                 Airway - Non-Surgical 06/14/18 1150 Endotracheal Tube less than 1 day          Arterial Line                 Arterial Line 06/14/18 1427 Right Femoral less than 1 day          Peripheral Intravenous Line                 Peripheral IV - Single Lumen 06/12/18 0926 Right Forearm 2  days         Peripheral IV - Single Lumen 06/12/18 0954 Left Forearm 2 days                Physical Exam   Constitutional: She is oriented to person, place, and time. She appears well-developed and well-nourished.   HENT:   Head: Normocephalic and atraumatic.   Eyes: Conjunctivae and EOM are normal. Pupils are equal, round, and reactive to light.   Neck: Normal range of motion. Neck supple.   Cardiovascular: Normal rate, normal heart sounds and intact distal pulses.  An irregularly irregular rhythm present.   Pulmonary/Chest: Effort normal and breath sounds normal.   Abdominal: Soft. Bowel sounds are normal.   Musculoskeletal: Normal range of motion.   Neurological: She is alert and oriented to person, place, and time.   Skin: Skin is warm and dry.   Psychiatric: She has a normal mood and affect.   Nursing note and vitals reviewed.      Significant Labs: All pertinent lab results from the last 24 hours have been reviewed.    Significant Imaging: X-Ray: CXR: X-Ray Chest 1 View (CXR):   Results for orders placed or performed during the hospital encounter of 06/12/18   X-Ray Chest 1 View    Narrative    EXAMINATION:  XR CHEST 1 VIEW    CLINICAL HISTORY:  hypoxemia;    COMPARISON:  06/13/2018    FINDINGS:  The current examination is limited secondary to the patient's chin being projected over the thorax.  The size of the heart is prominent.  There has been interval development of streaky opacities in both lungs.  There is persistent mild elevation of the left hemidiaphragm.  There is no pneumothorax.  The costophrenic angles are sharp.      Impression    1. The current examination is limited secondary to the patient's chin being projected over the thorax.  2. There has been interval development of streaky opacities in both lungs.  This is characteristic of subsegmental atelectasis.  3. There is persistent mild elevation of the left hemidiaphragm.  This is characteristic of a phrenic nerve injury.  4. The size of the  heart is prominent.  This may be secondary to magnification.  .      Electronically signed by: Orion Enamorado MD  Date:    06/14/2018  Time:    11:57

## 2018-06-14 NOTE — ASSESSMENT & PLAN NOTE
Cont Levophed and replace electrolytes  ICU cardiac monitoring  Patient starting to awaken and making purposeful movements will hold on TTM

## 2018-06-14 NOTE — PROGRESS NOTES
The patient was gurgling and refusing to cough or swallow. Tried to yanker suction patient but patient was clenching teeth down and refusing oral suction. Patient was suctioned through oropharyngeal suction per order. HOB at 45 degree. Will continue to monitor.

## 2018-06-14 NOTE — PT/OT/SLP PROGRESS
Speech Language Pathology      Citlali Karimi  MRN: [id number]    Pt with a change in status from yesterday's eval.  A new eval placed this am.  Upon ST entering room pt appeared to be increasingly more swollen above her incision, wet gurgle vocal quality, unable to produce a swallow, and nursing reported coarse lungs.  At this time Pt was unable to produce a cough or a swallow at bedside and suction has been set up.  Pt will keep her mouth closed in attempts to suction.  ST recommends NPO until pt's status improves and she is able to participate in swallow eval.  Nursing was present in room along with pt's  who validates the change in pt's status.  Hospital Medicine was informed and requested that Surgery be called.  Nurse Estelle has placed a call to surgery.    Jacque Bernstein, SARAH-SLP

## 2018-06-14 NOTE — ASSESSMENT & PLAN NOTE
-Pt admitted to Extended Recovery then transferred to Inpatient due to NSTEMI  -s/p Parathyroidectomy   -managed by General Surgery -primary team  - PTH- 32  - Ca 11.4>>10.5  - analgesia prn   - antiemetics prn  - specimens sent for analysis

## 2018-06-14 NOTE — SUBJECTIVE & OBJECTIVE
Past Medical History:   Diagnosis Date    Acid reflux 1/7/2015    Anxiety 1/7/2015    Aortic valvar stenosis     Arthritis     osteo    Callus     Chronic anemia     followed by Dr. Addison    CKD (chronic kidney disease) stage 5, GFR less than 15 ml/min 8/7/2015    Combined hyperlipidemia associated with type 2 diabetes mellitus     Coronary artery disease     Diabetes mellitus type II, controlled, with no complications     LBSL 108 today    Encounter for blood transfusion     Frail elderly with impaired mobility, wheel chair bound 8/25/2017    Gallbladder problem     gallbladder surgery    Heart murmur     Hyperparathyroidism, secondary renal 1/7/2015    Hypertension 8/7/2015    Hypertension associated with diabetes 11/12/2012    Kidney transplant candidate 1/7/2015    Overweight(278.02)     Urinary tract infection        Past Surgical History:   Procedure Laterality Date    ANKLE FRACTURE SURGERY Left 1985    AORTIC VALVE REPLACEMENT  05/2016    BONE BIOPSY      CARDIAC SURGERY      CATARACT EXTRACTION W/  INTRAOCULAR LENS IMPLANT  2009    CHOLECYSTECTOMY      CYSTOSCOPY      EYE SURGERY      FRACTURE SURGERY      HYSTERECTOMY  unknown    OOPHORECTOMY      PARATHYROIDECTOMY N/A 6/12/2018    Procedure: PARATHYROIDECTOMY;  Surgeon: Levy Samuel MD;  Location: [facility];  Service: General;  Laterality: N/A;    removal of colon polyps      RENAL BIOPSY  10/2013    YAG cap -OD         Review of patient's allergies indicates:   Allergen Reactions    Lipitor [atorvastatin] Swelling     Lips       Current Facility-Administered Medications   Medication Frequency    acetaminophen tablet 650 mg Q6H PRN    acetylcysteine 200 mg/ml (20%) solution 600 mg BID    [START ON 6/14/2018] aspirin EC tablet 81 mg Daily    bisacodyl suppository 10 mg Daily PRN    calcium carbonate 200 mg calcium (500 mg) chewable tablet 1,000 mg BID    chlorhexidine 0.12 % solution 10 mL BID    cloNIDine  tablet 0.1 mg Q6H PRN    dextrose 50% injection 12.5 g PRN    dextrose 50% injection 25 g PRN    glucagon (human recombinant) injection 1 mg PRN    glucose chewable tablet 16 g PRN    glucose chewable tablet 24 g PRN    heparin 25,000 units in dextrose 5% 250 mL (100 units/mL) bolus from bag; PRN BOLUS PRN    heparin 25,000 units in dextrose 5% 250 mL (100 units/mL) bolus from bag; PRN BOLUS PRN    heparin 25,000 units in dextrose 5% 250 mL (100 units/mL) infusion; FEMALE Continuous    insulin aspart U-100 pen 0-5 Units QID (AC + HS) PRN    isosorbide mononitrate 24 hr tablet 60 mg Daily    lactulose 20 gram/30 mL solution Soln 20 g Q6H PRN    metoprolol tartrate (LOPRESSOR) tablet 25 mg BID    morphine injection 2 mg Q6H PRN    NIFEdipine 24 hr tablet 90 mg Daily    nitroGLYCERIN 2% TD oint ointment 0.5 inch Q6H    nozaseptin (NOZIN) nasal  BID    ondansetron injection 4 mg Q8H PRN    pantoprazole EC tablet 40 mg Daily    piperacillin-tazobactam 4.5 g in dextrose 5 % 100 mL IVPB (ready to mix system) Q12H    senna-docusate 8.6-50 mg per tablet 1 tablet BID    simvastatin tablet 20 mg QHS    sodium bicarbonate tablet 650 mg BID    sodium chloride 0.9% flush 3 mL PRN     Family History     Problem Relation (Age of Onset)    Aneurysm Father    Cancer Paternal Grandmother    Colon cancer Maternal Grandmother    Diabetes Mother, Brother, Sister    Glaucoma Mother    Heart disease Mother, Sister    Hypertension Mother, Brother, Brother    Kidney disease Sister, Brother, Sister    No Known Problems Maternal Aunt, Maternal Uncle, Paternal Aunt, Paternal Uncle, Maternal Grandfather, Paternal Grandfather    Stroke Father        Social History Main Topics    Smoking status: Never Smoker    Smokeless tobacco: Never Used    Alcohol use No    Drug use: No    Sexual activity: Not Currently     Birth control/ protection: See Surgical Hx     Review of Systems   Unable to perform ROS: Other        Full review of systems could not be obtained during my exam as patient was lethargic,    Objective:     Vital Signs (Most Recent):  Temp: 98 °F (36.7 °C) (06/13/18 1922)  Pulse: 73 (06/13/18 1922)  Resp: 18 (06/13/18 1922)  BP: 139/65 (06/13/18 1922)  SpO2: 98 % (06/13/18 2023)  O2 Device (Oxygen Therapy): nasal cannula (06/13/18 2023) Vital Signs (24h Range):  Temp:  [97.5 °F (36.4 °C)-98.8 °F (37.1 °C)] 98 °F (36.7 °C)  Pulse:  [65-74] 73  Resp:  [17-18] 18  SpO2:  [96 %-100 %] 98 %  BP: ()/(51-69) 139/65     Weight: 69.7 kg (153 lb 10.6 oz) (06/13/18 1222)  Body mass index is 28.1 kg/m².  Body surface area is 1.75 meters squared.    I/O last 3 completed shifts:  In: 2110 [P.O.:320; I.V.:1541.3; IV Piggyback:248.8]  Out: 10 [Blood:10]    Physical Exam   Constitutional: She appears well-developed. No distress.   HENT:   Head: Normocephalic and atraumatic.   Mouth/Throat: Oropharynx is clear and moist. No oropharyngeal exudate.   Dressings on neck    Eyes: Conjunctivae are normal. Pupils are equal, round, and reactive to light.   Neck: Normal range of motion. Neck supple. No JVD present. Carotid bruit is not present. No tracheal deviation present. No thyroid mass and no thyromegaly present.   Cardiovascular: Normal rate, regular rhythm and intact distal pulses.  Exam reveals no gallop and no friction rub.    Murmur heard.  Pulmonary/Chest: Breath sounds normal. No respiratory distress. She has no wheezes. She has no rales. She exhibits no tenderness.   Abdominal: Soft. Bowel sounds are normal. She exhibits no distension, no abdominal bruit, no ascites and no mass. There is no hepatosplenomegaly. There is no tenderness. There is no rebound, no guarding and no CVA tenderness.   Musculoskeletal: She exhibits no edema or tenderness.   Lymphadenopathy:     She has no cervical adenopathy.   Neurological: No cranial nerve deficit. She exhibits normal muscle tone. Coordination normal.   Skin: Skin is warm and  intact. No rash noted. No erythema. No pallor.       Significant Labs:  CBC:   Recent Labs  Lab 06/13/18  0724   WBC 11.39   RBC 3.67*   HGB 10.7*   HCT 33.3*     179   MCV 91   MCH 29.2   MCHC 32.1     CMP:   Recent Labs  Lab 06/13/18  1623   *   CALCIUM 10.4   ALBUMIN 3.2*      K 4.2   CO2 23      BUN 42*   CREATININE 4.1*     Coagulation:   Recent Labs  Lab 06/13/18  0724  06/13/18  1847   INR 1.0  --   --    APTT 28.5  < > 37.8*   < > = values in this interval not displayed.  LFTs:   Recent Labs  Lab 06/13/18  1623   ALBUMIN 3.2*     All labs within the past 24 hours have been reviewed.    Lab Results   Component Value Date    PTH 18.5 06/12/2018    CALCIUM 10.4 06/13/2018    PHOS 5.1 (H) 06/13/2018       Lab Results   Component Value Date    ALBUMIN 3.2 (L) 06/13/2018       Recent Labs  Lab 06/12/18  1444  06/13/18  1508   *  --   --    CPKMB 8.1*  --   --    TROPONINI 0.113*  < > 41.662*   MB 1.2  --   --    < > = values in this interval not displayed.      Significant Imaging: reviewed

## 2018-06-14 NOTE — CODE DOCUMENTATION
Pt went into VIFB-no palpable pulses, CRNA at bedside. Code blue called, CPR started per ACLS protocol. 3 epis, 1 sodium bicarb, and 2 defibrillations were delivered per ACLS protocol. ROSC achieved. Pt transferred to ICU. VSS, family notified.

## 2018-06-14 NOTE — PROGRESS NOTES
[facility] -   Nephrology  Progress Note    Patient Name: Citlali Karimi  MRN: [id number]  Admission Date: 6/12/2018  Hospital Length of Stay: 1 days  Attending Provider: Levy Samuel MD   Primary Care Physician: Evelio Schuster MD  Principal Problem:Hyperparathyroidism    Subjective:     HPI:  Citlali Karimi Is a pleasant 68 -year-old  woman with history of chronic kidney disease stage 4, patient was admitted to the hospital for an elective  partial parathyroidectomy, following her surgery during observation.  She had some chest pain, workup revealed non ST elevated MI, patient was admitted to the hospital for further management.  We were consulted due to advanced renal insufficiency, baseline serum creatinine about 3.5-4,            Important Info :        She underwent a native kidney biopsy on 10/10/13. Final report of the kidney biopsy is negative for any specific etiology for acute renal failure. 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         Interval History:  Rapid response this AM ,     Review of patient's allergies indicates:   Allergen Reactions    Lipitor [atorvastatin] Swelling     Lips       Current Facility-Administered Medications   Medication Frequency    acetaminophen tablet 650 mg Q6H PRN    albuterol-ipratropium 2.5 mg-0.5 mg/3 mL nebulizer solution 3 mL Q4H    aspirin EC tablet 81 mg Daily    bisacodyl suppository 10 mg Daily PRN    calcium carbonate 200 mg calcium (500 mg) chewable tablet 1,000 mg Daily    chlorhexidine 0.12 % solution 10 mL BID    cloNIDine tablet 0.1 mg Q6H PRN    dextrose 50% injection 12.5 g PRN    dextrose 50% injection 25 g PRN    glucagon (human recombinant) injection 1 mg PRN    glucose chewable tablet 16 g PRN    glucose chewable tablet 24 g PRN    heparin 25,000 units in dextrose 5% 250 mL (100 units/mL) bolus from bag; PRN BOLUS PRN    heparin 25,000 units in dextrose 5% 250 mL  (100 units/mL) bolus from bag; PRN BOLUS PRN    heparin 25,000 units in dextrose 5% 250 mL (100 units/mL) infusion; FEMALE Continuous    hydrALAZINE injection 10 mg Q8H PRN    insulin aspart U-100 pen 0-5 Units QID (AC + HS) PRN    isosorbide mononitrate 24 hr tablet 60 mg Daily    lactulose 20 gram/30 mL solution Soln 20 g Q6H PRN    metoprolol tartrate (LOPRESSOR) tablet 25 mg BID    morphine injection 2 mg Q6H PRN    NIFEdipine 24 hr tablet 90 mg Daily    nitroGLYCERIN 2% TD oint ointment 0.5 inch Q6H    nozaseptin (NOZIN) nasal  BID    ondansetron injection 4 mg Q8H PRN    pantoprazole EC tablet 40 mg Daily    piperacillin-tazobactam 4.5 g in dextrose 5 % 100 mL IVPB (ready to mix system) Q12H    senna-docusate 8.6-50 mg per tablet 1 tablet BID    simvastatin tablet 20 mg QHS    sodium bicarbonate tablet 650 mg BID    sodium chloride 0.9% flush 3 mL PRN       Objective:     Vital Signs (Most Recent):  Temp: 97.9 °F (36.6 °C) (06/14/18 0749)  Pulse: 101 (06/14/18 1053)  Resp: 18 (06/14/18 0827)  BP: (!) 149/67 (06/14/18 1053)  SpO2: 97 % (06/14/18 1053)  O2 Device (Oxygen Therapy): nasal cannula (06/14/18 0827) Vital Signs (24h Range):  Temp:  [97.5 °F (36.4 °C)-98.4 °F (36.9 °C)] 97.9 °F (36.6 °C)  Pulse:  [] 101  Resp:  [18-20] 18  SpO2:  [96 %-100 %] 97 %  BP: ()/(51-79) 149/67     Weight: 69.7 kg (153 lb 10.6 oz) (06/13/18 1222)  Body mass index is 28.1 kg/m².  Body surface area is 1.75 meters squared.    I/O last 3 completed shifts:  In: 817.9 [P.O.:240; I.V.:229.1; IV Piggyback:348.8]  Out: -     Physical Exam   Constitutional: She appears well-developed. No distress.   HENT:   Head: Normocephalic and atraumatic.   Mouth/Throat: Oropharynx is clear and moist. No oropharyngeal exudate.   Dressings on neck with swelling    Eyes: Conjunctivae are normal. Pupils are equal, round, and reactive to light.   Neck: Normal range of motion. Neck supple. No JVD present. Carotid  bruit is not present. No tracheal deviation present. No thyroid mass and no thyromegaly present.   Cardiovascular: Normal rate, regular rhythm and intact distal pulses.  Exam reveals no gallop and no friction rub.    Murmur heard.  Pulmonary/Chest: No respiratory distress. She has no wheezes. She has rales. She exhibits no tenderness.   Abdominal: Soft. Bowel sounds are normal. She exhibits no distension, no abdominal bruit, no ascites and no mass. There is no hepatosplenomegaly. There is no tenderness. There is no rebound, no guarding and no CVA tenderness.   Musculoskeletal: She exhibits no edema or tenderness.   Lymphadenopathy:     She has no cervical adenopathy.   Neurological: No cranial nerve deficit. She exhibits normal muscle tone. Coordination normal.   Skin: Skin is warm and intact. No rash noted. No erythema. No pallor.       Significant Labs:  CBC:   Recent Labs  Lab 06/14/18 0236   WBC 8.86   RBC 3.47*   HGB 9.8*   HCT 31.5*      MCV 91   MCH 28.2   MCHC 31.1*     CMP:   Recent Labs  Lab 06/14/18 0236      CALCIUM 10.1   ALBUMIN 3.1*      K 3.7   CO2 20*      BUN 43*   CREATININE 4.1*     Coagulation:   Recent Labs  Lab 06/13/18  0724  06/14/18  0422   INR 1.0  --   --    APTT 28.5  < > 112.3*   < > = values in this interval not displayed.  LFTs:   Recent Labs  Lab 06/14/18 0236   ALBUMIN 3.1*     All labs within the past 24 hours have been reviewed.     Lab Results   Component Value Date    PTH 18.5 06/12/2018    CALCIUM 10.1 06/14/2018    PHOS 5.2 (H) 06/14/2018         Significant Imaging: reviewed     Assessment/Plan:     CKD (chronic kidney disease) stage 5, GFR less than 15 ml/min    1. CKD stage 4/5 :  Renal function at baseline, will continue to monitor, no acute indication for renal replacement therapy,     2.  Acute MI - cardiology notes reviewed, I personally think that patient will benefit from left heart catheterization and further evaluation of her coronaries,  high risk for severe MI and death, previous LHC was in 2016 , Cards following ,     Had a lengthy family discussion and family (  and niece Dr Hua ) decided against LHC due to risk of worsening kidney function as pt does not want to go on dialysis ,     3. SHPT :  Status post partial parathyroidectomy surgery, will continue to monitor her serum calcium levels,     Neck swelling noted , surgery aware ,     4.  Anemia of chronic kidney disease - stable Hb     5. Stable lytes ,     6. Met acidosis : on bicarb supplements,              I will follow-up with patient. Please contact us if you have any additional questions.     Total Critical care time spent 40 minutes including time needed to review the records,  patient  evaluation, documentation, face-to-face discussion with the patient,  family, primary and Cardiology team, also was present during Rapid response, more than 50% of the time was spent on coordination of care and counseling.       Magnus Vazquez MD  Nephrology  [facility]

## 2018-06-14 NOTE — ASSESSMENT & PLAN NOTE
-initial 0.113>>34>>50>>41  -pt denies CP and SOB  - EKG results showed diffuse ST changes    -serial results revealed NSTEMI with Cardiology consulted

## 2018-06-14 NOTE — ASSESSMENT & PLAN NOTE
Per Cards  Resuming Heparin infusion per Cards  Cont ASA and statin  Resume NTG top, Lopressor, and Imdur once BP more stable

## 2018-06-14 NOTE — HPI
Citlali Karimi Is a pleasant 68 -year-old  woman with history of chronic kidney disease stage 4, patient was admitted to the hospital for an elective  partial parathyroidectomy, following her surgery during observation.  She had some chest pain, workup revealed non ST elevated MI, patient was admitted to the hospital for further management.  We were consulted due to advanced renal insufficiency, baseline serum creatinine about 3.5-4,            Important Info :        She underwent a native kidney biopsy on 10/10/13. Final report of the kidney biopsy is negative for any specific etiology for acute renal failure. 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

## 2018-06-14 NOTE — NURSING
Dr. Vazquez at bedside speaking with patient spouse and family member on the phone. PT hob 90 degrees, patient coughing with difficulty clearing airway, with some stridor on non rebreather. Rapid Response called. No oxygen desat, blood sugar was 159, vitals recorded WDL. Dr. Lincoln at bedside assessing patient's neck, heparin dripped stop, pandey catheter ordered and placed, IV lasix ordered and given, patient prepared for surgery (neck exploration).

## 2018-06-14 NOTE — ASSESSMENT & PLAN NOTE
1. CKD stage 4/5 :  Renal function at baseline, will continue to monitor, no acute indication for renal replacement therapy,    2.  Acute MI - cardiology notes reviewed, I personally think that patient will benefit from left heart catheterization and further evaluation of her coronaries, high risk for severe MI and death, previous [facility] was in 2016     agree with high risk for worsening renal function due to IV contrast exposure ,   will start patient on oral Mucomyst ,  will need gentle hydration 1 hr before and about 6 hr following her catheterization,     3. SHPT :  Status post partial parathyroidectomy surgery, will continue to monitor her serum calcium levels,    4.  Anemia of chronic kidney disease - stable Hb     5. Stable lytes ,

## 2018-06-14 NOTE — ASSESSMENT & PLAN NOTE
- ASA, Statin, and Lopressor continued   -Imdur dose increased   -previous Cath showed severe CAD (proximal LCX 99%, mid 50%, RCA mid 90%, distal with subtotal lesion).  - Cardiology consulted with medical management continued   - LHC proposed pending Nephrology recommendations  -family refused LHC due to concern for worsening kidney function as pt had expressed that she did not want to be on dialysis

## 2018-06-14 NOTE — PT/OT/SLP PROGRESS
Physical Therapy  Treatment    Citlali Karimi   MRN: [id number]   Admitting Diagnosis: Hyperparathyroidism    PT Received On: 06/14/18  PT Start Time: 0925     PT Stop Time: 0945    PT Total Time (min): 20 min       Billable Minutes:  Therapeutic Activity 20 mins    Treatment Type: Treatment  PT/PTA: PT             General Precautions: Standard, fall, aspiration, NPO  Orthopedic Precautions: N/A   Braces: N/A         Subjective:  Communicated with EPIC and nurse (Estelle) prior to session.  Patient agreeable to participating with therapy.    Pain/Comfort  Pain Rating 1: 0/10    Objective:   Patient found with: telemetry, peripheral IV    Functional Mobility:  Bed Mobility:    Rolling to left total assist, supine <-> sit total assist x 2, seated scooting total assist x 2.    Transfers:   Patient refused.    Gait:    Unable.    Balance:   Static Sit: POOR: Needs MODERATE assist to maintain  Dynamic Sit: POOR: N/A  Static Stand: 0: Needs MAXIMAL assist to maintain   Dynamic stand: 0: N/A     Therapeutic Activities and Exercises:  Patient participated more with functional mobility today, though still not to max assist vs total assist level (attempts to follow commands).  Sat EOB x 15 minutes with static sitting balance ranging from CGA to max assist.  Verbal/tactile cues required to correct losses of balance.  Attempted to hold head more upright on command.  Performed a few active reps of therapeutic exercises with RLE, but did not/could not with LLE.  BLE AAROM/PROM in all planes.  Passive B heel cord stretch 30 sec hold x 3.  Patient refused to actively wash face when wet washcloth was placed in her hand.      AM-PAC 6 CLICK MOBILITY  How much help from another person does this patient currently need?   1 = Unable, Total/Dependent Assistance  2 = A lot, Maximum/Moderate Assistance  3 = A little, Minimum/Contact Guard/Supervision  4 = None, Modified [place]/Independent    Turning over in bed (including adjusting  bedclothes, sheets and blankets)?: 2  Sitting down on and standing up from a chair with arms (e.g., wheelchair, bedside commode, etc.): 1  Moving from lying on back to sitting on the side of the bed?: 2  Moving to and from a bed to a chair (including a wheelchair)?: 1  Need to walk in hospital room?: 1  Climbing 3-5 steps with a railing?: 1  Total Score: 8    AM-PAC Raw Score CMS G-Code Modifier Level of Impairment Assistance   6 % Total / Unable   7 - 9 CM 80 - 100% Maximal Assist   10 - 14 CL 60 - 80% Moderate Assist   15 - 19 CK 40 - 60% Moderate Assist   20 - 22 CJ 20 - 40% Minimal Assist   23 CI 1-20% SBA / CGA   24 CH 0% Independent/ Mod I     Patient left with bed in chair position with all lines intact, call button in reach, bed alarm on and spouse and nurse (Estelle) present.  Nurse was notified that patient was making gurgling sounds and that she would not/could not produce an effective cough or swallow.     Assessment:  Patient more responsive to therapist today and made increased attempts to participate and follow commands.  Would benefit from continued, skilled PT services.    Rehab identified problem list/impairments: Rehab identified problem list/impairments: weakness, impaired endurance, impaired self care skills, impaired functional mobilty, gait instability, impaired balance, decreased coordination, decreased upper extremity function, decreased lower extremity function, decreased safety awareness    Rehab potential is fair.    Activity tolerance: Fair    Discharge recommendations: Discharge Facility/Level Of Care Needs: nursing facility, skilled     Barriers to discharge:      Equipment recommendations: Equipment Needed After Discharge: other (see comments) (to be determined)     GOALS:    Physical Therapy Goals        Problem: Physical Therapy Goal           Problem: Physical Therapy Goal    Goal Priority Disciplines Outcome Goal Variances Interventions   Physical Therapy Goal     PT/OT,  PT Ongoing (interventions implemented as appropriate)     Description:  LTG'S TO BE MET IN 7 DAYS (6-20-18)  1. PT WILL REQUIRE MAXA FOR BED MOBILITY  2. PT WILL REQUIRE MAXA FOR SIT<>STAND TF USING RW  3. PT WILL AMB 10' WITH RW AND MAXA  4. PT WILL TOLERATE BLE THEREX X 20 REPS AAROM  5. PT WILL DEMO P+ DYNAMIC BALANCE DURING TF                    PLAN:    Patient to be seen 5 x/week  to address the above listed problems via gait training, therapeutic activities, therapeutic exercises  Plan of Care expires: 06/20/18  Plan of Care reviewed with: patient, spouse         Rose Cesar, PT, DPT  06/14/2018

## 2018-06-14 NOTE — TRANSFER OF CARE
"Anesthesia Transfer of Care Note    Patient: Citlali Karimi    Procedure(s) Performed: Procedure(s) (LRB):  EVACUATION, HEMATOMA (N/A)  EXPLORATION, WOUND (N/A)    Patient location: PACU    Anesthesia Type: general    Transport from OR: Transported from OR intubated on 100% O2 by AMBU with adequate controlled ventilation    Post pain: adequate analgesia    Post assessment: no apparent anesthetic complications    Level of consciousness: sedated    Nausea/Vomiting: no nausea/vomiting    Complications: none    Transfer of care protocol was followed      Last vitals:   Visit Vitals  BP (!) 149/67   Pulse 93   Temp 36.6 °C (97.9 °F) (Oral)   Resp 18   Ht 5' 2" (1.575 m)   Wt 69.7 kg (153 lb 10.6 oz)   SpO2 97%   Breastfeeding? No   BMI 28.10 kg/m²     "

## 2018-06-14 NOTE — SUBJECTIVE & OBJECTIVE
Interval History:  Rapid response this AM ,     Review of patient's allergies indicates:   Allergen Reactions    Lipitor [atorvastatin] Swelling     Lips       Current Facility-Administered Medications   Medication Frequency    acetaminophen tablet 650 mg Q6H PRN    albuterol-ipratropium 2.5 mg-0.5 mg/3 mL nebulizer solution 3 mL Q4H    aspirin EC tablet 81 mg Daily    bisacodyl suppository 10 mg Daily PRN    calcium carbonate 200 mg calcium (500 mg) chewable tablet 1,000 mg Daily    chlorhexidine 0.12 % solution 10 mL BID    cloNIDine tablet 0.1 mg Q6H PRN    dextrose 50% injection 12.5 g PRN    dextrose 50% injection 25 g PRN    glucagon (human recombinant) injection 1 mg PRN    glucose chewable tablet 16 g PRN    glucose chewable tablet 24 g PRN    heparin 25,000 units in dextrose 5% 250 mL (100 units/mL) bolus from bag; PRN BOLUS PRN    heparin 25,000 units in dextrose 5% 250 mL (100 units/mL) bolus from bag; PRN BOLUS PRN    heparin 25,000 units in dextrose 5% 250 mL (100 units/mL) infusion; FEMALE Continuous    hydrALAZINE injection 10 mg Q8H PRN    insulin aspart U-100 pen 0-5 Units QID (AC + HS) PRN    isosorbide mononitrate 24 hr tablet 60 mg Daily    lactulose 20 gram/30 mL solution Soln 20 g Q6H PRN    metoprolol tartrate (LOPRESSOR) tablet 25 mg BID    morphine injection 2 mg Q6H PRN    NIFEdipine 24 hr tablet 90 mg Daily    nitroGLYCERIN 2% TD oint ointment 0.5 inch Q6H    nozaseptin (NOZIN) nasal  BID    ondansetron injection 4 mg Q8H PRN    pantoprazole EC tablet 40 mg Daily    piperacillin-tazobactam 4.5 g in dextrose 5 % 100 mL IVPB (ready to mix system) Q12H    senna-docusate 8.6-50 mg per tablet 1 tablet BID    simvastatin tablet 20 mg QHS    sodium bicarbonate tablet 650 mg BID    sodium chloride 0.9% flush 3 mL PRN       Objective:     Vital Signs (Most Recent):  Temp: 97.9 °F (36.6 °C) (06/14/18 0749)  Pulse: 101 (06/14/18 1053)  Resp: 18 (06/14/18  0827)  BP: (!) 149/67 (06/14/18 1053)  SpO2: 97 % (06/14/18 1053)  O2 Device (Oxygen Therapy): nasal cannula (06/14/18 0827) Vital Signs (24h Range):  Temp:  [97.5 °F (36.4 °C)-98.4 °F (36.9 °C)] 97.9 °F (36.6 °C)  Pulse:  [] 101  Resp:  [18-20] 18  SpO2:  [96 %-100 %] 97 %  BP: ()/(51-79) 149/67     Weight: 69.7 kg (153 lb 10.6 oz) (06/13/18 1222)  Body mass index is 28.1 kg/m².  Body surface area is 1.75 meters squared.    I/O last 3 completed shifts:  In: 817.9 [P.O.:240; I.V.:229.1; IV Piggyback:348.8]  Out: -     Physical Exam   Constitutional: She appears well-developed. No distress.   HENT:   Head: Normocephalic and atraumatic.   Mouth/Throat: Oropharynx is clear and moist. No oropharyngeal exudate.   Dressings on neck with swelling    Eyes: Conjunctivae are normal. Pupils are equal, round, and reactive to light.   Neck: Normal range of motion. Neck supple. No JVD present. Carotid bruit is not present. No tracheal deviation present. No thyroid mass and no thyromegaly present.   Cardiovascular: Normal rate, regular rhythm and intact distal pulses.  Exam reveals no gallop and no friction rub.    Murmur heard.  Pulmonary/Chest: No respiratory distress. She has no wheezes. She has rales. She exhibits no tenderness.   Abdominal: Soft. Bowel sounds are normal. She exhibits no distension, no abdominal bruit, no ascites and no mass. There is no hepatosplenomegaly. There is no tenderness. There is no rebound, no guarding and no CVA tenderness.   Musculoskeletal: She exhibits no edema or tenderness.   Lymphadenopathy:     She has no cervical adenopathy.   Neurological: No cranial nerve deficit. She exhibits normal muscle tone. Coordination normal.   Skin: Skin is warm and intact. No rash noted. No erythema. No pallor.       Significant Labs:  CBC:   Recent Labs  Lab 06/14/18  0236   WBC 8.86   RBC 3.47*   HGB 9.8*   HCT 31.5*      MCV 91   MCH 28.2   MCHC 31.1*     CMP:   Recent Labs  Lab  06/14/18  0236      CALCIUM 10.1   ALBUMIN 3.1*      K 3.7   CO2 20*      BUN 43*   CREATININE 4.1*     Coagulation:   Recent Labs  Lab 06/13/18  0724  06/14/18  0422   INR 1.0  --   --    APTT 28.5  < > 112.3*   < > = values in this interval not displayed.  LFTs:   Recent Labs  Lab 06/14/18 0236   ALBUMIN 3.1*     All labs within the past 24 hours have been reviewed.     Lab Results   Component Value Date    PTH 18.5 06/12/2018    CALCIUM 10.1 06/14/2018    PHOS 5.2 (H) 06/14/2018         Significant Imaging: reviewed

## 2018-06-14 NOTE — SUBJECTIVE & OBJECTIVE
Interval History:  patient with progressively worsened neck swelling, feeling short of breath and difficulty swallowing, to OR for neck exploration/washout    Medications:  Continuous Infusions:   heparin (porcine) in D5W 13 Units/kg/hr (06/14/18 0722)     Scheduled Meds:   albuterol-ipratropium  3 mL Nebulization Q4H    aspirin  81 mg Oral Daily    calcium carbonate  1,000 mg Oral Daily    chlorhexidine  10 mL Mouth/Throat BID    isosorbide mononitrate  60 mg Oral Daily    metoprolol tartrate  25 mg Oral BID    NIFEdipine  90 mg Oral Daily    nitroGLYCERIN 2% TD oint  0.5 inch Topical (Top) Q6H    nozaseptin   Each Nare BID    pantoprazole  40 mg Oral Daily    piperacillin-tazobactam (ZOSYN) IVPB  4.5 g Intravenous Q12H    senna-docusate 8.6-50 mg  1 tablet Oral BID    simvastatin  20 mg Oral QHS    sodium bicarbonate  650 mg Oral BID     PRN Meds:acetaminophen, bisacodyl, cloNIDine, dextrose 50%, dextrose 50%, glucagon (human recombinant), glucose, glucose, heparin (PORCINE), heparin (PORCINE), hydrALAZINE, insulin aspart U-100, lactulose, morphine, ondansetron, sodium chloride 0.9%     Review of patient's allergies indicates:   Allergen Reactions    Lipitor [atorvastatin] Swelling     Lips       Objective:     Vital Signs (Most Recent):  Temp: 97.9 °F (36.6 °C) (06/14/18 0749)  Pulse: 101 (06/14/18 1053)  Resp: 18 (06/14/18 0827)  BP: (!) 149/67 (06/14/18 1053)  SpO2: 97 % (06/14/18 1053) Vital Signs (24h Range):  Temp:  [97.5 °F (36.4 °C)-98.4 °F (36.9 °C)] 97.9 °F (36.6 °C)  Pulse:  [] 101  Resp:  [18-20] 18  SpO2:  [96 %-100 %] 97 %  BP: ()/(51-79) 149/67     Weight: 69.7 kg (153 lb 10.6 oz)  Body mass index is 28.1 kg/m².    Intake/Output - Last 3 Shifts       06/12 0700 - 06/13 0659 06/13 0700 - 06/14 0659 06/14 0700 - 06/15 0659    P.O. 80 240     I.V. (mL/kg) 1500 (21.5) 229.1 (3.3)     IV Piggyback 100 248.8     Total Intake(mL/kg) 1680 (24.1) 717.9 (10.3)     Blood 10       Total Output 10        Net +1670 +717.9             Urine Occurrence 5 x 3 x     Stool Occurrence 0 x 0 x           Physical Exam   Constitutional: She appears well-developed and well-nourished. No distress.   HENT:   Voice no hoarseness, at baseline per patient   Neck: Neck supple.   Incision dressing intact, increased swelling and tightness of the neck   Cardiovascular: Normal rate and regular rhythm.    Pulmonary/Chest: Effort normal and breath sounds normal.   Vitals reviewed.      Significant Labs:  CBC:     Recent Labs  Lab 06/14/18  0236   WBC 8.86   RBC 3.47*   HGB 9.8*   HCT 31.5*      MCV 91   MCH 28.2   MCHC 31.1*     BMP:     Recent Labs  Lab 06/14/18 0236         K 3.7      CO2 20*   BUN 43*   CREATININE 4.1*   CALCIUM 10.1

## 2018-06-14 NOTE — ASSESSMENT & PLAN NOTE
- chest xray showed possible mild asymmetric CHF versus right upper lobe pneumonia.  -pt given IV Lasix in PACU  - IV antibiotics for suspected aspiration continued   - pt recently seen by RAMIRO Mcnally (Otolaryngology) for Dysphagia  -   - pt afebrile, WBC normal  - repeat xray results showed improved aeration RUL with no adverse interval changes in comparison to previous study  - SLP evaluation performed and thin, pureed recommended on 6/13/18

## 2018-06-14 NOTE — CODE DOCUMENTATION
RT arrived to PACU post overhead page for CODE BLUE. Upon arrival cpr in progress with continued ventilator use during cpr. RT at [facility] to takeover ambu ventilation for the duration of code. When pt resumed pulse and placed back on ventilator. Once stable patient was transported to ICU via RN.

## 2018-06-14 NOTE — HOSPITAL COURSE
6/14/18-Patient seen and examined today, sleepy/lethargic. Dyspneic with throat swelling, surgery notified. Recommended angiogram, risks/benefits discussed.  and family had detailed discussion with nephrology and decided against LHC due to increased risk of contrast induced nephropathy/dialysis.    Patient subsequently taken back to OR this AM for re-exploration/washout given throat swelling. Coded post procedure.   6/14/18  Pt with V fib post surgery with DCCV to NSR, IV amiodarone started,vasopressors also started SBP 160s  Repeat EKG in ICU with infpost injury pattern. Discussed with  and currently does not want LHC, will need to start IV heparin and continue medical therapy     6/15/18-Patient seen and examined today s/p post-procedural  Arrest/MI yesterday. Intubated, sedated. Blood transfusion in process. LHC planned for later today. Risks/benefits discussed in detail. Family agreeable.     6/16/18- LHC- diffuse CAD not amenable to PCI, no changes from 2016 cath, turned down for CABG, continue medical tx    6/17/18 intubated, NSR, continue medical tx    6/18/18- Remains intubated. In NSR this morning.  worsening renal failure with severe CAD.  H/H has dropped to 7.7 / 23.9 this morning. ICU team had discussion with spouse regarding possible need for RRT in which patient may not tolerate. Spouse has made patient DNR code status.

## 2018-06-14 NOTE — ASSESSMENT & PLAN NOTE
-Creatinine 3.6>>4.1  -baseline 2.7-4.4  -will monitor   -sodium bicarb continued   -pt has been followed outpatient by Dr. Vazquez (Nephrology)  -Nephrology consulted - pt may benefit from repeat angio due to NSTEMI but at high risk of contrast induced nephropathy  - pt was candidate for renal transplant however work up discontinued due to progressive weakness  - [facility] recommended

## 2018-06-14 NOTE — ANESTHESIA PREPROCEDURE EVALUATION
06/14/2018  Citlali Karimi is a 68 y.o., female.    Anesthesia Evaluation    I have reviewed the Patient Summary Reports.    I have reviewed the Nursing Notes.   I have reviewed the Medications.     Review of Systems  Anesthesia Hx:  No problems with previous Anesthesia  History of prior surgery of interest to airway management or planning: Denies Family Hx of Anesthesia complications.   Denies Personal Hx of Anesthesia complications.   Hematology/Oncology:         -- Anemia:   Cardiovascular:   Hypertension Valvular problems/Murmurs Past MI CAD   hyperlipidemia ECG has been reviewed. Post AVR   Renal/:   Chronic Renal Disease, CRI    Hepatic/GI:   GERD    Musculoskeletal:   Arthritis     Neurological:   CVA Neuromuscular Disease,    Endocrine:   Diabetes    Psych:   anxiety          Physical Exam  General:  Cachexia    Airway/Jaw/Neck:  Airway Findings: Mouth Opening: Normal Tongue: Normal  General Airway Assessment: Adult  Mallampati: III  Airway emergency with compression on airway by hematoma    Dental:  DENTAL FINDINGS: Normal   Chest/Lungs:  Chest/Lungs Findings: Tachypnea     Heart/Vascular:  Heart Findings: Rate: Tachycardia             Anesthesia Plan  Type of Anesthesia, risks & benefits discussed:  Anesthesia Type:  general  Patient's Preference:   Intra-op Monitoring Plan: standard ASA monitors  Intra-op Monitoring Plan Comments:   Post Op Pain Control Plan:   Post Op Pain Control Plan Comments:   Induction:   IV  Beta Blocker:  Patient is on a Beta-Blocker and has received one dose within the past 24 hours (No further documentation required).       Informed Consent: Patient understands risks and agrees with Anesthesia plan.  Questions answered. Anesthesia consent signed with patient.  ASA Score: 4     Day of Surgery Review of History & Physical:    H&P update referred to the surgeon.      Anesthesia Plan Notes: Pt well known to me because she developed myocardial ischemia and had high troponin levels post op.  On heparin drip with airway compromise and hematoma of neck.          Ready For Surgery From Anesthesia Perspective.

## 2018-06-14 NOTE — PROGRESS NOTES
[facility]  General Surgery  Progress Note    Subjective:     History of Present Illness:  No notes on file    Post-Op Info:  Procedure(s) (LRB):  PARATHYROIDECTOMY (N/A)   2 Days Post-Op     Interval History:  patient with progressively worsened neck swelling, feeling short of breath and difficulty swallowing, to OR for neck exploration/washout    Medications:  Continuous Infusions:   heparin (porcine) in D5W 13 Units/kg/hr (06/14/18 0722)     Scheduled Meds:   albuterol-ipratropium  3 mL Nebulization Q4H    aspirin  81 mg Oral Daily    calcium carbonate  1,000 mg Oral Daily    chlorhexidine  10 mL Mouth/Throat BID    isosorbide mononitrate  60 mg Oral Daily    metoprolol tartrate  25 mg Oral BID    NIFEdipine  90 mg Oral Daily    nitroGLYCERIN 2% TD oint  0.5 inch Topical (Top) Q6H    nozaseptin   Each Nare BID    pantoprazole  40 mg Oral Daily    piperacillin-tazobactam (ZOSYN) IVPB  4.5 g Intravenous Q12H    senna-docusate 8.6-50 mg  1 tablet Oral BID    simvastatin  20 mg Oral QHS    sodium bicarbonate  650 mg Oral BID     PRN Meds:acetaminophen, bisacodyl, cloNIDine, dextrose 50%, dextrose 50%, glucagon (human recombinant), glucose, glucose, heparin (PORCINE), heparin (PORCINE), hydrALAZINE, insulin aspart U-100, lactulose, morphine, ondansetron, sodium chloride 0.9%     Review of patient's allergies indicates:   Allergen Reactions    Lipitor [atorvastatin] Swelling     Lips       Objective:     Vital Signs (Most Recent):  Temp: 97.9 °F (36.6 °C) (06/14/18 0749)  Pulse: 101 (06/14/18 1053)  Resp: 18 (06/14/18 0827)  BP: (!) 149/67 (06/14/18 1053)  SpO2: 97 % (06/14/18 1053) Vital Signs (24h Range):  Temp:  [97.5 °F (36.4 °C)-98.4 °F (36.9 °C)] 97.9 °F (36.6 °C)  Pulse:  [] 101  Resp:  [18-20] 18  SpO2:  [96 %-100 %] 97 %  BP: ()/(51-79) 149/67     Weight: 69.7 kg (153 lb 10.6 oz)  Body mass index is 28.1 kg/m².    Intake/Output - Last 3 Shifts       06/12 0700 -  06/13 0659 06/13 0700 - 06/14 0659 06/14 0700 - 06/15 0659    P.O. 80 240     I.V. (mL/kg) 1500 (21.5) 229.1 (3.3)     IV Piggyback 100 248.8     Total Intake(mL/kg) 1680 (24.1) 717.9 (10.3)     Blood 10      Total Output 10        Net +1670 +717.9             Urine Occurrence 5 x 3 x     Stool Occurrence 0 x 0 x           Physical Exam   Constitutional: She appears well-developed and well-nourished. No distress.   HENT:   Voice no hoarseness, at baseline per patient   Neck: Neck supple.   Incision dressing intact, increased swelling and tightness of the neck   Cardiovascular: Normal rate and regular rhythm.    Pulmonary/Chest: Effort normal and breath sounds normal.   Vitals reviewed.      Significant Labs:  CBC:     Recent Labs  Lab 06/14/18  0236   WBC 8.86   RBC 3.47*   HGB 9.8*   HCT 31.5*      MCV 91   MCH 28.2   MCHC 31.1*     BMP:     Recent Labs  Lab 06/14/18  0236         K 3.7      CO2 20*   BUN 43*   CREATININE 4.1*   CALCIUM 10.1         Assessment/Plan:     * Hyperparathyroidism    Monitor calcium, PTH levels normal  Tums taper  Increased with neck swelling to OR for neck exploration/washout        CVA (cerebral vascular accident)    Prior CVA, patient with increased difficulty swelling medications at home prior to hospitalization, underwent evaluation with ENT prior to surgery  Speech therapy consult for swallowing        Coronary artery disease involving native coronary artery    Cardiology consult elevated troponin   Heparin GTT started        CKD (chronic kidney disease) stage 5, GFR less than 15 ml/min    Monitor urine output creatinine and electrolytes        Hypertension associated with diabetes    Antihypertensive medications        Diabetic nephropathy associated with type 2 diabetes mellitus    Insulin sliding scale            Levy Samuel MD  General Surgery  [facility]

## 2018-06-14 NOTE — HPI
Ms Karimi is a 69 yo BF with a PMH of HTN, Hyperparathyroidism, DM2, CKD5, CVA, CAD and HLD.  She was seen in clinic by surgery for hyperparathyroidism and hypercalcemia and was electively admitted 6/12 taken to OR undergoing parathyroidectomy finding 2 right sided enlarged parathyroid glands.  Intra and post op patient developed EKG changes and had elevated troponin.  Cards consulted.  Patient admitted due to swallowing issues she was not taking all her meds as prescribed and had BP as high as 225/103 during hospitalization.  Troponin increased to > 50.  Cards diagnosed with NSTEMI and she was Rx with ASA, Imdur, Lopressor, statin and Heparin infusion with plans for repeat LHC if cleared by Renal given CKD5.  Today, she had progressively worsening neck swelling and SOB with worsening dysphagia.  She was taken back to OR and had post op neck hematoma evacuation.  In PACU she had witnessed V-Fib cardiac arrest and CODE BLUE called.  After 3 rounds of CPR, Epi X 3 and Defib X 3 ROSC was attained.  She was still intubated and on vent post op.  CL and arterial line placed in right groin during code per myself.  Cards and Surgery at bedside.

## 2018-06-14 NOTE — PLAN OF CARE
Problem: Patient Care Overview  Goal: Plan of Care Review  Outcome: Ongoing (interventions implemented as appropriate)  Received patient from PACU.  Connected to ventilator and ICU monitoring.  Levophed infusing to R groin TLC.  Arterial line transduced.  Remains in Afib with EKG changes on bedside monitoring different from previous EKG in chart.  New EKG obtained, reading STEMI.  Dr. Louis notified, who spoke with , but wants to hold off on [facility] at this time until he discusses with other family members.  Heparin gtt started, Dr. Samuel notified d/t procedure for Hematoma evacuation this AM.  Amiodarone bolus and infusion started.  Potassium and Mag replaced.  Turn q2h.  Family updated on POC.  All questions answered.

## 2018-06-14 NOTE — ASSESSMENT & PLAN NOTE
-Presents with diffuse ST depression on post-procedure EKG and elevated troponin > 50, consistent with NSTEMI  -Denies chest pain or SOB  -BP elevated yesterday, HTN likely triggered clot rupture/MI  -Known CAD on previous cath not amenable to PCI at that time  -Continue ASA, Imdur, Metoprolol, statin, heparin gtt  -Angiogram recommended but patient and family had prolonged discussion with nephrology and decided against LHC given risk of contrast induced nephropathy/dialysis    Restart IV heparin

## 2018-06-14 NOTE — PROGRESS NOTES
The patient pocketed pills in mouth and would not swallow or spit out for about 10 minutes. Pt finally spit pills out.

## 2018-06-14 NOTE — SUBJECTIVE & OBJECTIVE
Past Medical History:   Diagnosis Date    Acid reflux 1/7/2015    Anxiety 1/7/2015    Aortic valvar stenosis     Arthritis     osteo    Callus     Chronic anemia     followed by Dr. Addison    CKD (chronic kidney disease) stage 5, GFR less than 15 ml/min 8/7/2015    Combined hyperlipidemia associated with type 2 diabetes mellitus     Coronary artery disease     Diabetes mellitus type II, controlled, with no complications     LBSL 108 today    Encounter for blood transfusion     Frail elderly with impaired mobility, wheel chair bound 8/25/2017    Gallbladder problem     gallbladder surgery    Heart murmur     Hyperparathyroidism, secondary renal 1/7/2015    Hypertension 8/7/2015    Hypertension associated with diabetes 11/12/2012    Kidney transplant candidate 1/7/2015    Overweight(278.02)     Urinary tract infection        Past Surgical History:   Procedure Laterality Date    ANKLE FRACTURE SURGERY Left 1985    AORTIC VALVE REPLACEMENT  05/2016    BONE BIOPSY      CARDIAC SURGERY      CATARACT EXTRACTION W/  INTRAOCULAR LENS IMPLANT  2009    CHOLECYSTECTOMY      CYSTOSCOPY      EYE SURGERY      FRACTURE SURGERY      HYSTERECTOMY  unknown    OOPHORECTOMY      PARATHYROIDECTOMY N/A 6/12/2018    Procedure: PARATHYROIDECTOMY;  Surgeon: Levy Samuel MD;  Location: [facility];  Service: General;  Laterality: N/A;    removal of colon polyps      RENAL BIOPSY  10/2013    YAG cap -OD         Review of patient's allergies indicates:   Allergen Reactions    Lipitor [atorvastatin] Swelling     Lips         Family History     Problem Relation (Age of Onset)    Aneurysm Father    Cancer Paternal Grandmother    Colon cancer Maternal Grandmother    Diabetes Mother, Brother, Sister    Glaucoma Mother    Heart disease Mother, Sister    Hypertension Mother, Brother, Brother    Kidney disease Sister, Brother, Sister    No Known Problems Maternal Aunt, Maternal Uncle, Paternal Aunt, Paternal Uncle,  Maternal Grandfather, Paternal Grandfather    Stroke Father        Social History Main Topics    Smoking status: Never Smoker    Smokeless tobacco: Never Used    Alcohol use No    Drug use: No    Sexual activity: Not Currently     Birth control/ protection: See Surgical Hx         Review of Systems   Unable to perform ROS: Intubated     Objective:     Vital Signs (Most Recent):  Temp: 97.4 °F (36.3 °C) (06/14/18 1400)  Pulse: (!) 111 (06/14/18 1545)  Resp: 16 (06/14/18 1545)  BP: 112/67 (06/14/18 1545)  SpO2: 99 % (06/14/18 1545) Vital Signs (24h Range):  Temp:  [97.1 °F (36.2 °C)-98.4 °F (36.9 °C)] 97.4 °F (36.3 °C)  Pulse:  [0-143] 111  Resp:  [11-20] 16  SpO2:  [86 %-100 %] 99 %  BP: ()/() 112/67     Weight: 73.9 kg (162 lb 14.7 oz)  Body mass index is 29.8 kg/m².      Intake/Output Summary (Last 24 hours) at 06/14/18 1552  Last data filed at 06/14/18 1500   Gross per 24 hour   Intake          2037.87 ml   Output              155 ml   Net          1882.87 ml       Physical Exam   Constitutional: She appears well-developed and well-nourished. She appears lethargic.  Non-toxic appearance. She has a sickly appearance. She appears ill. No distress. She is intubated and restrained.   HENT:   Head: Normocephalic and atraumatic.   Mouth/Throat: Oropharynx is clear and moist and mucous membranes are normal.   Eyes: EOM and lids are normal. Pupils are equal, round, and reactive to light.   Neck: Trachea normal. Neck supple. Carotid bruit is not present.       Cardiovascular: Normal heart sounds.  An irregular rhythm present. Tachycardia present.    Pulses:       Radial pulses are 2+ on the right side, and 2+ on the left side.        Dorsalis pedis pulses are 1+ on the right side, and 1+ on the left side.   Pulmonary/Chest: Effort normal. No accessory muscle usage. She is intubated. No respiratory distress. She has decreased breath sounds.   Abdominal: Soft. Normal appearance. She exhibits no distension.  Bowel sounds are absent. There is no tenderness.   Genitourinary:   Genitourinary Comments: Fair in place   Musculoskeletal:        Right foot: There is no deformity.        Left foot: There is no deformity.   No edema   Lymphadenopathy:     She has no cervical adenopathy.   Neurological: She appears lethargic.   Opens eyes and tracking to stimuli but does not follow commands   Skin: Skin is warm, dry and intact. Capillary refill takes less than 2 seconds. No rash noted. No cyanosis.            Vents:  Vent Mode: A/C (06/14/18 1533)  Set Rate: 16 bmp (06/14/18 1533)  Vt Set: 400 mL (06/14/18 1533)  Pressure Support: 0 cmH20 (06/14/18 1533)  PEEP/CPAP: 5 cmH20 (06/14/18 1533)  Oxygen Concentration (%): 45 (06/14/18 1545)  Peak Airway Pressure: 19 cmH2O (06/14/18 1533)  Plateau Pressure: 0 cmH20 (06/14/18 1533)  Total Ve: 6.74 mL (06/14/18 1533)  F/VT Ratio<105 (RSBI): (!) 44.2 (06/14/18 1533)    Lines/Drains/Airways     Central Venous Catheter Line                 Percutaneous Central Line Insertion/Assessment - triple lumen  06/14/18 right femoral less than 1 day          Drain                 Drain/Device  06/14/18 1239 neck collapsible closed device less than 1 day         Urethral Catheter 06/14/18 1113 less than 1 day          Airway                 Airway - Non-Surgical 06/14/18 1150 Endotracheal Tube less than 1 day          Arterial Line                 Arterial Line 06/14/18 1427 Right Femoral less than 1 day          Peripheral Intravenous Line                 Peripheral IV - Single Lumen 06/12/18 0926 Right Forearm 2 days         Peripheral IV - Single Lumen 06/12/18 0954 Left Forearm 2 days                Significant Labs:    CBC/Anemia Profile:    Recent Labs  Lab 06/13/18  0724 06/14/18  0236 06/14/18  1402 06/14/18  1422   WBC 11.39 8.86  --  6.13   HGB 10.7* 9.8*  --  9.4*   HCT 33.3* 31.5* 22* 29.5*     179 171  --  141*   MCV 91 91  --  90   RDW 16.6* 16.6*  --  16.7*         Chemistries:    Recent Labs  Lab 06/13/18  1623 06/14/18  0236 06/14/18  1422    143 142   K 4.2 3.7 3.2*    107 107   CO2 23 20* 17*   BUN 42* 43* 40*   CREATININE 4.1* 4.1* 3.7*   CALCIUM 10.4 10.1 9.3   ALBUMIN 3.2* 3.1* 2.7*   PROT  --   --  5.2*   BILITOT  --   --  0.4   ALKPHOS  --   --  48*   ALT  --   --  218*   AST  --   --  346*   MG  --   --  1.3*   PHOS 5.1* 5.2*  --        ABGs:   Recent Labs  Lab 06/14/18  1402   PH 7.347*   PCO2 37.3   HCO3 20.5*   POCSATURATED 96   BE -5     Coagulation:   Recent Labs  Lab 06/13/18  0724  06/14/18  1105   INR 1.0  --   --    APTT 28.5  < > 86.5*   < > = values in this interval not displayed.  Troponin:   Recent Labs  Lab 06/12/18  2053 06/13/18  0220 06/13/18  1508   TROPONINI 34.681* >50.000* 41.662*     All pertinent labs within the past 24 hours have been reviewed.    Significant Imaging:   CXR: I have reviewed all pertinent results/findings within the past 24 hours and my personal findings are:  pending

## 2018-06-14 NOTE — ASSESSMENT & PLAN NOTE
1. CKD stage 4/5 :  Renal function at baseline, will continue to monitor, no acute indication for renal replacement therapy,     2.  Acute MI - cardiology notes reviewed, I personally think that patient will benefit from left heart catheterization and further evaluation of her coronaries, high risk for severe MI and death, previous LHC was in 2016 , Cards following ,     Had a lengthy family discussion and family (  and niece Dr Hua ) decided against LHC due to risk of worsening kidney function as pt does not want to go on dialysis ,     3. SHPT :  Status post partial parathyroidectomy surgery, will continue to monitor her serum calcium levels,     Neck swelling noted , surgery aware ,     4.  Anemia of chronic kidney disease - stable Hb     5. Stable lytes ,     6. Met acidosis : on bicarb supplements,

## 2018-06-14 NOTE — ANESTHESIA POSTPROCEDURE EVALUATION
"Anesthesia Post Evaluation    Patient: Citlali Karimi    Procedure(s) Performed: Procedure(s) (LRB):  EVACUATION, HEMATOMA (N/A)  EXPLORATION, WOUND (N/A)    Final Anesthesia Type: general  Patient location during evaluation: PACU  Patient participation: No - Unable to Participate, Intubation  Level of consciousness: sedated  Post-procedure vital signs: reviewed and not stable  Pain management: adequate  Airway patency: patent  PONV status at discharge: No PONV  Anesthetic complications: yes  Perioperative Events: hypotension requiring unaticipated pressor infusion, arrhythmias requiring treatment, unplanned ICU admission and cardiac arrest    Cardiovascular status: hemodynamically unstable  Respiratory status: ETT and ventilator  Hydration status: euvolemic  Follow-up needed         Visit Vitals  BP (!) 96/48   Pulse (!) 115   Temp 36.6 °C (97.9 °F) (Oral)   Resp 18   Ht 5' 2" (1.575 m)   Wt 69.7 kg (153 lb 10.6 oz)   SpO2 100%   Breastfeeding? No   BMI 28.10 kg/m²       Pain/Suri Score: Pain Assessment Performed: Yes (6/14/2018  5:04 AM)  Presence of Pain: denies (6/14/2018  5:04 AM)  Pain Rating Prior to Med Admin: 7 (6/13/2018  1:10 PM)  Pain Rating Post Med Admin: 2 (6/13/2018  1:40 PM)      "

## 2018-06-15 ENCOUNTER — SURGERY (OUTPATIENT)
Age: 68
End: 2018-06-15

## 2018-06-15 PROBLEM — J98.8 ACUTE AIRWAY OBSTRUCTION: Status: ACTIVE | Noted: 2018-06-15

## 2018-06-15 PROBLEM — R57.0 CARDIOGENIC SHOCK: Status: RESOLVED | Noted: 2018-06-14 | Resolved: 2018-06-15

## 2018-06-15 PROBLEM — Z99.11 ON MECHANICALLY ASSISTED VENTILATION: Status: ACTIVE | Noted: 2018-06-14

## 2018-06-15 LAB
ABO + RH BLD: NORMAL
ALBUMIN SERPL BCP-MCNC: 2.8 G/DL
ALBUMIN SERPL BCP-MCNC: 2.8 G/DL
ALLENS TEST: ABNORMAL
ALP SERPL-CCNC: 66 U/L
ALT SERPL W/O P-5'-P-CCNC: 90 U/L
ANION GAP SERPL CALC-SCNC: 16 MMOL/L
ANION GAP SERPL CALC-SCNC: 16 MMOL/L
APTT BLDCRRT: 38.5 SEC
APTT BLDCRRT: 79 SEC
APTT BLDCRRT: 97.9 SEC
AST SERPL-CCNC: 304 U/L
BASOPHILS # BLD AUTO: 0 K/UL
BASOPHILS NFR BLD: 0 %
BILIRUB SERPL-MCNC: 0.4 MG/DL
BLD GP AB SCN CELLS X3 SERPL QL: NORMAL
BLD PROD TYP BPU: NORMAL
BLD PROD TYP BPU: NORMAL
BLOOD UNIT EXPIRATION DATE: NORMAL
BLOOD UNIT EXPIRATION DATE: NORMAL
BLOOD UNIT TYPE CODE: 5100
BLOOD UNIT TYPE CODE: 5100
BLOOD UNIT TYPE: NORMAL
BLOOD UNIT TYPE: NORMAL
BUN SERPL-MCNC: 45 MG/DL
BUN SERPL-MCNC: 45 MG/DL
CALCIUM SERPL-MCNC: 9.5 MG/DL
CALCIUM SERPL-MCNC: 9.5 MG/DL
CHLORIDE SERPL-SCNC: 106 MMOL/L
CHLORIDE SERPL-SCNC: 106 MMOL/L
CO2 SERPL-SCNC: 20 MMOL/L
CO2 SERPL-SCNC: 20 MMOL/L
CODING SYSTEM: NORMAL
CODING SYSTEM: NORMAL
CREAT SERPL-MCNC: 4.2 MG/DL
CREAT SERPL-MCNC: 4.2 MG/DL
DELSYS: ABNORMAL
DIFFERENTIAL METHOD: ABNORMAL
DISPENSE STATUS: NORMAL
DISPENSE STATUS: NORMAL
EOSINOPHIL # BLD AUTO: 0 K/UL
EOSINOPHIL NFR BLD: 0 %
ERYTHROCYTE [DISTWIDTH] IN BLOOD BY AUTOMATED COUNT: 16.4 %
ERYTHROCYTE [SEDIMENTATION RATE] IN BLOOD BY WESTERGREN METHOD: 16 MM/H
EST. GFR  (AFRICAN AMERICAN): 12 ML/MIN/1.73 M^2
EST. GFR  (AFRICAN AMERICAN): 12 ML/MIN/1.73 M^2
EST. GFR  (NON AFRICAN AMERICAN): 10 ML/MIN/1.73 M^2
EST. GFR  (NON AFRICAN AMERICAN): 10 ML/MIN/1.73 M^2
FACT X PPP CHRO-ACNC: 0.39 IU/ML
FIO2: 35
GLUCOSE SERPL-MCNC: 136 MG/DL
GLUCOSE SERPL-MCNC: 136 MG/DL
HCO3 UR-SCNC: 23.8 MMOL/L (ref 24–28)
HCT VFR BLD AUTO: 23.7 %
HGB BLD-MCNC: 7.9 G/DL
INR PPP: 1.1
IT: 1.2
LYMPHOCYTES # BLD AUTO: 0.7 K/UL
LYMPHOCYTES NFR BLD: 8.1 %
MAGNESIUM SERPL-MCNC: 2 MG/DL
MCH RBC QN AUTO: 29 PG
MCHC RBC AUTO-ENTMCNC: 33.3 G/DL
MCV RBC AUTO: 87 FL
MODE: ABNORMAL
MONOCYTES # BLD AUTO: 0.7 K/UL
MONOCYTES NFR BLD: 7.3 %
NEUTROPHILS # BLD AUTO: 7.7 K/UL
NEUTROPHILS NFR BLD: 84.6 %
NUM UNITS TRANS PACKED RBC: NORMAL
NUM UNITS TRANS PACKED RBC: NORMAL
PCO2 BLDA: 28.2 MMHG (ref 35–45)
PEEP: 5
PH SMN: 7.54 [PH] (ref 7.35–7.45)
PHOSPHATE SERPL-MCNC: 3.5 MG/DL
PLATELET # BLD AUTO: 142 K/UL
PMV BLD AUTO: 9.5 FL
PO2 BLDA: 144 MMHG (ref 80–100)
POC BE: 1 MMOL/L
POC SATURATED O2: 100 % (ref 95–100)
POCT GLUCOSE: 122 MG/DL (ref 70–110)
POCT GLUCOSE: 144 MG/DL (ref 70–110)
POCT GLUCOSE: 98 MG/DL (ref 70–110)
POTASSIUM SERPL-SCNC: 3.7 MMOL/L
POTASSIUM SERPL-SCNC: 3.7 MMOL/L
PROT SERPL-MCNC: 5.8 G/DL
PROTHROMBIN TIME: 12 SEC
RBC # BLD AUTO: 2.72 M/UL
SAMPLE: ABNORMAL
SITE: ABNORMAL
SODIUM SERPL-SCNC: 142 MMOL/L
SODIUM SERPL-SCNC: 142 MMOL/L
VT: 400
WBC # BLD AUTO: 9.15 K/UL

## 2018-06-15 PROCEDURE — 93454 CORONARY ARTERY ANGIO S&I: CPT | Mod: 26,,, | Performed by: INTERNAL MEDICINE

## 2018-06-15 PROCEDURE — 93005 ELECTROCARDIOGRAM TRACING: CPT

## 2018-06-15 PROCEDURE — B2111ZZ FLUOROSCOPY OF MULTIPLE CORONARY ARTERIES USING LOW OSMOLAR CONTRAST: ICD-10-PCS | Performed by: INTERNAL MEDICINE

## 2018-06-15 PROCEDURE — 25000003 PHARM REV CODE 250: Performed by: INTERNAL MEDICINE

## 2018-06-15 PROCEDURE — 93010 ELECTROCARDIOGRAM REPORT: CPT | Mod: 59,,, | Performed by: INTERNAL MEDICINE

## 2018-06-15 PROCEDURE — 80069 RENAL FUNCTION PANEL: CPT

## 2018-06-15 PROCEDURE — 99291 CRITICAL CARE FIRST HOUR: CPT | Mod: 25,,, | Performed by: NURSE PRACTITIONER

## 2018-06-15 PROCEDURE — B3101ZZ FLUOROSCOPY OF THORACIC AORTA USING LOW OSMOLAR CONTRAST: ICD-10-PCS | Performed by: INTERNAL MEDICINE

## 2018-06-15 PROCEDURE — 85025 COMPLETE CBC W/AUTO DIFF WBC: CPT

## 2018-06-15 PROCEDURE — 99233 SBSQ HOSP IP/OBS HIGH 50: CPT | Mod: ,,, | Performed by: INTERNAL MEDICINE

## 2018-06-15 PROCEDURE — 99900035 HC TECH TIME PER 15 MIN (STAT)

## 2018-06-15 PROCEDURE — 25000003 PHARM REV CODE 250: Performed by: NURSE PRACTITIONER

## 2018-06-15 PROCEDURE — 63600175 PHARM REV CODE 636 W HCPCS: Performed by: NURSE PRACTITIONER

## 2018-06-15 PROCEDURE — 36430 TRANSFUSION BLD/BLD COMPNT: CPT

## 2018-06-15 PROCEDURE — C9113 INJ PANTOPRAZOLE SODIUM, VIA: HCPCS | Performed by: SURGERY

## 2018-06-15 PROCEDURE — 37799 UNLISTED PX VASCULAR SURGERY: CPT

## 2018-06-15 PROCEDURE — 99291 CRITICAL CARE FIRST HOUR: CPT | Mod: ,,, | Performed by: INTERNAL MEDICINE

## 2018-06-15 PROCEDURE — 63600175 PHARM REV CODE 636 W HCPCS: Performed by: SURGERY

## 2018-06-15 PROCEDURE — 63600175 PHARM REV CODE 636 W HCPCS: Performed by: INTERNAL MEDICINE

## 2018-06-15 PROCEDURE — 20000000 HC ICU ROOM

## 2018-06-15 PROCEDURE — P9016 RBC LEUKOCYTES REDUCED: HCPCS

## 2018-06-15 PROCEDURE — 99900026 HC AIRWAY MAINTENANCE (STAT)

## 2018-06-15 PROCEDURE — 85610 PROTHROMBIN TIME: CPT

## 2018-06-15 PROCEDURE — 85520 HEPARIN ASSAY: CPT

## 2018-06-15 PROCEDURE — 25000003 PHARM REV CODE 250

## 2018-06-15 PROCEDURE — 94003 VENT MGMT INPAT SUBQ DAY: CPT

## 2018-06-15 PROCEDURE — 31502 CHANGE OF WINDPIPE AIRWAY: CPT | Mod: ,,, | Performed by: NURSE PRACTITIONER

## 2018-06-15 PROCEDURE — 83735 ASSAY OF MAGNESIUM: CPT

## 2018-06-15 PROCEDURE — 85730 THROMBOPLASTIN TIME PARTIAL: CPT

## 2018-06-15 PROCEDURE — 25000003 PHARM REV CODE 250: Performed by: SURGERY

## 2018-06-15 PROCEDURE — 86920 COMPATIBILITY TEST SPIN: CPT

## 2018-06-15 PROCEDURE — 80053 COMPREHEN METABOLIC PANEL: CPT

## 2018-06-15 PROCEDURE — 99152 MOD SED SAME PHYS/QHP 5/>YRS: CPT | Mod: ,,, | Performed by: INTERNAL MEDICINE

## 2018-06-15 PROCEDURE — 86850 RBC ANTIBODY SCREEN: CPT

## 2018-06-15 PROCEDURE — 4A023N7 MEASUREMENT OF CARDIAC SAMPLING AND PRESSURE, LEFT HEART, PERCUTANEOUS APPROACH: ICD-10-PCS | Performed by: INTERNAL MEDICINE

## 2018-06-15 PROCEDURE — 99232 SBSQ HOSP IP/OBS MODERATE 35: CPT | Mod: ,,, | Performed by: INTERNAL MEDICINE

## 2018-06-15 PROCEDURE — 25500020 PHARM REV CODE 255

## 2018-06-15 PROCEDURE — 97803 MED NUTRITION INDIV SUBSEQ: CPT

## 2018-06-15 PROCEDURE — 63600175 PHARM REV CODE 636 W HCPCS

## 2018-06-15 PROCEDURE — 99152 MOD SED SAME PHYS/QHP 5/>YRS: CPT

## 2018-06-15 PROCEDURE — 85730 THROMBOPLASTIN TIME PARTIAL: CPT | Mod: 91

## 2018-06-15 PROCEDURE — 82803 BLOOD GASES ANY COMBINATION: CPT

## 2018-06-15 RX ORDER — OXYCODONE HYDROCHLORIDE 5 MG/1
10 TABLET ORAL EVERY 4 HOURS PRN
Status: DISCONTINUED | OUTPATIENT
Start: 2018-06-15 | End: 2018-06-16

## 2018-06-15 RX ORDER — SODIUM CHLORIDE 9 MG/ML
INJECTION, SOLUTION INTRAVENOUS CONTINUOUS
Status: DISCONTINUED | OUTPATIENT
Start: 2018-06-15 | End: 2018-06-16

## 2018-06-15 RX ORDER — ACETYLCYSTEINE 200 MG/ML
600 SOLUTION ORAL; RESPIRATORY (INHALATION) 2 TIMES DAILY
Status: DISCONTINUED | OUTPATIENT
Start: 2018-06-15 | End: 2018-06-15

## 2018-06-15 RX ORDER — METOPROLOL TARTRATE 1 MG/ML
2.5 INJECTION, SOLUTION INTRAVENOUS ONCE
Status: COMPLETED | OUTPATIENT
Start: 2018-06-15 | End: 2018-06-15

## 2018-06-15 RX ORDER — MIDAZOLAM HYDROCHLORIDE 5 MG/ML
4 INJECTION INTRAMUSCULAR; INTRAVENOUS ONCE
Status: DISCONTINUED | OUTPATIENT
Start: 2018-06-15 | End: 2018-06-15 | Stop reason: SDUPTHER

## 2018-06-15 RX ORDER — NITROGLYCERIN 0.4 MG/1
0.4 TABLET SUBLINGUAL EVERY 5 MIN PRN
Status: DISCONTINUED | OUTPATIENT
Start: 2018-06-15 | End: 2018-06-23

## 2018-06-15 RX ORDER — AMIODARONE HYDROCHLORIDE 200 MG/1
200 TABLET ORAL 2 TIMES DAILY
Status: DISCONTINUED | OUTPATIENT
Start: 2018-06-15 | End: 2018-06-30 | Stop reason: HOSPADM

## 2018-06-15 RX ORDER — PANTOPRAZOLE SODIUM 40 MG/10ML
40 INJECTION, POWDER, LYOPHILIZED, FOR SOLUTION INTRAVENOUS DAILY
Status: DISCONTINUED | OUTPATIENT
Start: 2018-06-15 | End: 2018-06-15

## 2018-06-15 RX ORDER — CLOPIDOGREL BISULFATE 75 MG/1
75 TABLET ORAL DAILY
Status: DISCONTINUED | OUTPATIENT
Start: 2018-06-15 | End: 2018-06-18

## 2018-06-15 RX ORDER — ACETAMINOPHEN 325 MG/1
650 TABLET ORAL EVERY 4 HOURS PRN
Status: DISCONTINUED | OUTPATIENT
Start: 2018-06-15 | End: 2018-06-23

## 2018-06-15 RX ORDER — HYDROCODONE BITARTRATE AND ACETAMINOPHEN 500; 5 MG/1; MG/1
TABLET ORAL
Status: DISCONTINUED | OUTPATIENT
Start: 2018-06-15 | End: 2018-06-15

## 2018-06-15 RX ORDER — MORPHINE SULFATE 4 MG/ML
2 INJECTION, SOLUTION INTRAMUSCULAR; INTRAVENOUS EVERY 6 HOURS PRN
Status: DISCONTINUED | OUTPATIENT
Start: 2018-06-15 | End: 2018-06-15

## 2018-06-15 RX ORDER — METOPROLOL TARTRATE 1 MG/ML
2.5 INJECTION, SOLUTION INTRAVENOUS ONCE
Status: DISCONTINUED | OUTPATIENT
Start: 2018-06-15 | End: 2018-06-15

## 2018-06-15 RX ORDER — ATROPINE SULFATE 0.1 MG/ML
0.5 INJECTION INTRAVENOUS
Status: DISCONTINUED | OUTPATIENT
Start: 2018-06-15 | End: 2018-06-23

## 2018-06-15 RX ORDER — MORPHINE SULFATE 4 MG/ML
4 INJECTION, SOLUTION INTRAMUSCULAR; INTRAVENOUS EVERY 4 HOURS PRN
Status: DISCONTINUED | OUTPATIENT
Start: 2018-06-15 | End: 2018-06-16

## 2018-06-15 RX ORDER — MORPHINE SULFATE 4 MG/ML
2 INJECTION, SOLUTION INTRAMUSCULAR; INTRAVENOUS
Status: DISCONTINUED | OUTPATIENT
Start: 2018-06-15 | End: 2018-06-15

## 2018-06-15 RX ORDER — HYDROCODONE BITARTRATE AND ACETAMINOPHEN 5; 325 MG/1; MG/1
1 TABLET ORAL EVERY 4 HOURS PRN
Status: DISCONTINUED | OUTPATIENT
Start: 2018-06-15 | End: 2018-06-16

## 2018-06-15 RX ORDER — ISOSORBIDE MONONITRATE 30 MG/1
30 TABLET, EXTENDED RELEASE ORAL DAILY
Status: DISCONTINUED | OUTPATIENT
Start: 2018-06-15 | End: 2018-06-15

## 2018-06-15 RX ORDER — FUROSEMIDE 10 MG/ML
40 INJECTION INTRAMUSCULAR; INTRAVENOUS ONCE
Status: COMPLETED | OUTPATIENT
Start: 2018-06-15 | End: 2018-06-15

## 2018-06-15 RX ORDER — MIDAZOLAM HYDROCHLORIDE 1 MG/ML
4 INJECTION INTRAMUSCULAR; INTRAVENOUS ONCE
Status: COMPLETED | OUTPATIENT
Start: 2018-06-15 | End: 2018-06-15

## 2018-06-15 RX ADMIN — SODIUM BICARBONATE 650 MG TABLET 650 MG: at 08:06

## 2018-06-15 RX ADMIN — FUROSEMIDE 40 MG: 10 INJECTION, SOLUTION INTRAMUSCULAR; INTRAVENOUS at 11:06

## 2018-06-15 RX ADMIN — MORPHINE SULFATE 2 MG: 4 INJECTION INTRAVENOUS at 07:06

## 2018-06-15 RX ADMIN — PIPERACILLIN AND TAZOBACTAM 4.5 G: 4; .5 INJECTION, POWDER, LYOPHILIZED, FOR SOLUTION INTRAVENOUS; PARENTERAL at 06:06

## 2018-06-15 RX ADMIN — CALCIUM CARBONATE (ANTACID) CHEW TAB 500 MG 1000 MG: 500 CHEW TAB at 08:06

## 2018-06-15 RX ADMIN — METOPROLOL TARTRATE 25 MG: 25 TABLET, FILM COATED ORAL at 08:06

## 2018-06-15 RX ADMIN — AMIODARONE HYDROCHLORIDE 0.5 MG/MIN: 1.8 INJECTION, SOLUTION INTRAVENOUS at 08:06

## 2018-06-15 RX ADMIN — CHLORHEXIDINE GLUCONATE 10 ML: 1.2 RINSE ORAL at 08:06

## 2018-06-15 RX ADMIN — STANDARDIZED SENNA CONCENTRATE AND DOCUSATE SODIUM 1 TABLET: 8.6; 5 TABLET, FILM COATED ORAL at 08:06

## 2018-06-15 RX ADMIN — METOPROLOL TARTRATE 2.5 MG: 5 INJECTION, SOLUTION INTRAVENOUS at 03:06

## 2018-06-15 RX ADMIN — PIPERACILLIN AND TAZOBACTAM 4.5 G: 4; .5 INJECTION, POWDER, LYOPHILIZED, FOR SOLUTION INTRAVENOUS; PARENTERAL at 07:06

## 2018-06-15 RX ADMIN — MIDAZOLAM HYDROCHLORIDE 4 MG: 1 INJECTION, SOLUTION INTRAMUSCULAR; INTRAVENOUS at 10:06

## 2018-06-15 RX ADMIN — AMIODARONE HYDROCHLORIDE 200 MG: 200 TABLET ORAL at 05:06

## 2018-06-15 RX ADMIN — SODIUM CHLORIDE: 0.9 INJECTION, SOLUTION INTRAVENOUS at 05:06

## 2018-06-15 RX ADMIN — CLOPIDOGREL BISULFATE 75 MG: 75 TABLET ORAL at 05:06

## 2018-06-15 RX ADMIN — ASPIRIN 81 MG 81 MG: 81 TABLET ORAL at 08:06

## 2018-06-15 RX ADMIN — DEXTROSE 40 MG: 50 INJECTION, SOLUTION INTRAVENOUS at 08:06

## 2018-06-15 RX ADMIN — NITROGLYCERIN 0.5 INCH: 20 OINTMENT TOPICAL at 01:06

## 2018-06-15 RX ADMIN — NITROGLYCERIN 0.5 INCH: 20 OINTMENT TOPICAL at 05:06

## 2018-06-15 NOTE — PROGRESS NOTES
Upon first turn with oncoming RN, noticed patient's R side of neck, slightly firmer than L, also could be d/t positioning of patient.  Relayed assessment findings to Dr. Lincoln notified.  No new orders at this time.    Dr. Louis also notified that family would like to proceed with LHC.  Will keep NPO for now, to proceed with LHC tomorrow.

## 2018-06-15 NOTE — PROGRESS NOTES
[facility]  Cardiology  Progress Note    Patient Name: Citlali Karimi  MRN: [id number]  Admission Date: 6/12/2018  Hospital Length of Stay: 2 days  Code Status: Full Code   Attending Physician: Levy Samuel MD   Primary Care Physician: Evelio Schuster MD  Expected Discharge Date:   Principal Problem:STEMI (ST elevation myocardial infarction)    Subjective:   HPI:  Ms. Karimi is a 68 year old female patient with a PMHx of CKD stage IV, HTN, DM, CVA (with residual left-sided weakness), CAD, anemia, hyperlipidemia, s/p TAVR, and CAD who presented to [facility] yesterday for elective parathyroidectomy. Post-procedure, patient had ischemic changes on her EKG. Troponin was drawn and trended upward and cardiology consulted to assist with management. Patient seen and examined today, resting in bed. Reports malaise/fatige and surgical soreness. No real cardiac complaints. Denies chest pain or increased SOB. No palpitations. , who was present during exam, does report patient has been having issues with swallowing and may not have been taking full doses of her prescribed medications. BP noted to be elevated yesterday 225/103. Chart reviewed. Diffuse ST depression noted on post-op EKG, has since resolved. Troponin 0.113>34>50. 2D echo pending. Review of chart shows previous cath showed severe CAD (proximal LCX 99%, mid 50%, RCA mid 90%, distal with subtotal lesion). Medical mgmt recommended at that time.      Hospital Course:   6/14/18-Patient seen and examined today, sleepy/lethargic. Dyspneic with throat swelling, surgery notified. Recommended angiogram, risks/benefits discussed.  and family had detailed discussion with nephrology and decided against [facility] due to increased risk of contrast induced nephropathy/dialysis.    Patient subsequently taken back to OR this AM for re-exploration/washout given throat swelling. Coded post procedure.   6/14/18  Pt with V fib post surgery with DCCV to NSR, IV  amiodarone started,vasopressors also started SBP 160s  Repeat EKG in ICU with infpost injury pattern. Discussed with  and currently does not want LHC, will need to start IV heparin and continue medical therapy     6/15/18-Patient seen and examined today s/p post-procedural  Arrest/MI yesterday. Intubated, sedated. Blood transfusion in process. LHC planned for later today. Risks/benefits discussed in detail. Family agreeable.         Review of Systems   Unable to perform ROS: intubated     Objective:     Vital Signs (Most Recent):  Temp: 98.6 °F (37 °C) (06/15/18 1105)  Pulse: 73 (06/15/18 1123)  Resp: 16 (06/15/18 1123)  BP: (!) 104/47 (06/15/18 1105)  SpO2: 100 % (06/15/18 1123) Vital Signs (24h Range):  Temp:  [97.1 °F (36.2 °C)-99.7 °F (37.6 °C)] 98.6 °F (37 °C)  Pulse:  [0-143] 73  Resp:  [11-20] 16  SpO2:  [86 %-100 %] 100 %  BP: ()/() 104/47     Weight: 75.7 kg (166 lb 14.2 oz)  Body mass index is 30.52 kg/m².     SpO2: 100 %  O2 Device (Oxygen Therapy): ventilator      Intake/Output Summary (Last 24 hours) at 06/15/18 1131  Last data filed at 06/15/18 1100   Gross per 24 hour   Intake          2733.42 ml   Output              819 ml   Net          1914.42 ml       Lines/Drains/Airways     Central Venous Catheter Line                 Percutaneous Central Line Insertion/Assessment - triple lumen  06/14/18 right femoral 1 day          Drain                 Urethral Catheter 06/14/18 1113 1 day         Drain/Device  06/14/18 1239 neck collapsible closed device less than 1 day         NG/OG Tube 06/15/18 0200 [employer] sump 14 Fr. Center mouth less than 1 day          Airway                 Airway - Non-Surgical 06/15/18 1025 Endotracheal Tube less than 1 day          Arterial Line                 Arterial Line 06/14/18 1427 Right Femoral less than 1 day          Peripheral Intravenous Line                 Peripheral IV - Single Lumen 06/12/18 0954 Left Forearm 3 days                Physical Exam    Constitutional:   Intubated, appears ill   HENT:   Head: Normocephalic and atraumatic.   Neck: Neck supple.   Incision C/D/I; no swelling   Cardiovascular: Normal rate, regular rhythm, S1 normal and S2 normal.    No murmur heard.  Pulmonary/Chest:   Coarse BS anteriorly with rhonchi   Abdominal: Soft. Bowel sounds are normal. She exhibits no distension. There is no tenderness.   Neurological:   Sedated   Nursing note and vitals reviewed.      Significant Labs:   CMP   Recent Labs  Lab 06/14/18  0236 06/14/18  1422 06/14/18  1736 06/15/18  0352    142 139 142  142   K 3.7 3.2* 4.4 3.7  3.7    107 105 106  106   CO2 20* 17* 19* 20*  20*    192* 220* 136*  136*   BUN 43* 40* 41* 45*  45*   CREATININE 4.1* 3.7* 3.8* 4.2*  4.2*   CALCIUM 10.1 9.3 9.5 9.5  9.5   PROT  --  5.2*  --  5.8*   ALBUMIN 3.1* 2.7*  --  2.8*  2.8*   BILITOT  --  0.4  --  0.4   ALKPHOS  --  48*  --  66   AST  --  346*  --  304*   ALT  --  218*  --  90*   ANIONGAP 16 18* 15 16  16   ESTGFRAFRICA 12* 14* 13* 12*  12*   EGFRNONAA 10* 12* 11* 10*  10*   , CBC   Recent Labs  Lab 06/14/18  1422 06/14/18  1736 06/15/18  0352   WBC 6.13 11.22 9.15   HGB 9.4* 8.0* 7.9*   HCT 29.5* 25.2* 23.7*   * 160 142*   , Troponin   Recent Labs  Lab 06/13/18  1508   TROPONINI 41.662*    and All pertinent lab results from the last 24 hours have been reviewed.    Significant Imaging: Echocardiogram:   2D echo with color flow doppler:   Results for orders placed or performed during the hospital encounter of 06/12/18   2D echo with color flow doppler   Result Value Ref Range    EF 60 55 - 65    Diastolic Dysfunction Yes (A)    , EKG: Reviewed and X-Ray: CXR: X-Ray Chest 1 View (CXR):   Results for orders placed or performed during the hospital encounter of 06/12/18   X-Ray Chest 1 View    Narrative    EXAMINATION:  XR CHEST 1 VIEW    CLINICAL HISTORY:  resp failure;    TECHNIQUE:  Single frontal view of the chest was  performed.    FINDINGS:  Nasogastric tube is coiled within the gastric fundus.  In comparison to the prior study, there is no adverse interval changes      Impression    In comparison to the prior study, there is no adverse interval changes      Electronically signed by: Jered Gan MD  Date:    06/15/2018  Time:    07:56    and X-Ray Chest PA and Lateral (CXR): No results found for this visit on 06/12/18.    Assessment and Plan:   Complicated patient. Presents with post procedural STEMI and afib. [facility] planned for today with further rec's to follow. Continue current meds. Risks/benefits discussed.     * STEMI (ST elevation myocardial infarction)    -Presents with diffuse ST depression on post-procedure EKG and elevated troponin > 50, consistent with NSTEMI  -Denies chest pain or SOB  -BP elevated yesterday, HTN likely triggered clot rupture/MI  -Known CAD on previous cath not amenable to PCI at that time  -Continue ASA, Imdur, Metoprolol, statin, heparin gtt  -Angiogram recommended but patient and family had prolonged discussion with nephrology and decided against [facility] given risk of contrast induced nephropathy/dialysis      Update 6/15/18  Patient taken emergently back to OR on 6/14/18 due to neck swelling/airway compromise; post-procedure she had Vfib arrest/STEMI; after discussing with Dr. Louis, family agreeable to [facility]  -Continue heparin gtt, BB, ASA, statin  -Add Plavix if no contraindications  -Further rec's to follow pending cath          Shock liver    -Statin held due to shock liver  -Watch levels as patient is on amiodarone        Persistent atrial fibrillation    -Patient with post op afib, SR at time of exam  -On amio gtt, will need to monitor liver enzymes  -Continue heparin gtt for now  -Will address po anticoagulation once able  -Continue Metoprolol         Cardiac arrest with ventricular fibrillation    -s/p successful resuscitation  -Post-procedure EKG showed STEMI  -Continue medical mgmt with  ASA, statin, BB, heparin  -Plavix if no contraindications  -[facility] today by Dr. Louis. All risks, benefits, and treatment alternatives explained to patient in detail. All questions answered. POA,  signed consent. Aware of high risk of contrast induced nephropathy/dialysis.         Coronary artery disease involving native coronary artery    -See plan under NSTEMI        CKD (chronic kidney disease) stage 5, GFR less than 15 ml/min    -Creatinine 4.2  -Mgmt as per nephrology        Hypertension associated with diabetes    -BP elevated yesterday  -Continue BB, Imdur  -Add hydralazine or amlodipine if needed        Acute blood loss anemia    -Transfusion in process        H/O aortic valve replacement    -s/p TAVR  -Stable            VTE Risk Mitigation         Ordered     heparin 25,000 units in dextrose 5% 250 mL (100 units/mL) infusion; FEMALE  Continuous      06/14/18 1526     heparin 25,000 units in dextrose 5% 250 mL (100 units/mL) bolus from bag; PRN BOLUS  As needed (PRN)      06/14/18 1526     heparin 25,000 units in dextrose 5% 250 mL (100 units/mL) bolus from bag; PRN BOLUS  As needed (PRN)      06/14/18 1526     Place sequential compression device  Until discontinued      06/12/18 9081          Vesna Covington PA-C  Cardiology  [facility]    Chart reviewed. Dr. Louis examined patient and agrees with plan as outlined above.

## 2018-06-15 NOTE — PLAN OF CARE
Problem: Patient Care Overview  Goal: Plan of Care Review  Outcome: Ongoing (interventions implemented as appropriate)  Pt continues to be on mechanical vent with no significant changes at this time.

## 2018-06-15 NOTE — ASSESSMENT & PLAN NOTE
-Cardiology consulted   - EKG showed diffuse ST depression EKG and troponin increased from 0.113 >> 34>>50, consistent with NSTEMI  -Denies chest pain or SOB  -BP elevated yesterday, HTN likely triggered clot rupture/MI  -Known CAD on previous cath not amenable to PCI at that time  -Continue ASA, Imdur, Metoprolol, statin, heparin gtt  -Will need repeat angiogram to re-assess coronaries/underlying ischemia  -Nephrology consulted due to high risk of contrast induced nephropathy- creatinine 3.6  -Renal consult requested for recommendations/clearance for Heart Cath  -Family refused Heart Cath due to concern for worsening kidney function    6/15  [facility]  Cards

## 2018-06-15 NOTE — ASSESSMENT & PLAN NOTE
-Patient with post op afib, SR at time of exam  -On amio gtt, will need to monitor liver enzymes  -Continue heparin gtt for now  -Will address po anticoagulation once able  -Continue Metoprolol

## 2018-06-15 NOTE — PROGRESS NOTES
[facility] -   Hematology/Oncology  Progress Note    Patient Name: Citlali Karimi  Admission Date: 6/12/2018  Hospital Length of Stay: 2 days  Code Status: Full Code     Subjective:     HPI:  68 year old female, PMHx of CKD (IV), HTN, DM, CVA (left sided weakness), and CAD who initially presented for hyperparathyroidism and hypercalcemia requiring surgical intervention.   She was seen in clinic by surgery for hyperparathyroidism and hypercalcemia and was electively admitted 6/12 taken to OR undergoing parathyroidectomy finding 2 right sided enlarged parathyroid glands.  Intra and post op patient developed EKG changes and had elevated troponin.  Cards consulted.  Patient admitted due to swallowing issues she was not taking all her meds as prescribed and had BP as high as 225/103 during hospitalization.  Troponin increased to > 50.  Cards diagnosed with NSTEMI and she was Rx with ASA, Imdur, Lopressor, statin and Heparin infusion with plans for repeat LHC if cleared by Renal given CKD5.  Today, she had progressively worsening neck swelling and SOB with worsening dysphagia.  She was taken back to OR and had post op neck hematoma evacuation.  In PACU she had witnessed V-Fib cardiac arrest and CODE BLUE called.  After 3 rounds of CPR, Epi X 3 and Defib X 3 ROSC was attained.  She was still intubated and on vent post op    Interval History:  No events overnight  Patient has been weaned off of pressors    Oncology Treatment Plan:   [No treatment plan]    Medications:  Continuous Infusions:   amiodarone in dextrose 5% 0.5 mg/min (06/15/18 0705)    heparin (porcine) in D5W 9 Units/kg/hr (06/15/18 0705)     Scheduled Meds:   aspirin  81 mg Oral Daily    calcium carbonate  1,000 mg Oral Daily    chlorhexidine  10 mL Mouth/Throat BID    metoprolol tartrate  25 mg Oral BID    nozaseptin   Each Nare BID    pantoprazole 40 mg in dextrose 5 % 100 mL IVPB (over 15 minutes) (ready to mix system)  40 mg  Intravenous Daily    piperacillin-tazobactam (ZOSYN) IVPB  4.5 g Intravenous Q12H    senna-docusate 8.6-50 mg  1 tablet Oral BID    simvastatin  20 mg Oral QHS    sodium bicarbonate  650 mg Oral BID     PRN Meds:sodium chloride, acetaminophen, bisacodyl, cloNIDine, dextrose 50%, dextrose 50%, glucagon (human recombinant), glucose, glucose, heparin (PORCINE), heparin (PORCINE), hydrALAZINE, insulin aspart U-100, lactulose, lorazepam, morphine, ondansetron, sodium bicarbonate, sodium chloride 0.9%     Review of Systems   Unable to perform ROS: Other     Objective:     Vital Signs (Most Recent):  Temp: 99.7 °F (37.6 °C) (06/15/18 0705)  Pulse: 94 (06/15/18 0712)  Resp: 20 (06/15/18 0712)  BP: (!) 128/59 (06/15/18 0705)  SpO2: 100 % (06/15/18 0712) Vital Signs (24h Range):  Temp:  [97.1 °F (36.2 °C)-99.7 °F (37.6 °C)] 99.7 °F (37.6 °C)  Pulse:  [0-143] 94  Resp:  [11-20] 20  SpO2:  [86 %-100 %] 100 %  BP: ()/() 128/59     Weight: 75.7 kg (166 lb 14.2 oz)  Body mass index is 30.52 kg/m².  Body surface area is 1.82 meters squared.      Intake/Output Summary (Last 24 hours) at 06/15/18 0800  Last data filed at 06/15/18 0705   Gross per 24 hour   Intake          2733.42 ml   Output              744 ml   Net          1989.42 ml       Physical Exam   Constitutional: She appears well-developed and well-nourished. No distress.   HENT:   Head: Normocephalic and atraumatic.   Right Ear: Hearing and external ear normal.   Left Ear: Hearing and external ear normal.   Nose: No rhinorrhea or sinus tenderness. Right sinus exhibits no maxillary sinus tenderness and no frontal sinus tenderness. Left sinus exhibits no maxillary sinus tenderness and no frontal sinus tenderness.   Mouth/Throat: Uvula is midline and mucous membranes are normal. No oral lesions. No uvula swelling.   Intubated   Eyes: Conjunctivae are normal. Pupils are equal, round, and reactive to light. Right eye exhibits no discharge. Left eye exhibits no  discharge. No scleral icterus.   Neck: Normal range of motion. Carotid bruit is not present. No tracheal deviation present. No thyromegaly present.   Cardiovascular: Normal rate, regular rhythm, S1 normal, S2 normal and intact distal pulses.    Murmur heard.  Pulses:       Dorsalis pedis pulses are 2+ on the right side, and 2+ on the left side.   Pulmonary/Chest: Effort normal and breath sounds normal. No respiratory distress. She has no wheezes. She has no rales.   Intubated   Abdominal: Soft. Bowel sounds are normal. She exhibits no distension. There is no tenderness.   Musculoskeletal: Normal range of motion.   Lymphadenopathy:        Right: No supraclavicular adenopathy present.        Left: No supraclavicular adenopathy present.   Neurological: She is alert. She has normal strength. Gait normal.   Intubated   Skin: Skin is warm, dry and intact. Capillary refill takes less than 2 seconds. No rash noted. There is pallor.   Psychiatric: Her speech is normal and behavior is normal. Judgment and thought content normal. Her mood appears anxious. Her affect is labile. Cognition and memory are normal. She exhibits a depressed mood.   Nursing note and vitals reviewed.      Significant Labs:   CBC:   Recent Labs  Lab 06/14/18  1422 06/14/18  1736 06/15/18  0352   WBC 6.13 11.22 9.15   HGB 9.4* 8.0* 7.9*   HCT 29.5* 25.2* 23.7*   * 160 142*   , CMP:   Recent Labs  Lab 06/14/18  0236 06/14/18  1422 06/14/18  1736 06/15/18  0352    142 139 142  142   K 3.7 3.2* 4.4 3.7  3.7    107 105 106  106   CO2 20* 17* 19* 20*  20*    192* 220* 136*  136*   BUN 43* 40* 41* 45*  45*   CREATININE 4.1* 3.7* 3.8* 4.2*  4.2*   CALCIUM 10.1 9.3 9.5 9.5  9.5   PROT  --  5.2*  --  5.8*   ALBUMIN 3.1* 2.7*  --  2.8*  2.8*   BILITOT  --  0.4  --  0.4   ALKPHOS  --  48*  --  66   AST  --  346*  --  304*   ALT  --  218*  --  90*   ANIONGAP 16 18* 15 16  16   EGFRNONAA 10* 12* 11* 10*  10*   , Coagulation:    Recent Labs  Lab 06/14/18  0930 06/14/18  1105 06/15/18  0352   INR  --   --  1.1   APTT 77.0* 86.5* 97.9*   , Haptoglobin: No results for input(s): HAPTOGLOBIN in the last 48 hours., Immunology: No results for input(s): SPEP, SUZE, TRISTON, FREELAMBDALI in the last 48 hours., LDH: No results for input(s): LDHCSF, BFSOURCE in the last 48 hours. and LFTs:   Recent Labs  Lab 06/14/18  0236 06/14/18  1422 06/15/18  0352   ALT  --  218* 90*   AST  --  346* 304*   ALKPHOS  --  48* 66   BILITOT  --  0.4 0.4   PROT  --  5.2* 5.8*   ALBUMIN 3.1* 2.7* 2.8*  2.8*       Diagnostic Results:  I have reviewed all pertinent imaging results/findings within the past 24 hours.    Assessment/Plan:     Acute hypoxemic respiratory failure    Patient s/p cardiac arrest/vfib  Currently intubated with ventilator managed by critical care        Anemia in stage 4 chronic kidney disease    Patient has been treated with Procrit 20,000 units weekly for maintenance therapy. Patient with now multiple reasons for anemia incule acure blood loss and will require PRBC transfusion  Patient with hemoglobin of 7.9 this morning and planning PRBC transfusion            Thank you for your consult. I will follow-up with patient. Please contact us if you have any additional questions.     Sunny Hearn Jr, MD  Hematology/Oncology  [facility] -

## 2018-06-15 NOTE — ASSESSMENT & PLAN NOTE
-s/p successful resuscitation  -Post-procedure EKG showed STEMI  -Continue medical mgmt with ASA, statin, BB, heparin  -Plavix if no contraindications  -C today by Dr. Louis. All risks, benefits, and treatment alternatives explained to patient in detail. All questions answered. POA,  signed consent. Aware of high risk of contrast induced nephropathy/dialysis.

## 2018-06-15 NOTE — NURSING
Dr. Samuel at bedside assessing patient incision and neck swelling. Reported weak loose cough and patient not swallowing meds. New orders placed, will wait for speech to reevaluate today.

## 2018-06-15 NOTE — SUBJECTIVE & OBJECTIVE
Interval History:  No events overnight  Patient has been weaned off of pressors    Oncology Treatment Plan:   [No treatment plan]    Medications:  Continuous Infusions:   amiodarone in dextrose 5% 0.5 mg/min (06/15/18 0705)    heparin (porcine) in D5W 9 Units/kg/hr (06/15/18 0705)     Scheduled Meds:   aspirin  81 mg Oral Daily    calcium carbonate  1,000 mg Oral Daily    chlorhexidine  10 mL Mouth/Throat BID    metoprolol tartrate  25 mg Oral BID    nozaseptin   Each Nare BID    pantoprazole 40 mg in dextrose 5 % 100 mL IVPB (over 15 minutes) (ready to mix system)  40 mg Intravenous Daily    piperacillin-tazobactam (ZOSYN) IVPB  4.5 g Intravenous Q12H    senna-docusate 8.6-50 mg  1 tablet Oral BID    simvastatin  20 mg Oral QHS    sodium bicarbonate  650 mg Oral BID     PRN Meds:sodium chloride, acetaminophen, bisacodyl, cloNIDine, dextrose 50%, dextrose 50%, glucagon (human recombinant), glucose, glucose, heparin (PORCINE), heparin (PORCINE), hydrALAZINE, insulin aspart U-100, lactulose, lorazepam, morphine, ondansetron, sodium bicarbonate, sodium chloride 0.9%     Review of Systems   Unable to perform ROS: Other     Objective:     Vital Signs (Most Recent):  Temp: 99.7 °F (37.6 °C) (06/15/18 0705)  Pulse: 94 (06/15/18 0712)  Resp: 20 (06/15/18 0712)  BP: (!) 128/59 (06/15/18 0705)  SpO2: 100 % (06/15/18 0712) Vital Signs (24h Range):  Temp:  [97.1 °F (36.2 °C)-99.7 °F (37.6 °C)] 99.7 °F (37.6 °C)  Pulse:  [0-143] 94  Resp:  [11-20] 20  SpO2:  [86 %-100 %] 100 %  BP: ()/() 128/59     Weight: 75.7 kg (166 lb 14.2 oz)  Body mass index is 30.52 kg/m².  Body surface area is 1.82 meters squared.      Intake/Output Summary (Last 24 hours) at 06/15/18 0800  Last data filed at 06/15/18 0705   Gross per 24 hour   Intake          2733.42 ml   Output              744 ml   Net          1989.42 ml       Physical Exam   Constitutional: She appears well-developed and well-nourished. No distress.   HENT:    Head: Normocephalic and atraumatic.   Right Ear: Hearing and external ear normal.   Left Ear: Hearing and external ear normal.   Nose: No rhinorrhea or sinus tenderness. Right sinus exhibits no maxillary sinus tenderness and no frontal sinus tenderness. Left sinus exhibits no maxillary sinus tenderness and no frontal sinus tenderness.   Mouth/Throat: Uvula is midline and mucous membranes are normal. No oral lesions. No uvula swelling.   Intubated   Eyes: Conjunctivae are normal. Pupils are equal, round, and reactive to light. Right eye exhibits no discharge. Left eye exhibits no discharge. No scleral icterus.   Neck: Normal range of motion. Carotid bruit is not present. No tracheal deviation present. No thyromegaly present.   Cardiovascular: Normal rate, regular rhythm, S1 normal, S2 normal and intact distal pulses.    Murmur heard.  Pulses:       Dorsalis pedis pulses are 2+ on the right side, and 2+ on the left side.   Pulmonary/Chest: Effort normal and breath sounds normal. No respiratory distress. She has no wheezes. She has no rales.   Intubated   Abdominal: Soft. Bowel sounds are normal. She exhibits no distension. There is no tenderness.   Musculoskeletal: Normal range of motion.   Lymphadenopathy:        Right: No supraclavicular adenopathy present.        Left: No supraclavicular adenopathy present.   Neurological: She is alert. She has normal strength. Gait normal.   Intubated   Skin: Skin is warm, dry and intact. Capillary refill takes less than 2 seconds. No rash noted. There is pallor.   Psychiatric: Her speech is normal and behavior is normal. Judgment and thought content normal. Her mood appears anxious. Her affect is labile. Cognition and memory are normal. She exhibits a depressed mood.   Nursing note and vitals reviewed.      Significant Labs:   CBC:   Recent Labs  Lab 06/14/18  1422 06/14/18  1736 06/15/18  0352   WBC 6.13 11.22 9.15   HGB 9.4* 8.0* 7.9*   HCT 29.5* 25.2* 23.7*   * 160  142*   , CMP:   Recent Labs  Lab 06/14/18  0236 06/14/18  1422 06/14/18  1736 06/15/18  0352    142 139 142  142   K 3.7 3.2* 4.4 3.7  3.7    107 105 106  106   CO2 20* 17* 19* 20*  20*    192* 220* 136*  136*   BUN 43* 40* 41* 45*  45*   CREATININE 4.1* 3.7* 3.8* 4.2*  4.2*   CALCIUM 10.1 9.3 9.5 9.5  9.5   PROT  --  5.2*  --  5.8*   ALBUMIN 3.1* 2.7*  --  2.8*  2.8*   BILITOT  --  0.4  --  0.4   ALKPHOS  --  48*  --  66   AST  --  346*  --  304*   ALT  --  218*  --  90*   ANIONGAP 16 18* 15 16  16   EGFRNONAA 10* 12* 11* 10*  10*   , Coagulation:   Recent Labs  Lab 06/14/18  0930 06/14/18  1105 06/15/18  0352   INR  --   --  1.1   APTT 77.0* 86.5* 97.9*   , Haptoglobin: No results for input(s): HAPTOGLOBIN in the last 48 hours., Immunology: No results for input(s): SPEP, SUZE, TRISTON, FREELAMBDALI in the last 48 hours., LDH: No results for input(s): LDHCSF, BFSOURCE in the last 48 hours. and LFTs:   Recent Labs  Lab 06/14/18  0236 06/14/18  1422 06/15/18  0352   ALT  --  218* 90*   AST  --  346* 304*   ALKPHOS  --  48* 66   BILITOT  --  0.4 0.4   PROT  --  5.2* 5.8*   ALBUMIN 3.1* 2.7* 2.8*  2.8*       Diagnostic Results:  I have reviewed all pertinent imaging results/findings within the past 24 hours.

## 2018-06-15 NOTE — BRIEF OP NOTE
<[facility]  Surgery Department  Operative Note    SUMMARY     Date of Procedure: 6/15/2018     Procedure: Procedure(s) (LRB):  CATHETERIZATION, HEART, LEFT (Left)     Surgeon(s) and Role:     * Galindo Louis MD - Primary    Assisting Surgeon: None    Pre-Operative Diagnosis: * No pre-op diagnosis entered *    Post-Operative Diagnosis: Post-Op Diagnosis Codes:     * H/O cardiac arrest [Z86.74]    Anesthesia: RN IV Sedation    Technical Procedures Used: coronary angiogram    Description of the Findings of the Procedure: cornary angiogram-findings diffuse CAD not amenable for PCI, no changes from 2016    Significant Surgical Tasks Conducted by the Assistant(s), if Applicable: none    Complications: No    Estimated Blood Loss (EBL): < 50 cc           Implants: * No implants in log *    Specimens:   Specimen (12h ago through future)    None                  Condition: Good    Disposition: PACU - hemodynamically stable.    Attestation: I was present and scrubbed for the entire procedure.

## 2018-06-15 NOTE — PROGRESS NOTES
[facility] -   Nephrology  Progress Note    Patient Name: Citlali Karimi  MRN: [id number]  Admission Date: 6/12/2018  Hospital Length of Stay: 2 days  Attending Provider: Levy Samuel MD   Primary Care Physician: Evelio Schuster MD  Principal Problem:STEMI (ST elevation myocardial infarction)    Subjective:     HPI:  Citlali Karimi Is a pleasant 68 -year-old  woman with history of chronic kidney disease stage 4, patient was admitted to the hospital for an elective  partial parathyroidectomy, following her surgery during observation.  She had some chest pain, workup revealed non ST elevated MI, patient was admitted to the hospital for further management.  We were consulted due to advanced renal insufficiency, baseline serum creatinine about 3.5-4,            Important Info :        She underwent a native kidney biopsy on 10/10/13. Final report of the kidney biopsy is negative for any specific etiology for acute renal failure. 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         Interval History:  events noted , pt had a brief CODE on 6/14 and was intubated, also had hematoma evacuated from surgical site ( neck ) y'day ,     Objective:     Vital Signs (Most Recent):  Temp: 98.6 °F (37 °C) (06/15/18 1130)  Pulse: 72 (06/15/18 1130)  Resp: 16 (06/15/18 1130)  BP: 111/60 (06/15/18 1130)  SpO2: 100 % (06/15/18 1130)  O2 Device (Oxygen Therapy): ventilator (06/15/18 1130) Vital Signs (24h Range):  Temp:  [97.1 °F (36.2 °C)-99.7 °F (37.6 °C)] 98.6 °F (37 °C)  Pulse:  [0-143] 72  Resp:  [11-20] 16  SpO2:  [86 %-100 %] 100 %  BP: ()/() 111/60     Weight: 75.7 kg (166 lb 14.2 oz) (06/15/18 0600)  Body mass index is 30.52 kg/m².  Body surface area is 1.82 meters squared.    I/O last 3 completed shifts:  In: 2921.3 [I.V.:2521.3; IV Piggyback:400]  Out: 694 [Urine:660; Other:14; Blood:20]    Physical Exam   Constitutional: She appears well-developed.  No distress.   HENT:   Head: Normocephalic and atraumatic.   Mouth/Throat: No oropharyngeal exudate.   ETT in place    Eyes: Conjunctivae are normal. Pupils are equal, round, and reactive to light.   Neck: No JVD present. Carotid bruit is not present. No tracheal deviation present. No thyroid mass and no thyromegaly present.   Cardiovascular: Normal rate, regular rhythm and intact distal pulses.  Exam reveals no gallop and no friction rub.    Murmur heard.  Pulmonary/Chest: No respiratory distress. She has no wheezes. She exhibits no tenderness.   Abdominal: Soft. Bowel sounds are normal. She exhibits no distension, no abdominal bruit, no ascites and no mass. There is no hepatosplenomegaly. There is no tenderness. There is no rebound, no guarding and no CVA tenderness.   Musculoskeletal: She exhibits no edema or tenderness.   Lymphadenopathy:     She has no cervical adenopathy.   Neurological: No cranial nerve deficit. She exhibits normal muscle tone. Coordination normal.   Skin: Skin is warm and intact. No rash noted. No erythema. No pallor.       Significant Labs:  CBC:   Recent Labs  Lab 06/15/18  0352   WBC 9.15   RBC 2.72*   HGB 7.9*   HCT 23.7*   *   MCV 87   MCH 29.0   MCHC 33.3     CMP:   Recent Labs  Lab 06/15/18  0352   *  136*   CALCIUM 9.5  9.5   ALBUMIN 2.8*  2.8*   PROT 5.8*     142   K 3.7  3.7   CO2 20*  20*     106   BUN 45*  45*   CREATININE 4.2*  4.2*   ALKPHOS 66   ALT 90*   *   BILITOT 0.4     Coagulation:   Recent Labs  Lab 06/15/18  0352 06/15/18  1120   INR 1.1  --    APTT 97.9* 79.0*     LFTs:   Recent Labs  Lab 06/15/18  0352   ALT 90*   *   ALKPHOS 66   BILITOT 0.4   PROT 5.8*   ALBUMIN 2.8*  2.8*     All labs within the past 24 hours have been reviewed.     Lab Results   Component Value Date    PTH 18.5 06/12/2018    CALCIUM 9.5 06/15/2018    CALCIUM 9.5 06/15/2018    PHOS 3.5 06/15/2018         Significant Imaging: reviewed      Assessment/Plan:     CKD (chronic kidney disease) stage 5, GFR less than 15 ml/min    1. CKD stage 4/5 :  Renal function at baseline, will continue to monitor, no acute indication for renal replacement therapy,     2.  Acute MI - cardiology notes reviewed, Had a lengthy family discussion and family on 6/14 (  and niece Dr Hua ) ,     Initially pt and family decided against LHC due to risk of worsening kidney function as pt did not want to go on dialysis ,     After the CODE on 6/14 ,  changed his mind and wants LHC ,     UO low and understands that pt may need RRT after the procedure if kidneys cont to worsen ,     3. SHPT :  Status post partial parathyroidectomy surgery, will continue to monitor her serum calcium levels, hold Calcium carb at this time as her corrected serum calcium is high    Neck swelling improved after hematoma evacuation yesterday ,     4.  Anemia of chronic kidney disease - receiving 2 units of pRBC today ,     5. Stable lytes ,     6. Met acidosis : on sodium bicarb supplements,             I will follow-up with patient. Please contact us if you have any additional questions.     Total critical care time spent 40 minutes including time needed to review the records,  patient  evaluation, documentation, face-to-face discussion with the patient's , CC and primary teams,   more than 50% of the time was spent on coordination of care and counseling.       Magnus Vazquez MD  Nephrology  [facility]

## 2018-06-15 NOTE — SUBJECTIVE & OBJECTIVE
Interval History: Worsening. Code Blue with recovery. [facility] in progress    Review of Systems   Unable to perform ROS: Intubated     Objective:     Vital Signs (Most Recent):  Temp: 98.2 °F (36.8 °C) (06/15/18 1505)  Pulse: 72 (06/15/18 1540)  Resp: 16 (06/15/18 1540)  BP: (!) 105/59 (06/15/18 1505)  SpO2: 99 % (06/15/18 1540) Vital Signs (24h Range):  Temp:  [97.8 °F (36.6 °C)-99.7 °F (37.6 °C)] 98.2 °F (36.8 °C)  Pulse:  [] 72  Resp:  [12-20] 16  SpO2:  [99 %-100 %] 99 %  BP: ()/(34-66) 105/59     Weight: 75.7 kg (166 lb 14.2 oz)  Body mass index is 30.52 kg/m².    Intake/Output Summary (Last 24 hours) at 06/15/18 1632  Last data filed at 06/15/18 1500   Gross per 24 hour   Intake           1626.9 ml   Output              699 ml   Net            927.9 ml      Physical Exam   Constitutional: She appears well-developed and well-nourished. She is intubated.   ILL Appearing  Intubated   HENT:   Head: Normocephalic and atraumatic.   ET in place  + neck swelling   Eyes: EOM are normal. Pupils are equal, round, and reactive to light.   Neck: Normal range of motion. Neck supple. No JVD present.   Cardiovascular: Regular rhythm and intact distal pulses.  Tachycardia present.    Murmur heard.   Systolic murmur is present with a grade of 3/6   Pulmonary/Chest: Effort normal and breath sounds normal. She is intubated. No respiratory distress. She has no wheezes.   Abdominal: Soft. Bowel sounds are normal. She exhibits no distension.   Musculoskeletal: Normal range of motion. She exhibits edema. She exhibits no tenderness.   Neurological: She has normal reflexes. No cranial nerve deficit.   Skin: Skin is warm and dry. Capillary refill takes less than 2 seconds. No erythema.   Nursing note and vitals reviewed.      Significant Labs:   BMP:   Recent Labs  Lab 06/15/18  0352   *  136*     142   K 3.7  3.7     106   CO2 20*  20*   BUN 45*  45*   CREATININE 4.2*  4.2*   CALCIUM 9.5  9.5   MG 2.0      CBC:   Recent Labs  Lab 06/14/18  1422 06/14/18  1736 06/15/18  0352   WBC 6.13 11.22 9.15   HGB 9.4* 8.0* 7.9*   HCT 29.5* 25.2* 23.7*   * 160 142*     CMP:   Recent Labs  Lab 06/14/18  0236 06/14/18  1422 06/14/18  1736 06/15/18  0352    142 139 142  142   K 3.7 3.2* 4.4 3.7  3.7    107 105 106  106   CO2 20* 17* 19* 20*  20*    192* 220* 136*  136*   BUN 43* 40* 41* 45*  45*   CREATININE 4.1* 3.7* 3.8* 4.2*  4.2*   CALCIUM 10.1 9.3 9.5 9.5  9.5   PROT  --  5.2*  --  5.8*   ALBUMIN 3.1* 2.7*  --  2.8*  2.8*   BILITOT  --  0.4  --  0.4   ALKPHOS  --  48*  --  66   AST  --  346*  --  304*   ALT  --  218*  --  90*   ANIONGAP 16 18* 15 16  16   EGFRNONAA 10* 12* 11* 10*  10*     All pertinent labs within the past 24 hours have been reviewed.    Significant Imaging: I have reviewed all pertinent imaging results/findings within the past 24 hours.

## 2018-06-15 NOTE — PROCEDURES
"Citlali Karimi is a 68 y.o. female patient.    Temp: 98.8 °F (37.1 °C) (06/15/18 0945)  Pulse: 80 (06/15/18 1029)  Resp: 13 (06/15/18 1029)  BP: (!) 103/49 (06/15/18 1005)  SpO2: 100 % (06/15/18 1029)  Weight: 75.7 kg (166 lb 14.2 oz) (06/15/18 0600)  Height: 5' 2" (157.5 cm) (06/15/18 0600)       Intubation  Date/Time: 6/15/2018 10:10 AM  Location procedure was performed: [facility] INTENSIVE CARE UNIT  Performed by: SHADI TALAVERA  Authorized by: SHADI TALAVERA   Pre-operative diagnosis: Upper Airway obstruction  Post-operative diagnosis: upper airway obstruction  Consent Done: Not Needed  Indications: airway protection and  pulmonary toilet  Intubation method: oral  Patient status: awake  Preoxygenation: bag valve mask  Sedatives: midazolam and propofol  Paralytic: none  Laryngoscope size: Mac 4  Tube size: 7.5 mm  Tube type: cuffed  Number of attempts: 1  Cricoid pressure: no  Cords visualized: yes  Post-procedure assessment: chest rise and CO2 detector  Breath sounds: rales/crackles and equal and absent over the epigastrium  Cuff inflated: yes  ETT to lip: 23 cm  Tube secured with: ETT rosenbaum  Chest x-ray interpreted by me.  Chest x-ray findings: endotracheal tube in appropriate position  Patient tolerance: Patient tolerated the procedure well with no immediate complications  Complications: No  Estimated blood loss (mL): 0  Specimens: No  Implants: No  Comments: OET exchanged from #6 to #7.5 due to cuff leak and inability to suction through #6.  Cords closed and swollen and was very tight for re-intubation using glydoscope.            Shadi Talavera  6/15/2018  "

## 2018-06-15 NOTE — EICU
/74,  After ryles tube placement  On camera does not seem to be in discomfort    Plan:  IV metoprolol 2.5 mg once

## 2018-06-15 NOTE — ASSESSMENT & PLAN NOTE
-Creatinine 3.6>>4.1  -baseline 2.7-4.4  -will monitor   -sodium bicarb continued   -pt has been followed outpatient by Dr. Vazquez (Nephrology)  -Nephrology consulted - pt may benefit from repeat angio due to NSTEMI but at high risk of contrast induced nephropathy  - pt was candidate for renal transplant however work up discontinued due to progressive weakness  - [facility] recommended     6/15  Monitor worsening Renal Function  S/p Cardiac VFib Arrest  [facility] today  IVF's  Monitor

## 2018-06-15 NOTE — ASSESSMENT & PLAN NOTE
-Presents with diffuse ST depression on post-procedure EKG and elevated troponin > 50, consistent with NSTEMI  -Denies chest pain or SOB  -BP elevated yesterday, HTN likely triggered clot rupture/MI  -Known CAD on previous cath not amenable to PCI at that time  -Continue ASA, Imdur, Metoprolol, statin, heparin gtt  -Angiogram recommended but patient and family had prolonged discussion with nephrology and decided against C given risk of contrast induced nephropathy/dialysis      Update 6/15/18  Patient taken emergently back to OR on 6/14/18 due to neck swelling/airway compromise; post-procedure she had Vfib arrest/STEMI; after discussing with Dr. Louis, family agreeable to [facility]  -Continue heparin gtt, BB, ASA, statin  -Add Plavix if no contraindications  -Further rec's to follow pending cath

## 2018-06-15 NOTE — CONSULTS
[facility] -   Adult Nutrition  Consult Note    SUMMARY     Recommendations    Recommendation/Intervention:   1. When medically able, initiate enteral nutrition support ~ Peptamen Bariatric at 55 ml/hr, with flushes as needed per MD or NP for hydration. Provides 1320 calories, 122 g protein, and 1109 ml water.   2.if unable to  initiate enteral nutrition over the next 48 hrs consider parenteral nutrition support to meet needs, Custom PPN  4.25/5 at 100 ml/hr + 250 ml 20 % lipids daily with electrolyte management per pharmacy and MD/NP (1316 calories, 102 g protein, 1.10  mg/kg/min dextrose infusion rate). 3. Will continue to monitor and ajust recs prn.  Goals: Meet > 85 % EEN/EPN while admitted  Nutrition Goal Status: new  Communication of RD Recs:  (POC, sticky note)    Reason for Assessment    Reason for Assessment: consult, RD follow-up  Dx:  1. Abnormal chest x-ray    2. Hyperparathyroidism    3. Hypercalcemia    4. SOB (shortness of breath)    5. NSTEMI (non-ST elevated myocardial infarction)    6. Elevated troponin    7. S/P parathyroidectomy    8. Respiratory distress    9. Cardiogenic shock    10. Acute hypoxemic respiratory failure    11. Cardiac arrest with ventricular fibrillation    12. CKD (chronic kidney disease) stage 5, GFR less than 15 ml/min    13. Type 2 diabetes mellitus with stage 4 chronic kidney disease, without long-term current use of insulin    14. Electrolyte imbalance    15. H/O aortic valve replacement    16. Persistent atrial fibrillation    17. S/P evacuation of hematoma    18. Shock liver    19. ST elevation myocardial infarction (STEMI), unspecified artery    20. MI (myocardial infarction)    21. Acute airway obstruction    22. Acute blood loss anemia    23. On mechanically assisted ventilation      Past Medical History:   Diagnosis Date    Acid reflux 1/7/2015    Anxiety 1/7/2015    Aortic valvar stenosis     Arthritis     osteo    Callus     Chronic anemia      "followed by Dr. Addison    CKD (chronic kidney disease) stage 5, GFR less than 15 ml/min 8/7/2015    Combined hyperlipidemia associated with type 2 diabetes mellitus     Coronary artery disease     Diabetes mellitus type II, controlled, with no complications     LBSL 108 today    Encounter for blood transfusion     Frail elderly with impaired mobility, wheel chair bound 8/25/2017    Gallbladder problem     gallbladder surgery    Heart murmur     Hyperparathyroidism, secondary renal 1/7/2015    Hypertension 8/7/2015    Hypertension associated with diabetes 11/12/2012    Kidney transplant candidate 1/7/2015    Overweight(278.02)     Urinary tract infection        General Information Comments: S/p parathyroidectomy (6/12).  ST recs: NPO (6/14).  Pt was taken back to OR and had post op neck hematoma evacuation (6/14). Patient extubated and immediately reintubated. OG tube placed for PO med access. LHC planned for later today.   Nutrition Discharge Planning: too soon to determine     Nutrition Risk Screen    Nutrition Risk Screen: dysphagia or difficulty swallowing    Nutrition/Diet History    Do you have any cultural, spiritual, Gnosticism conflicts, given your current situation?: none    Anthropometrics    Temp: 98.6 °F (37 °C)  Height Method: Stated  Height: 5' 2" (157.5 cm)  Height (inches): 62 in  Weight Method: Bed Scale  Weight: 75.7 kg (166 lb 14.2 oz)  Weight (lb): 166.89 lb  Ideal Body Weight (IBW), Female: 110 lb  % Ideal Body Weight, Female (lb): 151.72 lb  BMI (Calculated): 30.6  BMI Grade: 30 - 34.9- obesity - grade I       Lab/Procedures/Meds    Pertinent Labs Reviewed: reviewed  BMP  Lab Results   Component Value Date     06/15/2018     06/15/2018    K 3.7 06/15/2018    K 3.7 06/15/2018     06/15/2018     06/15/2018    CO2 20 (L) 06/15/2018    CO2 20 (L) 06/15/2018    BUN 45 (H) 06/15/2018    BUN 45 (H) 06/15/2018    CREATININE 4.2 (H) 06/15/2018    CREATININE 4.2 " (H) 06/15/2018    CALCIUM 9.5 06/15/2018    CALCIUM 9.5 06/15/2018    ANIONGAP 16 06/15/2018    ANIONGAP 16 06/15/2018    ESTGFRAFRICA 12 (A) 06/15/2018    ESTGFRAFRICA 12 (A) 06/15/2018    EGFRNONAA 10 (A) 06/15/2018    EGFRNONAA 10 (A) 06/15/2018     Lab Results   Component Value Date    CALCIUM 9.5 06/15/2018    CALCIUM 9.5 06/15/2018    PHOS 3.5 06/15/2018     Lab Results   Component Value Date    ALBUMIN 2.8 (L) 06/15/2018    ALBUMIN 2.8 (L) 06/15/2018       Recent Labs  Lab 06/15/18  1124   POCTGLUCOSE 122*     Lab Results   Component Value Date    HGBA1C 5.4 06/12/2018       Pertinent Medications Reviewed: reviewed    Physical Findings/Assessment    Overall Physical Appearance: on ventilator support  Tubes:  (OG - x meds)  Oral/Mouth Cavity: decay/carries, tooth/teeth missing, no dental appliances present  Skin: incision(s)    Estimated/Assessed Needs    Weight Used For Calorie Calculations: 75.7 kg (166 lb 14.2 oz)  Energy Calorie Requirements (kcal): 1403.64  Energy Need Method: [facility] (modified)  Protein Requirements: 100 - 125g/day  Weight Used For Protein Calculations: 49.9 kg (110 lb) (IBW - obese ICU)     Fluid Need Method: RDA Method (or per MD)  RDA Method (mL): 1403.64  CHO Requirement: 50 % EEN      Nutrition Prescription Ordered    Current Diet Order: NPO    Evaluation of Received Nutrient/Fluid Intake          Intake/Output Summary (Last 24 hours) at 06/15/18 1154  Last data filed at 06/15/18 1130   Gross per 24 hour   Intake          3083.42 ml   Output              819 ml   Net          2264.42 ml       % Intake of Estimated Energy Needs: 0 - 25 %  % Meal Intake: NPO    Nutrition Risk    Level of Risk/Frequency of Follow-up:  (F/U x2 weekly)     Assessment and Plan    Nutrition Problem:  Inadequate energy intake     Related to (etiology):   Inability to consume sufficient energy     Signs and Symptoms (as evidenced by):   Intubated, NPO, no alternative means of nutrition.       Interventions/Recommendations (treatment strategy):  Please see RD recs above.     Nutrition Diagnosis Status:   New         Monitor and Evaluation    Food and Nutrient Intake: energy intake, enteral nutrition intake, parenteral nutrition intake  Food and Nutrient Adminstration: enteral and parenteral nutrition administration  Knowledge/Beliefs/Attitudes: food and nutrition knowledge/skill  Physical Activity and Function: nutrition-related ADLs and IADLs  Anthropometric Measurements: weight  Biochemical Data, Medical Tests and Procedures: electrolyte and renal panel, glucose/endocrine profile  Nutrition-Focused Physical Findings: overall appearance, skin     Nutrition Follow-Up    RD Follow-up?: Yes (2xweekly)

## 2018-06-15 NOTE — PLAN OF CARE
CM spoke with the patient spouse ( patient still on Vent). CM explained D/C planning / transition of care : LTAC, [facility] as the next transitional phase for the patient. Spouse agreed  That she will need a safe transition. CM to f/u for next level of care.

## 2018-06-15 NOTE — PROGRESS NOTES
6.0 ETT removed and patient intubated with a 7.5 ETT done by AIXA Raines using the glidoscope. 7.5 ETT 24 cm @ lips now and commercial tube rosenbaum in place with bite block. Procedure tolerated well. Settings per AIXA Raines

## 2018-06-15 NOTE — PLAN OF CARE
Problem: Patient Care Overview  Goal: Plan of Care Review  Outcome: Ongoing (interventions implemented as appropriate)  Recommendations     Recommendation/Intervention:   1. When medically able, initiate enteral nutrition support ~ Peptamen Bariatric at 55 ml/hr, with flushes as needed per MD or NP for hydration. Provides 1320 calories, 122 g protein, and 1109 ml water.   2.if unable to  initiate enteral nutrition over the next 48 hrs consider parenteral nutrition support to meet needs, Custom PPN  4.25/5 at 100 ml/hr + 250 ml 20 % lipids daily with electrolyte management per pharmacy and MD/NP (1316 calories, 102 g protein, 1.10  mg/kg/min dextrose infusion rate). 3. Will continue to monitor and ajust recs prn.  Goals: Meet > 85 % EEN/EPN while admitted  Nutrition Goal Status: new  Communication of RD Recs:  (POC, sticky note)

## 2018-06-15 NOTE — PLAN OF CARE
Problem: Patient Care Overview  Goal: Plan of Care Review  Outcome: Ongoing (interventions implemented as appropriate)  Patient awake, following commands upon AM assessment.  However, became more lethargic after Morphine given.  Tube exchange completed with Versed and Propofol given for sedation.  Patient still drowsy post procedure; will grimace to painful stimuli, but not back to baseline neuro assessment from 0700.  Remains on Heparin and Amiodarone.  2 units PRBCs given, with no transfusion reaction noted.  Lasix given in between units with poor result.  UOP remains on average 20cc/hr, Dr. Vazquez aware.  Plans for C later today.  Restraints continue; patient gets intermittently agitated and reaches for lines and ETT.  Turn q2h, heels floated.  POC reviewed with family at the bedside.  All questions answered.

## 2018-06-15 NOTE — ASSESSMENT & PLAN NOTE
Patient has been treated with Procrit 20,000 units weekly for maintenance therapy.  Patient with hemoglobin of 7.9 this morning and planning PRBC transfusion

## 2018-06-15 NOTE — EICU
Oral medications could not be administered as no enteral tube in place    Plan:  Advised placement of orogastric tube and use after position confirmed

## 2018-06-15 NOTE — ASSESSMENT & PLAN NOTE
Given ACS will transfuse 2 units PRBCs  CBC in AM  Patient has been treated with Procrit 20,000 units weekly for maintenance therapy followed by Hematology

## 2018-06-15 NOTE — PROGRESS NOTES
[facility] Hospital Medicine  Progress Note    Patient Name: Citlali Karimi  MRN: [id number]  Patient Class: IP- Inpatient   Admission Date: 6/12/2018  Length of Stay: 2 days  Attending Physician: Levy Samuel MD  Primary Care Provider: Evelio Schuster MD        Subjective:     Principal Problem:STEMI (ST elevation myocardial infarction)    HPI:  Citlali Karimi is a 68 y.o female with PMHx of CKD (IV), HTN, DM, CVA (left sided weakness), and CAD who initially presented for hyperparathyroidism and hypercalcemia requiring surgical intervention.  Pt underwent parathyroidectomy today per Dr. Tracy (General Surgery). Pt admitted to Medical Surgical Unit post procedure and Hospital Medicine consulted for medical management.  Chest xray post procedure showed mild asymmetric CHF versus RUL pneumonia. Troponin 0.113 and BNP >270 noted.  Pt given IV Lasix in PACU prior to unit arrival.      Hospital Course:  Pt admitted to Outpatient Extended Recovery post procedure.  Elevated noted post procedure and results trended yielding significant positive response.  Cardiology consulted and Heparin infusion initiated.  Hx of CAD, multiple vessels noted with home medications continued and Imdur dose increased.  Nephrology consulted due to elevated creatinine and [facility] recommended.  Echo results pending.  Heart catheterization procedure considered with family refusing due to concern for worsening kidney function as patient does not want to be on dialysis.  Decreased oral intake and difficulty swallowing noted.  Speech therapist to bedside.  Pt made NPO and General Surgery notified.      6/15  Patient worsening status. ET changed per Pulmonary CC. Patient s/p Code Blue - VFib with recovery. Cardiology taking patient to CATH lab. Discussed with CM plan for post acute care in progress.    Interval History: Worsening. Code Blue with recovery. [facility] in progress    Review of Systems   Unable to perform ROS: Intubated      Objective:     Vital Signs (Most Recent):  Temp: 98.2 °F (36.8 °C) (06/15/18 1505)  Pulse: 72 (06/15/18 1540)  Resp: 16 (06/15/18 1540)  BP: (!) 105/59 (06/15/18 1505)  SpO2: 99 % (06/15/18 1540) Vital Signs (24h Range):  Temp:  [97.8 °F (36.6 °C)-99.7 °F (37.6 °C)] 98.2 °F (36.8 °C)  Pulse:  [] 72  Resp:  [12-20] 16  SpO2:  [99 %-100 %] 99 %  BP: ()/(34-66) 105/59     Weight: 75.7 kg (166 lb 14.2 oz)  Body mass index is 30.52 kg/m².    Intake/Output Summary (Last 24 hours) at 06/15/18 1632  Last data filed at 06/15/18 1500   Gross per 24 hour   Intake           1626.9 ml   Output              699 ml   Net            927.9 ml      Physical Exam   Constitutional: She appears well-developed and well-nourished. She is intubated.   ILL Appearing  Intubated   HENT:   Head: Normocephalic and atraumatic.   ET in place  + neck swelling   Eyes: EOM are normal. Pupils are equal, round, and reactive to light.   Neck: Normal range of motion. Neck supple. No JVD present.   Cardiovascular: Regular rhythm and intact distal pulses.  Tachycardia present.    Murmur heard.   Systolic murmur is present with a grade of 3/6   Pulmonary/Chest: Effort normal and breath sounds normal. She is intubated. No respiratory distress. She has no wheezes.   Abdominal: Soft. Bowel sounds are normal. She exhibits no distension.   Musculoskeletal: Normal range of motion. She exhibits edema. She exhibits no tenderness.   Neurological: She has normal reflexes. No cranial nerve deficit.   Skin: Skin is warm and dry. Capillary refill takes less than 2 seconds. No erythema.   Nursing note and vitals reviewed.      Significant Labs:   BMP:   Recent Labs  Lab 06/15/18  0352   *  136*     142   K 3.7  3.7     106   CO2 20*  20*   BUN 45*  45*   CREATININE 4.2*  4.2*   CALCIUM 9.5  9.5   MG 2.0     CBC:   Recent Labs  Lab 06/14/18  1422 06/14/18  1736 06/15/18  0352   WBC 6.13 11.22 9.15   HGB 9.4* 8.0* 7.9*   HCT  29.5* 25.2* 23.7*   * 160 142*     CMP:   Recent Labs  Lab 06/14/18  0236 06/14/18  1422 06/14/18  1736 06/15/18  0352    142 139 142  142   K 3.7 3.2* 4.4 3.7  3.7    107 105 106  106   CO2 20* 17* 19* 20*  20*    192* 220* 136*  136*   BUN 43* 40* 41* 45*  45*   CREATININE 4.1* 3.7* 3.8* 4.2*  4.2*   CALCIUM 10.1 9.3 9.5 9.5  9.5   PROT  --  5.2*  --  5.8*   ALBUMIN 3.1* 2.7*  --  2.8*  2.8*   BILITOT  --  0.4  --  0.4   ALKPHOS  --  48*  --  66   AST  --  346*  --  304*   ALT  --  218*  --  90*   ANIONGAP 16 18* 15 16  16   EGFRNONAA 10* 12* 11* 10*  10*     All pertinent labs within the past 24 hours have been reviewed.    Significant Imaging: I have reviewed all pertinent imaging results/findings within the past 24 hours.    Assessment/Plan:      * STEMI (ST elevation myocardial infarction)    -Cardiology consulted   - EKG showed diffuse ST depression EKG and troponin increased from 0.113 >> 34>>50, consistent with NSTEMI  -Denies chest pain or SOB  -BP elevated yesterday, HTN likely triggered clot rupture/MI  -Known CAD on previous cath not amenable to PCI at that time  -Continue ASA, Imdur, Metoprolol, statin, heparin gtt  -Will need repeat angiogram to re-assess coronaries/underlying ischemia  -Nephrology consulted due to high risk of contrast induced nephropathy- creatinine 3.6  -Renal consult requested for recommendations/clearance for Heart Cath  -Family refused Heart Cath due to concern for worsening kidney function    6/15  [facility] Today  Cards          Shock liver    Monitor LFT's          Electrolyte imbalance    Monitor        Cardiac arrest with ventricular fibrillation    Cardiology  [facility] 6/15  ASA  Statin   BB          Hypercalcemia    -hx of hyperparathyroidism   - s/p parathyroidectomy   - Ca 11.4>>10.5>>9.3  - Nephrology consulted          Abnormal chest x-ray    - chest xray showed possible mild asymmetric CHF versus right upper lobe pneumonia.  -pt given IV  Lasix in PACU  - IV antibiotics for suspected aspiration continued   - pt recently seen by RAMIRO Mcnally (Otolaryngology) for Dysphagia  -   - pt afebrile, WBC normal  - repeat xray results showed improved aeration RUL with no adverse interval changes in comparison to previous study  - SLP evaluation performed and thin, pureed recommended on 6/13/18            Elevated troponin    -initial 0.113>>34>>50>>41  -pt denies CP and SOB  - EKG results showed diffuse ST changes    -serial results revealed NSTEMI with Cardiology consulted     6/15  NSTEMI POA  Cardiology  LHC  ASA  Hold Statin due to Elevated LFT's              CVA (cerebral vascular accident)    -hx of 1 yr ago per   -left sided weakness residual  -ASA and Statin continued           Hyperparathyroidism    -Pt admitted to Extended Recovery then transferred to Inpatient due to NSTEMI  -s/p Parathyroidectomy   -managed by General Surgery -primary team  - PTH- 32  - Ca 11.4>>10.5  - analgesia prn   - antiemetics prn  - specimens sent for analysis            Coronary artery disease involving native coronary artery    - ASA, Statin, and Lopressor continued   -Imdur dose increased   -previous Cath showed severe CAD (proximal LCX 99%, mid 50%, RCA mid 90%, distal with subtotal lesion).  - Cardiology consulted with medical management continued   - C proposed pending Nephrology recommendations  -family refused LHC due to concern for worsening kidney function as pt had expressed that she did not want to be on dialysis     6/15  Family electing LHC and accepting risk of worsening renal function  Cardiology          CKD (chronic kidney disease) stage 5, GFR less than 15 ml/min    -Creatinine 3.6>>4.1  -baseline 2.7-4.4  -will monitor   -sodium bicarb continued   -pt has been followed outpatient by Dr. Vazquez (Nephrology)  -Nephrology consulted - pt may benefit from repeat angio due to NSTEMI but at high risk of contrast induced nephropathy  - pt was  candidate for renal transplant however work up discontinued due to progressive weakness  - [facility] recommended     6/15  Monitor worsening Renal Function  S/p Cardiac VFib Arrest  [facility] today  IVF's  Monitor          Hypertension associated with diabetes    - home medications continued   -hx of noncompliance and inconsistent dosing at home per   - uncontrolled upon arrival- improved with Hydralazine in PACU   - Hydralazine prn          Diabetes mellitus, type 2 - only on oral medications    Controlled  NICC  Accuchecks          Acute blood loss anemia    -stable post procedure  -will monitor  -CBC in am   -pt followed outpatient by Heme/Onc          H/O aortic valve replacement    -s/p TAVR  - Echo in 04/2017 with EF 60%  -Cardiology following  - repeat Echo results showed EF 60% with diastolic dysfunction            VTE Risk Mitigation         Ordered     heparin 25,000 units in dextrose 5% 250 mL (100 units/mL) infusion; FEMALE  Continuous      06/14/18 1526     heparin 25,000 units in dextrose 5% 250 mL (100 units/mL) bolus from bag; PRN BOLUS  As needed (PRN)      06/14/18 1526     heparin 25,000 units in dextrose 5% 250 mL (100 units/mL) bolus from bag; PRN BOLUS  As needed (PRN)      06/14/18 1526     Place sequential compression device  Until discontinued      06/12/18 8671              Vinicio Marinelli MD  Department of Hospital Medicine   [facility]

## 2018-06-15 NOTE — PROGRESS NOTES
Patient to cath lab, on cardiac monitoring, manually bagged.  Heparin and Amiodarone infusing.  Report given to HARSHAL Medellin.

## 2018-06-15 NOTE — SUBJECTIVE & OBJECTIVE
Interval History:  Pt coded yesterday and kept intubated post surgery in ICU, stable overnight, heart cath today    Medications:  Continuous Infusions:   sodium chloride 0.9% 50 mL/hr at 06/15/18 1715     Scheduled Meds:   amiodarone  200 mg Oral BID    aspirin  81 mg Oral Daily    chlorhexidine  10 mL Mouth/Throat BID    clopidogrel  75 mg Oral Daily    metoprolol tartrate  25 mg Oral BID    nozaseptin   Each Nare BID    pantoprazole 40 mg in dextrose 5 % 100 mL IVPB (over 15 minutes) (ready to mix system)  40 mg Intravenous Daily    piperacillin-tazobactam (ZOSYN) IVPB  4.5 g Intravenous Q12H    senna-docusate 8.6-50 mg  1 tablet Oral BID    sodium bicarbonate  650 mg Oral BID     PRN Meds:acetaminophen, atropine, bisacodyl, cloNIDine, dextrose 50%, dextrose 50%, glucagon (human recombinant), glucose, glucose, hydrALAZINE, HYDROcodone-acetaminophen, insulin aspart U-100, lactulose, lorazepam, morphine, nitroGLYCERIN, ondansetron, oxyCODONE, sodium bicarbonate, sodium chloride 0.9%     Review of patient's allergies indicates:   Allergen Reactions    Lipitor [atorvastatin] Swelling     Lips       Objective:     Vital Signs (Most Recent):  Temp: 98.2 °F (36.8 °C) (06/15/18 1505)  Pulse: 70 (06/15/18 1745)  Resp: 16 (06/15/18 1745)  BP: (!) 105/59 (06/15/18 1505)  SpO2: 100 % (06/15/18 1745) Vital Signs (24h Range):  Temp:  [97.8 °F (36.6 °C)-99.7 °F (37.6 °C)] 98.2 °F (36.8 °C)  Pulse:  [] 70  Resp:  [12-20] 16  SpO2:  [99 %-100 %] 100 %  BP: ()/(34-66) 105/59     Weight: 75.7 kg (166 lb 14.2 oz)  Body mass index is 30.52 kg/m².    Intake/Output - Last 3 Shifts       06/13 0700 - 06/14 0659 06/14 0700 - 06/15 0659 06/15 0700 - 06/16 0659    P.O. 240      I.V. (mL/kg) 229.1 (3.3) 2333.4 (30.8) 363.6 (4.8)    Blood   687.5    IV Piggyback 248.8 300 200    Total Intake(mL/kg) 717.9 (10.3) 2633.4 (34.8) 1251.1 (16.5)    Urine (mL/kg/hr)  660 (0.4) 335 (0.4)    Other  14 (0) 15 (0)    Blood  20 (0)      Total Output   694 350    Net +717.9 +1939.4 +901.1           Urine Occurrence 3 x 1 x     Stool Occurrence 0 x 0 x           Physical Exam   Constitutional: She appears well-developed and well-nourished. No distress.   Neck: Neck supple.   Incision dressing intact, mily intact with minimal output   Cardiovascular: Normal rate and regular rhythm.    Pulmonary/Chest: Effort normal and breath sounds normal.   Vitals reviewed.      Significant Labs:  CBC:     Recent Labs  Lab 06/15/18  0352   WBC 9.15   RBC 2.72*   HGB 7.9*   HCT 23.7*   *   MCV 87   MCH 29.0   MCHC 33.3     BMP:     Recent Labs  Lab 06/15/18  0352   *  136*     142   K 3.7  3.7     106   CO2 20*  20*   BUN 45*  45*   CREATININE 4.2*  4.2*   CALCIUM 9.5  9.5   MG 2.0

## 2018-06-15 NOTE — PROGRESS NOTES
Patient having multiple issues with cuff around ETT leaking--ventilator not able to provide adequate tidal volumes. Versed and Propofol given by AIXA Ann for sedation.  Patient extubated and immediately reintubated with use of the glidescope.  Patient BP dropped initially, but did return to baseline.  CXR obtained for placement.  Will continue to monitor patient.

## 2018-06-15 NOTE — PT/OT/SLP PROGRESS
Physical Therapy      Patient Name:  Citlali Karimi   MRN:  [id number]    PT WITH DECLINE IN MEDICAL STATUS, TF TO ICU, PT WILL REQUIRE NEW P.T. ORDER FROM MD WHEN APPROPRIATE    Aditi Robledo, PT   6/15/2018  0900

## 2018-06-15 NOTE — PROGRESS NOTES
"Spoke to lab about 2130 ptt lab result.  Posted to epic at 0930am instead of 2130pm. Lab corrected error on SOFT side, per Marlen, but still resulted at am time in EPIC.  No titration of heparin at this time per "77" PTT result.   "

## 2018-06-15 NOTE — ASSESSMENT & PLAN NOTE
1. CKD stage 4/5 :  Renal function at baseline, will continue to monitor, no acute indication for renal replacement therapy,     2.  Acute MI - cardiology notes reviewed, Had a lengthy family discussion and family on 6/14 (  and niece Dr Hua ) ,     Initially pt and family decided against LHC due to risk of worsening kidney function as pt did not want to go on dialysis ,     After the CODE on 6/14 ,  changed his mind and wants LHC ,     UO low and understands that pt may need RRT after the procedure if kidneys cont to worsen ,     3. SHPT :  Status post partial parathyroidectomy surgery, will continue to monitor her serum calcium levels, hold Calcium carb at this time as her corrected serum calcium is high    Neck swelling improved after hematoma evacuation yesterday ,     4.  Anemia of chronic kidney disease - receiving 2 units of pRBC today ,     5. Stable lytes ,     6. Met acidosis : on sodium bicarb supplements,

## 2018-06-15 NOTE — PLAN OF CARE
"Problem: Patient Care Overview  Goal: Plan of Care Review  Outcome: Ongoing (interventions implemented as appropriate)  poc reviewed c pt and family.  Pt still does not follow commands, but is alert, attentive with eyes, and moves upper left and right and lower left extremities purposefully and spontaneously.  Not sure extent of baseline dementia on pt cooperativeness given intubation - to consult with family.  OG tube placed for po med access.  Iv metop given.  Pt shook head to deny pain medicine.  Scant output on MELISSA drain, circumference of neck remains stable at 20" when supine, 21" - 21 1/4" when turned on sides.  Vent adjustments per respiratory, tidal volume decreased.  Heparin decreased per nomogram.  On am labs, creatinine increased, with slight improvement on other levels.        "

## 2018-06-15 NOTE — NURSING
Bedside shift report completed with Violeta CAPUTO. Pt sitting HOB 90 AAO to self, weak cough, suction set up at bedside. Pulse ox % on 2L NC. No respiratory distress. Pink tinged sputum suctioned.

## 2018-06-15 NOTE — EICU
ABG 7.53/28/144  ON CMV/400/16/5/45%  MV 7L/min    Plan:  Decrease tidal volume to 360 ml(6ml/kg ideal body wt)

## 2018-06-15 NOTE — SUBJECTIVE & OBJECTIVE
Review of Systems   Unable to perform ROS: intubated     Objective:     Vital Signs (Most Recent):  Temp: 98.6 °F (37 °C) (06/15/18 1105)  Pulse: 73 (06/15/18 1123)  Resp: 16 (06/15/18 1123)  BP: (!) 104/47 (06/15/18 1105)  SpO2: 100 % (06/15/18 1123) Vital Signs (24h Range):  Temp:  [97.1 °F (36.2 °C)-99.7 °F (37.6 °C)] 98.6 °F (37 °C)  Pulse:  [0-143] 73  Resp:  [11-20] 16  SpO2:  [86 %-100 %] 100 %  BP: ()/() 104/47     Weight: 75.7 kg (166 lb 14.2 oz)  Body mass index is 30.52 kg/m².     SpO2: 100 %  O2 Device (Oxygen Therapy): ventilator      Intake/Output Summary (Last 24 hours) at 06/15/18 1131  Last data filed at 06/15/18 1100   Gross per 24 hour   Intake          2733.42 ml   Output              819 ml   Net          1914.42 ml       Lines/Drains/Airways     Central Venous Catheter Line                 Percutaneous Central Line Insertion/Assessment - triple lumen  06/14/18 right femoral 1 day          Drain                 Urethral Catheter 06/14/18 1113 1 day         Drain/Device  06/14/18 1239 neck collapsible closed device less than 1 day         NG/OG Tube 06/15/18 0200 [place] sump 14 Fr. Center mouth less than 1 day          Airway                 Airway - Non-Surgical 06/15/18 1025 Endotracheal Tube less than 1 day          Arterial Line                 Arterial Line 06/14/18 1427 Right Femoral less than 1 day          Peripheral Intravenous Line                 Peripheral IV - Single Lumen 06/12/18 0954 Left Forearm 3 days                Physical Exam   Constitutional:   Intubated, appears ill   HENT:   Head: Normocephalic and atraumatic.   Neck: Neck supple.   Incision C/D/I; no swelling   Cardiovascular: Normal rate, regular rhythm, S1 normal and S2 normal.    No murmur heard.  Pulmonary/Chest:   Coarse BS anteriorly with rhonchi   Abdominal: Soft. Bowel sounds are normal. She exhibits no distension. There is no tenderness.   Neurological:   Sedated   Nursing note and vitals  reviewed.      Significant Labs:   CMP   Recent Labs  Lab 06/14/18  0236 06/14/18  1422 06/14/18  1736 06/15/18  0352    142 139 142  142   K 3.7 3.2* 4.4 3.7  3.7    107 105 106  106   CO2 20* 17* 19* 20*  20*    192* 220* 136*  136*   BUN 43* 40* 41* 45*  45*   CREATININE 4.1* 3.7* 3.8* 4.2*  4.2*   CALCIUM 10.1 9.3 9.5 9.5  9.5   PROT  --  5.2*  --  5.8*   ALBUMIN 3.1* 2.7*  --  2.8*  2.8*   BILITOT  --  0.4  --  0.4   ALKPHOS  --  48*  --  66   AST  --  346*  --  304*   ALT  --  218*  --  90*   ANIONGAP 16 18* 15 16  16   ESTGFRAFRICA 12* 14* 13* 12*  12*   EGFRNONAA 10* 12* 11* 10*  10*   , CBC   Recent Labs  Lab 06/14/18  1422 06/14/18  1736 06/15/18  0352   WBC 6.13 11.22 9.15   HGB 9.4* 8.0* 7.9*   HCT 29.5* 25.2* 23.7*   * 160 142*   , Troponin   Recent Labs  Lab 06/13/18  1508   TROPONINI 41.662*    and All pertinent lab results from the last 24 hours have been reviewed.    Significant Imaging: Echocardiogram:   2D echo with color flow doppler:   Results for orders placed or performed during the hospital encounter of 06/12/18   2D echo with color flow doppler   Result Value Ref Range    EF 60 55 - 65    Diastolic Dysfunction Yes (A)    , EKG: Reviewed and X-Ray: CXR: X-Ray Chest 1 View (CXR):   Results for orders placed or performed during the hospital encounter of 06/12/18   X-Ray Chest 1 View    Narrative    EXAMINATION:  XR CHEST 1 VIEW    CLINICAL HISTORY:  resp failure;    TECHNIQUE:  Single frontal view of the chest was performed.    FINDINGS:  Nasogastric tube is coiled within the gastric fundus.  In comparison to the prior study, there is no adverse interval changes      Impression    In comparison to the prior study, there is no adverse interval changes      Electronically signed by: Jered Gan MD  Date:    06/15/2018  Time:    07:56    and X-Ray Chest PA and Lateral (CXR): No results found for this visit on 06/12/18.

## 2018-06-15 NOTE — ASSESSMENT & PLAN NOTE
Off Levophed  Monitor and replace electrolytes as needed  ICU cardiac monitoring  No neuro deficit noted above baseline from hx CVA

## 2018-06-15 NOTE — ASSESSMENT & PLAN NOTE
-initial 0.113>>34>>50>>41  -pt denies CP and SOB  - EKG results showed diffuse ST changes    -serial results revealed NSTEMI with Cardiology consulted     6/15  NSTEMI POA  Cardiology  LHC  ASA  Hold Statin due to Elevated LFT's

## 2018-06-15 NOTE — ASSESSMENT & PLAN NOTE
Per Cards  Cont Heparin infusion, Lopressor, ASA and statin  Planning [facility] asap

## 2018-06-15 NOTE — SUBJECTIVE & OBJECTIVE
Review of Systems   Unable to perform ROS: Intubated       Objective:     Vital Signs (Most Recent):  Temp: 99.7 °F (37.6 °C) (06/15/18 0705)  Pulse: 94 (06/15/18 0712)  Resp: 20 (06/15/18 0712)  BP: (!) 128/59 (06/15/18 0705)  SpO2: 100 % (06/15/18 0712) Vital Signs (24h Range):  Temp:  [97.1 °F (36.2 °C)-99.7 °F (37.6 °C)] 99.7 °F (37.6 °C)  Pulse:  [0-143] 94  Resp:  [11-20] 20  SpO2:  [86 %-100 %] 100 %  BP: ()/() 128/59     Weight: 75.7 kg (166 lb 14.2 oz)  Body mass index is 30.52 kg/m².      Intake/Output Summary (Last 24 hours) at 06/15/18 0851  Last data filed at 06/15/18 0705   Gross per 24 hour   Intake          2733.42 ml   Output              744 ml   Net          1989.42 ml       Physical Exam   Constitutional: Vital signs are normal. She appears well-developed and well-nourished.  Non-toxic appearance. She appears ill. No distress. She is intubated and restrained.   HENT:   Head: Normocephalic and atraumatic.   Mouth/Throat: Oropharynx is clear and moist and mucous membranes are normal.   Eyes: EOM and lids are normal. Pupils are equal, round, and reactive to light.   Neck: Trachea normal. Neck supple. Carotid bruit is not present.       Cardiovascular: Normal rate, regular rhythm and normal heart sounds.    Pulses:       Radial pulses are 2+ on the right side, and 2+ on the left side.        Dorsalis pedis pulses are 1+ on the right side, and 1+ on the left side.   Pulmonary/Chest: Effort normal. No accessory muscle usage. She is intubated. No respiratory distress. She has rhonchi.   Abdominal: Soft. Normal appearance. She exhibits no distension. Bowel sounds are decreased. There is no tenderness.   Genitourinary:   Genitourinary Comments: Fair in place   Musculoskeletal: Normal range of motion.        Right foot: There is no deformity.        Left foot: There is no deformity.   No edema   Lymphadenopathy:     She has no cervical adenopathy.   Neurological: She is alert.   Fully awake  and nodding head to questions   Skin: Skin is warm, dry and intact. Capillary refill takes less than 2 seconds. No rash noted. No cyanosis.            Vents:  Vent Mode: (S) Spont (Placed on spontaneous by AIXA Raines) (06/15/18 0712)  Ventilator Initiated: Yes (06/14/18 1812)  Set Rate: 0 bmp (06/15/18 0712)  Vt Set: 360 mL (06/15/18 0712)  Pressure Support: (S) 10 cmH20 (06/15/18 0712)  PEEP/CPAP: 5 cmH20 (06/15/18 0712)  Oxygen Concentration (%): 35 (06/15/18 0712)  Peak Airway Pressure: 15 cmH2O (06/15/18 0712)  Plateau Pressure: 0 cmH20 (06/15/18 0712)  Total Ve: 7.91 mL (06/15/18 0712)  F/VT Ratio<105 (RSBI): (!) 49.88 (06/15/18 0712)    Lines/Drains/Airways     Central Venous Catheter Line                 Percutaneous Central Line Insertion/Assessment - triple lumen  06/14/18 right femoral 1 day          Drain                 Drain/Device  06/14/18 1239 neck collapsible closed device less than 1 day         NG/OG Tube 06/15/18 0200 [place] sump 14 Fr. Center mouth less than 1 day         Urethral Catheter 06/14/18 1113 less than 1 day          Airway                 Airway - Non-Surgical 06/14/18 1150 Endotracheal Tube less than 1 day          Arterial Line                 Arterial Line 06/14/18 1427 Right Femoral less than 1 day          Peripheral Intravenous Line                 Peripheral IV - Single Lumen 06/12/18 0954 Left Forearm 2 days                Significant Labs:    CBC/Anemia Profile:    Recent Labs  Lab 06/14/18  1422 06/14/18  1736 06/15/18  0352   WBC 6.13 11.22 9.15   HGB 9.4* 8.0* 7.9*   HCT 29.5* 25.2* 23.7*   * 160 142*   MCV 90 89 87   RDW 16.7* 16.5* 16.4*        Chemistries:    Recent Labs  Lab 06/13/18  1623 06/14/18  0236 06/14/18  1422 06/14/18  1736 06/15/18  0352    143 142 139 142  142   K 4.2 3.7 3.2* 4.4 3.7  3.7    107 107 105 106  106   CO2 23 20* 17* 19* 20*  20*   BUN 42* 43* 40* 41* 45*  45*   CREATININE 4.1* 4.1* 3.7* 3.8* 4.2*  4.2*   CALCIUM 10.4  10.1 9.3 9.5 9.5  9.5   ALBUMIN 3.2* 3.1* 2.7*  --  2.8*  2.8*   PROT  --   --  5.2*  --  5.8*   BILITOT  --   --  0.4  --  0.4   ALKPHOS  --   --  48*  --  66   ALT  --   --  218*  --  90*   AST  --   --  346*  --  304*   MG  --   --  1.3* 2.1 2.0   PHOS 5.1* 5.2*  --   --  3.5       ABGs:   Recent Labs  Lab 06/15/18  0524   PH 7.535*   PCO2 28.2*   HCO3 23.8*   POCSATURATED 100   BE 1     Coagulation:   Recent Labs  Lab 06/15/18  0352   INR 1.1   APTT 97.9*     All pertinent labs within the past 24 hours have been reviewed.    Significant Imaging:  CXR: I have reviewed all pertinent results/findings within the past 24 hours and my personal findings are:  no acute pulm process noted

## 2018-06-15 NOTE — SUBJECTIVE & OBJECTIVE
Interval History:  events noted , pt had a brief CODE on 6/14 and was intubated, also had hematoma evacuated from surgical site ( neck ) y'day ,     Objective:     Vital Signs (Most Recent):  Temp: 98.6 °F (37 °C) (06/15/18 1130)  Pulse: 72 (06/15/18 1130)  Resp: 16 (06/15/18 1130)  BP: 111/60 (06/15/18 1130)  SpO2: 100 % (06/15/18 1130)  O2 Device (Oxygen Therapy): ventilator (06/15/18 1130) Vital Signs (24h Range):  Temp:  [97.1 °F (36.2 °C)-99.7 °F (37.6 °C)] 98.6 °F (37 °C)  Pulse:  [0-143] 72  Resp:  [11-20] 16  SpO2:  [86 %-100 %] 100 %  BP: ()/() 111/60     Weight: 75.7 kg (166 lb 14.2 oz) (06/15/18 0600)  Body mass index is 30.52 kg/m².  Body surface area is 1.82 meters squared.    I/O last 3 completed shifts:  In: 2921.3 [I.V.:2521.3; IV Piggyback:400]  Out: 694 [Urine:660; Other:14; Blood:20]    Physical Exam   Constitutional: She appears well-developed. No distress.   HENT:   Head: Normocephalic and atraumatic.   Mouth/Throat: No oropharyngeal exudate.   ETT in place    Eyes: Conjunctivae are normal. Pupils are equal, round, and reactive to light.   Neck: No JVD present. Carotid bruit is not present. No tracheal deviation present. No thyroid mass and no thyromegaly present.   Cardiovascular: Normal rate, regular rhythm and intact distal pulses.  Exam reveals no gallop and no friction rub.    Murmur heard.  Pulmonary/Chest: No respiratory distress. She has no wheezes. She exhibits no tenderness.   Abdominal: Soft. Bowel sounds are normal. She exhibits no distension, no abdominal bruit, no ascites and no mass. There is no hepatosplenomegaly. There is no tenderness. There is no rebound, no guarding and no CVA tenderness.   Musculoskeletal: She exhibits no edema or tenderness.   Lymphadenopathy:     She has no cervical adenopathy.   Neurological: No cranial nerve deficit. She exhibits normal muscle tone. Coordination normal.   Skin: Skin is warm and intact. No rash noted. No erythema. No pallor.        Significant Labs:  CBC:   Recent Labs  Lab 06/15/18  0352   WBC 9.15   RBC 2.72*   HGB 7.9*   HCT 23.7*   *   MCV 87   MCH 29.0   MCHC 33.3     CMP:   Recent Labs  Lab 06/15/18  0352   *  136*   CALCIUM 9.5  9.5   ALBUMIN 2.8*  2.8*   PROT 5.8*     142   K 3.7  3.7   CO2 20*  20*     106   BUN 45*  45*   CREATININE 4.2*  4.2*   ALKPHOS 66   ALT 90*   *   BILITOT 0.4     Coagulation:   Recent Labs  Lab 06/15/18  0352 06/15/18  1120   INR 1.1  --    APTT 97.9* 79.0*     LFTs:   Recent Labs  Lab 06/15/18  0352   ALT 90*   *   ALKPHOS 66   BILITOT 0.4   PROT 5.8*   ALBUMIN 2.8*  2.8*     All labs within the past 24 hours have been reviewed.     Lab Results   Component Value Date    PTH 18.5 06/12/2018    CALCIUM 9.5 06/15/2018    CALCIUM 9.5 06/15/2018    PHOS 3.5 06/15/2018         Significant Imaging: reviewed

## 2018-06-15 NOTE — PROGRESS NOTES
Received patient from cath lab.  Connected to ICU monitoring and ventilator.  Heparin gtt stopped post cath per Dr. Louis's order.  Dr. Louis also would like to transition patient to oral Amiodarone via OGT, and stop the drip.  Patient also started on Plavix.  Clarified fluid administration and rate with Dr. Vazquez.  Orders received and noted.  Left groin cath site dressing C/D/I, soft, no hematoma noted.  Distal pulses palpable.  Cath lab team reports that  will be going home and will be back in AM.   updated by Dr. Louis.  Will continue to monitor patient.

## 2018-06-15 NOTE — ASSESSMENT & PLAN NOTE
Leave intubated for airway protection  OET changed and cords viewed with glydoscope noting VCs are closed and swollen

## 2018-06-15 NOTE — PROGRESS NOTES
Patient assessed.  Soft bilat wrist restraints renewed due to risk of pulling lines, tubes and/or climbing OOB.    ED[facility]

## 2018-06-15 NOTE — ASSESSMENT & PLAN NOTE
- ASA, Statin, and Lopressor continued   -Imdur dose increased   -previous Cath showed severe CAD (proximal LCX 99%, mid 50%, RCA mid 90%, distal with subtotal lesion).  - Cardiology consulted with medical management continued   - LHC proposed pending Nephrology recommendations  -family refused LHC due to concern for worsening kidney function as pt had expressed that she did not want to be on dialysis     6/15  Family electing LHC and accepting risk of worsening renal function  Cardiology

## 2018-06-15 NOTE — PROGRESS NOTES
[facility] -   Critical Care Medicine  Progress Note    Patient Name: Citlali Karimi  MRN: [id number]  Admission Date: 6/12/2018  Hospital Length of Stay: 2 days  Code Status: Full Code  Attending Provider: Levy Samuel MD  Primary Care Provider: Evelio Schuster MD   Principal Problem: STEMI (ST elevation myocardial infarction)    Subjective:     HPI:  Ms Karimi is a 67 yo BF with a PMH of HTN, Hyperparathyroidism, DM2, CKD5, CVA, CAD and HLD.  She was seen in clinic by surgery for hyperparathyroidism and hypercalcemia and was electively admitted 6/12 taken to OR undergoing parathyroidectomy finding 2 right sided enlarged parathyroid glands.  Intra and post op patient developed EKG changes and had elevated troponin.  Cards consulted.  Patient admitted due to swallowing issues she was not taking all her meds as prescribed and had BP as high as 225/103 during hospitalization.  Troponin increased to > 50.  Cards diagnosed with NSTEMI and she was Rx with ASA, Imdur, Lopressor, statin and Heparin infusion with plans for repeat LHC if cleared by Renal given CKD5.  Today, she had progressively worsening neck swelling and SOB with worsening dysphagia.  She was taken back to OR and had post op neck hematoma evacuation.  In PACU she had witnessed V-Fib cardiac arrest and CODE BLUE called.  After 3 rounds of CPR, Epi X 3 and Defib X 3 ROSC was attained.  She was still intubated and on vent post op.  CL and arterial line placed in right groin during code per myself.  Cards and Surgery at bedside.      Hospital/ICU Course:  [facility]  She was in A-Fib with ST changes.  Cards called and patient placed on Amiodarone and Heparin infusion.    6/15 - Weaned off Levophed yesterday afternoon.  Still on vent much improved oxygenation.  Airway leak of #6 OET.  Fully awake and following commands.  Still on Heparin infusion.    Review of Systems   Unable to perform  ROS: Intubated       Objective:     Vital Signs (Most Recent):  Temp: 99.7 °F (37.6 °C) (06/15/18 0705)  Pulse: 94 (06/15/18 0712)  Resp: 20 (06/15/18 0712)  BP: (!) 128/59 (06/15/18 0705)  SpO2: 100 % (06/15/18 0712) Vital Signs (24h Range):  Temp:  [97.1 °F (36.2 °C)-99.7 °F (37.6 °C)] 99.7 °F (37.6 °C)  Pulse:  [0-143] 94  Resp:  [11-20] 20  SpO2:  [86 %-100 %] 100 %  BP: ()/() 128/59     Weight: 75.7 kg (166 lb 14.2 oz)  Body mass index is 30.52 kg/m².      Intake/Output Summary (Last 24 hours) at 06/15/18 0851  Last data filed at 06/15/18 0705   Gross per 24 hour   Intake          2733.42 ml   Output              744 ml   Net          1989.42 ml       Physical Exam   Constitutional: Vital signs are normal. She appears well-developed and well-nourished.  Non-toxic appearance. She appears ill. No distress. She is intubated and restrained.   HENT:   Head: Normocephalic and atraumatic.   Mouth/Throat: Oropharynx is clear and moist and mucous membranes are normal.   Eyes: EOM and lids are normal. Pupils are equal, round, and reactive to light.   Neck: Trachea normal. Neck supple. Carotid bruit is not present.       Cardiovascular: Normal rate, regular rhythm and normal heart sounds.    Pulses:       Radial pulses are 2+ on the right side, and 2+ on the left side.        Dorsalis pedis pulses are 1+ on the right side, and 1+ on the left side.   Pulmonary/Chest: Effort normal. No accessory muscle usage. She is intubated. No respiratory distress. She has rhonchi.   Abdominal: Soft. Normal appearance. She exhibits no distension. Bowel sounds are decreased. There is no tenderness.   Genitourinary:   Genitourinary Comments: Fair in place   Musculoskeletal: Normal range of motion.        Right foot: There is no deformity.        Left foot: There is no deformity.   No edema   Lymphadenopathy:     She has no cervical adenopathy.   Neurological: She is alert.   Fully awake and nodding head to questions   Skin:  Skin is warm, dry and intact. Capillary refill takes less than 2 seconds. No rash noted. No cyanosis.            Vents:  Vent Mode: (S) Spont (Placed on spontaneous by AIXA Raines) (06/15/18 0712)  Ventilator Initiated: Yes (06/14/18 1812)  Set Rate: 0 bmp (06/15/18 0712)  Vt Set: 360 mL (06/15/18 0712)  Pressure Support: (S) 10 cmH20 (06/15/18 0712)  PEEP/CPAP: 5 cmH20 (06/15/18 0712)  Oxygen Concentration (%): 35 (06/15/18 0712)  Peak Airway Pressure: 15 cmH2O (06/15/18 0712)  Plateau Pressure: 0 cmH20 (06/15/18 0712)  Total Ve: 7.91 mL (06/15/18 0712)  F/VT Ratio<105 (RSBI): (!) 49.88 (06/15/18 0712)    Lines/Drains/Airways     Central Venous Catheter Line                 Percutaneous Central Line Insertion/Assessment - triple lumen  06/14/18 right femoral 1 day          Drain                 Drain/Device  06/14/18 1239 neck collapsible closed device less than 1 day         NG/OG Tube 06/15/18 0200 [place] sump 14 Fr. Center mouth less than 1 day         Urethral Catheter 06/14/18 1113 less than 1 day          Airway                 Airway - Non-Surgical 06/14/18 1150 Endotracheal Tube less than 1 day          Arterial Line                 Arterial Line 06/14/18 1427 Right Femoral less than 1 day          Peripheral Intravenous Line                 Peripheral IV - Single Lumen 06/12/18 0954 Left Forearm 2 days                Significant Labs:    CBC/Anemia Profile:    Recent Labs  Lab 06/14/18  1422 06/14/18  1736 06/15/18  0352   WBC 6.13 11.22 9.15   HGB 9.4* 8.0* 7.9*   HCT 29.5* 25.2* 23.7*   * 160 142*   MCV 90 89 87   RDW 16.7* 16.5* 16.4*        Chemistries:    Recent Labs  Lab 06/13/18  1623 06/14/18  0236 06/14/18  1422 06/14/18  1736 06/15/18  0352    143 142 139 142  142   K 4.2 3.7 3.2* 4.4 3.7  3.7    107 107 105 106  106   CO2 23 20* 17* 19* 20*  20*   BUN 42* 43* 40* 41* 45*  45*   CREATININE 4.1* 4.1* 3.7* 3.8* 4.2*  4.2*   CALCIUM 10.4 10.1 9.3 9.5 9.5  9.5   ALBUMIN 3.2*  3.1* 2.7*  --  2.8*  2.8*   PROT  --   --  5.2*  --  5.8*   BILITOT  --   --  0.4  --  0.4   ALKPHOS  --   --  48*  --  66   ALT  --   --  218*  --  90*   AST  --   --  346*  --  304*   MG  --   --  1.3* 2.1 2.0   PHOS 5.1* 5.2*  --   --  3.5       ABGs:   Recent Labs  Lab 06/15/18  0524   PH 7.535*   PCO2 28.2*   HCO3 23.8*   POCSATURATED 100   BE 1     Coagulation:   Recent Labs  Lab 06/15/18  0352   INR 1.1   APTT 97.9*     All pertinent labs within the past 24 hours have been reviewed.    Significant Imaging:  CXR: I have reviewed all pertinent results/findings within the past 24 hours and my personal findings are:  no acute pulm process noted      Assessment/Plan:     Pulmonary   On mechanically assisted ventilation    Vent settings reviewed and adjusted  Cont full vent support for airway protection post op neck surgery with swelling  Tolerates SAT/SBT  OET small at #6 and with persistent air leak and losing Vt  Unable to suction down small OET for secretion mobility  Will change OET  SBT in AM  Weaning FiO2        Acute airway obstruction    Leave intubated for airway protection  OET changed and cords viewed with glydoscope noting VCs are closed and swollen        Cardiac/Vascular   * STEMI (ST elevation myocardial infarction)    Per Cards  Cont Heparin infusion, Lopressor, ASA and statin  Planning [facility] asap          Persistent atrial fibrillation    Converted to NSR  Cont Amiodarone and Lopressor  On Heparin infusion        Cardiac arrest with ventricular fibrillation    Off Levophed  Monitor and replace electrolytes as needed  ICU cardiac monitoring  No neuro deficit noted above baseline from hx CVA        H/O aortic valve replacement    On Heparin infusion  Cards following        Renal/   Electrolyte imbalance    Repeat labs in AM        CKD (chronic kidney disease) stage 5, GFR less than 15 ml/min    Renal following  BMP daily  Accurate I/Os        Oncology   Acute blood loss anemia    Given ACS will  transfuse 2 units PRBCs  CBC in AM  Patient has been treated with Procrit 20,000 units weekly for maintenance therapy followed by Hematology        Endocrine   Diabetes mellitus, type 2 - only on oral medications    Cont SSI          Hyperparathyroidism    POD #3 S/P parathyroidectomy  Surgery following        S/P parathyroidectomy    Per surgery  Post op hematoma evacuated 6/14  Monitor neck circumference  POD #3        GI   Shock liver    Improving  CMP in AM  Support BP           Preventive Measures and Monitoring:   Stress Ulcer: PPI  Nutrition: NPO  Glucose control: SSI  Bowel prophylaxis: PRN Dulcolax  DVT prophylaxis: Heparin infusion  Hx CAD on B-Blocker: Lopressor  Head of Bed/Reposition: Elevate HOB and turn Q1-2 hours   Early Mobility: Bed rest  SAT/SBT: Passed but need airway protection  Vent Day: #2  OG Day: #2  Central Line Right Fem Day: #2  Right Femoral Arterial Line Day: #2  [place]: #2  IVAB Day: #3  Code Status: Full  Pneumonia Vaccine: UTD     Counseling/Consultation:I have discussed the care of this patient in detail with the bedside nursing staff and Dr. May and Dr. Samuel and Dr. Louis and Dr. Marinelli    Critical Care Time: 70 minutes  Critical secondary to Patient has a condition that poses threat to life and bodily function: Acute Myocardial Infarction and Mechanical Ventilation  Patient is currently receiving parenteral controlled substances: Morphine and Ativan      Critical care was time spent personally by me on the following activities: development of treatment plan with patient or surrogate and bedside caregivers, discussions with consultants, evaluation of patient's response to treatment, examination of patient, ordering and performing treatments and interventions, ordering and review of laboratory studies, ordering and review of radiographic studies, pulse oximetry, re-evaluation of patient's condition. This critical care time did not overlap with that of any other provider  or involve time for any procedures.     Joseph Talavera NP  Critical Care Medicine  [facility] [place]

## 2018-06-15 NOTE — PROGRESS NOTES
Pt is currently in cath lab on  at previous settings. Ambubag and mask with patient for precautions.

## 2018-06-16 PROBLEM — I48.19 PERSISTENT ATRIAL FIBRILLATION: Status: RESOLVED | Noted: 2018-06-14 | Resolved: 2018-06-16

## 2018-06-16 PROBLEM — E83.52 HYPERCALCEMIA: Status: RESOLVED | Noted: 2018-06-12 | Resolved: 2018-06-16

## 2018-06-16 PROBLEM — I21.3 STEMI (ST ELEVATION MYOCARDIAL INFARCTION): Status: RESOLVED | Noted: 2018-06-13 | Resolved: 2018-06-16

## 2018-06-16 PROBLEM — I21.4 NSTEMI (NON-ST ELEVATED MYOCARDIAL INFARCTION): Status: ACTIVE | Noted: 2018-06-16

## 2018-06-16 PROBLEM — K72.00 SHOCK LIVER: Status: RESOLVED | Noted: 2018-06-14 | Resolved: 2018-06-16

## 2018-06-16 LAB
ALBUMIN SERPL BCP-MCNC: 2.3 G/DL
ALBUMIN SERPL BCP-MCNC: 2.3 G/DL
ALLENS TEST: ABNORMAL
ALP SERPL-CCNC: 57 U/L
ALT SERPL W/O P-5'-P-CCNC: 13 U/L
ANION GAP SERPL CALC-SCNC: 15 MMOL/L
ANION GAP SERPL CALC-SCNC: 15 MMOL/L
AST SERPL-CCNC: 124 U/L
BASOPHILS # BLD AUTO: 0.01 K/UL
BASOPHILS NFR BLD: 0.1 %
BILIRUB SERPL-MCNC: 0.4 MG/DL
BUN SERPL-MCNC: 49 MG/DL
BUN SERPL-MCNC: 49 MG/DL
CALCIUM SERPL-MCNC: 8.7 MG/DL
CALCIUM SERPL-MCNC: 8.7 MG/DL
CHLORIDE SERPL-SCNC: 105 MMOL/L
CHLORIDE SERPL-SCNC: 105 MMOL/L
CO2 SERPL-SCNC: 20 MMOL/L
CO2 SERPL-SCNC: 20 MMOL/L
CORONARY STENOSIS: ABNORMAL
CREAT SERPL-MCNC: 4.7 MG/DL
CREAT SERPL-MCNC: 4.7 MG/DL
DELSYS: ABNORMAL
DIFFERENTIAL METHOD: ABNORMAL
EOSINOPHIL # BLD AUTO: 0 K/UL
EOSINOPHIL NFR BLD: 0.3 %
ERYTHROCYTE [DISTWIDTH] IN BLOOD BY AUTOMATED COUNT: 19.3 %
ERYTHROCYTE [SEDIMENTATION RATE] IN BLOOD BY WESTERGREN METHOD: 16 MM/H
EST. GFR  (AFRICAN AMERICAN): 10 ML/MIN/1.73 M^2
EST. GFR  (AFRICAN AMERICAN): 10 ML/MIN/1.73 M^2
EST. GFR  (NON AFRICAN AMERICAN): 9 ML/MIN/1.73 M^2
EST. GFR  (NON AFRICAN AMERICAN): 9 ML/MIN/1.73 M^2
FIO2: 30
GLUCOSE SERPL-MCNC: 101 MG/DL
GLUCOSE SERPL-MCNC: 101 MG/DL
HCO3 UR-SCNC: 23.5 MMOL/L (ref 24–28)
HCT VFR BLD AUTO: 27.1 %
HGB BLD-MCNC: 8.9 G/DL
LYMPHOCYTES # BLD AUTO: 1.1 K/UL
LYMPHOCYTES NFR BLD: 11.7 %
MAGNESIUM SERPL-MCNC: 1.9 MG/DL
MCH RBC QN AUTO: 27.2 PG
MCHC RBC AUTO-ENTMCNC: 32.8 G/DL
MCV RBC AUTO: 83 FL
MODE: ABNORMAL
MONOCYTES # BLD AUTO: 0.7 K/UL
MONOCYTES NFR BLD: 8.2 %
NEUTROPHILS # BLD AUTO: 7.2 K/UL
NEUTROPHILS NFR BLD: 79.7 %
PCO2 BLDA: 31.3 MMHG (ref 35–45)
PEEP: 5
PH SMN: 7.48 [PH] (ref 7.35–7.45)
PHOSPHATE SERPL-MCNC: 4.5 MG/DL
PLATELET # BLD AUTO: 137 K/UL
PMV BLD AUTO: 9.9 FL
PO2 BLDA: 122 MMHG (ref 80–100)
POC BE: 0 MMOL/L
POC SATURATED O2: 99 % (ref 95–100)
POCT GLUCOSE: 107 MG/DL (ref 70–110)
POCT GLUCOSE: 122 MG/DL (ref 70–110)
POCT GLUCOSE: 90 MG/DL (ref 70–110)
POCT GLUCOSE: 91 MG/DL (ref 70–110)
POTASSIUM SERPL-SCNC: 3.6 MMOL/L
POTASSIUM SERPL-SCNC: 3.6 MMOL/L
PROT SERPL-MCNC: 5.3 G/DL
RBC # BLD AUTO: 3.27 M/UL
SAMPLE: ABNORMAL
SITE: ABNORMAL
SODIUM SERPL-SCNC: 140 MMOL/L
SODIUM SERPL-SCNC: 140 MMOL/L
VT: 380
WBC # BLD AUTO: 8.99 K/UL

## 2018-06-16 PROCEDURE — 80069 RENAL FUNCTION PANEL: CPT

## 2018-06-16 PROCEDURE — 99232 SBSQ HOSP IP/OBS MODERATE 35: CPT | Mod: ,,, | Performed by: INTERNAL MEDICINE

## 2018-06-16 PROCEDURE — 63600175 PHARM REV CODE 636 W HCPCS: Performed by: SURGERY

## 2018-06-16 PROCEDURE — 25000003 PHARM REV CODE 250: Performed by: SURGERY

## 2018-06-16 PROCEDURE — 99900026 HC AIRWAY MAINTENANCE (STAT)

## 2018-06-16 PROCEDURE — 94003 VENT MGMT INPAT SUBQ DAY: CPT

## 2018-06-16 PROCEDURE — 20000000 HC ICU ROOM

## 2018-06-16 PROCEDURE — 85025 COMPLETE CBC W/AUTO DIFF WBC: CPT

## 2018-06-16 PROCEDURE — C9113 INJ PANTOPRAZOLE SODIUM, VIA: HCPCS | Performed by: SURGERY

## 2018-06-16 PROCEDURE — 63600175 PHARM REV CODE 636 W HCPCS: Performed by: NURSE PRACTITIONER

## 2018-06-16 PROCEDURE — 83735 ASSAY OF MAGNESIUM: CPT

## 2018-06-16 PROCEDURE — 80053 COMPREHEN METABOLIC PANEL: CPT

## 2018-06-16 PROCEDURE — 99900035 HC TECH TIME PER 15 MIN (STAT)

## 2018-06-16 PROCEDURE — 99291 CRITICAL CARE FIRST HOUR: CPT | Mod: ,,, | Performed by: INTERNAL MEDICINE

## 2018-06-16 PROCEDURE — 82803 BLOOD GASES ANY COMBINATION: CPT

## 2018-06-16 PROCEDURE — 25000003 PHARM REV CODE 250: Performed by: NURSE PRACTITIONER

## 2018-06-16 PROCEDURE — 99024 POSTOP FOLLOW-UP VISIT: CPT | Mod: POP,,, | Performed by: SURGERY

## 2018-06-16 PROCEDURE — 37799 UNLISTED PX VASCULAR SURGERY: CPT

## 2018-06-16 PROCEDURE — 63600175 PHARM REV CODE 636 W HCPCS

## 2018-06-16 PROCEDURE — 99291 CRITICAL CARE FIRST HOUR: CPT | Mod: ,,, | Performed by: NURSE PRACTITIONER

## 2018-06-16 RX ORDER — POLYETHYLENE GLYCOL 3350 17 G/17G
17 POWDER, FOR SOLUTION ORAL DAILY
Status: DISCONTINUED | OUTPATIENT
Start: 2018-06-16 | End: 2018-06-18

## 2018-06-16 RX ORDER — POTASSIUM CHLORIDE 20 MEQ/15ML
20 SOLUTION ORAL ONCE
Status: COMPLETED | OUTPATIENT
Start: 2018-06-16 | End: 2018-06-16

## 2018-06-16 RX ORDER — FENTANYL CITRAT/DEXTROSE 5%/PF 100 MCG/10
PATIENT CONTROLLED ANALGESIA SYRINGE INTRAVENOUS CONTINUOUS
Status: DISCONTINUED | OUTPATIENT
Start: 2018-06-16 | End: 2018-06-17

## 2018-06-16 RX ADMIN — DEXTROSE 40 MG: 50 INJECTION, SOLUTION INTRAVENOUS at 08:06

## 2018-06-16 RX ADMIN — SODIUM BICARBONATE 650 MG TABLET 650 MG: at 08:06

## 2018-06-16 RX ADMIN — MORPHINE SULFATE 4 MG: 4 INJECTION INTRAVENOUS at 01:06

## 2018-06-16 RX ADMIN — CHLORHEXIDINE GLUCONATE 10 ML: 1.2 RINSE ORAL at 08:06

## 2018-06-16 RX ADMIN — METHYLPREDNISOLONE SODIUM SUCCINATE 40 MG: 40 INJECTION, POWDER, FOR SOLUTION INTRAMUSCULAR; INTRAVENOUS at 05:06

## 2018-06-16 RX ADMIN — METOPROLOL TARTRATE 25 MG: 25 TABLET, FILM COATED ORAL at 08:06

## 2018-06-16 RX ADMIN — STANDARDIZED SENNA CONCENTRATE AND DOCUSATE SODIUM 1 TABLET: 8.6; 5 TABLET, FILM COATED ORAL at 08:06

## 2018-06-16 RX ADMIN — Medication 50 MCG/HR: at 08:06

## 2018-06-16 RX ADMIN — PIPERACILLIN AND TAZOBACTAM 4.5 G: 4; .5 INJECTION, POWDER, LYOPHILIZED, FOR SOLUTION INTRAVENOUS; PARENTERAL at 08:06

## 2018-06-16 RX ADMIN — MORPHINE SULFATE 4 MG: 4 INJECTION INTRAVENOUS at 05:06

## 2018-06-16 RX ADMIN — METHYLPREDNISOLONE SODIUM SUCCINATE 40 MG: 40 INJECTION, POWDER, FOR SOLUTION INTRAMUSCULAR; INTRAVENOUS at 11:06

## 2018-06-16 RX ADMIN — AMIODARONE HYDROCHLORIDE 200 MG: 200 TABLET ORAL at 08:06

## 2018-06-16 RX ADMIN — POTASSIUM CHLORIDE 20 MEQ: 20 SOLUTION ORAL at 11:06

## 2018-06-16 RX ADMIN — ASPIRIN 81 MG 81 MG: 81 TABLET ORAL at 08:06

## 2018-06-16 RX ADMIN — CLOPIDOGREL BISULFATE 75 MG: 75 TABLET ORAL at 08:06

## 2018-06-16 NOTE — ASSESSMENT & PLAN NOTE
Monitor and replace electrolytes as needed  ICU cardiac monitoring  Cont Amiodarone.  A-Fib resolved.  No neuro deficit noted above baseline from hx CVA

## 2018-06-16 NOTE — PROGRESS NOTES
Patient assessed.  Soft bilat wrist restraints renewed due to risk of pulling lines, tubes and/or climbing OOB.    ED[facility]alavera [facility]

## 2018-06-16 NOTE — PLAN OF CARE
Problem: Patient Care Overview  Goal: Plan of Care Review  Outcome: Ongoing (interventions implemented as appropriate)  Patient passed SBT today, but neck remains swollen, still measuring 20-21 inches; did not extubate patient.  Steroids started today to assist in decreasing swelling.  Fentanyl gtt started for sedation.  UOP remains poor.  VSS.  Restraints continue; intermittently will reach for ETT and lines.  Turn q2h, heels floated.  POC reviewed with patient and family at the bedside.  All questions answered.

## 2018-06-16 NOTE — ASSESSMENT & PLAN NOTE
-Creatinine 3.6>>4.1  -baseline 2.7-4.4  -will monitor   -sodium bicarb continued   -pt has been followed outpatient by Dr. Vazquez (Nephrology)  -Nephrology consulted - pt may benefit from repeat angio due to NSTEMI but at high risk of contrast induced nephropathy  - pt was candidate for renal transplant however work up discontinued due to progressive weakness  - [facility] recommended     6/15  Monitor worsening Renal Function  S/p Cardiac VFib Arrest  [facility] today  IVF's  Monitor      6/16  [facility] diffuse disease  Cardiology  Monitor

## 2018-06-16 NOTE — PROGRESS NOTES
[facility] -   General Surgery  Progress Note    Subjective:     History of Present Illness:  No notes on file    Post-Op Info:  Procedure(s) (LRB):  CATHETERIZATION, HEART, LEFT (Left)   1 Day Post-Op     Interval History: No events    Medications:  Continuous Infusions:   fentanyl 50 mcg/hr (06/16/18 0900)     Scheduled Meds:   amiodarone  200 mg Oral BID    aspirin  81 mg Oral Daily    chlorhexidine  10 mL Mouth/Throat BID    clopidogrel  75 mg Oral Daily    metoprolol tartrate  25 mg Oral BID    nozaseptin   Each Nare BID    pantoprazole 40 mg in dextrose 5 % 100 mL IVPB (over 15 minutes) (ready to mix system)  40 mg Intravenous Daily    piperacillin-tazobactam (ZOSYN) IVPB  4.5 g Intravenous Q12H    senna-docusate 8.6-50 mg  1 tablet Oral BID    sodium bicarbonate  650 mg Oral BID     PRN Meds:acetaminophen, atropine, bisacodyl, cloNIDine, dextrose 50%, dextrose 50%, glucagon (human recombinant), glucose, glucose, hydrALAZINE, HYDROcodone-acetaminophen, insulin aspart U-100, lactulose, lorazepam, nitroGLYCERIN, ondansetron, sodium bicarbonate, sodium chloride 0.9%     Review of patient's allergies indicates:   Allergen Reactions    Lipitor [atorvastatin] Swelling     Lips       Objective:     Vital Signs (Most Recent):  Temp: 98.6 °F (37 °C) (06/16/18 0704)  Pulse: 68 (06/16/18 0900)  Resp: 13 (06/16/18 0900)  BP: (!) 98/37 (06/16/18 0900)  SpO2: 100 % (06/16/18 0900) Vital Signs (24h Range):  Temp:  [97.7 °F (36.5 °C)-98.8 °F (37.1 °C)] 98.6 °F (37 °C)  Pulse:  [61-84] 68  Resp:  [11-19] 13  SpO2:  [84 %-100 %] 100 %  BP: ()/(34-66) 98/37     Weight: 75.6 kg (166 lb 10.7 oz)  Body mass index is 30.48 kg/m².    Intake/Output - Last 3 Shifts       06/14 0700 - 06/15 0659 06/15 0700 - 06/16 0659 06/16 0700 - 06/17 0659    I.V. (mL/kg) 2333.4 (30.8) 859.4 (11.4) 100 (1.3)    Blood  687.5     NG/GT   120    IV Piggyback 300 200 100    Total Intake(mL/kg) 2633.4 (34.8) 1746.9 (23.1)  320 (4.2)    Urine (mL/kg/hr) 660 (0.4) 720 (0.4) 45 (0.3)    Other 14 (0) 15 (0)     Blood 20 (0)      Total Output 694 735 45    Net +1939.4 +1011.9 +275           Urine Occurrence 1 x      Stool Occurrence 0 x 0 x           Physical Exam   Constitutional: She is oriented to person, place, and time. No distress.   Neck: Trachea normal.   Swelling along left side of neck superior to incision   Pulmonary/Chest: Effort normal. No respiratory distress.   Musculoskeletal: She exhibits no edema.   Neurological: She is alert and oriented to person, place, and time.   Skin: No rash noted. No erythema. No pallor.       Significant Labs:  CBC:   Recent Labs  Lab 06/16/18  0330   WBC 8.99   RBC 3.27*   HGB 8.9*   HCT 27.1*   *   MCV 83   MCH 27.2   MCHC 32.8       Significant Diagnostics:  I have reviewed all pertinent imaging results/findings within the past 24 hours.    Assessment/Plan:     CVA (cerebral vascular accident)    Prior CVA, patient with increased difficulty swelling medications at home prior to hospitalization, underwent evaluation with ENT prior to surgery  Speech therapy consult for swallowing        Hyperparathyroidism    Monitor calcium, PTH levels normal    S/p neck washout/drain placement    Allow neck swelling to decrease before extubating        Coronary artery disease involving native coronary artery    Cardiology following        CKD (chronic kidney disease) stage 5, GFR less than 15 ml/min    Monitor urine output creatinine and electrolytes        Hypertension associated with diabetes    Antihypertensive medications            Louis O. Jeansonne, MD  General Surgery  [facility]

## 2018-06-16 NOTE — ASSESSMENT & PLAN NOTE
1. MAYLIN on CKD stage 4/5 :  Worsening in serum cr noted , will  continue to monitor, no acute indication for renal replacement therapy,     Can be due to hemodynamic reasons or ILIA ,     2.  Acute MI - cardiology notes reviewed, Had a lengthy family discussion and family on 6/14 (  and niece Dr Hua ) ,     Initially pt and family decided against LHC due to risk of worsening kidney function as pt did not want to go on dialysis ,     After the CODE on 6/14 ,  changed his mind , s/p LHC on 6/15, triple vessel disease not amenable for angioplasty ,     UO low and understands that pt may need RRT after the procedure if kidneys cont to worsen ,     3. SHPT :  Status post partial parathyroidectomy surgery, will continue to monitor her serum calcium levels, hold Calcium carb at this time as her corrected serum calcium is stable, Neck swelling improved after hematoma evacuation yesterday ,     4.  Anemia of chronic kidney disease - receiving 2 units of pRBC today ,     5. Stable lytes ,     6. Met acidosis : on sodium bicarb supplements,     7. resp failure - on vent support

## 2018-06-16 NOTE — SUBJECTIVE & OBJECTIVE
Interval History:     Review of Systems   Constitution: Negative. Negative for weight gain.   HENT: Negative.    Eyes: Negative.    Cardiovascular: Negative.  Negative for chest pain, leg swelling and palpitations.   Respiratory: Negative.  Negative for shortness of breath.    Endocrine: Negative.    Hematologic/Lymphatic: Negative.    Skin: Negative.    Musculoskeletal: Negative for muscle weakness.   Gastrointestinal: Negative.    Genitourinary: Negative.    Neurological: Negative.  Negative for dizziness.   Psychiatric/Behavioral: Negative.    Allergic/Immunologic: Negative.      Objective:     Vital Signs (Most Recent):  Temp: 98.6 °F (37 °C) (06/16/18 1502)  Pulse: 76 (06/16/18 1502)  Resp: 14 (06/16/18 1502)  BP: (!) 102/34 (06/16/18 1502)  SpO2: 100 % (06/16/18 1502) Vital Signs (24h Range):  Temp:  [97.7 °F (36.5 °C)-98.8 °F (37.1 °C)] 98.6 °F (37 °C)  Pulse:  [61-84] 76  Resp:  [11-19] 14  SpO2:  [84 %-100 %] 100 %  BP: ()/(34-62) 102/34     Weight: 75.6 kg (166 lb 10.7 oz)  Body mass index is 30.48 kg/m².     SpO2: 100 %  O2 Device (Oxygen Therapy): ventilator      Intake/Output Summary (Last 24 hours) at 06/16/18 1530  Last data filed at 06/16/18 1500   Gross per 24 hour   Intake          1063.46 ml   Output              650 ml   Net           413.46 ml       Lines/Drains/Airways     Central Venous Catheter Line                 Percutaneous Central Line Insertion/Assessment - triple lumen  06/14/18 right femoral 2 days          Drain                 Drain/Device  06/14/18 1239 neck collapsible closed device 2 days         Urethral Catheter 06/14/18 1113 2 days         NG/OG Tube 06/15/18 0200 [place] sump 14 Fr. Center mouth 1 day          Airway                 Airway - Non-Surgical 06/15/18 1025 Endotracheal Tube 1 day          Arterial Line                 Arterial Line 06/14/18 1427 Right Femoral 2 days          Peripheral Intravenous Line                 Peripheral IV - Single Lumen 06/12/18 0954  Left Forearm 4 days                Physical Exam   Constitutional: She is oriented to person, place, and time. She appears well-developed and well-nourished.   HENT:   Head: Normocephalic and atraumatic.   Eyes: Conjunctivae and EOM are normal. Pupils are equal, round, and reactive to light.   Neck: Normal range of motion. Neck supple.   Cardiovascular: Normal rate, regular rhythm, normal heart sounds and intact distal pulses.    Pulmonary/Chest: Effort normal and breath sounds normal.   Abdominal: Soft. Bowel sounds are normal.   Musculoskeletal: Normal range of motion.   Neurological: She is alert and oriented to person, place, and time.   Skin: Skin is warm and dry.   Psychiatric: She has a normal mood and affect.   Nursing note and vitals reviewed.      Significant Labs: All pertinent lab results from the last 24 hours have been reviewed.    Significant Imaging: X-Ray: CXR: X-Ray Chest 1 View (CXR):   Results for orders placed or performed during the hospital encounter of 06/12/18   X-Ray Chest 1 View    Narrative    EXAMINATION:  XR CHEST 1 VIEW    CLINICAL HISTORY:  resp failure;    TECHNIQUE:  Single frontal view of the chest was performed.    COMPARISON:  06/15/2018    FINDINGS:  Stable cardiomediastinal silhouette with valvular stent device.    Shallow inspiration with a small amount of patchy increased density in the left infrahilar lung which could be infiltrate and/or atelectasis.    Right lung clear.      Impression    Slight improved aeration left infrahilar lung with a small amount of left infrahilar infiltrate or atelectasis remaining      Electronically signed by: Joseph Diaz MD  Date:    06/16/2018  Time:    10:11

## 2018-06-16 NOTE — ASSESSMENT & PLAN NOTE
Per Cards  Cont Plavix, Lopressor, ASA   [facility] 6/15: diffuse CAD not amenable for PCI, no changes from 2016

## 2018-06-16 NOTE — SUBJECTIVE & OBJECTIVE
Interval History:  I met the patient's  at bedside taking over Hospital Medicine Service today for the Hematology Oncology service Oncology Treatment Plan:   [No treatment plan]    Medications:  Continuous Infusions:   fentanyl 50 mcg/hr (06/16/18 0900)     Scheduled Meds:   amiodarone  200 mg Oral BID    aspirin  81 mg Oral Daily    chlorhexidine  10 mL Mouth/Throat BID    clopidogrel  75 mg Oral Daily    metoprolol tartrate  25 mg Oral BID    nozaseptin   Each Nare BID    pantoprazole 40 mg in dextrose 5 % 100 mL IVPB (over 15 minutes) (ready to mix system)  40 mg Intravenous Daily    piperacillin-tazobactam (ZOSYN) IVPB  4.5 g Intravenous Q12H    senna-docusate 8.6-50 mg  1 tablet Oral BID    sodium bicarbonate  650 mg Oral BID     PRN Meds:acetaminophen, atropine, bisacodyl, cloNIDine, dextrose 50%, dextrose 50%, glucagon (human recombinant), glucose, glucose, hydrALAZINE, HYDROcodone-acetaminophen, insulin aspart U-100, lactulose, lorazepam, nitroGLYCERIN, ondansetron, sodium bicarbonate, sodium chloride 0.9%     Review of Systems   Unable to perform ROS: Intubated     Objective:     Vital Signs (Most Recent):  Temp: 98.6 °F (37 °C) (06/16/18 0704)  Pulse: 66 (06/16/18 0919)  Resp: 13 (06/16/18 0919)  BP: (!) 98/37 (06/16/18 0900)  SpO2: 100 % (06/16/18 0919) Vital Signs (24h Range):  Temp:  [97.7 °F (36.5 °C)-98.8 °F (37.1 °C)] 98.6 °F (37 °C)  Pulse:  [61-84] 66  Resp:  [11-19] 13  SpO2:  [84 %-100 %] 100 %  BP: ()/(34-66) 98/37     Weight: 75.6 kg (166 lb 10.7 oz)  Body mass index is 30.48 kg/m².  Body surface area is 1.82 meters squared.      Intake/Output Summary (Last 24 hours) at 06/16/18 0946  Last data filed at 06/16/18 0900   Gross per 24 hour   Intake          1866.94 ml   Output              700 ml   Net          1166.94 ml       Physical Exam   Constitutional: She appears distressed.   Pulmonary/Chest: She is in respiratory distress.       Significant Labs:   BMP:   Recent  Labs  Lab 06/14/18  1736 06/15/18  0352 06/16/18  0330   * 136*  136* 101  101    142  142 140  140   K 4.4 3.7  3.7 3.6  3.6    106  106 105  105   CO2 19* 20*  20* 20*  20*   BUN 41* 45*  45* 49*  49*   CREATININE 3.8* 4.2*  4.2* 4.7*  4.7*   CALCIUM 9.5 9.5  9.5 8.7  8.7   MG 2.1 2.0 1.9   , CBC:   Recent Labs  Lab 06/14/18  1736 06/15/18  0352 06/16/18  0330   WBC 11.22 9.15 8.99   HGB 8.0* 7.9* 8.9*   HCT 25.2* 23.7* 27.1*    142* 137*    and CMP:   Recent Labs  Lab 06/14/18  1422 06/14/18  1736 06/15/18  0352 06/16/18  0330    139 142  142 140  140   K 3.2* 4.4 3.7  3.7 3.6  3.6    105 106  106 105  105   CO2 17* 19* 20*  20* 20*  20*   * 220* 136*  136* 101  101   BUN 40* 41* 45*  45* 49*  49*   CREATININE 3.7* 3.8* 4.2*  4.2* 4.7*  4.7*   CALCIUM 9.3 9.5 9.5  9.5 8.7  8.7   PROT 5.2*  --  5.8* 5.3*   ALBUMIN 2.7*  --  2.8*  2.8* 2.3*  2.3*   BILITOT 0.4  --  0.4 0.4   ALKPHOS 48*  --  66 57   *  --  304* 124*   *  --  90* 13   ANIONGAP 18* 15 16  16 15  15   EGFRNONAA 12* 11* 10*  10* 9*  9*       Diagnostic Results:  I have reviewed all pertinent imaging results/findings within the past 24 hours.

## 2018-06-16 NOTE — PROGRESS NOTES
[facility] -   Critical Care Medicine  Progress Note    Patient Name: Citlali Karimi  MRN: [id number]  Admission Date: 6/12/2018  Hospital Length of Stay: 3 days  Code Status: Full Code  Attending Provider: Levy Samuel MD  Primary Care Provider: Evelio Schuster MD   Principal Problem: Acute airway obstruction    Subjective:     HPI:  Ms Karimi is a 69 yo BF with a PMH of HTN, Hyperparathyroidism, DM2, CKD5, CVA, CAD and HLD.  She was seen in clinic by surgery for hyperparathyroidism and hypercalcemia and was electively admitted 6/12 taken to OR undergoing parathyroidectomy finding 2 right sided enlarged parathyroid glands.  Intra and post op patient developed EKG changes and had elevated troponin.  Cards consulted.  Patient admitted due to swallowing issues she was not taking all her meds as prescribed and had BP as high as 225/103 during hospitalization.  Troponin increased to > 50.  Cards diagnosed with NSTEMI and she was Rx with ASA, Imdur, Lopressor, statin and Heparin infusion with plans for repeat LHC if cleared by Renal given CKD5.  Today, she had progressively worsening neck swelling and SOB with worsening dysphagia.  She was taken back to OR and had post op neck hematoma evacuation.  In PACU she had witnessed V-Fib cardiac arrest and CODE BLUE called.  After 3 rounds of CPR, Epi X 3 and Defib X 3 ROSC was attained.  She was still intubated and on vent post op.  CL and arterial line placed in right groin during code per myself.  Cards and Surgery at bedside.      Hospital/ICU Course:  [facility]14 [facility].  She was in A-Fib with ST changes.  Cards called and patient placed on Amiodarone and Heparin infusion.    6/15 - Weaned off Levophed yesterday afternoon.  Still on vent much improved oxygenation.  Airway leak of #6 OET.  Fully awake and following commands.  Still on Heparin infusion.  6/16 - Resting on vent with sedation tolerates SAT/SBT but  still neck swelling noted.  LHC yesterday revealed diffuse CAD not amenable for PCI, no changes from 2016.  VSS    Review of Systems   Unable to perform ROS: Intubated       Objective:     Vital Signs (Most Recent):  Temp: 98.6 °F (37 °C) (06/16/18 0704)  Pulse: 66 (06/16/18 0919)  Resp: 13 (06/16/18 0919)  BP: (!) 98/37 (06/16/18 0900)  SpO2: 100 % (06/16/18 0919) Vital Signs (24h Range):  Temp:  [97.7 °F (36.5 °C)-98.8 °F (37.1 °C)] 98.6 °F (37 °C)  Pulse:  [61-84] 66  Resp:  [11-19] 13  SpO2:  [84 %-100 %] 100 %  BP: ()/(34-66) 98/37     Weight: 75.6 kg (166 lb 10.7 oz)  Body mass index is 30.48 kg/m².      Intake/Output Summary (Last 24 hours) at 06/16/18 0953  Last data filed at 06/16/18 0900   Gross per 24 hour   Intake          1866.94 ml   Output              700 ml   Net          1166.94 ml       Physical Exam   Constitutional: Vital signs are normal. She appears well-developed and well-nourished. She is sleeping. She is easily aroused.  Non-toxic appearance. She appears ill. No distress. She is sedated, intubated and restrained.   HENT:   Head: Normocephalic and atraumatic.   Mouth/Throat: Oropharynx is clear and moist and mucous membranes are normal.   Eyes: EOM and lids are normal. Pupils are equal, round, and reactive to light.   Neck: Neck supple. Carotid bruit is not present.       Cardiovascular: Regular rhythm and normal heart sounds.  Bradycardia present.    Pulses:       Radial pulses are 2+ on the right side, and 2+ on the left side.        Dorsalis pedis pulses are 1+ on the right side, and 1+ on the left side.   Pulmonary/Chest: Effort normal and breath sounds normal. No accessory muscle usage. She is intubated. No respiratory distress. She has no rhonchi.   Abdominal: Soft. Normal appearance. She exhibits no distension. Bowel sounds are decreased. There is no tenderness.   Genitourinary:   Genitourinary Comments: Fair in place   Musculoskeletal: Normal range of motion.        Right  foot: There is no deformity.        Left foot: There is no deformity.   No edema   Lymphadenopathy:     She has no cervical adenopathy.   Neurological: She is easily aroused.   With sedation vacation she is Fully awake and nodding head to questions   Skin: Skin is warm, dry and intact. Capillary refill takes less than 2 seconds. No rash noted. No cyanosis.            Vents:  Vent Mode: Spont (06/16/18 0919)  Ventilator Initiated: Yes (06/14/18 1812)  Set Rate: 0 bmp (06/16/18 0919)  Vt Set: 380 mL (06/16/18 0919)  Pressure Support: 10 cmH20 (06/16/18 0919)  PEEP/CPAP: 5 cmH20 (06/16/18 0919)  Oxygen Concentration (%): 30 (06/16/18 0919)  Peak Airway Pressure: 15 cmH2O (06/16/18 0919)  Plateau Pressure: 0 cmH20 (06/16/18 0919)  Total Ve: 4.79 mL (06/16/18 0919)  F/VT Ratio<105 (RSBI): (!) 38.01 (06/16/18 0919)    Lines/Drains/Airways     Central Venous Catheter Line                 Percutaneous Central Line Insertion/Assessment - triple lumen  06/14/18 right femoral 2 days          Drain                 Drain/Device  06/14/18 1239 neck collapsible closed device 1 day         NG/OG Tube 06/15/18 0200 [place] sump 14 Fr. Center mouth 1 day         Urethral Catheter 06/14/18 1113 1 day          Airway                 Airway - Non-Surgical 06/15/18 1025 Endotracheal Tube less than 1 day          Arterial Line                 Arterial Line 06/14/18 1427 Right Femoral 1 day          Peripheral Intravenous Line                 Peripheral IV - Single Lumen 06/12/18 0954 Left Forearm 3 days                Significant Labs:    CBC/Anemia Profile:    Recent Labs  Lab 06/14/18  1736 06/15/18  0352 06/16/18  0330   WBC 11.22 9.15 8.99   HGB 8.0* 7.9* 8.9*   HCT 25.2* 23.7* 27.1*    142* 137*   MCV 89 87 83   RDW 16.5* 16.4* 19.3*        Chemistries:    Recent Labs  Lab 06/14/18  1422 06/14/18  1736 06/15/18  0352 06/16/18  0330    139 142  142 140  140   K 3.2* 4.4 3.7  3.7 3.6  3.6    105 106  106 105  105    CO2 17* 19* 20*  20* 20*  20*   BUN 40* 41* 45*  45* 49*  49*   CREATININE 3.7* 3.8* 4.2*  4.2* 4.7*  4.7*   CALCIUM 9.3 9.5 9.5  9.5 8.7  8.7   ALBUMIN 2.7*  --  2.8*  2.8* 2.3*  2.3*   PROT 5.2*  --  5.8* 5.3*   BILITOT 0.4  --  0.4 0.4   ALKPHOS 48*  --  66 57   *  --  90* 13   *  --  304* 124*   MG 1.3* 2.1 2.0 1.9   PHOS  --   --  3.5 4.5       ABGs:   Recent Labs  Lab 06/16/18  0507   PH 7.483*   PCO2 31.3*   HCO3 23.5*   POCSATURATED 99   BE 0     Coagulation:   Recent Labs  Lab 06/15/18  0352  06/15/18  1725   INR 1.1  --   --    APTT 97.9*  < > 38.5*   < > = values in this interval not displayed.  All pertinent labs within the past 24 hours have been reviewed.    Significant Imaging:  CXR: I have reviewed all pertinent results/findings within the past 24 hours and my personal findings are:  no acute pulm process noted      Assessment/Plan:     Pulmonary   * Acute airway obstruction    Neck still firm and swollen  Leave intubated for airway protection  Add los dose steroids        On mechanically assisted ventilation    Vent settings reviewed and adjusted  Cont full vent support for airway protection post op neck surgery with swelling  Tolerates SAT/SBT          Cardiac/Vascular   H/O aortic valve replacement    Has Tavr valve  Cards following        Cardiac arrest with ventricular fibrillation    Monitor and replace electrolytes as needed  ICU cardiac monitoring  Cont Amiodarone.  A-Fib resolved.  No neuro deficit noted above baseline from hx CVA        STEMI (ST elevation myocardial infarction)    Per Cards  Cont Plavix, Lopressor, ASA   [facility] 6/15: diffuse CAD not amenable for PCI, no changes from 2016          Renal/   Electrolyte imbalance    Replace K+   Daily labs        CKD (chronic kidney disease) stage 5, GFR less than 15 ml/min    S/P contrast with LHC  Renal following  BMP daily  Accurate I/Os        Oncology   Acute blood loss anemia    S/P 2 units PRBCs  yesterday  Daily H/H  No active bleeding  Patient has been treated with Procrit 20,000 units weekly for maintenance therapy followed by Hematology        Endocrine   Diabetes mellitus, type 2 - only on oral medications    Cont SSI while starting TF          Hyperparathyroidism    POD #4 S/P parathyroidectomy  Surgery following        S/P parathyroidectomy    Per surgery  Post op hematoma evacuated 6/14  Monitor neck circumference  POD #4        Preventive Measures and Monitoring:   Stress Ulcer: PPI  Nutrition: Start TF  Glucose control: SSI  Bowel prophylaxis: Miralax and PRN Dulcolax  DVT prophylaxis: SCDs  Hx CAD on B-Blocker: Lopressor  Head of Bed/Reposition: Elevate HOB and turn Q1-2 hours   Early Mobility: Bed rest  SAT/SBT: Passed but needs airway protection  Vent Day: #3  OG Day: #3  Central Line Right Fem Day: #3  Right Femoral Arterial Line Day: #3  [place]: #3  IVAB Day: #4  Code Status: Full  Pneumonia Vaccine: UTD     Counseling/Consultation:I have discussed the care of this patient in detail with the bedside nursing staff and Dr. May and Dr. Samuel and Dr. Louis and Dr. Marinelli    Critical Care Time: 56 minutes  Critical secondary to Patient has a condition that poses threat to life and bodily function: Acute Myocardial Infarction and Mechanical Ventilation  Patient is currently receiving parenteral controlled substances: Fentanyl infusion      Critical care was time spent personally by me on the following activities: development of treatment plan with patient or surrogate and bedside caregivers, discussions with consultants, evaluation of patient's response to treatment, examination of patient, ordering and performing treatments and interventions, ordering and review of laboratory studies, ordering and review of radiographic studies, pulse oximetry, re-evaluation of patient's condition. This critical care time did not overlap with that of any other provider or involve time for any  procedures.     Joseph Talavera NP  Critical Care Medicine  [facility] [place]

## 2018-06-16 NOTE — ASSESSMENT & PLAN NOTE
Patient will most likely need erythropoietin therapy once patient is iron repleted and stabilized from surgery will continue to follow

## 2018-06-16 NOTE — SUBJECTIVE & OBJECTIVE
Interval History: Improving. Breathing trials in progress.    Review of Systems   Unable to perform ROS: Intubated     Objective:     Vital Signs (Most Recent):  Temp: 98.6 °F (37 °C) (06/16/18 0704)  Pulse: 68 (06/16/18 0900)  Resp: 13 (06/16/18 0900)  BP: (!) 98/37 (06/16/18 0900)  SpO2: 100 % (06/16/18 0900) Vital Signs (24h Range):  Temp:  [97.7 °F (36.5 °C)-98.8 °F (37.1 °C)] 98.6 °F (37 °C)  Pulse:  [61-84] 68  Resp:  [11-19] 13  SpO2:  [84 %-100 %] 100 %  BP: ()/(34-66) 98/37     Weight: 75.6 kg (166 lb 10.7 oz)  Body mass index is 30.48 kg/m².    Intake/Output Summary (Last 24 hours) at 06/16/18 0915  Last data filed at 06/16/18 0900   Gross per 24 hour   Intake          1866.94 ml   Output              700 ml   Net          1166.94 ml      Physical Exam   Constitutional: She appears well-developed and well-nourished. She is intubated.   ILL Appearing  Intubated   HENT:   Head: Normocephalic and atraumatic.   ET in place  + neck swelling   Eyes: EOM are normal. Pupils are equal, round, and reactive to light.   Neck: Normal range of motion. Neck supple. No JVD present.   Cardiovascular: Regular rhythm and intact distal pulses.  Tachycardia present.    Murmur heard.   Systolic murmur is present with a grade of 3/6   Pulmonary/Chest: Effort normal and breath sounds normal. She is intubated. No respiratory distress. She has no wheezes.   Abdominal: Soft. Bowel sounds are normal. She exhibits no distension.   Musculoskeletal: Normal range of motion. She exhibits edema. She exhibits no tenderness.   Neurological: She has normal reflexes. No cranial nerve deficit.   Skin: Skin is warm and dry. Capillary refill takes less than 2 seconds. No erythema.   Nursing note and vitals reviewed.      Significant Labs:   BMP:   Recent Labs  Lab 06/16/18  0330     101     140   K 3.6  3.6     105   CO2 20*  20*   BUN 49*  49*   CREATININE 4.7*  4.7*   CALCIUM 8.7  8.7   MG 1.9     CBC:   Recent  Labs  Lab 06/14/18  1736 06/15/18  0352 06/16/18  0330   WBC 11.22 9.15 8.99   HGB 8.0* 7.9* 8.9*   HCT 25.2* 23.7* 27.1*    142* 137*     CMP:   Recent Labs  Lab 06/14/18  1422 06/14/18  1736 06/15/18  0352 06/16/18  0330    139 142  142 140  140   K 3.2* 4.4 3.7  3.7 3.6  3.6    105 106  106 105  105   CO2 17* 19* 20*  20* 20*  20*   * 220* 136*  136* 101  101   BUN 40* 41* 45*  45* 49*  49*   CREATININE 3.7* 3.8* 4.2*  4.2* 4.7*  4.7*   CALCIUM 9.3 9.5 9.5  9.5 8.7  8.7   PROT 5.2*  --  5.8* 5.3*   ALBUMIN 2.7*  --  2.8*  2.8* 2.3*  2.3*   BILITOT 0.4  --  0.4 0.4   ALKPHOS 48*  --  66 57   *  --  304* 124*   *  --  90* 13   ANIONGAP 18* 15 16  16 15  15   EGFRNONAA 12* 11* 10*  10* 9*  9*     All pertinent labs within the past 24 hours have been reviewed.    Significant Imaging: I have reviewed all pertinent imaging results/findings within the past 24 hours.

## 2018-06-16 NOTE — ASSESSMENT & PLAN NOTE
-Pt admitted to Extended Recovery then transferred to Inpatient due to NSTEMI  -s/p Parathyroidectomy   -managed by General Surgery -primary team  - PTH- 32  - Ca 11.4>>10.5  - analgesia prn   - antiemetics prn  - specimens sent for analysis    6/16  Ca 8.7 today  Monitor

## 2018-06-16 NOTE — ASSESSMENT & PLAN NOTE
-Cardiology consulted   - EKG showed diffuse ST depression EKG and troponin increased from 0.113 >> 34>>50, consistent with NSTEMI  -Denies chest pain or SOB  -BP elevated yesterday, HTN likely triggered clot rupture/MI  -Known CAD on previous cath not amenable to PCI at that time  -Continue ASA, Imdur, Metoprolol, statin, heparin gtt  -Will need repeat angiogram to re-assess coronaries/underlying ischemia  -Nephrology consulted due to high risk of contrast induced nephropathy- creatinine 3.6  -Renal consult requested for recommendations/clearance for Heart Cath  -Family refused Heart Cath due to concern for worsening kidney function    6/15  [facility] Today  Cards    6/16  [facility] Diffuse disease  Cards  ASA  Hold Statin

## 2018-06-16 NOTE — PROGRESS NOTES
[facility] -   Nephrology  Progress Note    Patient Name: Citlali Karimi  MRN: [id number]  Admission Date: 6/12/2018  Hospital Length of Stay: 3 days  Attending Provider: Levy Samuel MD   Primary Care Physician: Evelio Schuster MD  Principal Problem:Acute airway obstruction    Subjective:     HPI:  Citlali Karimi Is a pleasant 68 -year-old  woman with history of chronic kidney disease stage 4, patient was admitted to the hospital for an elective  partial parathyroidectomy, following her surgery during observation.  She had some chest pain, workup revealed non ST elevated MI, patient was admitted to the hospital for further management.  We were consulted due to advanced renal insufficiency, baseline serum creatinine about 3.5-4,            Important Info :        She underwent a native kidney biopsy on 10/10/13. Final report of the kidney biopsy is negative for any specific etiology for acute renal failure. 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         Interval History:  events noted , pt had a brief CODE on 6/14 and was intubated, also had hematoma evacuated from surgical site ( neck ) ,     Objective:     Vital Signs (Most Recent):  Temp: 98.8 °F (37.1 °C) (06/16/18 1105)  Pulse: 72 (06/16/18 1133)  Resp: 14 (06/16/18 1133)  BP: (!) 91/45 (06/16/18 1105)  SpO2: 100 % (06/16/18 1133)  O2 Device (Oxygen Therapy): ventilator (06/16/18 1133) Vital Signs (24h Range):  Temp:  [97.7 °F (36.5 °C)-98.8 °F (37.1 °C)] 98.8 °F (37.1 °C)  Pulse:  [61-84] 72  Resp:  [11-19] 14  SpO2:  [84 %-100 %] 100 %  BP: ()/(34-66) 91/45     Weight: 75.6 kg (166 lb 10.7 oz) (06/16/18 0545)  Body mass index is 30.48 kg/m².  Body surface area is 1.82 meters squared.    I/O last 3 completed shifts:  In: 2059.2 [I.V.:1171.7; Blood:687.5; IV Piggyback:200]  Out: 1039 [Urine:1010; Other:29]    Physical Exam   Constitutional: She appears well-developed. No distress.    HENT:   Head: Normocephalic and atraumatic.   Mouth/Throat: No oropharyngeal exudate.   ETT in place    Eyes: Conjunctivae are normal. Pupils are equal, round, and reactive to light.   Neck: No JVD present. Carotid bruit is not present. No tracheal deviation present. No thyroid mass and no thyromegaly present.   Swelling noted    Cardiovascular: Normal rate, regular rhythm and intact distal pulses.  Exam reveals no gallop and no friction rub.    Murmur heard.  Pulmonary/Chest: No respiratory distress. She has no wheezes. She exhibits no tenderness.   Abdominal: Soft. Bowel sounds are normal. She exhibits no distension, no abdominal bruit, no ascites and no mass. There is no hepatosplenomegaly. There is no tenderness. There is no rebound, no guarding and no CVA tenderness.   Musculoskeletal: She exhibits no edema or tenderness.   Lymphadenopathy:     She has no cervical adenopathy.   Neurological: No cranial nerve deficit. She exhibits normal muscle tone. Coordination normal.   Skin: Skin is warm and intact. No rash noted. No erythema. No pallor.       Significant Labs:  CBC:     Recent Labs  Lab 06/16/18  0330   WBC 8.99   RBC 3.27*   HGB 8.9*   HCT 27.1*   *   MCV 83   MCH 27.2   MCHC 32.8     CMP:     Recent Labs  Lab 06/16/18  0330     101   CALCIUM 8.7  8.7   ALBUMIN 2.3*  2.3*   PROT 5.3*     140   K 3.6  3.6   CO2 20*  20*     105   BUN 49*  49*   CREATININE 4.7*  4.7*   ALKPHOS 57   ALT 13   *   BILITOT 0.4     Coagulation:   Recent Labs  Lab 06/15/18  0352  06/15/18  1725   INR 1.1  --   --    APTT 97.9*  < > 38.5*   < > = values in this interval not displayed.  LFTs:     Recent Labs  Lab 06/16/18  0330   ALT 13   *   ALKPHOS 57   BILITOT 0.4   PROT 5.3*   ALBUMIN 2.3*  2.3*     All labs within the past 24 hours have been reviewed.     Lab Results   Component Value Date    PTH 18.5 06/12/2018    CALCIUM 8.7 06/16/2018    CALCIUM 8.7 06/16/2018    PHOS 4.5  06/16/2018         Significant Imaging: reviewed     Assessment/Plan:     CKD (chronic kidney disease) stage 5, GFR less than 15 ml/min    1. MAYLIN on CKD stage 4/5 :  Worsening in serum cr noted , will  continue to monitor, no acute indication for renal replacement therapy,     Can be due to hemodynamic reasons or ILIA ,     2.  Acute MI - cardiology notes reviewed, Had a lengthy family discussion and family on 6/14 (  and niece Dr Hua ) ,     Initially pt and family decided against LHC due to risk of worsening kidney function as pt did not want to go on dialysis ,     After the CODE on 6/14 ,  changed his mind , s/p LHC on 6/15, triple vessel disease not amenable for angioplasty ,     UO low and understands that pt may need RRT after the procedure if kidneys cont to worsen ,     3. SHPT :  Status post partial parathyroidectomy surgery, will continue to monitor her serum calcium levels, hold Calcium carb at this time as her corrected serum calcium is stable, Neck swelling improved after hematoma evacuation yesterday ,     4.  Anemia of chronic kidney disease - receiving 2 units of pRBC today ,     5. Stable lytes ,     6. Met acidosis : on sodium bicarb supplements,     7. resp failure - on vent support              I will follow-up with patient. Please contact us if you have any additional questions.     Total critical care time spent 40 minutes including time needed to review the records,  patient  evaluation, documentation, face-to-face discussion with the patient's family, primary and CC teams,   more than 50% of the time was spent on coordination of care and counseling.       Magnus Vazquez MD  Nephrology  [facility] -

## 2018-06-16 NOTE — PLAN OF CARE
Problem: Patient Care Overview  Goal: Plan of Care Review  Outcome: Ongoing (interventions implemented as appropriate)  Pt continues to be on mechanical vent with no significant changes.

## 2018-06-16 NOTE — ASSESSMENT & PLAN NOTE
- ASA, Statin, and Lopressor continued   -Imdur dose increased   -previous Cath showed severe CAD (proximal LCX 99%, mid 50%, RCA mid 90%, distal with subtotal lesion).  - Cardiology consulted with medical management continued   - LHC proposed pending Nephrology recommendations  -family refused LHC due to concern for worsening kidney function as pt had expressed that she did not want to be on dialysis     6/15  Family electing LHC and accepting risk of worsening renal function  Cardiology    6/16  S/p LHC  Cardiology  ASA  Statin on stability

## 2018-06-16 NOTE — PROGRESS NOTES
[facility] Hospital Medicine  Progress Note    Patient Name: Citlali Karimi  MRN: [id number]  Patient Class: IP- Inpatient   Admission Date: 6/12/2018  Length of Stay: 3 days  Attending Physician: Levy Samuel MD  Primary Care Provider: Evelio Schuster MD        Subjective:     Principal Problem:STEMI (ST elevation myocardial infarction)    HPI:  Citlali Karimi is a 68 y.o female with PMHx of CKD (IV), HTN, DM, CVA (left sided weakness), and CAD who initially presented for hyperparathyroidism and hypercalcemia requiring surgical intervention.  Pt underwent parathyroidectomy today per Dr. Tracy (General Surgery). Pt admitted to Medical Surgical Unit post procedure and Hospital Medicine consulted for medical management.  Chest xray post procedure showed mild asymmetric CHF versus RUL pneumonia. Troponin 0.113 and BNP >270 noted.  Pt given IV Lasix in PACU prior to unit arrival.      Hospital Course:  Pt admitted to Outpatient Extended Recovery post procedure.  Elevated noted post procedure and results trended yielding significant positive response.  Cardiology consulted and Heparin infusion initiated.  Hx of CAD, multiple vessels noted with home medications continued and Imdur dose increased.  Nephrology consulted due to elevated creatinine and [facility] recommended.  Echo results pending.  Heart catheterization procedure considered with family refusing due to concern for worsening kidney function as patient does not want to be on dialysis.  Decreased oral intake and difficulty swallowing noted.  Speech therapist to bedside.  Pt made NPO and General Surgery notified.      6/15  Patient worsening status. ET changed per Pulmonary CC. Patient s/p Code Blue - VFib with recovery. Cardiology taking patient to CATH lab. Discussed with CM plan for post acute care in progress.    6/16  Patient improved o/n. Patient awake and able to follow simple command. Breathing trials in progress. Discussed with Pulmonary  CC    Interval History: Improving. Breathing trials in progress.    Review of Systems   Unable to perform ROS: Intubated     Objective:     Vital Signs (Most Recent):  Temp: 98.6 °F (37 °C) (06/16/18 0704)  Pulse: 68 (06/16/18 0900)  Resp: 13 (06/16/18 0900)  BP: (!) 98/37 (06/16/18 0900)  SpO2: 100 % (06/16/18 0900) Vital Signs (24h Range):  Temp:  [97.7 °F (36.5 °C)-98.8 °F (37.1 °C)] 98.6 °F (37 °C)  Pulse:  [61-84] 68  Resp:  [11-19] 13  SpO2:  [84 %-100 %] 100 %  BP: ()/(34-66) 98/37     Weight: 75.6 kg (166 lb 10.7 oz)  Body mass index is 30.48 kg/m².    Intake/Output Summary (Last 24 hours) at 06/16/18 0915  Last data filed at 06/16/18 0900   Gross per 24 hour   Intake          1866.94 ml   Output              700 ml   Net          1166.94 ml      Physical Exam   Constitutional: She appears well-developed and well-nourished. She is intubated.   ILL Appearing  Intubated   HENT:   Head: Normocephalic and atraumatic.   ET in place  + neck swelling   Eyes: EOM are normal. Pupils are equal, round, and reactive to light.   Neck: Normal range of motion. Neck supple. No JVD present.   Cardiovascular: Regular rhythm and intact distal pulses.  Tachycardia present.    Murmur heard.   Systolic murmur is present with a grade of 3/6   Pulmonary/Chest: Effort normal and breath sounds normal. She is intubated. No respiratory distress. She has no wheezes.   Abdominal: Soft. Bowel sounds are normal. She exhibits no distension.   Musculoskeletal: Normal range of motion. She exhibits edema. She exhibits no tenderness.   Neurological: She has normal reflexes. No cranial nerve deficit.   Skin: Skin is warm and dry. Capillary refill takes less than 2 seconds. No erythema.   Nursing note and vitals reviewed.      Significant Labs:   BMP:   Recent Labs  Lab 06/16/18  0330     101     140   K 3.6  3.6     105   CO2 20*  20*   BUN 49*  49*   CREATININE 4.7*  4.7*   CALCIUM 8.7  8.7   MG 1.9     CBC:    Recent Labs  Lab 06/14/18  1736 06/15/18  0352 06/16/18  0330   WBC 11.22 9.15 8.99   HGB 8.0* 7.9* 8.9*   HCT 25.2* 23.7* 27.1*    142* 137*     CMP:   Recent Labs  Lab 06/14/18  1422 06/14/18  1736 06/15/18  0352 06/16/18  0330    139 142  142 140  140   K 3.2* 4.4 3.7  3.7 3.6  3.6    105 106  106 105  105   CO2 17* 19* 20*  20* 20*  20*   * 220* 136*  136* 101  101   BUN 40* 41* 45*  45* 49*  49*   CREATININE 3.7* 3.8* 4.2*  4.2* 4.7*  4.7*   CALCIUM 9.3 9.5 9.5  9.5 8.7  8.7   PROT 5.2*  --  5.8* 5.3*   ALBUMIN 2.7*  --  2.8*  2.8* 2.3*  2.3*   BILITOT 0.4  --  0.4 0.4   ALKPHOS 48*  --  66 57   *  --  304* 124*   *  --  90* 13   ANIONGAP 18* 15 16  16 15  15   EGFRNONAA 12* 11* 10*  10* 9*  9*     All pertinent labs within the past 24 hours have been reviewed.    Significant Imaging: I have reviewed all pertinent imaging results/findings within the past 24 hours.    Assessment/Plan:      * STEMI (ST elevation myocardial infarction)    -Cardiology consulted   - EKG showed diffuse ST depression EKG and troponin increased from 0.113 >> 34>>50, consistent with NSTEMI  -Denies chest pain or SOB  -BP elevated yesterday, HTN likely triggered clot rupture/MI  -Known CAD on previous cath not amenable to PCI at that time  -Continue ASA, Imdur, Metoprolol, statin, heparin gtt  -Will need repeat angiogram to re-assess coronaries/underlying ischemia  -Nephrology consulted due to high risk of contrast induced nephropathy- creatinine 3.6  -Renal consult requested for recommendations/clearance for Heart Cath  -Family refused Heart Cath due to concern for worsening kidney function    6/15  [facility] Today  Cards    6/16  [facility] Diffuse disease  Cards  ASA  Hold Statin          Acute airway obstruction    6/16  Intubated for airway protection.  Pulmonary CC          Shock liver    Monitor LFT's    6/16  Improving steadily  Monitor          Electrolyte imbalance    Monitor         Cardiac arrest with ventricular fibrillation    Cardiology  [facility] 6/15  ASA  Statin   BB          Abnormal chest x-ray    - chest xray showed possible mild asymmetric CHF versus right upper lobe pneumonia.  -pt given IV Lasix in PACU  - IV antibiotics for suspected aspiration continued   - pt recently seen by RAMIRO Mcnally (Otolaryngology) for Dysphagia  -   - pt afebrile, WBC normal  - repeat xray results showed improved aeration RUL with no adverse interval changes in comparison to previous study  - SLP evaluation performed and thin, pureed recommended on 6/13/18 6/16  Pastient intubated  Pulmonary CC  ABX            Elevated troponin    -initial 0.113>>34>>50>>41  -pt denies CP and SOB  - EKG results showed diffuse ST changes    -serial results revealed NSTEMI with Cardiology consulted     6/15  NSTEMI POA  Cardiology  [facility]  ASA  Hold Statin due to Elevated LFT's    6/16  Cardiology  [facility]  ICU  Statin on stability              CVA (cerebral vascular accident)    -hx of 1 yr ago per   -left sided weakness residual  -ASA   Statin on stability        Hyperparathyroidism    -Pt admitted to Extended Recovery then transferred to Inpatient due to NSTEMI  -s/p Parathyroidectomy   -managed by General Surgery -primary team  - PTH- 32  - Ca 11.4>>10.5  - analgesia prn   - antiemetics prn  - specimens sent for analysis    6/16  Ca 8.7 today  Monitor        Coronary artery disease involving native coronary artery    - ASA, Statin, and Lopressor continued   -Imdur dose increased   -previous Cath showed severe CAD (proximal LCX 99%, mid 50%, RCA mid 90%, distal with subtotal lesion).  - Cardiology consulted with medical management continued   - [facility] proposed pending Nephrology recommendations  -family refused LHC due to concern for worsening kidney function as pt had expressed that she did not want to be on dialysis     6/15  Family electing LHC and accepting risk of worsening renal  function  Cardiology    6/16  S/p [facility]  Cardiology  ASA  Statin on stability          CKD (chronic kidney disease) stage 5, GFR less than 15 ml/min    -Creatinine 3.6>>4.1  -baseline 2.7-4.4  -will monitor   -sodium bicarb continued   -pt has been followed outpatient by Dr. Vazquez (Nephrology)  -Nephrology consulted - pt may benefit from repeat angio due to NSTEMI but at high risk of contrast induced nephropathy  - pt was candidate for renal transplant however work up discontinued due to progressive weakness  - [facility] recommended     6/15  Monitor worsening Renal Function  S/p Cardiac VFib Arrest  [facility] today  IVF's  Monitor      6/16  [facility] diffuse disease  Cardiology  Monitor        Hypertension associated with diabetes    - home medications continued   -hx of noncompliance and inconsistent dosing at home per   - uncontrolled upon arrival- improved with Hydralazine in PACU   - Hydralazine prn          Diabetes mellitus, type 2 - only on oral medications    Controlled  NICC  Accuchecks          Acute blood loss anemia    -stable post procedure  -will monitor  -CBC in am   -pt followed outpatient by Heme/Onc    6/16  Stable  Monitor          H/O aortic valve replacement    -s/p TAVR  - Echo in 04/2017 with EF 60%  -Cardiology following  - repeat Echo results showed EF 60% with diastolic dysfunction            VTE Risk Mitigation         Ordered     Place sequential compression device  Until discontinued      06/12/18 8691          Critical care time spent on the evaluation and treatment of severe organ dysfunction, review of pertinent labs and imaging studies, discussions with consulting providers and discussions with patient/family: 45 minutes.    Vinicio Marinelli MD  Department of Hospital Medicine   [facility]

## 2018-06-16 NOTE — ASSESSMENT & PLAN NOTE
-hx of hyperparathyroidism   - s/p parathyroidectomy   - Ca 11.4>>10.5>>9.3 > 8.7  - Nephrology consulted

## 2018-06-16 NOTE — SUBJECTIVE & OBJECTIVE
Review of Systems   Unable to perform ROS: Intubated       Objective:     Vital Signs (Most Recent):  Temp: 98.6 °F (37 °C) (06/16/18 0704)  Pulse: 66 (06/16/18 0919)  Resp: 13 (06/16/18 0919)  BP: (!) 98/37 (06/16/18 0900)  SpO2: 100 % (06/16/18 0919) Vital Signs (24h Range):  Temp:  [97.7 °F (36.5 °C)-98.8 °F (37.1 °C)] 98.6 °F (37 °C)  Pulse:  [61-84] 66  Resp:  [11-19] 13  SpO2:  [84 %-100 %] 100 %  BP: ()/(34-66) 98/37     Weight: 75.6 kg (166 lb 10.7 oz)  Body mass index is 30.48 kg/m².      Intake/Output Summary (Last 24 hours) at 06/16/18 0953  Last data filed at 06/16/18 0900   Gross per 24 hour   Intake          1866.94 ml   Output              700 ml   Net          1166.94 ml       Physical Exam   Constitutional: Vital signs are normal. She appears well-developed and well-nourished. She is sleeping. She is easily aroused.  Non-toxic appearance. She appears ill. No distress. She is sedated, intubated and restrained.   HENT:   Head: Normocephalic and atraumatic.   Mouth/Throat: Oropharynx is clear and moist and mucous membranes are normal.   Eyes: EOM and lids are normal. Pupils are equal, round, and reactive to light.   Neck: Neck supple. Carotid bruit is not present.       Cardiovascular: Regular rhythm and normal heart sounds.  Bradycardia present.    Pulses:       Radial pulses are 2+ on the right side, and 2+ on the left side.        Dorsalis pedis pulses are 1+ on the right side, and 1+ on the left side.   Pulmonary/Chest: Effort normal and breath sounds normal. No accessory muscle usage. She is intubated. No respiratory distress. She has no rhonchi.   Abdominal: Soft. Normal appearance. She exhibits no distension. Bowel sounds are decreased. There is no tenderness.   Genitourinary:   Genitourinary Comments: Fair in place   Musculoskeletal: Normal range of motion.        Right foot: There is no deformity.        Left foot: There is no deformity.   No edema   Lymphadenopathy:     She has no  cervical adenopathy.   Neurological: She is easily aroused.   With sedation vacation she is Fully awake and nodding head to questions   Skin: Skin is warm, dry and intact. Capillary refill takes less than 2 seconds. No rash noted. No cyanosis.            Vents:  Vent Mode: Spont (06/16/18 0919)  Ventilator Initiated: Yes (06/14/18 1812)  Set Rate: 0 bmp (06/16/18 0919)  Vt Set: 380 mL (06/16/18 0919)  Pressure Support: 10 cmH20 (06/16/18 0919)  PEEP/CPAP: 5 cmH20 (06/16/18 0919)  Oxygen Concentration (%): 30 (06/16/18 0919)  Peak Airway Pressure: 15 cmH2O (06/16/18 0919)  Plateau Pressure: 0 cmH20 (06/16/18 0919)  Total Ve: 4.79 mL (06/16/18 0919)  F/VT Ratio<105 (RSBI): (!) 38.01 (06/16/18 0919)    Lines/Drains/Airways     Central Venous Catheter Line                 Percutaneous Central Line Insertion/Assessment - triple lumen  06/14/18 right femoral 2 days          Drain                 Drain/Device  06/14/18 1239 neck collapsible closed device 1 day         NG/OG Tube 06/15/18 0200 [place] sump 14 Fr. Center mouth 1 day         Urethral Catheter 06/14/18 1113 1 day          Airway                 Airway - Non-Surgical 06/15/18 1025 Endotracheal Tube less than 1 day          Arterial Line                 Arterial Line 06/14/18 1427 Right Femoral 1 day          Peripheral Intravenous Line                 Peripheral IV - Single Lumen 06/12/18 0954 Left Forearm 3 days                Significant Labs:    CBC/Anemia Profile:    Recent Labs  Lab 06/14/18  1736 06/15/18  0352 06/16/18  0330   WBC 11.22 9.15 8.99   HGB 8.0* 7.9* 8.9*   HCT 25.2* 23.7* 27.1*    142* 137*   MCV 89 87 83   RDW 16.5* 16.4* 19.3*        Chemistries:    Recent Labs  Lab 06/14/18  1422 06/14/18  1736 06/15/18  0352 06/16/18  0330    139 142  142 140  140   K 3.2* 4.4 3.7  3.7 3.6  3.6    105 106  106 105  105   CO2 17* 19* 20*  20* 20*  20*   BUN 40* 41* 45*  45* 49*  49*   CREATININE 3.7* 3.8* 4.2*  4.2* 4.7*  4.7*    CALCIUM 9.3 9.5 9.5  9.5 8.7  8.7   ALBUMIN 2.7*  --  2.8*  2.8* 2.3*  2.3*   PROT 5.2*  --  5.8* 5.3*   BILITOT 0.4  --  0.4 0.4   ALKPHOS 48*  --  66 57   *  --  90* 13   *  --  304* 124*   MG 1.3* 2.1 2.0 1.9   PHOS  --   --  3.5 4.5       ABGs:   Recent Labs  Lab 06/16/18  0507   PH 7.483*   PCO2 31.3*   HCO3 23.5*   POCSATURATED 99   BE 0     Coagulation:   Recent Labs  Lab 06/15/18  0352  06/15/18  1725   INR 1.1  --   --    APTT 97.9*  < > 38.5*   < > = values in this interval not displayed.  All pertinent labs within the past 24 hours have been reviewed.    Significant Imaging:  CXR: I have reviewed all pertinent results/findings within the past 24 hours and my personal findings are:  no acute pulm process noted

## 2018-06-16 NOTE — ASSESSMENT & PLAN NOTE
- chest xray showed possible mild asymmetric CHF versus right upper lobe pneumonia.  -pt given IV Lasix in PACU  - IV antibiotics for suspected aspiration continued   - pt recently seen by RAMIRO Mcnally (Otolaryngology) for Dysphagia  -   - pt afebrile, WBC normal  - repeat xray results showed improved aeration RUL with no adverse interval changes in comparison to previous study  - SLP evaluation performed and thin, pureed recommended on 6/13/18 6/16  Pastient intubated  Pulmonary CC  ABX

## 2018-06-16 NOTE — PROGRESS NOTES
[facility] -   Hematology/Oncology  Progress Note    Patient Name: Citlali Karimi  Admission Date: 6/12/2018  Hospital Length of Stay: 3 days  Code Status: Full Code     Subjective:     HPI:  68 year old female, PMHx of CKD (IV), HTN, DM, CVA (left sided weakness), and CAD who initially presented for hyperparathyroidism and hypercalcemia requiring surgical intervention.   She was seen in clinic by surgery for hyperparathyroidism and hypercalcemia and was electively admitted 6/12 taken to OR undergoing parathyroidectomy finding 2 right sided enlarged parathyroid glands.  Intra and post op patient developed EKG changes and had elevated troponin.  Cards consulted.  Patient admitted due to swallowing issues she was not taking all her meds as prescribed and had BP as high as 225/103 during hospitalization.  Troponin increased to > 50.  Cards diagnosed with NSTEMI and she was Rx with ASA, Imdur, Lopressor, statin and Heparin infusion with plans for repeat LHC if cleared by Renal given CKD5.  Today, she had progressively worsening neck swelling and SOB with worsening dysphagia.  She was taken back to OR and had post op neck hematoma evacuation.  In PACU she had witnessed V-Fib cardiac arrest and CODE BLUE called.  After 3 rounds of CPR, Epi X 3 and Defib X 3 ROSC was attained.  She was still intubated and on vent post op    Interval History:  I met the patient's  at bedside taking over Hospital Medicine Service today for the Hematology Oncology service Oncology Treatment Plan:   [No treatment plan]    Medications:  Continuous Infusions:   fentanyl 50 mcg/hr (06/16/18 0900)     Scheduled Meds:   amiodarone  200 mg Oral BID    aspirin  81 mg Oral Daily    chlorhexidine  10 mL Mouth/Throat BID    clopidogrel  75 mg Oral Daily    metoprolol tartrate  25 mg Oral BID    nozaseptin   Each Nare BID    pantoprazole 40 mg in dextrose 5 % 100 mL IVPB (over 15 minutes) (ready to mix system)  40 mg  Intravenous Daily    piperacillin-tazobactam (ZOSYN) IVPB  4.5 g Intravenous Q12H    senna-docusate 8.6-50 mg  1 tablet Oral BID    sodium bicarbonate  650 mg Oral BID     PRN Meds:acetaminophen, atropine, bisacodyl, cloNIDine, dextrose 50%, dextrose 50%, glucagon (human recombinant), glucose, glucose, hydrALAZINE, HYDROcodone-acetaminophen, insulin aspart U-100, lactulose, lorazepam, nitroGLYCERIN, ondansetron, sodium bicarbonate, sodium chloride 0.9%     Review of Systems   Unable to perform ROS: Intubated     Objective:     Vital Signs (Most Recent):  Temp: 98.6 °F (37 °C) (06/16/18 0704)  Pulse: 66 (06/16/18 0919)  Resp: 13 (06/16/18 0919)  BP: (!) 98/37 (06/16/18 0900)  SpO2: 100 % (06/16/18 0919) Vital Signs (24h Range):  Temp:  [97.7 °F (36.5 °C)-98.8 °F (37.1 °C)] 98.6 °F (37 °C)  Pulse:  [61-84] 66  Resp:  [11-19] 13  SpO2:  [84 %-100 %] 100 %  BP: ()/(34-66) 98/37     Weight: 75.6 kg (166 lb 10.7 oz)  Body mass index is 30.48 kg/m².  Body surface area is 1.82 meters squared.      Intake/Output Summary (Last 24 hours) at 06/16/18 0946  Last data filed at 06/16/18 0900   Gross per 24 hour   Intake          1866.94 ml   Output              700 ml   Net          1166.94 ml       Physical Exam   Constitutional: She appears distressed.   Pulmonary/Chest: She is in respiratory distress.       Significant Labs:   BMP:   Recent Labs  Lab 06/14/18  1736 06/15/18  0352 06/16/18  0330   * 136*  136* 101  101    142  142 140  140   K 4.4 3.7  3.7 3.6  3.6    106  106 105  105   CO2 19* 20*  20* 20*  20*   BUN 41* 45*  45* 49*  49*   CREATININE 3.8* 4.2*  4.2* 4.7*  4.7*   CALCIUM 9.5 9.5  9.5 8.7  8.7   MG 2.1 2.0 1.9   , CBC:   Recent Labs  Lab 06/14/18  1736 06/15/18  0352 06/16/18  0330   WBC 11.22 9.15 8.99   HGB 8.0* 7.9* 8.9*   HCT 25.2* 23.7* 27.1*    142* 137*    and CMP:   Recent Labs  Lab 06/14/18  1422 06/14/18  1736 06/15/18  0352 06/16/18  0330     139 142  142 140  140   K 3.2* 4.4 3.7  3.7 3.6  3.6    105 106  106 105  105   CO2 17* 19* 20*  20* 20*  20*   * 220* 136*  136* 101  101   BUN 40* 41* 45*  45* 49*  49*   CREATININE 3.7* 3.8* 4.2*  4.2* 4.7*  4.7*   CALCIUM 9.3 9.5 9.5  9.5 8.7  8.7   PROT 5.2*  --  5.8* 5.3*   ALBUMIN 2.7*  --  2.8*  2.8* 2.3*  2.3*   BILITOT 0.4  --  0.4 0.4   ALKPHOS 48*  --  66 57   *  --  304* 124*   *  --  90* 13   ANIONGAP 18* 15 16  16 15  15   EGFRNONAA 12* 11* 10*  10* 9*  9*       Diagnostic Results:  I have reviewed all pertinent imaging results/findings within the past 24 hours.    Assessment/Plan:     On mechanically assisted ventilation    Currently intubated with ventilator managed by critical care        CKD (chronic kidney disease) stage 5, GFR less than 15 ml/min    Patient will most likely need erythropoietin therapy once patient is iron repleted and stabilized from surgery will continue to follow        Acute blood loss anemia    Patient has been treated with Procrit 20,000 units weekly for maintenance therapy.  Patient with hemoglobin of 7.9 this morning and planning PRBC transfusion            Thank you for your consult. I will follow-up with patient. Please contact us if you have any additional questions.     Mazin Sevilla MD  Hematology/Oncology  [facility]

## 2018-06-16 NOTE — ASSESSMENT & PLAN NOTE
-initial 0.113>>34>>50>>41  -pt denies CP and SOB  - EKG results showed diffuse ST changes    -serial results revealed NSTEMI with Cardiology consulted     6/15  NSTEMI POA  Cardiology  LHC  ASA  Hold Statin due to Elevated LFT's    6/16  Cardiology  ASA  ICU  Statin on stability

## 2018-06-16 NOTE — SUBJECTIVE & OBJECTIVE
Interval History:  events noted , pt had a brief CODE on 6/14 and was intubated, also had hematoma evacuated from surgical site ( neck ) ,     Objective:     Vital Signs (Most Recent):  Temp: 98.8 °F (37.1 °C) (06/16/18 1105)  Pulse: 72 (06/16/18 1133)  Resp: 14 (06/16/18 1133)  BP: (!) 91/45 (06/16/18 1105)  SpO2: 100 % (06/16/18 1133)  O2 Device (Oxygen Therapy): ventilator (06/16/18 1133) Vital Signs (24h Range):  Temp:  [97.7 °F (36.5 °C)-98.8 °F (37.1 °C)] 98.8 °F (37.1 °C)  Pulse:  [61-84] 72  Resp:  [11-19] 14  SpO2:  [84 %-100 %] 100 %  BP: ()/(34-66) 91/45     Weight: 75.6 kg (166 lb 10.7 oz) (06/16/18 0545)  Body mass index is 30.48 kg/m².  Body surface area is 1.82 meters squared.    I/O last 3 completed shifts:  In: 2059.2 [I.V.:1171.7; Blood:687.5; IV Piggyback:200]  Out: 1039 [Urine:1010; Other:29]    Physical Exam   Constitutional: She appears well-developed. No distress.   HENT:   Head: Normocephalic and atraumatic.   Mouth/Throat: No oropharyngeal exudate.   ETT in place    Eyes: Conjunctivae are normal. Pupils are equal, round, and reactive to light.   Neck: No JVD present. Carotid bruit is not present. No tracheal deviation present. No thyroid mass and no thyromegaly present.   Swelling noted    Cardiovascular: Normal rate, regular rhythm and intact distal pulses.  Exam reveals no gallop and no friction rub.    Murmur heard.  Pulmonary/Chest: No respiratory distress. She has no wheezes. She exhibits no tenderness.   Abdominal: Soft. Bowel sounds are normal. She exhibits no distension, no abdominal bruit, no ascites and no mass. There is no hepatosplenomegaly. There is no tenderness. There is no rebound, no guarding and no CVA tenderness.   Musculoskeletal: She exhibits no edema or tenderness.   Lymphadenopathy:     She has no cervical adenopathy.   Neurological: No cranial nerve deficit. She exhibits normal muscle tone. Coordination normal.   Skin: Skin is warm and intact. No rash noted. No  erythema. No pallor.       Significant Labs:  CBC:     Recent Labs  Lab 06/16/18  0330   WBC 8.99   RBC 3.27*   HGB 8.9*   HCT 27.1*   *   MCV 83   MCH 27.2   MCHC 32.8     CMP:     Recent Labs  Lab 06/16/18  0330     101   CALCIUM 8.7  8.7   ALBUMIN 2.3*  2.3*   PROT 5.3*     140   K 3.6  3.6   CO2 20*  20*     105   BUN 49*  49*   CREATININE 4.7*  4.7*   ALKPHOS 57   ALT 13   *   BILITOT 0.4     Coagulation:   Recent Labs  Lab 06/15/18  0352  06/15/18  1725   INR 1.1  --   --    APTT 97.9*  < > 38.5*   < > = values in this interval not displayed.  LFTs:     Recent Labs  Lab 06/16/18  0330   ALT 13   *   ALKPHOS 57   BILITOT 0.4   PROT 5.3*   ALBUMIN 2.3*  2.3*     All labs within the past 24 hours have been reviewed.     Lab Results   Component Value Date    PTH 18.5 06/12/2018    CALCIUM 8.7 06/16/2018    CALCIUM 8.7 06/16/2018    PHOS 4.5 06/16/2018         Significant Imaging: reviewed

## 2018-06-16 NOTE — ASSESSMENT & PLAN NOTE
S/P 2 units PRBCs yesterday  Daily H/H  No active bleeding  Patient has been treated with Procrit 20,000 units weekly for maintenance therapy followed by Hematology

## 2018-06-16 NOTE — ASSESSMENT & PLAN NOTE
-stable post procedure  -will monitor  -CBC in am   -pt followed outpatient by Heme/Onc    6/16  Stable  Monitor

## 2018-06-16 NOTE — SUBJECTIVE & OBJECTIVE
Interval History: No events    Medications:  Continuous Infusions:   fentanyl 50 mcg/hr (06/16/18 0900)     Scheduled Meds:   amiodarone  200 mg Oral BID    aspirin  81 mg Oral Daily    chlorhexidine  10 mL Mouth/Throat BID    clopidogrel  75 mg Oral Daily    metoprolol tartrate  25 mg Oral BID    nozaseptin   Each Nare BID    pantoprazole 40 mg in dextrose 5 % 100 mL IVPB (over 15 minutes) (ready to mix system)  40 mg Intravenous Daily    piperacillin-tazobactam (ZOSYN) IVPB  4.5 g Intravenous Q12H    senna-docusate 8.6-50 mg  1 tablet Oral BID    sodium bicarbonate  650 mg Oral BID     PRN Meds:acetaminophen, atropine, bisacodyl, cloNIDine, dextrose 50%, dextrose 50%, glucagon (human recombinant), glucose, glucose, hydrALAZINE, HYDROcodone-acetaminophen, insulin aspart U-100, lactulose, lorazepam, nitroGLYCERIN, ondansetron, sodium bicarbonate, sodium chloride 0.9%     Review of patient's allergies indicates:   Allergen Reactions    Lipitor [atorvastatin] Swelling     Lips       Objective:     Vital Signs (Most Recent):  Temp: 98.6 °F (37 °C) (06/16/18 0704)  Pulse: 68 (06/16/18 0900)  Resp: 13 (06/16/18 0900)  BP: (!) 98/37 (06/16/18 0900)  SpO2: 100 % (06/16/18 0900) Vital Signs (24h Range):  Temp:  [97.7 °F (36.5 °C)-98.8 °F (37.1 °C)] 98.6 °F (37 °C)  Pulse:  [61-84] 68  Resp:  [11-19] 13  SpO2:  [84 %-100 %] 100 %  BP: ()/(34-66) 98/37     Weight: 75.6 kg (166 lb 10.7 oz)  Body mass index is 30.48 kg/m².    Intake/Output - Last 3 Shifts       06/14 0700 - 06/15 0659 06/15 0700 - 06/16 0659 06/16 0700 - 06/17 0659    I.V. (mL/kg) 2333.4 (30.8) 859.4 (11.4) 100 (1.3)    Blood  687.5     NG/GT   120    IV Piggyback 300 200 100    Total Intake(mL/kg) 2633.4 (34.8) 1746.9 (23.1) 320 (4.2)    Urine (mL/kg/hr) 660 (0.4) 720 (0.4) 45 (0.3)    Other 14 (0) 15 (0)     Blood 20 (0)      Total Output 694 735 45    Net +1939.4 +1011.9 +275           Urine Occurrence 1 x      Stool Occurrence 0 x 0 x            Physical Exam   Constitutional: She is oriented to person, place, and time. No distress.   Neck: Trachea normal.   Swelling along left side of neck superior to incision   Pulmonary/Chest: Effort normal. No respiratory distress.   Musculoskeletal: She exhibits no edema.   Neurological: She is alert and oriented to person, place, and time.   Skin: No rash noted. No erythema. No pallor.       Significant Labs:  CBC:   Recent Labs  Lab 06/16/18  0330   WBC 8.99   RBC 3.27*   HGB 8.9*   HCT 27.1*   *   MCV 83   MCH 27.2   MCHC 32.8       Significant Diagnostics:  I have reviewed all pertinent imaging results/findings within the past 24 hours.

## 2018-06-16 NOTE — PLAN OF CARE
Problem: Patient Care Overview  Goal: Plan of Care Review  Outcome: Ongoing (interventions implemented as appropriate)  poc reviewed c pt.  No interval changes overnight.  Pt requiring some intermittent analgesia for pain increase s/t activity/mobility.  Vital signs stable, pt nods appropriately and intermittently cooperative.  Afebrile, serum labs show increase in creatinine s/p yesterday LHC.  Marginal uop overnight.  Consider nutrition needs if to remain intubated.

## 2018-06-16 NOTE — ASSESSMENT & PLAN NOTE
Vent settings reviewed and adjusted  Cont full vent support for airway protection post op neck surgery with swelling  Tolerates SAT/SBT

## 2018-06-16 NOTE — ASSESSMENT & PLAN NOTE
Monitor calcium, PTH levels normal    S/p neck washout/drain placement    Allow neck swelling to decrease before extubating

## 2018-06-16 NOTE — PROGRESS NOTES
[facility] -   Cardiology  Progress Note    Patient Name: Citlali Karimi  MRN: [id number]  Admission Date: 6/12/2018  Hospital Length of Stay: 3 days  Code Status: Full Code   Attending Physician: Levy Samuel MD   Primary Care Physician: Evelio Schuster MD  Expected Discharge Date:   Principal Problem:Acute airway obstruction    Subjective:     Hospital Course:   6/14/18-Patient seen and examined today, sleepy/lethargic. Dyspneic with throat swelling, surgery notified. Recommended angiogram, risks/benefits discussed.  and family had detailed discussion with nephrology and decided against LHC due to increased risk of contrast induced nephropathy/dialysis.    Patient subsequently taken back to OR this AM for re-exploration/washout given throat swelling. Coded post procedure.   6/14/18  Pt with V fib post surgery with DCCV to NSR, IV amiodarone started,vasopressors also started SBP 160s  Repeat EKG in ICU with infpost injury pattern. Discussed with  and currently does not want LHC, will need to start IV heparin and continue medical therapy     6/15/18-Patient seen and examined today s/p post-procedural  Arrest/MI yesterday. Intubated, sedated. Blood transfusion in process. LHC planned for later today. Risks/benefits discussed in detail. Family agreeable.   6/16/18- LHC- diffuse CAD not amenable to PCI, no changes from 2016 cath, turned down for CABG, continue medical tx    Interval History:     Review of Systems   Constitution: Negative. Negative for weight gain.   HENT: Negative.    Eyes: Negative.    Cardiovascular: Negative.  Negative for chest pain, leg swelling and palpitations.   Respiratory: Negative.  Negative for shortness of breath.    Endocrine: Negative.    Hematologic/Lymphatic: Negative.    Skin: Negative.    Musculoskeletal: Negative for muscle weakness.   Gastrointestinal: Negative.    Genitourinary: Negative.    Neurological: Negative.  Negative for dizziness.    Psychiatric/Behavioral: Negative.    Allergic/Immunologic: Negative.      Objective:     Vital Signs (Most Recent):  Temp: 98.6 °F (37 °C) (06/16/18 1502)  Pulse: 76 (06/16/18 1502)  Resp: 14 (06/16/18 1502)  BP: (!) 102/34 (06/16/18 1502)  SpO2: 100 % (06/16/18 1502) Vital Signs (24h Range):  Temp:  [97.7 °F (36.5 °C)-98.8 °F (37.1 °C)] 98.6 °F (37 °C)  Pulse:  [61-84] 76  Resp:  [11-19] 14  SpO2:  [84 %-100 %] 100 %  BP: ()/(34-62) 102/34     Weight: 75.6 kg (166 lb 10.7 oz)  Body mass index is 30.48 kg/m².     SpO2: 100 %  O2 Device (Oxygen Therapy): ventilator      Intake/Output Summary (Last 24 hours) at 06/16/18 1530  Last data filed at 06/16/18 1500   Gross per 24 hour   Intake          1063.46 ml   Output              650 ml   Net           413.46 ml       Lines/Drains/Airways     Central Venous Catheter Line                 Percutaneous Central Line Insertion/Assessment - triple lumen  06/14/18 right femoral 2 days          Drain                 Drain/Device  06/14/18 1239 neck collapsible closed device 2 days         Urethral Catheter 06/14/18 1113 2 days         NG/OG Tube 06/15/18 0200 Upson sump 14 Fr. Center mouth 1 day          Airway                 Airway - Non-Surgical 06/15/18 1025 Endotracheal Tube 1 day          Arterial Line                 Arterial Line 06/14/18 1427 Right Femoral 2 days          Peripheral Intravenous Line                 Peripheral IV - Single Lumen 06/12/18 0954 Left Forearm 4 days                Physical Exam   Constitutional: She is oriented to person, place, and time. She appears well-developed and well-nourished.   HENT:   Head: Normocephalic and atraumatic.   Eyes: Conjunctivae and EOM are normal. Pupils are equal, round, and reactive to light.   Neck: Normal range of motion. Neck supple.   Cardiovascular: Normal rate, regular rhythm, normal heart sounds and intact distal pulses.    Pulmonary/Chest: Effort normal and breath sounds normal.   Abdominal: Soft.  Bowel sounds are normal.   Musculoskeletal: Normal range of motion.   Neurological: She is alert and oriented to person, place, and time.   Skin: Skin is warm and dry.   Psychiatric: She has a normal mood and affect.   Nursing note and vitals reviewed.      Significant Labs: All pertinent lab results from the last 24 hours have been reviewed.    Significant Imaging: X-Ray: CXR: X-Ray Chest 1 View (CXR):   Results for orders placed or performed during the hospital encounter of 06/12/18   X-Ray Chest 1 View    Narrative    EXAMINATION:  XR CHEST 1 VIEW    CLINICAL HISTORY:  resp failure;    TECHNIQUE:  Single frontal view of the chest was performed.    COMPARISON:  06/15/2018    FINDINGS:  Stable cardiomediastinal silhouette with valvular stent device.    Shallow inspiration with a small amount of patchy increased density in the left infrahilar lung which could be infiltrate and/or atelectasis.    Right lung clear.      Impression    Slight improved aeration left infrahilar lung with a small amount of left infrahilar infiltrate or atelectasis remaining      Electronically signed by: Joseph Diaz MD  Date:    06/16/2018  Time:    10:11     Assessment and Plan:     Brief HPI:     NSTEMI (non-ST elevated myocardial infarction)    diffuse CAD not amenable to PCI, no changes from 2016 cath, turned down for CABG, continue medical tx        Cardiac arrest with ventricular fibrillation    -s/p successful resuscitation  -Post-procedure EKG showed STEMI  -Continue medical mgmt with ASA, statin, BB, heparin  -Plavix if no contraindications  -C today by Dr. Louis. All risks, benefits, and treatment alternatives explained to patient in detail. All questions answered. POA,  signed consent. Aware of high risk of contrast induced nephropathy/dialysis.         Coronary artery disease involving native coronary artery    -See plan under NSTEMI        CKD (chronic kidney disease) stage 5, GFR less than 15 ml/min    -Creatinine  4.2  -Mgmt as per nephrology        Hypertension associated with diabetes    -BP elevated yesterday  -Continue BB, Imdur  -Add hydralazine or amlodipine if needed        Acute blood loss anemia    -Transfusion in process        H/O aortic valve replacement    -s/p TAVR  -Stable            VTE Risk Mitigation         Ordered     Place sequential compression device  Until discontinued      06/12/18 7436          Galindo Louis MD  Cardiology  [facility] [place]

## 2018-06-17 PROBLEM — I25.10 CORONARY ARTERY DISEASE INVOLVING NATIVE CORONARY ARTERY: Chronic | Status: ACTIVE | Noted: 2018-03-19

## 2018-06-17 PROBLEM — E21.3 HYPERPARATHYROIDISM: Chronic | Status: ACTIVE | Noted: 2018-06-12

## 2018-06-17 PROBLEM — E87.8 ELECTROLYTE IMBALANCE: Status: RESOLVED | Noted: 2018-06-14 | Resolved: 2018-06-17

## 2018-06-17 LAB
ALBUMIN SERPL BCP-MCNC: 2.5 G/DL
ALBUMIN SERPL BCP-MCNC: 2.5 G/DL
ALLENS TEST: ABNORMAL
ALP SERPL-CCNC: 59 U/L
ALT SERPL W/O P-5'-P-CCNC: <5 U/L
ANION GAP SERPL CALC-SCNC: 16 MMOL/L
ANION GAP SERPL CALC-SCNC: 16 MMOL/L
AST SERPL-CCNC: 51 U/L
BASOPHILS # BLD AUTO: 0 K/UL
BASOPHILS NFR BLD: 0 %
BILIRUB SERPL-MCNC: 0.3 MG/DL
BUN SERPL-MCNC: 68 MG/DL
BUN SERPL-MCNC: 68 MG/DL
CALCIUM SERPL-MCNC: 8.7 MG/DL
CALCIUM SERPL-MCNC: 8.7 MG/DL
CHLORIDE SERPL-SCNC: 103 MMOL/L
CHLORIDE SERPL-SCNC: 103 MMOL/L
CO2 SERPL-SCNC: 19 MMOL/L
CO2 SERPL-SCNC: 19 MMOL/L
CREAT SERPL-MCNC: 6 MG/DL
CREAT SERPL-MCNC: 6 MG/DL
DELSYS: ABNORMAL
DIFFERENTIAL METHOD: ABNORMAL
EOSINOPHIL # BLD AUTO: 0 K/UL
EOSINOPHIL NFR BLD: 0 %
ERYTHROCYTE [DISTWIDTH] IN BLOOD BY AUTOMATED COUNT: 19 %
ERYTHROCYTE [SEDIMENTATION RATE] IN BLOOD BY WESTERGREN METHOD: 14 MM/H
EST. GFR  (AFRICAN AMERICAN): 8 ML/MIN/1.73 M^2
EST. GFR  (AFRICAN AMERICAN): 8 ML/MIN/1.73 M^2
EST. GFR  (NON AFRICAN AMERICAN): 7 ML/MIN/1.73 M^2
EST. GFR  (NON AFRICAN AMERICAN): 7 ML/MIN/1.73 M^2
FIO2: 30
GLUCOSE SERPL-MCNC: 149 MG/DL
GLUCOSE SERPL-MCNC: 149 MG/DL
HCO3 UR-SCNC: 22.7 MMOL/L (ref 24–28)
HCT VFR BLD AUTO: 28 %
HGB BLD-MCNC: 9 G/DL
LYMPHOCYTES # BLD AUTO: 0.7 K/UL
LYMPHOCYTES NFR BLD: 5.5 %
MAGNESIUM SERPL-MCNC: 2.1 MG/DL
MCH RBC QN AUTO: 27 PG
MCHC RBC AUTO-ENTMCNC: 32.1 G/DL
MCV RBC AUTO: 84 FL
MODE: ABNORMAL
MONOCYTES # BLD AUTO: 0.3 K/UL
MONOCYTES NFR BLD: 2.7 %
NEUTROPHILS # BLD AUTO: 11 K/UL
NEUTROPHILS NFR BLD: 91.8 %
PCO2 BLDA: 43.6 MMHG (ref 35–45)
PEEP: 5
PH SMN: 7.32 [PH] (ref 7.35–7.45)
PHOSPHATE SERPL-MCNC: 6.7 MG/DL
PLATELET # BLD AUTO: 173 K/UL
PMV BLD AUTO: 10.3 FL
PO2 BLDA: 89 MMHG (ref 80–100)
POC BE: -3 MMOL/L
POC SATURATED O2: 96 % (ref 95–100)
POCT GLUCOSE: 144 MG/DL (ref 70–110)
POCT GLUCOSE: 168 MG/DL (ref 70–110)
POCT GLUCOSE: 178 MG/DL (ref 70–110)
POCT GLUCOSE: 229 MG/DL (ref 70–110)
POTASSIUM SERPL-SCNC: 4.3 MMOL/L
POTASSIUM SERPL-SCNC: 4.3 MMOL/L
PROT SERPL-MCNC: 6.2 G/DL
RBC # BLD AUTO: 3.33 M/UL
SAMPLE: ABNORMAL
SITE: ABNORMAL
SODIUM SERPL-SCNC: 138 MMOL/L
SODIUM SERPL-SCNC: 138 MMOL/L
VT: 380
WBC # BLD AUTO: 11.99 K/UL

## 2018-06-17 PROCEDURE — 80053 COMPREHEN METABOLIC PANEL: CPT

## 2018-06-17 PROCEDURE — 63600175 PHARM REV CODE 636 W HCPCS: Performed by: SURGERY

## 2018-06-17 PROCEDURE — 20000000 HC ICU ROOM

## 2018-06-17 PROCEDURE — 80069 RENAL FUNCTION PANEL: CPT

## 2018-06-17 PROCEDURE — 99900035 HC TECH TIME PER 15 MIN (STAT)

## 2018-06-17 PROCEDURE — 25000003 PHARM REV CODE 250: Performed by: NURSE PRACTITIONER

## 2018-06-17 PROCEDURE — 99291 CRITICAL CARE FIRST HOUR: CPT | Mod: ,,, | Performed by: INTERNAL MEDICINE

## 2018-06-17 PROCEDURE — 83735 ASSAY OF MAGNESIUM: CPT

## 2018-06-17 PROCEDURE — 94003 VENT MGMT INPAT SUBQ DAY: CPT

## 2018-06-17 PROCEDURE — 63600175 PHARM REV CODE 636 W HCPCS: Performed by: NURSE PRACTITIONER

## 2018-06-17 PROCEDURE — 99900026 HC AIRWAY MAINTENANCE (STAT)

## 2018-06-17 PROCEDURE — 85025 COMPLETE CBC W/AUTO DIFF WBC: CPT

## 2018-06-17 PROCEDURE — 99024 POSTOP FOLLOW-UP VISIT: CPT | Mod: POP,,, | Performed by: SURGERY

## 2018-06-17 PROCEDURE — 99232 SBSQ HOSP IP/OBS MODERATE 35: CPT | Mod: ,,, | Performed by: INTERNAL MEDICINE

## 2018-06-17 PROCEDURE — 82803 BLOOD GASES ANY COMBINATION: CPT

## 2018-06-17 PROCEDURE — C9113 INJ PANTOPRAZOLE SODIUM, VIA: HCPCS | Performed by: SURGERY

## 2018-06-17 PROCEDURE — 36600 WITHDRAWAL OF ARTERIAL BLOOD: CPT

## 2018-06-17 PROCEDURE — 25000003 PHARM REV CODE 250: Performed by: SURGERY

## 2018-06-17 PROCEDURE — 99291 CRITICAL CARE FIRST HOUR: CPT | Mod: ,,, | Performed by: NURSE PRACTITIONER

## 2018-06-17 RX ORDER — CHLORHEXIDINE GLUCONATE ORAL RINSE 1.2 MG/ML
10 SOLUTION DENTAL 2 TIMES DAILY
Status: DISCONTINUED | OUTPATIENT
Start: 2018-06-17 | End: 2018-06-22

## 2018-06-17 RX ORDER — MORPHINE SULFATE 4 MG/ML
4 INJECTION, SOLUTION INTRAMUSCULAR; INTRAVENOUS EVERY 4 HOURS PRN
Status: DISCONTINUED | OUTPATIENT
Start: 2018-06-17 | End: 2018-06-27

## 2018-06-17 RX ADMIN — Medication 125 MCG: at 05:06

## 2018-06-17 RX ADMIN — CHLORHEXIDINE GLUCONATE 10 ML: 1.2 RINSE ORAL at 09:06

## 2018-06-17 RX ADMIN — METOPROLOL TARTRATE 25 MG: 25 TABLET, FILM COATED ORAL at 08:06

## 2018-06-17 RX ADMIN — METHYLPREDNISOLONE SODIUM SUCCINATE 40 MG: 40 INJECTION, POWDER, FOR SOLUTION INTRAMUSCULAR; INTRAVENOUS at 12:06

## 2018-06-17 RX ADMIN — METHYLPREDNISOLONE SODIUM SUCCINATE 40 MG: 40 INJECTION, POWDER, FOR SOLUTION INTRAMUSCULAR; INTRAVENOUS at 05:06

## 2018-06-17 RX ADMIN — METHYLPREDNISOLONE SODIUM SUCCINATE 40 MG: 40 INJECTION, POWDER, FOR SOLUTION INTRAMUSCULAR; INTRAVENOUS at 11:06

## 2018-06-17 RX ADMIN — CLOPIDOGREL BISULFATE 75 MG: 75 TABLET ORAL at 08:06

## 2018-06-17 RX ADMIN — STANDARDIZED SENNA CONCENTRATE AND DOCUSATE SODIUM 1 TABLET: 8.6; 5 TABLET, FILM COATED ORAL at 09:06

## 2018-06-17 RX ADMIN — MORPHINE SULFATE 4 MG: 4 INJECTION INTRAVENOUS at 09:06

## 2018-06-17 RX ADMIN — CHLORHEXIDINE GLUCONATE 10 ML: 1.2 RINSE ORAL at 08:06

## 2018-06-17 RX ADMIN — DEXTROSE 40 MG: 50 INJECTION, SOLUTION INTRAVENOUS at 08:06

## 2018-06-17 RX ADMIN — AMIODARONE HYDROCHLORIDE 200 MG: 200 TABLET ORAL at 09:06

## 2018-06-17 RX ADMIN — INSULIN ASPART 2 UNITS: 100 INJECTION, SOLUTION INTRAVENOUS; SUBCUTANEOUS at 10:06

## 2018-06-17 RX ADMIN — SODIUM BICARBONATE 650 MG TABLET 650 MG: at 09:06

## 2018-06-17 RX ADMIN — ASPIRIN 81 MG 81 MG: 81 TABLET ORAL at 08:06

## 2018-06-17 RX ADMIN — MORPHINE SULFATE 4 MG: 4 INJECTION INTRAVENOUS at 12:06

## 2018-06-17 RX ADMIN — POLYETHYLENE GLYCOL (3350) 17 G: 17 POWDER, FOR SOLUTION ORAL at 08:06

## 2018-06-17 RX ADMIN — MORPHINE SULFATE 4 MG: 4 INJECTION INTRAVENOUS at 04:06

## 2018-06-17 RX ADMIN — LORAZEPAM 2 MG: 2 INJECTION INTRAMUSCULAR; INTRAVENOUS at 12:06

## 2018-06-17 RX ADMIN — METOPROLOL TARTRATE 25 MG: 25 TABLET, FILM COATED ORAL at 09:06

## 2018-06-17 RX ADMIN — AMIODARONE HYDROCHLORIDE 200 MG: 200 TABLET ORAL at 08:06

## 2018-06-17 RX ADMIN — STANDARDIZED SENNA CONCENTRATE AND DOCUSATE SODIUM 1 TABLET: 8.6; 5 TABLET, FILM COATED ORAL at 08:06

## 2018-06-17 RX ADMIN — MORPHINE SULFATE 4 MG: 4 INJECTION INTRAVENOUS at 11:06

## 2018-06-17 RX ADMIN — SODIUM BICARBONATE 650 MG TABLET 650 MG: at 08:06

## 2018-06-17 NOTE — ASSESSMENT & PLAN NOTE
Vent settings reviewed and adjusted  Cont intubation for airway protection post op neck surgery with swelling  Tolerates SAT/SBT

## 2018-06-17 NOTE — ASSESSMENT & PLAN NOTE
- ASA, Statin, and Lopressor continued   -Imdur dose increased   -previous Cath showed severe CAD (proximal LCX 99%, mid 50%, RCA mid 90%, distal with subtotal lesion).  - Cardiology consulted with medical management continued   - LHC proposed pending Nephrology recommendations  -family refused LHC due to concern for worsening kidney function as pt had expressed that she did not want to be on dialysis     6/15  Family electing LHC and accepting risk of worsening renal function  Cardiology    6/16  S/p LHC  Cardiology  ASA  Statin allergy noted    6/16  No interventions  Cardiology on consult

## 2018-06-17 NOTE — SUBJECTIVE & OBJECTIVE
Interval History:     ROS  Objective:     Vital Signs (Most Recent):  Temp: 98.7 °F (37.1 °C) (06/17/18 1105)  Pulse: 99 (06/17/18 1316)  Resp: 10 (06/17/18 1316)  BP: 125/68 (06/17/18 1300)  SpO2: 99 % (06/17/18 1316) Vital Signs (24h Range):  Temp:  [98.3 °F (36.8 °C)-98.7 °F (37.1 °C)] 98.7 °F (37.1 °C)  Pulse:  [] 99  Resp:  [10-18] 10  SpO2:  [97 %-100 %] 99 %  BP: ()/(30-75) 125/68     Weight: 75.6 kg (166 lb 10.7 oz)  Body mass index is 30.48 kg/m².     SpO2: 99 %  O2 Device (Oxygen Therapy): ventilator      Intake/Output Summary (Last 24 hours) at 06/17/18 1410  Last data filed at 06/17/18 1300   Gross per 24 hour   Intake          1080.83 ml   Output              202 ml   Net           878.83 ml       Lines/Drains/Airways     Central Venous Catheter Line                 Percutaneous Central Line Insertion/Assessment - triple lumen  06/14/18 right femoral 3 days          Drain                 Drain/Device  06/14/18 1239 neck collapsible closed device 3 days         Urethral Catheter 06/14/18 1113 3 days         NG/OG Tube 06/15/18 0200 Whitley sump 14 Fr. Center mouth 2 days          Airway                 Airway - Non-Surgical 06/15/18 1025 Endotracheal Tube 2 days          Arterial Line                 Arterial Line 06/14/18 1427 Right Femoral 2 days          Peripheral Intravenous Line                 Peripheral IV - Single Lumen 06/12/18 0954 Left Forearm 5 days                Physical Exam    Significant Labs: All pertinent lab results from the last 24 hours have been reviewed.    Significant Imaging: X-Ray: CXR: X-Ray Chest 1 View (CXR):   Results for orders placed or performed during the hospital encounter of 06/12/18   X-Ray Chest 1 View    Narrative    EXAMINATION:  XR CHEST 1 VIEW    CLINICAL HISTORY:  Respiratory failure.    COMPARISON:  06/16/2018.    FINDINGS:  Endotracheal tube remains in place with tip above the veronique.  Nasogastric tube identified with tip overlying the proximal  body of the stomach.  There is new left perihilar and left basilar infiltrate.  Pulmonary vascular congestion is present which is unchanged.  Cardiac silhouette mildly enlarged.  Stent overlies the heart which may represent aortic valve or ascending aortic stent.  No change compared to the previous exam.      Impression    1. New moderate size left perihilar and left basilar infiltrate.  2. Mild pulmonary vascular congestion.  3. Endotracheal tube remains in place with tip above veronique.  Nasogastric tube remains in place with distal tip within the stomach.      Electronically signed by: Orion Aponte MD  Date:    06/17/2018  Time:    08:23

## 2018-06-17 NOTE — SUBJECTIVE & OBJECTIVE
Interval History:  events noted , pt had a brief CODE on 6/14 and was intubated, also had hematoma evacuated from surgical site ( neck ) , remains on vent support and critically ill ,     Objective:     Vital Signs (Most Recent):  Temp: 98.7 °F (37.1 °C) (06/17/18 1105)  Pulse: 101 (06/17/18 1105)  Resp: 14 (06/17/18 1105)  BP: 116/68 (06/17/18 1105)  SpO2: 98 % (06/17/18 1105)  O2 Device (Oxygen Therapy): ventilator (06/17/18 0935) Vital Signs (24h Range):  Temp:  [98.3 °F (36.8 °C)-98.7 °F (37.1 °C)] 98.7 °F (37.1 °C)  Pulse:  [] 101  Resp:  [11-15] 14  SpO2:  [97 %-100 %] 98 %  BP: ()/(30-68) 116/68     Weight: 75.6 kg (166 lb 10.7 oz) (06/16/18 0545)  Body mass index is 30.48 kg/m².  Body surface area is 1.82 meters squared.    I/O last 3 completed shifts:  In: 1465.8 [I.V.:765.8; NG/GT:600; IV Piggyback:100]  Out: 607 [Urine:592; Other:15]    Physical Exam   Constitutional: She appears well-developed. No distress.   HENT:   Head: Normocephalic and atraumatic.   Mouth/Throat: No oropharyngeal exudate.   ETT in place    Eyes: Conjunctivae are normal. Pupils are equal, round, and reactive to light.   Neck: No JVD present. Carotid bruit is not present. No tracheal deviation present. No thyroid mass and no thyromegaly present.   Swelling noted    Cardiovascular: Normal rate, regular rhythm and intact distal pulses.  Exam reveals no gallop and no friction rub.    Murmur heard.  Pulmonary/Chest: No respiratory distress. She has no wheezes. She exhibits no tenderness.   Abdominal: Soft. Bowel sounds are normal. She exhibits no distension, no abdominal bruit, no ascites and no mass. There is no hepatosplenomegaly. There is no tenderness. There is no rebound, no guarding and no CVA tenderness.   Musculoskeletal: She exhibits no edema or tenderness.   Lymphadenopathy:     She has no cervical adenopathy.   Neurological: No cranial nerve deficit. She exhibits normal muscle tone. Coordination normal.   Skin: Skin  is warm and intact. No rash noted. No erythema. No pallor.       Significant Labs:  CBC:     Recent Labs  Lab 06/17/18  0510   WBC 11.99   RBC 3.33*   HGB 9.0*   HCT 28.0*      MCV 84   MCH 27.0   MCHC 32.1     CMP:     Recent Labs  Lab 06/17/18  0510   *  149*   CALCIUM 8.7  8.7   ALBUMIN 2.5*  2.5*   PROT 6.2     138   K 4.3  4.3   CO2 19*  19*     103   BUN 68*  68*   CREATININE 6.0*  6.0*   ALKPHOS 59   ALT <5*   AST 51*   BILITOT 0.3     Coagulation:   Recent Labs  Lab 06/15/18  0352  06/15/18  1725   INR 1.1  --   --    APTT 97.9*  < > 38.5*   < > = values in this interval not displayed.  LFTs:     Recent Labs  Lab 06/17/18  0510   ALT <5*   AST 51*   ALKPHOS 59   BILITOT 0.3   PROT 6.2   ALBUMIN 2.5*  2.5*     All labs within the past 24 hours have been reviewed.     Lab Results   Component Value Date    PTH 18.5 06/12/2018    CALCIUM 8.7 06/17/2018    CALCIUM 8.7 06/17/2018    PHOS 6.7 (H) 06/17/2018         Significant Imaging: reviewed

## 2018-06-17 NOTE — PROGRESS NOTES
[facility] -   Critical Care Medicine  Progress Note    Patient Name: Citlali Karimi  MRN: [id number]  Admission Date: 6/12/2018  Hospital Length of Stay: 4 days  Code Status: Full Code  Attending Provider: Levy Samuel MD  Primary Care Provider: Evelio Schuster MD   Principal Problem: Acute airway obstruction    Subjective:     HPI:  Ms Karimi is a 69 yo BF with a PMH of HTN, Hyperparathyroidism, DM2, CKD5, CVA, CAD and HLD.  She was seen in clinic by surgery for hyperparathyroidism and hypercalcemia and was electively admitted 6/12 taken to OR undergoing parathyroidectomy finding 2 right sided enlarged parathyroid glands.  Intra and post op patient developed EKG changes and had elevated troponin.  Cards consulted.  Patient admitted due to swallowing issues she was not taking all her meds as prescribed and had BP as high as 225/103 during hospitalization.  Troponin increased to > 50.  Cards diagnosed with NSTEMI and she was Rx with ASA, Imdur, Lopressor, statin and Heparin infusion with plans for repeat LHC if cleared by Renal given CKD5.  Today, she had progressively worsening neck swelling and SOB with worsening dysphagia.  She was taken back to OR and had post op neck hematoma evacuation.  In PACU she had witnessed V-Fib cardiac arrest and CODE BLUE called.  After 3 rounds of CPR, Epi X 3 and Defib X 3 ROSC was attained.  She was still intubated and on vent post op.  CL and arterial line placed in right groin during code per myself.  Cards and Surgery at bedside.      Hospital/ICU Course:  6/14 - [facility] Lev[facility].  She was in A-Fib with ST changes.  Cards called and patient placed on Amiodarone and Heparin infusion.    6/15 - Weaned off Levophed yesterday afternoon.  Still on vent much improved oxygenation.  Airway leak of #6 OET.  Fully awake and following commands.  Still on Heparin infusion.  6/16 - Resting on vent with sedation tolerates SAT/SBT but  still neck swelling noted.  LHC yesterday revealed diffuse CAD not amenable for PCI, no changes from 2016.  VSS  6[facility]17 - [facility] tolerating SAT/SBT.  [place] TF    Review of Systems   Unable to perform ROS: Intubated       Objective:     Vital Signs (Most Recent):  Temp: 98.3 °F (36.8 °C) (06/17/18 0705)  Pulse: 100 (06/17/18 0705)  Resp: 11 (06/17/18 0705)  BP: (!) 122/43 (06/17/18 0705)  SpO2: 97 % (06/17/18 0705) Vital Signs (24h Range):  Temp:  [98.3 °F (36.8 °C)-98.8 °F (37.1 °C)] 98.3 °F (36.8 °C)  Pulse:  [] 100  Resp:  [11-15] 11  SpO2:  [97 %-100 %] 97 %  BP: ()/(30-68) 122/43     Weight: 75.6 kg (166 lb 10.7 oz)  Body mass index is 30.48 kg/m².      Intake/Output Summary (Last 24 hours) at 06/17/18 0826  Last data filed at 06/17/18 0705   Gross per 24 hour   Intake           755.83 ml   Output              267 ml   Net           488.83 ml       Physical Exam   Constitutional: Vital signs are normal. She appears well-developed and well-nourished. She is sleeping. She is easily aroused.  Non-toxic appearance. She appears ill. No distress. She is sedated, intubated and restrained.   HENT:   Head: Normocephalic and atraumatic.   Mouth/Throat: Oropharynx is clear and moist and mucous membranes are normal.   Eyes: EOM and lids are normal. Pupils are equal, round, and reactive to light.   Neck: Neck supple. Carotid bruit is not present.       Cardiovascular: Regular rhythm and normal heart sounds.  Tachycardia present.    Pulses:       Radial pulses are 2+ on the right side, and 2+ on the left side.        Dorsalis pedis pulses are 1+ on the right side, and 1+ on the left side.   Pulmonary/Chest: Effort normal and breath sounds normal. No accessory muscle usage. She is intubated. No respiratory distress.   Abdominal: Soft. Normal appearance. She exhibits no distension. Bowel sounds are decreased. There is no tenderness.   Genitourinary:   Genitourinary Comments: Marv in  place   Musculoskeletal: Normal range of motion.        Right foot: There is no deformity.        Left foot: There is no deformity.   No edema   Lymphadenopathy:     She has no cervical adenopathy.   Neurological: She is easily aroused.   With sedation vacation she is Fully awake and nodding head to questions   Skin: Skin is warm, dry and intact. Capillary refill takes less than 2 seconds. No rash noted. No cyanosis.            Vents:  Vent Mode: Spont (06/17/18 0700)  Ventilator Initiated: Yes (06/14/18 1812)  Set Rate: 0 bmp (06/17/18 0700)  Vt Set: 0 mL (06/17/18 0700)  Pressure Support: 10 cmH20 (06/17/18 0700)  PEEP/CPAP: 5 cmH20 (06/17/18 0700)  Oxygen Concentration (%): 30 (06/17/18 0705)  Peak Airway Pressure: 15 cmH2O (06/17/18 0700)  Plateau Pressure: 18 cmH20 (06/17/18 0700)  Total Ve: 6.46 mL (06/17/18 0700)  F/VT Ratio<105 (RSBI): (!) 18.16 (06/17/18 0700)    Lines/Drains/Airways     Central Venous Catheter Line                 Percutaneous Central Line Insertion/Assessment - triple lumen  06/14/18 right femoral 3 days          Drain                 Drain/Device  06/14/18 1239 neck collapsible closed device 2 days         NG/OG Tube 06/15/18 0200 [place] sump 14 Fr. Center mouth 2 days         Urethral Catheter 06/14/18 1113 2 days          Airway                 Airway - Non-Surgical 06/15/18 1025 Endotracheal Tube 1 day          Arterial Line                 Arterial Line 06/14/18 1427 Right Femoral 2 days          Peripheral Intravenous Line                 Peripheral IV - Single Lumen 06/12/18 0954 Left Forearm 4 days                Significant Labs:    CBC/Anemia Profile:    Recent Labs  Lab 06/16/18  0330 06/17/18  0510   WBC 8.99 11.99   HGB 8.9* 9.0*   HCT 27.1* 28.0*   * 173   MCV 83 84   RDW 19.3* 19.0*        Chemistries:    Recent Labs  Lab 06/16/18  0330 06/17/18  0510     140 138  138   K 3.6  3.6 4.3  4.3     105 103  103   CO2 20*  20* 19*  19*   BUN 49*  49*  68*  68*   CREATININE 4.7*  4.7* 6.0*  6.0*   CALCIUM 8.7  8.7 8.7  8.7   ALBUMIN 2.3*  2.3* 2.5*  2.5*   PROT 5.3* 6.2   BILITOT 0.4 0.3   ALKPHOS 57 59   ALT 13 <5*   * 51*   MG 1.9 2.1   PHOS 4.5 6.7*       ABGs:   Recent Labs  Lab 06/17/18  0438   PH 7.324*   PCO2 43.6   HCO3 22.7*   POCSATURATED 96   BE -3     All pertinent labs within the past 24 hours have been reviewed.    Significant Imaging:  CXR: I have reviewed all pertinent results/findings within the past 24 hours and my personal findings are:  increased interstitial infiltrates      Assessment/Plan:     Pulmonary   * Acute airway obstruction    Neck still firm and swollen  Leave intubated for airway protection another 24 hours  Vocal Cord assessment in AM for possible extubation  Cont low dose steroids        On mechanically assisted ventilation    Vent settings reviewed and adjusted  Cont intubation for airway protection post op neck surgery with swelling  Tolerates SAT/SBT          Cardiac/Vascular   H/O aortic valve replacement    Has Tavr valve  Cards following        Cardiac arrest with ventricular fibrillation    Monitor and replace electrolytes as needed  ICU cardiac monitoring  Cont Amiodarone and Lopressor.  A-Fib resolved.  No neuro deficit noted above baseline from hx CVA        Coronary artery disease involving native coronary artery    Per Cards   [facility] 6/15 done revealing diffuse CAD not amenable for PCI, no changes from 2016  Cont Plavix, ASA, Lopressor.    Allergy to statin noted        Renal/   Acute renal failure superimposed on stage 5 chronic kidney disease, not on chronic dialysis    S/P contrast with [facility] 6/15  Renal following no RRT planned  BMP daily  Accurate I/Os        Oncology   Acute blood loss anemia    S/P 2 units PRBCs 6/15  Daily H/H  No active bleeding  Patient has been treated with Procrit 20,000 units weekly for maintenance therapy followed by Hematology        Endocrine   Diabetes mellitus, type 2 -  only on oral medications    Cont SSI           Hyperparathyroidism    POD #5 S/P parathyroidectomy  Surgery following        S/P parathyroidectomy    Per surgery POD #5  Returned to surgery for post op hematoma evacuation 6/14  Monitor neck circumference        Preventive Measures and Monitoring:   Stress Ulcer: PPI  Nutrition: TF  Glucose control: SSI  Bowel prophylaxis: Miralax and PRN Dulcolax  DVT prophylaxis: SCDs  Hx CAD on B-Blocker: Lopressor  Head of Bed/Reposition: Elevate HOB and turn Q1-2 hours   Early Mobility: Bed rest  SAT/SBT: Passed but needs airway protection  Vent Day: #4  OG Day: #4  Central Line Right Fem Day: #4  Right Femoral Arterial Line Day: #4  [place]: #4  Code Status: Full  Pneumonia Vaccine: UTD     Counseling/Consultation:I have discussed the care of this patient in detail with the bedside nursing staff and Dr. May and Dr. Samuel and Dr. Louis and Dr. Marinelli    Critical Care Time: 54 minutes  Critical secondary to Patient has a condition that poses threat to life and bodily function: Acute Renal Failure and [facility]  Patient is currently receiving parenteral controlled substances: Fentanyl infusion      Critical care was time spent personally by me on the following activities: development of treatment plan with patient or surrogate and bedside caregivers, discussions with consultants, evaluation of patient's response to treatment, examination of patient, ordering and performing treatments and interventions, ordering and review of laboratory studies, ordering and review of radiographic studies, pulse oximetry, re-evaluation of patient's condition. This critical care time did not overlap with that of any other provider or involve time for any procedures.     Joseph Talavera NP  Critical Care Medicine  [facility] -

## 2018-06-17 NOTE — PROGRESS NOTES
[facility] Hospital Medicine  Progress Note    Patient Name: Citlali Karimi  MRN: [id number]  Patient Class: IP- Inpatient   Admission Date: 6/12/2018  Length of Stay: 4 days  Attending Physician: Levy Samuel MD  Primary Care Provider: Evelio Schuster MD        Subjective:     Principal Problem:Acute airway obstruction    HPI:  Citlali Karimi is a 68 y.o female with PMHx of CKD (IV), HTN, DM, CVA (left sided weakness), and CAD who initially presented for hyperparathyroidism and hypercalcemia requiring surgical intervention.  Pt underwent parathyroidectomy today per Dr. Tracy (General Surgery). Pt admitted to Medical Surgical Unit post procedure and Hospital Medicine consulted for medical management.  Chest xray post procedure showed mild asymmetric CHF versus RUL pneumonia. Troponin 0.113 and BNP >270 noted.  Pt given IV Lasix in PACU prior to unit arrival.      Hospital Course:  Pt admitted to Outpatient Extended Recovery post procedure.  Elevated noted post procedure and results trended yielding significant positive response.  Cardiology consulted and Heparin infusion initiated.  Hx of CAD, multiple vessels noted with home medications continued and Imdur dose increased.  Nephrology consulted due to elevated creatinine and [facility] recommended.  Echo results pending.  Heart catheterization procedure considered with family refusing due to concern for worsening kidney function as patient does not want to be on dialysis.  Decreased oral intake and difficulty swallowing noted.  Speech therapist to bedside.  Pt made NPO and General Surgery notified.      6/15  Patient worsening status. ET changed per Pulmonary CC. Patient s/p Code Blue - VFib with recovery. Cardiology taking patient to CATH lab. Discussed with CM plan for post acute care in progress.    6/16  Patient improved o/n. Patient awake and able to follow simple command. Breathing trials in progress. Discussed with Pulmonary CC    6/17  Patient  remains intubated.  at bedside. Cardiology at bedside as well. Discussed with CC Team    Interval History: Stable O/N    Review of Systems   Unable to perform ROS: Intubated     Objective:     Vital Signs (Most Recent):  Temp: 98.3 °F (36.8 °C) (06/17/18 0705)  Pulse: 103 (06/17/18 0935)  Resp: 11 (06/17/18 0935)  BP: (!) 127/52 (06/17/18 0900)  SpO2: 98 % (06/17/18 0935) Vital Signs (24h Range):  Temp:  [98.3 °F (36.8 °C)-98.8 °F (37.1 °C)] 98.3 °F (36.8 °C)  Pulse:  [] 103  Resp:  [11-15] 11  SpO2:  [97 %-100 %] 98 %  BP: ()/(30-68) 127/52     Weight: 75.6 kg (166 lb 10.7 oz)  Body mass index is 30.48 kg/m².    Intake/Output Summary (Last 24 hours) at 06/17/18 1043  Last data filed at 06/17/18 0915   Gross per 24 hour   Intake          1095.83 ml   Output              237 ml   Net           858.83 ml      Physical Exam   Constitutional: She appears well-developed and well-nourished. She is intubated.   ILL Appearing  Intubated   HENT:   Head: Normocephalic and atraumatic.   ET in place  + neck swelling   Eyes: EOM are normal. Pupils are equal, round, and reactive to light.   Neck: Normal range of motion. Neck supple. No JVD present.   Cardiovascular: Regular rhythm and intact distal pulses.  Tachycardia present.    Murmur heard.   Systolic murmur is present with a grade of 3/6   Pulmonary/Chest: Effort normal and breath sounds normal. She is intubated. No respiratory distress. She has no wheezes.   Abdominal: Soft. Bowel sounds are normal. She exhibits no distension.   Musculoskeletal: Normal range of motion. She exhibits edema. She exhibits no tenderness.   Neurological: She has normal reflexes. No cranial nerve deficit.   Skin: Skin is warm and dry. Capillary refill takes less than 2 seconds. No erythema.   Nursing note and vitals reviewed.      Significant Labs:   BMP:   Recent Labs  Lab 06/17/18  0510   *  149*     138   K 4.3  4.3     103   CO2 19*  19*   BUN 68*   68*   CREATININE 6.0*  6.0*   CALCIUM 8.7  8.7   MG 2.1     CBC:   Recent Labs  Lab 06/16/18  0330 06/17/18  0510   WBC 8.99 11.99   HGB 8.9* 9.0*   HCT 27.1* 28.0*   * 173     CMP:   Recent Labs  Lab 06/16/18  0330 06/17/18  0510     140 138  138   K 3.6  3.6 4.3  4.3     105 103  103   CO2 20*  20* 19*  19*     101 149*  149*   BUN 49*  49* 68*  68*   CREATININE 4.7*  4.7* 6.0*  6.0*   CALCIUM 8.7  8.7 8.7  8.7   PROT 5.3* 6.2   ALBUMIN 2.3*  2.3* 2.5*  2.5*   BILITOT 0.4 0.3   ALKPHOS 57 59   * 51*   ALT 13 <5*   ANIONGAP 15  15 16  16   EGFRNONAA 9*  9* 7*  7*     All pertinent labs within the past 24 hours have been reviewed.    Significant Imaging: I have reviewed all pertinent imaging results/findings within the past 24 hours.    Assessment/Plan:      * Acute airway obstruction    6/16  Intubated for airway protection.  Pulmonary CC          NSTEMI (non-ST elevated myocardial infarction)    ASA  Cardiology  [facility] - Diffuse disease  No further intervention recommended  Medical Management          S/P parathyroidectomy              On mechanically assisted ventilation    Wean as tolerated  Intubated  Pulmonary CC          Cardiac arrest with ventricular fibrillation    Cardiology  [facility] 6/15  ASA  Statin Allergy  BB  No further interventions  Diffuse disease        Abnormal chest x-ray    - chest xray showed possible mild asymmetric CHF versus right upper lobe pneumonia.  -pt given IV Lasix in PACU  - IV antibiotics for suspected aspiration continued   - pt recently seen by RAMIRO Mcnally (Otolaryngology) for Dysphagia  -   - pt afebrile, WBC normal  - repeat xray results showed improved aeration RUL with no adverse interval changes in comparison to previous study  - SLP evaluation performed and thin, pureed recommended on 6/13/18 6/16  Pastient intubated  Pulmonary CC  ABX    6/17  ABX  Pulmonary CC  Monitor            Elevated troponin     -initial 0.113>>34>>50>>41  -pt denies CP and SOB  - EKG results showed diffuse ST changes    -serial results revealed NSTEMI with Cardiology consulted     6/15  NSTEMI POA  Cardiology  [facility]  ASA  Hold Statin due to Elevated LFT's    6/16  Cardiology  ASA  ICU  Statin allergy noted    6/17  S/p [facility] - Diffuse disease  ASA  Cards              CVA (cerebral vascular accident)    -hx of 1 yr ago per   -left sided weakness residual  -ASA   Statin on stability        Hyperparathyroidism    -Pt admitted to Extended Recovery then transferred to Inpatient due to NSTEMI  -s/p Parathyroidectomy   -managed by General Surgery -primary team  - PTH- 32  - Ca 11.4>>10.5  - analgesia prn   - antiemetics prn  - specimens sent for analysis    6/16  Ca 8.7 today  Monitor        Coronary artery disease involving native coronary artery    - ASA, Statin, and Lopressor continued   -Imdur dose increased   -previous Cath showed severe CAD (proximal LCX 99%, mid 50%, RCA mid 90%, distal with subtotal lesion).  - Cardiology consulted with medical management continued   - [facility] proposed pending Nephrology recommendations  -family refused LHC due to concern for worsening kidney function as pt had expressed that she did not want to be on dialysis     6/15  Family electing LHC and accepting risk of worsening renal function  Cardiology    6/16  S/p [facility]  Cardiology  ASA  Statin allergy noted    6/16  No interventions  Cardiology on consult          Acute renal failure superimposed on stage 5 chronic kidney disease, not on chronic dialysis    -Creatinine 3.6>>4.1  -baseline 2.7-4.4  -will monitor   -sodium bicarb continued   -pt has been followed outpatient by Dr. Vazquez (Nephrology)  -Nephrology consulted - pt may benefit from repeat angio due to NSTEMI but at high risk of contrast induced nephropathy  - pt was candidate for renal transplant however work up discontinued due to progressive weakness  - [facility] recommended     6/15  Monitor worsening  Renal Function  S/p Cardiac VFib Arrest  [facility] today  IVF's  Monitor      6/16  [facility] diffuse disease  Cardiology  Monitor    6/17  Worsening  No further Cardiac intervention  Nephrology on consult  Monitor  Cr 6.0 today vs 4.7  Low Urine O/P        Hypertension associated with diabetes    - home medications continued   -hx of noncompliance and inconsistent dosing at home per   - uncontrolled upon arrival- improved with Hydralazine in PACU   - Hydralazine prn          Diabetes mellitus, type 2 - only on oral medications    Controlled  NICC  Accuchecks          Acute blood loss anemia    -stable post procedure  -will monitor  -CBC in am   -pt followed outpatient by Heme/Onc    6/16  Stable  Monitor    6/17  Improving  Monitor            VTE Risk Mitigation         Ordered     Place sequential compression device  Until discontinued      06/12/18 0154          Critical care time spent on the evaluation and treatment of severe organ dysfunction, review of pertinent labs and imaging studies, discussions with consulting providers and discussions with patient/family: 45 minutes.    Vinicio Marinelli MD  Department of Hospital Medicine   [facility] -

## 2018-06-17 NOTE — PROGRESS NOTES
Patient assessed.  Soft bilat wrist restraints renewed due to risk of pulling lines, tubes and/or climbing OOB.    [facility]a [facility]

## 2018-06-17 NOTE — PROGRESS NOTES
Dr. Louis notified of patient's ST changes on bedside cardiac monitoring.  No new orders at this time.  Will continue to monitor patient.

## 2018-06-17 NOTE — PROGRESS NOTES
[facility] -   Nephrology  Progress Note    Patient Name: Citlali Karimi  MRN: [id number]  Admission Date: 6/12/2018  Hospital Length of Stay: 4 days  Attending Provider: Levy Samuel MD   Primary Care Physician: Evelio Schuster MD  Principal Problem:Acute airway obstruction    Subjective:     HPI:  Citlali Karimi Is a pleasant 68 -year-old  woman with history of chronic kidney disease stage 4, patient was admitted to the hospital for an elective  partial parathyroidectomy, following her surgery during observation.  She had some chest pain, workup revealed non ST elevated MI, patient was admitted to the hospital for further management.  We were consulted due to advanced renal insufficiency, baseline serum creatinine about 3.5-4,            Important Info :        She underwent a native kidney biopsy on 10/10/13. Final report of the kidney biopsy is negative for any specific etiology for acute renal failure. 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         Interval History:  events noted , pt had a brief CODE on 6/14 and was intubated, also had hematoma evacuated from surgical site ( neck ) , remains on vent support and critically ill ,     Objective:     Vital Signs (Most Recent):  Temp: 98.7 °F (37.1 °C) (06/17/18 1105)  Pulse: 101 (06/17/18 1105)  Resp: 14 (06/17/18 1105)  BP: 116/68 (06/17/18 1105)  SpO2: 98 % (06/17/18 1105)  O2 Device (Oxygen Therapy): ventilator (06/17/18 0935) Vital Signs (24h Range):  Temp:  [98.3 °F (36.8 °C)-98.7 °F (37.1 °C)] 98.7 °F (37.1 °C)  Pulse:  [] 101  Resp:  [11-15] 14  SpO2:  [97 %-100 %] 98 %  BP: ()/(30-68) 116/68     Weight: 75.6 kg (166 lb 10.7 oz) (06/16/18 0545)  Body mass index is 30.48 kg/m².  Body surface area is 1.82 meters squared.    I/O last 3 completed shifts:  In: 1465.8 [I.V.:765.8; NG/GT:600; IV Piggyback:100]  Out: 607 [Urine:592; Other:15]    Physical Exam   Constitutional:  She appears well-developed. No distress.   HENT:   Head: Normocephalic and atraumatic.   Mouth/Throat: No oropharyngeal exudate.   ETT in place    Eyes: Conjunctivae are normal. Pupils are equal, round, and reactive to light.   Neck: No JVD present. Carotid bruit is not present. No tracheal deviation present. No thyroid mass and no thyromegaly present.   Swelling noted    Cardiovascular: Normal rate, regular rhythm and intact distal pulses.  Exam reveals no gallop and no friction rub.    Murmur heard.  Pulmonary/Chest: No respiratory distress. She has no wheezes. She exhibits no tenderness.   Abdominal: Soft. Bowel sounds are normal. She exhibits no distension, no abdominal bruit, no ascites and no mass. There is no hepatosplenomegaly. There is no tenderness. There is no rebound, no guarding and no CVA tenderness.   Musculoskeletal: She exhibits no edema or tenderness.   Lymphadenopathy:     She has no cervical adenopathy.   Neurological: No cranial nerve deficit. She exhibits normal muscle tone. Coordination normal.   Skin: Skin is warm and intact. No rash noted. No erythema. No pallor.       Significant Labs:  CBC:     Recent Labs  Lab 06/17/18  0510   WBC 11.99   RBC 3.33*   HGB 9.0*   HCT 28.0*      MCV 84   MCH 27.0   MCHC 32.1     CMP:     Recent Labs  Lab 06/17/18  0510   *  149*   CALCIUM 8.7  8.7   ALBUMIN 2.5*  2.5*   PROT 6.2     138   K 4.3  4.3   CO2 19*  19*     103   BUN 68*  68*   CREATININE 6.0*  6.0*   ALKPHOS 59   ALT <5*   AST 51*   BILITOT 0.3     Coagulation:   Recent Labs  Lab 06/15/18  0352  06/15/18  1725   INR 1.1  --   --    APTT 97.9*  < > 38.5*   < > = values in this interval not displayed.  LFTs:     Recent Labs  Lab 06/17/18  0510   ALT <5*   AST 51*   ALKPHOS 59   BILITOT 0.3   PROT 6.2   ALBUMIN 2.5*  2.5*     All labs within the past 24 hours have been reviewed.     Lab Results   Component Value Date    PTH 18.5 06/12/2018    CALCIUM 8.7  06/17/2018    CALCIUM 8.7 06/17/2018    PHOS 6.7 (H) 06/17/2018         Significant Imaging: reviewed     Assessment/Plan:     Acute renal failure superimposed on stage 5 chronic kidney disease, not on chronic dialysis    1. MAYLIN on CKD stage 4/5 :  Worsening in serum cr noted , will  continue to monitor, no acute indication for renal replacement therapy,     Can be due to hemodynamic reasons or ILIA , oliguric, discussed with  bedside , will monitor on a daily basis for need for RRT ,     2.  Acute MI - cardiology notes reviewed, Had a lengthy family discussion and family on 6/14 (  and niece Dr Hua ) ,     Initially pt and family decided against LHC due to risk of worsening kidney function as pt did not want to go on dialysis ,     After the CODE on 6/14 ,  changed his mind , s/p LHC on 6/15, triple vessel disease not amenable for angioplasty ,     UO low and understands that pt may need RRT after the procedure if kidneys cont to worsen ,     3. SHPT :  Status post partial parathyroidectomy surgery ( 6/12/18 ) , will continue to monitor her serum calcium levels, hold Calcium carb at this time as her corrected serum calcium is stable, Neck swelling improved after hematoma evacuation , neck u/s report reviewed,     4.  Anemia of chronic kidney disease - receiving 2 units of pRBC today ,     5. Stable lytes ,     6. Met acidosis : on sodium bicarb supplements,     7. resp failure - on vent support              I will follow-up with patient. Please contact us if you have any additional questions.     Total critical care time spent 40 minutes including time needed to review the records,  patient  evaluation, documentation, face-to-face discussion with the patient's  , CC and primary teams,   more than 50% of the time was spent on coordination of care and counseling.       Magnus Vazquez MD  Nephrology  [facility] -

## 2018-06-17 NOTE — ASSESSMENT & PLAN NOTE
Monitor calcium    S/p neck washout/drain placement    Allow neck swelling to decrease before extubating. Neck girth is not increasing.    US today to assess hematoma.

## 2018-06-17 NOTE — ASSESSMENT & PLAN NOTE
1. MAYLIN on CKD stage 4/5 :  Worsening in serum cr noted , will  continue to monitor, no acute indication for renal replacement therapy,     Can be due to hemodynamic reasons or ILIA , oliguric, discussed with  bedside , will monitor on a daily basis for need for RRT ,     2.  Acute MI - cardiology notes reviewed, Had a lengthy family discussion and family on 6/14 (  and niece Dr Hua ) ,     Initially pt and family decided against LHC due to risk of worsening kidney function as pt did not want to go on dialysis ,     After the CODE on 6/14 ,  changed his mind , s/p LHC on 6/15, triple vessel disease not amenable for angioplasty ,     UO low and understands that pt may need RRT after the procedure if kidneys cont to worsen ,     3. SHPT :  Status post partial parathyroidectomy surgery ( 6/12/18 ) , will continue to monitor her serum calcium levels, hold Calcium carb at this time as her corrected serum calcium is stable, Neck swelling improved after hematoma evacuation , neck u/s report reviewed,     4.  Anemia of chronic kidney disease - receiving 2 units of pRBC today ,     5. Stable lytes ,     6. Met acidosis : on sodium bicarb supplements,     7. resp failure - on vent support

## 2018-06-17 NOTE — PROGRESS NOTES
[facility]  General Surgery  Progress Note    Subjective:     History of Present Illness:  No notes on file    Post-Op Info:  Procedure(s) (LRB):  CATHETERIZATION, HEART, LEFT (Left)   2 Days Post-Op     Interval History: No events    Medications:  Continuous Infusions:   fentanyl 200 mcg/hr (06/17/18 0610)     Scheduled Meds:   amiodarone  200 mg Oral BID    aspirin  81 mg Oral Daily    chlorhexidine  10 mL Mouth/Throat BID    clopidogrel  75 mg Oral Daily    methylPREDNISolone sodium succinate  40 mg Intravenous Q6H    metoprolol tartrate  25 mg Oral BID    nozaseptin   Each Nare BID    pantoprazole 40 mg in dextrose 5 % 100 mL IVPB (over 15 minutes) (ready to mix system)  40 mg Intravenous Daily    polyethylene glycol  17 g Oral Daily    senna-docusate 8.6-50 mg  1 tablet Oral BID    sodium bicarbonate  650 mg Oral BID     PRN Meds:acetaminophen, atropine, bisacodyl, cloNIDine, dextrose 50%, dextrose 50%, glucagon (human recombinant), glucose, glucose, hydrALAZINE, insulin aspart U-100, lactulose, lorazepam, nitroGLYCERIN, ondansetron, sodium bicarbonate, sodium chloride 0.9%     Review of patient's allergies indicates:   Allergen Reactions    Lipitor [atorvastatin] Swelling     Lips       Objective:     Vital Signs (Most Recent):  Temp: 98.4 °F (36.9 °C) (06/17/18 0300)  Pulse: 83 (06/17/18 0600)  Resp: 14 (06/17/18 0600)  BP: 118/63 (06/17/18 0600)  SpO2: 100 % (06/17/18 0600) Vital Signs (24h Range):  Temp:  [98.4 °F (36.9 °C)-98.8 °F (37.1 °C)] 98.4 °F (36.9 °C)  Pulse:  [] 83  Resp:  [11-17] 14  SpO2:  [98 %-100 %] 100 %  BP: ()/(30-68) 118/63     Weight: 75.6 kg (166 lb 10.7 oz)  Body mass index is 30.48 kg/m².    Intake/Output - Last 3 Shifts       06/15 0700 - 06/16 0659 06/16 0700 - 06/17 0659    I.V. (mL/kg) 859.4 (11.4) 300.2 (4)    Blood 687.5     NG/GT  600    IV Piggyback 200 100    Total Intake(mL/kg) 1746.9 (23.1) 1000.2 (13.2)    Urine (mL/kg/hr) 720  (0.4) 277 (0.2)    Other 15 (0) 15 (0)    Total Output 735 292    Net +1011.9 +708.2          Stool Occurrence 0 x 0 x          Physical Exam   Constitutional: She is oriented to person, place, and time. No distress.   Neck: Trachea normal.   Swelling along left side of neck superior to incision   Pulmonary/Chest: Effort normal. No respiratory distress.   Musculoskeletal: She exhibits no edema.   Neurological: She is alert and oriented to person, place, and time.   Skin: No rash noted. No erythema. No pallor.       Significant Labs:  CBC:     Recent Labs  Lab 06/17/18  0510   WBC 11.99   RBC 3.33*   HGB 9.0*   HCT 28.0*      MCV 84   MCH 27.0   MCHC 32.1       Significant Diagnostics:  I have reviewed all pertinent imaging results/findings within the past 24 hours.    Assessment/Plan:     CVA (cerebral vascular accident)    Prior CVA, patient with increased difficulty swelling medications at home prior to hospitalization, underwent evaluation with ENT prior to surgery  Speech therapy consult for swallowing        Hyperparathyroidism    Monitor calcium    S/p neck washout/drain placement    Allow neck swelling to decrease before extubating. Neck girth is not increasing.    US today to assess hematoma.        Coronary artery disease involving native coronary artery    Cardiology following        CKD (chronic kidney disease) stage 5, GFR less than 15 ml/min    Monitor urine output creatinine and electrolytes        Hypertension associated with diabetes    Antihypertensive medications            Louis O. Jeansonne, MD  General Surgery  [facility] -

## 2018-06-17 NOTE — SUBJECTIVE & OBJECTIVE
Review of Systems   Unable to perform ROS: Intubated       Objective:     Vital Signs (Most Recent):  Temp: 98.3 °F (36.8 °C) (06/17/18 0705)  Pulse: 100 (06/17/18 0705)  Resp: 11 (06/17/18 0705)  BP: (!) 122/43 (06/17/18 0705)  SpO2: 97 % (06/17/18 0705) Vital Signs (24h Range):  Temp:  [98.3 °F (36.8 °C)-98.8 °F (37.1 °C)] 98.3 °F (36.8 °C)  Pulse:  [] 100  Resp:  [11-15] 11  SpO2:  [97 %-100 %] 97 %  BP: ()/(30-68) 122/43     Weight: 75.6 kg (166 lb 10.7 oz)  Body mass index is 30.48 kg/m².      Intake/Output Summary (Last 24 hours) at 06/17/18 0826  Last data filed at 06/17/18 0705   Gross per 24 hour   Intake           755.83 ml   Output              267 ml   Net           488.83 ml       Physical Exam   Constitutional: Vital signs are normal. She appears well-developed and well-nourished. She is sleeping. She is easily aroused.  Non-toxic appearance. She appears ill. No distress. She is sedated, intubated and restrained.   HENT:   Head: Normocephalic and atraumatic.   Mouth/Throat: Oropharynx is clear and moist and mucous membranes are normal.   Eyes: EOM and lids are normal. Pupils are equal, round, and reactive to light.   Neck: Neck supple. Carotid bruit is not present.       Cardiovascular: Regular rhythm and normal heart sounds.  Tachycardia present.    Pulses:       Radial pulses are 2+ on the right side, and 2+ on the left side.        Dorsalis pedis pulses are 1+ on the right side, and 1+ on the left side.   Pulmonary/Chest: Effort normal and breath sounds normal. No accessory muscle usage. She is intubated. No respiratory distress.   Abdominal: Soft. Normal appearance. She exhibits no distension. Bowel sounds are decreased. There is no tenderness.   Genitourinary:   Genitourinary Comments: Fair in place   Musculoskeletal: Normal range of motion.        Right foot: There is no deformity.        Left foot: There is no deformity.   No edema   Lymphadenopathy:     She has no cervical  adenopathy.   Neurological: She is easily aroused.   With sedation vacation she is Fully awake and nodding head to questions   Skin: Skin is warm, dry and intact. Capillary refill takes less than 2 seconds. No rash noted. No cyanosis.            Vents:  Vent Mode: Spont (06/17/18 0700)  Ventilator Initiated: Yes (06/14/18 1812)  Set Rate: 0 bmp (06/17/18 0700)  Vt Set: 0 mL (06/17/18 0700)  Pressure Support: 10 cmH20 (06/17/18 0700)  PEEP/CPAP: 5 cmH20 (06/17/18 0700)  Oxygen Concentration (%): 30 (06/17/18 0705)  Peak Airway Pressure: 15 cmH2O (06/17/18 0700)  Plateau Pressure: 18 cmH20 (06/17/18 0700)  Total Ve: 6.46 mL (06/17/18 0700)  F/VT Ratio<105 (RSBI): (!) 18.16 (06/17/18 0700)    Lines/Drains/Airways     Central Venous Catheter Line                 Percutaneous Central Line Insertion/Assessment - triple lumen  06/14/18 right femoral 3 days          Drain                 Drain/Device  06/14/18 1239 neck collapsible closed device 2 days         NG/OG Tube 06/15/18 0200 [place] sump 14 Fr. Center mouth 2 days         Urethral Catheter 06/14/18 1113 2 days          Airway                 Airway - Non-Surgical 06/15/18 1025 Endotracheal Tube 1 day          Arterial Line                 Arterial Line 06/14/18 1427 Right Femoral 2 days          Peripheral Intravenous Line                 Peripheral IV - Single Lumen 06/12/18 0954 Left Forearm 4 days                Significant Labs:    CBC/Anemia Profile:    Recent Labs  Lab 06/16/18  0330 06/17/18  0510   WBC 8.99 11.99   HGB 8.9* 9.0*   HCT 27.1* 28.0*   * 173   MCV 83 84   RDW 19.3* 19.0*        Chemistries:    Recent Labs  Lab 06/16/18  0330 06/17/18  0510     140 138  138   K 3.6  3.6 4.3  4.3     105 103  103   CO2 20*  20* 19*  19*   BUN 49*  49* 68*  68*   CREATININE 4.7*  4.7* 6.0*  6.0*   CALCIUM 8.7  8.7 8.7  8.7   ALBUMIN 2.3*  2.3* 2.5*  2.5*   PROT 5.3* 6.2   BILITOT 0.4 0.3   ALKPHOS 57 59   ALT 13 <5*   * 51*    MG 1.9 2.1   PHOS 4.5 6.7*       ABGs:   Recent Labs  Lab 06/17/18  0438   PH 7.324*   PCO2 43.6   HCO3 22.7*   POCSATURATED 96   BE -3     All pertinent labs within the past 24 hours have been reviewed.    Significant Imaging:  CXR: I have reviewed all pertinent results/findings within the past 24 hours and my personal findings are:  increased interstitial infiltrates

## 2018-06-17 NOTE — ASSESSMENT & PLAN NOTE
S/P 2 units PRBCs 6/15  Daily H/H  No active bleeding  Patient has been treated with Procrit 20,000 units weekly for maintenance therapy followed by Hematology

## 2018-06-17 NOTE — PLAN OF CARE
Problem: Patient Care Overview  Goal: Plan of Care Review  Outcome: Ongoing (interventions implemented as appropriate)  Pt continues to be on mechanical ventilation. No significant changes.

## 2018-06-17 NOTE — ASSESSMENT & PLAN NOTE
-stable post procedure  -will monitor  -CBC in am   -pt followed outpatient by Heme/Onc    6/16  Stable  Monitor    6/17  Improving  Monitor

## 2018-06-17 NOTE — PROGRESS NOTES
[facility] -   Cardiology  Progress Note    Patient Name: Citlali Karimi  MRN: [id number]  Admission Date: 6/12/2018  Hospital Length of Stay: 4 days  Code Status: Full Code   Attending Physician: Levy Samuel MD   Primary Care Physician: Evelio Schuster MD  Expected Discharge Date:   Principal Problem:Acute airway obstruction    Subjective:     Hospital Course:   6/14/18-Patient seen and examined today, sleepy/lethargic. Dyspneic with throat swelling, surgery notified. Recommended angiogram, risks/benefits discussed.  and family had detailed discussion with nephrology and decided against LHC due to increased risk of contrast induced nephropathy/dialysis.    Patient subsequently taken back to OR this AM for re-exploration/washout given throat swelling. Coded post procedure.   6/14/18  Pt with V fib post surgery with DCCV to NSR, IV amiodarone started,vasopressors also started SBP 160s  Repeat EKG in ICU with infpost injury pattern. Discussed with  and currently does not want LHC, will need to start IV heparin and continue medical therapy     6/15/18-Patient seen and examined today s/p post-procedural  Arrest/MI yesterday. Intubated, sedated. Blood transfusion in process. LHC planned for later today. Risks/benefits discussed in detail. Family agreeable.   6/16/18- LHC- diffuse CAD not amenable to PCI, no changes from 2016 cath, turned down for CABG, continue medical tx  6/17/18 intubated, NSR, continue medical tx    Interval History:     ROS  Objective:     Vital Signs (Most Recent):  Temp: 98.7 °F (37.1 °C) (06/17/18 1105)  Pulse: 99 (06/17/18 1316)  Resp: 10 (06/17/18 1316)  BP: 125/68 (06/17/18 1300)  SpO2: 99 % (06/17/18 1316) Vital Signs (24h Range):  Temp:  [98.3 °F (36.8 °C)-98.7 °F (37.1 °C)] 98.7 °F (37.1 °C)  Pulse:  [] 99  Resp:  [10-18] 10  SpO2:  [97 %-100 %] 99 %  BP: ()/(30-75) 125/68     Weight: 75.6 kg (166 lb 10.7 oz)  Body mass index is 30.48 kg/m².     SpO2:  99 %  O2 Device (Oxygen Therapy): ventilator      Intake/Output Summary (Last 24 hours) at 06/17/18 1410  Last data filed at 06/17/18 1300   Gross per 24 hour   Intake          1080.83 ml   Output              202 ml   Net           878.83 ml       Lines/Drains/Airways     Central Venous Catheter Line                 Percutaneous Central Line Insertion/Assessment - triple lumen  06/14/18 right femoral 3 days          Drain                 Drain/Device  06/14/18 1239 neck collapsible closed device 3 days         Urethral Catheter 06/14/18 1113 3 days         NG/OG Tube 06/15/18 0200 [place] sump 14 Fr. Center mouth 2 days          Airway                 Airway - Non-Surgical 06/15/18 1025 Endotracheal Tube 2 days          Arterial Line                 Arterial Line 06/14/18 1427 Right Femoral 2 days          Peripheral Intravenous Line                 Peripheral IV - Single Lumen 06/12/18 0954 Left Forearm 5 days                Physical Exam    Significant Labs: All pertinent lab results from the last 24 hours have been reviewed.    Significant Imaging: X-Ray: CXR: X-Ray Chest 1 View (CXR):   Results for orders placed or performed during the hospital encounter of 06/12/18   X-Ray Chest 1 View    Narrative    EXAMINATION:  XR CHEST 1 VIEW    CLINICAL HISTORY:  Respiratory failure.    COMPARISON:  06/16/2018.    FINDINGS:  Endotracheal tube remains in place with tip above the veronique.  Nasogastric tube identified with tip overlying the proximal body of the stomach.  There is new left perihilar and left basilar infiltrate.  Pulmonary vascular congestion is present which is unchanged.  Cardiac silhouette mildly enlarged.  Stent overlies the heart which may represent aortic valve or ascending aortic stent.  No change compared to the previous exam.      Impression    1. New moderate size left perihilar and left basilar infiltrate.  2. Mild pulmonary vascular congestion.  3. Endotracheal tube remains in place with tip above  veronique.  Nasogastric tube remains in place with distal tip within the stomach.      Electronically signed by: Orion Aponte MD  Date:    06/17/2018  Time:    08:23     Assessment and Plan:     Brief HPI:     NSTEMI (non-ST elevated myocardial infarction)    diffuse CAD not amenable to PCI, no changes from 2016 cath, turned down for CABG, continue medical tx        Cardiac arrest with ventricular fibrillation    -s/p successful resuscitation  -Post-procedure EKG showed STEMI  -Continue medical mgmt with ASA, statin, BB, heparin  -Plavix if no contraindications  -[facility] today by Dr. Louis. All risks, benefits, and treatment alternatives explained to patient in detail. All questions answered. POA,  signed consent. Aware of high risk of contrast induced nephropathy/dialysis.         Coronary artery disease involving native coronary artery    -See plan under NSTEMI        Acute renal failure superimposed on stage 5 chronic kidney disease, not on chronic dialysis    -Creatinine 4.2  -Mgmt as per nephrology        Hypertension associated with diabetes    -BP elevated yesterday  -Continue BB, Imdur  -Add hydralazine or amlodipine if needed        Acute blood loss anemia    -Transfusion in process            VTE Risk Mitigation         Ordered     Place sequential compression device  Until discontinued      06/12/18 8536          Galindo Louis MD  Cardiology  [facility] -

## 2018-06-17 NOTE — ASSESSMENT & PLAN NOTE
-initial 0.113>>34>>50>>41  -pt denies CP and SOB  - EKG results showed diffuse ST changes    -serial results revealed NSTEMI with Cardiology consulted     6/15  NSTEMI POA  Cardiology  LHC  ASA  Hold Statin due to Elevated LFT's    6/16  Cardiology  ASA  ICU  Statin allergy noted    6/17  S/p LHC - Diffuse disease  ASA  Cards

## 2018-06-17 NOTE — ASSESSMENT & PLAN NOTE
- chest xray showed possible mild asymmetric CHF versus right upper lobe pneumonia.  -pt given IV Lasix in PACU  - IV antibiotics for suspected aspiration continued   - pt recently seen by RAMIRO Mcnally (Otolaryngology) for Dysphagia  -   - pt afebrile, WBC normal  - repeat xray results showed improved aeration RUL with no adverse interval changes in comparison to previous study  - SLP evaluation performed and thin, pureed recommended on 6/13/18 6/16  Pastient intubated  Pulmonary CC  ABX    6/17  ABX  Pulmonary CC  Monitor

## 2018-06-17 NOTE — SUBJECTIVE & OBJECTIVE
Interval History: No events    Medications:  Continuous Infusions:   fentanyl 200 mcg/hr (06/17/18 0610)     Scheduled Meds:   amiodarone  200 mg Oral BID    aspirin  81 mg Oral Daily    chlorhexidine  10 mL Mouth/Throat BID    clopidogrel  75 mg Oral Daily    methylPREDNISolone sodium succinate  40 mg Intravenous Q6H    metoprolol tartrate  25 mg Oral BID    nozaseptin   Each Nare BID    pantoprazole 40 mg in dextrose 5 % 100 mL IVPB (over 15 minutes) (ready to mix system)  40 mg Intravenous Daily    polyethylene glycol  17 g Oral Daily    senna-docusate 8.6-50 mg  1 tablet Oral BID    sodium bicarbonate  650 mg Oral BID     PRN Meds:acetaminophen, atropine, bisacodyl, cloNIDine, dextrose 50%, dextrose 50%, glucagon (human recombinant), glucose, glucose, hydrALAZINE, insulin aspart U-100, lactulose, lorazepam, nitroGLYCERIN, ondansetron, sodium bicarbonate, sodium chloride 0.9%     Review of patient's allergies indicates:   Allergen Reactions    Lipitor [atorvastatin] Swelling     Lips       Objective:     Vital Signs (Most Recent):  Temp: 98.4 °F (36.9 °C) (06/17/18 0300)  Pulse: 83 (06/17/18 0600)  Resp: 14 (06/17/18 0600)  BP: 118/63 (06/17/18 0600)  SpO2: 100 % (06/17/18 0600) Vital Signs (24h Range):  Temp:  [98.4 °F (36.9 °C)-98.8 °F (37.1 °C)] 98.4 °F (36.9 °C)  Pulse:  [] 83  Resp:  [11-17] 14  SpO2:  [98 %-100 %] 100 %  BP: ()/(30-68) 118/63     Weight: 75.6 kg (166 lb 10.7 oz)  Body mass index is 30.48 kg/m².    Intake/Output - Last 3 Shifts       06/15 0700 - 06/16 0659 06/16 0700 - 06/17 0659    I.V. (mL/kg) 859.4 (11.4) 300.2 (4)    Blood 687.5     NG/GT  600    IV Piggyback 200 100    Total Intake(mL/kg) 1746.9 (23.1) 1000.2 (13.2)    Urine (mL/kg/hr) 720 (0.4) 277 (0.2)    Other 15 (0) 15 (0)    Total Output 735 292    Net +1011.9 +708.2          Stool Occurrence 0 x 0 x          Physical Exam   Constitutional: She is oriented to person, place, and time. No distress.   Neck:  Trachea normal.   Swelling along left side of neck superior to incision   Pulmonary/Chest: Effort normal. No respiratory distress.   Musculoskeletal: She exhibits no edema.   Neurological: She is alert and oriented to person, place, and time.   Skin: No rash noted. No erythema. No pallor.       Significant Labs:  CBC:     Recent Labs  Lab 06/17/18  0510   WBC 11.99   RBC 3.33*   HGB 9.0*   HCT 28.0*      MCV 84   MCH 27.0   MCHC 32.1       Significant Diagnostics:  I have reviewed all pertinent imaging results/findings within the past 24 hours.

## 2018-06-17 NOTE — ASSESSMENT & PLAN NOTE
Per Cards   [facility] 6/15 done revealing diffuse CAD not amenable for PCI, no changes from 2016  Cont Plavix, ASA, Lopressor.    Allergy to statin noted

## 2018-06-17 NOTE — PLAN OF CARE
Problem: Patient Care Overview  Goal: Plan of Care Review  Outcome: Ongoing (interventions implemented as appropriate)  Switched patient to CPAP at the start of the shift d/t noncompliance with the vent on AC mode of ventilation.  Remains on CPAP at this time, tolerating well.  Fentanyl gtt stopped for SAT, tolerated well.  Changed sedation to PRN Morphine and D/C'd Fentanyl gtt.  Not tolerating TF today, up to 525mL residual, TF off for now.  UOP relatively anuric, renal aware.  US of neck completed.  Restraints continue.  Turn q2h, heels floated.  POC reviewed with  at the bedside.  All questions answered.

## 2018-06-17 NOTE — ASSESSMENT & PLAN NOTE
Neck still firm and swollen  Leave intubated for airway protection another 24 hours  Vocal Cord assessment in AM for possible extubation  Cont low dose steroids

## 2018-06-17 NOTE — PROGRESS NOTES
Had long conference with Mr. Gutiérrez (spouse) discussing worsening renal failure with severe CAD.  Patient may need RRT soon which patient may not tolerate.  Spouse wishes DNR status.  Witnessed by Dr. Marinelli.    [facility]

## 2018-06-17 NOTE — PLAN OF CARE
Problem: Patient Care Overview  Goal: Plan of Care Review  Outcome: Ongoing (interventions implemented as appropriate)  Fentanyl gtt maxed out earlier in shift, pt became restless with sedation maxed, PRN ativan given which caused pt's BP to decrease and RASS>-1, fentanyl weaned down and turned off. Pt placed back on fentanyl gtt after pressure came up and pt woke back up. Tube feeds increased to 45/hr. Pt tolerating well. UOP still minimal at about 10/hr. Turned Q2h. Neck measuring 19-20 inches. Pt follows commands at times. POC reviewed.

## 2018-06-17 NOTE — SUBJECTIVE & OBJECTIVE
Interval History: Stable O/N    Review of Systems   Unable to perform ROS: Intubated     Objective:     Vital Signs (Most Recent):  Temp: 98.3 °F (36.8 °C) (06/17/18 0705)  Pulse: 103 (06/17/18 0935)  Resp: 11 (06/17/18 0935)  BP: (!) 127/52 (06/17/18 0900)  SpO2: 98 % (06/17/18 0935) Vital Signs (24h Range):  Temp:  [98.3 °F (36.8 °C)-98.8 °F (37.1 °C)] 98.3 °F (36.8 °C)  Pulse:  [] 103  Resp:  [11-15] 11  SpO2:  [97 %-100 %] 98 %  BP: ()/(30-68) 127/52     Weight: 75.6 kg (166 lb 10.7 oz)  Body mass index is 30.48 kg/m².    Intake/Output Summary (Last 24 hours) at 06/17/18 1043  Last data filed at 06/17/18 0915   Gross per 24 hour   Intake          1095.83 ml   Output              237 ml   Net           858.83 ml      Physical Exam   Constitutional: She appears well-developed and well-nourished. She is intubated.   ILL Appearing  Intubated   HENT:   Head: Normocephalic and atraumatic.   ET in place  + neck swelling   Eyes: EOM are normal. Pupils are equal, round, and reactive to light.   Neck: Normal range of motion. Neck supple. No JVD present.   Cardiovascular: Regular rhythm and intact distal pulses.  Tachycardia present.    Murmur heard.   Systolic murmur is present with a grade of 3/6   Pulmonary/Chest: Effort normal and breath sounds normal. She is intubated. No respiratory distress. She has no wheezes.   Abdominal: Soft. Bowel sounds are normal. She exhibits no distension.   Musculoskeletal: Normal range of motion. She exhibits edema. She exhibits no tenderness.   Neurological: She has normal reflexes. No cranial nerve deficit.   Skin: Skin is warm and dry. Capillary refill takes less than 2 seconds. No erythema.   Nursing note and vitals reviewed.      Significant Labs:   BMP:   Recent Labs  Lab 06/17/18  0510   *  149*     138   K 4.3  4.3     103   CO2 19*  19*   BUN 68*  68*   CREATININE 6.0*  6.0*   CALCIUM 8.7  8.7   MG 2.1     CBC:   Recent Labs  Lab  06/16/18  0330 06/17/18  0510   WBC 8.99 11.99   HGB 8.9* 9.0*   HCT 27.1* 28.0*   * 173     CMP:   Recent Labs  Lab 06/16/18  0330 06/17/18  0510     140 138  138   K 3.6  3.6 4.3  4.3     105 103  103   CO2 20*  20* 19*  19*     101 149*  149*   BUN 49*  49* 68*  68*   CREATININE 4.7*  4.7* 6.0*  6.0*   CALCIUM 8.7  8.7 8.7  8.7   PROT 5.3* 6.2   ALBUMIN 2.3*  2.3* 2.5*  2.5*   BILITOT 0.4 0.3   ALKPHOS 57 59   * 51*   ALT 13 <5*   ANIONGAP 15  15 16  16   EGFRNONAA 9*  9* 7*  7*     All pertinent labs within the past 24 hours have been reviewed.    Significant Imaging: I have reviewed all pertinent imaging results/findings within the past 24 hours.

## 2018-06-17 NOTE — ASSESSMENT & PLAN NOTE
-Creatinine 3.6>>4.1  -baseline 2.7-4.4  -will monitor   -sodium bicarb continued   -pt has been followed outpatient by Dr. Vazquez (Nephrology)  -Nephrology consulted - pt may benefit from repeat angio due to NSTEMI but at high risk of contrast induced nephropathy  - pt was candidate for renal transplant however work up discontinued due to progressive weakness  - [facility] recommended     6/15  Monitor worsening Renal Function  S/p Cardiac VFib Arrest  [facility] today  IVF's  Monitor      6/16  [facility] diffuse disease  Cardiology  Monitor    6/17  Worsening  No further Cardiac intervention  Nephrology on consult  Monitor  Cr 6.0 today vs 4.7  Low Urine O/P

## 2018-06-17 NOTE — ASSESSMENT & PLAN NOTE
Cardiology  [facility] 6/15  ASA  Statin Allergy  BB  No further interventions  Diffuse disease

## 2018-06-17 NOTE — ASSESSMENT & PLAN NOTE
Monitor and replace electrolytes as needed  ICU cardiac monitoring  Cont Amiodarone and Lopressor.  A-Fib resolved.  No neuro deficit noted above baseline from hx CVA

## 2018-06-17 NOTE — ASSESSMENT & PLAN NOTE
Per surgery POD #5  Returned to surgery for post op hematoma evacuation 6/14  Monitor neck circumference

## 2018-06-18 LAB
ABO + RH BLD: NORMAL
ALBUMIN SERPL BCP-MCNC: 2.3 G/DL
ALLENS TEST: ABNORMAL
ALP SERPL-CCNC: 51 U/L
ALT SERPL W/O P-5'-P-CCNC: <5 U/L
ANION GAP SERPL CALC-SCNC: 17 MMOL/L
AST SERPL-CCNC: 31 U/L
BACTERIA BLD CULT: NORMAL
BASOPHILS # BLD AUTO: 0 K/UL
BASOPHILS NFR BLD: 0 %
BILIRUB SERPL-MCNC: 0.3 MG/DL
BLD GP AB SCN CELLS X3 SERPL QL: NORMAL
BLD PROD TYP BPU: NORMAL
BLOOD UNIT EXPIRATION DATE: NORMAL
BLOOD UNIT TYPE CODE: 5100
BLOOD UNIT TYPE: NORMAL
BUN SERPL-MCNC: 102 MG/DL
CALCIUM SERPL-MCNC: 8.2 MG/DL
CHLORIDE SERPL-SCNC: 102 MMOL/L
CO2 SERPL-SCNC: 18 MMOL/L
CODING SYSTEM: NORMAL
CREAT SERPL-MCNC: 7.3 MG/DL
CREAT UR-MCNC: 140.2 MG/DL
DELSYS: ABNORMAL
DIFFERENTIAL METHOD: ABNORMAL
DISPENSE STATUS: NORMAL
EOSINOPHIL # BLD AUTO: 0 K/UL
EOSINOPHIL NFR BLD: 0 %
ERYTHROCYTE [DISTWIDTH] IN BLOOD BY AUTOMATED COUNT: 18.8 %
ERYTHROCYTE [SEDIMENTATION RATE] IN BLOOD BY WESTERGREN METHOD: 14 MM/H
EST. GFR  (AFRICAN AMERICAN): 6 ML/MIN/1.73 M^2
EST. GFR  (NON AFRICAN AMERICAN): 5 ML/MIN/1.73 M^2
FIO2: 30
GLUCOSE SERPL-MCNC: 140 MG/DL
HCO3 UR-SCNC: 20.5 MMOL/L (ref 24–28)
HCT VFR BLD AUTO: 23.9 %
HGB BLD-MCNC: 7.7 G/DL
LYMPHOCYTES # BLD AUTO: 0.5 K/UL
LYMPHOCYTES NFR BLD: 4.9 %
MAGNESIUM SERPL-MCNC: 2.3 MG/DL
MCH RBC QN AUTO: 27.1 PG
MCHC RBC AUTO-ENTMCNC: 32.2 G/DL
MCV RBC AUTO: 84 FL
MODE: ABNORMAL
MONOCYTES # BLD AUTO: 0.6 K/UL
MONOCYTES NFR BLD: 6.1 %
NEUTROPHILS # BLD AUTO: 9.1 K/UL
NEUTROPHILS NFR BLD: 89 %
NUM UNITS TRANS PACKED RBC: NORMAL
PCO2 BLDA: 36.2 MMHG (ref 35–45)
PEEP: 5
PH SMN: 7.36 [PH] (ref 7.35–7.45)
PLATELET # BLD AUTO: 182 K/UL
PMV BLD AUTO: 9.9 FL
PO2 BLDA: 124 MMHG (ref 80–100)
POC BE: -5 MMOL/L
POC SATURATED O2: 99 % (ref 95–100)
POCT GLUCOSE: 135 MG/DL (ref 70–110)
POCT GLUCOSE: 144 MG/DL (ref 70–110)
POCT GLUCOSE: 148 MG/DL (ref 70–110)
POCT GLUCOSE: 152 MG/DL (ref 70–110)
POTASSIUM SERPL-SCNC: 4.5 MMOL/L
PROT SERPL-MCNC: 5.6 G/DL
RBC # BLD AUTO: 2.84 M/UL
SAMPLE: ABNORMAL
SITE: ABNORMAL
SODIUM SERPL-SCNC: 137 MMOL/L
SODIUM UR-SCNC: 22 MMOL/L
VT: 400
WBC # BLD AUTO: 10.27 K/UL

## 2018-06-18 PROCEDURE — 87449 NOS EACH ORGANISM AG IA: CPT

## 2018-06-18 PROCEDURE — 63600175 PHARM REV CODE 636 W HCPCS: Performed by: SURGERY

## 2018-06-18 PROCEDURE — 80053 COMPREHEN METABOLIC PANEL: CPT

## 2018-06-18 PROCEDURE — 85025 COMPLETE CBC W/AUTO DIFF WBC: CPT

## 2018-06-18 PROCEDURE — 25000003 PHARM REV CODE 250: Performed by: NURSE PRACTITIONER

## 2018-06-18 PROCEDURE — 36430 TRANSFUSION BLD/BLD COMPNT: CPT

## 2018-06-18 PROCEDURE — 99291 CRITICAL CARE FIRST HOUR: CPT | Mod: ,,, | Performed by: NURSE PRACTITIONER

## 2018-06-18 PROCEDURE — P9016 RBC LEUKOCYTES REDUCED: HCPCS

## 2018-06-18 PROCEDURE — 97803 MED NUTRITION INDIV SUBSEQ: CPT

## 2018-06-18 PROCEDURE — 84300 ASSAY OF URINE SODIUM: CPT

## 2018-06-18 PROCEDURE — 82570 ASSAY OF URINE CREATININE: CPT

## 2018-06-18 PROCEDURE — 83735 ASSAY OF MAGNESIUM: CPT

## 2018-06-18 PROCEDURE — 63600175 PHARM REV CODE 636 W HCPCS: Performed by: NURSE PRACTITIONER

## 2018-06-18 PROCEDURE — 87493 C DIFF AMPLIFIED PROBE: CPT

## 2018-06-18 PROCEDURE — 82803 BLOOD GASES ANY COMBINATION: CPT

## 2018-06-18 PROCEDURE — C9113 INJ PANTOPRAZOLE SODIUM, VIA: HCPCS | Performed by: SURGERY

## 2018-06-18 PROCEDURE — 86850 RBC ANTIBODY SCREEN: CPT

## 2018-06-18 PROCEDURE — 99291 CRITICAL CARE FIRST HOUR: CPT | Mod: ,,, | Performed by: INTERNAL MEDICINE

## 2018-06-18 PROCEDURE — 37799 UNLISTED PX VASCULAR SURGERY: CPT

## 2018-06-18 PROCEDURE — 99232 SBSQ HOSP IP/OBS MODERATE 35: CPT | Mod: ,,, | Performed by: INTERNAL MEDICINE

## 2018-06-18 PROCEDURE — 25000003 PHARM REV CODE 250: Performed by: SURGERY

## 2018-06-18 PROCEDURE — 20000000 HC ICU ROOM

## 2018-06-18 PROCEDURE — 94003 VENT MGMT INPAT SUBQ DAY: CPT

## 2018-06-18 PROCEDURE — 99900035 HC TECH TIME PER 15 MIN (STAT)

## 2018-06-18 RX ORDER — HYDROCODONE BITARTRATE AND ACETAMINOPHEN 500; 5 MG/1; MG/1
TABLET ORAL
Status: DISCONTINUED | OUTPATIENT
Start: 2018-06-18 | End: 2018-06-20

## 2018-06-18 RX ORDER — PROPOFOL 10 MG/ML
80 VIAL (ML) INTRAVENOUS ONCE
Status: COMPLETED | OUTPATIENT
Start: 2018-06-18 | End: 2018-06-18

## 2018-06-18 RX ADMIN — SODIUM BICARBONATE 650 MG TABLET 650 MG: at 08:06

## 2018-06-18 RX ADMIN — AMIODARONE HYDROCHLORIDE 200 MG: 200 TABLET ORAL at 08:06

## 2018-06-18 RX ADMIN — CHLORHEXIDINE GLUCONATE 10 ML: 1.2 RINSE ORAL at 08:06

## 2018-06-18 RX ADMIN — STANDARDIZED SENNA CONCENTRATE AND DOCUSATE SODIUM 1 TABLET: 8.6; 5 TABLET, FILM COATED ORAL at 08:06

## 2018-06-18 RX ADMIN — METHYLPREDNISOLONE SODIUM SUCCINATE 40 MG: 40 INJECTION, POWDER, FOR SOLUTION INTRAMUSCULAR; INTRAVENOUS at 06:06

## 2018-06-18 RX ADMIN — METOPROLOL TARTRATE 25 MG: 25 TABLET, FILM COATED ORAL at 08:06

## 2018-06-18 RX ADMIN — MORPHINE SULFATE 4 MG: 4 INJECTION INTRAVENOUS at 08:06

## 2018-06-18 RX ADMIN — DEXTROSE 40 MG: 50 INJECTION, SOLUTION INTRAVENOUS at 08:06

## 2018-06-18 RX ADMIN — METHYLPREDNISOLONE SODIUM SUCCINATE 40 MG: 40 INJECTION, POWDER, FOR SOLUTION INTRAMUSCULAR; INTRAVENOUS at 12:06

## 2018-06-18 RX ADMIN — ASPIRIN 81 MG 81 MG: 81 TABLET ORAL at 08:06

## 2018-06-18 RX ADMIN — METHYLPREDNISOLONE SODIUM SUCCINATE 40 MG: 40 INJECTION, POWDER, FOR SOLUTION INTRAMUSCULAR; INTRAVENOUS at 05:06

## 2018-06-18 RX ADMIN — MORPHINE SULFATE 4 MG: 4 INJECTION INTRAVENOUS at 06:06

## 2018-06-18 RX ADMIN — PROPOFOL 80 MG: 10 INJECTION, EMULSION INTRAVENOUS at 02:06

## 2018-06-18 RX ADMIN — MORPHINE SULFATE 4 MG: 4 INJECTION INTRAVENOUS at 01:06

## 2018-06-18 RX ADMIN — LORAZEPAM 2 MG: 2 INJECTION INTRAMUSCULAR; INTRAVENOUS at 02:06

## 2018-06-18 RX ADMIN — CLOPIDOGREL BISULFATE 75 MG: 75 TABLET ORAL at 08:06

## 2018-06-18 RX ADMIN — POLYETHYLENE GLYCOL (3350) 17 G: 17 POWDER, FOR SOLUTION ORAL at 08:06

## 2018-06-18 NOTE — PLAN OF CARE
Problem: Patient Care Overview  Goal: Plan of Care Review  Outcome: Ongoing (interventions implemented as appropriate)  Pt resting in bed on vent. Pt tolerating well. Pt stayed calm throughout most of the night. PRN medication given when needed, see MAR. Pt turned q2hr and pt afebrile throughout shift. Pt vitally stable at this time, see flowsheet. No acute distress noted in pt at this time. Will continue to monitor pt closely and follow POC.

## 2018-06-18 NOTE — SUBJECTIVE & OBJECTIVE
Interval History: pt was seen and examined in ICU. Met with pt's  and niece. Discussed with ICU. H/o reviewed. Stable last pm, no new events. Remains intubated. 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 Pt was admitted for elective parathyroidectomy and had NSTEMI post op, remains intubated for neck and vocal cord swelling. Pt had worsened renal function post LHC on 6/15/18. Result of LHC reviewed.    Review of patient's allergies indicates:   Allergen Reactions    Lipitor [atorvastatin] Swelling     Lips       Current Facility-Administered Medications   Medication Frequency    0.9%  NaCl infusion (for blood administration) Q24H PRN    acetaminophen tablet 650 mg Q4H PRN    amiodarone tablet 200 mg BID    aspirin chewable tablet 81 mg Daily    atropine injection 0.5 mg PRN    bisacodyl suppository 10 mg Daily PRN    chlorhexidine 0.12 % solution 10 mL BID    cloNIDine tablet 0.1 mg Q6H PRN    dextrose 50% injection 12.5 g PRN    dextrose 50% injection 25 g PRN    glucagon (human recombinant) injection 1 mg PRN    glucose chewable tablet 16 g PRN    glucose chewable tablet 24 g PRN    hydrALAZINE injection 10 mg Q8H PRN    insulin aspart U-100 pen 0-5 Units QID (AC + HS) PRN    lactulose 20 gram/30 mL solution Soln 20 g Q6H PRN    lorazepam (ATIVAN) injection 2 mg Q4H PRN    methylPREDNISolone sodium succinate injection 40 mg Q6H    metoprolol tartrate (LOPRESSOR) tablet 25 mg BID    morphine injection 4 mg Q4H PRN    nitroGLYCERIN SL tablet 0.4 mg Q5 Min PRN    nozaseptin (NOZIN) nasal  BID    ondansetron injection 4 mg Q8H PRN    pantoprazole 40 mg in dextrose 5 % 100 mL IVPB (over 15 minutes) (ready to mix system) Daily    polyethylene glycol packet 17 g Daily    senna-docusate 8.6-50 mg per tablet 1 tablet BID    sodium bicarbonate 8.4 % (1 mEq/mL) injection PRN    sodium bicarbonate tablet 650 mg BID    sodium chloride 0.9%  flush 3 mL PRN       Objective:     Vital Signs (Most Recent):  Temp: 98.4 °F (36.9 °C) (06/18/18 1315)  Pulse: 95 (06/18/18 1315)  Resp: 20 (06/18/18 1315)  BP: (!) 128/52 (06/18/18 1315)  SpO2: 100 % (06/18/18 1315)  O2 Device (Oxygen Therapy): ventilator (06/18/18 1315) Vital Signs (24h Range):  Temp:  [98.3 °F (36.8 °C)-98.8 °F (37.1 °C)] 98.4 °F (36.9 °C)  Pulse:  [] 95  Resp:  [9-24] 20  SpO2:  [97 %-100 %] 100 %  BP: ()/(52-90) 128/52     Weight: 75.6 kg (166 lb 10.7 oz) (06/16/18 0545)  Body mass index is 30.48 kg/m².  Body surface area is 1.82 meters squared.    I/O last 3 completed shifts:  In: 1372.7 [I.V.:302.7; NG/GT:1070]  Out: 222 [Urine:197; Other:25]    Physical Exam   Constitutional: She is oriented to person, place, and time. She appears well-developed and well-nourished. No distress.   HENT:   Head: Normocephalic and atraumatic.   Neck: No JVD present.   Cardiovascular: Normal rate, regular rhythm and normal heart sounds.  Exam reveals no friction rub.    Pulmonary/Chest: Effort normal and breath sounds normal. She has no rales.   Abdominal: Soft. Bowel sounds are normal. She exhibits no distension.   Musculoskeletal: She exhibits no edema.   Neurological: She is alert and oriented to person, place, and time.   Skin: Skin is warm and dry. She is not diaphoretic.   Nursing note and vitals reviewed.      Significant Labs: reviewed  BMP  Lab Results   Component Value Date     06/18/2018    K 4.5 06/18/2018     06/18/2018    CO2 18 (L) 06/18/2018     (H) 06/18/2018    CREATININE 7.3 (H) 06/18/2018    CALCIUM 8.2 (L) 06/18/2018    ANIONGAP 17 (H) 06/18/2018    ESTGFRAFRICA 6 (A) 06/18/2018    EGFRNONAA 5 (A) 06/18/2018     Lab Results   Component Value Date    WBC 10.27 06/18/2018    HGB 7.7 (L) 06/18/2018    HCT 23.9 (L) 06/18/2018    MCV 84 06/18/2018     06/18/2018       Recent Labs  Lab 06/13/18  1508   TROPONINI 41.662*         Significant Imaging: CXR  reviewed, mild pulm edema

## 2018-06-18 NOTE — SUBJECTIVE & OBJECTIVE
Interval History: Patient intubated. Worsening renal function.    Review of Systems   Unable to perform ROS: Intubated     Objective:     Vital Signs (Most Recent):  Temp: 98.7 °F (37.1 °C) (06/18/18 0700)  Pulse: 89 (06/18/18 0806)  Resp: 14 (06/18/18 0806)  BP: 122/63 (06/18/18 0806)  SpO2: 98 % (06/18/18 0806) Vital Signs (24h Range):  Temp:  [98.3 °F (36.8 °C)-98.8 °F (37.1 °C)] 98.7 °F (37.1 °C)  Pulse:  [] 89  Resp:  [9-24] 14  SpO2:  [97 %-100 %] 98 %  BP: ()/(52-75) 122/63     Weight: 75.6 kg (166 lb 10.7 oz)  Body mass index is 30.48 kg/m².    Intake/Output Summary (Last 24 hours) at 06/18/18 0851  Last data filed at 06/18/18 0705   Gross per 24 hour   Intake              675 ml   Output              115 ml   Net              560 ml      Physical Exam   Constitutional: She appears well-developed and well-nourished. She is intubated.   ILL Appearing  Intubated   HENT:   Head: Normocephalic and atraumatic.   ET in place  + neck swelling   Eyes: EOM are normal. Pupils are equal, round, and reactive to light.   Neck: Normal range of motion. Neck supple. No JVD present.   Cardiovascular: Regular rhythm and intact distal pulses.  Tachycardia present.    Murmur heard.   Systolic murmur is present with a grade of 3/6   Pulmonary/Chest: Effort normal and breath sounds normal. She is intubated. No respiratory distress. She has no wheezes.   Abdominal: Soft. Bowel sounds are normal. She exhibits no distension.   Musculoskeletal: Normal range of motion. She exhibits edema. She exhibits no tenderness.   Neurological: She has normal reflexes. No cranial nerve deficit.   Skin: Skin is warm and dry. Capillary refill takes less than 2 seconds. No erythema.   Nursing note and vitals reviewed.      Significant Labs:   BMP:   Recent Labs  Lab 06/18/18  0428   *      K 4.5      CO2 18*   *   CREATININE 7.3*   CALCIUM 8.2*   MG 2.3     CBC:   Recent Labs  Lab 06/17/18  0510 06/18/18  0429    WBC 11.99 10.27   HGB 9.0* 7.7*   HCT 28.0* 23.9*    182     CMP:   Recent Labs  Lab 06/17/18  0510 06/18/18  0428     138 137   K 4.3  4.3 4.5     103 102   CO2 19*  19* 18*   *  149* 140*   BUN 68*  68* 102*   CREATININE 6.0*  6.0* 7.3*   CALCIUM 8.7  8.7 8.2*   PROT 6.2 5.6*   ALBUMIN 2.5*  2.5* 2.3*   BILITOT 0.3 0.3   ALKPHOS 59 51*   AST 51* 31   ALT <5* <5*   ANIONGAP 16  16 17*   EGFRNONAA 7*  7* 5*     All pertinent labs within the past 24 hours have been reviewed.    Significant Imaging: I have reviewed all pertinent imaging results/findings within the past 24 hours.

## 2018-06-18 NOTE — ASSESSMENT & PLAN NOTE
ASA  Cardiology  LHC - Diffuse disease  No further intervention recommended  Medical Management  No statin due to allergy

## 2018-06-18 NOTE — ASSESSMENT & PLAN NOTE
Had NSTEMI post op  Cardiac arrest with V Fib.  [facility] results reviewed, no change from prior cath in 2016  Circ 99%, RCA 90%, LAD 50%  Diffuse disease, not amenable for stent/CABG revascularization

## 2018-06-18 NOTE — ASSESSMENT & PLAN NOTE
Per surgery POD #6  Returned to surgery for post op hematoma evacuation 6/14  Monitor neck circumference  Follow up US neck 6/17 soft tissue swelling but no fluid collection

## 2018-06-18 NOTE — ASSESSMENT & PLAN NOTE
Cardiology  [facility] 6/15  ASA  Statin Allergy  BB  No further interventions  Diffuse disease    6/18  ASA  BB  Diffuse disease No intervention

## 2018-06-18 NOTE — PROGRESS NOTES
[facility] -   Critical Care Medicine  Progress Note    Patient Name: Citlali Karimi  MRN: [id number]  Admission Date: 6/12/2018  Hospital Length of Stay: 5 days  Code Status: DNR  Attending Provider: Levy Samuel MD  Primary Care Provider: Evelio Schuster MD   Principal Problem: Acute airway obstruction    Subjective:     HPI:  Ms Karimi is a 67 yo BF with a PMH of HTN, Hyperparathyroidism, DM2, CKD5, CVA, CAD and HLD.  She was seen in clinic by surgery for hyperparathyroidism and hypercalcemia and was electively admitted 6/12 taken to OR undergoing parathyroidectomy finding 2 right sided enlarged parathyroid glands.  Intra and post op patient developed EKG changes and had elevated troponin.  Cards consulted.  Patient admitted due to swallowing issues she was not taking all her meds as prescribed and had BP as high as 225/103 during hospitalization.  Troponin increased to > 50.  Cards diagnosed with NSTEMI and she was Rx with ASA, Imdur, Lopressor, statin and Heparin infusion with plans for repeat LHC if cleared by Renal given CKD5.  Today, she had progressively worsening neck swelling and SOB with worsening dysphagia.  She was taken back to OR and had post op neck hematoma evacuation.  In PACU she had witnessed V-Fib cardiac arrest and CODE BLUE called.  After 3 rounds of CPR, Epi X 3 and Defib X 3 ROSC was attained.  She was still intubated and on vent post op.  CL and arterial line placed in right groin during code per myself.  Cards and Surgery at bedside.      Hospital/ICU Course:  6[facility]14 - [facility].  She was in A-Fib with ST changes.  Cards called and patient placed on Amiodarone and Heparin infusion.    6/15 - Weaned off Levophed yesterday afternoon.  Still on vent much improved oxygenation.  Airway leak of #6 OET.  Fully awake and following commands.  Still on Heparin infusion.  6/16 - Resting on vent with sedation tolerates SAT/SBT but still  neck swelling noted.  LHC yesterday revealed diffuse CAD not amenable for PCI, no changes from 2016.  VSS  6/17 - Still on mech vent for airway protection tolerating SAT/SBT.  [place] TF  6/18 - Resting on mech vent and sedation.  Neck swelling improving.  Worsening UOP and creatine.  Awake, alert and responsive on sedation.  Spouse at bedside.     Review of Systems   Unable to perform ROS: Intubated       Objective:     Vital Signs (Most Recent):  Temp: 98.7 °F (37.1 °C) (06/18/18 0700)  Pulse: 96 (06/18/18 0900)  Resp: (!) 22 (06/18/18 0900)  BP: 132/86 (06/18/18 0900)  SpO2: 100 % (06/18/18 0900) Vital Signs (24h Range):  Temp:  [98.3 °F (36.8 °C)-98.8 °F (37.1 °C)] 98.7 °F (37.1 °C)  Pulse:  [] 96  Resp:  [9-24] 22  SpO2:  [97 %-100 %] 100 %  BP: ()/(52-86) 132/86     Weight: 75.6 kg (166 lb 10.7 oz)  Body mass index is 30.48 kg/m².      Intake/Output Summary (Last 24 hours) at 06/18/18 1113  Last data filed at 06/18/18 0900   Gross per 24 hour   Intake              755 ml   Output               97 ml   Net              658 ml       Physical Exam   Constitutional: Vital signs are normal. She appears well-developed and well-nourished. She is sleeping. She is easily aroused.  Non-toxic appearance. She appears ill. No distress. She is sedated, intubated and restrained.   HENT:   Head: Normocephalic and atraumatic.   Mouth/Throat: Oropharynx is clear and moist and mucous membranes are normal.   Eyes: EOM and lids are normal. Pupils are equal, round, and reactive to light.   Neck: Neck supple. Carotid bruit is not present.       Cardiovascular: Normal rate, regular rhythm and normal heart sounds.    Pulses:       Radial pulses are 2+ on the right side, and 2+ on the left side.        Dorsalis pedis pulses are 1+ on the right side, and 1+ on the left side.   Pulmonary/Chest: Effort normal. No accessory muscle usage. She is intubated. No respiratory distress. She has rales.   Abdominal: Soft. Normal appearance.  She exhibits no distension. Bowel sounds are decreased. There is no tenderness.   Genitourinary:   Genitourinary Comments: Fair in place   Musculoskeletal: Normal range of motion.        Right foot: There is no deformity.        Left foot: There is no deformity.   Mild general edema   Lymphadenopathy:     She has no cervical adenopathy.   Neurological: She is alert and easily aroused.   On min sedation she is Fully awake and nodding head to questions   Skin: Skin is warm, dry and intact. Capillary refill takes less than 2 seconds. No rash noted. No cyanosis.            Vents:  Vent Mode: A/C (06/18/18 0806)  Ventilator Initiated: Yes (06/14/18 1812)  Set Rate: 14 bmp (06/18/18 0806)  Vt Set: 400 mL (06/18/18 0806)  Pressure Support: 0 cmH20 (06/18/18 0806)  PEEP/CPAP: 5 cmH20 (06/18/18 0806)  Oxygen Concentration (%): 30 (06/18/18 0900)  Peak Airway Pressure: 21 cmH2O (06/18/18 0806)  Plateau Pressure: 18 cmH20 (06/18/18 0806)  Total Ve: 5.71 mL (06/18/18 0806)  F/VT Ratio<105 (RSBI): (!) 34.65 (06/18/18 0806)    Lines/Drains/Airways     Central Venous Catheter Line                 Percutaneous Central Line Insertion/Assessment - triple lumen  06/14/18 right femoral 4 days          Drain                 Urethral Catheter 06/14/18 1113 4 days         Drain/Device  06/14/18 1239 neck collapsible closed device 3 days         NG/OG Tube 06/15/18 0200 [place] sump 14 Fr. Center mouth 3 days          Airway                 Airway - Non-Surgical 06/15/18 1025 Endotracheal Tube 3 days          Arterial Line                 Arterial Line 06/14/18 1427 Right Femoral 3 days          Peripheral Intravenous Line                 Peripheral IV - Single Lumen 06/12/18 0954 Left Forearm 6 days                Significant Labs:    CBC/Anemia Profile:    Recent Labs  Lab 06/17/18  0510 06/18/18  0428   WBC 11.99 10.27   HGB 9.0* 7.7*   HCT 28.0* 23.9*    182   MCV 84 84   RDW 19.0* 18.8*        Chemistries:    Recent Labs  Lab  06/17/18  0510 06/18/18  0428     138 137   K 4.3  4.3 4.5     103 102   CO2 19*  19* 18*   BUN 68*  68* 102*   CREATININE 6.0*  6.0* 7.3*   CALCIUM 8.7  8.7 8.2*   ALBUMIN 2.5*  2.5* 2.3*   PROT 6.2 5.6*   BILITOT 0.3 0.3   ALKPHOS 59 51*   ALT <5* <5*   AST 51* 31   MG 2.1 2.3   PHOS 6.7*  --        ABGs:   Recent Labs  Lab 06/18/18  0456   PH 7.361   PCO2 36.2   HCO3 20.5*   POCSATURATED 99   BE -5     All pertinent labs within the past 24 hours have been reviewed.    Significant Imaging:  CXR: I have reviewed all pertinent results/findings within the past 24 hours and my personal findings are:  no acute pulm process  U/S: I have reviewed all pertinent results/findings within the past 24 hours and my personal findings are:  NECK: soft tissue swelling but no fluid collection      Assessment/Plan:     Pulmonary   * Acute airway obstruction    Neck still with swelling but improved  Leave intubated for airway protection for possible RRT intiation  Vocal Cord assessment for possible extubation if no RRT planned  Cont low dose steroids        On mechanically assisted ventilation    Vent settings reviewed and adjusted  Cont intubation for airway protection post op neck surgery with swelling  Tolerates SAT/SBT          Cardiac/Vascular   Cardiac arrest with ventricular fibrillation    Monitor and replace electrolytes if needed  ICU cardiac monitoring  Cont Amiodarone and Lopressor.  A-Fib resolved.  No neuro deficit noted above baseline from hx CVA        Coronary artery disease involving native coronary artery    Per Cards.  Medical management  [facility] 6/15 done revealing diffuse CAD not amenable for PCI, no changes from 2016  Cont Plavix, ASA, Lopressor.    Allergy to statin noted        Renal/   Acute renal failure superimposed on stage 5 chronic kidney disease, not on chronic dialysis    S/P contrast with [facility] 6/15  Renal following.  Awaiting decision for possible RRT  Scant UOP with rising  creatine  BMP daily  Accurate I/Os        Oncology   Acute blood loss anemia    S/P 2 units PRBCs 6/15  Give another 1 unit PRBC today given recent ACS  Daily H/H  No active bleeding  Patient has been treated with Procrit 20,000 units weekly for maintenance therapy followed by Hematology        Endocrine   Diabetes mellitus, type 2 - only on oral medications    Cont SSI           Hyperparathyroidism    POD #6 S/P parathyroidectomy  Surgery following        S/P parathyroidectomy    Per surgery POD #6  Returned to surgery for post op hematoma evacuation 6/14  Monitor neck circumference  Follow up US neck 6/17 soft tissue swelling but no fluid collection        Preventive Measures and Monitoring:   Stress Ulcer: PPI  Nutrition: TF  Glucose control: SSI  Bowel prophylaxis: Miralax and PRN Dulcolax  DVT prophylaxis: SCDs  Hx CAD on B-Blocker: Lopressor  Head of Bed/Reposition: Elevate HOB and turn Q1-2 hours   Early Mobility: Bed rest  SAT/SBT: Passed but needs airway protection  Vent Day: #5  OG Day: #5  Central Line Right Fem Day: #5  Right Femoral Arterial Line Day: #5  [place]: #5  Code Status: Full  Pneumonia Vaccine: UTD     Counseling/Consultation:I have discussed the care of this patient in detail with the bedside nursing staff and Dr. May and Dr. Marinelli and Dr. Lima    Critical Care Time: 57 minutes  Critical secondary to Patient has a condition that poses threat to life and bodily function: Acute Renal Failure and mech ventilation  Patient is currently receiving parenteral controlled substances: Morphine      Critical care was time spent personally by me on the following activities: development of treatment plan with patient or surrogate and bedside caregivers, discussions with consultants, evaluation of patient's response to treatment, examination of patient, ordering and performing treatments and interventions, ordering and review of laboratory studies, ordering and review of radiographic studies,  pulse oximetry, re-evaluation of patient's condition. This critical care time did not overlap with that of any other provider or involve time for any procedures.     Joseph Talavera NP  Critical Care Medicine  [facility] [place]

## 2018-06-18 NOTE — SUBJECTIVE & OBJECTIVE
Review of Systems   Reason unable to perform ROS: intubted and sedated      Objective:     Vital Signs (Most Recent):  Temp: 98.7 °F (37.1 °C) (06/18/18 0700)  Pulse: 89 (06/18/18 0806)  Resp: 14 (06/18/18 0806)  BP: 122/63 (06/18/18 0806)  SpO2: 98 % (06/18/18 0806) Vital Signs (24h Range):  Temp:  [98.3 °F (36.8 °C)-98.8 °F (37.1 °C)] 98.7 °F (37.1 °C)  Pulse:  [] 89  Resp:  [9-24] 14  SpO2:  [97 %-100 %] 98 %  BP: ()/(52-75) 122/63     Weight: 75.6 kg (166 lb 10.7 oz)  Body mass index is 30.48 kg/m².     SpO2: 98 %  O2 Device (Oxygen Therapy): ventilator      Intake/Output Summary (Last 24 hours) at 06/18/18 0935  Last data filed at 06/18/18 0705   Gross per 24 hour   Intake              625 ml   Output              110 ml   Net              515 ml       Lines/Drains/Airways     Central Venous Catheter Line                 Percutaneous Central Line Insertion/Assessment - triple lumen  06/14/18 right femoral 4 days          Drain                 Drain/Device  06/14/18 1239 neck collapsible closed device 3 days         NG/OG Tube 06/15/18 0200 [place] sump 14 Fr. Center mouth 3 days         Urethral Catheter 06/14/18 1113 3 days          Airway                 Airway - Non-Surgical 06/15/18 1025 Endotracheal Tube 2 days          Arterial Line                 Arterial Line 06/14/18 1427 Right Femoral 3 days          Peripheral Intravenous Line                 Peripheral IV - Single Lumen 06/12/18 0954 Left Forearm 5 days                Physical Exam   Constitutional:   Intubated, appears ill   HENT:   Head: Normocephalic and atraumatic.   Neck: Neck supple.   Incision C/D/I; no swelling   Cardiovascular: Normal rate, regular rhythm, S1 normal and S2 normal.    No murmur heard.  Pulmonary/Chest:   Coarse BS anteriorly with rhonchi   Abdominal: Soft. Bowel sounds are normal. She exhibits no distension. There is no tenderness.   Neurological:   Sedated   Nursing note and vitals reviewed.      Significant  Labs:   All pertinent lab results from the last 24 hours have been reviewed. and   Recent Lab Results       06/18/18  0609 06/18/18  0456 06/18/18  0428 06/18/18  0020 06/17/18  1657      Albumin   2.3(L)       Alkaline Phosphatase   51(L)       Allens Test  N/A        ALT   <5(L)       Anion Gap   17(H)       AST   31       Baso #   0.00       Basophil%   0.0       Total Bilirubin   0.3  Comment:  For infants and newborns, interpretation of results should be based  on gestational age, weight and in agreement with clinical  observations.  Premature Infant recommended reference ranges:  Up to 24 hours.............<8.0 mg/dL  Up to 48 hours............<12.0 mg/dL  3-5 days..................<15.0 mg/dL  6-29 days.................<15.0 mg/dL         Site  [place]        BUN, Bld   102(H)       Calcium   8.2(L)       Chloride   102       CO2   18(L)       Creatinine   7.3(H)       DelSys  Adult Vent        Differential Method   Automated       eGFR if    6(A)       eGFR if non    5  Comment:  Calculation used to obtain the estimated glomerular filtration  rate (eGFR) is the CKD-EPI equation.   (A)       Eos #   0.0       Eosinophil%   0.0       FiO2  30        Glucose   140(H)       Gran # (ANC)   9.1(H)       Gran%   89.0(H)       Hematocrit   23.9(L)       Hemoglobin   7.7(L)       Lymph #   0.5(L)       Lymph%   4.9(L)       Magnesium   2.3       MCH   27.1       MCHC   32.2       MCV   84       Mode  AC/PRVC        Mono #   0.6       Mono%   6.1       MPV   9.9       PEEP  5        Platelets   182       POC BE  -5        POC HCO3  20.5(L)        POC PCO2  36.2        POC PH  7.361        POC PO2  124(H)        POC SATURATED O2  99        POCT Glucose 152(H)   148(H) 178(H)     Potassium   4.5       Total Protein   5.6(L)       Rate  14        RBC   2.84(L)       RDW   18.8(H)       Sample  ARTERIAL        Sodium   137       Vt  400        WBC   10.27                   06/17/18  1045       Albumin      Alkaline Phosphatase      Allens Test      ALT      Anion Gap      AST      Baso #      Basophil%      Total Bilirubin      Site      BUN, Bld      Calcium      Chloride      CO2      Creatinine      DelSys      Differential Method      eGFR if       eGFR if non       Eos #      Eosinophil%      FiO2      Glucose      Gran # (ANC)      Gran%      Hematocrit      Hemoglobin      Lymph #      Lymph%      Magnesium      MCH      MCHC      MCV      Mode      Mono #      Mono%      MPV      PEEP      Platelets      POC BE      POC HCO3      POC PCO2      POC PH      POC PO2      POC SATURATED O2      POCT Glucose 229(H)     Potassium      Total Protein      Rate      RBC      RDW      Sample      Sodium      Vt      WBC            Significant Imaging: Echocardiogram:   2D echo with color flow doppler:   Results for orders placed or performed during the hospital encounter of 06/12/18   2D echo with color flow doppler   Result Value Ref Range    EF 60 55 - 65    Diastolic Dysfunction Yes (A)

## 2018-06-18 NOTE — ASSESSMENT & PLAN NOTE
S/P contrast with [facility] 6/15  Renal following.  Awaiting decision for possible RRT  Scant UOP with rising creatine  BMP daily  Accurate I/Os

## 2018-06-18 NOTE — PROGRESS NOTES
[facility] -   Adult Nutrition  Progress Note    SUMMARY     Recommendations    Recommendation/Intervention:   1. If unable to extubate pt over the next 24 hrs, consider changing TF to Novasource @ 25 ml/hr + 1 pack of beneprotein 5x/day ( 1325 calories, 84 gms prot, & 430 mls free water).    2. If able to extubate pt, consider SLP evaluation.  3. Will continue to monitor.   Goals: Meet > 85 % EEN/EPN while admitted  Nutrition Goal Status: progressing towards goal   Communication of RD Recs: Reviewed with NP     Reason for Assessment    Reason for Assessment:  RD follow-up  Dx:  1. Abnormal chest x-ray    2. Hyperparathyroidism    3. Hypercalcemia    4. SOB (shortness of breath)    5. NSTEMI (non-ST elevated myocardial infarction)    6. Elevated troponin    7. S/P parathyroidectomy    8. Respiratory distress    9. Cardiogenic shock    10. Acute hypoxemic respiratory failure    11. Cardiac arrest with ventricular fibrillation    12. CKD (chronic kidney disease) stage 5, GFR less than 15 ml/min    13. Type 2 diabetes mellitus with stage 4 chronic kidney disease, without long-term current use of insulin    14. Electrolyte imbalance    15. H/O aortic valve replacement    16. Persistent atrial fibrillation    17. S/P evacuation of hematoma    18. Shock liver    19. ST elevation myocardial infarction (STEMI), unspecified artery    20. MI (myocardial infarction)    21. Acute airway obstruction    22. Acute blood loss anemia    23. On mechanically assisted ventilation    24. STEMI (ST elevation myocardial infarction)    25. Personal history of sudden cardiac arrest    26. Presence of prosthetic heart valve    27. Coronary artery disease involving native coronary artery without angina pectoris, unspecified whether native or transplanted heart      Past Medical History:   Diagnosis Date    Acid reflux 1/7/2015    Anxiety 1/7/2015    Aortic valvar stenosis     Arthritis     osteo    Callus     Chronic  "anemia     followed by Dr. Addison    CKD (chronic kidney disease) stage 5, GFR less than 15 ml/min 8/7/2015    Combined hyperlipidemia associated with type 2 diabetes mellitus     Coronary artery disease     Diabetes mellitus type II, controlled, with no complications     LBSL 108 today    Encounter for blood transfusion     Frail elderly with impaired mobility, wheel chair bound 8/25/2017    Gallbladder problem     gallbladder surgery    Heart murmur     Hyperparathyroidism, secondary renal 1/7/2015    Hypertension 8/7/2015    Hypertension associated with diabetes 11/12/2012    Kidney transplant candidate 1/7/2015    Overweight(278.02)     Urinary tract infection        General Information Comments:   6.15.18.S/p parathyroidectomy (6/12).  ST recs: NPO (6/14).  Pt was taken back to OR and had post op neck hematoma evacuation (6/14). Patient extubated and immediately reintubated. OG tube placed for PO med access. LHC planned for later today.   6.18.18. S/p CATHETERIZATION, HEART, LEFT. Patient remains intubated. Currently on TF. Worsening renal function. Per ICU rounds,  plans for extubation and possible plans for dialysis.   Nutrition Discharge Planning: too soon to determine      Nutrition Risk Screen    Nutrition Risk Screen: dysphagia or difficulty swallowing    Nutrition/Diet History    Do you have any cultural, spiritual, Mosque conflicts, given your current situation?: none    Anthropometrics    Temp: 98.7 °F (37.1 °C)  Height Method: Stated  Height: 5' 2" (157.5 cm)  Height (inches): 62 in  Weight Method: Bed Scale  Weight: 75.6 kg (166 lb 10.7 oz)  Weight (lb): 166.67 lb  Ideal Body Weight (IBW), Female: 110 lb  % Ideal Body Weight, Female (lb): 151.52 lb  BMI (Calculated): 30.5  BMI Grade: 30 - 34.9- obesity - grade I       Lab/Procedures/Meds    Pertinent Labs Reviewed: reviewed  BMP  Lab Results   Component Value Date     06/18/2018    K 4.5 06/18/2018     06/18/2018    " CO2 18 (L) 06/18/2018     (H) 06/18/2018    CREATININE 7.3 (H) 06/18/2018    CALCIUM 8.2 (L) 06/18/2018    ANIONGAP 17 (H) 06/18/2018    ESTGFRAFRICA 6 (A) 06/18/2018    EGFRNONAA 5 (A) 06/18/2018     Lab Results   Component Value Date    CALCIUM 8.2 (L) 06/18/2018    PHOS 6.7 (H) 06/17/2018     Lab Results   Component Value Date    ALBUMIN 2.3 (L) 06/18/2018       Recent Labs  Lab 06/18/18  0609   POCTGLUCOSE 152*     Lab Results   Component Value Date    HGBA1C 5.4 06/12/2018       Pertinent Medications Reviewed: reviewed    Physical Findings/Assessment    Overall Physical Appearance: on ventilator support  Tubes:  (OG tube)  Oral/Mouth Cavity: decay/carries, tooth/teeth missing, no dental appliances present  Skin: incision(s)    Estimated/Assessed Needs    Weight Used For Calorie Calculations: 75.6 kg (166 lb 10.7 oz)  Energy Calorie Requirements (kcal): 1307.65  Energy Need Method: [place] (modified)  Protein Requirements: 84- 122 g/day  Weight Used For Protein Calculations: 49.9 kg (110 lb) (IBW - obese ICU)     Fluid Need Method: RDA Method (or per MD)  RDA Method (mL): 1403.64  CHO Requirement: 50 % EEN      Nutrition Prescription Ordered    Current Diet Order: NPO  Nutrition support formula ordered: Peptamen bariatric at 55 ml/hr     Evaluation of Received Nutrient/Fluid Intake      Enteral Calories (kcal): 1320  Enteral Protein (gm): 122   % kcal needs: 100 %  % protein needs: 100 %     Intake/Output Summary (Last 24 hours) at 06/18/18 0953  Last data filed at 06/18/18 0900   Gross per 24 hour   Intake              800 ml   Output              112 ml   Net              688 ml       % Intake of Estimated Energy Needs: 75 - 100 %  % Meal Intake: NPO    Nutrition Risk    Level of Risk/Frequency of Follow-up:  (F/U x2 weekly)     Assessment and Plan    Nutrition Problem:  Inadequate energy intake     Related to (etiology):   Inability to consume sufficient energy     Signs and Symptoms (as evidenced  by):   Intubated, NPO, no alternative means of nutrition.      Interventions/Recommendations (treatment strategy):  Please see RD recs above.     Nutrition Diagnosis Status:   Improving. On TF now.       Monitor and Evaluation    Food and Nutrient Intake: energy intake, enteral nutrition intake, parenteral nutrition intake  Food and Nutrient Adminstration: enteral and parenteral nutrition administration  Knowledge/Beliefs/Attitudes: food and nutrition knowledge/skill  Physical Activity and Function: nutrition-related ADLs and IADLs  Anthropometric Measurements: weight  Biochemical Data, Medical Tests and Procedures: electrolyte and renal panel, glucose/endocrine profile  Nutrition-Focused Physical Findings: overall appearance, skin     Nutrition Follow-Up    RD Follow-up?: Yes (2xweekly)

## 2018-06-18 NOTE — PROGRESS NOTES
[facility]  Cardiology  Progress Note    Patient Name: Citlali Karimi  MRN: [id number]  Admission Date: 6/12/2018  Hospital Length of Stay: 5 days  Code Status: DNR   Attending Physician: Levy Samuel MD   Primary Care Physician: Evelio Schuster MD  Expected Discharge Date:   Principal Problem:Acute airway obstruction    Subjective:   Brief HPI:    Ms. Karimi is a 68 year old female patient with a PMHx of CKD stage IV, HTN, DM, CVA (with residual left-sided weakness), CAD, anemia, hyperlipidemia, s/p TAVR, and CAD who presented to [facility] yesterday for elective parathyroidectomy. Post-procedure, patient had ischemic changes on her EKG. Troponin was drawn and trended upward and cardiology consulted to assist with management. Patient seen and examined today, resting in bed. Reports malaise/fatige and surgical soreness. No real cardiac complaints. Denies chest pain or increased SOB. No palpitations. , who was present during exam, does report patient has been having issues with swallowing and may not have been taking full doses of her prescribed medications. BP noted to be elevated yesterday 225/103. Chart reviewed. Diffuse ST depression noted on post-op EKG, has since resolved. Troponin 0.113>34>50. 2D echo pending. Review of chart shows previous cath showed severe CAD (proximal LCX 99%, mid 50%, RCA mid 90%, distal with subtotal lesion). Medical mgmt recommended at that time.      Hospital Course:   6/14/18-Patient seen and examined today, sleepy/lethargic. Dyspneic with throat swelling, surgery notified. Recommended angiogram, risks/benefits discussed.  and family had detailed discussion with nephrology and decided against [facility] due to increased risk of contrast induced nephropathy/dialysis.    Patient subsequently taken back to OR this AM for re-exploration/washout given throat swelling. Coded post procedure.   6/14/18  Pt with V fib post surgery with DCCV to NSR, IV amiodarone  started,vasopressors also started SBP 160s  Repeat EKG in ICU with infpost injury pattern. Discussed with  and currently does not want LHC, will need to start IV heparin and continue medical therapy     6/15/18-Patient seen and examined today s/p post-procedural  Arrest/MI yesterday. Intubated, sedated. Blood transfusion in process. LHC planned for later today. Risks/benefits discussed in detail. Family agreeable.     6/16/18- C- diffuse CAD not amenable to PCI, no changes from 2016 cath, turned down for CABG, continue medical tx    6/17/18 intubated, NSR, continue medical tx    6/18/18- Remains intubated. In NSR this morning.  worsening renal failure with severe CAD.  H/H has dropped to 7.7 / 23.9 this morning. ICU team had discussion with spouse regarding possible need for RRT in which patient may not tolerate. Spouse has made patient DNR code status.          Review of Systems   Reason unable to perform ROS: intubted and sedated      Objective:     Vital Signs (Most Recent):  Temp: 98.7 °F (37.1 °C) (06/18/18 0700)  Pulse: 89 (06/18/18 0806)  Resp: 14 (06/18/18 0806)  BP: 122/63 (06/18/18 0806)  SpO2: 98 % (06/18/18 0806) Vital Signs (24h Range):  Temp:  [98.3 °F (36.8 °C)-98.8 °F (37.1 °C)] 98.7 °F (37.1 °C)  Pulse:  [] 89  Resp:  [9-24] 14  SpO2:  [97 %-100 %] 98 %  BP: ()/(52-75) 122/63     Weight: 75.6 kg (166 lb 10.7 oz)  Body mass index is 30.48 kg/m².     SpO2: 98 %  O2 Device (Oxygen Therapy): ventilator      Intake/Output Summary (Last 24 hours) at 06/18/18 0935  Last data filed at 06/18/18 0705   Gross per 24 hour   Intake              625 ml   Output              110 ml   Net              515 ml       Lines/Drains/Airways     Central Venous Catheter Line                 Percutaneous Central Line Insertion/Assessment - triple lumen  06/14/18 right femoral 4 days          Drain                 Drain/Device  06/14/18 1239 neck collapsible closed device 3 days         NG/OG Tube  06/15/18 0200 [facility] sump 14 Fr. Center mouth 3 days         Urethral Catheter 06/14/18 1113 3 days          Airway                 Airway - Non-Surgical 06/15/18 1025 Endotracheal Tube 2 days          Arterial Line                 Arterial Line 06/14/18 1427 Right Femoral 3 days          Peripheral Intravenous Line                 Peripheral IV - Single Lumen 06/12/18 0954 Left Forearm 5 days                Physical Exam   Constitutional:   Intubated, appears ill   HENT:   Head: Normocephalic and atraumatic.   Neck: Neck supple.   Incision C/D/I; no swelling   Cardiovascular: Normal rate, regular rhythm, S1 normal and S2 normal.    No murmur heard.  Pulmonary/Chest:   Coarse BS anteriorly with rhonchi   Abdominal: Soft. Bowel sounds are normal. She exhibits no distension. There is no tenderness.   Neurological:   Sedated   Nursing note and vitals reviewed.      Significant Labs:   All pertinent lab results from the last 24 hours have been reviewed. and   Recent Lab Results       06/18/18  0609 06/18/18  0456 06/18/18  0428 06/18/18  0020 06/17/18  1657      Albumin   2.3(L)       Alkaline Phosphatase   51(L)       Allens Test  N/A        ALT   <5(L)       Anion Gap   17(H)       AST   31       Baso #   0.00       Basophil%   0.0       Total Bilirubin   0.3  Comment:  For infants and newborns, interpretation of results should be based  on gestational age, weight and in agreement with clinical  observations.  Premature Infant recommended reference ranges:  Up to 24 hours.............<8.0 mg/dL  Up to 48 hours............<12.0 mg/dL  3-5 days..................<15.0 mg/dL  6-29 days.................<15.0 mg/dL         Site  [facility]        BUN, Bld   102(H)       Calcium   8.2(L)       Chloride   102       CO2   18(L)       Creatinine   7.3(H)       DelSys  Adult Vent        Differential Method   Automated       eGFR if    6(A)       eGFR if non    5  Comment:  Calculation used to obtain  the estimated glomerular filtration  rate (eGFR) is the CKD-EPI equation.   (A)       Eos #   0.0       Eosinophil%   0.0       FiO2  30        Glucose   140(H)       Gran # (ANC)   9.1(H)       Gran%   89.0(H)       Hematocrit   23.9(L)       Hemoglobin   7.7(L)       Lymph #   0.5(L)       Lymph%   4.9(L)       Magnesium   2.3       MCH   27.1       MCHC   32.2       MCV   84       Mode  AC/PRVC        Mono #   0.6       Mono%   6.1       MPV   9.9       PEEP  5        Platelets   182       POC BE  -5        POC HCO3  20.5(L)        POC PCO2  36.2        POC PH  7.361        POC PO2  124(H)        POC SATURATED O2  99        POCT Glucose 152(H)   148(H) 178(H)     Potassium   4.5       Total Protein   5.6(L)       Rate  14        RBC   2.84(L)       RDW   18.8(H)       Sample  ARTERIAL        Sodium   137       Vt  400        WBC   10.27                   06/17/18  1045      Albumin      Alkaline Phosphatase      Allens Test      ALT      Anion Gap      AST      Baso #      Basophil%      Total Bilirubin      Site      BUN, Bld      Calcium      Chloride      CO2      Creatinine      DelSys      Differential Method      eGFR if       eGFR if non       Eos #      Eosinophil%      FiO2      Glucose      Gran # (ANC)      Gran%      Hematocrit      Hemoglobin      Lymph #      Lymph%      Magnesium      MCH      MCHC      MCV      Mode      Mono #      Mono%      MPV      PEEP      Platelets      POC BE      POC HCO3      POC PCO2      POC PH      POC PO2      POC SATURATED O2      POCT Glucose 229(H)     Potassium      Total Protein      Rate      RBC      RDW      Sample      Sodium      Vt      WBC            Significant Imaging: Echocardiogram:   2D echo with color flow doppler:   Results for orders placed or performed during the hospital encounter of 06/12/18   2D echo with color flow doppler   Result Value Ref Range    EF 60 55 - 65    Diastolic Dysfunction Yes (A)       Assessment and Plan:       NSTEMI (non-ST elevated myocardial infarction)    diffuse CAD not amenable to PCI, no changes from 2016 cath, turned down for CABG, continue medical tx        Cardiac arrest with ventricular fibrillation    -s/p successful resuscitation  -Post-procedure EKG showed STEMI  -Underwent LHC on 6/15/18 that showed Three vessel coronary artery disease.    Diffuses CAD unchanged from 2016 not amenable for PCI.    stent graft noted in proximal aorta.   -Recommendations made for medical management of CAD   - Continue ASA, BB  -Stop Plavix per Dr. Louis to avoid further bleeding   -Statin stopped due to elevated liver enzymes  -Treat anemia           Coronary artery disease involving native coronary artery    -See plan under cardiac arrest         Acute renal failure superimposed on stage 5 chronic kidney disease, not on chronic dialysis    -Creatinine 4.2  -Mgmt as per nephrology        Hypertension associated with diabetes    -BP stable on current medical management.   -No changes at this time           Acute blood loss anemia    -anemia being addressed by primary team               VTE Risk Mitigation         Ordered     Place sequential compression device  Until discontinued      06/12/18 3318      Chart reviewed. Patient examined by Dr. Louis and agrees with plan that has been outlined.       GERRI Gonzalez  Cardiology  [facility]

## 2018-06-18 NOTE — PROGRESS NOTES
[facility] Hospital Medicine  Progress Note    Patient Name: Citlali Karimi  MRN: [id number]  Patient Class: IP- Inpatient   Admission Date: 6/12/2018  Length of Stay: 5 days  Attending Physician: Levy Samuel MD  Primary Care Provider: Evelio Schuster MD        Subjective:     Principal Problem:Acute airway obstruction    HPI:  Citlali Karimi is a 68 y.o female with PMHx of CKD (IV), HTN, DM, CVA (left sided weakness), and CAD who initially presented for hyperparathyroidism and hypercalcemia requiring surgical intervention.  Pt underwent parathyroidectomy today per Dr. Tracy (General Surgery). Pt admitted to Medical Surgical Unit post procedure and Hospital Medicine consulted for medical management.  Chest xray post procedure showed mild asymmetric CHF versus RUL pneumonia. Troponin 0.113 and BNP >270 noted.  Pt given IV Lasix in PACU prior to unit arrival.      Hospital Course:  Pt admitted to Outpatient Extended Recovery post procedure.  Elevated noted post procedure and results trended yielding significant positive response.  Cardiology consulted and Heparin infusion initiated.  Hx of CAD, multiple vessels noted with home medications continued and Imdur dose increased.  Nephrology consulted due to elevated creatinine and [facility] recommended.  Echo results pending.  Heart catheterization procedure considered with family refusing due to concern for worsening kidney function as patient does not want to be on dialysis.  Decreased oral intake and difficulty swallowing noted.  Speech therapist to bedside.  Pt made NPO and General Surgery notified.      6/15  Patient worsening status. ET changed per Pulmonary CC. Patient s/p Code Blue - VFib with recovery. Cardiology taking patient to CATH lab. Discussed with CM plan for post acute care in progress.    6/16  Patient improved o/n. Patient awake and able to follow simple command. Breathing trials in progress. Discussed with Pulmonary CC    6/17  Patient  remains intubated.  at bedside. Cardiology at bedside as well. Discussed with CC Team    6/18  Patient responds to command but remains intubated. Swelling to neck continues to be prominent. Discussed with CC Team. Worsening renal function. Possible HD need.    Interval History: Patient intubated. Worsening renal function.    Review of Systems   Unable to perform ROS: Intubated     Objective:     Vital Signs (Most Recent):  Temp: 98.7 °F (37.1 °C) (06/18/18 0700)  Pulse: 89 (06/18/18 0806)  Resp: 14 (06/18/18 0806)  BP: 122/63 (06/18/18 0806)  SpO2: 98 % (06/18/18 0806) Vital Signs (24h Range):  Temp:  [98.3 °F (36.8 °C)-98.8 °F (37.1 °C)] 98.7 °F (37.1 °C)  Pulse:  [] 89  Resp:  [9-24] 14  SpO2:  [97 %-100 %] 98 %  BP: ()/(52-75) 122/63     Weight: 75.6 kg (166 lb 10.7 oz)  Body mass index is 30.48 kg/m².    Intake/Output Summary (Last 24 hours) at 06/18/18 0851  Last data filed at 06/18/18 0705   Gross per 24 hour   Intake              675 ml   Output              115 ml   Net              560 ml      Physical Exam   Constitutional: She appears well-developed and well-nourished. She is intubated.   ILL Appearing  Intubated   HENT:   Head: Normocephalic and atraumatic.   ET in place  + neck swelling   Eyes: EOM are normal. Pupils are equal, round, and reactive to light.   Neck: Normal range of motion. Neck supple. No JVD present.   Cardiovascular: Regular rhythm and intact distal pulses.  Tachycardia present.    Murmur heard.   Systolic murmur is present with a grade of 3/6   Pulmonary/Chest: Effort normal and breath sounds normal. She is intubated. No respiratory distress. She has no wheezes.   Abdominal: Soft. Bowel sounds are normal. She exhibits no distension.   Musculoskeletal: Normal range of motion. She exhibits edema. She exhibits no tenderness.   Neurological: She has normal reflexes. No cranial nerve deficit.   Skin: Skin is warm and dry. Capillary refill takes less than 2 seconds. No  erythema.   Nursing note and vitals reviewed.      Significant Labs:   BMP:   Recent Labs  Lab 06/18/18  0428   *      K 4.5      CO2 18*   *   CREATININE 7.3*   CALCIUM 8.2*   MG 2.3     CBC:   Recent Labs  Lab 06/17/18  0510 06/18/18  0428   WBC 11.99 10.27   HGB 9.0* 7.7*   HCT 28.0* 23.9*    182     CMP:   Recent Labs  Lab 06/17/18  0510 06/18/18  0428     138 137   K 4.3  4.3 4.5     103 102   CO2 19*  19* 18*   *  149* 140*   BUN 68*  68* 102*   CREATININE 6.0*  6.0* 7.3*   CALCIUM 8.7  8.7 8.2*   PROT 6.2 5.6*   ALBUMIN 2.5*  2.5* 2.3*   BILITOT 0.3 0.3   ALKPHOS 59 51*   AST 51* 31   ALT <5* <5*   ANIONGAP 16  16 17*   EGFRNONAA 7*  7* 5*     All pertinent labs within the past 24 hours have been reviewed.    Significant Imaging: I have reviewed all pertinent imaging results/findings within the past 24 hours.    Assessment/Plan:      * Acute airway obstruction    6/16  Intubated for airway protection.  Pulmonary CC      6/18  Remains intubated  Weaning in progress  Pulmonary CC        NSTEMI (non-ST elevated myocardial infarction)    ASA  Cardiology  [facility] - Diffuse disease  No further intervention recommended  Medical Management  No statin due to allergy        S/P parathyroidectomy              On mechanically assisted ventilation    Wean as tolerated  Intubated  Pulmonary CC          Cardiac arrest with ventricular fibrillation    Cardiology  [facility] 6/15  ASA  Statin Allergy  BB  No further interventions  Diffuse disease    6/18  ASA  BB  Diffuse disease No intervention        Abnormal chest x-ray    - chest xray showed possible mild asymmetric CHF versus right upper lobe pneumonia.  -pt given IV Lasix in PACU  - IV antibiotics for suspected aspiration continued   - pt recently seen by RAMIRO Mcnally (Otolaryngology) for Dysphagia  -   - pt afebrile, WBC normal  - repeat xray results showed improved aeration RUL with no adverse interval  changes in comparison to previous study  - SLP evaluation performed and thin, pureed recommended on 6/13/18 6/16  Pastient intubated  Pulmonary CC  ABX    6/17  ABX  Pulmonary CC  Monitor    6/18  Pulmonary CC  Intubated  Steroids            Elevated troponin    -initial 0.113>>34>>50>>41  -pt denies CP and SOB  - EKG results showed diffuse ST changes    -serial results revealed NSTEMI with Cardiology consulted     6/15  NSTEMI POA  Cardiology  LHC  ASA  Hold Statin due to Elevated LFT's    6/16  Cardiology  ASA  ICU  Statin allergy noted    6/17  S/p LHC - Diffuse disease  ASA  Cards              CVA (cerebral vascular accident)    -hx of 1 yr ago per   -left sided weakness residual  -ASA   Statin on stability        Hyperparathyroidism    -Pt admitted to Extended Recovery then transferred to Inpatient due to NSTEMI  -s/p Parathyroidectomy   -managed by General Surgery -primary team  - PTH- 32  - Ca 11.4>>10.5  - analgesia prn   - antiemetics prn  - specimens sent for analysis    6/16  Ca 8.7 today  Monitor    6/18  Ca 8.2 today  Monitor        Coronary artery disease involving native coronary artery    - ASA, Statin, and Lopressor continued   -Imdur dose increased   -previous Cath showed severe CAD (proximal LCX 99%, mid 50%, RCA mid 90%, distal with subtotal lesion).  - Cardiology consulted with medical management continued   - [facility] proposed pending Nephrology recommendations  -family refused LHC due to concern for worsening kidney function as pt had expressed that she did not want to be on dialysis     6/15  Family electing LHC and accepting risk of worsening renal function  Cardiology    6/16  S/p LHC  Cardiology  ASA  Statin allergy noted    6/16  No interventions  Cardiology on consult    6/18  Cardiology on Consult  Medical Management          Acute renal failure superimposed on stage 5 chronic kidney disease, not on chronic dialysis    -Creatinine 3.6>>4.1  -baseline 2.7-4.4  -will monitor    -sodium bicarb continued   -pt has been followed outpatient by Dr. Vazquez (Nephrology)  -Nephrology consulted - pt may benefit from repeat angio due to NSTEMI but at high risk of contrast induced nephropathy  - pt was candidate for renal transplant however work up discontinued due to progressive weakness  - [facility] recommended     6/15  Monitor worsening Renal Function  S/p Cardiac VFib Arrest  [facility] today  IVF's  Monitor      6/16  [facility] diffuse disease  Cardiology  Monitor    6/17  Worsening  No further Cardiac intervention  Nephrology on consult  Monitor  Cr 6.0 today vs 4.7  Low Urine O/P    6/18  COntinues to worsen. Cr 7.3 today  Nephrology guidance for HD vs Monitoring        Hypertension associated with diabetes    - home medications continued   -hx of noncompliance and inconsistent dosing at home per   - uncontrolled upon arrival- improved with Hydralazine in PACU   - Hydralazine prn          Diabetes mellitus, type 2 - only on oral medications    Controlled  NICC  Accuchecks          Acute blood loss anemia    -stable post procedure  -will monitor  -CBC in am   -pt followed outpatient by Heme/Onc    6/16  Stable  Monitor    6/17  Improving  Monitor    6/18  Hgb 7.7 today  Transfuse PRBC's per sx  Monitor            VTE Risk Mitigation         Ordered     Place sequential compression device  Until discontinued      06/12/18 1669          Critical care time spent on the evaluation and treatment of severe organ dysfunction, review of pertinent labs and imaging studies, discussions with consulting providers and discussions with patient/family: 32 minutes.    Vinicio Marinelli MD  Department of Hospital Medicine   [facility]

## 2018-06-18 NOTE — ASSESSMENT & PLAN NOTE
-Creatinine 3.6>>4.1  -baseline 2.7-4.4  -will monitor   -sodium bicarb continued   -pt has been followed outpatient by Dr. Vazquez (Nephrology)  -Nephrology consulted - pt may benefit from repeat angio due to NSTEMI but at high risk of contrast induced nephropathy  - pt was candidate for renal transplant however work up discontinued due to progressive weakness  - [facility] recommended     6/15  Monitor worsening Renal Function  S/p Cardiac VFib Arrest  [facility] today  IVF's  Monitor      6/16  [facility] diffuse disease  Cardiology  Monitor    6/17  Worsening  No further Cardiac intervention  Nephrology on consult  Monitor  Cr 6.0 today vs 4.7  Low Urine O/P    6/18  COntinues to worsen. Cr 7.3 today  Nephrology guidance for HD vs Monitoring

## 2018-06-18 NOTE — ASSESSMENT & PLAN NOTE
Status post parathyroidectomy  Monitor calcium  S/p neck washout/drain placement  New fluid collections noted on ultrasound, neck remains off this morning, minimal drainage in MELISSA  Okay from surgical standpoint to attempt extubation is cuff leak noted

## 2018-06-18 NOTE — SUBJECTIVE & OBJECTIVE
Review of Systems   Unable to perform ROS: Intubated       Objective:     Vital Signs (Most Recent):  Temp: 98.7 °F (37.1 °C) (06/18/18 0700)  Pulse: 96 (06/18/18 0900)  Resp: (!) 22 (06/18/18 0900)  BP: 132/86 (06/18/18 0900)  SpO2: 100 % (06/18/18 0900) Vital Signs (24h Range):  Temp:  [98.3 °F (36.8 °C)-98.8 °F (37.1 °C)] 98.7 °F (37.1 °C)  Pulse:  [] 96  Resp:  [9-24] 22  SpO2:  [97 %-100 %] 100 %  BP: ()/(52-86) 132/86     Weight: 75.6 kg (166 lb 10.7 oz)  Body mass index is 30.48 kg/m².      Intake/Output Summary (Last 24 hours) at 06/18/18 1113  Last data filed at 06/18/18 0900   Gross per 24 hour   Intake              755 ml   Output               97 ml   Net              658 ml       Physical Exam   Constitutional: Vital signs are normal. She appears well-developed and well-nourished. She is sleeping. She is easily aroused.  Non-toxic appearance. She appears ill. No distress. She is sedated, intubated and restrained.   HENT:   Head: Normocephalic and atraumatic.   Mouth/Throat: Oropharynx is clear and moist and mucous membranes are normal.   Eyes: EOM and lids are normal. Pupils are equal, round, and reactive to light.   Neck: Neck supple. Carotid bruit is not present.       Cardiovascular: Normal rate, regular rhythm and normal heart sounds.    Pulses:       Radial pulses are 2+ on the right side, and 2+ on the left side.        Dorsalis pedis pulses are 1+ on the right side, and 1+ on the left side.   Pulmonary/Chest: Effort normal. No accessory muscle usage. She is intubated. No respiratory distress. She has rales.   Abdominal: Soft. Normal appearance. She exhibits no distension. Bowel sounds are decreased. There is no tenderness.   Genitourinary:   Genitourinary Comments: Fair in place   Musculoskeletal: Normal range of motion.        Right foot: There is no deformity.        Left foot: There is no deformity.   Mild general edema   Lymphadenopathy:     She has no cervical adenopathy.    Neurological: She is alert and easily aroused.   On min sedation she is Fully awake and nodding head to questions   Skin: Skin is warm, dry and intact. Capillary refill takes less than 2 seconds. No rash noted. No cyanosis.            Vents:  Vent Mode: A/C (06/18/18 0806)  Ventilator Initiated: Yes (06/14/18 1812)  Set Rate: 14 bmp (06/18/18 0806)  Vt Set: 400 mL (06/18/18 0806)  Pressure Support: 0 cmH20 (06/18/18 0806)  PEEP/CPAP: 5 cmH20 (06/18/18 0806)  Oxygen Concentration (%): 30 (06/18/18 0900)  Peak Airway Pressure: 21 cmH2O (06/18/18 0806)  Plateau Pressure: 18 cmH20 (06/18/18 0806)  Total Ve: 5.71 mL (06/18/18 0806)  F/VT Ratio<105 (RSBI): (!) 34.65 (06/18/18 0806)    Lines/Drains/Airways     Central Venous Catheter Line                 Percutaneous Central Line Insertion/Assessment - triple lumen  06/14/18 right femoral 4 days          Drain                 Urethral Catheter 06/14/18 1113 4 days         Drain/Device  06/14/18 1239 neck collapsible closed device 3 days         NG/OG Tube 06/15/18 0200 [place] sump 14 Fr. Center mouth 3 days          Airway                 Airway - Non-Surgical 06/15/18 1025 Endotracheal Tube 3 days          Arterial Line                 Arterial Line 06/14/18 1427 Right Femoral 3 days          Peripheral Intravenous Line                 Peripheral IV - Single Lumen 06/12/18 0954 Left Forearm 6 days                Significant Labs:    CBC/Anemia Profile:    Recent Labs  Lab 06/17/18  0510 06/18/18  0428   WBC 11.99 10.27   HGB 9.0* 7.7*   HCT 28.0* 23.9*    182   MCV 84 84   RDW 19.0* 18.8*        Chemistries:    Recent Labs  Lab 06/17/18  0510 06/18/18  0428     138 137   K 4.3  4.3 4.5     103 102   CO2 19*  19* 18*   BUN 68*  68* 102*   CREATININE 6.0*  6.0* 7.3*   CALCIUM 8.7  8.7 8.2*   ALBUMIN 2.5*  2.5* 2.3*   PROT 6.2 5.6*   BILITOT 0.3 0.3   ALKPHOS 59 51*   ALT <5* <5*   AST 51* 31   MG 2.1 2.3   PHOS 6.7*  --        ABGs:   Recent  Labs  Lab 06/18/18  0456   PH 7.361   PCO2 36.2   HCO3 20.5*   POCSATURATED 99   BE -5     All pertinent labs within the past 24 hours have been reviewed.    Significant Imaging:  CXR: I have reviewed all pertinent results/findings within the past 24 hours and my personal findings are:  no acute pulm process  U/S: I have reviewed all pertinent results/findings within the past 24 hours and my personal findings are:  NECK: soft tissue swelling but no fluid collection

## 2018-06-18 NOTE — SUBJECTIVE & OBJECTIVE
Interval History: ultrasound neck yesterday shows no fluid collections, patient remains on minimal vent settings    Medications:  Continuous Infusions:    Scheduled Meds:   amiodarone  200 mg Oral BID    aspirin  81 mg Oral Daily    chlorhexidine  10 mL Mouth/Throat BID    clopidogrel  75 mg Oral Daily    methylPREDNISolone sodium succinate  40 mg Intravenous Q6H    metoprolol tartrate  25 mg Oral BID    nozaseptin   Each Nare BID    pantoprazole 40 mg in dextrose 5 % 100 mL IVPB (over 15 minutes) (ready to mix system)  40 mg Intravenous Daily    polyethylene glycol  17 g Oral Daily    senna-docusate 8.6-50 mg  1 tablet Oral BID    sodium bicarbonate  650 mg Oral BID     PRN Meds:acetaminophen, atropine, bisacodyl, cloNIDine, dextrose 50%, dextrose 50%, glucagon (human recombinant), glucose, glucose, hydrALAZINE, insulin aspart U-100, lactulose, lorazepam, morphine, nitroGLYCERIN, ondansetron, sodium bicarbonate, sodium chloride 0.9%     Review of patient's allergies indicates:   Allergen Reactions    Lipitor [atorvastatin] Swelling     Lips       Objective:     Vital Signs (Most Recent):  Temp: 98.3 °F (36.8 °C) (06/18/18 0300)  Pulse: 86 (06/18/18 0600)  Resp: 16 (06/18/18 0600)  BP: 112/60 (06/18/18 0600)  SpO2: 98 % (06/18/18 0600) Vital Signs (24h Range):  Temp:  [98.3 °F (36.8 °C)-98.8 °F (37.1 °C)] 98.3 °F (36.8 °C)  Pulse:  [] 86  Resp:  [9-24] 16  SpO2:  [97 %-100 %] 98 %  BP: ()/(43-75) 112/60     Weight: 75.6 kg (166 lb 10.7 oz)  Body mass index is 30.48 kg/m².    Intake/Output - Last 3 Shifts       06/16 0700 - 06/17 0659 06/17 0700 - 06/18 0659    I.V. (mL/kg) 357.5 (4.7) 123.3 (1.6)    NG/ 775    IV Piggyback 100     Total Intake(mL/kg) 1057.5 (14) 898.3 (11.9)    Urine (mL/kg/hr) 282 (0.2) 110 (0.1)    Other 15 (0) 10 (0)    Total Output 297 120    Net +760.5 +778.3          Stool Occurrence 0 x           Physical Exam   Constitutional: She is oriented to person, place,  and time. No distress.   Neck: Trachea normal.   Neck is soft, dressing intact, MELISSA with minimal output   Pulmonary/Chest: Effort normal. No respiratory distress.   Musculoskeletal: She exhibits no edema.   Neurological: She is alert and oriented to person, place, and time.   Skin: No rash noted. No erythema. No pallor.       Significant Labs:  CBC:     Recent Labs  Lab 06/18/18  0428   WBC 10.27   RBC 2.84*   HGB 7.7*   HCT 23.9*      MCV 84   MCH 27.1   MCHC 32.2       Significant Diagnostics:  I have reviewed all pertinent imaging results/findings within the past 24 hours.

## 2018-06-18 NOTE — ASSESSMENT & PLAN NOTE
Per Cards.  Medical management  [facility] 6/15 done revealing diffuse CAD not amenable for PCI, no changes from 2016  Cont Plavix, ASA, Lopressor.    Allergy to statin noted

## 2018-06-18 NOTE — ASSESSMENT & PLAN NOTE
-s/p successful resuscitation  -Post-procedure EKG showed STEMI  -Underwent LHC on 6/15/18 that showed Three vessel coronary artery disease.    Diffuses CAD unchanged from 2016 not amenable for PCI.    stent graft noted in proximal aorta.   -Recommendations made for medical management of CAD   - Continue ASA, BB  -Stop Plavix per Dr. Louis to avoid further bleeding   -Statin stopped due to elevated liver enzymes  -Treat anemia

## 2018-06-18 NOTE — ASSESSMENT & PLAN NOTE
- ASA, Statin, and Lopressor continued   -Imdur dose increased   -previous Cath showed severe CAD (proximal LCX 99%, mid 50%, RCA mid 90%, distal with subtotal lesion).  - Cardiology consulted with medical management continued   - LHC proposed pending Nephrology recommendations  -family refused LHC due to concern for worsening kidney function as pt had expressed that she did not want to be on dialysis     6/15  Family electing LHC and accepting risk of worsening renal function  Cardiology    6/16  S/p LHC  Cardiology  ASA  Statin allergy noted    6/16  No interventions  Cardiology on consult    6/18  Cardiology on Consult  Medical Management

## 2018-06-18 NOTE — PLAN OF CARE
Problem: Patient Care Overview  Goal: Plan of Care Review  Outcome: Ongoing (interventions implemented as appropriate)  Recommendations     Recommendation/Intervention:   1. If unable to extubate pt over the next 24 hrs, consider changing TF to Novasource @ 25 ml/hr + 1 pack of beneprotein 5x/day ( 1325 calories, 84 gms prot, & 430 mls free water).    2. If able to extubate pt, consider SLP evaluation.  3. Will continue to monitor.   Goals: Meet > 85 % EEN/EPN while admitted  Nutrition Goal Status: progressing towards goal   Communication of RD Recs: Reviewed with NP

## 2018-06-18 NOTE — PLAN OF CARE
CM spoke with the spouse regarding transition of care. CM explained DNR , comfort care vs aggressive care. Since patient has been intubated for days , she will need some type of care ie Skill , LTAC Rehab. CM explained the difference.  Since patient I is DNR CM focused on what is comfort care. Spouse stated he is awaiting to speak with other MD especially if she needs dialysis.  CM to f/u with safe transition     06/18/18 1314   Discharge Reassessment   Assessment Type Discharge Planning Reassessment   Provided patient/caregiver education on the expected discharge date and the discharge plan No   Do you have any problems affording any of your prescribed medications? No   Discharge Plan A Home Health;Hospice/home   Discharge Plan B Home Health;Hospice/home   Patient choice form signed by patient/caregiver N/A   Can the patient answer the patient profile reliably? No, cognitively impaired   How does the patient rate their overall health at the present time? Fair   Describe the patient's ability to walk at the present time. Major restrictions/daily assistance from another person   How often would a person be available to care for the patient? Whenever needed   Number of comorbid conditions (as recorded on the chart) Five or more   During the past month, has the patient often been bothered by feeling down, depressed or hopeless? No   During the past month, has the patient often been bothered by little interest or pleasure in doing things? No

## 2018-06-18 NOTE — ASSESSMENT & PLAN NOTE
Neck still with swelling but improved  Leave intubated for airway protection for possible RRT intiation  Vocal Cord assessment for possible extubation if no RRT planned  Cont low dose steroids

## 2018-06-18 NOTE — ASSESSMENT & PLAN NOTE
-Pt admitted to Extended Recovery then transferred to Inpatient due to NSTEMI  -s/p Parathyroidectomy   -managed by General Surgery -primary team  - PTH- 32  - Ca 11.4>>10.5  - analgesia prn   - antiemetics prn  - specimens sent for analysis    6/16  Ca 8.7 today  Monitor    6/18  Ca 8.2 today  Monitor

## 2018-06-18 NOTE — PROGRESS NOTES
[facility] -   Nephrology  Progress Note    Patient Name: Citlali Karimi  MRN: [id number]  Admission Date: 6/12/2018  Hospital Length of Stay: 5 days  Attending Provider: Levy Samuel MD   Primary Care Physician: Evelio Schuster MD  Principal Problem:Acute airway obstruction. Renal failure    Subjective:     HPI:  Citlali Karimi Is a pleasant 68 -year-old  woman with history of chronic kidney disease stage 4, patient was admitted to the hospital for an elective  partial parathyroidectomy, following her surgery during observation.  She had some chest pain, workup revealed non ST elevated MI, patient was admitted to the hospital for further management.  We were consulted due to advanced renal insufficiency, baseline serum creatinine about 3.5-4,            Important Info :        She underwent a native kidney biopsy on 10/10/13. Final report of the kidney biopsy is negative for any specific etiology for acute renal failure. 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         Interval History: pt was seen and examined in ICU. Met with pt's  and niece. Discussed with ICU. H/o reviewed. Stable last pm, no new events. Remains intubated. Noted h/o for CKD stage 4-5 at baseline, h/o of reflux nephropathy, diabetic nephropathy, DM, HTN, diastolic CHF, EF well preserved. Pt was admitted for elective parathyroidectomy and had NSTEMI post op, remains intubated for neck and vocal cord swelling. Pt had worsened renal function post LHC on 6/15/18. Result of LHC reviewed.    Review of patient's allergies indicates:   Allergen Reactions    Lipitor [atorvastatin] Swelling     Lips       Current Facility-Administered Medications   Medication Frequency    0.9%  NaCl infusion (for blood administration) Q24H PRN    acetaminophen tablet 650 mg Q4H PRN    amiodarone tablet 200 mg BID    aspirin chewable tablet 81 mg Daily    atropine injection 0.5 mg PRN     bisacodyl suppository 10 mg Daily PRN    chlorhexidine 0.12 % solution 10 mL BID    cloNIDine tablet 0.1 mg Q6H PRN    dextrose 50% injection 12.5 g PRN    dextrose 50% injection 25 g PRN    glucagon (human recombinant) injection 1 mg PRN    glucose chewable tablet 16 g PRN    glucose chewable tablet 24 g PRN    hydrALAZINE injection 10 mg Q8H PRN    insulin aspart U-100 pen 0-5 Units QID (AC + HS) PRN    lactulose 20 gram/30 mL solution Soln 20 g Q6H PRN    lorazepam (ATIVAN) injection 2 mg Q4H PRN    methylPREDNISolone sodium succinate injection 40 mg Q6H    metoprolol tartrate (LOPRESSOR) tablet 25 mg BID    morphine injection 4 mg Q4H PRN    nitroGLYCERIN SL tablet 0.4 mg Q5 Min PRN    nozaseptin (NOZIN) nasal  BID    ondansetron injection 4 mg Q8H PRN    pantoprazole 40 mg in dextrose 5 % 100 mL IVPB (over 15 minutes) (ready to mix system) Daily    polyethylene glycol packet 17 g Daily    senna-docusate 8.6-50 mg per tablet 1 tablet BID    sodium bicarbonate 8.4 % (1 mEq/mL) injection PRN    sodium bicarbonate tablet 650 mg BID    sodium chloride 0.9% flush 3 mL PRN       Objective:     Vital Signs (Most Recent):  Temp: 98.4 °F (36.9 °C) (06/18/18 1315)  Pulse: 95 (06/18/18 1315)  Resp: 20 (06/18/18 1315)  BP: (!) 128/52 (06/18/18 1315)  SpO2: 100 % (06/18/18 1315)  O2 Device (Oxygen Therapy): ventilator (06/18/18 1315) Vital Signs (24h Range):  Temp:  [98.3 °F (36.8 °C)-98.8 °F (37.1 °C)] 98.4 °F (36.9 °C)  Pulse:  [] 95  Resp:  [9-24] 20  SpO2:  [97 %-100 %] 100 %  BP: ()/(52-90) 128/52     Weight: 75.6 kg (166 lb 10.7 oz) (06/16/18 0545)  Body mass index is 30.48 kg/m².  Body surface area is 1.82 meters squared.    I/O last 3 completed shifts:  In: 1372.7 [I.V.:302.7; NG/GT:1070]  Out: 222 [Urine:197; Other:25]    Physical Exam   Constitutional: She is oriented to person, place, and time. She appears well-developed and well-nourished. No distress.   HENT:   Head:  Normocephalic and atraumatic.   Neck: No JVD present.   Cardiovascular: Normal rate, regular rhythm and normal heart sounds.  Exam reveals no friction rub.    Pulmonary/Chest: Effort normal and breath sounds normal. She has no rales.   Abdominal: Soft. Bowel sounds are normal. She exhibits no distension.   Musculoskeletal: She exhibits no edema.   Neurological: She is alert and oriented to person, place, and time.   Skin: Skin is warm and dry. She is not diaphoretic.   Nursing note and vitals reviewed.      Significant Labs: reviewed  BMP  Lab Results   Component Value Date     06/18/2018    K 4.5 06/18/2018     06/18/2018    CO2 18 (L) 06/18/2018     (H) 06/18/2018    CREATININE 7.3 (H) 06/18/2018    CALCIUM 8.2 (L) 06/18/2018    ANIONGAP 17 (H) 06/18/2018    ESTGFRAFRICA 6 (A) 06/18/2018    EGFRNONAA 5 (A) 06/18/2018     Lab Results   Component Value Date    WBC 10.27 06/18/2018    HGB 7.7 (L) 06/18/2018    HCT 23.9 (L) 06/18/2018    MCV 84 06/18/2018     06/18/2018       Recent Labs  Lab 06/13/18  1508   TROPONINI 41.662*         Significant Imaging: CXR reviewed, mild pulm edema    Assessment/Plan:               Acute renal failure superimposed on stage 5 chronic kidney disease, not on chronic dialysis    Renal: pt has stage 4-5 CKD even at baseline  Has further increase in s Cr MAYLIN on CKD stage 4-5  Likely cause could be contrast nephropathy vs prerenal azotemia due to NSTEMI and hypotension.  Even at baseline, pt was pre-HD, pre-ESRD  Therefore, the current worsening does not diminish from the extent and gravity of her background advanced CKD  No acute indication for renal replacement therapy,   K normal  Metabolic acidosis  Good O2 sat  Mild pulm edema on CXR  Pt will likely need chronic HD  This is despite the current MAYLIN  Pt is expected to tolerate HD from the cardiac point of view     SHPT: s/p partial parathyroidectomy surgery ( 6/12/18 ) ,   Mild hypocalcemia as expected  Will  monitor s Ca and will repeat iPTH  Will provide Tums or Vit D as indicated    Anemia of chronic kidney disease -   s/p 2 units of pRBC today ,         * Acute airway obstruction    Intubated for neck swelling  Neck swelling and hematoma due to the neck surgery  The indication for this current intubation is irrespective to the for HD.          Coronary artery disease involving native coronary artery    Had NSTEMI post op  Cardiac arrest with V Fib.  C results reviewed, no change from prior cath in 2016  Circ 99%, RCA 90%, LAD 50%  Diffuse disease, not amenable for stent/CABG revascularization          Plans and recommendations:  As discussed above  Discussed extensively with , agreeable to HD when pt needs it chronically  No indication for acute HD at this point.  Total critical care time spent 45 minutes including time needed to review the records, the   patient evaluation, documentation, face-to-face discussion with pt's family   more than 50% of the time was spent on coordination of care and counseling.    Level V visit.    Jordyn Lima MD  Nephrology  [facility]

## 2018-06-18 NOTE — PROGRESS NOTES
[facility] -   General Surgery  Progress Note    Subjective:     History of Present Illness:  No notes on file    Post-Op Info:  Procedure(s) (LRB):  CATHETERIZATION, HEART, LEFT (Left)   3 Days Post-Op     Interval History: ultrasound neck yesterday shows no fluid collections, patient remains on minimal vent settings    Medications:  Continuous Infusions:    Scheduled Meds:   amiodarone  200 mg Oral BID    aspirin  81 mg Oral Daily    chlorhexidine  10 mL Mouth/Throat BID    clopidogrel  75 mg Oral Daily    methylPREDNISolone sodium succinate  40 mg Intravenous Q6H    metoprolol tartrate  25 mg Oral BID    nozaseptin   Each Nare BID    pantoprazole 40 mg in dextrose 5 % 100 mL IVPB (over 15 minutes) (ready to mix system)  40 mg Intravenous Daily    polyethylene glycol  17 g Oral Daily    senna-docusate 8.6-50 mg  1 tablet Oral BID    sodium bicarbonate  650 mg Oral BID     PRN Meds:acetaminophen, atropine, bisacodyl, cloNIDine, dextrose 50%, dextrose 50%, glucagon (human recombinant), glucose, glucose, hydrALAZINE, insulin aspart U-100, lactulose, lorazepam, morphine, nitroGLYCERIN, ondansetron, sodium bicarbonate, sodium chloride 0.9%     Review of patient's allergies indicates:   Allergen Reactions    Lipitor [atorvastatin] Swelling     Lips       Objective:     Vital Signs (Most Recent):  Temp: 98.3 °F (36.8 °C) (06/18/18 0300)  Pulse: 86 (06/18/18 0600)  Resp: 16 (06/18/18 0600)  BP: 112/60 (06/18/18 0600)  SpO2: 98 % (06/18/18 0600) Vital Signs (24h Range):  Temp:  [98.3 °F (36.8 °C)-98.8 °F (37.1 °C)] 98.3 °F (36.8 °C)  Pulse:  [] 86  Resp:  [9-24] 16  SpO2:  [97 %-100 %] 98 %  BP: ()/(43-75) 112/60     Weight: 75.6 kg (166 lb 10.7 oz)  Body mass index is 30.48 kg/m².    Intake/Output - Last 3 Shifts       06/16 0700 - 06/17 0659 06/17 0700 - 06/18 0659    I.V. (mL/kg) 357.5 (4.7) 123.3 (1.6)    NG/ 775    IV Piggyback 100     Total Intake(mL/kg) 1057.5 (14) 898.3  (11.9)    Urine (mL/kg/hr) 282 (0.2) 110 (0.1)    Other 15 (0) 10 (0)    Total Output 297 120    Net +760.5 +778.3          Stool Occurrence 0 x           Physical Exam   Constitutional: She is oriented to person, place, and time. No distress.   Neck: Trachea normal.   Neck is soft, dressing intact, MELISSA with minimal output   Pulmonary/Chest: Effort normal. No respiratory distress.   Musculoskeletal: She exhibits no edema.   Neurological: She is alert and oriented to person, place, and time.   Skin: No rash noted. No erythema. No pallor.       Significant Labs:  CBC:     Recent Labs  Lab 06/18/18  0428   WBC 10.27   RBC 2.84*   HGB 7.7*   HCT 23.9*      MCV 84   MCH 27.1   MCHC 32.2       Significant Diagnostics:  I have reviewed all pertinent imaging results/findings within the past 24 hours.    Assessment/Plan:     CVA (cerebral vascular accident)    Prior CVA, patient with increased difficulty swelling medications at home prior to hospitalization, underwent evaluation with ENT prior to surgery  Speech therapy consult for swallowing        Hyperparathyroidism    Status post parathyroidectomy  Monitor calcium  S/p neck washout/drain placement  New fluid collections noted on ultrasound, neck remains off this morning, minimal drainage in MELISSA  Okay from surgical standpoint to attempt extubation is cuff leak noted            Coronary artery disease involving native coronary artery    Cardiology following  On anticoagulation        Acute renal failure superimposed on stage 5 chronic kidney disease, not on chronic dialysis    urine output creatinine worsening  Nephrology following patient will likely need dialysis        Hypertension associated with diabetes    Antihypertensive medications            Levy Samuel MD  General Surgery  [facility]

## 2018-06-18 NOTE — ASSESSMENT & PLAN NOTE
-stable post procedure  -will monitor  -CBC in am   -pt followed outpatient by Heme/Onc    6/16  Stable  Monitor    6/17  Improving  Monitor    6/18  Hgb 7.7 today  Transfuse PRBC's per sx  Monitor

## 2018-06-18 NOTE — ASSESSMENT & PLAN NOTE
- chest xray showed possible mild asymmetric CHF versus right upper lobe pneumonia.  -pt given IV Lasix in PACU  - IV antibiotics for suspected aspiration continued   - pt recently seen by RAMIRO Mcnally (Otolaryngology) for Dysphagia  -   - pt afebrile, WBC normal  - repeat xray results showed improved aeration RUL with no adverse interval changes in comparison to previous study  - SLP evaluation performed and thin, pureed recommended on 6/13/18 6/16  Pastient intubated  Pulmonary CC  ABX    6/17  ABX  Pulmonary CC  Monitor    6/18  Pulmonary CC  Intubated  Steroids

## 2018-06-18 NOTE — ASSESSMENT & PLAN NOTE
Monitor and replace electrolytes if needed  ICU cardiac monitoring  Cont Amiodarone and Lopressor.  A-Fib resolved.  No neuro deficit noted above baseline from hx CVA

## 2018-06-18 NOTE — PLAN OF CARE
Problem: Patient Care Overview  Goal: Plan of Care Review  Outcome: Ongoing (interventions implemented as appropriate)  Pt VSS,

## 2018-06-18 NOTE — ASSESSMENT & PLAN NOTE
S/P 2 units PRBCs 6/15  Give another 1 unit PRBC today given recent ACS  Daily H/H  No active bleeding  Patient has been treated with Procrit 20,000 units weekly for maintenance therapy followed by Hematology

## 2018-06-18 NOTE — ASSESSMENT & PLAN NOTE
6/16  Intubated for airway protection.  Pulmonary CC      6/18  Remains intubated  Weaning in progress  Pulmonary CC

## 2018-06-18 NOTE — PROGRESS NOTES
Inspected upper airway with glydoscope at bedside.  Sedated with 80mg Propofol IVP while on vent.  Upper airway still with swelling but improved from 3 days ago.  Cont intubation for another 48 hours then perform OET min leak test and attempt extubation if pass.    EDSON Talavera ACNP-BC

## 2018-06-18 NOTE — ASSESSMENT & PLAN NOTE
diffuse CAD not amenable to PCI, no changes from 2016 cath, turned down for CABG, continue medical tx

## 2018-06-19 PROBLEM — N18.6 ESRD (END STAGE RENAL DISEASE): Status: ACTIVE | Noted: 2018-06-19

## 2018-06-19 LAB
ALBUMIN SERPL BCP-MCNC: 2.6 G/DL
ALLENS TEST: ABNORMAL
ALP SERPL-CCNC: 58 U/L
ALT SERPL W/O P-5'-P-CCNC: <5 U/L
ANION GAP SERPL CALC-SCNC: 20 MMOL/L
AST SERPL-CCNC: 27 U/L
BASOPHILS # BLD AUTO: 0.01 K/UL
BASOPHILS NFR BLD: 0.1 %
BILIRUB SERPL-MCNC: 0.4 MG/DL
BUN SERPL-MCNC: 120 MG/DL
C DIFF GDH STL QL: POSITIVE
C DIFF TOX A+B STL QL IA: NEGATIVE
CALCIUM SERPL-MCNC: 8.2 MG/DL
CHLORIDE SERPL-SCNC: 99 MMOL/L
CO2 SERPL-SCNC: 16 MMOL/L
CREAT SERPL-MCNC: 8.3 MG/DL
DELSYS: ABNORMAL
DIFFERENTIAL METHOD: ABNORMAL
EOSINOPHIL # BLD AUTO: 0 K/UL
EOSINOPHIL NFR BLD: 0 %
ERYTHROCYTE [DISTWIDTH] IN BLOOD BY AUTOMATED COUNT: 17.9 %
ERYTHROCYTE [SEDIMENTATION RATE] IN BLOOD BY WESTERGREN METHOD: 14 MM/H
EST. GFR  (AFRICAN AMERICAN): 5 ML/MIN/1.73 M^2
EST. GFR  (NON AFRICAN AMERICAN): 4 ML/MIN/1.73 M^2
FIO2: 30
GLUCOSE SERPL-MCNC: 154 MG/DL
HCO3 UR-SCNC: 18.2 MMOL/L (ref 24–28)
HCT VFR BLD AUTO: 29.2 %
HGB BLD-MCNC: 9.7 G/DL
IT: 1.1
LYMPHOCYTES # BLD AUTO: 0.7 K/UL
LYMPHOCYTES NFR BLD: 4.4 %
MAGNESIUM SERPL-MCNC: 2.6 MG/DL
MCH RBC QN AUTO: 27.6 PG
MCHC RBC AUTO-ENTMCNC: 33.2 G/DL
MCV RBC AUTO: 83 FL
MODE: ABNORMAL
MONOCYTES # BLD AUTO: 0.9 K/UL
MONOCYTES NFR BLD: 6 %
NEUTROPHILS # BLD AUTO: 13.5 K/UL
NEUTROPHILS NFR BLD: 89.5 %
PCO2 BLDA: 36.9 MMHG (ref 35–45)
PEEP: 5
PH SMN: 7.3 [PH] (ref 7.35–7.45)
PLATELET # BLD AUTO: 227 K/UL
PMV BLD AUTO: 10 FL
PO2 BLDA: 100 MMHG (ref 80–100)
POC BE: -8 MMOL/L
POC SATURATED O2: 97 % (ref 95–100)
POCT GLUCOSE: 131 MG/DL (ref 70–110)
POCT GLUCOSE: 157 MG/DL (ref 70–110)
POCT GLUCOSE: 163 MG/DL (ref 70–110)
POCT GLUCOSE: 196 MG/DL (ref 70–110)
POTASSIUM SERPL-SCNC: 4.6 MMOL/L
PROT SERPL-MCNC: 6.2 G/DL
RBC # BLD AUTO: 3.51 M/UL
SAMPLE: ABNORMAL
SITE: ABNORMAL
SODIUM SERPL-SCNC: 135 MMOL/L
VT: 400
WBC # BLD AUTO: 15.1 K/UL

## 2018-06-19 PROCEDURE — 94003 VENT MGMT INPAT SUBQ DAY: CPT

## 2018-06-19 PROCEDURE — 63600175 PHARM REV CODE 636 W HCPCS: Performed by: NURSE PRACTITIONER

## 2018-06-19 PROCEDURE — 63600175 PHARM REV CODE 636 W HCPCS: Performed by: SURGERY

## 2018-06-19 PROCEDURE — 37799 UNLISTED PX VASCULAR SURGERY: CPT

## 2018-06-19 PROCEDURE — 83735 ASSAY OF MAGNESIUM: CPT

## 2018-06-19 PROCEDURE — 99291 CRITICAL CARE FIRST HOUR: CPT | Mod: ,,, | Performed by: NURSE PRACTITIONER

## 2018-06-19 PROCEDURE — 99900035 HC TECH TIME PER 15 MIN (STAT)

## 2018-06-19 PROCEDURE — 90937 HEMODIALYSIS REPEATED EVAL: CPT | Mod: ,,, | Performed by: INTERNAL MEDICINE

## 2018-06-19 PROCEDURE — 25000003 PHARM REV CODE 250: Performed by: NURSE PRACTITIONER

## 2018-06-19 PROCEDURE — 99291 CRITICAL CARE FIRST HOUR: CPT | Mod: 25,,, | Performed by: INTERNAL MEDICINE

## 2018-06-19 PROCEDURE — 63600175 PHARM REV CODE 636 W HCPCS: Performed by: INTERNAL MEDICINE

## 2018-06-19 PROCEDURE — 25000003 PHARM REV CODE 250: Performed by: SURGERY

## 2018-06-19 PROCEDURE — 20000000 HC ICU ROOM

## 2018-06-19 PROCEDURE — 82803 BLOOD GASES ANY COMBINATION: CPT

## 2018-06-19 PROCEDURE — 80053 COMPREHEN METABOLIC PANEL: CPT

## 2018-06-19 PROCEDURE — C9113 INJ PANTOPRAZOLE SODIUM, VIA: HCPCS | Performed by: SURGERY

## 2018-06-19 PROCEDURE — 36556 INSERT NON-TUNNEL CV CATH: CPT

## 2018-06-19 PROCEDURE — 85025 COMPLETE CBC W/AUTO DIFF WBC: CPT

## 2018-06-19 PROCEDURE — 80100016 HC MAINTENANCE HEMODIALYSIS

## 2018-06-19 RX ORDER — MANNITOL 250 MG/ML
25 INJECTION, SOLUTION INTRAVENOUS ONCE
Status: COMPLETED | OUTPATIENT
Start: 2018-06-19 | End: 2018-06-19

## 2018-06-19 RX ORDER — MANNITOL 250 MG/ML
25 INJECTION, SOLUTION INTRAVENOUS ONCE
Status: COMPLETED | OUTPATIENT
Start: 2018-06-20 | End: 2018-06-20

## 2018-06-19 RX ORDER — HEPARIN SODIUM 1000 [USP'U]/ML
3000 INJECTION, SOLUTION INTRAVENOUS; SUBCUTANEOUS
Status: DISCONTINUED | OUTPATIENT
Start: 2018-06-19 | End: 2018-06-26

## 2018-06-19 RX ADMIN — METOPROLOL TARTRATE 25 MG: 25 TABLET, FILM COATED ORAL at 08:06

## 2018-06-19 RX ADMIN — AMIODARONE HYDROCHLORIDE 200 MG: 200 TABLET ORAL at 08:06

## 2018-06-19 RX ADMIN — CHLORHEXIDINE GLUCONATE 10 ML: 1.2 RINSE ORAL at 08:06

## 2018-06-19 RX ADMIN — MORPHINE SULFATE 4 MG: 4 INJECTION INTRAVENOUS at 02:06

## 2018-06-19 RX ADMIN — DEXTROSE 40 MG: 50 INJECTION, SOLUTION INTRAVENOUS at 08:06

## 2018-06-19 RX ADMIN — HEPARIN SODIUM 3000 UNITS: 1000 INJECTION INTRAVENOUS; SUBCUTANEOUS at 02:06

## 2018-06-19 RX ADMIN — MANNITOL 25 G: 12.5 INJECTION, SOLUTION INTRAVENOUS at 05:06

## 2018-06-19 RX ADMIN — LORAZEPAM 2 MG: 2 INJECTION INTRAMUSCULAR; INTRAVENOUS at 02:06

## 2018-06-19 RX ADMIN — SODIUM BICARBONATE 650 MG TABLET 650 MG: at 08:06

## 2018-06-19 RX ADMIN — METHYLPREDNISOLONE SODIUM SUCCINATE 40 MG: 40 INJECTION, POWDER, FOR SOLUTION INTRAMUSCULAR; INTRAVENOUS at 06:06

## 2018-06-19 RX ADMIN — MORPHINE SULFATE 4 MG: 4 INJECTION INTRAVENOUS at 08:06

## 2018-06-19 RX ADMIN — ASPIRIN 81 MG 81 MG: 81 TABLET ORAL at 08:06

## 2018-06-19 RX ADMIN — METHYLPREDNISOLONE SODIUM SUCCINATE 40 MG: 40 INJECTION, POWDER, FOR SOLUTION INTRAMUSCULAR; INTRAVENOUS at 01:06

## 2018-06-19 NOTE — PROGRESS NOTES
[facility] -   General Surgery  Progress Note    Subjective:     History of Present Illness:  No notes on file    Post-Op Info:  Procedure(s) (LRB):  CATHETERIZATION, HEART, LEFT (Left)   4 Days Post-Op     Interval History: awaiting decrease in airway swelling for extubation, dialysis catheter placed today    Medications:  Continuous Infusions:    Scheduled Meds:   amiodarone  200 mg Oral BID    aspirin  81 mg Oral Daily    chlorhexidine  10 mL Mouth/Throat BID    [START ON 6/20/2018] mannitol 25%  25 g Intravenous Once    metoprolol tartrate  25 mg Oral BID    pantoprazole 40 mg in dextrose 5 % 100 mL IVPB (over 15 minutes) (ready to mix system)  40 mg Intravenous Daily    sodium bicarbonate  650 mg Oral BID     PRN Meds:sodium chloride, acetaminophen, atropine, bisacodyl, cloNIDine, dextrose 50%, dextrose 50%, glucagon (human recombinant), glucose, glucose, heparin (porcine), hydrALAZINE, insulin aspart U-100, lactulose, lorazepam, morphine, nitroGLYCERIN, ondansetron, sodium bicarbonate, sodium chloride 0.9%     Review of patient's allergies indicates:   Allergen Reactions    Lipitor [atorvastatin] Swelling     Lips       Objective:     Vital Signs (Most Recent):  Temp: 97.3 °F (36.3 °C) (06/19/18 1700)  Pulse: 72 (06/19/18 1800)  Resp: (!) 5 (06/19/18 1800)  BP: (!) 81/55 (06/19/18 1800)  SpO2: 100 % (06/19/18 1800) Vital Signs (24h Range):  Temp:  [97.3 °F (36.3 °C)-98.9 °F (37.2 °C)] 97.3 °F (36.3 °C)  Pulse:  [67-97] 72  Resp:  [0-19] 5  SpO2:  [96 %-100 %] 100 %  BP: ()/(51-99) 81/55     Weight: 75.6 kg (166 lb 10.7 oz)  Body mass index is 30.48 kg/m².    Intake/Output - Last 3 Shifts       06/17 0700 - 06/18 0659 06/18 0700 - 06/19 0659 06/19 0700 - 06/20 0659    I.V. (mL/kg) 123.3 (1.6) 100 (1.3) 100 (1.3)    Blood  350     NG/ 1125 280    Total Intake(mL/kg) 898.3 (11.9) 1575 (20.8) 380 (5)    Urine (mL/kg/hr) 110 (0.1) 95 (0.1) 60 (0.1)    Other 10 (0) 0 (0)     Stool    550 (0.6)    Total Output 120 95 610    Net +778.3 +1480 -230                 Physical Exam   Constitutional: She is oriented to person, place, and time. No distress.   Neck: Trachea normal.   Neck is soft, dressing intact, MELISSA with minimal output   Pulmonary/Chest: Effort normal. No respiratory distress.   Musculoskeletal: She exhibits no edema.   Neurological: She is alert and oriented to person, place, and time.   Skin: No rash noted. No erythema. No pallor.       Significant Labs:  CBC:     Recent Labs  Lab 06/19/18  0507   WBC 15.10*   RBC 3.51*   HGB 9.7*   HCT 29.2*      MCV 83   MCH 27.6   MCHC 33.2       Significant Diagnostics:  I have reviewed all pertinent imaging results/findings within the past 24 hours.    Assessment/Plan:     CVA (cerebral vascular accident)    Prior CVA, patient with increased difficulty swelling medications at home prior to hospitalization, underwent evaluation with ENT prior to surgery  Speech therapy consult for swallowing        Hyperparathyroidism    Status post parathyroidectomy  Monitor calcium  S/p neck washout/drain placement  New fluid collections noted on ultrasound, neck remains off this morning, minimal drainage in MELISSA  Okay from surgical standpoint to attempt extubation is cuff leak noted            Coronary artery disease involving native coronary artery    Cardiology following  On anticoagulation        Acute renal failure superimposed on stage 5 chronic kidney disease, not on chronic dialysis    urine output creatinine worsening  Nephrology following patient will likely need dialysis        Hypertension associated with diabetes    Antihypertensive medications            Levy Samuel MD  General Surgery  [facility] -

## 2018-06-19 NOTE — ASSESSMENT & PLAN NOTE
6/16  Intubated for airway protection.  Pulmonary CC      6/18  Remains intubated  Weaning in progress  Pulmonary CC    6/19  Weaning in progress  Scope yday revealed persistent swelling  Pulmonary CC

## 2018-06-19 NOTE — ASSESSMENT & PLAN NOTE
-Creatinine 3.6>>4.1  -baseline 2.7-4.4  -will monitor   -sodium bicarb continued   -pt has been followed outpatient by Dr. Vazquez (Nephrology)  -Nephrology consulted - pt may benefit from repeat angio due to NSTEMI but at high risk of contrast induced nephropathy  - pt was candidate for renal transplant however work up discontinued due to progressive weakness  - [facility] recommended         6/15  Monitor worsening Renal Function  S/p Cardiac VFib Arrest  [facility] today  IVF's  Monitor      6/16  [facility] diffuse disease  Cardiology  Monitor    6/17  Worsening  No further Cardiac intervention  Nephrology on consult  Monitor  Cr 6.0 today vs 4.7  Low Urine O/P    6/18  COntinues to worsen. Cr 7.3 today  Nephrology guidance for HD vs Monitoring    6/19  Worsening  Cr 8.3 today - K is 4.6  Nephrology on board  No HD indicated at present  Monitor

## 2018-06-19 NOTE — SUBJECTIVE & OBJECTIVE
Interval History: Pt was seen and examined in ICU today. Met with pt;s  and niece. No new events last pm, remained stable, discussed with ICU team, chart was reviewed. Initiation of chronic HD recommended, risks and benefits and the rational explained, specially in terms of mechanical ventilation and fluid mgmt.  was agreeable. Vascular was consulted. A femoral line was placed (pt has recent parathyroidectomy and an IJ vas cath was going to be more difficult). Pt is currently receiving HD, mannitol was given, pt tolerating HD well, no new issues, pt received multiple visits and evaluations during HD in ICU by me.Discussed Hd treatment with HD nurse.    Review of patient's allergies indicates:   Allergen Reactions    Lipitor [atorvastatin] Swelling     Lips       Current Facility-Administered Medications   Medication Frequency    0.9%  NaCl infusion (for blood administration) Q24H PRN    acetaminophen tablet 650 mg Q4H PRN    amiodarone tablet 200 mg BID    aspirin chewable tablet 81 mg Daily    atropine injection 0.5 mg PRN    bisacodyl suppository 10 mg Daily PRN    chlorhexidine 0.12 % solution 10 mL BID    cloNIDine tablet 0.1 mg Q6H PRN    dextrose 50% injection 12.5 g PRN    dextrose 50% injection 25 g PRN    glucagon (human recombinant) injection 1 mg PRN    glucose chewable tablet 16 g PRN    glucose chewable tablet 24 g PRN    heparin (porcine) injection 3,000 Units PRN    hydrALAZINE injection 10 mg Q8H PRN    insulin aspart U-100 pen 0-5 Units QID (AC + HS) PRN    lactulose 20 gram/30 mL solution Soln 20 g Q6H PRN    lorazepam (ATIVAN) injection 2 mg Q4H PRN    mannitol 25% injection 25 g Once    [START ON 6/20/2018] mannitol 25% injection 25 g Once    metoprolol tartrate (LOPRESSOR) tablet 25 mg BID    morphine injection 4 mg Q4H PRN    nitroGLYCERIN SL tablet 0.4 mg Q5 Min PRN    ondansetron injection 4 mg Q8H PRN    pantoprazole 40 mg in dextrose 5 % 100 mL IVPB  (over 15 minutes) (ready to mix system) Daily    sodium bicarbonate 8.4 % (1 mEq/mL) injection PRN    sodium bicarbonate tablet 650 mg BID    sodium chloride 0.9% flush 3 mL PRN       Objective:     Vital Signs (Most Recent):  Temp: 98.8 °F (37.1 °C) (06/19/18 1503)  Pulse: 72 (06/19/18 1700)  Resp: 15 (06/19/18 1700)  BP: (!) 87/52 (06/19/18 1700)  SpO2: 99 % (06/19/18 1700)  O2 Device (Oxygen Therapy): ventilator (06/19/18 1105) Vital Signs (24h Range):  Temp:  [98.4 °F (36.9 °C)-98.9 °F (37.2 °C)] 98.8 °F (37.1 °C)  Pulse:  [72-97] 72  Resp:  [13-19] 15  SpO2:  [96 %-100 %] 99 %  BP: ()/(51-99) 87/52     Weight: 75.6 kg (166 lb 10.7 oz) (06/16/18 0545)  Body mass index is 30.48 kg/m².  Body surface area is 1.82 meters squared.    I/O last 3 completed shifts:  In: 1970 [I.V.:100; Blood:350; NG/GT:1520]  Out: 150 [Urine:145; Other:5]    Physical Exam   Constitutional: She is oriented to person, place, and time. She appears well-developed and well-nourished. No distress.   HENT:   Head: Normocephalic and atraumatic.   Neck: No JVD present.   Cardiovascular: Normal rate, regular rhythm and normal heart sounds.  Exam reveals no friction rub.    Pulmonary/Chest: Effort normal and breath sounds normal. She has no rales.   Abdominal: Soft. Bowel sounds are normal. She exhibits no distension.   Musculoskeletal: She exhibits no edema.   Neurological: She is alert and oriented to person, place, and time.   Skin: Skin is warm and dry. She is not diaphoretic.   Nursing note and vitals reviewed.      Significant Labs: reviewed  BMP  Lab Results   Component Value Date     (L) 06/19/2018    K 4.6 06/19/2018    CL 99 06/19/2018    CO2 16 (L) 06/19/2018     (H) 06/19/2018    CREATININE 8.3 (H) 06/19/2018    CALCIUM 8.2 (L) 06/19/2018    ANIONGAP 20 (H) 06/19/2018    ESTGFRAFRICA 5 (A) 06/19/2018    EGFRNONAA 4 (A) 06/19/2018     Lab Results   Component Value Date    WBC 15.10 (H) 06/19/2018    HGB 9.7 (L)  06/19/2018    HCT 29.2 (L) 06/19/2018    MCV 83 06/19/2018     06/19/2018       Significant Imaging: reviewed CXR, has pulm edema

## 2018-06-19 NOTE — ASSESSMENT & PLAN NOTE
- ASA, Statin, and Lopressor continued   -Imdur dose increased   -previous Cath showed severe CAD (proximal LCX 99%, mid 50%, RCA mid 90%, distal with subtotal lesion).  - Cardiology consulted with medical management continued   - LHC proposed pending Nephrology recommendations  -family refused LHC due to concern for worsening kidney function as pt had expressed that she did not want to be on dialysis     6/15  Family electing LHC and accepting risk of worsening renal function  Cardiology    6/16  S/p LHC  Cardiology  ASA  Statin allergy noted    6/16  No interventions  Cardiology on consult    6/18  Cardiology on Consult  Medical Management    6/19  ASA  BB

## 2018-06-19 NOTE — SUBJECTIVE & OBJECTIVE
Interval History: Patient remains intubated but arouseable. No events. Worsening renal function.    Review of Systems   Unable to perform ROS: Intubated     Objective:     Vital Signs (Most Recent):  Temp: 98.8 °F (37.1 °C) (06/19/18 0705)  Pulse: 82 (06/19/18 0745)  Resp: 15 (06/19/18 0745)  BP: 133/66 (06/19/18 0705)  SpO2: 98 % (06/19/18 0745) Vital Signs (24h Range):  Temp:  [98.3 °F (36.8 °C)-98.9 °F (37.2 °C)] 98.8 °F (37.1 °C)  Pulse:  [76-97] 82  Resp:  [13-22] 15  SpO2:  [96 %-100 %] 98 %  BP: ()/(42-91) 133/66     Weight: 75.6 kg (166 lb 10.7 oz)  Body mass index is 30.48 kg/m².    Intake/Output Summary (Last 24 hours) at 06/19/18 0825  Last data filed at 06/19/18 0705   Gross per 24 hour   Intake             1495 ml   Output              640 ml   Net              855 ml      Physical Exam   Constitutional: She appears well-developed and well-nourished. She is intubated.   ILL Appearing  Intubated   HENT:   Head: Normocephalic and atraumatic.   ET in place  + neck swelling   Eyes: EOM are normal. Pupils are equal, round, and reactive to light.   Neck: Normal range of motion. Neck supple. No JVD present.   Cardiovascular: Regular rhythm and intact distal pulses.  Tachycardia present.    Murmur heard.   Systolic murmur is present with a grade of 3/6   Pulmonary/Chest: Effort normal and breath sounds normal. She is intubated. No respiratory distress. She has no wheezes.   Abdominal: Soft. Bowel sounds are normal. She exhibits no distension.   Musculoskeletal: Normal range of motion. She exhibits edema. She exhibits no tenderness.   Neurological: She has normal reflexes. No cranial nerve deficit.   Skin: Skin is warm and dry. Capillary refill takes less than 2 seconds. No erythema.   Nursing note and vitals reviewed.      Significant Labs:   BMP:   Recent Labs  Lab 06/19/18  0507   *   *   K 4.6   CL 99   CO2 16*   *   CREATININE 8.3*   CALCIUM 8.2*   MG 2.6     CBC:   Recent  Labs  Lab 06/18/18  0428 06/19/18  0507   WBC 10.27 15.10*   HGB 7.7* 9.7*   HCT 23.9* 29.2*    227     CMP:   Recent Labs  Lab 06/18/18  0428 06/19/18  0507    135*   K 4.5 4.6    99   CO2 18* 16*   * 154*   * 120*   CREATININE 7.3* 8.3*   CALCIUM 8.2* 8.2*   PROT 5.6* 6.2   ALBUMIN 2.3* 2.6*   BILITOT 0.3 0.4   ALKPHOS 51* 58   AST 31 27   ALT <5* <5*   ANIONGAP 17* 20*   EGFRNONAA 5* 4*     All pertinent labs within the past 24 hours have been reviewed.    Significant Imaging: I have reviewed all pertinent imaging results/findings within the past 24 hours.

## 2018-06-19 NOTE — ASSESSMENT & PLAN NOTE
Per Cards.  Medical management  [facility] 6/15 done revealing diffuse CAD not amenable for PCI, no changes from 2016  Cont Plavix, ASA, Lopressor.    Allergy to statin noted

## 2018-06-19 NOTE — ASSESSMENT & PLAN NOTE
Neck still with swelling but improving  Leave intubated for airway protection given possible heart strain if RRT initiated  Min lead test of OET in AM and possible extubation  Cont low dose steroids

## 2018-06-19 NOTE — ASSESSMENT & PLAN NOTE
Cardiology  C 6/15  ASA  Statin Allergy  BB  No further interventions  Diffuse disease    6/18  ASA  BB  Diffuse disease No intervention    6/19  ASA  BB  No Statin due to allergy  Cardiology

## 2018-06-19 NOTE — ASSESSMENT & PLAN NOTE
S/P contrast with [facility] 6/15  Renal following.  Evaluating daily for possible RRT  Oliguria worsening as is creatine and met acidosis  BMP daily  Accurate I/Os

## 2018-06-19 NOTE — SUBJECTIVE & OBJECTIVE
Interval History: awaiting decrease in airway swelling for extubation, dialysis catheter placed today    Medications:  Continuous Infusions:    Scheduled Meds:   amiodarone  200 mg Oral BID    aspirin  81 mg Oral Daily    chlorhexidine  10 mL Mouth/Throat BID    [START ON 6/20/2018] mannitol 25%  25 g Intravenous Once    metoprolol tartrate  25 mg Oral BID    pantoprazole 40 mg in dextrose 5 % 100 mL IVPB (over 15 minutes) (ready to mix system)  40 mg Intravenous Daily    sodium bicarbonate  650 mg Oral BID     PRN Meds:sodium chloride, acetaminophen, atropine, bisacodyl, cloNIDine, dextrose 50%, dextrose 50%, glucagon (human recombinant), glucose, glucose, heparin (porcine), hydrALAZINE, insulin aspart U-100, lactulose, lorazepam, morphine, nitroGLYCERIN, ondansetron, sodium bicarbonate, sodium chloride 0.9%     Review of patient's allergies indicates:   Allergen Reactions    Lipitor [atorvastatin] Swelling     Lips       Objective:     Vital Signs (Most Recent):  Temp: 97.3 °F (36.3 °C) (06/19/18 1700)  Pulse: 72 (06/19/18 1800)  Resp: (!) 5 (06/19/18 1800)  BP: (!) 81/55 (06/19/18 1800)  SpO2: 100 % (06/19/18 1800) Vital Signs (24h Range):  Temp:  [97.3 °F (36.3 °C)-98.9 °F (37.2 °C)] 97.3 °F (36.3 °C)  Pulse:  [67-97] 72  Resp:  [0-19] 5  SpO2:  [96 %-100 %] 100 %  BP: ()/(51-99) 81/55     Weight: 75.6 kg (166 lb 10.7 oz)  Body mass index is 30.48 kg/m².    Intake/Output - Last 3 Shifts       06/17 0700 - 06/18 0659 06/18 0700 - 06/19 0659 06/19 0700 - 06/20 0659    I.V. (mL/kg) 123.3 (1.6) 100 (1.3) 100 (1.3)    Blood  350     NG/ 1125 280    Total Intake(mL/kg) 898.3 (11.9) 1575 (20.8) 380 (5)    Urine (mL/kg/hr) 110 (0.1) 95 (0.1) 60 (0.1)    Other 10 (0) 0 (0)     Stool   550 (0.6)    Total Output 120 95 610    Net +778.3 +1480 -230                 Physical Exam   Constitutional: She is oriented to person, place, and time. No distress.   Neck: Trachea normal.   Neck is soft, dressing  intact, MELISSA with minimal output   Pulmonary/Chest: Effort normal. No respiratory distress.   Musculoskeletal: She exhibits no edema.   Neurological: She is alert and oriented to person, place, and time.   Skin: No rash noted. No erythema. No pallor.       Significant Labs:  CBC:     Recent Labs  Lab 06/19/18  0507   WBC 15.10*   RBC 3.51*   HGB 9.7*   HCT 29.2*      MCV 83   MCH 27.6   MCHC 33.2       Significant Diagnostics:  I have reviewed all pertinent imaging results/findings within the past 24 hours.

## 2018-06-19 NOTE — ASSESSMENT & PLAN NOTE
- chest xray showed possible mild asymmetric CHF versus right upper lobe pneumonia.  -pt given IV Lasix in PACU  - IV antibiotics for suspected aspiration continued   - pt recently seen by RAMIRO Mcnally (Otolaryngology) for Dysphagia  -   - pt afebrile, WBC normal  - repeat xray results showed improved aeration RUL with no adverse interval changes in comparison to previous study  - SLP evaluation performed and thin, pureed recommended on 6/13/18 6/16  Pastient intubated  Pulmonary CC  ABX    6/17  ABX  Pulmonary CC  Monitor    6/18  Pulmonary CC  Intubated  Steroids    6/19  Pulmonary CC  Intubated  Steroids Continued

## 2018-06-19 NOTE — PLAN OF CARE
Problem: Patient Care Overview  Goal: Plan of Care Review  Outcome: Ongoing (interventions implemented as appropriate)  Remains on the vent, sedated with PRN Morphine and Ativan.  Decision made to dialyze patient today.  Dr. Lara placed left groin vas cath, sterile technique followed, no complications.  Plans to dialyze later today.  Tube feeds on hold shortly for elevated residuals.  Diarrhea appears to have lessened; flexi seal remains in place.  VSS.  Turn q2h, heels floated.  POC reviewed with patient and family at the bedside.  All questions answered.

## 2018-06-19 NOTE — ASSESSMENT & PLAN NOTE
-stable post procedure  -will monitor  -CBC in am   -pt followed outpatient by Heme/Onc    6/16  Stable  Monitor    6/17  Improving  Monitor    6/18  Hgb 7.7 today  Transfuse PRBC's per sx  Monitor    6/19  Hgb 9.7 today  Monitor

## 2018-06-19 NOTE — PROGRESS NOTES
[facility] -   Nephrology  Progress Note    Patient Name: Citlali Karimi  MRN: [id number]  Admission Date: 6/12/2018  Hospital Length of Stay: 6 days  Attending Provider: Levy Samuel MD   Primary Care Physician: Evelio Schuster MD  Principal Problem:Acute airway obstruction. ESRD    Subjective:     HPI:  Citlali Karimi Is a pleasant 68 -year-old  woman with history of chronic kidney disease stage 4, patient was admitted to the hospital for an elective  partial parathyroidectomy, following her surgery during observation.  She had some chest pain, workup revealed non ST elevated MI, patient was admitted to the hospital for further management.  We were consulted due to advanced renal insufficiency, baseline serum creatinine about 3.5-4,            Important Info :        She underwent a native kidney biopsy on 10/10/13. Final report of the kidney biopsy is negative for any specific etiology for acute renal failure. 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         Interval History: Pt was seen and examined in ICU today. Met with pt;s  and niece. No new events last pm, remained stable, discussed with ICU team, chart was reviewed. Initiation of chronic HD recommended, risks and benefits and the rational explained, specially in terms of mechanical ventilation and fluid mgmt.  was agreeable. Vascular was consulted. A femoral line was placed (pt has recent parathyroidectomy and an IJ vas cath was going to be more difficult). Pt is currently receiving HD, mannitol was given, pt tolerating HD well, no new issues, pt received multiple visits and evaluations during HD in ICU by me.Discussed Hd treatment with HD nurse.    Review of patient's allergies indicates:   Allergen Reactions    Lipitor [atorvastatin] Swelling     Lips       Current Facility-Administered Medications   Medication Frequency    0.9%  NaCl infusion (for blood  administration) Q24H PRN    acetaminophen tablet 650 mg Q4H PRN    amiodarone tablet 200 mg BID    aspirin chewable tablet 81 mg Daily    atropine injection 0.5 mg PRN    bisacodyl suppository 10 mg Daily PRN    chlorhexidine 0.12 % solution 10 mL BID    cloNIDine tablet 0.1 mg Q6H PRN    dextrose 50% injection 12.5 g PRN    dextrose 50% injection 25 g PRN    glucagon (human recombinant) injection 1 mg PRN    glucose chewable tablet 16 g PRN    glucose chewable tablet 24 g PRN    heparin (porcine) injection 3,000 Units PRN    hydrALAZINE injection 10 mg Q8H PRN    insulin aspart U-100 pen 0-5 Units QID (AC + HS) PRN    lactulose 20 gram/30 mL solution Soln 20 g Q6H PRN    lorazepam (ATIVAN) injection 2 mg Q4H PRN    mannitol 25% injection 25 g Once    [START ON 6/20/2018] mannitol 25% injection 25 g Once    metoprolol tartrate (LOPRESSOR) tablet 25 mg BID    morphine injection 4 mg Q4H PRN    nitroGLYCERIN SL tablet 0.4 mg Q5 Min PRN    ondansetron injection 4 mg Q8H PRN    pantoprazole 40 mg in dextrose 5 % 100 mL IVPB (over 15 minutes) (ready to mix system) Daily    sodium bicarbonate 8.4 % (1 mEq/mL) injection PRN    sodium bicarbonate tablet 650 mg BID    sodium chloride 0.9% flush 3 mL PRN       Objective:     Vital Signs (Most Recent):  Temp: 98.8 °F (37.1 °C) (06/19/18 1503)  Pulse: 72 (06/19/18 1700)  Resp: 15 (06/19/18 1700)  BP: (!) 87/52 (06/19/18 1700)  SpO2: 99 % (06/19/18 1700)  O2 Device (Oxygen Therapy): ventilator (06/19/18 1105) Vital Signs (24h Range):  Temp:  [98.4 °F (36.9 °C)-98.9 °F (37.2 °C)] 98.8 °F (37.1 °C)  Pulse:  [72-97] 72  Resp:  [13-19] 15  SpO2:  [96 %-100 %] 99 %  BP: ()/(51-99) 87/52     Weight: 75.6 kg (166 lb 10.7 oz) (06/16/18 0545)  Body mass index is 30.48 kg/m².  Body surface area is 1.82 meters squared.    I/O last 3 completed shifts:  In: 1970 [I.V.:100; Blood:350; NG/GT:1520]  Out: 150 [Urine:145; Other:5]    Physical Exam    Constitutional: She is oriented to person, place, and time. She appears well-developed and well-nourished. No distress.   HENT:   Head: Normocephalic and atraumatic.   Neck: No JVD present.   Cardiovascular: Normal rate, regular rhythm and normal heart sounds.  Exam reveals no friction rub.    Pulmonary/Chest: Effort normal and breath sounds normal. She has no rales.   Abdominal: Soft. Bowel sounds are normal. She exhibits no distension.   Musculoskeletal: She exhibits no edema.   Neurological: She is alert and oriented to person, place, and time.   Skin: Skin is warm and dry. She is not diaphoretic.   Nursing note and vitals reviewed.      Significant Labs: reviewed  BMP  Lab Results   Component Value Date     (L) 06/19/2018    K 4.6 06/19/2018    CL 99 06/19/2018    CO2 16 (L) 06/19/2018     (H) 06/19/2018    CREATININE 8.3 (H) 06/19/2018    CALCIUM 8.2 (L) 06/19/2018    ANIONGAP 20 (H) 06/19/2018    ESTGFRAFRICA 5 (A) 06/19/2018    EGFRNONAA 4 (A) 06/19/2018     Lab Results   Component Value Date    WBC 15.10 (H) 06/19/2018    HGB 9.7 (L) 06/19/2018    HCT 29.2 (L) 06/19/2018    MCV 83 06/19/2018     06/19/2018       Significant Imaging: reviewed CXR, has pulm edema    Assessment/Plan:   67 y/o female with advanced CKD stage 5 had further worsening in renal failure as result of NSTEMI and current acute illness and was initiated on chronic HD:      Chronic renal failure leading to ESRD     Renal: advanced CKD stage 5 leading to ESRD.  Pt started chronic HD today, HD#2 tomorrow  Tolerating Hd well, continue HD  Receiving mannitol nely lower risk of dialysis dysequilibrium syndrome    Even at baseline, pt was pre-HD, pre-ESRD  Likely cause of further renal failure was contrast nephropathy vs prerenal azotemia due to NSTEMI and hypotension.  HD will make ICU mgmt easier  Pulm edema on CXR     SHPT: s/p partial parathyroidectomy surgery ( 6/12/18 ) ,   Mild hypocalcemia as expected  Will monitor s  Ca and will repeat iPTH  Will provide Tums or Vit D as indicated     Anemia of chronic kidney disease -   s/p 2 units of pRBC today ,           * Acute airway obstruction     Intubated for neck swelling  Neck swelling and hematoma due to the neck surgery             Coronary artery disease involving native coronary artery     Had NSTEMI post op  Cardiac arrest with V Fib.  [facility] results reviewed, no change from prior cath in 2016  Circ 99%, RCA 90%, LAD 50%  Diffuse disease, not amenable for stent/CABG revascularization             Plans and recommendations:  As discussed above  HD#2 tomorrow  Mannitol with HD  Risks and benefits of hemodialysis initiation were explained to the pt. Benefits include correction of hyperkalemia and other electrolyte disorders, maintanance of fluid balance, and improvement in oxygenation. Risks include changes in fluid status, heart failure, and death.  Total critical care time spent 50 minutes   Pt received multiple visits and evaluations during HD in ICU.            Jordyn Lima MD  Nephrology  [facility]

## 2018-06-19 NOTE — ASSESSMENT & PLAN NOTE
Per surgery POD #7  Returned to surgery for post op hematoma evacuation 6/14  Follow up US neck 6/17 revealed soft tissue swelling but no fluid collection

## 2018-06-19 NOTE — SUBJECTIVE & OBJECTIVE
Review of Systems   Unable to perform ROS: Intubated       Objective:     Vital Signs (Most Recent):  Temp: 98.8 °F (37.1 °C) (06/19/18 0705)  Pulse: 82 (06/19/18 0745)  Resp: 15 (06/19/18 0745)  BP: 133/66 (06/19/18 0705)  SpO2: 98 % (06/19/18 0745) Vital Signs (24h Range):  Temp:  [98.3 °F (36.8 °C)-98.9 °F (37.2 °C)] 98.8 °F (37.1 °C)  Pulse:  [76-97] 82  Resp:  [13-22] 15  SpO2:  [96 %-100 %] 98 %  BP: ()/(42-91) 133/66     Weight: 75.6 kg (166 lb 10.7 oz)  Body mass index is 30.48 kg/m².      Intake/Output Summary (Last 24 hours) at 06/19/18 0759  Last data filed at 06/19/18 0705   Gross per 24 hour   Intake             1520 ml   Output              640 ml   Net              880 ml       Physical Exam   Constitutional: Vital signs are normal. She appears well-developed and well-nourished. She is sleeping. She is easily aroused.  Non-toxic appearance. She appears ill. No distress. She is sedated, intubated and restrained.   HENT:   Head: Normocephalic and atraumatic.   Mouth/Throat: Oropharynx is clear and moist and mucous membranes are normal.   Eyes: EOM and lids are normal. Pupils are equal, round, and reactive to light.   Neck: Neck supple. Carotid bruit is not present.       Cardiovascular: Normal rate and regular rhythm.    Murmur heard.   Systolic murmur is present   Pulses:       Radial pulses are 2+ on the right side, and 2+ on the left side.        Dorsalis pedis pulses are 1+ on the right side, and 1+ on the left side.   Pulmonary/Chest: Effort normal. No accessory muscle usage. No tachypnea. She is intubated. No respiratory distress. She has rales (mild).   Abdominal: Soft. Normal appearance. She exhibits no distension. Bowel sounds are increased. There is no tenderness.   Genitourinary:   Genitourinary Comments: Fair in place   Musculoskeletal: Normal range of motion.        Right foot: There is no deformity.        Left foot: There is no deformity.   Mild general edema more so in hands    Lymphadenopathy:     She has no cervical adenopathy.   Neurological: She is easily aroused.   On min sedation she is Fully awake and nodding head to questions   Skin: Skin is warm, dry and intact. Capillary refill takes less than 2 seconds. No rash noted. No cyanosis.            Vents:  Vent Mode: A/C (06/19/18 0745)  Ventilator Initiated: Yes (06/14/18 1812)  Set Rate: 14 bmp (06/19/18 0745)  Vt Set: 400 mL (06/19/18 0745)  Pressure Support: 0 cmH20 (06/19/18 0745)  PEEP/CPAP: 5 cmH20 (06/19/18 0745)  Oxygen Concentration (%): 30 (06/19/18 0745)  Peak Airway Pressure: 18 cmH2O (06/19/18 0745)  Plateau Pressure: 18 cmH20 (06/19/18 0745)  Total Ve: 6.74 mL (06/19/18 0745)  F/VT Ratio<105 (RSBI): 375 (06/19/18 0745)    Lines/Drains/Airways     Central Venous Catheter Line                 Percutaneous Central Line Insertion/Assessment - triple lumen  06/14/18 right femoral 5 days          Drain                 Drain/Device  06/14/18 1239 neck collapsible closed device 4 days         NG/OG Tube 06/15/18 0200 [place] sump 14 Fr. Center mouth 4 days         Urethral Catheter 06/14/18 1113 4 days         Rectal Tube 06/18/18 1350 rectal tube w/ balloon (indicate number of mLs) less than 1 day          Airway                 Airway - Non-Surgical 06/15/18 1025 Endotracheal Tube 3 days          Arterial Line                 Arterial Line 06/14/18 1427 Right Femoral 4 days          Peripheral Intravenous Line                 Peripheral IV - Single Lumen 06/12/18 0954 Left Forearm 6 days                Significant Labs:    CBC/Anemia Profile:    Recent Labs  Lab 06/18/18  0428 06/19/18  0507   WBC 10.27 15.10*   HGB 7.7* 9.7*   HCT 23.9* 29.2*    227   MCV 84 83   RDW 18.8* 17.9*        Chemistries:    Recent Labs  Lab 06/18/18  0428 06/19/18  0507    135*   K 4.5 4.6    99   CO2 18* 16*   * 120*   CREATININE 7.3* 8.3*   CALCIUM 8.2* 8.2*   ALBUMIN 2.3* 2.6*   PROT 5.6* 6.2   BILITOT 0.3 0.4   ALKPHOS  51* 58   ALT <5* <5*   AST 31 27   MG 2.3 2.6       ABGs:   Recent Labs  Lab 06/19/18  0358   PH 7.301*   PCO2 36.9   HCO3 18.2*   POCSATURATED 97   BE -8     All pertinent labs within the past 24 hours have been reviewed.    Significant Imaging:  CXR: I have reviewed all pertinent results/findings within the past 24 hours and my personal findings are:  no change

## 2018-06-19 NOTE — PROGRESS NOTES
[facility] -   Critical Care Medicine  Progress Note    Patient Name: Citlali Karimi  MRN: [id number]  Admission Date: 6/12/2018  Hospital Length of Stay: 6 days  Code Status: DNR  Attending Provider: Levy Samuel MD  Primary Care Provider: Evelio Schuster MD   Principal Problem: Acute airway obstruction    Subjective:     HPI:  Ms Karimi is a 69 yo BF with a PMH of HTN, Hyperparathyroidism, DM2, CKD5, CVA, CAD and HLD.  She was seen in clinic by surgery for hyperparathyroidism and hypercalcemia and was electively admitted 6/12 taken to OR undergoing parathyroidectomy finding 2 right sided enlarged parathyroid glands.  Intra and post op patient developed EKG changes and had elevated troponin.  Cards consulted.  Patient admitted due to swallowing issues she was not taking all her meds as prescribed and had BP as high as 225/103 during hospitalization.  Troponin increased to > 50.  Cards diagnosed with NSTEMI and she was Rx with ASA, Imdur, Lopressor, statin and Heparin infusion with plans for repeat LHC if cleared by Renal given CKD5.  Today, she had progressively worsening neck swelling and SOB with worsening dysphagia.  She was taken back to OR and had post op neck hematoma evacuation.  In PACU she had witnessed V-Fib cardiac arrest and CODE BLUE called.  After 3 rounds of CPR, Epi X 3 and Defib X 3 ROSC was attained.  She was still intubated and on vent post op.  CL and arterial line placed in right groin during code per myself.  Cards and Surgery at bedside.      Hospital/ICU Course:  [facility]  She was in A-Fib with ST changes.  Cards called and patient placed on Amiodarone and Heparin infusion.    6/15 - Weaned off Levophed yesterday afternoon.  Still on vent much improved oxygenation.  Airway leak of #6 OET.  Fully awake and following commands.  Still on Heparin infusion.  6/16 - Resting on vent with sedation tolerates SAT/SBT but still  neck swelling noted.  LHC yesterday revealed diffuse CAD not amenable for PCI, no changes from 2016.  VSS  6/17 - Still on mech vent for airway protection tolerating SAT/SBT.  [place] TF  6/18 - Resting on mech vent and sedation.  Neck swelling improving.  Worsening UOP and creatine.  Awake, alert and responsive on sedation.  Spouse at bedside.   6/19 - Still resting on vent with intermittent Morphine.  Neck swelling improving.  Oliguria and creatine worsening.  Now with diarrhea last 24 hours    Review of Systems   Unable to perform ROS: Intubated       Objective:     Vital Signs (Most Recent):  Temp: 98.8 °F (37.1 °C) (06/19/18 0705)  Pulse: 82 (06/19/18 0745)  Resp: 15 (06/19/18 0745)  BP: 133/66 (06/19/18 0705)  SpO2: 98 % (06/19/18 0745) Vital Signs (24h Range):  Temp:  [98.3 °F (36.8 °C)-98.9 °F (37.2 °C)] 98.8 °F (37.1 °C)  Pulse:  [76-97] 82  Resp:  [13-22] 15  SpO2:  [96 %-100 %] 98 %  BP: ()/(42-91) 133/66     Weight: 75.6 kg (166 lb 10.7 oz)  Body mass index is 30.48 kg/m².      Intake/Output Summary (Last 24 hours) at 06/19/18 0759  Last data filed at 06/19/18 0705   Gross per 24 hour   Intake             1520 ml   Output              640 ml   Net              880 ml       Physical Exam   Constitutional: Vital signs are normal. She appears well-developed and well-nourished. She is sleeping. She is easily aroused.  Non-toxic appearance. She appears ill. No distress. She is sedated, intubated and restrained.   HENT:   Head: Normocephalic and atraumatic.   Mouth/Throat: Oropharynx is clear and moist and mucous membranes are normal.   Eyes: EOM and lids are normal. Pupils are equal, round, and reactive to light.   Neck: Neck supple. Carotid bruit is not present.       Cardiovascular: Normal rate and regular rhythm.    Murmur heard.   Systolic murmur is present   Pulses:       Radial pulses are 2+ on the right side, and 2+ on the left side.        Dorsalis pedis pulses are 1+ on the right side, and 1+ on  the left side.   Pulmonary/Chest: Effort normal. No accessory muscle usage. No tachypnea. She is intubated. No respiratory distress. She has rales (mild).   Abdominal: Soft. Normal appearance. She exhibits no distension. Bowel sounds are increased. There is no tenderness.   Genitourinary:   Genitourinary Comments: Fair in place   Musculoskeletal: Normal range of motion.        Right foot: There is no deformity.        Left foot: There is no deformity.   Mild general edema more so in hands   Lymphadenopathy:     She has no cervical adenopathy.   Neurological: She is easily aroused.   On min sedation she is Fully awake and nodding head to questions   Skin: Skin is warm, dry and intact. Capillary refill takes less than 2 seconds. No rash noted. No cyanosis.            Vents:  Vent Mode: A/C (06/19/18 0745)  Ventilator Initiated: Yes (06/14/18 1812)  Set Rate: 14 bmp (06/19/18 0745)  Vt Set: 400 mL (06/19/18 0745)  Pressure Support: 0 cmH20 (06/19/18 0745)  PEEP/CPAP: 5 cmH20 (06/19/18 0745)  Oxygen Concentration (%): 30 (06/19/18 0745)  Peak Airway Pressure: 18 cmH2O (06/19/18 0745)  Plateau Pressure: 18 cmH20 (06/19/18 0745)  Total Ve: 6.74 mL (06/19/18 0745)  F/VT Ratio<105 (RSBI): 375 (06/19/18 0745)    Lines/Drains/Airways     Central Venous Catheter Line                 Percutaneous Central Line Insertion/Assessment - triple lumen  06/14/18 right femoral 5 days          Drain                 Drain/Device  06/14/18 1239 neck collapsible closed device 4 days         NG/OG Tube 06/15/18 0200 [place] sump 14 Fr. Center mouth 4 days         Urethral Catheter 06/14/18 1113 4 days         Rectal Tube 06/18/18 1350 rectal tube w/ balloon (indicate number of mLs) less than 1 day          Airway                 Airway - Non-Surgical 06/15/18 1025 Endotracheal Tube 3 days          Arterial Line                 Arterial Line 06/14/18 1427 Right Femoral 4 days          Peripheral Intravenous Line                 Peripheral IV -  Single Lumen 06/12/18 0954 Left Forearm 6 days                Significant Labs:    CBC/Anemia Profile:    Recent Labs  Lab 06/18/18  0428 06/19/18  0507   WBC 10.27 15.10*   HGB 7.7* 9.7*   HCT 23.9* 29.2*    227   MCV 84 83   RDW 18.8* 17.9*        Chemistries:    Recent Labs  Lab 06/18/18  0428 06/19/18  0507    135*   K 4.5 4.6    99   CO2 18* 16*   * 120*   CREATININE 7.3* 8.3*   CALCIUM 8.2* 8.2*   ALBUMIN 2.3* 2.6*   PROT 5.6* 6.2   BILITOT 0.3 0.4   ALKPHOS 51* 58   ALT <5* <5*   AST 31 27   MG 2.3 2.6       ABGs:   Recent Labs  Lab 06/19/18  0358   PH 7.301*   PCO2 36.9   HCO3 18.2*   POCSATURATED 97   BE -8     All pertinent labs within the past 24 hours have been reviewed.    Significant Imaging:  CXR: I have reviewed all pertinent results/findings within the past 24 hours and my personal findings are:  no change      Assessment/Plan:     Pulmonary   * Acute airway obstruction    Neck still with swelling but improving  Leave intubated for airway protection given possible heart strain if RRT initiated  Min lead test of OET in AM and possible extubation  Cont low dose steroids        On mechanically assisted ventilation    Vent settings reviewed and adjusted  Cont intubation for airway protection post op neck surgery with swelling  Tolerates SAT/SBT          Cardiac/Vascular   Cardiac arrest with ventricular fibrillation    Monitor and replace electrolytes if needed  ICU cardiac monitoring  Cont Amiodarone and Lopressor.  A-Fib resolved.  No neuro deficit noted above baseline from hx CVA        Coronary artery disease involving native coronary artery    Per Cards.  Medical management  [facility] 6/15 done revealing diffuse CAD not amenable for PCI, no changes from 2016  Cont ASA, Lopressor.    Allergy to statin noted        Renal/   Acute renal failure superimposed on stage 5 chronic kidney disease, not on chronic dialysis    S/P contrast with [facility] 6/15  Renal following.  Evaluating daily  for possible RRT  Oliguria worsening as is creatine and met acidosis  BMP daily  Accurate I/Os        Oncology   Acute blood loss anemia    S/P 2 units PRBCs 6/15 and 1 unit PRBC 6/18  Daily H/H  No active bleeding  Patient has been treated with Procrit 20,000 units weekly for maintenance therapy followed by Hematology        Endocrine   Diabetes mellitus, type 2 - only on oral medications    Cont SSI           Hyperparathyroidism    POD #7 S/P parathyroidectomy  Surgery following        S/P parathyroidectomy    Per surgery POD #7  Returned to surgery for post op hematoma evacuation 6/14  Follow up US neck 6/17 revealed soft tissue swelling but no fluid collection        Preventive Measures and Monitoring:   Stress Ulcer: PPI  Nutrition: TF  Glucose control: SSI  Bowel prophylaxis: Miralax stopped due to diarrhea and has PRN Dulcolax  DVT prophylaxis: SCDs  Hx CAD on B-Blocker: Lopressor  Head of Bed/Reposition: Elevate HOB and turn Q1-2 hours   Early Mobility: Bed rest  SAT/SBT: Passed but needs airway protection  Vent Day: #6  OG Day: #6  Central Line Right Fem Day: #6  Right Femoral Arterial Line Day: #6  [place]: #6  Code Status: Full  Pneumonia Vaccine: UTD     Counseling/Consultation:I have discussed the care of this patient in detail with the bedside nursing staff and Dr. Perez and Dr. Marinelli and Dr. Lima    Critical Care Time: 51 minutes  Critical secondary to Patient has a condition that poses threat to life and bodily function: Acute Renal Failure and On mech ventilation and intubated  Patient is currently receiving parenteral controlled substances: Morphine      Critical care was time spent personally by me on the following activities: development of treatment plan with patient or surrogate and bedside caregivers, discussions with consultants, evaluation of patient's response to treatment, examination of patient, ordering and performing treatments and interventions, ordering and review of  laboratory studies, ordering and review of radiographic studies, pulse oximetry, re-evaluation of patient's condition. This critical care time did not overlap with that of any other provider or involve time for any procedures.     Joseph Talavera NP  Critical Care Medicine  [facility]

## 2018-06-19 NOTE — ASSESSMENT & PLAN NOTE
S/P 2 units PRBCs 6/15 and 1 unit PRBC 6/18  Daily H/H  No active bleeding  Patient has been treated with Procrit 20,000 units weekly for maintenance therapy followed by Hematology

## 2018-06-19 NOTE — OP NOTE
[facility]  Vascular Surgery  Operative Note    SUMMARY     Date of Procedure: 6/15/2018     Procedure:left IJ bedside vas catheter placement with US guidance.    Surgeon(s) and Role:     * Demond Lara MD - Primary    Assisting Surgeon: None    Pre-Operative Diagnosis: * ESRD N18.6 *    Post-Operative Diagnosis: same    Anesthesia: local 1% lidocaine    Technical Procedures Used: after consent was obtained and risk and benefits explained.  Left groin prep sterile with chlorhexidine.  Local used and with US guidance successful cannulation of left CFV. dilators used and catheter placed successfully.  Each port flushed and aspirated with heparin saline and flushed with 2000 units heparin each port.  Sutured in with prolene.    Description of the Findings of the Procedure: no DVT on US with successful cannulation of left CFV    Significant Surgical Tasks Conducted by the Assistant(s), if Applicable: none    Complications: No    Estimated Blood Loss (EBL): * No values recorded between 6/15/2018 12:00 AM and 6/15/2018  4:53 PM *           Implants: * No implants in log *    Specimens:   Specimen (12h ago through future)    None                  Condition: Good    Disposition: PACU - hemodynamically stable.    Attestation: I performed the procedure.

## 2018-06-19 NOTE — PROGRESS NOTES
[facility] Hospital Medicine  Progress Note    Patient Name: Citlali Karimi  MRN: [id number]  Patient Class: IP- Inpatient   Admission Date: 6/12/2018  Length of Stay: 6 days  Attending Physician: Levy Samuel MD  Primary Care Provider: Evelio Schuster MD        Subjective:     Principal Problem:Acute airway obstruction    HPI:  Citlali Karimi is a 68 y.o female with PMHx of CKD (IV), HTN, DM, CVA (left sided weakness), and CAD who initially presented for hyperparathyroidism and hypercalcemia requiring surgical intervention.  Pt underwent parathyroidectomy today per Dr. Tracy (General Surgery). Pt admitted to Medical Surgical Unit post procedure and Hospital Medicine consulted for medical management.  Chest xray post procedure showed mild asymmetric CHF versus RUL pneumonia. Troponin 0.113 and BNP >270 noted.  Pt given IV Lasix in PACU prior to unit arrival.      Hospital Course:  Pt admitted to Outpatient Extended Recovery post procedure.  Elevated noted post procedure and results trended yielding significant positive response.  Cardiology consulted and Heparin infusion initiated.  Hx of CAD, multiple vessels noted with home medications continued and Imdur dose increased.  Nephrology consulted due to elevated creatinine and [facility] recommended.  Echo results pending.  Heart catheterization procedure considered with family refusing due to concern for worsening kidney function as patient does not want to be on dialysis.  Decreased oral intake and difficulty swallowing noted.  Speech therapist to bedside.  Pt made NPO and General Surgery notified.      6/15  Patient worsening status. ET changed per Pulmonary CC. Patient s/p Code Blue - VFib with recovery. Cardiology taking patient to CATH lab. Discussed with CM plan for post acute care in progress.    6/16  Patient improved o/n. Patient awake and able to follow simple command. Breathing trials in progress. Discussed with Pulmonary CC    6/17  Patient  remains intubated.  at bedside. Cardiology at bedside as well. Discussed with CC Team    6/18  Patient responds to command but remains intubated. Swelling to neck continues to be prominent. Discussed with CC Team. Worsening renal function. Possible HD need.    6/19  Patient remains intubated. Worsening renal function. No events Discussed with CC Team    Interval History: Patient remains intubated but arouseable. No events. Worsening renal function.    Review of Systems   Unable to perform ROS: Intubated     Objective:     Vital Signs (Most Recent):  Temp: 98.8 °F (37.1 °C) (06/19/18 0705)  Pulse: 82 (06/19/18 0745)  Resp: 15 (06/19/18 0745)  BP: 133/66 (06/19/18 0705)  SpO2: 98 % (06/19/18 0745) Vital Signs (24h Range):  Temp:  [98.3 °F (36.8 °C)-98.9 °F (37.2 °C)] 98.8 °F (37.1 °C)  Pulse:  [76-97] 82  Resp:  [13-22] 15  SpO2:  [96 %-100 %] 98 %  BP: ()/(42-91) 133/66     Weight: 75.6 kg (166 lb 10.7 oz)  Body mass index is 30.48 kg/m².    Intake/Output Summary (Last 24 hours) at 06/19/18 0825  Last data filed at 06/19/18 0705   Gross per 24 hour   Intake             1495 ml   Output              640 ml   Net              855 ml      Physical Exam   Constitutional: She appears well-developed and well-nourished. She is intubated.   ILL Appearing  Intubated   HENT:   Head: Normocephalic and atraumatic.   ET in place  + neck swelling   Eyes: EOM are normal. Pupils are equal, round, and reactive to light.   Neck: Normal range of motion. Neck supple. No JVD present.   Cardiovascular: Regular rhythm and intact distal pulses.  Tachycardia present.    Murmur heard.   Systolic murmur is present with a grade of 3/6   Pulmonary/Chest: Effort normal and breath sounds normal. She is intubated. No respiratory distress. She has no wheezes.   Abdominal: Soft. Bowel sounds are normal. She exhibits no distension.   Musculoskeletal: Normal range of motion. She exhibits edema. She exhibits no tenderness.   Neurological:  She has normal reflexes. No cranial nerve deficit.   Skin: Skin is warm and dry. Capillary refill takes less than 2 seconds. No erythema.   Nursing note and vitals reviewed.      Significant Labs:   BMP:   Recent Labs  Lab 06/19/18  0507   *   *   K 4.6   CL 99   CO2 16*   *   CREATININE 8.3*   CALCIUM 8.2*   MG 2.6     CBC:   Recent Labs  Lab 06/18/18  0428 06/19/18  0507   WBC 10.27 15.10*   HGB 7.7* 9.7*   HCT 23.9* 29.2*    227     CMP:   Recent Labs  Lab 06/18/18  0428 06/19/18  0507    135*   K 4.5 4.6    99   CO2 18* 16*   * 154*   * 120*   CREATININE 7.3* 8.3*   CALCIUM 8.2* 8.2*   PROT 5.6* 6.2   ALBUMIN 2.3* 2.6*   BILITOT 0.3 0.4   ALKPHOS 51* 58   AST 31 27   ALT <5* <5*   ANIONGAP 17* 20*   EGFRNONAA 5* 4*     All pertinent labs within the past 24 hours have been reviewed.    Significant Imaging: I have reviewed all pertinent imaging results/findings within the past 24 hours.    Assessment/Plan:      * Acute airway obstruction    6/16  Intubated for airway protection.  Pulmonary CC      6/18  Remains intubated  Weaning in progress  Pulmonary CC    6/19  Weaning in progress  Scope yday revealed persistent swelling  Pulmonary CC        NSTEMI (non-ST elevated myocardial infarction)    ASA  Cardiology  [facility] - Diffuse disease  No further intervention recommended  Medical Management  No statin due to allergy        S/P parathyroidectomy              On mechanically assisted ventilation    Wean as tolerated  Intubated  Pulmonary CC          Cardiac arrest with ventricular fibrillation    Cardiology  LH 6/15  ASA  Statin Allergy  BB  No further interventions  Diffuse disease    6/18  ASA  BB  Diffuse disease No intervention    6/19  ASA  BB  No Statin due to allergy  Cardiology        Abnormal chest x-ray    - chest xray showed possible mild asymmetric CHF versus right upper lobe pneumonia.  -pt given IV Lasix in PACU  - IV antibiotics for suspected  aspiration continued   - pt recently seen by RAMIRO Mcnally (Otolaryngology) for Dysphagia  -   - pt afebrile, WBC normal  - repeat xray results showed improved aeration RUL with no adverse interval changes in comparison to previous study  - SLP evaluation performed and thin, pureed recommended on 6/13/18 6/16  Pastient intubated  Pulmonary CC  ABX    6/17  ABX  Pulmonary CC  Monitor    6/18  Pulmonary CC  Intubated  Steroids    6/19  Pulmonary CC  Intubated  Steroids Continued            Elevated troponin    -initial 0.113>>34>>50>>41  -pt denies CP and SOB  - EKG results showed diffuse ST changes    -serial results revealed NSTEMI with Cardiology consulted     6/15  NSTEMI POA  Cardiology  C  ASA  Hold Statin due to Elevated LFT's    6/16  Cardiology  ASA  ICU  Statin allergy noted    6/17  S/p [facility] - Diffuse disease  ASA  Cards              CVA (cerebral vascular accident)    -hx of 1 yr ago per   -left sided weakness residual  -ASA   No Statin due to allergy        Hyperparathyroidism    -Pt admitted to Extended Recovery then transferred to Inpatient due to NSTEMI  -s/p Parathyroidectomy   -managed by General Surgery -primary team  - PTH- 32  - Ca 11.4>>10.5  - analgesia prn   - antiemetics prn  - specimens sent for analysis    6/16  Ca 8.7 today  Monitor    6/18  Ca 8.2 today  Monitor    6/19  Ca 8.2 stable        Coronary artery disease involving native coronary artery    - ASA, Statin, and Lopressor continued   -Imdur dose increased   -previous Cath showed severe CAD (proximal LCX 99%, mid 50%, RCA mid 90%, distal with subtotal lesion).  - Cardiology consulted with medical management continued   - [facility] proposed pending Nephrology recommendations  -family refused LHC due to concern for worsening kidney function as pt had expressed that she did not want to be on dialysis     6/15  Family electing LHC and accepting risk of worsening renal function  Cardiology    6/16  S/p  [facility]  Cardiology  ASA  Statin allergy noted    6/16  No interventions  Cardiology on consult    6/18  Cardiology on Consult  Medical Management    6/19  ASA  BB            Acute renal failure superimposed on stage 5 chronic kidney disease, not on chronic dialysis    -Creatinine 3.6>>4.1  -baseline 2.7-4.4  -will monitor   -sodium bicarb continued   -pt has been followed outpatient by Dr. Vazquez (Nephrology)  -Nephrology consulted - pt may benefit from repeat angio due to NSTEMI but at high risk of contrast induced nephropathy  - pt was candidate for renal transplant however work up discontinued due to progressive weakness  - [facility] recommended         6/15  Monitor worsening Renal Function  S/p Cardiac VFib Arrest  [facility] today  IVF's  Monitor      6/16  [facility] diffuse disease  Cardiology  Monitor    6/17  Worsening  No further Cardiac intervention  Nephrology on consult  Monitor  Cr 6.0 today vs 4.7  Low Urine O/P    6/18  COntinues to worsen. Cr 7.3 today  Nephrology guidance for HD vs Monitoring    6/19  Worsening  Cr 8.3 today - K is 4.6  Nephrology on board  No HD indicated at present  Monitor        Hypertension associated with diabetes    - home medications continued   -hx of noncompliance and inconsistent dosing at home per   - uncontrolled upon arrival- improved with Hydralazine in PACU   - Hydralazine prn          Diabetes mellitus, type 2 - only on oral medications    Controlled  NICC  Accuchecks          Acute blood loss anemia    -stable post procedure  -will monitor  -CBC in am   -pt followed outpatient by Heme/Onc    6/16  Stable  Monitor    6/17  Improving  Monitor    6/18  Hgb 7.7 today  Transfuse PRBC's per sx  Monitor    6/19  Hgb 9.7 today  Monitor            VTE Risk Mitigation         Ordered     Place sequential compression device  Until discontinued      06/12/18 3806          Critical care time spent on the evaluation and treatment of severe organ dysfunction, review of pertinent labs and  imaging studies, discussions with consulting providers and discussions with patient/family: 42 minutes.    Vinicio Marinelli MD  Department of Hospital Medicine   [facility]

## 2018-06-19 NOTE — PLAN OF CARE
Problem: Patient Care Overview  Goal: Plan of Care Review  Outcome: Ongoing (interventions implemented as appropriate)  Pt resting in bed on vent. Pt tolerating well. Pt calm most of shift. PRN medication given when needed, see MAR/flowsheet. Pt turned q2hr and pt afebrile throughout shift. Pt vitally stable, see flowsheet. No acute distress noted in pt at this time. Will continue to monitor pt closely and follow POC.

## 2018-06-19 NOTE — ASSESSMENT & PLAN NOTE
-Pt admitted to Extended Recovery then transferred to Inpatient due to NSTEMI  -s/p Parathyroidectomy   -managed by General Surgery -primary team  - PTH- 32  - Ca 11.4>>10.5  - analgesia prn   - antiemetics prn  - specimens sent for analysis    6/16  Ca 8.7 today  Monitor    6/18  Ca 8.2 today  Monitor    6/19  Ca 8.2 stable

## 2018-06-19 NOTE — EICU
eICU Note :    Called by the [facility]:    Problem: Restraint Renewal, pt pulling line and tubes     Pertinent History and labs reviewed :68-year-old -American female with history of CK D stage IV status  post partial parathyroidectomy on mechanical ventilator       Treatment /Intervention given: Renewal done         Gracy James M.D  eICU Physician

## 2018-06-19 NOTE — ASSESSMENT & PLAN NOTE
Acute renal failure superimposed on stage 5 chronic kidney disease, not on chronic dialysis     Renal: pt has stage 4-5 CKD even at baseline  Has further increase in s Cr MAYLIN on CKD stage 4-5  Likely cause could be contrast nephropathy vs prerenal azotemia due to NSTEMI and hypotension.  Even at baseline, pt was pre-HD, pre-ESRD  Therefore, the current worsening does not diminish from the extent and gravity of her background advanced CKD  No acute indication for renal replacement therapy,   K normal  Metabolic acidosis  Good O2 sat  Mild pulm edema on CXR  Pt will likely need chronic HD  This is despite the current MAYLIN  Pt is expected to tolerate HD from the cardiac point of view      SHPT: s/p partial parathyroidectomy surgery ( 6/12/18 ) ,   Mild hypocalcemia as expected  Will monitor s Ca and will repeat iPTH  Will provide Tums or Vit D as indicated     Anemia of chronic kidney disease -   s/p 2 units of pRBC today ,              * Acute airway obstruction     Intubated for neck swelling  Neck swelling and hematoma due to the neck surgery  The indication for this current intubation is irrespective to the for HD.             Coronary artery disease involving native coronary artery     Had NSTEMI post op  Cardiac arrest with V Fib.  C results reviewed, no change from prior cath in 2016  Circ 99%, RCA 90%, LAD 50%  Diffuse disease, not amenable for stent/CABG revascularization             Plans and recommendations:  As discussed above  Discussed extensively with , agreeable to HD when pt needs it chronically  No indication for acute HD at this point.  Total critical care time spent 45 minutes including time needed to review the records, the   patient evaluation, documentation, face-to-face discussion with pt's family   more than 50% of the time was spent on coordination of care and counseling.    Level V visit.

## 2018-06-20 PROBLEM — A04.72 C. DIFFICILE DIARRHEA: Status: ACTIVE | Noted: 2018-06-20

## 2018-06-20 LAB
ALBUMIN SERPL BCP-MCNC: 2.3 G/DL
ALLENS TEST: ABNORMAL
ALP SERPL-CCNC: 49 U/L
ALT SERPL W/O P-5'-P-CCNC: 6 U/L
ANION GAP SERPL CALC-SCNC: 15 MMOL/L
AST SERPL-CCNC: 27 U/L
BASOPHILS # BLD AUTO: 0.01 K/UL
BASOPHILS NFR BLD: 0.1 %
BILIRUB SERPL-MCNC: 0.4 MG/DL
BUN SERPL-MCNC: 93 MG/DL
C DIFF TOX GENS STL QL NAA+PROBE: POSITIVE
CALCIUM SERPL-MCNC: 8.3 MG/DL
CHLORIDE SERPL-SCNC: 102 MMOL/L
CO2 SERPL-SCNC: 19 MMOL/L
CREAT SERPL-MCNC: 6.2 MG/DL
DELSYS: ABNORMAL
DIFFERENTIAL METHOD: ABNORMAL
EOSINOPHIL # BLD AUTO: 0 K/UL
EOSINOPHIL NFR BLD: 0 %
ERYTHROCYTE [DISTWIDTH] IN BLOOD BY AUTOMATED COUNT: 17.8 %
ERYTHROCYTE [SEDIMENTATION RATE] IN BLOOD BY WESTERGREN METHOD: 14 MM/H
EST. GFR  (AFRICAN AMERICAN): 7 ML/MIN/1.73 M^2
EST. GFR  (NON AFRICAN AMERICAN): 6 ML/MIN/1.73 M^2
FERRITIN SERPL-MCNC: 1532 NG/ML
FIO2: 30
GLUCOSE SERPL-MCNC: 129 MG/DL
HCO3 UR-SCNC: 21.9 MMOL/L (ref 24–28)
HCT VFR BLD AUTO: 25.5 %
HGB BLD-MCNC: 8.6 G/DL
IRON SERPL-MCNC: 24 UG/DL
LYMPHOCYTES # BLD AUTO: 1.3 K/UL
LYMPHOCYTES NFR BLD: 10.5 %
MAGNESIUM SERPL-MCNC: 2.2 MG/DL
MCH RBC QN AUTO: 27.8 PG
MCHC RBC AUTO-ENTMCNC: 33.7 G/DL
MCV RBC AUTO: 83 FL
MODE: ABNORMAL
MONOCYTES # BLD AUTO: 1.1 K/UL
MONOCYTES NFR BLD: 9.4 %
NEUTROPHILS # BLD AUTO: 9.6 K/UL
NEUTROPHILS NFR BLD: 80 %
PCO2 BLDA: 36.2 MMHG (ref 35–45)
PEEP: 5
PH SMN: 7.39 [PH] (ref 7.35–7.45)
PLATELET # BLD AUTO: 193 K/UL
PMV BLD AUTO: 9.7 FL
PO2 BLDA: 105 MMHG (ref 80–100)
POC BE: -3 MMOL/L
POC SATURATED O2: 98 % (ref 95–100)
POCT GLUCOSE: 115 MG/DL (ref 70–110)
POCT GLUCOSE: 121 MG/DL (ref 70–110)
POCT GLUCOSE: 122 MG/DL (ref 70–110)
POCT GLUCOSE: 134 MG/DL (ref 70–110)
POCT GLUCOSE: 150 MG/DL (ref 70–110)
POTASSIUM SERPL-SCNC: 4.1 MMOL/L
PROT SERPL-MCNC: 5.3 G/DL
PTH-INTACT SERPL-MCNC: 134.5 PG/ML
RBC # BLD AUTO: 3.09 M/UL
SAMPLE: ABNORMAL
SATURATED IRON: 23 %
SITE: ABNORMAL
SODIUM SERPL-SCNC: 136 MMOL/L
TOTAL IRON BINDING CAPACITY: 104 UG/DL
TRANSFERRIN SERPL-MCNC: 70 MG/DL
VT: 400
WBC # BLD AUTO: 11.95 K/UL

## 2018-06-20 PROCEDURE — 83970 ASSAY OF PARATHORMONE: CPT

## 2018-06-20 PROCEDURE — 85025 COMPLETE CBC W/AUTO DIFF WBC: CPT

## 2018-06-20 PROCEDURE — 94003 VENT MGMT INPAT SUBQ DAY: CPT

## 2018-06-20 PROCEDURE — C9113 INJ PANTOPRAZOLE SODIUM, VIA: HCPCS | Performed by: SURGERY

## 2018-06-20 PROCEDURE — 99291 CRITICAL CARE FIRST HOUR: CPT | Mod: ,,, | Performed by: NURSE PRACTITIONER

## 2018-06-20 PROCEDURE — 99900026 HC AIRWAY MAINTENANCE (STAT)

## 2018-06-20 PROCEDURE — 82728 ASSAY OF FERRITIN: CPT

## 2018-06-20 PROCEDURE — 63600175 PHARM REV CODE 636 W HCPCS: Performed by: SURGERY

## 2018-06-20 PROCEDURE — 99900035 HC TECH TIME PER 15 MIN (STAT)

## 2018-06-20 PROCEDURE — 25000003 PHARM REV CODE 250: Performed by: SURGERY

## 2018-06-20 PROCEDURE — 25000003 PHARM REV CODE 250: Performed by: NURSE PRACTITIONER

## 2018-06-20 PROCEDURE — 20000000 HC ICU ROOM

## 2018-06-20 PROCEDURE — 37799 UNLISTED PX VASCULAR SURGERY: CPT

## 2018-06-20 PROCEDURE — 99291 CRITICAL CARE FIRST HOUR: CPT | Mod: 25,,, | Performed by: INTERNAL MEDICINE

## 2018-06-20 PROCEDURE — 83735 ASSAY OF MAGNESIUM: CPT

## 2018-06-20 PROCEDURE — 63600175 PHARM REV CODE 636 W HCPCS: Performed by: NURSE PRACTITIONER

## 2018-06-20 PROCEDURE — 83540 ASSAY OF IRON: CPT

## 2018-06-20 PROCEDURE — 90937 HEMODIALYSIS REPEATED EVAL: CPT | Mod: ,,, | Performed by: INTERNAL MEDICINE

## 2018-06-20 PROCEDURE — 86704 HEP B CORE ANTIBODY TOTAL: CPT

## 2018-06-20 PROCEDURE — 80053 COMPREHEN METABOLIC PANEL: CPT

## 2018-06-20 PROCEDURE — 80100016 HC MAINTENANCE HEMODIALYSIS

## 2018-06-20 PROCEDURE — 63600175 PHARM REV CODE 636 W HCPCS: Performed by: INTERNAL MEDICINE

## 2018-06-20 PROCEDURE — 82803 BLOOD GASES ANY COMBINATION: CPT

## 2018-06-20 PROCEDURE — 87340 HEPATITIS B SURFACE AG IA: CPT

## 2018-06-20 PROCEDURE — 86706 HEP B SURFACE ANTIBODY: CPT

## 2018-06-20 RX ORDER — FAMOTIDINE 20 MG/1
20 TABLET, FILM COATED ORAL DAILY
Status: DISCONTINUED | OUTPATIENT
Start: 2018-06-20 | End: 2018-06-30 | Stop reason: HOSPADM

## 2018-06-20 RX ORDER — MANNITOL 250 MG/ML
25 INJECTION, SOLUTION INTRAVENOUS ONCE
Status: COMPLETED | OUTPATIENT
Start: 2018-06-21 | End: 2018-06-21

## 2018-06-20 RX ORDER — METRONIDAZOLE 500 MG/100ML
500 INJECTION, SOLUTION INTRAVENOUS
Status: DISCONTINUED | OUTPATIENT
Start: 2018-06-20 | End: 2018-06-20

## 2018-06-20 RX ADMIN — HEPARIN SODIUM 3000 UNITS: 1000 INJECTION INTRAVENOUS; SUBCUTANEOUS at 06:06

## 2018-06-20 RX ADMIN — CHLORHEXIDINE GLUCONATE 10 ML: 1.2 RINSE ORAL at 08:06

## 2018-06-20 RX ADMIN — Medication 125 MG: at 11:06

## 2018-06-20 RX ADMIN — SODIUM BICARBONATE 650 MG TABLET 650 MG: at 08:06

## 2018-06-20 RX ADMIN — Medication 125 MG: at 06:06

## 2018-06-20 RX ADMIN — METOPROLOL TARTRATE 25 MG: 25 TABLET, FILM COATED ORAL at 08:06

## 2018-06-20 RX ADMIN — AMIODARONE HYDROCHLORIDE 200 MG: 200 TABLET ORAL at 08:06

## 2018-06-20 RX ADMIN — MANNITOL 25 G: 12.5 INJECTION, SOLUTION INTRAVENOUS at 01:06

## 2018-06-20 RX ADMIN — DEXTROSE 40 MG: 50 INJECTION, SOLUTION INTRAVENOUS at 08:06

## 2018-06-20 RX ADMIN — ERYTHROPOIETIN 4000 UNITS: 10000 INJECTION, SOLUTION INTRAVENOUS; SUBCUTANEOUS at 06:06

## 2018-06-20 RX ADMIN — MORPHINE SULFATE 4 MG: 4 INJECTION INTRAVENOUS at 08:06

## 2018-06-20 RX ADMIN — ASPIRIN 81 MG 81 MG: 81 TABLET ORAL at 08:06

## 2018-06-20 NOTE — PROGRESS NOTES
[facility]   Critical Care Medicine  Progress Note    Patient Name: Citlali Karimi  MRN: [id number]  Admission Date: 6/12/2018  Hospital Length of Stay: 7 days  Code Status: DNR  Attending Provider: Levy Samuel MD  Primary Care Provider: Evelio Schuster MD   Principal Problem: Acute airway obstruction    Subjective:     HPI:  Ms Karimi is a 69 yo BF with a PMH of HTN, Hyperparathyroidism, DM2, CKD5, CVA, CAD and HLD.  She was seen in clinic by surgery for hyperparathyroidism and hypercalcemia and was electively admitted 6/12 taken to OR undergoing parathyroidectomy finding 2 right sided enlarged parathyroid glands.  Intra and post op patient developed EKG changes and had elevated troponin.  Cards consulted.  Patient admitted due to swallowing issues she was not taking all her meds as prescribed and had BP as high as 225/103 during hospitalization.  Troponin increased to > 50.  Cards diagnosed with NSTEMI and she was Rx with ASA, Imdur, Lopressor, statin and Heparin infusion with plans for repeat LHC if cleared by Renal given CKD5.  Today, she had progressively worsening neck swelling and SOB with worsening dysphagia.  She was taken back to OR and had post op neck hematoma evacuation.  In PACU she had witnessed V-Fib cardiac arrest and CODE BLUE called.  After 3 rounds of CPR, Epi X 3 and Defib X 3 ROSC was attained.  She was still intubated and on vent post op.  CL and arterial line placed in right groin during code per myself.  Cards and Surgery at bedside.      Hospital/ICU Course:  6[facility]14 - [facility] Lev[facility]hed [facility].  She was in A-Fib with ST changes.  Cards called and patient placed on Amiodarone and Heparin infusion.    6/15 - Weaned off Levophed yesterday afternoon.  Still on vent much improved oxygenation.  Airway leak of #6 OET.  Fully awake and following commands.  Still on Heparin infusion.  6/16 - Resting on vent with sedation tolerates SAT/SBT but still  neck swelling noted.  LHC yesterday revealed diffuse CAD not amenable for PCI, no changes from 2016.  VSS  6/17 - Still on mech vent for airway protection tolerating SAT/SBT.  [place] TF  6/18 - Resting on mech vent and sedation.  Neck swelling improving.  Worsening UOP and creatine.  Awake, alert and responsive on sedation.  Spouse at bedside.   6/19 - Still resting on vent with intermittent Morphine.  Neck swelling improving.  Oliguria and creatine worsening.  Now with diarrhea last 24 hours  6/20 - VasCath placed yesterday and started on RRT.  Diarrhea + C-diff per PCR.  Bal[place]ero TF.  No air leak around OET with cuff deflated this AM    Review of Systems   Unable to perform ROS: Intubated       Objective:     Vital Signs (Most Recent):  Temp: 97.8 °F (36.6 °C) (06/20/18 0715)  Pulse: 88 (06/20/18 1000)  Resp: 13 (06/20/18 1000)  BP: (!) 100/51 (06/20/18 1000)  SpO2: 100 % (06/20/18 1000) Vital Signs (24h Range):  Temp:  [97.3 °F (36.3 °C)-98.8 °F (37.1 °C)] 97.8 °F (36.6 °C)  Pulse:  [67-98] 88  Resp:  [0-18] 13  SpO2:  [97 %-100 %] 100 %  BP: ()/(46-71) 100/51     Weight: 75.6 kg (166 lb 10.7 oz)  Body mass index is 30.48 kg/m².      Intake/Output Summary (Last 24 hours) at 06/20/18 1014  Last data filed at 06/20/18 1000   Gross per 24 hour   Intake              540 ml   Output              985 ml   Net             -445 ml       Physical Exam   Constitutional: Vital signs are normal. She appears well-developed and well-nourished. She is uncooperative. She is easily aroused.  Non-toxic appearance. She appears ill. No distress. She is intubated and restrained.   HENT:   Head: Normocephalic and atraumatic.   Mouth/Throat: Oropharynx is clear and moist and mucous membranes are normal.   Eyes: EOM and lids are normal. Pupils are equal, round, and reactive to light.   Neck: Neck supple. Carotid bruit is not present.       Cardiovascular: Normal rate and regular rhythm.    Murmur heard.   Systolic murmur is present    Pulses:       Radial pulses are 2+ on the right side, and 2+ on the left side.        Dorsalis pedis pulses are 1+ on the right side, and 1+ on the left side.   Pulmonary/Chest: Effort normal and breath sounds normal. No accessory muscle usage. No tachypnea. She is intubated. No respiratory distress. She has no rales.   Abdominal: Soft. Normal appearance and bowel sounds are normal. She exhibits no distension. There is no tenderness.   Genitourinary:   Genitourinary Comments: Fair in place   Musculoskeletal: Normal range of motion.        Right foot: There is no deformity.        Left foot: There is no deformity.   Trace edema   Lymphadenopathy:     She has no cervical adenopathy.   Neurological: She is easily aroused.   Arouses to stimuli and opens eyes but will not follow commands   Skin: Skin is warm, dry and intact. Capillary refill takes less than 2 seconds. No rash noted. No cyanosis.        Psychiatric: She exhibits a depressed mood.       Vents:  Vent Mode: Spont (06/20/18 0943)  Ventilator Initiated: Yes (06/14/18 1812)  Set Rate: 0 bmp (06/20/18 0943)  Vt Set: 400 mL (06/20/18 0943)  Pressure Support: 8 cmH20 (06/20/18 0943)  PEEP/CPAP: 5 cmH20 (06/20/18 0943)  Oxygen Concentration (%): 30 (06/20/18 1000)  Peak Airway Pressure: 13 cmH2O (06/20/18 0943)  Plateau Pressure: 18 cmH20 (06/20/18 0943)  Total Ve: 5.46 mL (06/20/18 0943)  F/VT Ratio<105 (RSBI): (!) 27.29 (06/20/18 0943)    Lines/Drains/Airways     Central Venous Catheter Line                 Percutaneous Central Line Insertion/Assessment - triple lumen  06/14/18 right femoral 6 days         Hemodialysis Catheter 06/19/18 1430 left femoral less than 1 day          Drain                 Drain/Device  06/14/18 1239 neck collapsible closed device 5 days         NG/OG Tube 06/15/18 0200 [place] sump 14 Fr. Center mouth 5 days         Urethral Catheter 06/14/18 1113 5 days         Rectal Tube 06/18/18 1350 rectal tube w/ balloon (indicate number of  mLs) 1 day          Airway                 Airway - Non-Surgical 06/15/18 1025 Endotracheal Tube 4 days          Arterial Line                 Arterial Line 06/14/18 1427 Right Femoral 5 days                Significant Labs:    CBC/Anemia Profile:    Recent Labs  Lab 06/19/18  0507 06/20/18  0354   WBC 15.10* 11.95   HGB 9.7* 8.6*   HCT 29.2* 25.5*    193   MCV 83 83   RDW 17.9* 17.8*        Chemistries:    Recent Labs  Lab 06/19/18  0507 06/20/18  0354   * 136   K 4.6 4.1   CL 99 102   CO2 16* 19*   * 93*   CREATININE 8.3* 6.2*   CALCIUM 8.2* 8.3*   ALBUMIN 2.6* 2.3*   PROT 6.2 5.3*   BILITOT 0.4 0.4   ALKPHOS 58 49*   ALT <5* 6*   AST 27 27   MG 2.6 2.2       ABGs:   Recent Labs  Lab 06/20/18  0455   PH 7.389   PCO2 36.2   HCO3 21.9*   POCSATURATED 98   BE -3     All pertinent labs within the past 24 hours have been reviewed.  Stool C-Diff PCR +    Significant Imaging:  CXR: I have reviewed all pertinent results/findings within the past 24 hours and my personal findings are:  no acute pulm process noted      Assessment/Plan:     Pulmonary   * Acute airway obstruction    Neck still with swelling but improving daily  Leave intubated for airway protection given failed min lead test of OET with cuff deflated this AM        On mechanically assisted ventilation    Vent settings reviewed and adjusted  Cont intubation for airway protection post op X 2 neck surgery with swelling  Tolerates SAT/SBT          Cardiac/Vascular   Cardiac arrest with ventricular fibrillation    Monitor and replace electrolytes if needed  ICU cardiac monitoring  Cont Amiodarone and Lopressor.  A-Fib resolved.  No neuro deficit noted above baseline from hx CVA        Coronary artery disease involving native coronary artery    Per Cards.  Medical management  C 6/15 done revealing diffuse CAD not amenable for PCI, no changes from 2016  Cont ASA, Lopressor  Plavix stopped by Cards 6/18.    Allergy to statin noted        Renal/    Acute renal failure superimposed on stage 5 chronic kidney disease, not on chronic dialysis    S/P contrast with LHC 6/15  Renal following.    RRT started 6/19 and repeating today  BMP daily  Accurate I/Os        ID   C. difficile diarrhea    Starting Vanc per OG        Oncology   Acute blood loss anemia    S/P 2 units PRBCs 6/15 and 1 unit PRBC 6/18  Daily H/H  No active bleeding  Patient has been treated with Procrit 20,000 units weekly for maintenance therapy followed by Hematology        Endocrine   Diabetes mellitus, type 2 - only on oral medications    Cont SSI           Hyperparathyroidism    POD #8 S/P parathyroidectomy  Surgery following        S/P parathyroidectomy    Per surgery still following  MELISSA drain removed today  POD #8  Returned to surgery for post op hematoma evacuation 6/14 POD #6  Follow up US neck 6/17 revealed soft tissue swelling but no fluid collection        Preventive Measures and Monitoring:   Stress Ulcer: Pepcid  Nutrition: TF  Glucose control: SSI  Bowel prophylaxis: Miralax stopped due to diarrhea and has PRN Dulcolax  DVT prophylaxis: SCDs  Hx CAD on B-Blocker: Lopressor  Head of Bed/Reposition: Elevate HOB and turn Q1-2 hours   Early Mobility: Bed rest  SAT/SBT: Passed SBT but needs airway protection  Vent Day: #7  OG Day: #7  Central Line Right Fem Day: #7  Right Femoral Arterial Line Day: #7  Left Femoral VasCath Day: #2  [place]: #7  Code Status: Full  Pneumonia Vaccine: UTD     Counseling/Consultation:I have discussed the care of this patient in detail with the bedside nursing staff and Dr. Perez and Dr. Marinelli and Dr. Lima    Critical Care Time: 48 minutes  Critical secondary to Patient has a condition that poses threat to life and bodily function: Acute Renal Failure and on mechanical ventilation and intubated.      Critical care was time spent personally by me on the following activities: development of treatment plan with patient or surrogate and bedside  caregivers, discussions with consultants, evaluation of patient's response to treatment, examination of patient, ordering and performing treatments and interventions, ordering and review of laboratory studies, ordering and review of radiographic studies, pulse oximetry, re-evaluation of patient's condition. This critical care time did not overlap with that of any other provider or involve time for any procedures.     Joseph Talavera NP  Critical Care Medicine  [facility]

## 2018-06-20 NOTE — ASSESSMENT & PLAN NOTE
Neck still with swelling but improving daily  Leave intubated for airway protection given failed min lead test of OET with cuff deflated this AM

## 2018-06-20 NOTE — PROGRESS NOTES
[facility] -   Nephrology  Progress Note    Patient Name: Citlali Karimi  MRN: [id number]  Admission Date: 6/12/2018  Hospital Length of Stay: 7 days  Attending Provider: Levy Samuel MD   Primary Care Physician: Evelio Schuster MD  Principal Problem:Acute airway obstruction. ESRD    Subjective:     HPI:  Citlali Karimi Is a pleasant 68 -year-old  woman with history of chronic kidney disease stage 4, patient was admitted to the hospital for an elective  partial parathyroidectomy, following her surgery during observation.  She had some chest pain, workup revealed non ST elevated MI, patient was admitted to the hospital for further management.  We were consulted due to advanced renal insufficiency, baseline serum creatinine about 3.5-4,            Important Info :        She underwent a native kidney biopsy on 10/10/13. Final report of the kidney biopsy is negative for any specific etiology for acute renal failure. 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         Interval History: Pt was seen and examined in ICU. Stable last pm, no new issues, discussed with ICU. Chart was reviewed. Pt started chronic HD yesterday, tolerated HD well. HD#2 today.    Review of patient's allergies indicates:   Allergen Reactions    Lipitor [atorvastatin] Swelling     Lips       Current Facility-Administered Medications   Medication Frequency    0.9%  NaCl infusion (for blood administration) Q24H PRN    acetaminophen tablet 650 mg Q4H PRN    amiodarone tablet 200 mg BID    aspirin chewable tablet 81 mg Daily    atropine injection 0.5 mg PRN    bisacodyl suppository 10 mg Daily PRN    chlorhexidine 0.12 % solution 10 mL BID    cloNIDine tablet 0.1 mg Q6H PRN    dextrose 50% injection 12.5 g PRN    dextrose 50% injection 25 g PRN    glucagon (human recombinant) injection 1 mg PRN    glucose chewable tablet 16 g PRN    glucose chewable tablet 24 g PRN     heparin (porcine) injection 3,000 Units PRN    hydrALAZINE injection 10 mg Q8H PRN    insulin aspart U-100 pen 0-5 Units QID (AC + HS) PRN    lactulose 20 gram/30 mL solution Soln 20 g Q6H PRN    lorazepam (ATIVAN) injection 2 mg Q4H PRN    mannitol 25% injection 25 g Once    metoprolol tartrate (LOPRESSOR) tablet 25 mg BID    morphine injection 4 mg Q4H PRN    nitroGLYCERIN SL tablet 0.4 mg Q5 Min PRN    ondansetron injection 4 mg Q8H PRN    pantoprazole 40 mg in dextrose 5 % 100 mL IVPB (over 15 minutes) (ready to mix system) Daily    sodium bicarbonate 8.4 % (1 mEq/mL) injection PRN    sodium bicarbonate tablet 650 mg BID    sodium chloride 0.9% flush 3 mL PRN       Objective:     Vital Signs (Most Recent):  Temp: 98.6 °F (37 °C) (06/20/18 0300)  Pulse: 94 (06/20/18 0734)  Resp: 14 (06/20/18 0733)  BP: (!) 114/55 (06/20/18 0600)  SpO2: 100 % (06/20/18 0733)  O2 Device (Oxygen Therapy): ventilator (06/20/18 0600) Vital Signs (24h Range):  Temp:  [97.3 °F (36.3 °C)-98.8 °F (37.1 °C)] 98.6 °F (37 °C)  Pulse:  [67-98] 94  Resp:  [0-19] 14  SpO2:  [97 %-100 %] 100 %  BP: ()/(46-99) 114/55     Weight: 75.6 kg (166 lb 10.7 oz) (06/16/18 0545)  Body mass index is 30.48 kg/m².  Body surface area is 1.82 meters squared.    I/O last 3 completed shifts:  In: 950 [I.V.:100; NG/GT:850]  Out: 1510 [Urine:140; Other:820; Stool:550]    Physical Exam   Constitutional: She is oriented to person, place, and time. She appears well-developed and well-nourished. No distress.   HENT:   Head: Normocephalic and atraumatic.   Neck: No JVD present.   Cardiovascular: Normal rate, regular rhythm and normal heart sounds.  Exam reveals no friction rub.    Pulmonary/Chest: Effort normal and breath sounds normal. She has no rales.   Abdominal: Soft. Bowel sounds are normal. She exhibits no distension.   Musculoskeletal: She exhibits no edema.   Neurological: She is alert and oriented to person, place, and time.   Skin: Skin is  warm and dry. She is not diaphoretic.   Nursing note and vitals reviewed.      Significant Labs: reviewed  BMP  Lab Results   Component Value Date     06/20/2018    K 4.1 06/20/2018     06/20/2018    CO2 19 (L) 06/20/2018    BUN 93 (H) 06/20/2018    CREATININE 6.2 (H) 06/20/2018    CALCIUM 8.3 (L) 06/20/2018    ANIONGAP 15 06/20/2018    ESTGFRAFRICA 7 (A) 06/20/2018    EGFRNONAA 6 (A) 06/20/2018     Lab Results   Component Value Date    WBC 11.95 06/20/2018    HGB 8.6 (L) 06/20/2018    HCT 25.5 (L) 06/20/2018    MCV 83 06/20/2018     06/20/2018         Significant Imaging: reviewed CXR    Assessment/Plan:         69 y/o female with advanced CKD stage 5 had further worsening in renal failure as result of NSTEMI and current acute illness and was initiated on chronic HD:                Chronic renal failure leading to ESRD     Renal: advanced CKD stage 5 leading to ESRD.  Pt started chronic HD yesterday, HD#2 today  Tolerating HD well, continue HD  Receiving mannitol to lower risk of dialysis dysequilibrium syndrome (DDS)  No sx's or signs of DDS     SHPT: s/p partial parathyroidectomy surgery ( 6/12/18 ) ,   Corrected Ca is normal  Will monitor s Ca and will repeat iPTH  Will provide Tums or Vit D as indicated     Anemia of chronic kidney disease -   s/p 2 units of PRBC today ,   Will start epogen with HD  Will check iron panel            * Acute airway obstruction     Intubated for neck swelling  Neck swelling and hematoma due to the neck surgery             Coronary artery disease involving native coronary artery     Had NSTEMI post op  Cardiac arrest with V Fib.  LHC results reviewed, no change from prior cath in 2016  Circ 99%, RCA 90%, LAD 50%  Diffuse disease, not amenable for stent/CABG revascularization             Plans and recommendations:  As discussed above  HD#2 today  Mannitol with HD  Will request SW assistance with eventual HD unit placement  Will get hep B panel for HD  placement  Total critical care time spent 40 minutes   Pt received multiple visits and evaluations during HD in ICU.                 Jordyn Lima MD  Nephrology  [facility]

## 2018-06-20 NOTE — PHYSICIAN QUERY
PT Name: Citlali Karimi  MR #: [id number]     Physician Query Form - Documentation Clarification      CDS/: Flor Peacock               Contact information:    This form is a permanent document in the medical record.     Query Date: June 20, 2018    By submitting this query, we are merely seeking further clarification of documentation. Please utilize your independent clinical judgment when addressing the question(s) below.    The Medical record reflects the following:    Supporting Clinical Findings Location in   Medical Record   NSTEMI  BP elevated yest, htn likely triggered clot rupture/MI  Known CAD w/prev LHC not amendable to pci    NSTEMI  Post-procedure, patient had ischemic changes on her EKG.   BP noted to be elevated yesterday 225/103  Diffuse ST depression noted on post-op EKG, has since resolved. Troponin 0.113>34>50.  Hosp Med PN 6/13            Cards Cn 6/13     STEMI (ST elevation myocardial infarction)  -Presents with diffuse ST depression on post-procedure EKG and elevated troponin > 50, consistent with NSTEMI  -BP elevated yesterday, HTN likely triggered clot rupture/MI  -Known CAD on previous cath not amenable to PCI at that time    STEMI (ST elevation myocardial infarction)   -Cardiology consulted   - EKG showed diffuse ST depression EKG and troponin increased from 0.113 >> 34>>50, consistent with NSTEMI   Cards PN 6/14              Hosp med Pn 6/14-6/16   previous Cath showed severe CAD (proximal LCX 99%, mid 50%, RCA mid 90%, distal with subtotal lesion).    6/16/18- C- diffuse CAD not amenable to PCI, no changes from 2016 cath, turned down for CABG, continue medical tx    Hosp Med PN 6/13      Cards Pn 6/18     6/15/18-Patient seen and examined today s/p post-procedural  Arrest/MI yesterday    NSTEMI (non-ST elevated myocardial infarction)     diffuse CAD not amenable to PCI, no changes from 2016 cath, turned down for CABG, continue medical tx     Cardiac arrest with ventricular  fibrillation     -s/p successful resuscitation  -Post-procedure EKG showed STEMI  -Underwent LHC on 6/15/18 that showed Three vessel coronary artery disease.    Diffuses CAD unchanged from 2016 not amenable for PCI.    stent graft noted in proximal aorta.   -Recommendations made for medical management of CAD   - Continue ASA, BB  -Stop Plavix per Dr. Louis to avoid further bleeding   -Statin stopped due to elevated liver enzymes  -Treat anemia     Cards PN 6/18                                         Parathyroidectomy  Wound Washout Op Note 6/12 949 pm  Op Note 6/14 559 pm                                                                         Doctor, Please specify diagnosis or diagnoses associated with above clinical findings.        Please clarify NSTEMI and STEMI.       Possible 2 part question.     Provider Use Only    (  ) NSTEMI post surgery 6/12 ruled in   and   STEMI post surgery  6/14 ruled in.                     Specify location/vessel of STEMI: ______________    ( X ) NSTEMI ruled in, No STEMI    (  ) STEMI ruled in, No NSTEMI - further specify location/vessel: ___________                       (  ) Other: _________________                                                                                                             [  ] Clinically undetermined

## 2018-06-20 NOTE — ASSESSMENT & PLAN NOTE
Status post parathyroidectomy  Monitor calcium  S/p neck washout/drain placement  MELISSA removed this AM  Okay from surgical standpoint to attempt extubation is cuff leak noted

## 2018-06-20 NOTE — PROGRESS NOTES
[facility]  General Surgery  Progress Note    Subjective:     History of Present Illness:  No notes on file    Post-Op Info:  Procedure(s) (LRB):  CATHETERIZATION, HEART, LEFT (Left)   5 Days Post-Op     Interval History: c.diff positive stool, mily removed.     Medications:  Continuous Infusions:  Scheduled Meds:   amiodarone  200 mg Oral BID    aspirin  81 mg Oral Daily    chlorhexidine  10 mL Mouth/Throat BID    epoetin carmen (PROCRIT) injection  4,000 Units Intravenous Once    famotidine  20 mg Oral Daily    mannitol 25%  25 g Intravenous Once    metoprolol tartrate  25 mg Oral BID    sodium bicarbonate  650 mg Oral BID    vancomycin  125 mg Oral Q6H     PRN Meds:sodium chloride, acetaminophen, atropine, bisacodyl, cloNIDine, dextrose 50%, dextrose 50%, glucagon (human recombinant), glucose, glucose, heparin (porcine), hydrALAZINE, insulin aspart U-100, lactulose, lorazepam, morphine, nitroGLYCERIN, ondansetron, sodium bicarbonate, sodium chloride 0.9%     Review of patient's allergies indicates:   Allergen Reactions    Lipitor [atorvastatin] Swelling     Lips       Objective:     Vital Signs (Most Recent):  Temp: 97.8 °F (36.6 °C) (06/20/18 0715)  Pulse: 88 (06/20/18 1000)  Resp: 13 (06/20/18 1000)  BP: (!) 100/51 (06/20/18 1000)  SpO2: 100 % (06/20/18 1000) Vital Signs (24h Range):  Temp:  [97.3 °F (36.3 °C)-98.8 °F (37.1 °C)] 97.8 °F (36.6 °C)  Pulse:  [67-98] 88  Resp:  [0-18] 13  SpO2:  [97 %-100 %] 100 %  BP: ()/(46-71) 100/51     Weight: 75.6 kg (166 lb 10.7 oz)  Body mass index is 30.48 kg/m².    Intake/Output - Last 3 Shifts       06/18 0700 - 06/19 0659 06/19 0700 - 06/20 0659 06/20 0700 - 06/21 0659    I.V. (mL/kg) 100 (1.3) 100 (1.3) 100 (1.3)    Blood 350      Other  0     NG/GT 1125 520 160    Total Intake(mL/kg) 1575 (20.8) 620 (8.2) 260 (3.4)    Urine (mL/kg/hr) 95 (0.1) 100 (0.1) 30 (0.1)    Other 0 (0) 820 (0.5)     Stool  550 (0.3) 50 (0.2)    Total Output 95 6910  80    Net +1480 -850 +180                 Physical Exam   Constitutional: She is oriented to person, place, and time. She appears well-developed and well-nourished.   HENT:   Head: Normocephalic and atraumatic.   Eyes: EOM are normal.   Neck:   Swelling is improved, mily removed   Cardiovascular: Normal rate and regular rhythm.    Pulmonary/Chest: No respiratory distress.   intubated   Abdominal: Soft.   Musculoskeletal: Normal range of motion.   Neurological: She is alert and oriented to person, place, and time.   Skin: Skin is warm and dry.   Psychiatric: She has a normal mood and affect. Thought content normal.   Vitals reviewed.      Significant Labs:  CBC:   Recent Labs  Lab 06/20/18  0354   WBC 11.95   RBC 3.09*   HGB 8.6*   HCT 25.5*      MCV 83   MCH 27.8   MCHC 33.7     CMP:   Recent Labs  Lab 06/20/18  0354   *   CALCIUM 8.3*   ALBUMIN 2.3*   PROT 5.3*      K 4.1   CO2 19*      BUN 93*   CREATININE 6.2*   ALKPHOS 49*   ALT 6*   AST 27   BILITOT 0.4       Significant Diagnostics:  I have reviewed all pertinent imaging results/findings within the past 24 hours.    Assessment/Plan:     CVA (cerebral vascular accident)    Prior CVA, patient with increased difficulty swelling medications at home prior to hospitalization, underwent evaluation with ENT prior to surgery  Speech therapy consult for swallowing        Hyperparathyroidism    Status post parathyroidectomy  Monitor calcium  S/p neck washout/drain placement  MILY removed this AM  Okay from surgical standpoint to attempt extubation is cuff leak noted            Coronary artery disease involving native coronary artery    Cardiology following  On anticoagulation        Acute renal failure superimposed on stage 5 chronic kidney disease, not on chronic dialysis    Pt tolerating hemo-dialysis well        Hypertension associated with diabetes    Antihypertensive medications            Becka Raygoza PA-C  General Surgery  [facility]

## 2018-06-20 NOTE — PHYSICIAN QUERY
"PT Name: Citlali Karimi  MR #: [id number]    Physician Query Form - Heart  Condition Clarification     Flor Peacock RN, CCDS  Contact Info: [phone] [email or web address]    This form is a permanent document in the medical record.     Query Date: June 20, 2018    By submitting this query, we are merely seeking further clarification of documentation. Please utilize your independent clinical judgment when addressing the question(s) below.    The medical record contains the following   Indicators     Supporting Clinical Findings Location in Medical Record   x BNP Troponin 0.113 and BNP >270 noted.   Hosp Med CN 6/12    x EF 60% Hosp Med Pn 6/15   x Radiology findings POD 0  Abnormal chest x-ray  - chest xray showed possible mild asymmetric CHF versus right upper lobe pneumonia.    Pulmonary Edema on CXR   Hosp Med CN 6/12           Nephro PN 6/19   x Echo Results - Echo in 04/2017 with EF 60%  - repeat Echo results showed EF 60% with diastolic dysfunction Hosp Med PN 6/15    "Ascites" documented      "SOB" or "MARIE" documented      "Hypoxia" documented     x Heart Failure documented Diastolic CHF Neph PN 6/18   x "Edema" documented Pt with crackles to lungs throughout  Rales  Pulmonary Edema on CXR RN PN 6/12  Nephro PN 6/14  Nephro PN 6/19     x Diuretics/Meds -pt given IV Lasix in PACU Hosp Med CN 6/12     Treatment:     x Other:  Prior CVA with left sided weakness.  Hyperparathyroidism  Hypercalcemia    HTN  CKD5  DMT2 nephropathy  Aockd5  Aortic valvar stenosis    PARATHYROIDECTOMY    advanced CKD stage 5 had further worsening in renal failure as result of NSTEMI and current acute illness and was initiated on chronic HD: H&P View Only            Hosp CN 6/12        Op note 6/12      Nephro PN 6/20     Provider, please specify diagnosis or diagnoses associated with above clinical findings.        Please specify acuity of  "Diastolic CHF".                                [  ] Acute Diastolic Heart Failure ( EF > " 40)*  [  ] Acute on Chronic Diastolic Heart Failure( EF > 40)*  [  X ] Chronic Diastolic Heart Failure (EF > 40)*  [  ] Other Type of Heart Failure (please specify type): _________________________  [  ] Other (please specify): ___________________________________  [  ] Clinically Undetermined            *[employer]                                                                                                          Please document in your progress notes daily for the duration of treatment until resolved and include in your discharge summary.

## 2018-06-20 NOTE — PHYSICIAN QUERY
PT Name: Citlali Karimi  MR #: [id number]     Physician Query Form - Documentation Clarification      Flor Peacock, RN, CCDS  Contact Info: [phone] [email or web address]      This form is a permanent document in the medical record.     Query Date: June 20, 2018    By submitting this query, we are merely seeking further clarification of documentation. Please utilize your independent clinical judgment when addressing the question(s) below.    The Medical record reflects the following:    Supporting Clinical Findings Location in Medical Record   K & Mg replaced    Electrolyte imbalance   Replace K+ and Mg+  Follow up labs RN Plan of Care 6/14      Pulm CN 6/14       K = 3.2 on 6/14  Mg = 1.3 on 6/14    KCL 40 meq IV 6/14  KCL  20 meq Oral 6/16    Magnesium 2 gm IVPB 6/14 Lab      MAR      MAR                                                              Doctor, Please specify diagnosis or diagnoses associated with above clinical findings.    Provider Use Only    Anuj all that apply.     ( x ) Hypokalemia    ( x ) Hypomagnesemia    (  ) Other - specify: ______________________                                                                                                                 [  ] Clinically undetermined

## 2018-06-20 NOTE — PROGRESS NOTES
Pt completed 1hr 50mins of HD removing 500ml. Pt clotted off unable to reinfuse blood. BP remained stable throughout tx (see flowsheet). Mannitol given at the start of tx and half way through. Pt tolerated HD well. No signs of distress noted. Vas cath de accessed per p&p  Hep locked 1.9ml x 2. Dr Lima made rounds during tx.

## 2018-06-20 NOTE — ASSESSMENT & PLAN NOTE
S/P contrast with C 6/15  Renal following.    RRT started 6/19 and repeating today  BMP daily  Accurate I/Os

## 2018-06-20 NOTE — PROGRESS NOTES
Pt completed 3 hours of HD tx with 1 L net UF.  Pt tolerated tx well. Lines reversed.  Pt received epo.  Dr. Lima rounded on pt. Post tx, blood rinsed back, CVC flushed with normal saline and locked with 1.9 ml of 1000u/ml of heparin, clamped, and sterile caps applied x2.

## 2018-06-20 NOTE — ASSESSMENT & PLAN NOTE
Per surgery still following  MELISSA drain removed today  POD #8  Returned to surgery for post op hematoma evacuation 6/14 POD #6  Follow up US neck 6/17 revealed soft tissue swelling but no fluid collection

## 2018-06-20 NOTE — SUBJECTIVE & OBJECTIVE
Interval History: Pt was seen and examined in ICU. Stable last pm, no new issues, discussed with ICU. Chart was reviewed. Pt started chronic HD yesterday, tolerated HD well. HD#2 today.    Review of patient's allergies indicates:   Allergen Reactions    Lipitor [atorvastatin] Swelling     Lips       Current Facility-Administered Medications   Medication Frequency    0.9%  NaCl infusion (for blood administration) Q24H PRN    acetaminophen tablet 650 mg Q4H PRN    amiodarone tablet 200 mg BID    aspirin chewable tablet 81 mg Daily    atropine injection 0.5 mg PRN    bisacodyl suppository 10 mg Daily PRN    chlorhexidine 0.12 % solution 10 mL BID    cloNIDine tablet 0.1 mg Q6H PRN    dextrose 50% injection 12.5 g PRN    dextrose 50% injection 25 g PRN    glucagon (human recombinant) injection 1 mg PRN    glucose chewable tablet 16 g PRN    glucose chewable tablet 24 g PRN    heparin (porcine) injection 3,000 Units PRN    hydrALAZINE injection 10 mg Q8H PRN    insulin aspart U-100 pen 0-5 Units QID (AC + HS) PRN    lactulose 20 gram/30 mL solution Soln 20 g Q6H PRN    lorazepam (ATIVAN) injection 2 mg Q4H PRN    mannitol 25% injection 25 g Once    metoprolol tartrate (LOPRESSOR) tablet 25 mg BID    morphine injection 4 mg Q4H PRN    nitroGLYCERIN SL tablet 0.4 mg Q5 Min PRN    ondansetron injection 4 mg Q8H PRN    pantoprazole 40 mg in dextrose 5 % 100 mL IVPB (over 15 minutes) (ready to mix system) Daily    sodium bicarbonate 8.4 % (1 mEq/mL) injection PRN    sodium bicarbonate tablet 650 mg BID    sodium chloride 0.9% flush 3 mL PRN       Objective:     Vital Signs (Most Recent):  Temp: 98.6 °F (37 °C) (06/20/18 0300)  Pulse: 94 (06/20/18 0734)  Resp: 14 (06/20/18 0733)  BP: (!) 114/55 (06/20/18 0600)  SpO2: 100 % (06/20/18 0733)  O2 Device (Oxygen Therapy): ventilator (06/20/18 0600) Vital Signs (24h Range):  Temp:  [97.3 °F (36.3 °C)-98.8 °F (37.1 °C)] 98.6 °F (37 °C)  Pulse:  [67-98]  94  Resp:  [0-19] 14  SpO2:  [97 %-100 %] 100 %  BP: ()/(46-99) 114/55     Weight: 75.6 kg (166 lb 10.7 oz) (06/16/18 0545)  Body mass index is 30.48 kg/m².  Body surface area is 1.82 meters squared.    I/O last 3 completed shifts:  In: 950 [I.V.:100; NG/GT:850]  Out: 1510 [Urine:140; Other:820; Stool:550]    Physical Exam   Constitutional: She is oriented to person, place, and time. She appears well-developed and well-nourished. No distress.   HENT:   Head: Normocephalic and atraumatic.   Neck: No JVD present.   Cardiovascular: Normal rate, regular rhythm and normal heart sounds.  Exam reveals no friction rub.    Pulmonary/Chest: Effort normal and breath sounds normal. She has no rales.   Abdominal: Soft. Bowel sounds are normal. She exhibits no distension.   Musculoskeletal: She exhibits no edema.   Neurological: She is alert and oriented to person, place, and time.   Skin: Skin is warm and dry. She is not diaphoretic.   Nursing note and vitals reviewed.      Significant Labs: reviewed  BMP  Lab Results   Component Value Date     06/20/2018    K 4.1 06/20/2018     06/20/2018    CO2 19 (L) 06/20/2018    BUN 93 (H) 06/20/2018    CREATININE 6.2 (H) 06/20/2018    CALCIUM 8.3 (L) 06/20/2018    ANIONGAP 15 06/20/2018    ESTGFRAFRICA 7 (A) 06/20/2018    EGFRNONAA 6 (A) 06/20/2018     Lab Results   Component Value Date    WBC 11.95 06/20/2018    HGB 8.6 (L) 06/20/2018    HCT 25.5 (L) 06/20/2018    MCV 83 06/20/2018     06/20/2018         Significant Imaging: reviewed CXR

## 2018-06-20 NOTE — PHYSICIAN QUERY
"PT Name: Citlali Karimi  MR #: [id number]    Physician Query Form - Relationship to Procedure Clarification     Flor Peacock RN, CCDS  Contact Info: [phone] [email or web address]      This form is a permanent document in the medical record.     Query Date: June 20, 2018    By submitting this query, we are merely seeking further clarification of documentation. Please utilize your independent clinical judgment when addressing the question(s) below.    The Medical record contains the following:  Supporting Clinical Findings Location in Medical Record   6/15/18-Patient seen and examined today s/p post-procedural  Arrest/MI yesterday.    NSTEMI  Cardiac arrest with ventricular fibrillation    -s/p successful resuscitation  -Post-procedure EKG showed STEMI  -Underwent LHC on 6/15/18 that showed Three vessel coronary artery disease. Cards Pn 6/15        Cards Pn 6/18   Parathyroidectomy  Wound Washout Op Note 6/12 949 pm  Op Note 6/14 559 pm       Please clarify if  "Post Procedural Arrest/MI" is    [  ] Inherent/Integral to procedure  [  ] Routine outcome  [  ] Incidental finding  [  ] Complication of procedure  [  ] Clinically insignificant  [X] Clinically undetermined      "

## 2018-06-20 NOTE — SUBJECTIVE & OBJECTIVE
Interval History: c.diff positive stool, mily removed.     Medications:  Continuous Infusions:  Scheduled Meds:   amiodarone  200 mg Oral BID    aspirin  81 mg Oral Daily    chlorhexidine  10 mL Mouth/Throat BID    epoetin carmen (PROCRIT) injection  4,000 Units Intravenous Once    famotidine  20 mg Oral Daily    mannitol 25%  25 g Intravenous Once    metoprolol tartrate  25 mg Oral BID    sodium bicarbonate  650 mg Oral BID    vancomycin  125 mg Oral Q6H     PRN Meds:sodium chloride, acetaminophen, atropine, bisacodyl, cloNIDine, dextrose 50%, dextrose 50%, glucagon (human recombinant), glucose, glucose, heparin (porcine), hydrALAZINE, insulin aspart U-100, lactulose, lorazepam, morphine, nitroGLYCERIN, ondansetron, sodium bicarbonate, sodium chloride 0.9%     Review of patient's allergies indicates:   Allergen Reactions    Lipitor [atorvastatin] Swelling     Lips       Objective:     Vital Signs (Most Recent):  Temp: 97.8 °F (36.6 °C) (06/20/18 0715)  Pulse: 88 (06/20/18 1000)  Resp: 13 (06/20/18 1000)  BP: (!) 100/51 (06/20/18 1000)  SpO2: 100 % (06/20/18 1000) Vital Signs (24h Range):  Temp:  [97.3 °F (36.3 °C)-98.8 °F (37.1 °C)] 97.8 °F (36.6 °C)  Pulse:  [67-98] 88  Resp:  [0-18] 13  SpO2:  [97 %-100 %] 100 %  BP: ()/(46-71) 100/51     Weight: 75.6 kg (166 lb 10.7 oz)  Body mass index is 30.48 kg/m².    Intake/Output - Last 3 Shifts       06/18 0700 - 06/19 0659 06/19 0700 - 06/20 0659 06/20 0700 - 06/21 0659    I.V. (mL/kg) 100 (1.3) 100 (1.3) 100 (1.3)    Blood 350      Other  0     NG/GT 1125 520 160    Total Intake(mL/kg) 1575 (20.8) 620 (8.2) 260 (3.4)    Urine (mL/kg/hr) 95 (0.1) 100 (0.1) 30 (0.1)    Other 0 (0) 820 (0.5)     Stool  550 (0.3) 50 (0.2)    Total Output 95 1470 80    Net +1480 -850 +180                 Physical Exam   Constitutional: She is oriented to person, place, and time. She appears well-developed and well-nourished.   HENT:   Head: Normocephalic and atraumatic.   Eyes:  EOM are normal.   Neck:   Swelling is improved, mily removed   Cardiovascular: Normal rate and regular rhythm.    Pulmonary/Chest: No respiratory distress.   intubated   Abdominal: Soft.   Musculoskeletal: Normal range of motion.   Neurological: She is alert and oriented to person, place, and time.   Skin: Skin is warm and dry.   Psychiatric: She has a normal mood and affect. Thought content normal.   Vitals reviewed.      Significant Labs:  CBC:   Recent Labs  Lab 06/20/18  0354   WBC 11.95   RBC 3.09*   HGB 8.6*   HCT 25.5*      MCV 83   MCH 27.8   MCHC 33.7     CMP:   Recent Labs  Lab 06/20/18  0354   *   CALCIUM 8.3*   ALBUMIN 2.3*   PROT 5.3*      K 4.1   CO2 19*      BUN 93*   CREATININE 6.2*   ALKPHOS 49*   ALT 6*   AST 27   BILITOT 0.4       Significant Diagnostics:  I have reviewed all pertinent imaging results/findings within the past 24 hours.

## 2018-06-20 NOTE — SUBJECTIVE & OBJECTIVE
Review of Systems   Unable to perform ROS: Intubated       Objective:     Vital Signs (Most Recent):  Temp: 97.8 °F (36.6 °C) (06/20/18 0715)  Pulse: 88 (06/20/18 1000)  Resp: 13 (06/20/18 1000)  BP: (!) 100/51 (06/20/18 1000)  SpO2: 100 % (06/20/18 1000) Vital Signs (24h Range):  Temp:  [97.3 °F (36.3 °C)-98.8 °F (37.1 °C)] 97.8 °F (36.6 °C)  Pulse:  [67-98] 88  Resp:  [0-18] 13  SpO2:  [97 %-100 %] 100 %  BP: ()/(46-71) 100/51     Weight: 75.6 kg (166 lb 10.7 oz)  Body mass index is 30.48 kg/m².      Intake/Output Summary (Last 24 hours) at 06/20/18 1014  Last data filed at 06/20/18 1000   Gross per 24 hour   Intake              540 ml   Output              985 ml   Net             -445 ml       Physical Exam   Constitutional: Vital signs are normal. She appears well-developed and well-nourished. She is uncooperative. She is easily aroused.  Non-toxic appearance. She appears ill. No distress. She is intubated and restrained.   HENT:   Head: Normocephalic and atraumatic.   Mouth/Throat: Oropharynx is clear and moist and mucous membranes are normal.   Eyes: EOM and lids are normal. Pupils are equal, round, and reactive to light.   Neck: Neck supple. Carotid bruit is not present.       Cardiovascular: Normal rate and regular rhythm.    Murmur heard.   Systolic murmur is present   Pulses:       Radial pulses are 2+ on the right side, and 2+ on the left side.        Dorsalis pedis pulses are 1+ on the right side, and 1+ on the left side.   Pulmonary/Chest: Effort normal and breath sounds normal. No accessory muscle usage. No tachypnea. She is intubated. No respiratory distress. She has no rales.   Abdominal: Soft. Normal appearance and bowel sounds are normal. She exhibits no distension. There is no tenderness.   Genitourinary:   Genitourinary Comments: Fair in place   Musculoskeletal: Normal range of motion.        Right foot: There is no deformity.        Left foot: There is no deformity.   Trace edema    Lymphadenopathy:     She has no cervical adenopathy.   Neurological: She is easily aroused.   Arouses to stimuli and opens eyes but will not follow commands   Skin: Skin is warm, dry and intact. Capillary refill takes less than 2 seconds. No rash noted. No cyanosis.        Psychiatric: She exhibits a depressed mood.       Vents:  Vent Mode: Spont (06/20/18 0943)  Ventilator Initiated: Yes (06/14/18 1812)  Set Rate: 0 bmp (06/20/18 0943)  Vt Set: 400 mL (06/20/18 0943)  Pressure Support: 8 cmH20 (06/20/18 0943)  PEEP/CPAP: 5 cmH20 (06/20/18 0943)  Oxygen Concentration (%): 30 (06/20/18 1000)  Peak Airway Pressure: 13 cmH2O (06/20/18 0943)  Plateau Pressure: 18 cmH20 (06/20/18 0943)  Total Ve: 5.46 mL (06/20/18 0943)  F/VT Ratio<105 (RSBI): (!) 27.29 (06/20/18 0943)    Lines/Drains/Airways     Central Venous Catheter Line                 Percutaneous Central Line Insertion/Assessment - triple lumen  06/14/18 right femoral 6 days         Hemodialysis Catheter 06/19/18 1430 left femoral less than 1 day          Drain                 Drain/Device  06/14/18 1239 neck collapsible closed device 5 days         NG/OG Tube 06/15/18 0200 Alger sump 14 Fr. Center mouth 5 days         Urethral Catheter 06/14/18 1113 5 days         Rectal Tube 06/18/18 1350 rectal tube w/ balloon (indicate number of mLs) 1 day          Airway                 Airway - Non-Surgical 06/15/18 1025 Endotracheal Tube 4 days          Arterial Line                 Arterial Line 06/14/18 1427 Right Femoral 5 days                Significant Labs:    CBC/Anemia Profile:    Recent Labs  Lab 06/19/18  0507 06/20/18  0354   WBC 15.10* 11.95   HGB 9.7* 8.6*   HCT 29.2* 25.5*    193   MCV 83 83   RDW 17.9* 17.8*        Chemistries:    Recent Labs  Lab 06/19/18  0507 06/20/18  0354   * 136   K 4.6 4.1   CL 99 102   CO2 16* 19*   * 93*   CREATININE 8.3* 6.2*   CALCIUM 8.2* 8.3*   ALBUMIN 2.6* 2.3*   PROT 6.2 5.3*   BILITOT 0.4 0.4   ALKPHOS 58  49*   ALT <5* 6*   AST 27 27   MG 2.6 2.2       ABGs:   Recent Labs  Lab 06/20/18  0455   PH 7.389   PCO2 36.2   HCO3 21.9*   POCSATURATED 98   BE -3     All pertinent labs within the past 24 hours have been reviewed.  Stool C-Diff PCR +    Significant Imaging:  CXR: I have reviewed all pertinent results/findings within the past 24 hours and my personal findings are:  no acute pulm process noted

## 2018-06-20 NOTE — ASSESSMENT & PLAN NOTE
Vent settings reviewed and adjusted  Cont intubation for airway protection post op X 2 neck surgery with swelling  Tolerates SAT/SBT

## 2018-06-20 NOTE — ASSESSMENT & PLAN NOTE
Per Cards.  Medical management  [facility] 6/15 done revealing diffuse CAD not amenable for PCI, no changes from 2016  Cont ASA, Lopressor  Plavix stopped by Cards 6/18.    Allergy to statin noted

## 2018-06-20 NOTE — PLAN OF CARE
Problem: Patient Care Overview  Goal: Plan of Care Review  Outcome: Ongoing (interventions implemented as appropriate)  Pt resting in bed on vent. Pt tolerating well. Pt underwent HD today and tolerated well. Pt turned q2hr. Stool output from flexiseal greatly decreased. Pt remained calm throughout night. Pt afebrile. Pt vitally stable at this time. No acute distress noted in pt at this time. Will continue to monitor pt closely.

## 2018-06-20 NOTE — PHYSICIAN QUERY
"PT Name: Citlali Karimi  MR #: [id number]    Physician Query Form - Relationship to Procedure Clarification     Flor Peacock RN, CCDS  Contact Info: [phone] [email or web address]      This form is a permanent document in the medical record.     Query Date: June 20, 2018    By submitting this query, we are merely seeking further clarification of documentation. Please utilize your independent clinical judgment when addressing the question(s) below.    The Medical record contains the following:  Supporting Clinical Findings Location in Medical Record   Today, she had progressively worsening neck swelling and SOB with worsening dysphagia.  She was taken back to OR and had post op neck hematoma evacuation    Acute blood loss anemia S/P 2 units PRBCs 6/15 and 1 unit PRBC 6/18   Daily H/H   No active bleeding   Patient has been treated with Procrit 20,000 units weekly for maintenance therapy followed by Hematology    Pulm Pn 6/19   Parathyroidectomy  Wound Washout    H&H:   On 6/12  =  12.6/40.6  On 6/15  =    7.9/23.7  On 6/18  =    7.7/23.9  On 6/20  =    8.6/25.5    PRBCs Transfused:   6/18  PRBC x1  6/19  PRBC x2 Op Note 6/12 949 pm  Op Note 6/14 559 pm        Lab          Blood Bank     Please clarify if  "Acute Blood Loss Anemia"  is    [  ] Inherent/Integral to procedure  [  ] Routine outcome  [  ] Incidental finding  [  ] Complication of procedure  [  ] Clinically insignificant  [  ] Clinically undetermined      "

## 2018-06-20 NOTE — ASSESSMENT & PLAN NOTE
67 y/o female with advanced CKD stage 5 had further worsening in renal failure as result of NSTEMI and current acute illness and was initiated on chronic HD:            Chronic renal failure leading to ESRD     Renal: advanced CKD stage 5 leading to ESRD.  Pt started chronic HD today, HD#2 tomorrow  Tolerating Hd well, continue HD  Receiving mannitol nely lower risk of dialysis dysequilibrium syndrome     Even at baseline, pt was pre-HD, pre-ESRD  Likely cause of further renal failure was contrast nephropathy vs prerenal azotemia due to NSTEMI and hypotension.  HD will make ICU mgmt easier  Pulm edema on CXR     SHPT: s/p partial parathyroidectomy surgery ( 6/12/18 ) ,   Mild hypocalcemia as expected  Will monitor s Ca and will repeat iPTH  Will provide Tums or Vit D as indicated     Anemia of chronic kidney disease -   s/p 2 units of pRBC today ,             * Acute airway obstruction     Intubated for neck swelling  Neck swelling and hematoma due to the neck surgery             Coronary artery disease involving native coronary artery     Had NSTEMI post op  Cardiac arrest with V Fib.  LHC results reviewed, no change from prior cath in 2016  Circ 99%, RCA 90%, LAD 50%  Diffuse disease, not amenable for stent/CABG revascularization             Plans and recommendations:  As discussed above  HD#2 tomorrow  Mannitol with HD  Risks and benefits of hemodialysis initiation were explained to the pt. Benefits include correction of hyperkalemia and other electrolyte disorders, maintanance of fluid balance, and improvement in oxygenation. Risks include changes in fluid status, heart failure, and death.  Total critical care time spent 50 minutes   Pt received multiple visits and evaluations during HD in ICU.

## 2018-06-21 LAB
ALBUMIN SERPL BCP-MCNC: 2.2 G/DL
ALLENS TEST: ABNORMAL
ALP SERPL-CCNC: 46 U/L
ALT SERPL W/O P-5'-P-CCNC: 10 U/L
ANION GAP SERPL CALC-SCNC: 12 MMOL/L
AST SERPL-CCNC: 29 U/L
BASOPHILS # BLD AUTO: 0 K/UL
BASOPHILS NFR BLD: 0 %
BILIRUB SERPL-MCNC: 0.4 MG/DL
BUN SERPL-MCNC: 77 MG/DL
CALCIUM SERPL-MCNC: 7.8 MG/DL
CHLORIDE SERPL-SCNC: 100 MMOL/L
CO2 SERPL-SCNC: 23 MMOL/L
CREAT SERPL-MCNC: 5.5 MG/DL
DELSYS: ABNORMAL
DIFFERENTIAL METHOD: ABNORMAL
EOSINOPHIL # BLD AUTO: 0.1 K/UL
EOSINOPHIL NFR BLD: 0.6 %
ERYTHROCYTE [DISTWIDTH] IN BLOOD BY AUTOMATED COUNT: 17.5 %
EST. GFR  (AFRICAN AMERICAN): 8 ML/MIN/1.73 M^2
EST. GFR  (NON AFRICAN AMERICAN): 7 ML/MIN/1.73 M^2
FIO2: 30
GLUCOSE SERPL-MCNC: 162 MG/DL
HBV CORE AB SERPL QL IA: NEGATIVE
HBV SURFACE AB SER-ACNC: NEGATIVE M[IU]/ML
HBV SURFACE AG SERPL QL IA: NEGATIVE
HCO3 UR-SCNC: 23.3 MMOL/L (ref 24–28)
HCT VFR BLD AUTO: 24.8 %
HGB BLD-MCNC: 8.3 G/DL
LYMPHOCYTES # BLD AUTO: 1.6 K/UL
LYMPHOCYTES NFR BLD: 13.3 %
MAGNESIUM SERPL-MCNC: 1.9 MG/DL
MCH RBC QN AUTO: 28.2 PG
MCHC RBC AUTO-ENTMCNC: 33.5 G/DL
MCV RBC AUTO: 84 FL
MODE: ABNORMAL
MONOCYTES # BLD AUTO: 0.9 K/UL
MONOCYTES NFR BLD: 6.9 %
NEUTROPHILS # BLD AUTO: 9.8 K/UL
NEUTROPHILS NFR BLD: 79.2 %
PCO2 BLDA: 33.1 MMHG (ref 35–45)
PEEP: 5
PH SMN: 7.46 [PH] (ref 7.35–7.45)
PHOSPHATE SERPL-MCNC: 3.2 MG/DL
PLATELET # BLD AUTO: 170 K/UL
PMV BLD AUTO: 9.4 FL
PO2 BLDA: 112 MMHG (ref 80–100)
POC BE: -1 MMOL/L
POC SATURATED O2: 99 % (ref 95–100)
POCT GLUCOSE: 123 MG/DL (ref 70–110)
POCT GLUCOSE: 158 MG/DL (ref 70–110)
POTASSIUM SERPL-SCNC: 3.9 MMOL/L
PROT SERPL-MCNC: 5 G/DL
PS: 8
RBC # BLD AUTO: 2.94 M/UL
SAMPLE: ABNORMAL
SITE: ABNORMAL
SODIUM SERPL-SCNC: 135 MMOL/L
SPONT RATE: 17
WBC # BLD AUTO: 12.33 K/UL

## 2018-06-21 PROCEDURE — 90937 HEMODIALYSIS REPEATED EVAL: CPT | Mod: ,,, | Performed by: INTERNAL MEDICINE

## 2018-06-21 PROCEDURE — 99291 CRITICAL CARE FIRST HOUR: CPT | Mod: 25,,, | Performed by: INTERNAL MEDICINE

## 2018-06-21 PROCEDURE — 94010 BREATHING CAPACITY TEST: CPT

## 2018-06-21 PROCEDURE — 97803 MED NUTRITION INDIV SUBSEQ: CPT

## 2018-06-21 PROCEDURE — 94003 VENT MGMT INPAT SUBQ DAY: CPT

## 2018-06-21 PROCEDURE — 25000003 PHARM REV CODE 250: Performed by: NURSE PRACTITIONER

## 2018-06-21 PROCEDURE — 36556 INSERT NON-TUNNEL CV CATH: CPT

## 2018-06-21 PROCEDURE — 84100 ASSAY OF PHOSPHORUS: CPT

## 2018-06-21 PROCEDURE — 83735 ASSAY OF MAGNESIUM: CPT

## 2018-06-21 PROCEDURE — 80100016 HC MAINTENANCE HEMODIALYSIS

## 2018-06-21 PROCEDURE — 36556 INSERT NON-TUNNEL CV CATH: CPT | Mod: ,,, | Performed by: INTERNAL MEDICINE

## 2018-06-21 PROCEDURE — 63600175 PHARM REV CODE 636 W HCPCS: Performed by: INTERNAL MEDICINE

## 2018-06-21 PROCEDURE — 20000000 HC ICU ROOM

## 2018-06-21 PROCEDURE — 25000003 PHARM REV CODE 250: Performed by: SURGERY

## 2018-06-21 PROCEDURE — 82803 BLOOD GASES ANY COMBINATION: CPT

## 2018-06-21 PROCEDURE — 80053 COMPREHEN METABOLIC PANEL: CPT

## 2018-06-21 PROCEDURE — 99900035 HC TECH TIME PER 15 MIN (STAT)

## 2018-06-21 PROCEDURE — 85025 COMPLETE CBC W/AUTO DIFF WBC: CPT

## 2018-06-21 PROCEDURE — 37799 UNLISTED PX VASCULAR SURGERY: CPT

## 2018-06-21 RX ORDER — HEPARIN SODIUM 5000 [USP'U]/ML
5000 INJECTION, SOLUTION INTRAVENOUS; SUBCUTANEOUS EVERY 8 HOURS
Status: DISCONTINUED | OUTPATIENT
Start: 2018-06-21 | End: 2018-06-26

## 2018-06-21 RX ADMIN — METOPROLOL TARTRATE 25 MG: 25 TABLET, FILM COATED ORAL at 09:06

## 2018-06-21 RX ADMIN — SODIUM BICARBONATE 650 MG TABLET 650 MG: at 09:06

## 2018-06-21 RX ADMIN — CHLORHEXIDINE GLUCONATE 10 ML: 1.2 RINSE ORAL at 09:06

## 2018-06-21 RX ADMIN — Medication 125 MG: at 05:06

## 2018-06-21 RX ADMIN — AMIODARONE HYDROCHLORIDE 200 MG: 200 TABLET ORAL at 09:06

## 2018-06-21 RX ADMIN — CHLORHEXIDINE GLUCONATE 10 ML: 1.2 RINSE ORAL at 08:06

## 2018-06-21 RX ADMIN — HEPARIN SODIUM 3000 UNITS: 1000 INJECTION INTRAVENOUS; SUBCUTANEOUS at 07:06

## 2018-06-21 RX ADMIN — HEPARIN SODIUM 5000 UNITS: 5000 INJECTION, SOLUTION INTRAVENOUS; SUBCUTANEOUS at 11:06

## 2018-06-21 RX ADMIN — FAMOTIDINE 20 MG: 20 TABLET ORAL at 09:06

## 2018-06-21 RX ADMIN — MANNITOL 25 G: 12.5 INJECTION, SOLUTION INTRAVENOUS at 07:06

## 2018-06-21 RX ADMIN — ASPIRIN 81 MG 81 MG: 81 TABLET ORAL at 09:06

## 2018-06-21 NOTE — PROGRESS NOTES
[facility] Hospital Medicine  Progress Note    Patient Name: Citlali Karimi  MRN: [id number]  Patient Class: IP- Inpatient   Admission Date: 6/12/2018  Length of Stay: 8 days  Attending Physician: Levy Samuel MD  Primary Care Provider: Evelio Schuster MD        Subjective:     Principal Problem:Acute airway obstruction    HPI:  Citlali Karimi is a 68 y.o female with PMHx of CKD (IV), HTN, DM, CVA (left sided weakness), and CAD who initially presented for hyperparathyroidism and hypercalcemia requiring surgical intervention.  Pt underwent parathyroidectomy today per Dr. Tracy (General Surgery). Pt admitted to Medical Surgical Unit post procedure and Hospital Medicine consulted for medical management.  Chest xray post procedure showed mild asymmetric CHF versus RUL pneumonia. Troponin 0.113 and BNP >270 noted.  Pt given IV Lasix in PACU prior to unit arrival.      Hospital Course:  Pt admitted to Outpatient Extended Recovery post procedure.  Elevated noted post procedure and results trended yielding significant positive response.  Cardiology consulted and Heparin infusion initiated.  Hx of CAD, multiple vessels noted with home medications continued and Imdur dose increased.  Nephrology consulted due to elevated creatinine and [facility] recommended.  Echo results pending.  Heart catheterization procedure considered with family refusing due to concern for worsening kidney function as patient does not want to be on dialysis.  Decreased oral intake and difficulty swallowing noted.  Speech therapist to bedside.  Pt made NPO and General Surgery notified.      6/15  Patient worsening status. ET changed per Pulmonary CC. Patient s/p Code Blue - VFib with recovery. Cardiology taking patient to CATH lab. Discussed with CM plan for post acute care in progress.    6/16  Patient improved o/n. Patient awake and able to follow simple command. Breathing trials in progress. Discussed with Pulmonary CC    6/17  Patient  remains intubated.  at bedside. Cardiology at bedside as well. Discussed with CC Team    6/18  Patient responds to command but remains intubated. Swelling to neck continues to be prominent. Discussed with CC Team. Worsening renal function. Possible HD need.    6/19  Patient remains intubated. Worsening renal function. No events Discussed with CC Team    6/21  Positive cough leak and plan for extubation today .         Review of Systems   Unable to perform ROS: Intubated     Objective:     Vital Signs (Most Recent):  Temp: 98 °F (36.7 °C) (06/21/18 1130)  Pulse: 90 (06/21/18 1301)  Resp: 14 (06/21/18 1301)  BP: (!) 132/55 (06/21/18 1135)  SpO2: 100 % (06/21/18 1301) Vital Signs (24h Range):  Temp:  [98 °F (36.7 °C)-99 °F (37.2 °C)] 98 °F (36.7 °C)  Pulse:  [] 90  Resp:  [5-25] 14  SpO2:  [99 %-100 %] 100 %  BP: ()/(35-80) 132/55     Weight: 77 kg (169 lb 12.1 oz)  Body mass index is 31.05 kg/m².    Intake/Output Summary (Last 24 hours) at 06/21/18 1713  Last data filed at 06/21/18 1130   Gross per 24 hour   Intake              445 ml   Output             4272 ml   Net            -3827 ml      Physical Exam   Constitutional: She appears well-developed and well-nourished.   HENT:   Head: Normocephalic and atraumatic.   Eyes: EOM are normal.   Neck: Neck supple.   Cardiovascular: Normal rate, regular rhythm and intact distal pulses.    Pulmonary/Chest: No respiratory distress.   ETT on vent   Abdominal: Soft. Distention: soft distention.   Neurological: She is alert.   Skin: Skin is warm and dry.   Nursing note and vitals reviewed.      Significant Labs: All pertinent labs within the past 24 hours have been reviewed.    Significant Imaging: I have reviewed all pertinent imaging results/findings within the past 24 hours.    Assessment/Plan:      * Acute airway obstruction    6/16  Intubated for airway protection.  Pulmonary CC      6/18  Remains intubated  Weaning in progress  Pulmonary  CC    6/19  Weaning in progress  Scope yday revealed persistent swelling  Pulmonary CC        NSTEMI (non-ST elevated myocardial infarction)    ASA  Cardiology  [facility] - Diffuse disease  No further intervention recommended  Medical Management  No statin due to allergy        S/P parathyroidectomy              On mechanically assisted ventilation    Wean as tolerated  Intubated  Pulmonary CC          Cardiac arrest with ventricular fibrillation    Cardiology  [facility] 6/15  ASA  Statin Allergy  BB  No further interventions  Diffuse disease    6/18  ASA  BB  Diffuse disease No intervention    6/19  ASA  BB  No Statin due to allergy  Cardiology        Abnormal chest x-ray    - chest xray showed possible mild asymmetric CHF versus right upper lobe pneumonia.  -pt given IV Lasix in PACU  - IV antibiotics for suspected aspiration continued   - pt recently seen by RAMIRO Mcnally (Otolaryngology) for Dysphagia  -   - pt afebrile, WBC normal  - repeat xray results showed improved aeration RUL with no adverse interval changes in comparison to previous study  - SLP evaluation performed and thin, pureed recommended on 6/13/18 6/16  Pastient intubated  Pulmonary CC  ABX    6/17  ABX  Pulmonary CC  Monitor    6/18  Pulmonary CC  Intubated  Steroids    6/19  Pulmonary CC  Intubated  Steroids Continued            Elevated troponin    -initial 0.113>>34>>50>>41  -pt denies CP and SOB  - EKG results showed diffuse ST changes    -serial results revealed NSTEMI with Cardiology consulted     6/15  NSTEMI POA  Cardiology  [facility]  ASA  Hold Statin due to Elevated LFT's    6/16  Cardiology  [facility]  ICU  Statin allergy noted    6/17  S/p [facility] - Diffuse disease  ASA  Cards              CVA (cerebral vascular accident)    -hx of 1 yr ago per   -left sided weakness residual  -ASA   No Statin due to allergy        Hyperparathyroidism    -Pt admitted to Extended Recovery then transferred to Inpatient due to NSTEMI  -s/p Parathyroidectomy    -managed by General Surgery -primary team  - PTH- 32  - Ca 11.4>>10.5  - analgesia prn   - antiemetics prn  - specimens sent for analysis    6/16  Ca 8.7 today  Monitor    6/18  Ca 8.2 today  Monitor    6/19  Ca 8.2 stable        Coronary artery disease involving native coronary artery    - ASA, Statin, and Lopressor continued   -Imdur dose increased   -previous Cath showed severe CAD (proximal LCX 99%, mid 50%, RCA mid 90%, distal with subtotal lesion).  - Cardiology consulted with medical management continued   - [facility] proposed pending Nephrology recommendations  -family refused LHC due to concern for worsening kidney function as pt had expressed that she did not want to be on dialysis     6/15  Family electing LHC and accepting risk of worsening renal function  Cardiology    6/16  S/p C  Cardiology  ASA  Statin allergy noted    6/16  No interventions  Cardiology on consult    6/18  Cardiology on Consult  Medical Management    6/19  ASA  BB            Acute renal failure superimposed on stage 5 chronic kidney disease, not on chronic dialysis    -Creatinine 3.6>>4.1  -baseline 2.7-4.4  -will monitor   -sodium bicarb continued   -pt has been followed outpatient by Dr. Vazquez (Nephrology)  -Nephrology consulted - pt may benefit from repeat angio due to NSTEMI but at high risk of contrast induced nephropathy  - pt was candidate for renal transplant however work up discontinued due to progressive weakness  - [facility] recommended         6/15  Monitor worsening Renal Function  S/p Cardiac VFib Arrest  [facility] today  IVF's  Monitor      6/16  [facility] diffuse disease  Cardiology  Monitor    6/17  Worsening  No further Cardiac intervention  Nephrology on consult  Monitor  Cr 6.0 today vs 4.7  Low Urine O/P    6/18  COntinues to worsen. Cr 7.3 today  Nephrology guidance for HD vs Monitoring    6/19  Worsening  Cr 8.3 today - K is 4.6  Nephrology on board  No HD indicated at present  Monitor        Hypertension associated with  diabetes    - home medications continued   -hx of noncompliance and inconsistent dosing at home per   - uncontrolled upon arrival- improved with Hydralazine in PACU   - Hydralazine prn          Diabetes mellitus, type 2 - only on oral medications    Controlled  NICC  Accuchecks          Acute blood loss anemia    -stable post procedure  -will monitor  -CBC in am   -pt followed outpatient by Heme/Onc    6/16  Stable  Monitor    6/17  Improving  Monitor    6/18  Hgb 7.7 today  Transfuse PRBC's per sx  Monitor    6/19  Hgb 9.7 today  Monitor            VTE Risk Mitigation         Ordered     heparin (porcine) injection 5,000 Units  Every 8 hours      06/21/18 0838     heparin (porcine) injection 3,000 Units  As needed (PRN)      06/19/18 1408     Place sequential compression device  Until discontinued      06/12/18 1851          Critical care time spent on the evaluation and treatment of severe organ dysfunction, review of pertinent labs and imaging studies, discussions with consulting providers and discussions with patient/family:35 minutes.    Dominguez Levy MD  Department of Hospital Medicine   [facility]

## 2018-06-21 NOTE — PLAN OF CARE
Problem: Patient Care Overview  Goal: Plan of Care Review  Outcome: Ongoing (interventions implemented as appropriate)  Pt stable. Pt is NSR on the heart monitor.  Pt remains intubated.  Flynn soft wrist restraints in place for pt safety with pulling at lines/tubes.  Pt awakens to stimuli and withdrawals to pain and stimuli, but does not follow commands.  Tube feeding continued at goal rate of 25ml/hr; pt tolerating with residual 0-10ml.  Pt received vanc via OG tube.  Fair intact draining yellow urine, UOP 0-10ml/hr.  Rectal tube in place; balloon deflated, readjusted and balloon reinflated, minimal output.  Blood sugar monitored.  Neck incision CDI.  Pt turned and repositioned with use of pillows and wedge, heels floating.  Left vas cath, right femoral PICC and right femoral art line intact with no redness, swelling or drainage.  Bed low, wheels locked, bed alarm on, call light in reach.  Plan of care reviewed.  Will continue to monitor.

## 2018-06-21 NOTE — SUBJECTIVE & OBJECTIVE
Interval History: no new overnight events. She remains intubated    Medications:  Continuous Infusions:  Scheduled Meds:   amiodarone  200 mg Oral BID    aspirin  81 mg Oral Daily    chlorhexidine  10 mL Mouth/Throat BID    epoetin carmen (PROCRIT) injection  4,000 Units Intravenous Every Tues, Thurs, Sat    famotidine  20 mg Oral Daily    heparin (porcine)  5,000 Units Subcutaneous Q8H    metoprolol tartrate  25 mg Oral BID    sodium bicarbonate  650 mg Oral BID    vancomycin  125 mg Oral Q6H     PRN Meds:acetaminophen, atropine, bisacodyl, cloNIDine, dextrose 50%, dextrose 50%, glucagon (human recombinant), glucose, glucose, heparin (porcine), hydrALAZINE, insulin aspart U-100, lactulose, lorazepam, morphine, nitroGLYCERIN, ondansetron, sodium bicarbonate, sodium chloride 0.9%     Review of patient's allergies indicates:   Allergen Reactions    Lipitor [atorvastatin] Swelling     Lips       Objective:     Vital Signs (Most Recent):  Temp: 99 °F (37.2 °C) (06/21/18 0305)  Pulse: 93 (06/21/18 0915)  Resp: 13 (06/21/18 0915)  BP: (!) 131/58 (06/21/18 0900)  SpO2: 100 % (06/21/18 0915) Vital Signs (24h Range):  Temp:  [97.5 °F (36.4 °C)-99 °F (37.2 °C)] 99 °F (37.2 °C)  Pulse:  [] 93  Resp:  [5-19] 13  SpO2:  [99 %-100 %] 100 %  BP: ()/(35-67) 131/58     Weight: 77 kg (169 lb 12.1 oz)  Body mass index is 31.05 kg/m².    Intake/Output - Last 3 Shifts       06/19 0700 - 06/20 0659 06/20 0700 - 06/21 0659 06/21 0700 - 06/22 0659    I.V. (mL/kg) 100 (1.3) 100 (1.3)     Other 0      NG/ 845     Total Intake(mL/kg) 620 (8.2) 945 (12.3)     Urine (mL/kg/hr) 100 (0.1) 102 (0.1)     Other 820 (0.5) 1500 (0.8)     Stool 550 (0.3) 90 (0)     Total Output 1470 1692      Net -850 -097                   Physical Exam   Constitutional: She appears well-developed and well-nourished.   HENT:   Head: Normocephalic and atraumatic.   Eyes: EOM are normal.   Neck: Neck supple.   Cardiovascular: Normal rate,  regular rhythm and intact distal pulses.    Pulmonary/Chest: No respiratory distress.   ETT on vent   Abdominal: Soft. Distention: soft distention.   Neurological: She is alert.   Skin: Skin is warm and dry.   Vitals reviewed.      Significant Labs:  CBC:   Recent Labs  Lab 06/21/18  0350   WBC 12.33   RBC 2.94*   HGB 8.3*   HCT 24.8*      MCV 84   MCH 28.2   MCHC 33.5     CMP:   Recent Labs  Lab 06/21/18  0350   *   CALCIUM 7.8*   ALBUMIN 2.2*   PROT 5.0*   *   K 3.9   CO2 23      BUN 77*   CREATININE 5.5*   ALKPHOS 46*   ALT 10   AST 29   BILITOT 0.4       Significant Diagnostics:  I have reviewed all pertinent imaging results/findings within the past 24 hours.

## 2018-06-21 NOTE — PROGRESS NOTES
[facility] -   Critical Care Medicine  Progress Note    Patient Name: Citlali Karimi  MRN: [id number]  Admission Date: 6/12/2018  Hospital Length of Stay: 8 days  Code Status: DNR  Attending Provider: Levy Samuel MD  Primary Care Provider: Evelio Schuster MD   Principal Problem: Acute airway obstruction    Subjective:     HPI:  Ms Karimi is a 67 yo BF with a PMH of HTN, Hyperparathyroidism, DM2, CKD5, CVA, CAD and HLD.  She was seen in clinic by surgery for hyperparathyroidism and hypercalcemia and was electively admitted 6/12 taken to OR undergoing parathyroidectomy finding 2 right sided enlarged parathyroid glands.  Intra and post op patient developed EKG changes and had elevated troponin.  Cards consulted.  Patient admitted due to swallowing issues she was not taking all her meds as prescribed and had BP as high as 225/103 during hospitalization.  Troponin increased to > 50.  Cards diagnosed with NSTEMI and she was Rx with ASA, Imdur, Lopressor, statin and Heparin infusion with plans for repeat LHC if cleared by Renal given CKD5.  Today, she had progressively worsening neck swelling and SOB with worsening dysphagia.  She was taken back to OR and had post op neck hematoma evacuation.  In PACU she had witnessed V-Fib cardiac arrest and CODE BLUE called.  After 3 rounds of CPR, Epi X 3 and Defib X 3 ROSC was attained.  She was still intubated and on vent post op.  CL and arterial line placed in right groin during code per myself.  Cards and Surgery at bedside.      Hospital/ICU Course:  6[facility]14 [facility]  She was in A-Fib with ST changes.  Cards called and patient placed on Amiodarone and Heparin infusion.    6/15 - Weaned off Levophed yesterday afternoon.  Still on vent much improved oxygenation.  Airway leak of #6 OET.  Fully awake and following commands.  Still on Heparin infusion.  6/16 - Resting on vent with sedation tolerates SAT/SBT but still  Outpatient Behavioral Health Progress Note    Date: 3/26/2018     Session Type: Family Therapy. Session 4, 50 minutes        REFERRAL: Quincy is a 11 year old male who was referred to the [facility] by his psychiatrist (Dr. Francis), to address concerns related to anxiety.      Treatment Goals:  - Learn and implement 2 healthy coping skills  - Improve ability to go in public (restaurants, football game,s etc) once a week  - Decrease events of rumination from once a week to once every other week  Treatment Plan: Reviewed-unchanged    Session  - Quincy and his father were engaged throughout the session.   Patient Level of Functioning: Minimal Improvements    - Interpersonal strategies were utilized to reflect on functioning since the previous session. Quincy and his father verbalized increased implementation for exposure exercises. Quincy reflected on outcomes. He verbalized feeling proud for his accomplishments. Mr. Mccarthy acknowledged inconsistent use of modeling on a daily basis. He problem solved ways to increase modeling to assist Quincy with understanding, practice, and repetition. Mr. Mccarthy verbalized potential concerns for insurance and today would be our last appointment unless something can get figured out. Cognitive behavioral strategies were utilized to review skills discussed thus far and ways in which Quincy can continue to practice in the home, school, and sport settings to assist with successful management of anxiety levels. Acceptance and commitment strategies were utilized to introduce mindfulness. Quincy verbalized understanding. He practiced in session and reflected on related outcomes for implementation. I encouraged daily practice.   - I will wait to hear from the family if they will be returning. Father is aware of the closure plan in the future if they are unable to return for services.           Suicide/Homicide/Violence Ideation: No  Change in Medication(s) Reported: No    Need for  Community Resources Assessed: Yes  Resources Needed: No     Pt/Family Education Provided: Yes   Pt/Family Displays Understanding: Yes      Diagnoses:  Generalized Anxiety Disorder (by history)  Attention Deficit/Hyperactivity Disorder, Inattentive Type (by history)  Parent Child Relational problem  Educational Concern  R/O Unspecified Depressive Disorder    Homework:  - Utilize positive reinforcement ratios  - Implement differential attention  - Utilize rational part of brain  - Practice deep breathing  - Utilize mindfulness      Follow-up appointment: 4/9/2018  Margoth Palacios, PHD   neck swelling noted.  LHC yesterday revealed diffuse CAD not amenable for PCI, no changes from 2016.  VSS  6/17 - Still on mech vent for airway protection tolerating SAT/SBT.  Baldomero TF  6/18 - Resting on mech vent and sedation.  Neck swelling improving.  Worsening UOP and creatine.  Awake, alert and responsive on sedation.  Spouse at bedside.   6/19 - Still resting on vent with intermittent Morphine.  Neck swelling improving.  Oliguria and creatine worsening.  Now with diarrhea last 24 hours  6/20 - VasCath placed yesterday and started on RRT.  Diarrhea + C-diff per PCR.  Baldomero TF.  No air leak around OET with cuff deflated this AM  06/21: +ve leak. Plan for extubation    Interval History:   On 3rd dialysis session    Review of Systems   Unable to perform ROS: Intubated       Objective:     Vital Signs (Most Recent):  Temp: 99 °F (37.2 °C) (06/21/18 0305)  Pulse: 97 (06/21/18 0830)  Resp: 15 (06/21/18 0830)  BP: (!) 118/56 (06/21/18 0800)  SpO2: 100 % (06/21/18 0830) Vital Signs (24h Range):  Temp:  [97.5 °F (36.4 °C)-99 °F (37.2 °C)] 99 °F (37.2 °C)  Pulse:  [] 97  Resp:  [5-19] 15  SpO2:  [99 %-100 %] 100 %  BP: ()/(35-67) 118/56     Weight: 77 kg (169 lb 12.1 oz)  Body mass index is 31.05 kg/m².      Intake/Output Summary (Last 24 hours) at 06/21/18 0838  Last data filed at 06/21/18 0600   Gross per 24 hour   Intake              735 ml   Output             1622 ml   Net             -887 ml       Physical Exam   Constitutional: Vital signs are normal. She appears well-developed and well-nourished. She is uncooperative. She is easily aroused.  Non-toxic appearance. She appears ill. No distress. She is intubated and restrained.       HENT:   Head: Normocephalic and atraumatic.   Mouth/Throat: Oropharynx is clear and moist and mucous membranes are normal.   Eyes: EOM and lids are normal. Pupils are equal, round, and reactive to light.   Neck: Neck supple. Carotid bruit is not present.       Cardiovascular:  Normal rate and regular rhythm.    Murmur heard.   Systolic murmur is present   Pulses:       Radial pulses are 2+ on the right side, and 2+ on the left side.        Dorsalis pedis pulses are 1+ on the right side, and 1+ on the left side.   Pulmonary/Chest: Effort normal and breath sounds normal. No accessory muscle usage. No tachypnea. She is intubated. No respiratory distress. She has no rales.   Abdominal: Soft. Normal appearance and bowel sounds are normal. She exhibits no distension. There is no tenderness.   Genitourinary:   Genitourinary Comments: Fair in place   Musculoskeletal: Normal range of motion.        Right foot: There is no deformity.        Left foot: There is no deformity.   Trace edema   Lymphadenopathy:     She has no cervical adenopathy.   Neurological: She is easily aroused.   Arouses to stimuli and opens eyes but will not follow commands   Skin: Skin is warm, dry and intact. Capillary refill takes less than 2 seconds. No rash noted. No cyanosis.        Psychiatric: She exhibits a depressed mood.   Nursing note and vitals reviewed.      Vents:  Vent Mode: Spont (06/21/18 0732)  Ventilator Initiated: Yes (06/14/18 1812)  Set Rate: 0 bmp (06/21/18 0732)  Vt Set: 400 mL (06/21/18 0732)  Pressure Support: 8 cmH20 (06/21/18 0732)  PEEP/CPAP: 5 cmH20 (06/21/18 0732)  Oxygen Concentration (%): 30 (06/21/18 0830)  Peak Airway Pressure: 13 cmH2O (06/21/18 0732)  Plateau Pressure: 18 cmH20 (06/21/18 0732)  Total Ve: 7.87 mL (06/21/18 0732)  F/VT Ratio<105 (RSBI): (!) 42.74 (06/21/18 0732)    Lines/Drains/Airways     Central Venous Catheter Line                 Percutaneous Central Line Insertion/Assessment - triple lumen  06/14/18 right femoral 7 days         Hemodialysis Catheter 06/19/18 1430 left femoral 1 day          Drain                 Drain/Device  06/14/18 1239 neck collapsible closed device 6 days         NG/OG Tube 06/15/18 0200 [place] sump 14 Fr. Center mouth 6 days         Urethral  Catheter 06/14/18 1113 6 days         Rectal Tube 06/18/18 1350 rectal tube w/ balloon (indicate number of mLs) 2 days          Airway                 Airway - Non-Surgical 06/15/18 1025 Endotracheal Tube 5 days          Arterial Line                 Arterial Line 06/14/18 1427 Right Femoral 6 days                Significant Labs:    CBC/Anemia Profile:    Recent Labs  Lab 06/20/18  0354 06/20/18  1535 06/21/18  0350   WBC 11.95  --  12.33   HGB 8.6*  --  8.3*   HCT 25.5*  --  24.8*     --  170   MCV 83  --  84   RDW 17.8*  --  17.5*   IRON  --  24*  --    FERRITIN  --  1,532*  --         Chemistries:    Recent Labs  Lab 06/20/18  0354 06/21/18  0350    135*   K 4.1 3.9    100   CO2 19* 23   BUN 93* 77*   CREATININE 6.2* 5.5*   CALCIUM 8.3* 7.8*   ALBUMIN 2.3* 2.2*   PROT 5.3* 5.0*   BILITOT 0.4 0.4   ALKPHOS 49* 46*   ALT 6* 10   AST 27 29   MG 2.2 1.9   PHOS  --  3.2       ABGs:   Recent Labs  Lab 06/21/18  0412   PH 7.455*   PCO2 33.1*   HCO3 23.3*   POCSATURATED 99   BE -1     Respiratory Culture: No results for input(s): GSRESP, RESPIRATORYC in the last 48 hours.  All pertinent labs within the past 24 hours have been reviewed.    Significant Imaging:  I have reviewed and interpreted all pertinent imaging results/findings within the past 24 hours.      Assessment/Plan:     Pulmonary   * Acute airway obstruction    Neck still with swelling IMPROVED          On mechanically assisted ventilation    Vent settings reviewed and adjusted  Spont mode overnignt  X 2 neck surgery with swelling  Tolerates SAT/SBT  Has cuff leak          Cardiac/Vascular   Cardiac arrest with ventricular fibrillation    Monitor and replace electrolytes if needed  ICU cardiac monitoring  Cont Amiodarone and Lopressor.  A-Fib resolved.  No neuro deficit noted above baseline from hx CVA        Coronary artery disease involving native coronary artery    Per Cards.  Medical management  LHC 6/15 done revealing diffuse CAD not  amenable for PCI, no changes from 2016  Cont ASA, Lopressor  Plavix stopped by Cards 6/18.    Allergy to statin noted        Renal/   ESRD (end stage renal disease)    Vascath groin        Acute renal failure superimposed on stage 5 chronic kidney disease, not on chronic dialysis    S/P contrast with LHC 6/15  Renal following.    RRT started 6/19 on 3rd session  BMP daily  Accurate I/Os  May DC Fair        ID   C. difficile diarrhea    continue Vancomycin per OG        Oncology   Acute blood loss anemia    S/P 2 units PRBCs 6/15 and 1 unit PRBC 6/18  Daily H/H  No active bleeding  Patient has been treated with Procrit 20,000 units weekly for maintenance therapy followed by Hematology        Endocrine   S/P parathyroidectomy    Per surgery still following  MELISSA drain removed today  POD # 9  Returned to surgery for post op hematoma evacuation 6/14 POD #6  Follow up US neck 6/17 revealed soft tissue swelling but no fluid collection        Hyperparathyroidism    POD # 9  S/P parathyroidectomy  Surgery following        Diabetes mellitus, type 2 - only on oral medications    Cont SSI              Critical Care Daily Checklist:    A: Awake: RASS Goal/Actual Goal: RASS Goal: 0-->alert and calm  Actual:     B: Spontaneous Breathing Trial Performed? Spon. Breathing Trial Initiated?: Initiated (06/17/18 0700)   C: SAT & SBT Coordinated?  Yes and plan for extubation                      D: Delirium: CAM-ICU     E: Early Mobility Performed? Yes   F: Feeding Goal: Goals: Meet > 85 % EEN/EPN while admitted  Status: Nutrition Goal Status: new   Current Diet Order   Procedures    Diet NPO      AS: Analgesia/Sedation Off sedation   T: Thromboembolic Prophylaxis Yes: heparin SC   H: HOB > 300 Yes   U: Stress Ulcer Prophylaxis (if needed) YES   G: Glucose Control POCT   B: Bowel Function Stool Occurrence: 0   I: Indwelling Catheter (Lines & Fair) Necessity Plan to DC groin line except vascath   D: De-escalation of  Antimicrobials/Pharmacotherapies Yes: PO Vanco    Plan for the day/ETD extubation    Code Status:  Family/Goals of Care: DNR  HOME     Critical Care Time: 45 minutes  Critical secondary to Patient has a condition that poses threat to life and bodily function: acute respiratory failure      Critical care was time spent personally by me on the following activities: development of treatment plan with patient or surrogate and bedside caregivers, discussions with consultants, evaluation of patient's response to treatment, examination of patient, ordering and performing treatments and interventions, ordering and review of laboratory studies, ordering and review of radiographic studies, pulse oximetry, re-evaluation of patient's condition. This critical care time did not overlap with that of any other provider or involve time for any procedures.     Axel Perez MD  Critical Care Medicine  [facility]

## 2018-06-21 NOTE — ASSESSMENT & PLAN NOTE
Per surgery still following  MELISSA drain removed today  POD # 9  Returned to surgery for post op hematoma evacuation 6/14 POD #6  Follow up US neck 6/17 revealed soft tissue swelling but no fluid collection

## 2018-06-21 NOTE — ASSESSMENT & PLAN NOTE
67 y/o female with advanced CKD stage 5 had further worsening in renal failure as result of NSTEMI and current acute illness and was initiated on chronic HD:                    Chronic renal failure leading to ESRD     Renal: advanced CKD stage 5 leading to ESRD.  Pt started chronic HD yesterday, HD#2 today  Tolerating HD well, continue HD  Receiving mannitol to lower risk of dialysis dysequilibrium syndrome (DDS)  No sx's or signs of DDS     SHPT: s/p partial parathyroidectomy surgery ( 6/12/18 ) ,   Corrected Ca is normal  Will monitor s Ca and will repeat iPTH  Will provide Tums or Vit D as indicated     Anemia of chronic kidney disease -   s/p 2 units of PRBC today ,   Will start epogen with HD  Will check iron panel            * Acute airway obstruction     Intubated for neck swelling  Neck swelling and hematoma due to the neck surgery             Coronary artery disease involving native coronary artery     Had NSTEMI post op  Cardiac arrest with V Fib.  LHC results reviewed, no change from prior cath in 2016  Circ 99%, RCA 90%, LAD 50%  Diffuse disease, not amenable for stent/CABG revascularization             Plans and recommendations:  As discussed above  HD#2 today  Mannitol with HD  Will request SW assistance with eventual HD unit placement  Will get hep B panel for HD placement  Total critical care time spent 40 minutes   Pt received multiple visits and evaluations during HD in ICU.

## 2018-06-21 NOTE — PROGRESS NOTES
[facility]  General Surgery  Progress Note    Subjective:     History of Present Illness:  No notes on file    Post-Op Info:  Procedure(s) (LRB):  CATHETERIZATION, HEART, LEFT (Left)   6 Days Post-Op     Interval History: no new overnight events. She remains intubated    Medications:  Continuous Infusions:  Scheduled Meds:   amiodarone  200 mg Oral BID    aspirin  81 mg Oral Daily    chlorhexidine  10 mL Mouth/Throat BID    epoetin carmen (PROCRIT) injection  4,000 Units Intravenous Every Tues, Thurs, Sat    famotidine  20 mg Oral Daily    heparin (porcine)  5,000 Units Subcutaneous Q8H    metoprolol tartrate  25 mg Oral BID    sodium bicarbonate  650 mg Oral BID    vancomycin  125 mg Oral Q6H     PRN Meds:acetaminophen, atropine, bisacodyl, cloNIDine, dextrose 50%, dextrose 50%, glucagon (human recombinant), glucose, glucose, heparin (porcine), hydrALAZINE, insulin aspart U-100, lactulose, lorazepam, morphine, nitroGLYCERIN, ondansetron, sodium bicarbonate, sodium chloride 0.9%     Review of patient's allergies indicates:   Allergen Reactions    Lipitor [atorvastatin] Swelling     Lips       Objective:     Vital Signs (Most Recent):  Temp: 99 °F (37.2 °C) (06/21/18 0305)  Pulse: 93 (06/21/18 0915)  Resp: 13 (06/21/18 0915)  BP: (!) 131/58 (06/21/18 0900)  SpO2: 100 % (06/21/18 0915) Vital Signs (24h Range):  Temp:  [97.5 °F (36.4 °C)-99 °F (37.2 °C)] 99 °F (37.2 °C)  Pulse:  [] 93  Resp:  [5-19] 13  SpO2:  [99 %-100 %] 100 %  BP: ()/(35-67) 131/58     Weight: 77 kg (169 lb 12.1 oz)  Body mass index is 31.05 kg/m².    Intake/Output - Last 3 Shifts       06/19 0700 - 06/20 0659 06/20 0700 - 06/21 0659 06/21 0700 - 06/22 0659    I.V. (mL/kg) 100 (1.3) 100 (1.3)     Other 0      NG/ 845     Total Intake(mL/kg) 620 (8.2) 945 (12.3)     Urine (mL/kg/hr) 100 (0.1) 102 (0.1)     Other 820 (0.5) 1500 (0.8)     Stool 550 (0.3) 90 (0)     Total Output 8490 6831 Qle -613 -409                    Physical Exam   Constitutional: She appears well-developed and well-nourished.   HENT:   Head: Normocephalic and atraumatic.   Eyes: EOM are normal.   Neck: Neck supple.   Cardiovascular: Normal rate, regular rhythm and intact distal pulses.    Pulmonary/Chest: No respiratory distress.   ETT on vent   Abdominal: Soft. Distention: soft distention.   Neurological: She is alert.   Skin: Skin is warm and dry.   Vitals reviewed.      Significant Labs:  CBC:   Recent Labs  Lab 06/21/18  0350   WBC 12.33   RBC 2.94*   HGB 8.3*   HCT 24.8*      MCV 84   MCH 28.2   MCHC 33.5     CMP:   Recent Labs  Lab 06/21/18  0350   *   CALCIUM 7.8*   ALBUMIN 2.2*   PROT 5.0*   *   K 3.9   CO2 23      BUN 77*   CREATININE 5.5*   ALKPHOS 46*   ALT 10   AST 29   BILITOT 0.4       Significant Diagnostics:  I have reviewed all pertinent imaging results/findings within the past 24 hours.    Assessment/Plan:     C. difficile diarrhea    Cont PO vancomycin        ESRD (end stage renal disease)    Hemodialysis was initiated        On mechanically assisted ventilation    Ok to attempt extubation when cuff leak noted - or per Critical Care team        CVA (cerebral vascular accident)    Prior CVA, patient with increased difficulty swelling medications at home prior to hospitalization, underwent evaluation with ENT prior to surgery  Speech therapy consult for swallowing        Hyperparathyroidism    Status post parathyroidectomy  Monitor calcium  S/p neck washout  Neck is supple, no further swelling since drain out.     Extubation per ICU team            Coronary artery disease involving native coronary artery    Cardiology following  On anticoagulation        Acute renal failure superimposed on stage 5 chronic kidney disease, not on chronic dialysis    Pt tolerating hemo-dialysis well        Hypertension associated with diabetes    Antihypertensive medications            MATTHEW Vela  Surgery  [facility]

## 2018-06-21 NOTE — PROGRESS NOTES
[facility] -   Adult Nutrition  Progress Note    SUMMARY     Recommendations    Recommendation/Intervention:   1. If unable to extubate pt over the next 24 hrs, continue current TF regimen.    2. If able to extubate pt, consider SLP evaluation. 3. When medically able, ADAT to Renal Diabetic with consistency per SLP. 4. If unable to advance diet and TF warranted, rec Novasource at  40 mL/hr - to provide 1920 kcal/day, 86 g protein/day, and 688 mL free fluid/day.   Goals: Meet > 85 % EEN/EPN while admitted  Nutrition Goal Status: progressing towards goal   Communication of RD Recs: POC, sticky note    Reason for Assessment    Reason for Assessment:  RD follow-up  Dx:  1. Abnormal chest x-ray    2. Hyperparathyroidism    3. Hypercalcemia    4. SOB (shortness of breath)    5. NSTEMI (non-ST elevated myocardial infarction)    6. Elevated troponin    7. S/P parathyroidectomy    8. Respiratory distress    9. Cardiogenic shock    10. Acute hypoxemic respiratory failure    11. Cardiac arrest with ventricular fibrillation    12. CKD (chronic kidney disease) stage 5, GFR less than 15 ml/min    13. Type 2 diabetes mellitus with stage 4 chronic kidney disease, without long-term current use of insulin    14. Electrolyte imbalance    15. H/O aortic valve replacement    16. Persistent atrial fibrillation    17. S/P evacuation of hematoma    18. Shock liver    19. ST elevation myocardial infarction (STEMI), unspecified artery    20. MI (myocardial infarction)    21. Acute airway obstruction    22. Acute blood loss anemia    23. On mechanically assisted ventilation    24. STEMI (ST elevation myocardial infarction)    25. Personal history of sudden cardiac arrest    26. Presence of prosthetic heart valve    27. Coronary artery disease involving native coronary artery without angina pectoris, unspecified whether native or transplanted heart    28. Acute renal failure superimposed on stage 5 chronic kidney disease, not  "on chronic dialysis, unspecified acute renal failure type    29. C. difficile diarrhea      Past Medical History:   Diagnosis Date    Acid reflux 1/7/2015    Anxiety 1/7/2015    Aortic valvar stenosis     Arthritis     osteo    Callus     Chronic anemia     followed by Dr. Addison    CKD (chronic kidney disease) stage 5, GFR less than 15 ml/min 8/7/2015    Combined hyperlipidemia associated with type 2 diabetes mellitus     Coronary artery disease     Diabetes mellitus type II, controlled, with no complications     LBSL 108 today    Encounter for blood transfusion     Frail elderly with impaired mobility, wheel chair bound 8/25/2017    Gallbladder problem     gallbladder surgery    Heart murmur     Hyperparathyroidism, secondary renal 1/7/2015    Hypertension 8/7/2015    Hypertension associated with diabetes 11/12/2012    Kidney transplant candidate 1/7/2015    Overweight(278.02)     Urinary tract infection        General Information Comments:   6.15.18.S/p parathyroidectomy (6/12).  ST recs: NPO (6/14).  Pt was taken back to OR and had post op neck hematoma evacuation (6/14). Patient extubated and immediately reintubated. OG tube placed for PO med access. LHC planned for later today.   6.18.18. S/p CATHETERIZATION, HEART, LEFT. Patient remains intubated. Currently on TF. Worsening renal function. Per ICU rounds,  plans for extubation and possible plans for dialysis.   6.21.18. Per ICU staff: Pt remains intubated,Pt on TF, tolerating,  Pt on HD, plans to extubate after dialysis today.   Nutrition Discharge Planning: too soon to determine      Nutrition Risk Screen    Nutrition Risk Screen: dysphagia or difficulty swallowing    Nutrition/Diet History    Do you have any cultural, spiritual, Adventist conflicts, given your current situation?: none    Anthropometrics    Temp: 99 °F (37.2 °C)  Height Method: Stated  Height: 5' 2" (157.5 cm)  Height (inches): 62 in  Weight Method: Bed Scale  Weight: " 77 kg (169 lb 12.1 oz)  Weight (lb): 169.76 lb  Ideal Body Weight (IBW), Female: 110 lb  % Ideal Body Weight, Female (lb): 154.33 lb  BMI (Calculated): 31.1  BMI Grade: 30 - 34.9- obesity - grade I       Lab/Procedures/Meds    Pertinent Labs Reviewed: reviewed  BMP  Lab Results   Component Value Date     (L) 06/21/2018    K 3.9 06/21/2018     06/21/2018    CO2 23 06/21/2018    BUN 77 (H) 06/21/2018    CREATININE 5.5 (H) 06/21/2018    CALCIUM 7.8 (L) 06/21/2018    ANIONGAP 12 06/21/2018    ESTGFRAFRICA 8 (A) 06/21/2018    EGFRNONAA 7 (A) 06/21/2018     Lab Results   Component Value Date    CALCIUM 7.8 (L) 06/21/2018    PHOS 3.2 06/21/2018     Lab Results   Component Value Date    ALBUMIN 2.2 (L) 06/21/2018       Recent Labs  Lab 06/21/18  0548   POCTGLUCOSE 158*     Lab Results   Component Value Date    HGBA1C 5.4 06/12/2018       Pertinent Medications Reviewed: reviewed    Physical Findings/Assessment    Overall Physical Appearance: on ventilator support  Tubes:  (OG tube)  Oral/Mouth Cavity: decay/carries, tooth/teeth missing, no dental appliances present  Skin: incision(s)    Estimated/Assessed Needs    Weight Used For Calorie Calculations: 77 kg (169 lb 12.1 oz)  Energy Calorie Requirements (kcal): 1433  Energy Need Method: [place] (modified)  Protein Requirements: 84- 122 g/day  Weight Used For Protein Calculations: 49.9 kg (110 lb 0.2 oz) (IBW - obese ICU)     Fluid Need Method: RDA Method (or per MD)  RDA Method (mL): 1433.92  CHO Requirement: 50 % EEN      Nutrition Prescription Ordered    Current Diet Order: NPO  Current Nutrition Support Formula Ordered:  (Novasource at 25 ml/hr + 5 pack of beneprotein)    Evaluation of Received Nutrient/Fluid Intake    Enteral Calories (kcal): 1325  Enteral Protein (gm): 84     % kcal needs: 92.46  % protein needs: 100    Intake/Output Summary (Last 24 hours) at 06/21/18 1051  Last data filed at 06/21/18 0600   Gross per 24 hour   Intake              620 ml    Output             1612 ml   Net             -992 ml       % Intake of Estimated Energy Needs: 75 - 100 %  % Meal Intake: NPO    Nutrition Risk    Level of Risk/Frequency of Follow-up:  (F/U x2 weekly)     Assessment and Plan    Nutrition Problem:  Inadequate energy intake     Related to (etiology):   Inability to consume sufficient energy     Signs and Symptoms (as evidenced by):   Intubated, NPO, no alternative means of nutrition.      Interventions/Recommendations (treatment strategy):  Please see RD recs above.     Nutrition Diagnosis Status:   Improving. 6.21.18. Pt on TF now.       Monitor and Evaluation    Food and Nutrient Intake: energy intake, enteral nutrition intake  Food and Nutrient Adminstration: enteral and parenteral nutrition administration  Anthropometric Measurements: weight  Biochemical Data, Medical Tests and Procedures: electrolyte and renal panel, glucose/endocrine profile  Nutrition-Focused Physical Findings: overall appearance, skin     Nutrition Follow-Up    RD Follow-up?: Yes (2xweekly)

## 2018-06-21 NOTE — ASSESSMENT & PLAN NOTE
Vent settings reviewed and adjusted  Spont mode overnignt  X 2 neck surgery with swelling  Tolerates SAT/SBT  Has cuff leak

## 2018-06-21 NOTE — PLAN OF CARE
Problem: Patient Care Overview  Goal: Plan of Care Review  Outcome: Ongoing (interventions implemented as appropriate)  Pt extubated today; remains responsive only to pain; maintaining sats and airway on 2LO2/NC.

## 2018-06-21 NOTE — PROGRESS NOTES
[facility] -   Nephrology  Progress Note    Patient Name: Citlali Karimi  MRN: [id number]  Admission Date: 6/12/2018  Hospital Length of Stay: 8 days  Attending Provider: Levy Samuel MD   Primary Care Physician: Evelio Schuster MD  Principal Problem:Acute airway obstruction    Subjective:     HPI:  Citlali Karimi Is a pleasant 68 -year-old  woman with history of chronic kidney disease stage 4, patient was admitted to the hospital for an elective  partial parathyroidectomy, following her surgery during observation.  She had some chest pain, workup revealed non ST elevated MI, patient was admitted to the hospital for further management.  We were consulted due to advanced renal insufficiency, baseline serum creatinine about 3.5-4,            Important Info :        She underwent a native kidney biopsy on 10/10/13. Final report of the kidney biopsy is negative for any specific etiology for acute renal failure. 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         Interval History: Pt was seen and examined in ICU. Pt is receiving HD initiation day #3 today. Chart reviewed. Discussed with ICU. Met with HD RN. No c/o's or issues with HD. Receiving mannitol with Hd initiation.    Review of patient's allergies indicates:   Allergen Reactions    Lipitor [atorvastatin] Swelling     Lips       Current Facility-Administered Medications   Medication Frequency    acetaminophen tablet 650 mg Q4H PRN    amiodarone tablet 200 mg BID    aspirin chewable tablet 81 mg Daily    atropine injection 0.5 mg PRN    bisacodyl suppository 10 mg Daily PRN    chlorhexidine 0.12 % solution 10 mL BID    cloNIDine tablet 0.1 mg Q6H PRN    dextrose 50% injection 12.5 g PRN    dextrose 50% injection 25 g PRN    famotidine tablet 20 mg Daily    glucagon (human recombinant) injection 1 mg PRN    glucose chewable tablet 16 g PRN    glucose chewable tablet 24 g PRN     heparin (porcine) injection 3,000 Units PRN    heparin (porcine) injection 5,000 Units Q8H    hydrALAZINE injection 10 mg Q8H PRN    insulin aspart U-100 pen 0-5 Units QID (AC + HS) PRN    lactulose 20 gram/30 mL solution Soln 20 g Q6H PRN    lorazepam (ATIVAN) injection 2 mg Q4H PRN    metoprolol tartrate (LOPRESSOR) tablet 25 mg BID    morphine injection 4 mg Q4H PRN    nitroGLYCERIN SL tablet 0.4 mg Q5 Min PRN    ondansetron injection 4 mg Q8H PRN    sodium bicarbonate 8.4 % (1 mEq/mL) injection PRN    sodium bicarbonate tablet 650 mg BID    sodium chloride 0.9% flush 3 mL PRN    vancomycin 250mg / 10ml oral suspension 125 mg Q6H       Objective:     Vital Signs (Most Recent):  Temp: 99 °F (37.2 °C) (06/21/18 0305)  Pulse: 96 (06/21/18 0845)  Resp: 16 (06/21/18 0845)  BP: (!) 118/56 (06/21/18 0800)  SpO2: 100 % (06/21/18 0845)  O2 Device (Oxygen Therapy): ventilator (06/21/18 0732) Vital Signs (24h Range):  Temp:  [97.5 °F (36.4 °C)-99 °F (37.2 °C)] 99 °F (37.2 °C)  Pulse:  [] 96  Resp:  [5-19] 16  SpO2:  [99 %-100 %] 100 %  BP: ()/(35-67) 118/56     Weight: 77 kg (169 lb 12.1 oz) (06/21/18 0515)  Body mass index is 31.05 kg/m².  Body surface area is 1.84 meters squared.    I/O last 3 completed shifts:  In: 1175 [I.V.:100; NG/GT:1075]  Out: 2552 [Urine:142; Other:2320; Stool:90]    Physical Exam   Constitutional: She is oriented to person, place, and time. She appears well-developed and well-nourished. No distress.   HENT:   Head: Normocephalic and atraumatic.   Neck: No JVD present.   Cardiovascular: Normal rate, regular rhythm and normal heart sounds.  Exam reveals no friction rub.    Pulmonary/Chest: Effort normal and breath sounds normal. She has no rales.   Abdominal: Soft. Bowel sounds are normal. She exhibits no distension.   Musculoskeletal: She exhibits no edema.   Neurological: She is alert and oriented to person, place, and time.   Skin: Skin is warm and dry. She is not  diaphoretic.   Nursing note and vitals reviewed.      Significant Labs: reviewed  Lab Results   Component Value Date    WBC 12.33 06/21/2018    HGB 8.3 (L) 06/21/2018    HCT 24.8 (L) 06/21/2018    MCV 84 06/21/2018     06/21/2018     BMP  Lab Results   Component Value Date     (L) 06/21/2018    K 3.9 06/21/2018     06/21/2018    CO2 23 06/21/2018    BUN 77 (H) 06/21/2018    CREATININE 5.5 (H) 06/21/2018    CALCIUM 7.8 (L) 06/21/2018    ANIONGAP 12 06/21/2018    ESTGFRAFRICA 8 (A) 06/21/2018    EGFRNONAA 7 (A) 06/21/2018     Iron sat 24%    Significant Imaging: CXR reviewed    Assessment/Plan:     ESRD (end stage renal disease)    67 y/o female with advanced CKD stage 5 had further worsening in renal failure as result of NSTEMI and current acute illness and was initiated on chronic HD:                    Chronic renal failure leading to ESRD     Renal: advanced CKD stage 5 leading to ESRD.  Pt started chronic HD, HD#3 today  Tolerating HD well, continue HD  Will be on HD schedule q TTS until SW has established future outpt schedule  Receiving mannitol to lower risk of dialysis dysequilibrium syndrome (DDS)  No sx's or signs of DDS     SHPT: s/p partial parathyroidectomy surgery ( 6/12/18 ) ,   Corrected Ca is normal  Will monitor s Ca and will repeat iPTH  Will provide Tums or Vit D as indicated     Anemia of chronic kidney disease -   s/p 2 units of PRBC today ,   On epogen with HD  Iron sat adequate            * Acute airway obstruction     Intubated for neck swelling  Neck swelling and hematoma due to the neck surgery             Coronary artery disease involving native coronary artery     Had NSTEMI post op  Cardiac arrest with V Fib.  LHC results reviewed, no change from prior cath in 2016  Circ 99%, RCA 90%, LAD 50%  Diffuse disease, not amenable for stent/CABG revascularization             Plans and recommendations:  As discussed above  HD#3 today  Mannitol with HD  Referred to SW assistance  with eventual HD unit placement  pending hep B panel for HD placement  Total critical care time spent 40 minutes   Pt received multiple visits and evaluations during HD in ICU.                 Jordyn Lima MD  Nephrology  [facility]

## 2018-06-21 NOTE — PLAN OF CARE
Problem: Patient Care Overview  Goal: Plan of Care Review  Outcome: Ongoing (interventions implemented as appropriate)  Recommendations     Recommendation/Intervention:   1. If unable to extubate pt over the next 24 hrs, continue current TF regimen.    2. If able to extubate pt, consider SLP evaluation. 3. When medically able, ADAT to Renal Diabetic with consistency per SLP. 4. If unable to advance diet and TF warranted, rec Novasource at  40 mL/hr - to provide 1920 kcal/day, 86 g protein/day, and 688 mL free fluid/day.   Goals: Meet > 85 % EEN/EPN while admitted  Nutrition Goal Status: progressing towards goal   Communication of RD Recs: POC, sticky note

## 2018-06-21 NOTE — PLAN OF CARE
Patient received hd as ordered. Net removal 2 liters. No access issues. Adm. Mannitol as ordered with hd. Tolerated well. Dr. Lima visited during hd.

## 2018-06-21 NOTE — ASSESSMENT & PLAN NOTE
Status post parathyroidectomy  Monitor calcium  S/p neck washout  Neck is supple, no further swelling since drain out.     Extubation per ICU team

## 2018-06-21 NOTE — EICU
At risk of self harm as she is pulling on lines and tube  Intubated and sedated      Plan:ordered bilateral soft wrist restrains for 24 hours

## 2018-06-21 NOTE — PROGRESS NOTES
Patient has air coming around cuff when deflated , swelling has gone down. Plan to extubate after dialysis per order of

## 2018-06-21 NOTE — PLAN OF CARE
Plan today is to extubate and patient will need Dialysis     06/21/18 1330   Discharge Reassessment   Assessment Type Discharge Planning Reassessment   Provided patient/caregiver education on the expected discharge date and the discharge plan No   Do you have any problems affording any of your prescribed medications? No   Discharge Plan A Long-term acute care facility (LTAC)   Discharge Plan B Long-term acute care facility (LTAC)   Patient choice form signed by patient/caregiver N/A   Can the patient answer the patient profile reliably? Yes, cognitively intact   How does the patient rate their overall health at the present time? Fair   Describe the patient's ability to walk at the present time. Major restrictions/daily assistance from another person   How often would a person be available to care for the patient? Whenever needed   Number of comorbid conditions (as recorded on the chart) Five or more   During the past month, has the patient often been bothered by feeling down, depressed or hopeless? No   During the past month, has the patient often been bothered by little interest or pleasure in doing things? No

## 2018-06-21 NOTE — SUBJECTIVE & OBJECTIVE
Interval History:   On 3rd dialysis session    Review of Systems   Unable to perform ROS: Intubated       Objective:     Vital Signs (Most Recent):  Temp: 99 °F (37.2 °C) (06/21/18 0305)  Pulse: 97 (06/21/18 0830)  Resp: 15 (06/21/18 0830)  BP: (!) 118/56 (06/21/18 0800)  SpO2: 100 % (06/21/18 0830) Vital Signs (24h Range):  Temp:  [97.5 °F (36.4 °C)-99 °F (37.2 °C)] 99 °F (37.2 °C)  Pulse:  [] 97  Resp:  [5-19] 15  SpO2:  [99 %-100 %] 100 %  BP: ()/(35-67) 118/56     Weight: 77 kg (169 lb 12.1 oz)  Body mass index is 31.05 kg/m².      Intake/Output Summary (Last 24 hours) at 06/21/18 0838  Last data filed at 06/21/18 0600   Gross per 24 hour   Intake              735 ml   Output             1622 ml   Net             -887 ml       Physical Exam   Constitutional: Vital signs are normal. She appears well-developed and well-nourished. She is uncooperative. She is easily aroused.  Non-toxic appearance. She appears ill. No distress. She is intubated and restrained.       HENT:   Head: Normocephalic and atraumatic.   Mouth/Throat: Oropharynx is clear and moist and mucous membranes are normal.   Eyes: EOM and lids are normal. Pupils are equal, round, and reactive to light.   Neck: Neck supple. Carotid bruit is not present.       Cardiovascular: Normal rate and regular rhythm.    Murmur heard.   Systolic murmur is present   Pulses:       Radial pulses are 2+ on the right side, and 2+ on the left side.        Dorsalis pedis pulses are 1+ on the right side, and 1+ on the left side.   Pulmonary/Chest: Effort normal and breath sounds normal. No accessory muscle usage. No tachypnea. She is intubated. No respiratory distress. She has no rales.   Abdominal: Soft. Normal appearance and bowel sounds are normal. She exhibits no distension. There is no tenderness.   Genitourinary:   Genitourinary Comments: Fair in place   Musculoskeletal: Normal range of motion.        Right foot: There is no deformity.        Left foot:  There is no deformity.   Trace edema   Lymphadenopathy:     She has no cervical adenopathy.   Neurological: She is easily aroused.   Arouses to stimuli and opens eyes but will not follow commands   Skin: Skin is warm, dry and intact. Capillary refill takes less than 2 seconds. No rash noted. No cyanosis.        Psychiatric: She exhibits a depressed mood.   Nursing note and vitals reviewed.      Vents:  Vent Mode: Spont (06/21/18 0732)  Ventilator Initiated: Yes (06/14/18 1812)  Set Rate: 0 bmp (06/21/18 0732)  Vt Set: 400 mL (06/21/18 0732)  Pressure Support: 8 cmH20 (06/21/18 0732)  PEEP/CPAP: 5 cmH20 (06/21/18 0732)  Oxygen Concentration (%): 30 (06/21/18 0830)  Peak Airway Pressure: 13 cmH2O (06/21/18 0732)  Plateau Pressure: 18 cmH20 (06/21/18 0732)  Total Ve: 7.87 mL (06/21/18 0732)  F/VT Ratio<105 (RSBI): (!) 42.74 (06/21/18 0732)    Lines/Drains/Airways     Central Venous Catheter Line                 Percutaneous Central Line Insertion/Assessment - triple lumen  06/14/18 right femoral 7 days         Hemodialysis Catheter 06/19/18 1430 left femoral 1 day          Drain                 Drain/Device  06/14/18 1239 neck collapsible closed device 6 days         NG/OG Tube 06/15/18 0200 [place] sump 14 Fr. Center mouth 6 days         Urethral Catheter 06/14/18 1113 6 days         Rectal Tube 06/18/18 1350 rectal tube w/ balloon (indicate number of mLs) 2 days          Airway                 Airway - Non-Surgical 06/15/18 1025 Endotracheal Tube 5 days          Arterial Line                 Arterial Line 06/14/18 1427 Right Femoral 6 days                Significant Labs:    CBC/Anemia Profile:    Recent Labs  Lab 06/20/18  0354 06/20/18  1535 06/21/18  0350   WBC 11.95  --  12.33   HGB 8.6*  --  8.3*   HCT 25.5*  --  24.8*     --  170   MCV 83  --  84   RDW 17.8*  --  17.5*   IRON  --  24*  --    FERRITIN  --  1,532*  --         Chemistries:    Recent Labs  Lab 06/20/18  0354 06/21/18  0350    135*   K  4.1 3.9    100   CO2 19* 23   BUN 93* 77*   CREATININE 6.2* 5.5*   CALCIUM 8.3* 7.8*   ALBUMIN 2.3* 2.2*   PROT 5.3* 5.0*   BILITOT 0.4 0.4   ALKPHOS 49* 46*   ALT 6* 10   AST 27 29   MG 2.2 1.9   PHOS  --  3.2       ABGs:   Recent Labs  Lab 06/21/18  0412   PH 7.455*   PCO2 33.1*   HCO3 23.3*   POCSATURATED 99   BE -1     Respiratory Culture: No results for input(s): GSRESP, RESPIRATORYC in the last 48 hours.  All pertinent labs within the past 24 hours have been reviewed.    Significant Imaging:  I have reviewed and interpreted all pertinent imaging results/findings within the past 24 hours.

## 2018-06-21 NOTE — ASSESSMENT & PLAN NOTE
Per Cards.  Medical management  [facility] 6/15 done revealing diffuse CAD not amenable for PCI, no changes from 2016  Cont ASA, Lopressor  Plavix stopped by Cards 6/18.    Allergy to statin noted

## 2018-06-21 NOTE — ASSESSMENT & PLAN NOTE
S/P contrast with LHC 6/15  Renal following.    RRT started 6/19 on 3rd session  BMP daily  Accurate I/Os  May DC Fair

## 2018-06-21 NOTE — SUBJECTIVE & OBJECTIVE
Interval History: Pt was seen and examined in ICU. Pt is receiving HD initiation day #3 today. Chart reviewed. Discussed with ICU. Met with HD RN. No c/o's or issues with HD.    Review of patient's allergies indicates:   Allergen Reactions    Lipitor [atorvastatin] Swelling     Lips       Current Facility-Administered Medications   Medication Frequency    acetaminophen tablet 650 mg Q4H PRN    amiodarone tablet 200 mg BID    aspirin chewable tablet 81 mg Daily    atropine injection 0.5 mg PRN    bisacodyl suppository 10 mg Daily PRN    chlorhexidine 0.12 % solution 10 mL BID    cloNIDine tablet 0.1 mg Q6H PRN    dextrose 50% injection 12.5 g PRN    dextrose 50% injection 25 g PRN    famotidine tablet 20 mg Daily    glucagon (human recombinant) injection 1 mg PRN    glucose chewable tablet 16 g PRN    glucose chewable tablet 24 g PRN    heparin (porcine) injection 3,000 Units PRN    heparin (porcine) injection 5,000 Units Q8H    hydrALAZINE injection 10 mg Q8H PRN    insulin aspart U-100 pen 0-5 Units QID (AC + HS) PRN    lactulose 20 gram/30 mL solution Soln 20 g Q6H PRN    lorazepam (ATIVAN) injection 2 mg Q4H PRN    metoprolol tartrate (LOPRESSOR) tablet 25 mg BID    morphine injection 4 mg Q4H PRN    nitroGLYCERIN SL tablet 0.4 mg Q5 Min PRN    ondansetron injection 4 mg Q8H PRN    sodium bicarbonate 8.4 % (1 mEq/mL) injection PRN    sodium bicarbonate tablet 650 mg BID    sodium chloride 0.9% flush 3 mL PRN    vancomycin 250mg / 10ml oral suspension 125 mg Q6H       Objective:     Vital Signs (Most Recent):  Temp: 99 °F (37.2 °C) (06/21/18 0305)  Pulse: 96 (06/21/18 0845)  Resp: 16 (06/21/18 0845)  BP: (!) 118/56 (06/21/18 0800)  SpO2: 100 % (06/21/18 0845)  O2 Device (Oxygen Therapy): ventilator (06/21/18 0732) Vital Signs (24h Range):  Temp:  [97.5 °F (36.4 °C)-99 °F (37.2 °C)] 99 °F (37.2 °C)  Pulse:  [] 96  Resp:  [5-19] 16  SpO2:  [99 %-100 %] 100 %  BP: ()/(35-67)  118/56     Weight: 77 kg (169 lb 12.1 oz) (06/21/18 0515)  Body mass index is 31.05 kg/m².  Body surface area is 1.84 meters squared.    I/O last 3 completed shifts:  In: 1175 [I.V.:100; NG/GT:1075]  Out: 2552 [Urine:142; Other:2320; Stool:90]    Physical Exam   Constitutional: She is oriented to person, place, and time. She appears well-developed and well-nourished. No distress.   HENT:   Head: Normocephalic and atraumatic.   Neck: No JVD present.   Cardiovascular: Normal rate, regular rhythm and normal heart sounds.  Exam reveals no friction rub.    Pulmonary/Chest: Effort normal and breath sounds normal. She has no rales.   Abdominal: Soft. Bowel sounds are normal. She exhibits no distension.   Musculoskeletal: She exhibits no edema.   Neurological: She is alert and oriented to person, place, and time.   Skin: Skin is warm and dry. She is not diaphoretic.   Nursing note and vitals reviewed.      Significant Labs: reviewed  Lab Results   Component Value Date    WBC 12.33 06/21/2018    HGB 8.3 (L) 06/21/2018    HCT 24.8 (L) 06/21/2018    MCV 84 06/21/2018     06/21/2018     BMP  Lab Results   Component Value Date     (L) 06/21/2018    K 3.9 06/21/2018     06/21/2018    CO2 23 06/21/2018    BUN 77 (H) 06/21/2018    CREATININE 5.5 (H) 06/21/2018    CALCIUM 7.8 (L) 06/21/2018    ANIONGAP 12 06/21/2018    ESTGFRAFRICA 8 (A) 06/21/2018    EGFRNONAA 7 (A) 06/21/2018       Significant Imaging: CXR reviewed

## 2018-06-21 NOTE — SUBJECTIVE & OBJECTIVE
Review of Systems   Unable to perform ROS: Intubated     Objective:     Vital Signs (Most Recent):  Temp: 98 °F (36.7 °C) (06/21/18 1130)  Pulse: 90 (06/21/18 1301)  Resp: 14 (06/21/18 1301)  BP: (!) 132/55 (06/21/18 1135)  SpO2: 100 % (06/21/18 1301) Vital Signs (24h Range):  Temp:  [98 °F (36.7 °C)-99 °F (37.2 °C)] 98 °F (36.7 °C)  Pulse:  [] 90  Resp:  [5-25] 14  SpO2:  [99 %-100 %] 100 %  BP: ()/(35-80) 132/55     Weight: 77 kg (169 lb 12.1 oz)  Body mass index is 31.05 kg/m².    Intake/Output Summary (Last 24 hours) at 06/21/18 1713  Last data filed at 06/21/18 1130   Gross per 24 hour   Intake              445 ml   Output             4272 ml   Net            -3827 ml      Physical Exam   Constitutional: She appears well-developed and well-nourished.   HENT:   Head: Normocephalic and atraumatic.   Eyes: EOM are normal.   Neck: Neck supple.   Cardiovascular: Normal rate, regular rhythm and intact distal pulses.    Pulmonary/Chest: No respiratory distress.   ETT on vent   Abdominal: Soft. Distention: soft distention.   Neurological: She is alert.   Skin: Skin is warm and dry.   Nursing note and vitals reviewed.      Significant Labs: All pertinent labs within the past 24 hours have been reviewed.    Significant Imaging: I have reviewed all pertinent imaging results/findings within the past 24 hours.

## 2018-06-22 PROBLEM — R93.89 ABNORMAL CHEST X-RAY: Status: RESOLVED | Noted: 2018-06-12 | Resolved: 2018-06-22

## 2018-06-22 PROBLEM — Z99.11 ON MECHANICALLY ASSISTED VENTILATION: Status: RESOLVED | Noted: 2018-06-14 | Resolved: 2018-06-22

## 2018-06-22 PROBLEM — J98.8 ACUTE AIRWAY OBSTRUCTION: Status: RESOLVED | Noted: 2018-06-15 | Resolved: 2018-06-22

## 2018-06-22 PROBLEM — G62.81 CRITICAL ILLNESS POLYNEUROPATHY: Status: ACTIVE | Noted: 2018-06-22

## 2018-06-22 LAB
ALBUMIN SERPL BCP-MCNC: 2.3 G/DL
ALP SERPL-CCNC: 46 U/L
ALT SERPL W/O P-5'-P-CCNC: 10 U/L
ANION GAP SERPL CALC-SCNC: 10 MMOL/L
AST SERPL-CCNC: 29 U/L
BASOPHILS # BLD AUTO: 0.01 K/UL
BASOPHILS NFR BLD: 0.1 %
BILIRUB SERPL-MCNC: 0.6 MG/DL
BUN SERPL-MCNC: 44 MG/DL
CALCIUM SERPL-MCNC: 8.1 MG/DL
CHLORIDE SERPL-SCNC: 103 MMOL/L
CO2 SERPL-SCNC: 23 MMOL/L
CREAT SERPL-MCNC: 4.1 MG/DL
DIFFERENTIAL METHOD: ABNORMAL
EOSINOPHIL # BLD AUTO: 0.1 K/UL
EOSINOPHIL NFR BLD: 0.9 %
ERYTHROCYTE [DISTWIDTH] IN BLOOD BY AUTOMATED COUNT: 17.7 %
EST. GFR  (AFRICAN AMERICAN): 12 ML/MIN/1.73 M^2
EST. GFR  (NON AFRICAN AMERICAN): 10 ML/MIN/1.73 M^2
GLUCOSE SERPL-MCNC: 105 MG/DL
HCT VFR BLD AUTO: 25.6 %
HGB BLD-MCNC: 8.2 G/DL
LYMPHOCYTES # BLD AUTO: 1.5 K/UL
LYMPHOCYTES NFR BLD: 10.3 %
MAGNESIUM SERPL-MCNC: 1.9 MG/DL
MCH RBC QN AUTO: 27.7 PG
MCHC RBC AUTO-ENTMCNC: 32 G/DL
MCV RBC AUTO: 87 FL
MONOCYTES # BLD AUTO: 0.8 K/UL
MONOCYTES NFR BLD: 5.8 %
NEUTROPHILS # BLD AUTO: 12 K/UL
NEUTROPHILS NFR BLD: 82.9 %
PHOSPHATE SERPL-MCNC: 3.9 MG/DL
PLATELET # BLD AUTO: 181 K/UL
PMV BLD AUTO: 10.1 FL
POCT GLUCOSE: 107 MG/DL (ref 70–110)
POCT GLUCOSE: 72 MG/DL (ref 70–110)
POCT GLUCOSE: 73 MG/DL (ref 70–110)
POCT GLUCOSE: 84 MG/DL (ref 70–110)
POTASSIUM SERPL-SCNC: 4.3 MMOL/L
PROT SERPL-MCNC: 5.3 G/DL
RBC # BLD AUTO: 2.96 M/UL
SODIUM SERPL-SCNC: 136 MMOL/L
WBC # BLD AUTO: 14.42 K/UL

## 2018-06-22 PROCEDURE — G8988 SELF CARE GOAL STATUS: HCPCS | Mod: CL

## 2018-06-22 PROCEDURE — 25000003 PHARM REV CODE 250: Performed by: SURGERY

## 2018-06-22 PROCEDURE — 97530 THERAPEUTIC ACTIVITIES: CPT

## 2018-06-22 PROCEDURE — 85025 COMPLETE CBC W/AUTO DIFF WBC: CPT

## 2018-06-22 PROCEDURE — 27000221 HC OXYGEN, UP TO 24 HOURS

## 2018-06-22 PROCEDURE — 25000003 PHARM REV CODE 250: Performed by: NURSE PRACTITIONER

## 2018-06-22 PROCEDURE — 92610 EVALUATE SWALLOWING FUNCTION: CPT

## 2018-06-22 PROCEDURE — 20000000 HC ICU ROOM

## 2018-06-22 PROCEDURE — 63600175 PHARM REV CODE 636 W HCPCS: Performed by: INTERNAL MEDICINE

## 2018-06-22 PROCEDURE — 99291 CRITICAL CARE FIRST HOUR: CPT | Mod: ,,, | Performed by: INTERNAL MEDICINE

## 2018-06-22 PROCEDURE — 99900037 HC PT THERAPY SCREENING (STAT)

## 2018-06-22 PROCEDURE — G8987 SELF CARE CURRENT STATUS: HCPCS | Mod: CN

## 2018-06-22 PROCEDURE — 80053 COMPREHEN METABOLIC PANEL: CPT

## 2018-06-22 PROCEDURE — 83735 ASSAY OF MAGNESIUM: CPT

## 2018-06-22 PROCEDURE — 97167 OT EVAL HIGH COMPLEX 60 MIN: CPT

## 2018-06-22 PROCEDURE — 99232 SBSQ HOSP IP/OBS MODERATE 35: CPT | Mod: ,,, | Performed by: INTERNAL MEDICINE

## 2018-06-22 PROCEDURE — 84100 ASSAY OF PHOSPHORUS: CPT

## 2018-06-22 PROCEDURE — 99233 SBSQ HOSP IP/OBS HIGH 50: CPT | Mod: ,,, | Performed by: INTERNAL MEDICINE

## 2018-06-22 RX ADMIN — AMIODARONE HYDROCHLORIDE 200 MG: 200 TABLET ORAL at 09:06

## 2018-06-22 RX ADMIN — CHLORHEXIDINE GLUCONATE 10 ML: 1.2 RINSE ORAL at 11:06

## 2018-06-22 RX ADMIN — HEPARIN SODIUM 5000 UNITS: 5000 INJECTION, SOLUTION INTRAVENOUS; SUBCUTANEOUS at 03:06

## 2018-06-22 RX ADMIN — Medication 125 MG: at 06:06

## 2018-06-22 RX ADMIN — Medication 125 MG: at 05:06

## 2018-06-22 RX ADMIN — METOPROLOL TARTRATE 25 MG: 25 TABLET, FILM COATED ORAL at 09:06

## 2018-06-22 RX ADMIN — SODIUM BICARBONATE 650 MG TABLET 650 MG: at 09:06

## 2018-06-22 RX ADMIN — FAMOTIDINE 20 MG: 20 TABLET ORAL at 09:06

## 2018-06-22 RX ADMIN — Medication 125 MG: at 12:06

## 2018-06-22 RX ADMIN — ASPIRIN 81 MG 81 MG: 81 TABLET ORAL at 09:06

## 2018-06-22 RX ADMIN — Medication 125 MG: at 11:06

## 2018-06-22 RX ADMIN — HEPARIN SODIUM 5000 UNITS: 5000 INJECTION, SOLUTION INTRAVENOUS; SUBCUTANEOUS at 05:06

## 2018-06-22 RX ADMIN — HEPARIN SODIUM 5000 UNITS: 5000 INJECTION, SOLUTION INTRAVENOUS; SUBCUTANEOUS at 10:06

## 2018-06-22 NOTE — ASSESSMENT & PLAN NOTE
Hold hemodialysis today.  Arrange hemodialysis for tomorrow.  Patient is comfortable and not short of breath.  Has received 3 sessions for hemodialysis already this week.  Case discussed in detail with Dr. Perez

## 2018-06-22 NOTE — PROGRESS NOTES
[facility] -   Critical Care Medicine  Progress Note    Patient Name: Citlali Karimi  MRN: [id number]  Admission Date: 6/12/2018  Hospital Length of Stay: 9 days  Code Status: DNR  Attending Provider: Levy Samuel MD  Primary Care Provider: Evelio Schuster MD   Principal Problem: Critical illness polyneuropathy    Subjective:     HPI:  Ms Karimi is a 67 yo BF with a PMH of HTN, Hyperparathyroidism, DM2, CKD5, CVA, CAD and HLD.  She was seen in clinic by surgery for hyperparathyroidism and hypercalcemia and was electively admitted 6/12 taken to OR undergoing parathyroidectomy finding 2 right sided enlarged parathyroid glands.  Intra and post op patient developed EKG changes and had elevated troponin.  Cards consulted.  Patient admitted due to swallowing issues she was not taking all her meds as prescribed and had BP as high as 225/103 during hospitalization.  Troponin increased to > 50.  Cards diagnosed with NSTEMI and she was Rx with ASA, Imdur, Lopressor, statin and Heparin infusion with plans for repeat LHC if cleared by Renal given CKD5.  Today, she had progressively worsening neck swelling and SOB with worsening dysphagia.  She was taken back to OR and had post op neck hematoma evacuation.  In PACU she had witnessed V-Fib cardiac arrest and CODE BLUE called.  After 3 rounds of CPR, Epi X 3 and Defib X 3 ROSC was attained.  She was still intubated and on vent post op.  CL and arterial line placed in right groin during code per myself.  Cards and Surgery at bedside.      Hospital/ICU Course:  6/14 - Admitted to ICU on [facility] ventilation and on Levophed infusion.  She was in A-Fib with ST changes.  Cards called and patient placed on Amiodarone and Heparin infusion.    6/15 - Weaned off Levophed yesterday afternoon.  Still on vent much improved oxygenation.  Airway leak of #6 OET.  Fully awake and following commands.  Still on Heparin infusion.  6/16 - Resting on vent with sedation tolerates SAT/SBT but  still neck swelling noted.  LHC yesterday revealed diffuse CAD not amenable for PCI, no changes from 2016.  VSS  6/17 - Still on mech vent for airway protection tolerating SAT/SBT.  Baldomero TF  6/18 - Resting on mech vent and sedation.  Neck swelling improving.  Worsening UOP and creatine.  Awake, alert and responsive on sedation.  Spouse at bedside.   6/19 - Still resting on vent with intermittent Morphine.  Neck swelling improving.  Oliguria and creatine worsening.  Now with diarrhea last 24 hours  6/20 - VasCath placed yesterday and started on RRT.  Diarrhea + C-diff per PCR.  Baldomero TF.  No air leak around OET with cuff deflated this AM  06/21: +ve leak. Plan for extubation  06/22: Weak, no overnight events, Has NG    Interval History:   No interval adverse events    Review of Systems   Constitutional: Positive for malaise/fatigue.   Neurological: Positive for weakness.   All other systems reviewed and are negative.      Objective:     Vital Signs (Most Recent):  Temp: 98.8 °F (37.1 °C) (06/22/18 0305)  Pulse: 65 (06/22/18 0700)  Resp: 20 (06/22/18 0700)  BP: 114/66 (06/22/18 0700)  SpO2: (!) 83 % (06/22/18 0700) Vital Signs (24h Range):  Temp:  [98 °F (36.7 °C)-98.8 °F (37.1 °C)] 98.8 °F (37.1 °C)  Pulse:  [] 65  Resp:  [13-25] 20  SpO2:  [83 %-100 %] 83 %  BP: ()/(50-83) 114/66     Weight: 77 kg (169 lb 12.1 oz)  Body mass index is 31.05 kg/m².      Intake/Output Summary (Last 24 hours) at 06/22/18 0804  Last data filed at 06/22/18 0605   Gross per 24 hour   Intake              360 ml   Output             2760 ml   Net            -2400 ml       Physical Exam   Constitutional: Vital signs are normal. She has a sickly appearance. Nasal cannula in place.       HENT:   Head: Normocephalic and atraumatic.   Nose: Nose normal.   Mouth/Throat: Oropharynx is clear and moist.   Eyes: Conjunctivae and EOM are normal. Pupils are equal, round, and reactive to light.   Neck: Normal range of motion. Neck supple. No JVD  present.   Cardiovascular: Normal rate, normal heart sounds and intact distal pulses.    No murmur heard.  Pulmonary/Chest: Effort normal and breath sounds normal.   Abdominal: Soft. Bowel sounds are normal.   Musculoskeletal: Normal range of motion. She exhibits no edema.   Lymphadenopathy:     She has no cervical adenopathy.   Neurological: She is alert. No cranial nerve deficit.   Skin: Skin is warm and dry. Capillary refill takes 2 to 3 seconds.   Psychiatric: She has a normal mood and affect.   Nursing note and vitals reviewed.      Vents:  Vent Mode: Spont (06/21/18 1301)  Ventilator Initiated: Yes (06/14/18 1812)  Set Rate: 0 bmp (06/21/18 1301)  Vt Set: 400 mL (06/21/18 1301)  Pressure Support: 8 cmH20 (06/21/18 1301)  PEEP/CPAP: 5 cmH20 (06/21/18 1301)  Oxygen Concentration (%): 4 (06/21/18 1505)  Peak Airway Pressure: 13 cmH2O (06/21/18 1301)  Plateau Pressure: 18 cmH20 (06/21/18 1301)  Total Ve: 6.39 mL (06/21/18 1301)  Negative Inspiratory Force (cm H2O): -40 (06/21/18 1430)  F/VT Ratio<105 (RSBI): (!) 40.94 (06/21/18 1301)    Lines/Drains/Airways     Central Venous Catheter Line                 Hemodialysis Catheter 06/19/18 1430 left femoral 2 days         Percutaneous Central Line Insertion/Assessment - triple lumen  06/21/18 1400 right subclavian less than 1 day          Drain                 Urethral Catheter 06/14/18 1113 7 days         Rectal Tube 06/18/18 1350 rectal tube w/ balloon (indicate number of mLs) 3 days         NG/OG Tube 06/21/18 2145 [place] sump Left nostril less than 1 day                Significant Labs:    CBC/Anemia Profile:    Recent Labs  Lab 06/20/18  1535 06/21/18  0350 06/22/18  0417   WBC  --  12.33 14.42*   HGB  --  8.3* 8.2*   HCT  --  24.8* 25.6*   PLT  --  170 181   MCV  --  84 87   RDW  --  17.5* 17.7*   IRON 24*  --   --    FERRITIN 1,532*  --   --         Chemistries:    Recent Labs  Lab 06/21/18  0350 06/22/18  0417   * 136   K 3.9 4.3    103   CO2 23 23    BUN 77* 44*   CREATININE 5.5* 4.1*   CALCIUM 7.8* 8.1*   ALBUMIN 2.2* 2.3*   PROT 5.0* 5.3*   BILITOT 0.4 0.6   ALKPHOS 46* 46*   ALT 10 10   AST 29 29   MG 1.9 1.9   PHOS 3.2 3.9       ABGs:   Recent Labs  Lab 06/21/18  0412   PH 7.455*   PCO2 33.1*   HCO3 23.3*   POCSATURATED 99   BE -1     Blood Culture: No results for input(s): LABBLOO in the last 48 hours.  Respiratory Culture: No results for input(s): GSRESP, RESPIRATORYC in the last 48 hours.  All pertinent labs within the past 24 hours have been reviewed.    Significant Imaging:  I have reviewed and interpreted all pertinent imaging results/findings within the past 24 hours.      Assessment/Plan:     Neuro   * Critical illness polyneuropathy    PT and OT          Pulmonary   Acute hypoxemic respiratory failure    SP extubation 06/21  Oral care  Deep breathing exercises  Nasal canulla, Keep Sat> 92%        Cardiac/Vascular   Cardiac arrest with ventricular fibrillation    Monitor and replace electrolytes if needed  ICU cardiac monitoring  Cont Amiodarone and Lopressor.  A-Fib resolved.  No neuro deficit noted above baseline from hx CVA        Coronary artery disease involving native coronary artery    Per Cards.  Medical management  C 6/15 done revealing diffuse CAD not amenable for PCI, no changes from 2016  Cont ASA, Lopressor  Plavix stopped by Cards 6/18.    Allergy to statin noted    Consider restart PLAVIX        Renal/   ESRD (end stage renal disease)    Vascath groin  No HD today        Acute renal failure superimposed on stage 5 chronic kidney disease, not on chronic dialysis    S/P contrast with [facility] 6/15  Renal following.    RRT started 6/19 on 3rd session  BMP daily  Accurate I/Os  DC Marv LARRY Renal move vascath from Groin        ID   C. difficile diarrhea    continue Vancomycin per OG        Oncology   Acute blood loss anemia    S/P 2 units PRBCs 6/15 and 1 unit PRBC 6/18  Daily H/H  No active bleeding  Patient has been treated with Procrit  20,000 units weekly for maintenance therapy followed by Hematology  H&H 8.2/25.6: not symptomatic        Endocrine   S/P parathyroidectomy    Per surgery still following  Incision clean  POD # 10        Hyperparathyroidism    POD # 10  S/P parathyroidectomy  Surgery following        Diabetes mellitus, type 2 - only on oral medications    Cont SSI             Overall Improving but weak: seated out in chair  Improve nutrition with NG, PT and OT  Vancomycin day 3  No HD today     Critical Care Daily Checklist:    A: Awake: RASS Goal/Actual Goal: RASS Goal: 0-->alert and calm  Actual: Martínez Agitation Sedation Scale (RASS): Alert and calm   B: Spontaneous Breathing Trial Performed? Spon. Breathing Trial Initiated?: Initiated (06/17/18 0700)   C: SAT & SBT Coordinated?  N/A                      D: Delirium: CAM-ICU Overall CAM-ICU: Positive   E: Early Mobility Performed? Yes   F: Feeding Goal: Goals: Meet > 85 % EEN/EPN while admitted  Status: Nutrition Goal Status: new   Current Diet Order   Procedures    Diet NPO      AS: Analgesia/Sedation NONE   T: Thromboembolic Prophylaxis YES   H: HOB > 300 Yes   U: Stress Ulcer Prophylaxis (if needed) YES   G: Glucose Control SSI   B: Bowel Function Stool Occurrence: 0   I: Indwelling Catheter (Lines & Fair) Necessity Groin vascath needs change, vascular contacted   D: De-escalation of Antimicrobials/Pharmacotherapies PO Vancomycin, NG will be replaced    Plan for the day/ETD Await placement    Code Status:  Family/Goals of Care: DNR  home     Critical Care Time: 43 minutes  Critical secondary to respiratory failure     Critical care was time spent personally by me on the following activities: development of treatment plan with patient or surrogate and bedside caregivers, discussions with consultants, evaluation of patient's response to treatment, examination of patient, ordering and performing treatments and interventions, ordering and review of laboratory studies, ordering  and review of radiographic studies, pulse oximetry, re-evaluation of patient's condition. This critical care time did not overlap with that of any other provider or involve time for any procedures.     Axel Perez MD  Critical Care Medicine  [facility] [place]

## 2018-06-22 NOTE — ASSESSMENT & PLAN NOTE
Per Cards.  Medical management  [facility] 6/15 done revealing diffuse CAD not amenable for PCI, no changes from 2016  Cont ASA, Lopressor  Plavix stopped by Cards 6/18.    Allergy to statin noted    Consider restart PLAVIX

## 2018-06-22 NOTE — PHYSICIAN QUERY
PT Name: Citlali Karimi  MR #: [id number]  Physician Query Form - Renal Clarification     Flor Peacock RN, CCDS  Contact Info: [phone] [email or web address]      This form is a permanent document in the medical record.     QueryDate: June 22, 2018    By submitting this query, we are merely seeking further clarification of documentation. Please utilize your independent clinical judgment when addressing the question(s) below.    The Medical record contains the following:   Indicator Supporting Clinical Findings Location in Medical Record    Kidney (Renal) Insufficiency     x Kidney (Renal) Failure / Injury ESRD  Advanced CKD Stage 5 further worsening in Renal Failure as a result of NSTEMI & current acute illness.      Acute renal failure superimposed on CKD stage 5    Worsening Renal Function    MAYLIN on CKD stage 4/5 :  Worsening in serum cr noted   Nephro PN 6/21        Heme Pn 6/22    Crit Care PN  6/15    Nephro PN 6/16   x Nephrotoxic Agents Nephrology consulted - pt may benefit from repeat angio due to NSTEMI but at high risk of contrast induced nephropathy    · high risk for worsening renal function due to IV contrast exposure ,     · will start patient on oral Mucomyst ,   · will need gentle hydration 1 hr before and about 6 hr following her catheterization Crit Care PN  6/15            Nephrology PN 6/13   x BUN/Creatinine GFR On 6/12: Bun: 33;  Cr: 3.5;  GFR: 15    6/14 - 6/22  Bun:     33 - 120  Cr:       3.5 - 8.3  GFR:    15 - 5 Lab    Urine: Casts         Eosinophils      Dehydration      Nausea/Vomiting     x Dialysis/CRRT Chronic HD initiated.  Nephro PN 6/21    Treatment:        x Other:  She underwent a native kidney biopsy on 10/10/13. Final report of the kidney biopsy is negative for any specific etiology for acute renal failure. 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     Nephro PN 6/21   x  6/12 = parathyroidectomy  NSTEMI  6/13 = Neck hematoma  "evacuation             Witnessed V-Fib cardiac arrest; Code Blue             NSTEMI post op             CPR; Defib             Afib - amiodarone             Levophed  6/14 = Cardiogenic Shock             Acute Hypoxic Resp Failure             Persistent AFib             Shock Liver  6/15 = ABLA PRBC x2 6/15 & PRBC x1 6/18             LHC - diffuse disease             Monitor worsening Renal Function Pulm Pn 6/21                Crit Care Med Pn 6/14     Provider, please specify the diagnosis or diagnoses associated with above clinical findings.           Please further specify "Acute Renal Failure".     [  ] Acute Kidney Failure/Injury with Tubular Necrosis    [  ] Other Acute Kidney Failure/Injury (please specify): ____________    [  ] Unspecified Acute Kidney Failure/Injury    [ X ] Other (please specify): _Due to NSTEMI and hypotension______________________________    [  ] Clinically Undetermined    Please document in your progress notes daily for the duration of treatment, until resolved, and include in your discharge summary.  "

## 2018-06-22 NOTE — ASSESSMENT & PLAN NOTE
June 22, 2018-patient has anemia now due to end-stage renal disease.  Treatment of this will be as per Nephrology protocol with IV iron/Depo supplementation with hemodialysis.  Please call with any questions.

## 2018-06-22 NOTE — PT/OT/SLP EVAL
Speech Language Pathology Evaluation  Bedside Swallow    Patient Name:  Citlali Karimi   MRN:  [id number]  Admitting Diagnosis: S/P parathyroidectomy    Recommendations:                 General Recommendations:  Dysphagia therapy  Diet recommendations:  NPO, NPO   Aspiration Precautions: Frequent oral care   General Precautions: Standard, aspiration    History:     Past Medical History:   Diagnosis Date    Acid reflux 1/7/2015    Anxiety 1/7/2015    Aortic valvar stenosis     Arthritis     osteo    Callus     Chronic anemia     followed by Dr. Addison    CKD (chronic kidney disease) stage 5, GFR less than 15 ml/min 8/7/2015    Combined hyperlipidemia associated with type 2 diabetes mellitus     Coronary artery disease     Diabetes mellitus type II, controlled, with no complications     LBSL 108 today    Encounter for blood transfusion     Frail elderly with impaired mobility, wheel chair bound 8/25/2017    Gallbladder problem     gallbladder surgery    Heart murmur     Hyperparathyroidism, secondary renal 1/7/2015    Hypertension 8/7/2015    Hypertension associated with diabetes 11/12/2012    Kidney transplant candidate 1/7/2015    Overweight(278.02)     Urinary tract infection        Past Surgical History:   Procedure Laterality Date    ANKLE FRACTURE SURGERY Left 1985    AORTIC VALVE REPLACEMENT  05/2016    BONE BIOPSY      CARDIAC SURGERY      CATARACT EXTRACTION W/  INTRAOCULAR LENS IMPLANT  2009    CHOLECYSTECTOMY      CYSTOSCOPY      EVACUATION OF HEMATOMA N/A 6/14/2018    Procedure: EVACUATION, HEMATOMA;  Surgeon: Levy Samuel MD;  Location: [facility] OR;  Service: General;  Laterality: N/A;    EYE SURGERY      FRACTURE SURGERY      HYSTERECTOMY  unknown    OOPHORECTOMY      PARATHYROIDECTOMY N/A 6/12/2018    Procedure: PARATHYROIDECTOMY;  Surgeon: Levy Samuel MD;  Location: [facility] OR;  Service: General;  Laterality: N/A;    removal of colon polyps      RENAL BIOPSY  10/2013     WOUND EXPLORATION N/A 6/14/2018    Procedure: EXPLORATION, WOUND;  Surgeon: Levy Samuel MD;  Location[facility] OR;  Service: General;  Laterality: N/A;    YAG cap -OD         Social History: Patient lives with her  who is her caregiver    Prior Intubation HX:  Extubated 6/21/2018. Prior to intubation no dysphagia present.    Subjective     Pt was seen at bedside with her  present.  She is sleepy and requires cueing to participate.  She made no attempt to verbalize   Patient goals: none stated     Pain/Comfort:  · Pain Rating 1: 0/10    Objective:     Oral Musculature Evaluation  · Oral Musculature: general weakness  · Dentition: present and adequate    Bedside Swallow Eval:   Consistencies Assessed:  · Ice chips     Oral Phase:   · Slow oral transit time    Pharyngeal Phase:   · decreased hyolaryngeal excursion to palpation  · multiple spontaneous swallows  · throat clearing      Assessment:     Citlali Karimi is a 68 y.o. female with an SLP diagnosis of Dysphagia.  She presents with extreme weakness and signs of possible aspiration.  ST recommends NPO and continue NG feedings with an ongoing swallow assessment.     Goals:    SLP Goals        Problem: SLP Goal    Goal Priority Disciplines Outcome   SLP Goal     SLP    Description:  POC established 6/22/2018  1.  Ongoing swallow assessment to determine po readiness  2. Oral and pharyngeal ex's with modA for increased strength and ROM                     Plan:     · Patient to be seen:  3 x/week   · Plan of Care expires:  06/29/18  · Plan of Care reviewed with:  spouse   · SLP Follow-Up:  Yes       Discharge recommendations:  nursing facility, skilled   Barriers to Discharge:  None    Time Tracking:     SLP Treatment Date:   06/22/18  Speech Start Time:  1015  Speech Stop Time:  1030     Speech Total Time (min):  15 min    Billable Minutes: Eval Swallow and Oral Function 15    Jacque Bernstein CCC-SLP  06/22/2018

## 2018-06-22 NOTE — ASSESSMENT & PLAN NOTE
- home medications continued   -hx of noncompliance and inconsistent dosing at home per   - uncontrolled upon arrival- improved with Hydralazine in PACU   - Hydralazine prn  6/22  Controlled

## 2018-06-22 NOTE — ASSESSMENT & PLAN NOTE
6/22/18 - Patient has been treated with Procrit for maintenance therapy for anemia due to chronic kidney disease. Patient with hemoglobin of 8.2 this morning.  No current evidence of bleeding noted.  We will continue to monitor closely.

## 2018-06-22 NOTE — PROGRESS NOTES
[facility] -   Nephrology  Progress Note    Patient Name: Citlali Karimi  MRN: [id number]  Admission Date: 6/12/2018  Hospital Length of Stay: 9 days  Attending Provider: Levy Samuel MD   Primary Care Physician: Evelio Schuster MD  Principal Problem:Critical illness polyneuropathy    Subjective:     HPI:  Citlali Karimi Is a pleasant 68 -year-old  woman with history of chronic kidney disease stage 4, patient was admitted to the hospital for an elective  partial parathyroidectomy, following her surgery during observation.  She had some chest pain, workup revealed non ST elevated MI, patient was admitted to the hospital for further management.  We were consulted due to advanced renal insufficiency, baseline serum creatinine about 3.5-4,            Important Info :        She underwent a native kidney biopsy on 10/10/13. Final report of the kidney biopsy is negative for any specific etiology for acute renal failure. 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         Interval History:  Hold dialysis today.  No chest pain or shortness of breath.  Will take the Fair's catheter out.  Patient has ESRD is on dialysis and is dialysis dependent    Review of patient's allergies indicates:   Allergen Reactions    Lipitor [atorvastatin] Swelling     Lips       Current Facility-Administered Medications   Medication Frequency    acetaminophen tablet 650 mg Q4H PRN    amiodarone tablet 200 mg BID    aspirin chewable tablet 81 mg Daily    atropine injection 0.5 mg PRN    bisacodyl suppository 10 mg Daily PRN    chlorhexidine 0.12 % solution 10 mL BID    cloNIDine tablet 0.1 mg Q6H PRN    dextrose 50% injection 12.5 g PRN    dextrose 50% injection 25 g PRN    epoetin carmen injection 4,000 Units Every Tues, Thurs, Sat    famotidine tablet 20 mg Daily    glucagon (human recombinant) injection 1 mg PRN    glucose chewable tablet 16 g PRN    glucose  chewable tablet 24 g PRN    heparin (porcine) injection 3,000 Units PRN    heparin (porcine) injection 5,000 Units Q8H    hydrALAZINE injection 10 mg Q8H PRN    insulin aspart U-100 pen 0-5 Units QID (AC + HS) PRN    lactulose 20 gram/30 mL solution Soln 20 g Q6H PRN    lorazepam (ATIVAN) injection 2 mg Q4H PRN    metoprolol tartrate (LOPRESSOR) tablet 25 mg BID    morphine injection 4 mg Q4H PRN    nitroGLYCERIN SL tablet 0.4 mg Q5 Min PRN    ondansetron injection 4 mg Q8H PRN    sodium bicarbonate 8.4 % (1 mEq/mL) injection PRN    sodium bicarbonate tablet 650 mg BID    sodium chloride 0.9% flush 3 mL PRN    vancomycin 250mg / 10ml oral suspension 125 mg Q6H       Objective:     Vital Signs (Most Recent):  Temp: 98.8 °F (37.1 °C) (06/22/18 0305)  Pulse: 84 (06/22/18 0947)  Resp: 20 (06/22/18 0700)  BP: (!) 92/47 (06/22/18 0947)  SpO2: (!) 83 % (06/22/18 0700)  O2 Device (Oxygen Therapy): nasal cannula (06/22/18 0705) Vital Signs (24h Range):  Temp:  [98.1 °F (36.7 °C)-98.8 °F (37.1 °C)] 98.8 °F (37.1 °C)  Pulse:  [] 84  Resp:  [19-22] 20  SpO2:  [83 %-100 %] 83 %  BP: ()/(47-83) 92/47     Weight: 77 kg (169 lb 12.1 oz) (06/21/18 0515)  Body mass index is 31.05 kg/m².  Body surface area is 1.84 meters squared.    I/O last 3 completed shifts:  In: 740 [NG/GT:740]  Out: 2872 [Urine:232; Other:2600; Stool:40]    Physical Exam   Constitutional: Vital signs are normal. She has a sickly appearance. Nasal cannula in place.       HENT:   Head: Normocephalic and atraumatic.   Nose: Nose normal.   Mouth/Throat: Oropharynx is clear and moist.   Eyes: Conjunctivae and EOM are normal. Pupils are equal, round, and reactive to light.   Neck: Normal range of motion. Neck supple. No JVD present.   Cardiovascular: Normal rate, normal heart sounds and intact distal pulses.    No murmur heard.  Pulmonary/Chest: Effort normal and breath sounds normal.   Abdominal: Soft. Bowel sounds are normal.    Musculoskeletal: Normal range of motion. She exhibits no edema.   Lymphadenopathy:     She has no cervical adenopathy.   Neurological: She is alert. No cranial nerve deficit.   Skin: Skin is warm and dry. Capillary refill takes 2 to 3 seconds.   Psychiatric: She has a normal mood and affect.   Nursing note and vitals reviewed.      Significant Labs:  All labs within the past 24 hours have been reviewed.     Significant Imaging:  Labs: Reviewed  X-Ray: Reviewed    Assessment/Plan:     ESRD (end stage renal disease)    Hold hemodialysis today.  Arrange hemodialysis for tomorrow.  Patient is comfortable and not short of breath.  Has received 3 sessions for hemodialysis already this week.  Case discussed in detail with Dr. Perez             Thank you for your consult.     Chinmay Lyles MD  Nephrology  [facility] -

## 2018-06-22 NOTE — PROGRESS NOTES
[facility] Hospital Medicine  Progress Note    Patient Name: Citlali Karimi  MRN: [id number]  Patient Class: IP- Inpatient   Admission Date: 6/12/2018  Length of Stay: 9 days  Attending Physician: Levy Samuel MD  Primary Care Provider: Evelio Schuster MD        Subjective:     Principal Problem:S/P parathyroidectomy    HPI:  Citlali Karimi is a 68 y.o female with PMHx of CKD (IV), HTN, DM, CVA (left sided weakness), and CAD who initially presented for hyperparathyroidism and hypercalcemia requiring surgical intervention.  Pt underwent parathyroidectomy today per Dr. Tracy (General Surgery). Pt admitted to Medical Surgical Unit post procedure and Hospital Medicine consulted for medical management.  Chest xray post procedure showed mild asymmetric CHF versus RUL pneumonia. Troponin 0.113 and BNP >270 noted.  Pt given IV Lasix in PACU prior to unit arrival.      Hospital Course:  Pt admitted to Outpatient Extended Recovery post procedure.  Elevated noted post procedure and results trended yielding significant positive response.  Cardiology consulted and Heparin infusion initiated.  Hx of CAD, multiple vessels noted with home medications continued and Imdur dose increased.  Nephrology consulted due to elevated creatinine and [facility] recommended.  Echo results pending.  Heart catheterization procedure considered with family refusing due to concern for worsening kidney function as patient does not want to be on dialysis.  Decreased oral intake and difficulty swallowing noted.  Speech therapist to bedside.  Pt made NPO and General Surgery notified.      6/15  Patient worsening status. ET changed per Pulmonary CC. Patient s/p Code Blue - VFib with recovery. Cardiology taking patient to CATH lab. Discussed with CM plan for post acute care in progress.    6/16  Patient improved o/n. Patient awake and able to follow simple command. Breathing trials in progress. Discussed with Pulmonary CC    6/17  Patient remains  intubated.  at bedside. Cardiology at bedside as well. Discussed with CC Team    6/18  Patient responds to command but remains intubated. Swelling to neck continues to be prominent. Discussed with CC Team. Worsening renal function. Possible HD need.    6/19  Patient remains intubated. Worsening renal function. No events Discussed with CC Team    6/21  Positive cough leak and plan for extubation today .   6/22  Patient continue to be confused . Will consider ct head if didn't improve . Continue treat infection/c difficle .Consulted vascular surgery dr maciel to remove femoral dialysis and put new one subclavian ?. To avoid risk of infection in groin. Patient will continue to need dialysis            Review of Systems   Unable to perform ROS: Mental status change     Objective:     Vital Signs (Most Recent):  Temp: 98.8 °F (37.1 °C) (06/22/18 0305)  Pulse: 84 (06/22/18 0947)  Resp: 20 (06/22/18 0700)  BP: (!) 92/47 (06/22/18 0947)  SpO2: (!) 83 % (06/22/18 0700) Vital Signs (24h Range):  Temp:  [98.1 °F (36.7 °C)-98.8 °F (37.1 °C)] 98.8 °F (37.1 °C)  Pulse:  [] 84  Resp:  [14-22] 20  SpO2:  [83 %-100 %] 83 %  BP: ()/(47-83) 92/47     Weight: 77 kg (169 lb 12.1 oz)  Body mass index is 31.05 kg/m².    Intake/Output Summary (Last 24 hours) at 06/22/18 1143  Last data filed at 06/22/18 0605   Gross per 24 hour   Intake              360 ml   Output              150 ml   Net              210 ml      Physical Exam   Constitutional: Vital signs are normal. She has a sickly appearance. Nasal cannula in place.       HENT:   Head: Normocephalic and atraumatic.   Nose: Nose normal.   Mouth/Throat: Oropharynx is clear and moist.   Eyes: Conjunctivae and EOM are normal. Pupils are equal, round, and reactive to light.   Neck: Normal range of motion. Neck supple. No JVD present.   Cardiovascular: Normal rate, normal heart sounds and intact distal pulses.    No murmur heard.  Pulmonary/Chest: Effort normal and  breath sounds normal.   Abdominal: Soft. Bowel sounds are normal.   Musculoskeletal: Normal range of motion. She exhibits no edema.   Lymphadenopathy:     She has no cervical adenopathy.   Neurological: She is alert. No cranial nerve deficit.   Skin: Skin is warm and dry. Capillary refill takes 2 to 3 seconds.   Psychiatric: She has a normal mood and affect.   Nursing note and vitals reviewed.      Significant Labs: All pertinent labs within the past 24 hours have been reviewed.    Significant Imaging: I have reviewed all pertinent imaging results/findings within the past 24 hours.    Assessment/Plan:      * S/P parathyroidectomy              C. difficile diarrhea    Continue with oral vancomycin         NSTEMI (non-ST elevated myocardial infarction)    ASA  Cardiology  [facility] - Diffuse disease  No further intervention recommended  Medical Management  No statin due to allergy        Cardiac arrest with ventricular fibrillation    Cardiology  [facility] 6/15  ASA  Statin Allergy  BB  No further interventions  Diffuse disease    6/18  ASA  BB  Diffuse disease No intervention    6/19  ASA  BB  No Statin due to allergy  Cardiology        Elevated troponin    -initial 0.113>>34>>50>>41  -pt denies CP and SOB  - EKG results showed diffuse ST changes    -serial results revealed NSTEMI with Cardiology consulted     6/15  NSTEMI POA  Cardiology  [facility]  ASA  Hold Statin due to Elevated LFT's    6/16  Cardiology  ASA  ICU  Statin allergy noted    6/17  S/p [facility] - Diffuse disease  ASA  Cards              CVA (cerebral vascular accident)    -hx of 1 yr ago per   -left sided weakness residual  -ASA   No Statin due to allergy        Hyperparathyroidism    -Pt admitted to Extended Recovery then transferred to Inpatient due to NSTEMI  -s/p Parathyroidectomy   -managed by General Surgery -primary team  - PTH- 32  - Ca 11.4>>10.5  - analgesia prn   - antiemetics prn  - specimens sent for analysis    6/16  Ca 8.7 today  Monitor    6/18  Ca  8.2 today  Monitor    6/19  Ca 8.2 stable        Coronary artery disease involving native coronary artery    - ASA, Statin, and Lopressor continued   -Imdur dose increased   -previous Cath showed severe CAD (proximal LCX 99%, mid 50%, RCA mid 90%, distal with subtotal lesion).  - Cardiology consulted with medical management continued   - [facility] proposed pending Nephrology recommendations  -family refused [facility] due to concern for worsening kidney function as pt had expressed that she did not want to be on dialysis     6/15  Family electing [facility] and accepting risk of worsening renal function  Cardiology    6/16  S/p LHC  Cardiology  ASA  Statin allergy noted    6/16  No interventions  Cardiology on consult    6/18  Cardiology on Consult  Medical Management    6/19  ASA  BB  6/22  Continue with medical management           Acute renal failure superimposed on stage 5 chronic kidney disease, not on chronic dialysis    -Creatinine 3.6>>4.1  -baseline 2.7-4.4  -will monitor   -sodium bicarb continued   -pt has been followed outpatient by Dr. Vazquez (Nephrology)  -Nephrology consulted - pt may benefit from repeat angio due to NSTEMI but at high risk of contrast induced nephropathy  - pt was candidate for renal transplant however work up discontinued due to progressive weakness  - [facility] recommended         6/15  Monitor worsening Renal Function  S/p Cardiac VFib Arrest  [facility] today  IVF's  Monitor      6/16  [facility] diffuse disease  Cardiology  Monitor    6/17  Worsening  No further Cardiac intervention  Nephrology on consult  Monitor  Cr 6.0 today vs 4.7  Low Urine O/P    6/18  COntinues to worsen. Cr 7.3 today  Nephrology guidance for HD vs Monitoring    6/19  Worsening  Cr 8.3 today - K is 4.6  Nephrology on board  No HD indicated at present  Monitor  6/22  [facility]          Hypertension associated with diabetes    - home medications continued   -hx of noncompliance and  inconsistent dosing at home per   - uncontrolled upon arrival- improved with Hydralazine in PACU   - Hydralazine prn  6/22  Controlled         Diabetes mellitus, type 2 - only on oral medications    Controlled  NICC  Accuchecks          Acute blood loss anemia    -stable post procedure  -will monitor  -CBC in am   -pt followed outpatient by Heme/Onc    6/16  Stable  Monitor    6/17  Improving  Monitor    6/18  Hgb 7.7 today  Transfuse PRBC's per sx  Monitor    6/19  Hgb 9.7 today  Monitor            VTE Risk Mitigation         Ordered     heparin (porcine) injection 5,000 Units  Every 8 hours      06/21/18 0838     heparin (porcine) injection 3,000 Units  As needed (PRN)      06/19/18 1408     Place sequential compression device  Until discontinued      06/12/18 1851          Critical care time spent on the evaluation and treatment of severe organ dysfunction, review of pertinent labs and imaging studies, discussions with consulting providers and discussions with patient/family: 40 minutes    Dominguez Levy MD  Department of Hospital Medicine   [facility] -

## 2018-06-22 NOTE — PROGRESS NOTES
[facility] -   Hematology/Oncology  Progress Note    Patient Name: Citlali Karimi  Admission Date: 6/12/2018  Hospital Length of Stay: 9 days  Code Status: DNR     Subjective:     HPI:  68 year old female, PMHx of CKD (IV), HTN, DM, CVA (left sided weakness), and CAD who initially presented for hyperparathyroidism and hypercalcemia requiring surgical intervention.   She was seen in clinic by surgery for hyperparathyroidism and hypercalcemia and was electively admitted 6/12 taken to OR undergoing parathyroidectomy finding 2 right sided enlarged parathyroid glands.  Intra and post op patient developed EKG changes and had elevated troponin.  Cards consulted.  Patient admitted due to swallowing issues she was not taking all her meds as prescribed and had BP as high as 225/103 during hospitalization.  Troponin increased to > 50.  Cards diagnosed with NSTEMI and she was Rx with ASA, Imdur, Lopressor, statin and Heparin infusion with plans for repeat LHC if cleared by Renal given CKD5.  Today, she had progressively worsening neck swelling and SOB with worsening dysphagia.  She was taken back to OR and had post op neck hematoma evacuation.  In PACU she had witnessed V-Fib cardiac arrest and CODE BLUE called.  After 3 rounds of CPR, Epi X 3 and Defib X 3 ROSC was attained.  She was still intubated and on vent post op    Interval History:  The patient has been extubated overnight.  She opens her eyes when spoken to and acknowledges presence but is otherwise noncommunicative at this time.    Oncology Treatment Plan:   [No treatment plan]    Medications:  Continuous Infusions:  Scheduled Meds:   amiodarone  200 mg Oral BID    aspirin  81 mg Oral Daily    chlorhexidine  10 mL Mouth/Throat BID    epoetin carmen (PROCRIT) injection  4,000 Units Intravenous Every Tues, Thurs, Sat    famotidine  20 mg Oral Daily    heparin (porcine)  5,000 Units Subcutaneous Q8H    metoprolol tartrate  25 mg Oral BID    sodium  bicarbonate  650 mg Oral BID    vancomycin  125 mg Oral Q6H     PRN Meds:acetaminophen, atropine, bisacodyl, cloNIDine, dextrose 50%, dextrose 50%, glucagon (human recombinant), glucose, glucose, heparin (porcine), hydrALAZINE, insulin aspart U-100, lactulose, lorazepam, morphine, nitroGLYCERIN, ondansetron, sodium bicarbonate, sodium chloride 0.9%     Review of Systems   Unable to perform ROS: Mental status change     Objective:     Vital Signs (Most Recent):  Temp: 98.7 °F (37.1 °C) (06/21/18 2305)  Pulse: 81 (06/22/18 0220)  Resp: (!) 21 (06/22/18 0220)  BP: 112/72 (06/22/18 0220)  SpO2: 96 % (06/22/18 0220) Vital Signs (24h Range):  Temp:  [98 °F (36.7 °C)-98.7 °F (37.1 °C)] 98.7 °F (37.1 °C)  Pulse:  [] 81  Resp:  [13-25] 21  SpO2:  [96 %-100 %] 96 %  BP: (104-140)/(47-83) 112/72     Weight: 77 kg (169 lb 12.1 oz)  Body mass index is 31.05 kg/m².  Body surface area is 1.84 meters squared.      Intake/Output Summary (Last 24 hours) at 06/22/18 0723  Last data filed at 06/22/18 0505   Gross per 24 hour   Intake              360 ml   Output             2755 ml   Net            -2395 ml       Physical Exam   Constitutional: She appears well-developed and well-nourished. She is active and cooperative. She appears ill.   HENT:   Head: Normocephalic and atraumatic.   Nose: Nose normal.   Mouth/Throat: Oropharynx is clear and moist. No oropharyngeal exudate.   Healing neck incision is noted.   Eyes: Pupils are equal, round, and reactive to light. No scleral icterus.   Neck: Neck supple. No thyromegaly present.   Cardiovascular: Normal rate, regular rhythm, normal heart sounds and intact distal pulses.    No murmur heard.  Pulmonary/Chest: Effort normal. No stridor. No respiratory distress. She has no wheezes. She has rhonchi. She has no rales.   Abdominal: Soft. Bowel sounds are normal. She exhibits no distension, no ascites and no mass. There is no hepatosplenomegaly. There is no tenderness. There is no  rigidity, no rebound and no guarding.   Musculoskeletal: She exhibits no edema or tenderness.   Lymphadenopathy:     She has no cervical adenopathy.   Neurological: She is unresponsive. No cranial nerve deficit. She exhibits normal muscle tone. Coordination normal.   Skin: Skin is warm and dry. Ecchymosis noted. No rash noted. No pallor.   Psychiatric: She is slowed. She is noncommunicative.   Nursing note and vitals reviewed.      Significant Labs:   I have reviewed all of the patient's relevant lab work available in the medical record and have utilized this in my evaluation and management recommendations today    Diagnostic Results:  I have reviewed all of the patient's diagnostic/imaging results available in the medical record and have utilized this in my evaluation and management recommendations today.    Assessment/Plan:     On mechanically assisted ventilation    June 22, 2018-patient has been extubated overnight and is breathing spontaneously.        Acute renal failure superimposed on stage 5 chronic kidney disease, not on chronic dialysis    June 22, 2018-patient has anemia now due to end-stage renal disease.  Treatment of this will be as per Nephrology protocol with IV iron/Depo supplementation with hemodialysis.  Please call with any questions.        Acute blood loss anemia    6/22/18 - Patient has been treated with Procrit for maintenance therapy for anemia due to chronic kidney disease. Patient with hemoglobin of 8.2 this morning.  No current evidence of bleeding noted.  We will continue to monitor closely.            Thank you for your consult.      David Addison MD  Hematology/Oncology  [facility]

## 2018-06-22 NOTE — ASSESSMENT & PLAN NOTE
- ASA, Statin, and Lopressor continued   -Imdur dose increased   -previous Cath showed severe CAD (proximal LCX 99%, mid 50%, RCA mid 90%, distal with subtotal lesion).  - Cardiology consulted with medical management continued   - LHC proposed pending Nephrology recommendations  -family refused LHC due to concern for worsening kidney function as pt had expressed that she did not want to be on dialysis     6/15  Family electing LHC and accepting risk of worsening renal function  Cardiology    6/16  S/p LHC  Cardiology  ASA  Statin allergy noted    6/16  No interventions  Cardiology on consult    6/18  Cardiology on Consult  Medical Management    6/19  ASA  BB  6/22  Continue with medical management

## 2018-06-22 NOTE — PLAN OF CARE
Problem: Patient Care Overview  Goal: Plan of Care Review  Outcome: Ongoing (interventions implemented as appropriate)  Pt drowsy, arouses to voice, inconsistently follows commands, responds to name but does not answer questions. Since approx 0500 pt has been alert, not drowsy, but no other neuro changes. NGT placed; pt tolerated well. NSR on monitor. UOP 5-15mL/hr per pandey. Minimal liquid stool per rectal tube. Q2 turns, heels floated. Contact precautions for cdiff in place.

## 2018-06-22 NOTE — EICU
NG tube placement review. Still images are not up.  Talked to RN.  She said radiologist read it as in good position.  So Ok to use NG tube.      Checked image : NG tube in place.

## 2018-06-22 NOTE — SUBJECTIVE & OBJECTIVE
Interval History:  The patient has been extubated overnight.  She opens her eyes when spoken to and acknowledges presence but is otherwise noncommunicative at this time.    Oncology Treatment Plan:   [No treatment plan]    Medications:  Continuous Infusions:  Scheduled Meds:   amiodarone  200 mg Oral BID    aspirin  81 mg Oral Daily    chlorhexidine  10 mL Mouth/Throat BID    epoetin carmen (PROCRIT) injection  4,000 Units Intravenous Every Tues, Thurs, Sat    famotidine  20 mg Oral Daily    heparin (porcine)  5,000 Units Subcutaneous Q8H    metoprolol tartrate  25 mg Oral BID    sodium bicarbonate  650 mg Oral BID    vancomycin  125 mg Oral Q6H     PRN Meds:acetaminophen, atropine, bisacodyl, cloNIDine, dextrose 50%, dextrose 50%, glucagon (human recombinant), glucose, glucose, heparin (porcine), hydrALAZINE, insulin aspart U-100, lactulose, lorazepam, morphine, nitroGLYCERIN, ondansetron, sodium bicarbonate, sodium chloride 0.9%     Review of Systems   Unable to perform ROS: Mental status change     Objective:     Vital Signs (Most Recent):  Temp: 98.7 °F (37.1 °C) (06/21/18 2305)  Pulse: 81 (06/22/18 0220)  Resp: (!) 21 (06/22/18 0220)  BP: 112/72 (06/22/18 0220)  SpO2: 96 % (06/22/18 0220) Vital Signs (24h Range):  Temp:  [98 °F (36.7 °C)-98.7 °F (37.1 °C)] 98.7 °F (37.1 °C)  Pulse:  [] 81  Resp:  [13-25] 21  SpO2:  [96 %-100 %] 96 %  BP: (104-140)/(47-83) 112/72     Weight: 77 kg (169 lb 12.1 oz)  Body mass index is 31.05 kg/m².  Body surface area is 1.84 meters squared.      Intake/Output Summary (Last 24 hours) at 06/22/18 0723  Last data filed at 06/22/18 0505   Gross per 24 hour   Intake              360 ml   Output             2755 ml   Net            -2395 ml       Physical Exam   Constitutional: She appears well-developed and well-nourished. She is active and cooperative. She appears ill.   HENT:   Head: Normocephalic and atraumatic.   Nose: Nose normal.   Mouth/Throat: Oropharynx is clear  and moist. No oropharyngeal exudate.   Healing neck incision is noted.   Eyes: Pupils are equal, round, and reactive to light. No scleral icterus.   Neck: Neck supple. No thyromegaly present.   Cardiovascular: Normal rate, regular rhythm, normal heart sounds and intact distal pulses.    No murmur heard.  Pulmonary/Chest: Effort normal. No stridor. No respiratory distress. She has no wheezes. She has rhonchi. She has no rales.   Abdominal: Soft. Bowel sounds are normal. She exhibits no distension, no ascites and no mass. There is no hepatosplenomegaly. There is no tenderness. There is no rigidity, no rebound and no guarding.   Musculoskeletal: She exhibits no edema or tenderness.   Lymphadenopathy:     She has no cervical adenopathy.   Neurological: She is unresponsive. No cranial nerve deficit. She exhibits normal muscle tone. Coordination normal.   Skin: Skin is warm and dry. Ecchymosis noted. No rash noted. No pallor.   Psychiatric: She is slowed. She is noncommunicative.   Nursing note and vitals reviewed.      Significant Labs:   I have reviewed all of the patient's relevant lab work available in the medical record and have utilized this in my evaluation and management recommendations today    Diagnostic Results:  I have reviewed all of the patient's diagnostic/imaging results available in the medical record and have utilized this in my evaluation and management recommendations today.

## 2018-06-22 NOTE — ASSESSMENT & PLAN NOTE
S/P contrast with LHC 6/15  Renal following.    RRT started 6/19 on 3rd session  BMP daily  Accurate I/Os  DC Marv LARRY Renal move vascath from Groin

## 2018-06-22 NOTE — PT/OT/SLP PROGRESS
Physical Therapy      Patient Name:  Citlali Karimi   MRN:  [id number]    P.T. EVAL INITIATED THIS AM VIA CHART REVIEW, PT CURRENTLY NOT READY FOR P.T. EVAL PER NURSE NADEEM, PT STILL LETHARGIC AND UNABLE TO FOLLOW COMMANDS, PT JUST EXTUBATED, WILL RE-ATTEMPT COMPLETION OF P.T. EVAL NEXT VISIT    Aditi Robledo, PT   6/22/2018  4525

## 2018-06-22 NOTE — SUBJECTIVE & OBJECTIVE
Review of Systems   Unable to perform ROS: Mental status change     Objective:     Vital Signs (Most Recent):  Temp: 98.8 °F (37.1 °C) (06/22/18 0305)  Pulse: 84 (06/22/18 0947)  Resp: 20 (06/22/18 0700)  BP: (!) 92/47 (06/22/18 0947)  SpO2: (!) 83 % (06/22/18 0700) Vital Signs (24h Range):  Temp:  [98.1 °F (36.7 °C)-98.8 °F (37.1 °C)] 98.8 °F (37.1 °C)  Pulse:  [] 84  Resp:  [14-22] 20  SpO2:  [83 %-100 %] 83 %  BP: ()/(47-83) 92/47     Weight: 77 kg (169 lb 12.1 oz)  Body mass index is 31.05 kg/m².    Intake/Output Summary (Last 24 hours) at 06/22/18 1143  Last data filed at 06/22/18 0605   Gross per 24 hour   Intake              360 ml   Output              150 ml   Net              210 ml      Physical Exam   Constitutional: Vital signs are normal. She has a sickly appearance. Nasal cannula in place.       HENT:   Head: Normocephalic and atraumatic.   Nose: Nose normal.   Mouth/Throat: Oropharynx is clear and moist.   Eyes: Conjunctivae and EOM are normal. Pupils are equal, round, and reactive to light.   Neck: Normal range of motion. Neck supple. No JVD present.   Cardiovascular: Normal rate, normal heart sounds and intact distal pulses.    No murmur heard.  Pulmonary/Chest: Effort normal and breath sounds normal.   Abdominal: Soft. Bowel sounds are normal.   Musculoskeletal: Normal range of motion. She exhibits no edema.   Lymphadenopathy:     She has no cervical adenopathy.   Neurological: She is alert. No cranial nerve deficit.   Skin: Skin is warm and dry. Capillary refill takes 2 to 3 seconds.   Psychiatric: She has a normal mood and affect.   Nursing note and vitals reviewed.      Significant Labs: All pertinent labs within the past 24 hours have been reviewed.    Significant Imaging: I have reviewed all pertinent imaging results/findings within the past 24 hours.

## 2018-06-22 NOTE — SUBJECTIVE & OBJECTIVE
Interval History:  Hold dialysis today.  No chest pain or shortness of breath.  Will take the Fair's catheter out.  Patient has ESRD is on dialysis and is dialysis dependent    Review of patient's allergies indicates:   Allergen Reactions    Lipitor [atorvastatin] Swelling     Lips       Current Facility-Administered Medications   Medication Frequency    acetaminophen tablet 650 mg Q4H PRN    amiodarone tablet 200 mg BID    aspirin chewable tablet 81 mg Daily    atropine injection 0.5 mg PRN    bisacodyl suppository 10 mg Daily PRN    chlorhexidine 0.12 % solution 10 mL BID    cloNIDine tablet 0.1 mg Q6H PRN    dextrose 50% injection 12.5 g PRN    dextrose 50% injection 25 g PRN    epoetin carmen injection 4,000 Units Every Tues, Thurs, Sat    famotidine tablet 20 mg Daily    glucagon (human recombinant) injection 1 mg PRN    glucose chewable tablet 16 g PRN    glucose chewable tablet 24 g PRN    heparin (porcine) injection 3,000 Units PRN    heparin (porcine) injection 5,000 Units Q8H    hydrALAZINE injection 10 mg Q8H PRN    insulin aspart U-100 pen 0-5 Units QID (AC + HS) PRN    lactulose 20 gram/30 mL solution Soln 20 g Q6H PRN    lorazepam (ATIVAN) injection 2 mg Q4H PRN    metoprolol tartrate (LOPRESSOR) tablet 25 mg BID    morphine injection 4 mg Q4H PRN    nitroGLYCERIN SL tablet 0.4 mg Q5 Min PRN    ondansetron injection 4 mg Q8H PRN    sodium bicarbonate 8.4 % (1 mEq/mL) injection PRN    sodium bicarbonate tablet 650 mg BID    sodium chloride 0.9% flush 3 mL PRN    vancomycin 250mg / 10ml oral suspension 125 mg Q6H       Objective:     Vital Signs (Most Recent):  Temp: 98.8 °F (37.1 °C) (06/22/18 0305)  Pulse: 84 (06/22/18 0947)  Resp: 20 (06/22/18 0700)  BP: (!) 92/47 (06/22/18 0947)  SpO2: (!) 83 % (06/22/18 0700)  O2 Device (Oxygen Therapy): nasal cannula (06/22/18 0705) Vital Signs (24h Range):  Temp:  [98.1 °F (36.7 °C)-98.8 °F (37.1 °C)] 98.8 °F (37.1 °C)  Pulse:  []  84  Resp:  [19-22] 20  SpO2:  [83 %-100 %] 83 %  BP: ()/(47-83) 92/47     Weight: 77 kg (169 lb 12.1 oz) (06/21/18 0515)  Body mass index is 31.05 kg/m².  Body surface area is 1.84 meters squared.    I/O last 3 completed shifts:  In: 740 [NG/GT:740]  Out: 2872 [Urine:232; Other:2600; Stool:40]    Physical Exam   Constitutional: Vital signs are normal. She has a sickly appearance. Nasal cannula in place.       HENT:   Head: Normocephalic and atraumatic.   Nose: Nose normal.   Mouth/Throat: Oropharynx is clear and moist.   Eyes: Conjunctivae and EOM are normal. Pupils are equal, round, and reactive to light.   Neck: Normal range of motion. Neck supple. No JVD present.   Cardiovascular: Normal rate, normal heart sounds and intact distal pulses.    No murmur heard.  Pulmonary/Chest: Effort normal and breath sounds normal.   Abdominal: Soft. Bowel sounds are normal.   Musculoskeletal: Normal range of motion. She exhibits no edema.   Lymphadenopathy:     She has no cervical adenopathy.   Neurological: She is alert. No cranial nerve deficit.   Skin: Skin is warm and dry. Capillary refill takes 2 to 3 seconds.   Psychiatric: She has a normal mood and affect.   Nursing note and vitals reviewed.      Significant Labs:  All labs within the past 24 hours have been reviewed.     Significant Imaging:  Labs: Reviewed  X-Ray: Reviewed

## 2018-06-22 NOTE — SUBJECTIVE & OBJECTIVE
Interval History:   No interval adverse events    Review of Systems   Constitutional: Positive for malaise/fatigue.   Neurological: Positive for weakness.   All other systems reviewed and are negative.      Objective:     Vital Signs (Most Recent):  Temp: 98.8 °F (37.1 °C) (06/22/18 0305)  Pulse: 65 (06/22/18 0700)  Resp: 20 (06/22/18 0700)  BP: 114/66 (06/22/18 0700)  SpO2: (!) 83 % (06/22/18 0700) Vital Signs (24h Range):  Temp:  [98 °F (36.7 °C)-98.8 °F (37.1 °C)] 98.8 °F (37.1 °C)  Pulse:  [] 65  Resp:  [13-25] 20  SpO2:  [83 %-100 %] 83 %  BP: ()/(50-83) 114/66     Weight: 77 kg (169 lb 12.1 oz)  Body mass index is 31.05 kg/m².      Intake/Output Summary (Last 24 hours) at 06/22/18 0804  Last data filed at 06/22/18 0605   Gross per 24 hour   Intake              360 ml   Output             2760 ml   Net            -2400 ml       Physical Exam   Constitutional: Vital signs are normal. She has a sickly appearance. Nasal cannula in place.       HENT:   Head: Normocephalic and atraumatic.   Nose: Nose normal.   Mouth/Throat: Oropharynx is clear and moist.   Eyes: Conjunctivae and EOM are normal. Pupils are equal, round, and reactive to light.   Neck: Normal range of motion. Neck supple. No JVD present.   Cardiovascular: Normal rate, normal heart sounds and intact distal pulses.    No murmur heard.  Pulmonary/Chest: Effort normal and breath sounds normal.   Abdominal: Soft. Bowel sounds are normal.   Musculoskeletal: Normal range of motion. She exhibits no edema.   Lymphadenopathy:     She has no cervical adenopathy.   Neurological: She is alert. No cranial nerve deficit.   Skin: Skin is warm and dry. Capillary refill takes 2 to 3 seconds.   Psychiatric: She has a normal mood and affect.   Nursing note and vitals reviewed.      Vents:  Vent Mode: Spont (06/21/18 1301)  Ventilator Initiated: Yes (06/14/18 1812)  Set Rate: 0 bmp (06/21/18 1301)  Vt Set: 400 mL (06/21/18 1301)  Pressure Support: 8 cmH20  (06/21/18 1301)  PEEP/CPAP: 5 cmH20 (06/21/18 1301)  Oxygen Concentration (%): 4 (06/21/18 1505)  Peak Airway Pressure: 13 cmH2O (06/21/18 1301)  Plateau Pressure: 18 cmH20 (06/21/18 1301)  Total Ve: 6.39 mL (06/21/18 1301)  Negative Inspiratory Force (cm H2O): -40 (06/21/18 1430)  F/VT Ratio<105 (RSBI): (!) 40.94 (06/21/18 1301)    Lines/Drains/Airways     Central Venous Catheter Line                 Hemodialysis Catheter 06/19/18 1430 left femoral 2 days         Percutaneous Central Line Insertion/Assessment - triple lumen  06/21/18 1400 right subclavian less than 1 day          Drain                 Urethral Catheter 06/14/18 1113 7 days         Rectal Tube 06/18/18 1350 rectal tube w/ balloon (indicate number of mLs) 3 days         NG/OG Tube 06/21/18 2145 Hunt sump Left nostril less than 1 day                Significant Labs:    CBC/Anemia Profile:    Recent Labs  Lab 06/20/18  1535 06/21/18  0350 06/22/18  0417   WBC  --  12.33 14.42*   HGB  --  8.3* 8.2*   HCT  --  24.8* 25.6*   PLT  --  170 181   MCV  --  84 87   RDW  --  17.5* 17.7*   IRON 24*  --   --    FERRITIN 1,532*  --   --         Chemistries:    Recent Labs  Lab 06/21/18  0350 06/22/18  0417   * 136   K 3.9 4.3    103   CO2 23 23   BUN 77* 44*   CREATININE 5.5* 4.1*   CALCIUM 7.8* 8.1*   ALBUMIN 2.2* 2.3*   PROT 5.0* 5.3*   BILITOT 0.4 0.6   ALKPHOS 46* 46*   ALT 10 10   AST 29 29   MG 1.9 1.9   PHOS 3.2 3.9       ABGs:   Recent Labs  Lab 06/21/18  0412   PH 7.455*   PCO2 33.1*   HCO3 23.3*   POCSATURATED 99   BE -1     Blood Culture: No results for input(s): LABBLOO in the last 48 hours.  Respiratory Culture: No results for input(s): GSRESP, RESPIRATORYC in the last 48 hours.  All pertinent labs within the past 24 hours have been reviewed.    Significant Imaging:  I have reviewed and interpreted all pertinent imaging results/findings within the past 24 hours.

## 2018-06-22 NOTE — ASSESSMENT & PLAN NOTE
-Creatinine 3.6>>4.1  -baseline 2.7-4.4  -will monitor   -sodium bicarb continued   -pt has been followed outpatient by Dr. Vazquez (Nephrology)  -Nephrology consulted - pt may benefit from repeat angio due to NSTEMI but at high risk of contrast induced nephropathy  - pt was candidate for renal transplant however work up discontinued due to progressive weakness  - [facility] recommended         6/15  Monitor worsening Renal Function  S/p Cardiac VFib Arrest  [facility] today  IVF's  Monitor      6/16  [facility] diffuse disease  Cardiology  Monitor    6/17  Worsening  No further Cardiac intervention  Nephrology on consult  Monitor  Cr 6.0 today vs 4.7  Low Urine O/P    6/18  COntinues to worsen. Cr 7.3 today  Nephrology guidance for HD vs Monitoring    6/19  Worsening  Cr 8.3 today - K is 4.6  Nephrology on board  No HD indicated at present  Monitor  6/22  Patient will continue with dialysis . Dr Lara for vascath change .

## 2018-06-22 NOTE — ASSESSMENT & PLAN NOTE
S/P 2 units PRBCs 6/15 and 1 unit PRBC 6/18  Daily H/H  No active bleeding  Patient has been treated with Procrit 20,000 units weekly for maintenance therapy followed by Hematology  H&H 8.2/25.6: not symptomatic

## 2018-06-22 NOTE — PLAN OF CARE
Cm spoke with the spouse regarding D/C planning . CM explained LTAC and SKILLED HH and NH. Spouse stated he understood all entities. CM explained LTAC. He agreed with the next Level. CM gave listing of of integrated partners with ins for LTAC close to his home. Spouse P.A.M in [place]. Preference form signed and copy given to the spouse and original to the blue folder. SEBLE notified sofia Pennington  0900- Aditi met with the  Spouse. He agreed to and consent for [facility] to be the facility for the patient to transition once cleared to transition.

## 2018-06-22 NOTE — PT/OT/SLP EVAL
Occupational Therapy   Evaluation    Name: Citlali Karimi  MRN: [id number]  Admitting Diagnosis:  S/P parathyroidectomy 7 Days Post-Op    Recommendations:     Discharge Recommendations: nursing facility, skilled  Discharge Equipment Recommendations:  grab bar  Barriers to discharge:  Inaccessible home environment    History:     Occupational Profile:  Living Environment: lives with spouse in mobile home and ramp to enter  Previous level of function: assistance with adl's and functional t/f's proper to admittance s/p 6 week req s with adl's , functional mobility  Roles and Routines: occupational therapy  Equipment Owned:  walker, rolling, walker, standard, rollator, wheelchair, bath bench (elevgated toilet seat)  Assistance upon Discharge:     Past Medical History:   Diagnosis Date    Acid reflux 1/7/2015    Anxiety 1/7/2015    Aortic valvar stenosis     Arthritis     osteo    Callus     Chronic anemia     followed by Dr. Addison    CKD (chronic kidney disease) stage 5, GFR less than 15 ml/min 8/7/2015    Combined hyperlipidemia associated with type 2 diabetes mellitus     Coronary artery disease     Diabetes mellitus type II, controlled, with no complications     LBSL 108 today    Encounter for blood transfusion     Frail elderly with impaired mobility, wheel chair bound 8/25/2017    Gallbladder problem     gallbladder surgery    Heart murmur     Hyperparathyroidism, secondary renal 1/7/2015    Hypertension 8/7/2015    Hypertension associated with diabetes 11/12/2012    Kidney transplant candidate 1/7/2015    Overweight(278.02)     Urinary tract infection        Past Surgical History:   Procedure Laterality Date    ANKLE FRACTURE SURGERY Left 1985    AORTIC VALVE REPLACEMENT  05/2016    BONE BIOPSY      CARDIAC SURGERY      CATARACT EXTRACTION W/  INTRAOCULAR LENS IMPLANT  2009    CHOLECYSTECTOMY      CYSTOSCOPY      EVACUATION OF HEMATOMA N/A 6/14/2018    Procedure: EVACUATION, HEMATOMA;   Surgeon: Levy Samuel MD;  Location: [facility] OR;  Service: General;  Laterality: N/A;    EYE SURGERY      FRACTURE SURGERY      HYSTERECTOMY  unknown    LEFT HEART CATHETERIZATION Left 6/15/2018    Procedure: CATHETERIZATION, HEART, LEFT;  Surgeon: Galindo Louis MD;  Location: [facility] CATH LAB;  Service: Cardiology;  Laterality: Left;    OOPHORECTOMY      PARATHYROIDECTOMY N/A 6/12/2018    Procedure: PARATHYROIDECTOMY;  Surgeon: Levy Samuel MD;  Location: [facility] OR;  Service: General;  Laterality: N/A;    removal of colon polyps      RENAL BIOPSY  10/2013    WOUND EXPLORATION N/A 6/14/2018    Procedure: EXPLORATION, WOUND;  Surgeon: Levy Samuel MD;  Location: [facility] OR;  Service: General;  Laterality: N/A;    YAG cap -OD         Subjective     Chief Complaint: debility and generalized weakness  Patient/Family stated goals:   Communicated with: nurse deluna and epic chart review prior to session.  Pain/Comfort:  · Pain Rating 1: 0/10    Patients cultural, spiritual, Latter day conflicts given the current situation:      Objective:     Patient found with: peripheral IV, oxygen, NG tube, telemetry, blood pressure cuff, pulse ox (continuous)    General Precautions: Standard, fall   Orthopedic Precautions:N/A   Braces: N/A     Occupational Performance:    Bed Mobility:    · Patient completed Rolling/Turning to Left with  total assistance  · Patient completed Rolling/Turning to Right with total assistance  · Patient completed Scooting/Bridging with total assistance  · Patient completed Supine to Sit with total assistance  · Patient completed Sit to Supine with total assistance    Functional Mobility/Transfers:  · Patient completed Bed <> Chair Transfer using Drawsheet technique with dependence with no assistive device  · Functional Mobility: na    Activities of Daily Living:  · UB Dressing: total assistance x1  · LB Dressing: total assistance x1    Cognitive/Visual Perceptual:  Cognitive/Psychosocial Skills:    "  -       Oriented to: unable to properly determine   -       Follows Commands/attention:Inattentive  -       Communication: non verbal  -       Memory: unable to assess  -       Safety awareness/insight to disability: impaired   Visual/Perceptual:      -Intact    Physical Exam:  Upper Extremity Range of Motion:     -       Right Upper Extremity: aarom wfl  -       Left Upper Extremity: aarom wfl  Upper Extremity Strength:    -       Right Upper Extremity: resistance felt mmt: 2+/5   -       Left Upper Extremity: RESISTANCE FELT AAROM 2+/5 GROSSLY   Strength:    -       Right Upper Extremity: UNABLE TO DETERMINE  -       Left Upper Extremity: UNABLE TO DETERMINE    Patient left up in chair with all lines intact, call button in reach and NURSE NADEEM notified    [facility] 6 Click:  [facility] Total Score: 6    Treatment & Education:    Education:    Assessment:     Citlali Karimi is a 68 y.o. female with a medical diagnosis of S/P parathyroidectomy.  She presents with performance deficits affecting function are weakness, impaired functional mobilty, decreased safety awareness, impaired endurance, gait instability, decreased coordination, impaired balance, decreased upper extremity function, visual deficits, decreased lower extremity function, decreased ROM.      Rehab Prognosis: fair+; patient would benefit from acute skilled OT services to address these deficits and reach maximum level of function.         Clinical Decision Making:     3.  OT High:  "Pt evaluation falls under high complexity for evaluation category due to 5+ performance deficits identified with comprehensive assessments and significant modifications/assistance required. An expanded review of history and occupational profile completed in addition to expanded review of physical, cognitive and psychosocial history. Several treatment options considered in care."     Plan:     Patient to be seen 3 x/week to address the above listed problems via self-care/home " management, therapeutic activities, therapeutic exercises  · Plan of Care Expires:    · Plan of Care Reviewed with: patient, spouse    This Plan of care has been discussed with the patient who was involved in its development and understands and is in agreement with the identified goals and treatment plan    GOALS:    Occupational Therapy Goals        Problem: Occupational Therapy Goal    Goal Priority Disciplines Outcome Interventions   Occupational Therapy Goal     OT, PT/OT     Description:  Goals to be met by: 6-29-18    Patient will increase functional independence with ADLs by performing:    UE Dressing with Moderate Assistance.  LE Dressing with Moderate Assistance.  Sitting at edge of bed x10 minutes with Moderate Assistance.  Upper extremity exercise program x10 reps            Problem: Occupational Therapy Goal    Goal Priority Disciplines Outcome Interventions   Occupational Therapy Goal     OT, PT/OT                     Time Tracking:     OT Date of Treatment: 06/22/18  OT Start Time: 1130  OT Stop Time: 1210  OT Total Time (min): 40 min    Billable Minutes:Evaluation 25 minutes  Therapeutic Activity 15 minutes    Joie Isabel OT  6/22/2018

## 2018-06-23 ENCOUNTER — SURGERY (OUTPATIENT)
Age: 68
End: 2018-06-23

## 2018-06-23 LAB
ALBUMIN SERPL BCP-MCNC: 2.3 G/DL
ALP SERPL-CCNC: 54 U/L
ALT SERPL W/O P-5'-P-CCNC: 9 U/L
ANION GAP SERPL CALC-SCNC: 12 MMOL/L
AST SERPL-CCNC: 19 U/L
BASOPHILS # BLD AUTO: 0.01 K/UL
BASOPHILS NFR BLD: 0.1 %
BILIRUB SERPL-MCNC: 0.4 MG/DL
BUN SERPL-MCNC: 69 MG/DL
CALCIUM SERPL-MCNC: 8 MG/DL
CHLORIDE SERPL-SCNC: 102 MMOL/L
CO2 SERPL-SCNC: 24 MMOL/L
CREAT SERPL-MCNC: 5.7 MG/DL
DIFFERENTIAL METHOD: ABNORMAL
EOSINOPHIL # BLD AUTO: 0.1 K/UL
EOSINOPHIL NFR BLD: 0.8 %
ERYTHROCYTE [DISTWIDTH] IN BLOOD BY AUTOMATED COUNT: 17.4 %
EST. GFR  (AFRICAN AMERICAN): 8 ML/MIN/1.73 M^2
EST. GFR  (NON AFRICAN AMERICAN): 7 ML/MIN/1.73 M^2
GLUCOSE SERPL-MCNC: 147 MG/DL
HCT VFR BLD AUTO: 24.7 %
HGB BLD-MCNC: 8 G/DL
LYMPHOCYTES # BLD AUTO: 1.4 K/UL
LYMPHOCYTES NFR BLD: 9.8 %
MAGNESIUM SERPL-MCNC: 2.1 MG/DL
MCH RBC QN AUTO: 28.1 PG
MCHC RBC AUTO-ENTMCNC: 32.4 G/DL
MCV RBC AUTO: 87 FL
MONOCYTES # BLD AUTO: 0.9 K/UL
MONOCYTES NFR BLD: 5.8 %
NEUTROPHILS # BLD AUTO: 12.2 K/UL
NEUTROPHILS NFR BLD: 83.5 %
PHOSPHATE SERPL-MCNC: 4.8 MG/DL
PLATELET # BLD AUTO: 200 K/UL
PMV BLD AUTO: 10.1 FL
POCT GLUCOSE: 127 MG/DL (ref 70–110)
POCT GLUCOSE: 152 MG/DL (ref 70–110)
POCT GLUCOSE: 175 MG/DL (ref 70–110)
POCT GLUCOSE: 183 MG/DL (ref 70–110)
POTASSIUM SERPL-SCNC: 4.1 MMOL/L
PROT SERPL-MCNC: 5.5 G/DL
RBC # BLD AUTO: 2.85 M/UL
SODIUM SERPL-SCNC: 138 MMOL/L
WBC # BLD AUTO: 14.6 K/UL

## 2018-06-23 PROCEDURE — 99233 SBSQ HOSP IP/OBS HIGH 50: CPT | Mod: ,,, | Performed by: INTERNAL MEDICINE

## 2018-06-23 PROCEDURE — 25000003 PHARM REV CODE 250

## 2018-06-23 PROCEDURE — 25000003 PHARM REV CODE 250: Performed by: NURSE PRACTITIONER

## 2018-06-23 PROCEDURE — 63600175 PHARM REV CODE 636 W HCPCS: Mod: JG | Performed by: INTERNAL MEDICINE

## 2018-06-23 PROCEDURE — 27000221 HC OXYGEN, UP TO 24 HOURS

## 2018-06-23 PROCEDURE — 84100 ASSAY OF PHOSPHORUS: CPT

## 2018-06-23 PROCEDURE — 80053 COMPREHEN METABOLIC PANEL: CPT

## 2018-06-23 PROCEDURE — 83735 ASSAY OF MAGNESIUM: CPT

## 2018-06-23 PROCEDURE — 99232 SBSQ HOSP IP/OBS MODERATE 35: CPT | Mod: ,,, | Performed by: INTERNAL MEDICINE

## 2018-06-23 PROCEDURE — 0JHD3XZ INSERTION OF TUNNELED VASCULAR ACCESS DEVICE INTO RIGHT UPPER ARM SUBCUTANEOUS TISSUE AND FASCIA, PERCUTANEOUS APPROACH: ICD-10-PCS | Performed by: SURGERY

## 2018-06-23 PROCEDURE — 85025 COMPLETE CBC W/AUTO DIFF WBC: CPT

## 2018-06-23 PROCEDURE — 20000000 HC ICU ROOM

## 2018-06-23 PROCEDURE — 25000003 PHARM REV CODE 250: Performed by: SURGERY

## 2018-06-23 PROCEDURE — 63600175 PHARM REV CODE 636 W HCPCS

## 2018-06-23 PROCEDURE — 63600175 PHARM REV CODE 636 W HCPCS: Performed by: INTERNAL MEDICINE

## 2018-06-23 PROCEDURE — 02HV33Z INSERTION OF INFUSION DEVICE INTO SUPERIOR VENA CAVA, PERCUTANEOUS APPROACH: ICD-10-PCS | Performed by: SURGERY

## 2018-06-23 PROCEDURE — 97110 THERAPEUTIC EXERCISES: CPT

## 2018-06-23 PROCEDURE — C1769 GUIDE WIRE: HCPCS

## 2018-06-23 RX ORDER — HEPARIN SODIUM 1000 [USP'U]/ML
2000 INJECTION, SOLUTION INTRAVENOUS; SUBCUTANEOUS ONCE
Status: DISCONTINUED | OUTPATIENT
Start: 2018-06-23 | End: 2018-06-26

## 2018-06-23 RX ADMIN — METOPROLOL TARTRATE 25 MG: 25 TABLET, FILM COATED ORAL at 08:06

## 2018-06-23 RX ADMIN — ALTEPLASE 2 MG: 2.2 INJECTION, POWDER, LYOPHILIZED, FOR SOLUTION INTRAVENOUS at 06:06

## 2018-06-23 RX ADMIN — FAMOTIDINE 20 MG: 20 TABLET ORAL at 09:06

## 2018-06-23 RX ADMIN — SODIUM BICARBONATE 650 MG TABLET 650 MG: at 09:06

## 2018-06-23 RX ADMIN — AMIODARONE HYDROCHLORIDE 200 MG: 200 TABLET ORAL at 09:06

## 2018-06-23 RX ADMIN — Medication 125 MG: at 05:06

## 2018-06-23 RX ADMIN — HEPARIN SODIUM 5000 UNITS: 5000 INJECTION, SOLUTION INTRAVENOUS; SUBCUTANEOUS at 06:06

## 2018-06-23 RX ADMIN — SODIUM BICARBONATE 650 MG TABLET 650 MG: at 08:06

## 2018-06-23 RX ADMIN — HEPARIN SODIUM 5000 UNITS: 5000 INJECTION, SOLUTION INTRAVENOUS; SUBCUTANEOUS at 09:06

## 2018-06-23 RX ADMIN — Medication 125 MG: at 12:06

## 2018-06-23 RX ADMIN — Medication 125 MG: at 06:06

## 2018-06-23 RX ADMIN — ASPIRIN 81 MG 81 MG: 81 TABLET ORAL at 09:06

## 2018-06-23 RX ADMIN — HEPARIN SODIUM 5000 UNITS: 5000 INJECTION, SOLUTION INTRAVENOUS; SUBCUTANEOUS at 02:06

## 2018-06-23 NOTE — ASSESSMENT & PLAN NOTE
June 22, 2018-patient has anemia now due to end-stage renal disease.  Treatment of this will be as per Nephrology protocol with IV iron/Depo supplementation with hemodialysis.  Please call with any questions.  June 23, 2018-patient has end-stage renal disease and is on RRT.  Management of anemia will be as per Nephrology.

## 2018-06-23 NOTE — PT/OT/SLP PROGRESS
Physical Therapy      Patient Name:  Citlali Karimi   MRN:  [id number]    Patient not seen today secondary to unavailable - cath lab then dialysis. Will follow-up on next session to complete eval.    Wan Guzmán, PT

## 2018-06-23 NOTE — ASSESSMENT & PLAN NOTE
S/P contrast with LHC 6/15  Renal following.    RRT started 6/19   Accurate I/Os  No Marv  DW Renal  And Dr Levy: move vascath from [facility] : Dr Lara

## 2018-06-23 NOTE — INTERVAL H&P NOTE
The patient has been examined and the H&P has been reviewed:    I concur with the findings and no changes have occurred since H&P was written.    Anesthesia/Surgery risks, benefits and alternative options discussed and understood by patient/family.          Active Hospital Problems    Diagnosis  POA    *Critical illness polyneuropathy [G62.81]  No    Acute hypoxemic respiratory failure [J96.01]  Yes    C. difficile diarrhea [A04.72]  No    ESRD (end stage renal disease) [N18.6]  Yes    NSTEMI (non-ST elevated myocardial infarction) [I21.4]  Yes    Cardiac arrest with ventricular fibrillation [I46.9, I49.01]  No    S/P parathyroidectomy [E89.2]  Not Applicable     Post op hematoma of neck required evacuation      Hyperparathyroidism [E21.3]  Yes     Chronic    Elevated troponin [R74.8]  Yes    Coronary artery disease involving native coronary artery [I25.10]  Yes     Chronic     S/P NSTEMI this admit X 2 with anesthesia      Acute renal failure superimposed on stage 5 chronic kidney disease, not on chronic dialysis [N17.9, N18.5]  Yes     Now initiated on RRT      Hypertension associated with diabetes [E11.59, I10]  Yes    Diabetes mellitus, type 2 - only on oral medications [E11.9]  Yes     Chronic    Acute blood loss anemia [D62]  Yes     With chronic anemia due to CKD          Resolved Hospital Problems    Diagnosis Date Resolved POA    Acute airway obstruction [J98.8] 06/22/2018 Yes    Cardiogenic shock [R57.0] 06/15/2018 No    Electrolyte imbalance [E87.8] 06/17/2018 No    On mechanically assisted ventilation [Z99.11] 06/22/2018 Not Applicable    Persistent atrial fibrillation [I48.1] 06/16/2018 No    Shock liver [K72.00] 06/16/2018 No    STEMI (ST elevation myocardial infarction) [I21.3] 06/16/2018 No    Hypercalcemia [E83.52] 06/16/2018 Yes    H/O aortic valve replacement [Z95.2] 06/17/2018 Not Applicable     Chronic

## 2018-06-23 NOTE — NURSING
PT RECEIVED BACK FROM CATH LAB.  REPORT RECEIVED.  PT TOLERATED WELL.  ON ROOM AIR/O2 SATURATIONS 98%.  VS WNL NO DISTRESS NOTED. WILL CONTINUE TO MONITOR PER PROTOCOL.  TUBE FEEDINGS RESUMED.  CALL MCMANUS IN REACH AND BED IN LOW POSITION.

## 2018-06-23 NOTE — PLAN OF CARE
Problem: Patient Care Overview  Goal: Plan of Care Review  Outcome: Ongoing (interventions implemented as appropriate)  PT HAS HAD SMITH CATHETER DISCONTINUED.  SHE HAS TOLERATED BEING PLACED IN BEDSIDE CHAIR (RECLINER) FOR APPROXIMATELY SIX HOURS TODAY.  PT'S OXYGEN LEVEL REMAINED 100% AND O2 CAN BE WEANED.  THE FECAL CONTAINMENT SYSTEM WAS ALSO DISCONTINUED R/T LOW STOOL OUTPUT/FORMED STOOL.  TUBE FEEDINGS HAVE BEEN RESTARTED AT 25ML HOUR WITH A GOAL OF 40 ML ON NOVA SOURSE PER DIETARY REC'S.  PT HAS TOLERATED WELL.  PT'S  WAS HERE TODAY AND WAS UPDATED TO HER CONDITION AND THE POC FOR TODAY.  ALL QUESTIONS HE HAD WERE ANSWERED.  PT IS STILL UNABLE TO COMMUNICATE WELL SPEECH IS MOSTLY INCOMPREHENSIBLE WITH A FEW WORDS LIKE YES AND HEY THAT ARE ABLE TO BE UNDERSTOOD.  PT WILL NOT FOLLOW COMMANDS CONSISTENTLY.  HOWEVER, SHE HAS GOOD STRENGTH IN HER UPPER EXTREMITIES AND BECOMES VERY RIGID WHEN TRYING TO BE MOVED FROM THE CHAIR TO BED OR VICE VERSA.  OCCUPATIONAL THERAPY DID HAVE HER SIT ON THE SIDE OF THE BED WITH MAXIMUM SUPPORT TODAY. PT DID ALSO PULL OUT NG TUBE TODAY AND IT WAS REPLACED.

## 2018-06-23 NOTE — PROGRESS NOTES
[facility] -   Nephrology  Progress Note    Patient Name: Citlali Karimi  MRN: [id number]  Admission Date: 6/12/2018  Hospital Length of Stay: 10 days  Attending Provider: Levy Samuel MD   Primary Care Physician: Evelio Schuster MD  Principal Problem:Critical illness polyneuropathy    Subjective:     HPI:  Citlali Karimi Is a pleasant 68 -year-old  woman with history of chronic kidney disease stage 4, patient was admitted to the hospital for an elective  partial parathyroidectomy, following her surgery during observation.  She had some chest pain, workup revealed non ST elevated MI, patient was admitted to the hospital for further management.  We were consulted due to advanced renal insufficiency, baseline serum creatinine about 3.5-4,            Important Info :        She underwent a native kidney biopsy on 10/10/13. Final report of the kidney biopsy is negative for any specific etiology for acute renal failure. 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         Interval History:  Discussed with vascular surgery Dr. Watson.  Patient will get tunneled Vas-Cath and remove left femoral temporary Vas-Cath.  Patient is dialysis dependent would be on dialysis for ESRD    Review of patient's allergies indicates:   Allergen Reactions    Lipitor [atorvastatin] Swelling     Lips       Current Facility-Administered Medications   Medication Frequency    acetaminophen tablet 650 mg Q4H PRN    amiodarone tablet 200 mg BID    aspirin chewable tablet 81 mg Daily    atropine injection 0.5 mg PRN    bisacodyl suppository 10 mg Daily PRN    cloNIDine tablet 0.1 mg Q6H PRN    dextrose 50% injection 12.5 g PRN    dextrose 50% injection 25 g PRN    epoetin carmen injection 10,000 Units Once    epoetin carmen injection 4,000 Units Every Tues, Thurs, Sat    famotidine tablet 20 mg Daily    glucagon (human recombinant) injection 1 mg PRN    glucose chewable  tablet 16 g PRN    glucose chewable tablet 24 g PRN    heparin (porcine) injection 2,000 Units Once    heparin (porcine) injection 3,000 Units PRN    heparin (porcine) injection 5,000 Units Q8H    hydrALAZINE injection 10 mg Q8H PRN    insulin aspart U-100 pen 0-5 Units QID (AC + HS) PRN    lactulose 20 gram/30 mL solution Soln 20 g Q6H PRN    lorazepam (ATIVAN) injection 2 mg Q4H PRN    metoprolol tartrate (LOPRESSOR) tablet 25 mg BID    morphine injection 4 mg Q4H PRN    nitroGLYCERIN SL tablet 0.4 mg Q5 Min PRN    ondansetron injection 4 mg Q8H PRN    sodium bicarbonate 8.4 % (1 mEq/mL) injection PRN    sodium bicarbonate tablet 650 mg BID    sodium chloride 0.9% flush 3 mL PRN    vancomycin 250mg / 10ml oral suspension 125 mg Q6H       Objective:     Vital Signs (Most Recent):  Temp: 98.8 °F (37.1 °C) (06/23/18 0300)  Pulse: 84 (06/23/18 0600)  Resp: 20 (06/23/18 0600)  BP: (!) 91/45 (06/23/18 0600)  SpO2: 100 % (06/23/18 0714)  O2 Device (Oxygen Therapy): nasal cannula (06/23/18 0714) Vital Signs (24h Range):  Temp:  [98.6 °F (37 °C)-99.1 °F (37.3 °C)] 98.8 °F (37.1 °C)  Pulse:  [74-86] 84  Resp:  [15-22] 20  SpO2:  [97 %-100 %] 100 %  BP: ()/(37-64) 91/45     Weight: 75.4 kg (166 lb 3.6 oz) (06/23/18 0153)  Body mass index is 30.4 kg/m².  Body surface area is 1.82 meters squared.    I/O last 3 completed shifts:  In: 990 [NG/GT:990]  Out: 175 [Urine:175]    Physical Exam   Constitutional: She appears well-developed and well-nourished.   HENT:   Head: Normocephalic and atraumatic.   Eyes: Conjunctivae and EOM are normal. Pupils are equal, round, and reactive to light.   Neck: Normal range of motion and full passive range of motion without pain. Neck supple. Carotid bruit is not present. No edema present. No thyroid mass and no thyromegaly present.   Cardiovascular: Normal rate, regular rhythm, S1 normal, S2 normal, intact distal pulses and normal pulses.   No extrasystoles are present. PMI  is displaced.  Exam reveals no friction rub.    Murmur heard.   Systolic murmur is present with a grade of 2/6   RV heave loud P2    Pulmonary/Chest: Effort normal. No accessory muscle usage. No respiratory distress. She has wheezes. She has no rales. She exhibits no tenderness.   Abdominal: Soft. Bowel sounds are normal. She exhibits no distension and no mass. There is no hepatosplenomegaly. There is no tenderness. There is no rebound and no CVA tenderness. No hernia.   Musculoskeletal: Normal range of motion. She exhibits edema. She exhibits no tenderness.   Neurological: She is alert. She has normal reflexes. No cranial nerve deficit or sensory deficit. She exhibits normal muscle tone. Coordination normal.   Drowsy but arousable and no distress no acute signs of CVA   Skin: Skin is warm and dry. Capillary refill takes 2 to 3 seconds. No bruising, no ecchymosis and no rash noted. No cyanosis or erythema. No pallor. Nails show no clubbing.   Psychiatric: Her speech is normal.       Significant Labs:  All labs within the past 24 hours have been reviewed.     Significant Imaging:  Labs: Reviewed  X-Ray: Reviewed    Assessment/Plan:     ESRD (end stage renal disease)    Ordered  hemodialysis today.   Patient is comfortable and not short of breath.  Has received 3 sessions for hemodialysis already this week.  Case discussed in detail with Dr. Perez and dr. Watson    New IJ vasc cath     Left Femoral Cath out     HD today             Thank you for your consult.     Chinmay Lyles MD  Nephrology  [facility] -

## 2018-06-23 NOTE — PROGRESS NOTES
[facility] Hospital Medicine  Progress Note    Patient Name: Citlali Karimi  MRN: [id number]  Patient Class: IP- Inpatient   Admission Date: 6/12/2018  Length of Stay: 10 days  Attending Physician: Levy Samuel MD  Primary Care Provider: Evelio Schuster MD        Subjective:     Principal Problem:Critical illness polyneuropathy    HPI:  Citlali Karimi is a 68 y.o female with PMHx of CKD (IV), HTN, DM, CVA (left sided weakness), and CAD who initially presented for hyperparathyroidism and hypercalcemia requiring surgical intervention.  Pt underwent parathyroidectomy today per Dr. Tracy (General Surgery). Pt admitted to Medical Surgical Unit post procedure and Hospital Medicine consulted for medical management.  Chest xray post procedure showed mild asymmetric CHF versus RUL pneumonia. Troponin 0.113 and BNP >270 noted.  Pt given IV Lasix in PACU prior to unit arrival.      Hospital Course:  Pt admitted to Outpatient Extended Recovery post procedure.  Elevated noted post procedure and results trended yielding significant positive response.  Cardiology consulted and Heparin infusion initiated.  Hx of CAD, multiple vessels noted with home medications continued and Imdur dose increased.  Nephrology consulted due to elevated creatinine and [facility] recommended.  Echo results pending.  Heart catheterization procedure considered with family refusing due to concern for worsening kidney function as patient does not want to be on dialysis.  Decreased oral intake and difficulty swallowing noted.  Speech therapist to bedside.  Pt made NPO and General Surgery notified.      6/15  Patient worsening status. ET changed per Pulmonary CC. Patient s/p Code Blue - VFib with recovery. Cardiology taking patient to CATH lab. Discussed with CM plan for post acute care in progress.    6/16  Patient improved o/n. Patient awake and able to follow simple command. Breathing trials in progress. Discussed with Pulmonary  CC    6/17  Patient remains intubated.  at bedside. Cardiology at bedside as well. Discussed with CC Team    6/18  Patient responds to command but remains intubated. Swelling to neck continues to be prominent. Discussed with CC Team. Worsening renal function. Possible HD need.    6/19  Patient remains intubated. Worsening renal function. No events Discussed with CC Team    6/21  Positive cough leak and plan for extubation today .   6/22  Patient continue to be confused . Will consider ct head if didn't improve . Continue treat infection/c difficle .Consulted vascular surgery dr maciel to remove femoral dialysis and put new one subclavian ?. To avoid risk of infection in groin. Patient will continue to need dialysis    6/23  Patient is seen and examined today . More awake today. dialysis catheter was removed from groin and new one will be place in upper body ij vs subclavian. Patient diarrhea improved .         Review of Systems   Unable to perform ROS: Mental status change     Objective:     Vital Signs (Most Recent):  Temp: 98.4 °F (36.9 °C) (06/23/18 1505)  Pulse: 90 (06/23/18 1530)  Resp: 18 (06/23/18 1530)  BP: (!) 95/42 (06/23/18 1530)  SpO2: 97 % (06/23/18 1530) Vital Signs (24h Range):  Temp:  [98.1 °F (36.7 °C)-98.8 °F (37.1 °C)] 98.4 °F (36.9 °C)  Pulse:  [75-94] 90  Resp:  [16-22] 18  SpO2:  [97 %-100 %] 97 %  BP: ()/(37-56) 95/42     Weight: 75.4 kg (166 lb 3.6 oz)  Body mass index is 30.4 kg/m².    Intake/Output Summary (Last 24 hours) at 06/23/18 1819  Last data filed at 06/23/18 1500   Gross per 24 hour   Intake              960 ml   Output                0 ml   Net              960 ml      Physical Exam   Constitutional: She appears well-developed and well-nourished.   HENT:   Head: Normocephalic and atraumatic.   Eyes: Conjunctivae and EOM are normal. Pupils are equal, round, and reactive to light.   Neck: Normal range of motion and full passive range of motion without pain. Neck  supple. Carotid bruit is not present. No edema present. No thyroid mass and no thyromegaly present.   Cardiovascular: Normal rate, regular rhythm, S1 normal, S2 normal, intact distal pulses and normal pulses.   No extrasystoles are present. PMI is displaced.  Exam reveals no friction rub.    Murmur heard.   Systolic murmur is present with a grade of 2/6   RV heave loud P2    Pulmonary/Chest: Effort normal. No accessory muscle usage. No respiratory distress. She has wheezes. She has no rales. She exhibits no tenderness.   Abdominal: Soft. Bowel sounds are normal. She exhibits no distension and no mass. There is no hepatosplenomegaly. There is no tenderness. There is no rebound and no CVA tenderness. No hernia.   Musculoskeletal: Normal range of motion. She exhibits edema. She exhibits no tenderness.   Neurological: She is alert. She has normal reflexes. No cranial nerve deficit or sensory deficit. She exhibits normal muscle tone. Coordination normal.   Drowsy but arousable and no distress no acute signs of CVA   Skin: Skin is warm and dry. Capillary refill takes 2 to 3 seconds. No bruising, no ecchymosis and no rash noted. No cyanosis or erythema. No pallor. Nails show no clubbing.   Psychiatric: Her speech is normal.   Nursing note and vitals reviewed.      Significant Labs: All pertinent labs within the past 24 hours have been reviewed.    Significant Imaging: I have reviewed all pertinent imaging results/findings within the past 24 hours.    Assessment/Plan:      C. difficile diarrhea    Continue with oral vancomycin         NSTEMI (non-ST elevated myocardial infarction)    ASA  Cardiology  [facility] - Diffuse disease  No further intervention recommended  Medical Management  No statin due to allergy        S/P parathyroidectomy              Cardiac arrest with ventricular fibrillation    Cardiology  [facility] 6/15  ASA  Statin Allergy  BB  No further interventions  Diffuse disease    6/18  ASA  BB  Diffuse disease No  intervention    6/19  ASA  BB  No Statin due to allergy  Cardiology        Elevated troponin    -initial 0.113>>34>>50>>41  -pt denies CP and SOB  - EKG results showed diffuse ST changes    -serial results revealed NSTEMI with Cardiology consulted     6/15  NSTEMI POA  Cardiology  C  ASA  Hold Statin due to Elevated LFT's    6/16  Cardiology  ASA  ICU  Statin allergy noted    6/17  S/p LHC - Diffuse disease  ASA  Cards              Hyperparathyroidism    -Pt admitted to Extended Recovery then transferred to Inpatient due to NSTEMI  -s/p Parathyroidectomy   -managed by General Surgery -primary team  - PTH- 32  - Ca 11.4>>10.5  - analgesia prn   - antiemetics prn  - specimens sent for analysis    6/16  Ca 8.7 today  Monitor    6/18  Ca 8.2 today  Monitor    6/19  Ca 8.2 stable        Coronary artery disease involving native coronary artery    - ASA, Statin, and Lopressor continued   -Imdur dose increased   -previous Cath showed severe CAD (proximal LCX 99%, mid 50%, RCA mid 90%, distal with subtotal lesion).  - Cardiology consulted with medical management continued   - C proposed pending Nephrology recommendations  -family refused LHC due to concern for worsening kidney function as pt had expressed that she did not want to be on dialysis     6/15  Family electing LHC and accepting risk of worsening renal function  Cardiology    6/16  S/p C  Cardiology  ASA  Statin allergy noted    6/16  No interventions  Cardiology on consult    6/18  Cardiology on Consult  Medical Management    6/19  ASA  BB  6/22  Continue with medical management           Acute renal failure superimposed on stage 5 chronic kidney disease, not on chronic dialysis    -Creatinine 3.6>>4.1  -baseline 2.7-4.4  -will monitor   -sodium bicarb continued   -pt has been followed outpatient by Dr. Vazquez (Nephrology)  -Nephrology consulted - pt may benefit from repeat angio due to NSTEMI but at high risk of contrast induced nephropathy  - pt was  candidate for renal transplant however work up discontinued due to progressive weakness  - [facility] recommended         6/15  Monitor worsening Renal Function  S/p Cardiac VFib Arrest  [facility] today  IVF's  Monitor      6/16  [facility] diffuse disease  Cardiology  Monitor    6/17  Worsening  No further Cardiac intervention  Nephrology on consult  Monitor  Cr 6.0 today vs 4.7  Low Urine O/P    6/18  COntinues to worsen. Cr 7.3 today  Nephrology guidance for HD vs Monitoring    6/19  Worsening  Cr 8.3 today - K is 4.6  Nephrology on board  No HD indicated at present  Monitor  6/22  Patient will continue with dialysis . Dr Lara for [facility] change .          Hypertension associated with diabetes    - home medications continued   -hx of noncompliance and inconsistent dosing at home per   - uncontrolled upon arrival- improved with Hydralazine in PACU   - Hydralazine prn  6/22  Controlled         Diabetes mellitus, type 2 - only on oral medications    Controlled  NICC  Accuchecks          Acute blood loss anemia    -stable post procedure  -will monitor  -CBC in am   -pt followed outpatient by Heme/Onc    6/16  Stable  Monitor    6/17  Improving  Monitor    6/18  Hgb 7.7 today  Transfuse PRBC's per sx  Monitor    6/23  Hb 8 stable   Monitor            VTE Risk Mitigation         Ordered     heparin (porcine) injection 2,000 Units  Once      06/23/18 0153     heparin (porcine) injection 5,000 Units  Every 8 hours      06/21/18 0838     heparin (porcine) injection 3,000 Units  As needed (PRN)      06/19/18 1408     Place sequential compression device  Until discontinued      06/12/18 9485              Dominguez Levy MD  Department of Hospital Medicine   [facility] -

## 2018-06-23 NOTE — NURSING
PT TRANSFERRED TO CATHLAB VIA BED ON PORTABLE MONITOR/ZOLL TO CHANGE OUT FEMORAL VAS CATH ACCESS.

## 2018-06-23 NOTE — PLAN OF CARE
Problem: Patient Care Overview  Goal: Plan of Care Review  Outcome: Ongoing (interventions implemented as appropriate)  poc reviewed c pt.  Pt oriented to self only, able to verbalize her name and intermittent 1-2 word responses.  Pleasant and mostly cooperative tonight.  Some soft blood pressures with deep sleep.  Week, loose cough and trouble clearing throat independently.  Turning q2h.  Tolerating tube feeds at goal. No bm nor uop at this time.  Am labs pending.

## 2018-06-23 NOTE — BRIEF OP NOTE
[facility] -   Brief Operative Note    SUMMARY     Surgery Date: 6/23/2018     Surgeon(s) and Role:     * Hector Watson IV, MD - Primary    Assisting Surgeon: None    Pre-op Diagnosis:  Acute renal failure, unspecified acute renal failure type [N17.9]    Post-op Diagnosis:  Post-Op Diagnosis Codes:     * Acute renal failure, unspecified acute renal failure type [N17.9]    Procedure(s) (LRB):  INSERTION,CATHETER,CENTRAL VENOUS,HEMODIALYSIS,TUNNELED (N/A)   Removal of left femoral vas cath    Anesthesia: RN IV Sedation    Description of Procedure: insertion of tunneled Right IJ vas cath and removal of left femoral vas cath    Description of the findings of the procedure: see operative report    Estimated Blood Loss: * No values recorded between 6/23/2018 12:00 AM and 6/23/2018 12:32 PM *         Specimens:   Specimen (12h ago through future)    None

## 2018-06-23 NOTE — ASSESSMENT & PLAN NOTE
6/22/18 - Patient has been treated with Procrit for maintenance therapy for anemia due to chronic kidney disease. Patient with hemoglobin of 8.2 this morning.  No current evidence of bleeding noted.  We will continue to monitor closely.  June 23, 2018-the patient has acute blood loss anemia is stable with hemoglobin greater than 8 grams/deciliter today.  She will continue Procrit as per HD protocol.  No urgent indication for PRBC transfusion today.

## 2018-06-23 NOTE — PT/OT/SLP PROGRESS
OCCUPATIONAL THERAPY    S: NONVERBAL, CONTACT ISOLATION  O/A:  PT SEEN AT BEDSIDE, APPEARS LETHARGIC. PT'S NURSE- NADEEM CONSULTED. PT PERFORMED AAROM EX TO LUE 1 X 10 RPS TO ALL AVAILABLE PLANES, PROM EX TO RUE 1 X 10 REPS TO ALL AVAILABLE PLANES, MINIMAL PARTICIPATION, KEPT EYES CLOSED. PT LEFT IN SUPINE, ALL LINES INTACT, CALL BELL WITHIN REACH. BP: 99/42 HR 88, 100% O2.   P: CONT WITH OT POC  TIME: 9:35 - 9:45

## 2018-06-23 NOTE — ASSESSMENT & PLAN NOTE
S/P 2 units PRBCs 6/15 and 1 unit PRBC 6/18  Daily H/H  No active bleeding  Patient has been treated with Procrit 20,000 units weekly for maintenance therapy followed by Hematology  H&H 8.0/24.7: not symptomatic

## 2018-06-23 NOTE — SUBJECTIVE & OBJECTIVE
Interval History:  The patient is more alert and awake today.  She is able to answer a few more questions today but is not able to consistently do this.  Overall her mental status does appear to be better.    Oncology Treatment Plan:   [No treatment plan]    Medications:  Continuous Infusions:  Scheduled Meds:   amiodarone  200 mg Oral BID    aspirin  81 mg Oral Daily    epoetin carmen (PROCRIT) injection  10,000 Units Intravenous Once    epoetin carmen (PROCRIT) injection  4,000 Units Intravenous Every Tues, Thurs, Sat    famotidine  20 mg Oral Daily    heparin (porcine)  2,000 Units Intravenous Once    heparin (porcine)  5,000 Units Subcutaneous Q8H    metoprolol tartrate  25 mg Oral BID    sodium bicarbonate  650 mg Oral BID    vancomycin  125 mg Oral Q6H     PRN Meds:acetaminophen, atropine, bisacodyl, cloNIDine, dextrose 50%, dextrose 50%, glucagon (human recombinant), glucose, glucose, heparin (porcine), hydrALAZINE, insulin aspart U-100, lactulose, lorazepam, morphine, nitroGLYCERIN, ondansetron, sodium bicarbonate, sodium chloride 0.9%     Review of Systems   Unable to perform ROS: Mental status change     Objective:     Vital Signs (Most Recent):  Temp: 98.8 °F (37.1 °C) (06/23/18 0300)  Pulse: 84 (06/23/18 0600)  Resp: 20 (06/23/18 0600)  BP: (!) 91/45 (06/23/18 0600)  SpO2: 100 % (06/23/18 0714) Vital Signs (24h Range):  Temp:  [98.6 °F (37 °C)-99.1 °F (37.3 °C)] 98.8 °F (37.1 °C)  Pulse:  [73-86] 84  Resp:  [15-22] 20  SpO2:  [97 %-100 %] 100 %  BP: ()/(37-75) 91/45     Weight: 75.4 kg (166 lb 3.6 oz)  Body mass index is 30.4 kg/m².  Body surface area is 1.82 meters squared.      Intake/Output Summary (Last 24 hours) at 06/23/18 1240  Last data filed at 06/23/18 0600   Gross per 24 hour   Intake              630 ml   Output                0 ml   Net              630 ml       Physical Exam   Constitutional: She appears well-developed and well-nourished. She is active and cooperative. She  appears ill.   HENT:   Head: Normocephalic and atraumatic.   Nose: Nose normal.   Mouth/Throat: Oropharynx is clear and moist. No oropharyngeal exudate.   Healing neck incision is noted.   Eyes: Pupils are equal, round, and reactive to light. No scleral icterus.   Neck: Neck supple. No thyromegaly present.   Cardiovascular: Normal rate, regular rhythm, normal heart sounds and intact distal pulses.    No murmur heard.  Pulmonary/Chest: Effort normal. No stridor. No respiratory distress. She has no wheezes. She has rhonchi. She has no rales.   Abdominal: Soft. Bowel sounds are normal. She exhibits no distension, no ascites and no mass. There is no hepatosplenomegaly. There is no tenderness. There is no rigidity, no rebound and no guarding.   Musculoskeletal: She exhibits no edema or tenderness.   Lymphadenopathy:     She has no cervical adenopathy.   Neurological: She is unresponsive. No cranial nerve deficit. She exhibits normal muscle tone. Coordination normal.   Skin: Skin is warm and dry. Ecchymosis noted. No rash noted. No pallor.   Psychiatric: She is slowed. She is noncommunicative.   Nursing note and vitals reviewed.      Significant Labs:   I have reviewed all of the patient's relevant lab work available in the medical record and have utilized this in my evaluation and management recommendations today    Diagnostic Results:  I have reviewed all of the patient's diagnostic/imaging results available in the medical record and have utilized this in my evaluation and management recommendations today.

## 2018-06-23 NOTE — SUBJECTIVE & OBJECTIVE
Interval History: mentation improving, loose stools resolving, tolerating tube feeds    Medications:  Continuous Infusions:  Scheduled Meds:   amiodarone  200 mg Oral BID    aspirin  81 mg Oral Daily    epoetin carmen (PROCRIT) injection  10,000 Units Intravenous Once    epoetin carmen (PROCRIT) injection  4,000 Units Intravenous Every Tues, Thurs, Sat    famotidine  20 mg Oral Daily    heparin (porcine)  2,000 Units Intravenous Once    heparin (porcine)  5,000 Units Subcutaneous Q8H    metoprolol tartrate  25 mg Oral BID    sodium bicarbonate  650 mg Oral BID    vancomycin  125 mg Oral Q6H     PRN Meds:acetaminophen, atropine, bisacodyl, cloNIDine, dextrose 50%, dextrose 50%, glucagon (human recombinant), glucose, glucose, heparin (porcine), hydrALAZINE, insulin aspart U-100, lactulose, lorazepam, morphine, nitroGLYCERIN, ondansetron, sodium bicarbonate, sodium chloride 0.9%     Review of patient's allergies indicates:   Allergen Reactions    Lipitor [atorvastatin] Swelling     Lips       Objective:     Vital Signs (Most Recent):  Temp: 98.8 °F (37.1 °C) (06/23/18 0300)  Pulse: 84 (06/23/18 0600)  Resp: 20 (06/23/18 0600)  BP: (!) 91/45 (06/23/18 0600)  SpO2: 100 % (06/23/18 0714) Vital Signs (24h Range):  Temp:  [97.9 °F (36.6 °C)-99.1 °F (37.3 °C)] 98.8 °F (37.1 °C)  Pulse:  [72-86] 84  Resp:  [15-22] 20  SpO2:  [97 %-100 %] 100 %  BP: ()/(37-75) 91/45     Weight: 75.4 kg (166 lb 3.6 oz)  Body mass index is 30.4 kg/m².    Intake/Output - Last 3 Shifts       06/21 0700 - 06/22 0659 06/22 0700 - 06/23 0659 06/23 0700 - 06/24 0659    NG/ 630     Total Intake(mL/kg) 360 (4.7) 630 (8.4)     Urine (mL/kg/hr) 175 (0.1) 25 (0)     Other 2600 (1.4)      Stool 40 (0)      Total Output 2815 25      Net -2455 +605             Stool Occurrence  0 x           Physical Exam   Constitutional: She appears well-developed and well-nourished.   HENT:   Head: Normocephalic and atraumatic.   Eyes: EOM are normal.    Neck: Neck supple.   Cardiovascular: Normal rate, regular rhythm and intact distal pulses.    Pulmonary/Chest: Effort normal. No respiratory distress.   Abdominal: Soft.   Neurological: She is alert.   Skin: Skin is warm and dry.   Vitals reviewed.      Significant Labs:  CBC:     Recent Labs  Lab 06/23/18  0430   WBC 14.60*   RBC 2.85*   HGB 8.0*   HCT 24.7*      MCV 87   MCH 28.1   MCHC 32.4     CMP:     Recent Labs  Lab 06/23/18  0430   *   CALCIUM 8.0*   ALBUMIN 2.3*   PROT 5.5*      K 4.1   CO2 24      BUN 69*   CREATININE 5.7*   ALKPHOS 54*   ALT 9*   AST 19   BILITOT 0.4       Significant Diagnostics:  I have reviewed all pertinent imaging results/findings within the past 24 hours.

## 2018-06-23 NOTE — SUBJECTIVE & OBJECTIVE
Interval History: extubated yesterday, somewhat disoriented    Medications:  Continuous Infusions:  Scheduled Meds:   amiodarone  200 mg Oral BID    aspirin  81 mg Oral Daily    chlorhexidine  10 mL Mouth/Throat BID    epoetin carmen (PROCRIT) injection  4,000 Units Intravenous Every Tues, Thurs, Sat    famotidine  20 mg Oral Daily    heparin (porcine)  5,000 Units Subcutaneous Q8H    metoprolol tartrate  25 mg Oral BID    sodium bicarbonate  650 mg Oral BID    vancomycin  125 mg Oral Q6H     PRN Meds:acetaminophen, atropine, bisacodyl, cloNIDine, dextrose 50%, dextrose 50%, glucagon (human recombinant), glucose, glucose, heparin (porcine), hydrALAZINE, insulin aspart U-100, lactulose, lorazepam, morphine, nitroGLYCERIN, ondansetron, sodium bicarbonate, sodium chloride 0.9%     Review of patient's allergies indicates:   Allergen Reactions    Lipitor [atorvastatin] Swelling     Lips       Objective:     Vital Signs (Most Recent):  Temp: 98.6 °F (37 °C) (06/22/18 1900)  Pulse: 77 (06/22/18 1900)  Resp: 19 (06/22/18 1900)  BP: (!) 101/42 (06/22/18 1900)  SpO2: 100 % (06/22/18 1952) Vital Signs (24h Range):  Temp:  [97.4 °F (36.3 °C)-99.1 °F (37.3 °C)] 98.6 °F (37 °C)  Pulse:  [] 77  Resp:  [15-22] 19  SpO2:  [83 %-100 %] 100 %  BP: ()/(42-83) 101/42     Weight: 77 kg (169 lb 12.1 oz)  Body mass index is 31.05 kg/m².    Intake/Output - Last 3 Shifts       06/20 0700 - 06/21 0659 06/21 0700 - 06/22 0659 06/22 0700 - 06/23 0659    I.V. (mL/kg) 100 (1.3)      NG/ 360 130    Total Intake(mL/kg) 945 (12.3) 360 (4.7) 130 (1.7)    Urine (mL/kg/hr) 102 (0.1) 175 (0.1) 25 (0)    Other 1500 (0.8) 2600 (1.4)     Stool 90 (0) 40 (0)     Total Output 1692 2815 25    Net -747 -2455 +105                 Physical Exam   Constitutional: She appears well-developed and well-nourished.   HENT:   Head: Normocephalic and atraumatic.   Eyes: EOM are normal.   Neck: Neck supple.   Cardiovascular: Normal rate, regular  rhythm and intact distal pulses.    Pulmonary/Chest: Effort normal. No respiratory distress.   Abdominal: Soft.   Neurological: She is alert.   Skin: Skin is warm and dry.   Vitals reviewed.      Significant Labs:  CBC:     Recent Labs  Lab 06/22/18 0417   WBC 14.42*   RBC 2.96*   HGB 8.2*   HCT 25.6*      MCV 87   MCH 27.7   MCHC 32.0     CMP:     Recent Labs  Lab 06/22/18 0417      CALCIUM 8.1*   ALBUMIN 2.3*   PROT 5.3*      K 4.3   CO2 23      BUN 44*   CREATININE 4.1*   ALKPHOS 46*   ALT 10   AST 29   BILITOT 0.6       Significant Diagnostics:  I have reviewed all pertinent imaging results/findings within the past 24 hours.

## 2018-06-23 NOTE — ASSESSMENT & PLAN NOTE
Per Cards.  Medical management  [facility] 6/15 done revealing diffuse CAD not amenable for PCI, no changes from 2016  Cont ASA, Lopressor  Plavix stopped by Cards 6/18.    Allergy to statin noted    Consider restart PLAVIX

## 2018-06-23 NOTE — PROGRESS NOTES
[facility] -   Pulmonology  Progress Note    Patient Name: Citlali Karimi  MRN: [id number]  Admission Date: 6/12/2018  Hospital Length of Stay: 10 days  Code Status: DNR  Attending Provider: Levy Samuel MD  Primary Care Provider: Evelio Schuster MD   Principal Problem: Critical illness polyneuropathy    Subjective:     Interval History:     No interval adverse events    Review of Systems   HENT: Negative.    Eyes: Negative.    Respiratory: Negative.    Cardiovascular: Negative.    Gastrointestinal: Negative.    Genitourinary: Negative.    Musculoskeletal: Negative.    Skin: Negative.    Neurological: Positive for weakness.   Endo/Heme/Allergies: Negative.        Objective:     Vital Signs (Most Recent):  Temp: 98.8 °F (37.1 °C) (06/23/18 0300)  Pulse: 84 (06/23/18 0600)  Resp: 20 (06/23/18 0600)  BP: (!) 91/45 (06/23/18 0600)  SpO2: 100 % (06/23/18 0714) Vital Signs (24h Range):  Temp:  [97.9 °F (36.6 °C)-99.1 °F (37.3 °C)] 98.8 °F (37.1 °C)  Pulse:  [72-86] 84  Resp:  [15-22] 20  SpO2:  [97 %-100 %] 100 %  BP: ()/(37-75) 91/45     Weight: 75.4 kg (166 lb 3.6 oz)  Body mass index is 30.4 kg/m².      Intake/Output Summary (Last 24 hours) at 06/23/18 0932  Last data filed at 06/23/18 0600   Gross per 24 hour   Intake              630 ml   Output               10 ml   Net              620 ml       Physical Exam   Constitutional: Vital signs are normal. She has a sickly appearance. Nasal cannula in place.       HENT:   Head: Normocephalic and atraumatic.   Nose: Nose normal.   Mouth/Throat: Oropharynx is clear and moist.   Eyes: Conjunctivae and EOM are normal. Pupils are equal, round, and reactive to light.   Neck: Normal range of motion. Neck supple. No JVD present.   Cardiovascular: Normal rate, normal heart sounds and intact distal pulses.    No murmur heard.  Pulmonary/Chest: Effort normal and breath sounds normal.   Abdominal: Soft. Bowel sounds are normal.   Musculoskeletal: Normal range of  motion. She exhibits no edema.   Lymphadenopathy:     She has no cervical adenopathy.   Neurological: She is alert. No cranial nerve deficit.   Skin: Skin is warm and dry. Capillary refill takes 2 to 3 seconds.   Psychiatric: She has a normal mood and affect.   Nursing note and vitals reviewed.      Vents:  Vent Mode: Spont (06/21/18 1301)  Ventilator Initiated: Yes (06/14/18 1812)  Set Rate: 0 bmp (06/21/18 1301)  Vt Set: 400 mL (06/21/18 1301)  Pressure Support: 8 cmH20 (06/21/18 1301)  PEEP/CPAP: 5 cmH20 (06/21/18 1301)  Oxygen Concentration (%): 4 (06/22/18 1105)  Peak Airway Pressure: 13 cmH2O (06/21/18 1301)  Plateau Pressure: 18 cmH20 (06/21/18 1301)  Total Ve: 6.39 mL (06/21/18 1301)  Negative Inspiratory Force (cm H2O): -40 (06/21/18 1430)  F/VT Ratio<105 (RSBI): (!) 40.94 (06/21/18 1301)    Lines/Drains/Airways     Central Venous Catheter Line                 Hemodialysis Catheter 06/19/18 1430 left femoral 3 days         Percutaneous Central Line Insertion/Assessment - triple lumen  06/21/18 1400 right subclavian 1 day          Drain                 NG/OG Tube 06/22/18 1635 [place] sump 16 Fr. Left nostril less than 1 day                Significant Labs:    CBC/Anemia Profile:    Recent Labs  Lab 06/22/18  0417 06/23/18  0430   WBC 14.42* 14.60*   HGB 8.2* 8.0*   HCT 25.6* 24.7*    200   MCV 87 87   RDW 17.7* 17.4*        Chemistries:    Recent Labs  Lab 06/22/18  0417 06/23/18  0430    138   K 4.3 4.1    102   CO2 23 24   BUN 44* 69*   CREATININE 4.1* 5.7*   CALCIUM 8.1* 8.0*   ALBUMIN 2.3* 2.3*   PROT 5.3* 5.5*   BILITOT 0.6 0.4   ALKPHOS 46* 54*   ALT 10 9*   AST 29 19   MG 1.9 2.1   PHOS 3.9 4.8*       All pertinent labs within the past 24 hours have been reviewed.    Significant Imaging:  I have reviewed and interpreted all pertinent imaging results/findings within the past 24 hours.      Assessment/Plan:     * Critical illness polyneuropathy    PT and OT  Will need LTAC        Acute  hypoxemic respiratory failure    SP extubation 06/21  Oral care  Deep breathing exercises  Nasal canulla, Keep Sat> 92%        C. difficile diarrhea    continue Vancomycin per OG        ESRD (end stage renal disease)    Vascath groin   HD today        S/P parathyroidectomy    Per surgery still following  Incision clean  POD # 11        Cardiac arrest with ventricular fibrillation    Monitor and replace electrolytes if needed  ICU cardiac monitoring  Cont Amiodarone and Lopressor.  A-Fib resolved.  No neuro deficit noted above baseline from hx CVA        Elevated troponin    Cards will decide when to reintroduce Clopidogrel        Hyperparathyroidism    POD # 11  S/P parathyroidectomy  Surgery following        Coronary artery disease involving native coronary artery    Per Cards.  Medical management  [facility] 6/15 done revealing diffuse CAD not amenable for PCI, no changes from 2016  Cont ASA, Lopressor  Plavix stopped by Cards 6/18.    Allergy to statin noted    Consider restart PLAVIX        Acute renal failure superimposed on stage 5 chronic kidney disease, not on chronic dialysis    S/P contrast with LHC 6/15  Renal following.    RRT started 6/19   Accurate I/Os  No Marv LARRY Renal  [facility] Levy[facility]        Diabetes mellitus, type 2 - only on oral medications    Cont SSI           Acute blood loss anemia    S/P 2 units PRBCs 6/15 and 1 unit PRBC 6/18  Daily H/H  No active bleeding  Patient has been treated with Procrit 20,000 units weekly for maintenance therapy followed by Hematology  H&H 8.0/24.7: not symptomatic          [facility] LTAC  [facility]  Transfer to tele       I have reviewed all labs and imaging studies and compared to previous results. I have also discussed labs with all the teams in the medical care of the patient and my plan is outlined below          Axel Perez MD  Pulmonology  [facility] -

## 2018-06-23 NOTE — NURSING
PT TRANSFERRED TO DIALYSIS VIA BED ON TELEMETRY MONITOR AND ROOM AIR.  NG TUBE IN PLACE AND TUBE FEEDS STOPPED.  ISOLATION PRECAUTIONS MAINTAINED DURING TRANSFER PER PROTOCOL.  REPORT GIVEN TO HARSHAL GIBBS.

## 2018-06-23 NOTE — PROGRESS NOTES
[facility] -   Hematology/Oncology  Progress Note    Patient Name: Citlali Karimi  Admission Date: 6/12/2018  Hospital Length of Stay: 10 days  Code Status: DNR     Subjective:     HPI:  68 year old female, PMHx of CKD (IV), HTN, DM, CVA (left sided weakness), and CAD who initially presented for hyperparathyroidism and hypercalcemia requiring surgical intervention.   She was seen in clinic by surgery for hyperparathyroidism and hypercalcemia and was electively admitted 6/12 taken to OR undergoing parathyroidectomy finding 2 right sided enlarged parathyroid glands.  Intra and post op patient developed EKG changes and had elevated troponin.  Cards consulted.  Patient admitted due to swallowing issues she was not taking all her meds as prescribed and had BP as high as 225/103 during hospitalization.  Troponin increased to > 50.  Cards diagnosed with NSTEMI and she was Rx with ASA, Imdur, Lopressor, statin and Heparin infusion with plans for repeat LHC if cleared by Renal given CKD5.  Today, she had progressively worsening neck swelling and SOB with worsening dysphagia.  She was taken back to OR and had post op neck hematoma evacuation.  In PACU she had witnessed V-Fib cardiac arrest and CODE BLUE called.  After 3 rounds of CPR, Epi X 3 and Defib X 3 ROSC was attained.  She was still intubated and on vent post op    Interval History:  The patient is more alert and awake today.  She is able to answer a few more questions today but is not able to consistently do this.  Overall her mental status does appear to be better.    Oncology Treatment Plan:   [No treatment plan]    Medications:  Continuous Infusions:  Scheduled Meds:   amiodarone  200 mg Oral BID    aspirin  81 mg Oral Daily    epoetin carmen (PROCRIT) injection  10,000 Units Intravenous Once    epoetin carmen (PROCRIT) injection  4,000 Units Intravenous Every Tues, Thurs, Sat    famotidine  20 mg Oral Daily    heparin (porcine)  2,000 Units  Intravenous Once    heparin (porcine)  5,000 Units Subcutaneous Q8H    metoprolol tartrate  25 mg Oral BID    sodium bicarbonate  650 mg Oral BID    vancomycin  125 mg Oral Q6H     PRN Meds:acetaminophen, atropine, bisacodyl, cloNIDine, dextrose 50%, dextrose 50%, glucagon (human recombinant), glucose, glucose, heparin (porcine), hydrALAZINE, insulin aspart U-100, lactulose, lorazepam, morphine, nitroGLYCERIN, ondansetron, sodium bicarbonate, sodium chloride 0.9%     Review of Systems   Unable to perform ROS: Mental status change     Objective:     Vital Signs (Most Recent):  Temp: 98.8 °F (37.1 °C) (06/23/18 0300)  Pulse: 84 (06/23/18 0600)  Resp: 20 (06/23/18 0600)  BP: (!) 91/45 (06/23/18 0600)  SpO2: 100 % (06/23/18 0714) Vital Signs (24h Range):  Temp:  [98.6 °F (37 °C)-99.1 °F (37.3 °C)] 98.8 °F (37.1 °C)  Pulse:  [73-86] 84  Resp:  [15-22] 20  SpO2:  [97 %-100 %] 100 %  BP: ()/(37-75) 91/45     Weight: 75.4 kg (166 lb 3.6 oz)  Body mass index is 30.4 kg/m².  Body surface area is 1.82 meters squared.      Intake/Output Summary (Last 24 hours) at 06/23/18 1240  Last data filed at 06/23/18 0600   Gross per 24 hour   Intake              630 ml   Output                0 ml   Net              630 ml       Physical Exam   Constitutional: She appears well-developed and well-nourished. She is active and cooperative. She appears ill.   HENT:   Head: Normocephalic and atraumatic.   Nose: Nose normal.   Mouth/Throat: Oropharynx is clear and moist. No oropharyngeal exudate.   Healing neck incision is noted.   Eyes: Pupils are equal, round, and reactive to light. No scleral icterus.   Neck: Neck supple. No thyromegaly present.   Cardiovascular: Normal rate, regular rhythm, normal heart sounds and intact distal pulses.    No murmur heard.  Pulmonary/Chest: Effort normal. No stridor. No respiratory distress. She has no wheezes. She has rhonchi. She has no rales.   Abdominal: Soft. Bowel sounds are normal. She  exhibits no distension, no ascites and no mass. There is no hepatosplenomegaly. There is no tenderness. There is no rigidity, no rebound and no guarding.   Musculoskeletal: She exhibits no edema or tenderness.   Lymphadenopathy:     She has no cervical adenopathy.   Neurological: She is unresponsive. No cranial nerve deficit. She exhibits normal muscle tone. Coordination normal.   Skin: Skin is warm and dry. Ecchymosis noted. No rash noted. No pallor.   Psychiatric: She is slowed. She is noncommunicative.   Nursing note and vitals reviewed.      Significant Labs:   I have reviewed all of the patient's relevant lab work available in the medical record and have utilized this in my evaluation and management recommendations today    Diagnostic Results:  I have reviewed all of the patient's diagnostic/imaging results available in the medical record and have utilized this in my evaluation and management recommendations today.    Assessment/Plan:     Acute renal failure superimposed on stage 5 chronic kidney disease, not on chronic dialysis    June 22, 2018-patient has anemia now due to end-stage renal disease.  Treatment of this will be as per Nephrology protocol with IV iron/Depo supplementation with hemodialysis.  Please call with any questions.  June 23, 2018-patient has end-stage renal disease and is on RRT.  Management of anemia will be as per Nephrology.        Acute blood loss anemia    6/22/18 - Patient has been treated with Procrit for maintenance therapy for anemia due to chronic kidney disease. Patient with hemoglobin of 8.2 this morning.  No current evidence of bleeding noted.  We will continue to monitor closely.  June 23, 2018-the patient has acute blood loss anemia is stable with hemoglobin greater than 8 grams/deciliter today.  She will continue Procrit as per HD protocol.  No urgent indication for PRBC transfusion today.            Thank you for your consult.      David Addison,  MD  Hematology/Oncology  [facility]

## 2018-06-23 NOTE — SUBJECTIVE & OBJECTIVE
Interval History:  Discussed with vascular surgery Dr. Watson.  Patient will get tunneled Vas-Cath and remove left femoral temporary Vas-Cath.  Patient is dialysis dependent would be on dialysis for ESRD    Review of patient's allergies indicates:   Allergen Reactions    Lipitor [atorvastatin] Swelling     Lips       Current Facility-Administered Medications   Medication Frequency    acetaminophen tablet 650 mg Q4H PRN    amiodarone tablet 200 mg BID    aspirin chewable tablet 81 mg Daily    atropine injection 0.5 mg PRN    bisacodyl suppository 10 mg Daily PRN    cloNIDine tablet 0.1 mg Q6H PRN    dextrose 50% injection 12.5 g PRN    dextrose 50% injection 25 g PRN    epoetin carmen injection 10,000 Units Once    epoetin carmen injection 4,000 Units Every Tues, Thurs, Sat    famotidine tablet 20 mg Daily    glucagon (human recombinant) injection 1 mg PRN    glucose chewable tablet 16 g PRN    glucose chewable tablet 24 g PRN    heparin (porcine) injection 2,000 Units Once    heparin (porcine) injection 3,000 Units PRN    heparin (porcine) injection 5,000 Units Q8H    hydrALAZINE injection 10 mg Q8H PRN    insulin aspart U-100 pen 0-5 Units QID (AC + HS) PRN    lactulose 20 gram/30 mL solution Soln 20 g Q6H PRN    lorazepam (ATIVAN) injection 2 mg Q4H PRN    metoprolol tartrate (LOPRESSOR) tablet 25 mg BID    morphine injection 4 mg Q4H PRN    nitroGLYCERIN SL tablet 0.4 mg Q5 Min PRN    ondansetron injection 4 mg Q8H PRN    sodium bicarbonate 8.4 % (1 mEq/mL) injection PRN    sodium bicarbonate tablet 650 mg BID    sodium chloride 0.9% flush 3 mL PRN    vancomycin 250mg / 10ml oral suspension 125 mg Q6H       Objective:     Vital Signs (Most Recent):  Temp: 98.8 °F (37.1 °C) (06/23/18 0300)  Pulse: 84 (06/23/18 0600)  Resp: 20 (06/23/18 0600)  BP: (!) 91/45 (06/23/18 0600)  SpO2: 100 % (06/23/18 0714)  O2 Device (Oxygen Therapy): nasal cannula (06/23/18 0714) Vital Signs (24h  Range):  Temp:  [98.6 °F (37 °C)-99.1 °F (37.3 °C)] 98.8 °F (37.1 °C)  Pulse:  [74-86] 84  Resp:  [15-22] 20  SpO2:  [97 %-100 %] 100 %  BP: ()/(37-64) 91/45     Weight: 75.4 kg (166 lb 3.6 oz) (06/23/18 0153)  Body mass index is 30.4 kg/m².  Body surface area is 1.82 meters squared.    I/O last 3 completed shifts:  In: 990 [NG/GT:990]  Out: 175 [Urine:175]    Physical Exam   Constitutional: She appears well-developed and well-nourished.   HENT:   Head: Normocephalic and atraumatic.   Eyes: Conjunctivae and EOM are normal. Pupils are equal, round, and reactive to light.   Neck: Normal range of motion and full passive range of motion without pain. Neck supple. Carotid bruit is not present. No edema present. No thyroid mass and no thyromegaly present.   Cardiovascular: Normal rate, regular rhythm, S1 normal, S2 normal, intact distal pulses and normal pulses.   No extrasystoles are present. PMI is displaced.  Exam reveals no friction rub.    Murmur heard.   Systolic murmur is present with a grade of 2/6   RV heave loud P2    Pulmonary/Chest: Effort normal. No accessory muscle usage. No respiratory distress. She has wheezes. She has no rales. She exhibits no tenderness.   Abdominal: Soft. Bowel sounds are normal. She exhibits no distension and no mass. There is no hepatosplenomegaly. There is no tenderness. There is no rebound and no CVA tenderness. No hernia.   Musculoskeletal: Normal range of motion. She exhibits edema. She exhibits no tenderness.   Neurological: She is alert. She has normal reflexes. No cranial nerve deficit or sensory deficit. She exhibits normal muscle tone. Coordination normal.   Drowsy but arousable and no distress no acute signs of CVA   Skin: Skin is warm and dry. Capillary refill takes 2 to 3 seconds. No bruising, no ecchymosis and no rash noted. No cyanosis or erythema. No pallor. Nails show no clubbing.   Psychiatric: Her speech is normal.       Significant Labs:  All labs within the  past 24 hours have been reviewed.     Significant Imaging:  Labs: Reviewed  X-Ray: Reviewed

## 2018-06-23 NOTE — PROGRESS NOTES
[facility]  General Surgery  Progress Note    Subjective:     History of Present Illness:  No notes on file    Post-Op Info:  Procedure(s) (LRB):  CATHETERIZATION, HEART, LEFT (Left)   7 Days Post-Op     Interval History: extubated yesterday, somewhat disoriented    Medications:  Continuous Infusions:  Scheduled Meds:   amiodarone  200 mg Oral BID    aspirin  81 mg Oral Daily    chlorhexidine  10 mL Mouth/Throat BID    epoetin carmen (PROCRIT) injection  4,000 Units Intravenous Every Tues, Thurs, Sat    famotidine  20 mg Oral Daily    heparin (porcine)  5,000 Units Subcutaneous Q8H    metoprolol tartrate  25 mg Oral BID    sodium bicarbonate  650 mg Oral BID    vancomycin  125 mg Oral Q6H     PRN Meds:acetaminophen, atropine, bisacodyl, cloNIDine, dextrose 50%, dextrose 50%, glucagon (human recombinant), glucose, glucose, heparin (porcine), hydrALAZINE, insulin aspart U-100, lactulose, lorazepam, morphine, nitroGLYCERIN, ondansetron, sodium bicarbonate, sodium chloride 0.9%     Review of patient's allergies indicates:   Allergen Reactions    Lipitor [atorvastatin] Swelling     Lips       Objective:     Vital Signs (Most Recent):  Temp: 98.6 °F (37 °C) (06/22/18 1900)  Pulse: 77 (06/22/18 1900)  Resp: 19 (06/22/18 1900)  BP: (!) 101/42 (06/22/18 1900)  SpO2: 100 % (06/22/18 1952) Vital Signs (24h Range):  Temp:  [97.4 °F (36.3 °C)-99.1 °F (37.3 °C)] 98.6 °F (37 °C)  Pulse:  [] 77  Resp:  [15-22] 19  SpO2:  [83 %-100 %] 100 %  BP: ()/(42-83) 101/42     Weight: 77 kg (169 lb 12.1 oz)  Body mass index is 31.05 kg/m².    Intake/Output - Last 3 Shifts       06/20 0700 - 06/21 0659 06/21 0700 - 06/22 0659 06/22 0700 - 06/23 0659    I.V. (mL/kg) 100 (1.3)      NG/ 360 130    Total Intake(mL/kg) 945 (12.3) 360 (4.7) 130 (1.7)    Urine (mL/kg/hr) 102 (0.1) 175 (0.1) 25 (0)    Other 1500 (0.8) 2600 (1.4)     Stool 90 (0) 40 (0)     Total Output 1694 9915 25    Net -524 -1997 +822                  Physical Exam   Constitutional: She appears well-developed and well-nourished.   HENT:   Head: Normocephalic and atraumatic.   Eyes: EOM are normal.   Neck: Neck supple.   Cardiovascular: Normal rate, regular rhythm and intact distal pulses.    Pulmonary/Chest: Effort normal. No respiratory distress.   Abdominal: Soft.   Neurological: She is alert.   Skin: Skin is warm and dry.   Vitals reviewed.      Significant Labs:  CBC:     Recent Labs  Lab 06/22/18 0417   WBC 14.42*   RBC 2.96*   HGB 8.2*   HCT 25.6*      MCV 87   MCH 27.7   MCHC 32.0     CMP:     Recent Labs  Lab 06/22/18 0417      CALCIUM 8.1*   ALBUMIN 2.3*   PROT 5.3*      K 4.3   CO2 23      BUN 44*   CREATININE 4.1*   ALKPHOS 46*   ALT 10   AST 29   BILITOT 0.6       Significant Diagnostics:  I have reviewed all pertinent imaging results/findings within the past 24 hours.    Assessment/Plan:     C. difficile diarrhea    Cont PO vancomycin        ESRD (end stage renal disease)    Hemodialysis was initiated        CVA (cerebral vascular accident)    Prior CVA, patient with increased difficulty swelling medications at home prior to hospitalization, underwent evaluation with ENT prior to surgery  Speech therapy consult for swallowing        Hyperparathyroidism    Status post parathyroidectomy  Monitor calcium  S/p neck washout  Neck is supple, no further swelling since drain out.   Extubated yesterday  Speech therapy for swallow eval may need alternative feeding access if unable to take in po, cont  Tube feeds  PT/OT              Coronary artery disease involving native coronary artery    Cardiology following  On anticoagulation        Acute renal failure superimposed on stage 5 chronic kidney disease, not on chronic dialysis    Pt tolerating hemo-dialysis well        Hypertension associated with diabetes    Antihypertensive medications            Levy Samuel MD  General Surgery  [facility]

## 2018-06-23 NOTE — OP NOTE
DATE OF PROCEDURE:  06/23/2018.    SURGEON:  Hector Watson IV, M.D.    ASSISTANT:  None.    PREOPERATIVE DIAGNOSIS:  End-stage renal disease.    PROCEDURES PERFORMED:  1.  Tunneled right internal jugular Vas-Cath insertion.  2.  Removal of left femoral Vas-Cath.    POSTOPERATIVE DIAGNOSIS:  End-stage renal disease.    ANESTHESIA:  Local.    BLOOD LOSS:  Minimal.    INDICATIONS:  This is a 68-year-old female admitted for parathyroidectomy   complicated by hematoma requiring surgical evacuation.  The patient also had an   NSTEMI requiring a heart catheterization with subsequent worsening of her   chronic kidney disease to end-stage renal disease requiring urgent left femoral   Vas-Cath placement for initiation of hemodialysis.  The patient is recovering   and is now getting ready to be discharged to a LTAC.  The femoral Vas-Cath is   needing to be changed for a tunneled jugular Vas-Cath.    PROCEDURE IN DETAIL:  After informed consent was obtained from the patient's   .  The patient was brought to the Cath Lab, placed on the table in supine   position.  The patient's right neck and previous triple lumen catheter were   prepped and draped in the usual standard sterile fashion as well as the right   shoulder.  After timeout was performed, a stiff angled Glidewire was placed   through the right IJ triple lumen catheter into the inferior vena cava.  A 1%   lidocaine was used to anesthetize the right shoulder and track the Vas-Cath.  A   16-Russian x 24 cm Vas-Cath was then tunneled from the right shoulder to the   right IJ access point.  The triple lumen catheter was then removed over the   wire.  The IJ track was dilated and a peel-away sheath was placed.  Vas-Cath was   placed through the peel-away sheath and the peel-away sheath was removed.  Tip   of the Vas-Cath was at the junction of the superior vena cava and right atrium.    Each lumen was aspirated and flushed and was working appropriately.  Each lumen    was then flushed with 2 mL of heparin and the Vas-Cath was sutured in place.    At this point, the left femoral Vas-Cath was then removed, pressure for   hemostasis and no complications.  The patient tolerated the procedure well.      ZAFAR/IN  dd: 06/23/2018 14:04:03 (CDT)  td: 06/23/2018 14:26:01 (ARTUROT)  Doc ID   #[id number]  Job ID #[id number]    CC:

## 2018-06-23 NOTE — ASSESSMENT & PLAN NOTE
-stable post procedure  -will monitor  -CBC in am   -pt followed outpatient by Heme/Onc    6/16  Stable  Monitor    6/17  Improving  Monitor    6/18  Hgb 7.7 today  Transfuse PRBC's per sx  Monitor    6/23  Hb 8 stable   Monitor  6/24  No signs of acute blood loss anema

## 2018-06-23 NOTE — SUBJECTIVE & OBJECTIVE
Interval History:     No interval adverse events    Review of Systems   HENT: Negative.    Eyes: Negative.    Respiratory: Negative.    Cardiovascular: Negative.    Gastrointestinal: Negative.    Genitourinary: Negative.    Musculoskeletal: Negative.    Skin: Negative.    Neurological: Positive for weakness.   Endo/Heme/Allergies: Negative.        Objective:     Vital Signs (Most Recent):  Temp: 98.8 °F (37.1 °C) (06/23/18 0300)  Pulse: 84 (06/23/18 0600)  Resp: 20 (06/23/18 0600)  BP: (!) 91/45 (06/23/18 0600)  SpO2: 100 % (06/23/18 0714) Vital Signs (24h Range):  Temp:  [97.9 °F (36.6 °C)-99.1 °F (37.3 °C)] 98.8 °F (37.1 °C)  Pulse:  [72-86] 84  Resp:  [15-22] 20  SpO2:  [97 %-100 %] 100 %  BP: ()/(37-75) 91/45     Weight: 75.4 kg (166 lb 3.6 oz)  Body mass index is 30.4 kg/m².      Intake/Output Summary (Last 24 hours) at 06/23/18 0932  Last data filed at 06/23/18 0600   Gross per 24 hour   Intake              630 ml   Output               10 ml   Net              620 ml       Physical Exam   Constitutional: Vital signs are normal. She has a sickly appearance. Nasal cannula in place.       HENT:   Head: Normocephalic and atraumatic.   Nose: Nose normal.   Mouth/Throat: Oropharynx is clear and moist.   Eyes: Conjunctivae and EOM are normal. Pupils are equal, round, and reactive to light.   Neck: Normal range of motion. Neck supple. No JVD present.   Cardiovascular: Normal rate, normal heart sounds and intact distal pulses.    No murmur heard.  Pulmonary/Chest: Effort normal and breath sounds normal.   Abdominal: Soft. Bowel sounds are normal.   Musculoskeletal: Normal range of motion. She exhibits no edema.   Lymphadenopathy:     She has no cervical adenopathy.   Neurological: She is alert. No cranial nerve deficit.   Skin: Skin is warm and dry. Capillary refill takes 2 to 3 seconds.   Psychiatric: She has a normal mood and affect.   Nursing note and vitals reviewed.      Vents:  Vent Mode: Spont (06/21/18  1301)  Ventilator Initiated: Yes (06/14/18 1812)  Set Rate: 0 bmp (06/21/18 1301)  Vt Set: 400 mL (06/21/18 1301)  Pressure Support: 8 cmH20 (06/21/18 1301)  PEEP/CPAP: 5 cmH20 (06/21/18 1301)  Oxygen Concentration (%): 4 (06/22/18 1105)  Peak Airway Pressure: 13 cmH2O (06/21/18 1301)  Plateau Pressure: 18 cmH20 (06/21/18 1301)  Total Ve: 6.39 mL (06/21/18 1301)  Negative Inspiratory Force (cm H2O): -40 (06/21/18 1430)  F/VT Ratio<105 (RSBI): (!) 40.94 (06/21/18 1301)    Lines/Drains/Airways     Central Venous Catheter Line                 Hemodialysis Catheter 06/19/18 1430 left femoral 3 days         Percutaneous Central Line Insertion/Assessment - triple lumen  06/21/18 1400 right subclavian 1 day          Drain                 NG/OG Tube 06/22/18 1635 Claiborne sump 16 Fr. Left nostril less than 1 day                Significant Labs:    CBC/Anemia Profile:    Recent Labs  Lab 06/22/18  0417 06/23/18  0430   WBC 14.42* 14.60*   HGB 8.2* 8.0*   HCT 25.6* 24.7*    200   MCV 87 87   RDW 17.7* 17.4*        Chemistries:    Recent Labs  Lab 06/22/18  0417 06/23/18  0430    138   K 4.3 4.1    102   CO2 23 24   BUN 44* 69*   CREATININE 4.1* 5.7*   CALCIUM 8.1* 8.0*   ALBUMIN 2.3* 2.3*   PROT 5.3* 5.5*   BILITOT 0.6 0.4   ALKPHOS 46* 54*   ALT 10 9*   AST 29 19   MG 1.9 2.1   PHOS 3.9 4.8*       All pertinent labs within the past 24 hours have been reviewed.    Significant Imaging:  I have reviewed and interpreted all pertinent imaging results/findings within the past 24 hours.

## 2018-06-23 NOTE — ASSESSMENT & PLAN NOTE
Ordered  hemodialysis today.   Patient is comfortable and not short of breath.  Has received 3 sessions for hemodialysis already this week.  Case discussed in detail with Dr. Perez and dr. Watson    New IJ vasc cath     Left Femoral Cath out     HD today

## 2018-06-23 NOTE — SUBJECTIVE & OBJECTIVE
Review of Systems   Unable to perform ROS: Mental status change     Objective:     Vital Signs (Most Recent):  Temp: 98.4 °F (36.9 °C) (06/23/18 1505)  Pulse: 90 (06/23/18 1530)  Resp: 18 (06/23/18 1530)  BP: (!) 95/42 (06/23/18 1530)  SpO2: 97 % (06/23/18 1530) Vital Signs (24h Range):  Temp:  [98.1 °F (36.7 °C)-98.8 °F (37.1 °C)] 98.4 °F (36.9 °C)  Pulse:  [75-94] 90  Resp:  [16-22] 18  SpO2:  [97 %-100 %] 97 %  BP: ()/(37-56) 95/42     Weight: 75.4 kg (166 lb 3.6 oz)  Body mass index is 30.4 kg/m².    Intake/Output Summary (Last 24 hours) at 06/23/18 1819  Last data filed at 06/23/18 1500   Gross per 24 hour   Intake              960 ml   Output                0 ml   Net              960 ml      Physical Exam   Constitutional: She appears well-developed and well-nourished.   HENT:   Head: Normocephalic and atraumatic.   Eyes: Conjunctivae and EOM are normal. Pupils are equal, round, and reactive to light.   Neck: Normal range of motion and full passive range of motion without pain. Neck supple. Carotid bruit is not present. No edema present. No thyroid mass and no thyromegaly present.   Cardiovascular: Normal rate, regular rhythm, S1 normal, S2 normal, intact distal pulses and normal pulses.   No extrasystoles are present. PMI is displaced.  Exam reveals no friction rub.    Murmur heard.   Systolic murmur is present with a grade of 2/6   RV heave loud P2    Pulmonary/Chest: Effort normal. No accessory muscle usage. No respiratory distress. She has wheezes. She has no rales. She exhibits no tenderness.   Abdominal: Soft. Bowel sounds are normal. She exhibits no distension and no mass. There is no hepatosplenomegaly. There is no tenderness. There is no rebound and no CVA tenderness. No hernia.   Musculoskeletal: Normal range of motion. She exhibits edema. She exhibits no tenderness.   Neurological: She is alert. She has normal reflexes. No cranial nerve deficit or sensory deficit. She exhibits normal  muscle tone. Coordination normal.   Drowsy but arousable and no distress no acute signs of CVA   Skin: Skin is warm and dry. Capillary refill takes 2 to 3 seconds. No bruising, no ecchymosis and no rash noted. No cyanosis or erythema. No pallor. Nails show no clubbing.   Psychiatric: Her speech is normal.   Nursing note and vitals reviewed.      Significant Labs: All pertinent labs within the past 24 hours have been reviewed.    Significant Imaging: I have reviewed all pertinent imaging results/findings within the past 24 hours.

## 2018-06-23 NOTE — PT/OT/SLP PROGRESS
Speech Language Pathology      Citlali Karimi  MRN: [id number]    Patient not seen today secondary to pt in a procedure.   Will follow-up with pt tomorrow.    Caroline Adair, SARAH-SLP

## 2018-06-24 ENCOUNTER — SURGERY (OUTPATIENT)
Age: 68
End: 2018-06-24

## 2018-06-24 PROBLEM — R93.89 ABNORMAL CHEST X-RAY: Status: ACTIVE | Noted: 2018-06-24

## 2018-06-24 LAB
ALBUMIN SERPL BCP-MCNC: 2.2 G/DL
ALP SERPL-CCNC: 63 U/L
ALT SERPL W/O P-5'-P-CCNC: <5 U/L
ANION GAP SERPL CALC-SCNC: 14 MMOL/L
AST SERPL-CCNC: 17 U/L
BASOPHILS # BLD AUTO: 0.01 K/UL
BASOPHILS NFR BLD: 0.1 %
BILIRUB SERPL-MCNC: 0.4 MG/DL
BUN SERPL-MCNC: 93 MG/DL
CALCIUM SERPL-MCNC: 7.8 MG/DL
CHLORIDE SERPL-SCNC: 102 MMOL/L
CO2 SERPL-SCNC: 21 MMOL/L
CREAT SERPL-MCNC: 6.9 MG/DL
DIFFERENTIAL METHOD: ABNORMAL
EOSINOPHIL # BLD AUTO: 0.1 K/UL
EOSINOPHIL NFR BLD: 0.5 %
ERYTHROCYTE [DISTWIDTH] IN BLOOD BY AUTOMATED COUNT: 17.4 %
EST. GFR  (AFRICAN AMERICAN): 6 ML/MIN/1.73 M^2
EST. GFR  (NON AFRICAN AMERICAN): 6 ML/MIN/1.73 M^2
GLUCOSE SERPL-MCNC: 155 MG/DL
HCT VFR BLD AUTO: 23.5 %
HGB BLD-MCNC: 7.5 G/DL
LYMPHOCYTES # BLD AUTO: 1 K/UL
LYMPHOCYTES NFR BLD: 9.5 %
MAGNESIUM SERPL-MCNC: 2.1 MG/DL
MCH RBC QN AUTO: 27.2 PG
MCHC RBC AUTO-ENTMCNC: 31.9 G/DL
MCV RBC AUTO: 85 FL
MONOCYTES # BLD AUTO: 0.6 K/UL
MONOCYTES NFR BLD: 5.7 %
NEUTROPHILS # BLD AUTO: 9.2 K/UL
NEUTROPHILS NFR BLD: 84.2 %
PHOSPHATE SERPL-MCNC: 3.1 MG/DL
PLATELET # BLD AUTO: 199 K/UL
PMV BLD AUTO: 10.9 FL
POCT GLUCOSE: 128 MG/DL (ref 70–110)
POCT GLUCOSE: 150 MG/DL (ref 70–110)
POCT GLUCOSE: 167 MG/DL (ref 70–110)
POCT GLUCOSE: 168 MG/DL (ref 70–110)
POCT GLUCOSE: 203 MG/DL (ref 70–110)
POTASSIUM SERPL-SCNC: 4.1 MMOL/L
PROT SERPL-MCNC: 5.5 G/DL
RBC # BLD AUTO: 2.76 M/UL
SODIUM SERPL-SCNC: 137 MMOL/L
WBC # BLD AUTO: 10.89 K/UL

## 2018-06-24 PROCEDURE — 25000003 PHARM REV CODE 250: Performed by: NURSE PRACTITIONER

## 2018-06-24 PROCEDURE — 36581 REPLACE TUNNELED CV CATH: CPT

## 2018-06-24 PROCEDURE — 0J2VXYZ CHANGE OTHER DEVICE IN UPPER EXTREMITY SUBCUTANEOUS TISSUE AND FASCIA, EXTERNAL APPROACH: ICD-10-PCS | Performed by: SURGERY

## 2018-06-24 PROCEDURE — 80100016 HC MAINTENANCE HEMODIALYSIS

## 2018-06-24 PROCEDURE — 80053 COMPREHEN METABOLIC PANEL: CPT

## 2018-06-24 PROCEDURE — 99233 SBSQ HOSP IP/OBS HIGH 50: CPT | Mod: ,,, | Performed by: INTERNAL MEDICINE

## 2018-06-24 PROCEDURE — 63600175 PHARM REV CODE 636 W HCPCS: Performed by: INTERNAL MEDICINE

## 2018-06-24 PROCEDURE — 63600175 PHARM REV CODE 636 W HCPCS

## 2018-06-24 PROCEDURE — 85025 COMPLETE CBC W/AUTO DIFF WBC: CPT

## 2018-06-24 PROCEDURE — 83735 ASSAY OF MAGNESIUM: CPT

## 2018-06-24 PROCEDURE — 36415 COLL VENOUS BLD VENIPUNCTURE: CPT

## 2018-06-24 PROCEDURE — 21400001 HC TELEMETRY ROOM

## 2018-06-24 PROCEDURE — 25000003 PHARM REV CODE 250: Performed by: SURGERY

## 2018-06-24 PROCEDURE — 25000003 PHARM REV CODE 250

## 2018-06-24 PROCEDURE — 63600175 PHARM REV CODE 636 W HCPCS: Mod: JG | Performed by: INTERNAL MEDICINE

## 2018-06-24 PROCEDURE — 99232 SBSQ HOSP IP/OBS MODERATE 35: CPT | Mod: ,,, | Performed by: INTERNAL MEDICINE

## 2018-06-24 PROCEDURE — 84100 ASSAY OF PHOSPHORUS: CPT

## 2018-06-24 PROCEDURE — 25000003 PHARM REV CODE 250: Performed by: INTERNAL MEDICINE

## 2018-06-24 RX ORDER — ACETAMINOPHEN 650 MG/20.3ML
650 LIQUID ORAL EVERY 4 HOURS PRN
Status: DISCONTINUED | OUTPATIENT
Start: 2018-06-24 | End: 2018-06-30 | Stop reason: HOSPADM

## 2018-06-24 RX ADMIN — Medication 125 MG: at 05:06

## 2018-06-24 RX ADMIN — METOPROLOL TARTRATE 25 MG: 25 TABLET, FILM COATED ORAL at 09:06

## 2018-06-24 RX ADMIN — ERYTHROPOIETIN 10000 UNITS: 10000 INJECTION, SOLUTION INTRAVENOUS; SUBCUTANEOUS at 06:06

## 2018-06-24 RX ADMIN — SODIUM BICARBONATE 650 MG TABLET 650 MG: at 09:06

## 2018-06-24 RX ADMIN — Medication 125 MG: at 12:06

## 2018-06-24 RX ADMIN — ASPIRIN 81 MG 81 MG: 81 TABLET ORAL at 09:06

## 2018-06-24 RX ADMIN — HEPARIN SODIUM 5000 UNITS: 5000 INJECTION, SOLUTION INTRAVENOUS; SUBCUTANEOUS at 01:06

## 2018-06-24 RX ADMIN — FAMOTIDINE 20 MG: 20 TABLET ORAL at 09:06

## 2018-06-24 RX ADMIN — HEPARIN SODIUM 5000 UNITS: 5000 INJECTION, SOLUTION INTRAVENOUS; SUBCUTANEOUS at 09:06

## 2018-06-24 RX ADMIN — ACETAMINOPHEN 650 MG: 650 SOLUTION ORAL at 03:06

## 2018-06-24 RX ADMIN — HEPARIN SODIUM 5000 UNITS: 5000 INJECTION, SOLUTION INTRAVENOUS; SUBCUTANEOUS at 05:06

## 2018-06-24 RX ADMIN — INSULIN ASPART 2 UNITS: 100 INJECTION, SOLUTION INTRAVENOUS; SUBCUTANEOUS at 12:06

## 2018-06-24 RX ADMIN — AMIODARONE HYDROCHLORIDE 200 MG: 200 TABLET ORAL at 09:06

## 2018-06-24 NOTE — ASSESSMENT & PLAN NOTE
6/22/18 - Patient has been treated with Procrit for maintenance therapy for anemia due to chronic kidney disease. Patient with hemoglobin of 8.2 this morning.  No current evidence of bleeding noted.  We will continue to monitor closely.  June 23, 2018-the patient has acute blood loss anemia is stable with hemoglobin greater than 8 grams/deciliter today.  She will continue Procrit as per HD protocol.  No urgent indication for PRBC transfusion today.  June 24, 2018-CBC from today shows hemoglobin greater than 7 grams/deciliter.  I would recommend transfusion of PRBCs for supportive care if hemoglobin less than 7 grams/deciliter.

## 2018-06-24 NOTE — BRIEF OP NOTE
[facility] -   Brief Operative Note    SUMMARY     Surgery Date: 6/24/2018     Surgeon(s) and Role:     * Hector Watson IV, MD - Primary    Assisting Surgeon: None    Pre-op Diagnosis:  Chronic kidney disease (CKD) [N18.9]    Post-op Diagnosis:  Post-Op Diagnosis Codes:     * Chronic kidney disease (CKD) [N18.9]    Procedure(s) (LRB):  VASCULAR CATH EXCHANGE (N/A)    Anesthesia: Choice    Description of Procedure: Right IJ Tunneled vas cath insertion    Description of the findings of the procedure: see operative report    Estimated Blood Loss: * No values recorded between 6/24/2018 12:00 AM and 6/24/2018  8:24 AM *         Specimens:   Specimen (12h ago through future)    None

## 2018-06-24 NOTE — ASSESSMENT & PLAN NOTE
- ASA, Statin, and Lopressor continued   -Imdur dose increased   -previous Cath showed severe CAD (proximal LCX 99%, mid 50%, RCA mid 90%, distal with subtotal lesion).  - Cardiology consulted with medical management continued   - LHC proposed pending Nephrology recommendations  -family refused LHC due to concern for worsening kidney function as pt had expressed that she did not want to be on dialysis     6/15  Family electing LHC and accepting risk of worsening renal function  Cardiology    6/16  S/p LHC  Cardiology  ASA  Statin allergy noted    6/16  No interventions  Cardiology on consult    6/18  Cardiology on Consult  Medical Management    6/19  ASA  BB  6/22  Continue with medical management   6/23  Continue with medical management

## 2018-06-24 NOTE — SUBJECTIVE & OBJECTIVE
Interval History: d/w dr. Watson who is taking patient back to cath lab for access issue. Planned HD later today     No SOB no fever     Rest negative     Review of patient's allergies indicates:   Allergen Reactions    Lipitor [atorvastatin] Swelling     Lips       Current Facility-Administered Medications   Medication Frequency    amiodarone tablet 200 mg BID    aspirin chewable tablet 81 mg Daily    bisacodyl suppository 10 mg Daily PRN    cloNIDine tablet 0.1 mg Q6H PRN    dextrose 50% injection 12.5 g PRN    dextrose 50% injection 25 g PRN    epoetin carmen injection 10,000 Units Once    epoetin carmen injection 4,000 Units Every Tues, Thurs, Sat    famotidine tablet 20 mg Daily    glucagon (human recombinant) injection 1 mg PRN    glucose chewable tablet 16 g PRN    glucose chewable tablet 24 g PRN    heparin (porcine) injection 2,000 Units Once    heparin (porcine) injection 3,000 Units PRN    heparin (porcine) injection 5,000 Units Q8H    hydrALAZINE injection 10 mg Q8H PRN    insulin aspart U-100 pen 0-5 Units QID (AC + HS) PRN    lactulose 20 gram/30 mL solution Soln 20 g Q6H PRN    lorazepam (ATIVAN) injection 2 mg Q4H PRN    metoprolol tartrate (LOPRESSOR) tablet 25 mg BID    morphine injection 4 mg Q4H PRN    ondansetron injection 4 mg Q8H PRN    sodium bicarbonate 8.4 % (1 mEq/mL) injection PRN    sodium bicarbonate tablet 650 mg BID    sodium chloride 0.9% flush 3 mL PRN    vancomycin 250mg / 10ml oral suspension 125 mg Q6H       Objective:     Vital Signs (Most Recent):  Temp: 98.5 °F (36.9 °C) (06/24/18 0938)  Pulse: 86 (06/24/18 0938)  Resp: 20 (06/24/18 0938)  BP: 117/64 (06/24/18 0938)  SpO2: 100 % (06/24/18 0938)  O2 Device (Oxygen Therapy): room air (06/24/18 0938) Vital Signs (24h Range):  Temp:  [98.4 °F (36.9 °C)-99.2 °F (37.3 °C)] 98.5 °F (36.9 °C)  Pulse:  [84-98] 86  Resp:  [16-28] 20  SpO2:  [95 %-100 %] 100 %  BP: ()/(42-90) 117/64     Weight: 77 kg (169 lb  12.1 oz) (06/24/18 0545)  Body mass index is 31.05 kg/m².  Body surface area is 1.84 meters squared.    I/O last 3 completed shifts:  In: 1660 [NG/GT:1660]  Out: -     Physical Exam   Constitutional: She appears well-developed and well-nourished.   HENT:   Head: Normocephalic and atraumatic.   Eyes: Conjunctivae and EOM are normal. Pupils are equal, round, and reactive to light.   Neck: Normal range of motion and full passive range of motion without pain. Neck supple. Carotid bruit is not present. No edema present. No thyroid mass and no thyromegaly present.   Cardiovascular: Normal rate, regular rhythm, S1 normal, S2 normal, intact distal pulses and normal pulses.   No extrasystoles are present. PMI is displaced.  Exam reveals no friction rub.    Murmur heard.   Systolic murmur is present with a grade of 2/6   RV heave loud P2    Pulmonary/Chest: Effort normal. No accessory muscle usage. No respiratory distress. She has wheezes. She has no rales. She exhibits no tenderness.   Abdominal: Soft. Bowel sounds are normal. She exhibits no distension and no mass. There is no hepatosplenomegaly. There is no tenderness. There is no rebound and no CVA tenderness. No hernia.   Musculoskeletal: Normal range of motion. She exhibits edema. She exhibits no tenderness.   Neurological: She is alert. She has normal reflexes. No cranial nerve deficit or sensory deficit. She exhibits normal muscle tone. Coordination normal.   Drowsy but arousable and no distress no acute signs of CVA   Skin: Skin is warm and dry. Capillary refill takes 2 to 3 seconds. No bruising, no ecchymosis and no rash noted. No cyanosis or erythema. No pallor. Nails show no clubbing.   Psychiatric: Her speech is normal.   Nursing note and vitals reviewed.      Significant Labs:  All labs within the past 24 hours have been reviewed.     Significant Imaging:  Labs: Reviewed

## 2018-06-24 NOTE — ASSESSMENT & PLAN NOTE
Ordered  hemodialysis today.   Patient is comfortable and not short of breath.  See HD orders

## 2018-06-24 NOTE — OP NOTE
DATE OF PROCEDURE:  06/24/2018.    SURGEON:  Hector Watson IV, M.D.    ASSISTANT:  No assistants.    PREOPERATIVE DIAGNOSIS:  End-stage renal disease with malfunctioning Vas-Cath.    PROCEDURES PERFORMED:  1.  Removal of tunneled right internal jugular Vas-Cath.  2.  Ultrasound-guided right internal jugular Vas-Cath percutaneous access.  3.  Tunneled right internal jugular vein Vas-Cath insertion.    POSTOPERATIVE DIAGNOSIS:  End-stage renal disease with malfunctioning Vas-Cath.    ANESTHESIA:  Conscious sedation and local.    BLOOD LOSS:  Minimal.    INDICATIONS:  This is a 68-year-old female with multiple medical comorbidities   and chronic kidney disease.  The patient was admitted, suffered an NSTEMI,   requiring a heart catheterization, subsequently requiring initiation of   hemodialysis.  A temporary Vas-Cath was inserted in the left femoral, which was   removed yesterday and a tunneled right internal jugular Vas-Cath was inserted.    On initiation of dialysis overnight, the Vas-Cath was found to be clotted and   hemodialysis was not performed.  We will bring the patient back today for a new   Vas-Cath insertion.    DESCRIPTION OF PROCEDURE:  After informed consent was obtained from the   patient's , the patient was brought down to the Cath Lab.  The previously   placed tunneled right internal jugular Vas-Cath was removed.  Pressure held for   hemostasis.  The patient's right neck and shoulder were prepped and draped in   usual standard sterile fashion.  The patient was given Versed for sedation.    After timeout was performed under ultrasound guidance, the right internal   jugular vein was percutaneously accessed.  A micropuncture needle and wire and a   micropuncture sheath was placed.  A stiff wire was advanced into the inferior   vena cava.  1% lidocaine was used to anesthetize the right shoulder and the   tract at the Vas-Cath.  An 11 blade was used to make a small skin nick.  The   Vas-Cath was  then tunneled from the right shoulder to the right IJ access point.    The right IJ tract was serially dilated and a peel-away sheath was placed.    The Vas-Cath was placed through the peel-away sheath and the peel-away sheath   was removed.  The tip of the Vas-Cath was at the junction of the superior vena   cava and right atrium.  Each lumen was aspirated and flushed and was working   appropriately.  Each lumen was then flushed with 2 mL of heparin.  Vas-Cath was   sutured in place.  There were no complications.      ZAFAR/SHIRLENE  dd: 06/24/2018 09:00:25 (CDT)  td: 06/24/2018 10:43:29 (CDT)  Doc ID   #[id number]  Job ID #[id number]    CC:

## 2018-06-24 NOTE — ASSESSMENT & PLAN NOTE
S/P 2 units PRBCs 6/15 and 1 unit PRBC 6/18  Daily H/H  No active bleeding  Patient has been treated with Procrit 20,000 units weekly for maintenance therapy followed by Hematology  H&H 7.5/23.5: not symptomatic

## 2018-06-24 NOTE — PROGRESS NOTES
[facility] -   Nephrology  Progress Note    Patient Name: Citlali Karimi  MRN: [id number]  Admission Date: 6/12/2018  Hospital Length of Stay: 11 days  Attending Provider: Levy Samuel MD   Primary Care Physician: Evelio Schuster MD  Principal Problem:Critical illness polyneuropathy    Subjective:     HPI:  Citlali Karimi Is a pleasant 68 -year-old  woman with history of chronic kidney disease stage 4, patient was admitted to the hospital for an elective  partial parathyroidectomy, following her surgery during observation.  She had some chest pain, workup revealed non ST elevated MI, patient was admitted to the hospital for further management.  We were consulted due to advanced renal insufficiency, baseline serum creatinine about 3.5-4,            Important Info :        She underwent a native kidney biopsy on 10/10/13. Final report of the kidney biopsy is negative for any specific etiology for acute renal failure. 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         Interval History: d/w dr. Watson who is taking patient back to cath lab for access issue. Planned HD later today     No SOB no fever     Rest negative     Review of patient's allergies indicates:   Allergen Reactions    Lipitor [atorvastatin] Swelling     Lips       Current Facility-Administered Medications   Medication Frequency    amiodarone tablet 200 mg BID    aspirin chewable tablet 81 mg Daily    bisacodyl suppository 10 mg Daily PRN    cloNIDine tablet 0.1 mg Q6H PRN    dextrose 50% injection 12.5 g PRN    dextrose 50% injection 25 g PRN    epoetin carmen injection 10,000 Units Once    epoetin carmen injection 4,000 Units Every Tues, Thurs, Sat    famotidine tablet 20 mg Daily    glucagon (human recombinant) injection 1 mg PRN    glucose chewable tablet 16 g PRN    glucose chewable tablet 24 g PRN    heparin (porcine) injection 2,000 Units Once    heparin (porcine)  injection 3,000 Units PRN    heparin (porcine) injection 5,000 Units Q8H    hydrALAZINE injection 10 mg Q8H PRN    insulin aspart U-100 pen 0-5 Units QID (AC + HS) PRN    lactulose 20 gram/30 mL solution Soln 20 g Q6H PRN    lorazepam (ATIVAN) injection 2 mg Q4H PRN    metoprolol tartrate (LOPRESSOR) tablet 25 mg BID    morphine injection 4 mg Q4H PRN    ondansetron injection 4 mg Q8H PRN    sodium bicarbonate 8.4 % (1 mEq/mL) injection PRN    sodium bicarbonate tablet 650 mg BID    sodium chloride 0.9% flush 3 mL PRN    vancomycin 250mg / 10ml oral suspension 125 mg Q6H       Objective:     Vital Signs (Most Recent):  Temp: 98.5 °F (36.9 °C) (06/24/18 0938)  Pulse: 86 (06/24/18 0938)  Resp: 20 (06/24/18 0938)  BP: 117/64 (06/24/18 0938)  SpO2: 100 % (06/24/18 0938)  O2 Device (Oxygen Therapy): room air (06/24/18 0938) Vital Signs (24h Range):  Temp:  [98.4 °F (36.9 °C)-99.2 °F (37.3 °C)] 98.5 °F (36.9 °C)  Pulse:  [84-98] 86  Resp:  [16-28] 20  SpO2:  [95 %-100 %] 100 %  BP: ()/(42-90) 117/64     Weight: 77 kg (169 lb 12.1 oz) (06/24/18 0545)  Body mass index is 31.05 kg/m².  Body surface area is 1.84 meters squared.    I/O last 3 completed shifts:  In: 1660 [NG/GT:1660]  Out: -     Physical Exam   Constitutional: She appears well-developed and well-nourished.   HENT:   Head: Normocephalic and atraumatic.   Eyes: Conjunctivae and EOM are normal. Pupils are equal, round, and reactive to light.   Neck: Normal range of motion and full passive range of motion without pain. Neck supple. Carotid bruit is not present. No edema present. No thyroid mass and no thyromegaly present.   Cardiovascular: Normal rate, regular rhythm, S1 normal, S2 normal, intact distal pulses and normal pulses.   No extrasystoles are present. PMI is displaced.  Exam reveals no friction rub.    Murmur heard.   Systolic murmur is present with a grade of 2/6   RV heave loud P2    Pulmonary/Chest: Effort normal. No accessory muscle  usage. No respiratory distress. She has wheezes. She has no rales. She exhibits no tenderness.   Abdominal: Soft. Bowel sounds are normal. She exhibits no distension and no mass. There is no hepatosplenomegaly. There is no tenderness. There is no rebound and no CVA tenderness. No hernia.   Musculoskeletal: Normal range of motion. She exhibits edema. She exhibits no tenderness.   Neurological: She is alert. She has normal reflexes. No cranial nerve deficit or sensory deficit. She exhibits normal muscle tone. Coordination normal.   Drowsy but arousable and no distress no acute signs of CVA   Skin: Skin is warm and dry. Capillary refill takes 2 to 3 seconds. No bruising, no ecchymosis and no rash noted. No cyanosis or erythema. No pallor. Nails show no clubbing.   Psychiatric: Her speech is normal.   Nursing note and vitals reviewed.      Significant Labs:  All labs within the past 24 hours have been reviewed.     Significant Imaging:  Labs: Reviewed    Assessment/Plan:     ESRD (end stage renal disease)    Ordered  hemodialysis today.   Patient is comfortable and not short of breath.  See HD orders                 Thank you for your consult.     Chinmay Lyles MD  Nephrology  [facility] -

## 2018-06-24 NOTE — PT/OT/SLP PROGRESS
Physical Therapy  Patient Not Seen      Patient Name:  Citlali Karimi   MRN:  [id number]    PT attempted to see patient this a.m. to complete PT eval.  Patient not seen today secondary to away at Vas-cath procedure.  Patient will be on 24 hour hold for therapy following procedure.  Will follow-up at next visit to complete PT eval.    Rose Cesar, PT, DPT  6/24/2018  6040

## 2018-06-24 NOTE — PLAN OF CARE
Problem: Patient Care Overview  Goal: Plan of Care Review  Outcome: Ongoing (interventions implemented as appropriate)  RIJ vas cath replaced today. Pt tolerated. C/o soreness to R neck. Prn Tylenol ordered per MD and given.  at bedside. Pt remains drowsy post vas cath exchange due to sedation received. Awakens with stimulation. Call light in reach. Turned Q2 hr with wedge. BUE and BLE elevated on pillows. Will continue to monitor.

## 2018-06-24 NOTE — SUBJECTIVE & OBJECTIVE
Interval History: dialysis catheter revision this morning, plan for dialysis today    Medications:  Continuous Infusions:  Scheduled Meds:   amiodarone  200 mg Oral BID    aspirin  81 mg Oral Daily    epoetin carmen (PROCRIT) injection  10,000 Units Intravenous Once    epoetin carmen (PROCRIT) injection  4,000 Units Intravenous Every Tues, Thurs, Sat    famotidine  20 mg Oral Daily    heparin (porcine)  2,000 Units Intravenous Once    heparin (porcine)  5,000 Units Subcutaneous Q8H    metoprolol tartrate  25 mg Oral BID    sodium bicarbonate  650 mg Oral BID    vancomycin  125 mg Oral Q6H     PRN Meds:bisacodyl, cloNIDine, dextrose 50%, dextrose 50%, glucagon (human recombinant), glucose, glucose, heparin (porcine), hydrALAZINE, insulin aspart U-100, lactulose, lorazepam, morphine, ondansetron, sodium bicarbonate, sodium chloride 0.9%     Review of patient's allergies indicates:   Allergen Reactions    Lipitor [atorvastatin] Swelling     Lips       Objective:     Vital Signs (Most Recent):  Temp: 98.5 °F (36.9 °C) (06/24/18 0938)  Pulse: 86 (06/24/18 0938)  Resp: 20 (06/24/18 0938)  BP: 117/64 (06/24/18 0938)  SpO2: 100 % (06/24/18 0938) Vital Signs (24h Range):  Temp:  [98.4 °F (36.9 °C)-99.2 °F (37.3 °C)] 98.5 °F (36.9 °C)  Pulse:  [84-98] 86  Resp:  [16-28] 20  SpO2:  [95 %-100 %] 100 %  BP: ()/(42-90) 117/64     Weight: 77 kg (169 lb 12.1 oz)  Body mass index is 31.05 kg/m².    Intake/Output - Last 3 Shifts       06/22 0700 - 06/23 0659 06/23 0700 - 06/24 0659 06/24 0700 - 06/25 0659    NG/ 1160 180    Total Intake(mL/kg) 630 (8.4) 1160 (15.1) 180 (2.3)    Urine (mL/kg/hr) 25 (0)      Total Output 25        Net +605 +1160 +180           Urine Occurrence  3 x 1 x    Stool Occurrence 0 x  0 x          Physical Exam   Constitutional: She appears well-developed and well-nourished.   HENT:   Head: Normocephalic and atraumatic.   Eyes: EOM are normal.   Neck: Neck supple.   Cardiovascular: Normal  rate, regular rhythm and intact distal pulses.    Pulmonary/Chest: Effort normal. No respiratory distress.   Abdominal: Soft.   Neurological: She is alert.   Skin: Skin is warm and dry.   Vitals reviewed.      Significant Labs:  CBC:     Recent Labs  Lab 06/24/18  0401   WBC 10.89   RBC 2.76*   HGB 7.5*   HCT 23.5*      MCV 85   MCH 27.2   MCHC 31.9*     CMP:     Recent Labs  Lab 06/24/18  0401   *   CALCIUM 7.8*   ALBUMIN 2.2*   PROT 5.5*      K 4.1   CO2 21*      BUN 93*   CREATININE 6.9*   ALKPHOS 63   ALT <5*   AST 17   BILITOT 0.4       Significant Diagnostics:  I have reviewed all pertinent imaging results/findings within the past 24 hours.

## 2018-06-24 NOTE — ASSESSMENT & PLAN NOTE
Per Cards.  Medical management  [facility] 6/15 done revealing diffuse CAD not amenable for PCI, no changes from 2016  Cont ASA, Lopressor  Plavix stopped by Cards 6/18.    Allergy to statin noted    Consider restart PLAVIX

## 2018-06-24 NOTE — PROGRESS NOTES
Attempted HD today, unable to start dialysis due to dysfunctional HD catheter. Dr Lyles notified and ordered Cathflo. Cathflo was instilled in HD cath for 45mins with no success. Dr Lyles notified, He spoke with surgeon and will attempt HD tomorrow.

## 2018-06-24 NOTE — PLAN OF CARE
Problem: Patient Care Overview  Goal: Plan of Care Review  Outcome: Ongoing (interventions implemented as appropriate)  PT DID NOT HAVE AN EVENTFUL DAY.  NO CHANGE IN CONDITION OR POC.  FAMILY UPDATED TO POC.  HEMODIALYSIS CATHETER WAS RELOCATED TO THE RIGHT SUBCLAVIAN.  AND PT WAS TRANSFERRED TO DIALYSIS AT 1800.  SHE WAS MORE ALERT TODAY AND SPOKE MORE.  SHE IS MORE AWARE OF HER SURROUNDINGS AND ORIENTED TO SELF AND FAMILY MEMBERS.  PT ALSO HAS SOME RECALL.  SHE IS STILL REPEATING WHAT IS BEING SAID ON OCCASION.  PT HAS ASSISTED WITH TURNING EVERY 2 HOURS AND HAS HAD HEELS FLOATED.  CALL MCMANUS REMAINS IN REACH AND BED LOCKED AND LOW.

## 2018-06-24 NOTE — ASSESSMENT & PLAN NOTE
June 22, 2018-patient has anemia now due to end-stage renal disease.  Treatment of this will be as per Nephrology protocol with IV iron/Depo supplementation with hemodialysis.  Please call with any questions.  June 23, 2018-patient has end-stage renal disease and is on RRT.  Management of anemia will be as per Nephrology.  June 24, 2018-the patient is on are RT for end-stage renal disease.  Management of EPO/IV iron will be as per Nephrology protocol.

## 2018-06-24 NOTE — PROGRESS NOTES
[facility]  Pulmonology  Progress Note    Patient Name: Citlali Karimi  MRN: [id number]  Admission Date: 6/12/2018  Hospital Length of Stay: 11 days  Code Status: DNR  Attending Provider: Levy Samuel MD  Primary Care Provider: Evelio Schuster MD   Principal Problem: Critical illness polyneuropathy    Subjective:     6/20 - VasCath placed yesterday and started on RRT.  Diarrhea + C-diff per PCR.  [place] TF.  No air leak around OET with cuff deflated this AM  06/21: +ve leak. Plan for extubation  06/22: Weak, no overnight events, Has NG  06/23: Awake , smiles, communicates well, tolerate tube feeds: awaits placement  06/24: awaits placement, No respiratory concerns.NG tube feeding, too weak for    Interval History:     No interval adverse events    Review of Systems   HENT: Negative.    Eyes: Negative.    Respiratory: Negative.    Cardiovascular: Negative.    Gastrointestinal: Negative.    Genitourinary: Negative.    Musculoskeletal: Negative.    Skin: Negative.    Neurological: Positive for weakness.   Endo/Heme/Allergies: Negative.        Objective:     Vital Signs (Most Recent):  Temp: 98.5 °F (36.9 °C) (06/24/18 0938)  Pulse: 86 (06/24/18 0938)  Resp: 20 (06/24/18 0938)  BP: 117/64 (06/24/18 0938)  SpO2: 100 % (06/24/18 0938) Vital Signs (24h Range):  Temp:  [98.4 °F (36.9 °C)-99.2 °F (37.3 °C)] 98.5 °F (36.9 °C)  Pulse:  [84-98] 86  Resp:  [16-28] 20  SpO2:  [95 %-100 %] 100 %  BP: ()/(42-90) 117/64     Weight: 77 kg (169 lb 12.1 oz)  Body mass index is 31.05 kg/m².      Intake/Output Summary (Last 24 hours) at 06/24/18 1042  Last data filed at 06/24/18 1000   Gross per 24 hour   Intake             1120 ml   Output                0 ml   Net             1120 ml       Physical Exam   Constitutional: Vital signs are normal. She has a sickly appearance. Nasal cannula in place.       HENT:   Head: Normocephalic and atraumatic.   Nose: Nose normal.   Mouth/Throat: Oropharynx is clear and moist.    Eyes: Conjunctivae and EOM are normal. Pupils are equal, round, and reactive to light.   Neck: Normal range of motion. Neck supple. No JVD present.   Cardiovascular: Normal rate, normal heart sounds and intact distal pulses.    No murmur heard.  Pulmonary/Chest: Effort normal and breath sounds normal.   Abdominal: Soft. Bowel sounds are normal.   Musculoskeletal: Normal range of motion. She exhibits no edema.   Lymphadenopathy:     She has no cervical adenopathy.   Neurological: She is alert. No cranial nerve deficit.   Skin: Skin is warm and dry. Capillary refill takes 2 to 3 seconds.   Psychiatric: She has a normal mood and affect.   Nursing note and vitals reviewed.      Vents:  Vent Mode: Spont (06/21/18 1301)  Ventilator Initiated: Yes (06/14/18 1812)  Set Rate: 0 bmp (06/21/18 1301)  Vt Set: 400 mL (06/21/18 1301)  Pressure Support: 8 cmH20 (06/21/18 1301)  PEEP/CPAP: 5 cmH20 (06/21/18 1301)  Oxygen Concentration (%): 4 (06/22/18 1105)  Peak Airway Pressure: 13 cmH2O (06/21/18 1301)  Plateau Pressure: 18 cmH20 (06/21/18 1301)  Total Ve: 6.39 mL (06/21/18 1301)  Negative Inspiratory Force (cm H2O): -40 (06/21/18 1430)  F/VT Ratio<105 (RSBI): (!) 40.94 (06/21/18 1301)    Lines/Drains/Airways     Central Venous Catheter Line                 Hemodialysis Catheter 06/24/18 0900 right internal jugular less than 1 day          Drain                 NG/OG Tube 06/22/18 1635 Hurst sump 16 Fr. Left nostril 1 day          Peripheral Intravenous Line                 Peripheral IV - Single Lumen 06/23/18 1415 Right Antecubital less than 1 day                Significant Labs:    CBC/Anemia Profile:    Recent Labs  Lab 06/23/18  0430 06/24/18  0401   WBC 14.60* 10.89   HGB 8.0* 7.5*   HCT 24.7* 23.5*    199   MCV 87 85   RDW 17.4* 17.4*        Chemistries:    Recent Labs  Lab 06/23/18  0430 06/24/18  0401    137   K 4.1 4.1    102   CO2 24 21*   BUN 69* 93*   CREATININE 5.7* 6.9*   CALCIUM 8.0* 7.8*    ALBUMIN 2.3* 2.2*   PROT 5.5* 5.5*   BILITOT 0.4 0.4   ALKPHOS 54* 63   ALT 9* <5*   AST 19 17   MG 2.1 2.1   PHOS 4.8* 3.1       All pertinent labs within the past 24 hours have been reviewed.    Significant Imaging:  I have reviewed and interpreted all pertinent imaging results/findings within the past 24 hours.      Assessment/Plan:     * Critical illness polyneuropathy    PT and OT  Will need LTAC  Await placement        Acute hypoxemic respiratory failure    SP extubation 06/21  Oral care  Deep breathing exercises  Nasal canulla, Keep Sat> 92%  No issues        C. difficile diarrhea    continue Vancomycin per OG        ESRD (end stage renal disease)    Vascath Right IJ   HD today        S/P parathyroidectomy    Per surgery still following  Incision clean  POD # 12        Elevated troponin    Cards will decide when to reintroduce Clopidogrel        Hyperparathyroidism    POD # 12  S/P parathyroidectomy  Surgery following        Coronary artery disease involving native coronary artery    Per Cards.  Medical management  [facility] 6/15 done revealing diffuse CAD not amenable for PCI, no changes from 2016  Cont ASA, Lopressor  Plavix stopped by Cards 6/18.    Allergy to statin noted    Consider restart PLAVIX        Acute renal failure superimposed on stage 5 chronic kidney disease, not on chronic dialysis    S/P contrast with LHC 6/15  Renal following.    RRT started 6/19   Accurate I/Os  No Fair  New VASCATH right IJ        Diabetes mellitus, type 2 - only on oral medications    Cont SSI           Acute blood loss anemia    S/P 2 units PRBCs 6/15 and 1 unit PRBC 6/18  Daily H/H  No active bleeding  Patient has been treated with Procrit 20,000 units weekly for maintenance therapy followed by Hematology  H&H 7.5/23.5: not symptomatic          Sign off when transferred off MICU       I have reviewed all labs and imaging studies and compared to previous results. I have also discussed labs with all the teams in the  medical care of the patient and my plan is outlined below        Axel Perez MD  Pulmonology  [facility]

## 2018-06-24 NOTE — SUBJECTIVE & OBJECTIVE
Review of Systems   Unable to perform ROS: Mental status change     Objective:     Vital Signs (Most Recent):  Temp: 98.5 °F (36.9 °C) (06/24/18 1105)  Pulse: 81 (06/24/18 1220)  Resp: (!) 22 (06/24/18 1220)  BP: (!) 98/43 (06/24/18 1220)  SpO2: 99 % (06/24/18 1220) Vital Signs (24h Range):  Temp:  [98.4 °F (36.9 °C)-99.2 °F (37.3 °C)] 98.5 °F (36.9 °C)  Pulse:  [78-98] 81  Resp:  [16-28] 22  SpO2:  [95 %-100 %] 99 %  BP: ()/(42-90) 98/43     Weight: 77 kg (169 lb 12.1 oz)  Body mass index is 31.05 kg/m².    Intake/Output Summary (Last 24 hours) at 06/24/18 1356  Last data filed at 06/24/18 1000   Gross per 24 hour   Intake             1020 ml   Output                0 ml   Net             1020 ml      Physical Exam   Constitutional: She appears well-developed and well-nourished. She is active and cooperative. She appears ill.   HENT:   Head: Normocephalic and atraumatic.   Nose: Nose normal.   Mouth/Throat: Oropharynx is clear and moist. No oropharyngeal exudate.   Healing neck incision is noted.   Eyes: Pupils are equal, round, and reactive to light. No scleral icterus.   Neck: Neck supple. No thyromegaly present.   Cardiovascular: Normal rate, regular rhythm, normal heart sounds and intact distal pulses.    No murmur heard.  Pulmonary/Chest: Effort normal. No stridor. No respiratory distress. She has no wheezes. She has rhonchi. She has no rales.   Abdominal: Soft. Bowel sounds are normal. She exhibits no distension, no ascites and no mass. There is no hepatosplenomegaly. There is no tenderness. There is no rigidity, no rebound and no guarding.   Musculoskeletal: She exhibits no edema or tenderness.   Lymphadenopathy:     She has no cervical adenopathy.   Neurological: She is alert. No cranial nerve deficit. She exhibits normal muscle tone. Coordination normal.   Skin: Skin is warm and dry. Ecchymosis noted. No rash noted. No pallor.   Psychiatric: She is slowed. She is communicative.   Nursing note and  vitals reviewed.      Significant Labs: All pertinent labs within the past 24 hours have been reviewed.    Significant Imaging: I have reviewed all pertinent imaging results/findings within the past 24 hours.

## 2018-06-24 NOTE — SUBJECTIVE & OBJECTIVE
Interval History:     No interval adverse events    Review of Systems   HENT: Negative.    Eyes: Negative.    Respiratory: Negative.    Cardiovascular: Negative.    Gastrointestinal: Negative.    Genitourinary: Negative.    Musculoskeletal: Negative.    Skin: Negative.    Neurological: Positive for weakness.   Endo/Heme/Allergies: Negative.        Objective:     Vital Signs (Most Recent):  Temp: 98.5 °F (36.9 °C) (06/24/18 0938)  Pulse: 86 (06/24/18 0938)  Resp: 20 (06/24/18 0938)  BP: 117/64 (06/24/18 0938)  SpO2: 100 % (06/24/18 0938) Vital Signs (24h Range):  Temp:  [98.4 °F (36.9 °C)-99.2 °F (37.3 °C)] 98.5 °F (36.9 °C)  Pulse:  [84-98] 86  Resp:  [16-28] 20  SpO2:  [95 %-100 %] 100 %  BP: ()/(42-90) 117/64     Weight: 77 kg (169 lb 12.1 oz)  Body mass index is 31.05 kg/m².      Intake/Output Summary (Last 24 hours) at 06/24/18 1042  Last data filed at 06/24/18 1000   Gross per 24 hour   Intake             1120 ml   Output                0 ml   Net             1120 ml       Physical Exam   Constitutional: Vital signs are normal. She has a sickly appearance. Nasal cannula in place.       HENT:   Head: Normocephalic and atraumatic.   Nose: Nose normal.   Mouth/Throat: Oropharynx is clear and moist.   Eyes: Conjunctivae and EOM are normal. Pupils are equal, round, and reactive to light.   Neck: Normal range of motion. Neck supple. No JVD present.   Cardiovascular: Normal rate, normal heart sounds and intact distal pulses.    No murmur heard.  Pulmonary/Chest: Effort normal and breath sounds normal.   Abdominal: Soft. Bowel sounds are normal.   Musculoskeletal: Normal range of motion. She exhibits no edema.   Lymphadenopathy:     She has no cervical adenopathy.   Neurological: She is alert. No cranial nerve deficit.   Skin: Skin is warm and dry. Capillary refill takes 2 to 3 seconds.   Psychiatric: She has a normal mood and affect.   Nursing note and vitals reviewed.      Vents:  Vent Mode: Spont (06/21/18  1301)  Ventilator Initiated: Yes (06/14/18 1812)  Set Rate: 0 bmp (06/21/18 1301)  Vt Set: 400 mL (06/21/18 1301)  Pressure Support: 8 cmH20 (06/21/18 1301)  PEEP/CPAP: 5 cmH20 (06/21/18 1301)  Oxygen Concentration (%): 4 (06/22/18 1105)  Peak Airway Pressure: 13 cmH2O (06/21/18 1301)  Plateau Pressure: 18 cmH20 (06/21/18 1301)  Total Ve: 6.39 mL (06/21/18 1301)  Negative Inspiratory Force (cm H2O): -40 (06/21/18 1430)  F/VT Ratio<105 (RSBI): (!) 40.94 (06/21/18 1301)    Lines/Drains/Airways     Central Venous Catheter Line                 Hemodialysis Catheter 06/24/18 0900 right internal jugular less than 1 day          Drain                 NG/OG Tube 06/22/18 1635 [place] sump 16 Fr. Left nostril 1 day          Peripheral Intravenous Line                 Peripheral IV - Single Lumen 06/23/18 1415 Right Antecubital less than 1 day                Significant Labs:    CBC/Anemia Profile:    Recent Labs  Lab 06/23/18  0430 06/24/18  0401   WBC 14.60* 10.89   HGB 8.0* 7.5*   HCT 24.7* 23.5*    199   MCV 87 85   RDW 17.4* 17.4*        Chemistries:    Recent Labs  Lab 06/23/18  0430 06/24/18  0401    137   K 4.1 4.1    102   CO2 24 21*   BUN 69* 93*   CREATININE 5.7* 6.9*   CALCIUM 8.0* 7.8*   ALBUMIN 2.3* 2.2*   PROT 5.5* 5.5*   BILITOT 0.4 0.4   ALKPHOS 54* 63   ALT 9* <5*   AST 19 17   MG 2.1 2.1   PHOS 4.8* 3.1       All pertinent labs within the past 24 hours have been reviewed.    Significant Imaging:  I have reviewed and interpreted all pertinent imaging results/findings within the past 24 hours.

## 2018-06-24 NOTE — PROGRESS NOTES
[facility] -   Hematology/Oncology  Progress Note    Patient Name: Citlali Karimi  Admission Date: 6/12/2018  Hospital Length of Stay: 11 days  Code Status: DNR     Subjective:     HPI:  68 year old female, PMHx of CKD (IV), HTN, DM, CVA (left sided weakness), and CAD who initially presented for hyperparathyroidism and hypercalcemia requiring surgical intervention.   She was seen in clinic by surgery for hyperparathyroidism and hypercalcemia and was electively admitted 6/12 taken to OR undergoing parathyroidectomy finding 2 right sided enlarged parathyroid glands.  Intra and post op patient developed EKG changes and had elevated troponin.  Cards consulted.  Patient admitted due to swallowing issues she was not taking all her meds as prescribed and had BP as high as 225/103 during hospitalization.  Troponin increased to > 50.  Cards diagnosed with NSTEMI and she was Rx with ASA, Imdur, Lopressor, statin and Heparin infusion with plans for repeat LHC if cleared by Renal given CKD5.  Today, she had progressively worsening neck swelling and SOB with worsening dysphagia.  She was taken back to OR and had post op neck hematoma evacuation.  In PACU she had witnessed V-Fib cardiac arrest and CODE BLUE called.  After 3 rounds of CPR, Epi X 3 and Defib X 3 ROSC was attained.  She was still intubated and on vent post op    Interval History:  The patient is more alert and awake today.  She is able to answer a few questions but is not able to consistently do this.  Overall her mental status does appear to be slowly improving.    Oncology Treatment Plan:   [No treatment plan]    Medications:  Continuous Infusions:  Scheduled Meds:   amiodarone  200 mg Oral BID    aspirin  81 mg Oral Daily    epoetin carmen (PROCRIT) injection  10,000 Units Intravenous Once    epoetin carmen (PROCRIT) injection  4,000 Units Intravenous Every Tues, Thurs, Sat    famotidine  20 mg Oral Daily    heparin (porcine)  2,000 Units  Intravenous Once    heparin (porcine)  5,000 Units Subcutaneous Q8H    metoprolol tartrate  25 mg Oral BID    sodium bicarbonate  650 mg Oral BID    vancomycin  125 mg Oral Q6H     PRN Meds:bisacodyl, cloNIDine, dextrose 50%, dextrose 50%, glucagon (human recombinant), glucose, glucose, heparin (porcine), hydrALAZINE, insulin aspart U-100, lactulose, lorazepam, morphine, ondansetron, sodium bicarbonate, sodium chloride 0.9%     Review of Systems   Unable to perform ROS: Mental status change     Objective:     Vital Signs (Most Recent):  Temp: 98.5 °F (36.9 °C) (06/24/18 1105)  Pulse: 81 (06/24/18 1220)  Resp: (!) 22 (06/24/18 1220)  BP: (!) 98/43 (06/24/18 1220)  SpO2: 99 % (06/24/18 1220) Vital Signs (24h Range):  Temp:  [98.4 °F (36.9 °C)-99.2 °F (37.3 °C)] 98.5 °F (36.9 °C)  Pulse:  [78-98] 81  Resp:  [16-28] 22  SpO2:  [95 %-100 %] 99 %  BP: ()/(42-90) 98/43     Weight: 77 kg (169 lb 12.1 oz)  Body mass index is 31.05 kg/m².  Body surface area is 1.84 meters squared.      Intake/Output Summary (Last 24 hours) at 06/24/18 1225  Last data filed at 06/24/18 1000   Gross per 24 hour   Intake             1040 ml   Output                0 ml   Net             1040 ml       Physical Exam   Constitutional: She appears well-developed and well-nourished. She is active and cooperative. She appears ill.   HENT:   Head: Normocephalic and atraumatic.   Nose: Nose normal.   Mouth/Throat: Oropharynx is clear and moist. No oropharyngeal exudate.   Healing neck incision is noted.   Eyes: Pupils are equal, round, and reactive to light. No scleral icterus.   Neck: Neck supple. No thyromegaly present.   Cardiovascular: Normal rate, regular rhythm, normal heart sounds and intact distal pulses.    No murmur heard.  Pulmonary/Chest: Effort normal. No stridor. No respiratory distress. She has no wheezes. She has rhonchi. She has no rales.   Abdominal: Soft. Bowel sounds are normal. She exhibits no distension, no ascites and no  mass. There is no hepatosplenomegaly. There is no tenderness. There is no rigidity, no rebound and no guarding.   Musculoskeletal: She exhibits no edema or tenderness.   Lymphadenopathy:     She has no cervical adenopathy.   Neurological: She is unresponsive. No cranial nerve deficit. She exhibits normal muscle tone. Coordination normal.   Skin: Skin is warm and dry. Ecchymosis noted. No rash noted. No pallor.   Psychiatric: She is slowed. She is noncommunicative.   Nursing note and vitals reviewed.      Significant Labs:   I have reviewed all of the patient's relevant lab work available in the medical record and have utilized this in my evaluation and management recommendations today    Diagnostic Results:  I have reviewed all of the patient's diagnostic/imaging results available in the medical record and have utilized this in my evaluation and management recommendations today.    Assessment/Plan:     Acute renal failure superimposed on stage 5 chronic kidney disease, not on chronic dialysis    June 22, 2018-patient has anemia now due to end-stage renal disease.  Treatment of this will be as per Nephrology protocol with IV iron/Depo supplementation with hemodialysis.  Please call with any questions.  June 23, 2018-patient has end-stage renal disease and is on RRT.  Management of anemia will be as per Nephrology.  June 24, 2018-the patient is on are RT for end-stage renal disease.  Management of EPO/IV iron will be as per Nephrology protocol.        Acute blood loss anemia    6/22/18 - Patient has been treated with Procrit for maintenance therapy for anemia due to chronic kidney disease. Patient with hemoglobin of 8.2 this morning.  No current evidence of bleeding noted.  We will continue to monitor closely.  June 23, 2018-the patient has acute blood loss anemia is stable with hemoglobin greater than 8 grams/deciliter today.  She will continue Procrit as per HD protocol.  No urgent indication for PRBC transfusion  today.  June 24, 2018-CBC from today shows hemoglobin greater than 7 grams/deciliter.  I would recommend transfusion of PRBCs for supportive care if hemoglobin less than 7 grams/deciliter.              Thank you for your consult.      David Addison MD  Hematology/Oncology  [facility]

## 2018-06-24 NOTE — PT/OT/SLP PROGRESS
Speech Language Pathology      Citlali Karimi  MRN: [id number]    Patient had a vas cath this morning;  She is back in room but very lethargic and S.T. Unable to be completed at this time.       Caroline Adair, SARAH-SLP

## 2018-06-24 NOTE — PROGRESS NOTES
[facility] Hospital Medicine  Progress Note    Patient Name: Citlali Karimi  MRN: [id number]  Patient Class: IP- Inpatient   Admission Date: 6/12/2018  Length of Stay: 11 days  Attending Physician: Levy Samuel MD  Primary Care Provider: Evelio Schuster MD        Subjective:     Principal Problem:Critical illness polyneuropathy    HPI:  Citlali Karimi is a 68 y.o female with PMHx of CKD (IV), HTN, DM, CVA (left sided weakness), and CAD who initially presented for hyperparathyroidism and hypercalcemia requiring surgical intervention.  Pt underwent parathyroidectomy today per Dr. Tracy (General Surgery). Pt admitted to Medical Surgical Unit post procedure and Hospital Medicine consulted for medical management.  Chest xray post procedure showed mild asymmetric CHF versus RUL pneumonia. Troponin 0.113 and BNP >270 noted.  Pt given IV Lasix in PACU prior to unit arrival.      Hospital Course:  Pt admitted to Outpatient Extended Recovery post procedure.  Elevated noted post procedure and results trended yielding significant positive response.  Cardiology consulted and Heparin infusion initiated.  Hx of CAD, multiple vessels noted with home medications continued and Imdur dose increased.  Nephrology consulted due to elevated creatinine and [facility] recommended.  Echo results pending.  Heart catheterization procedure considered with family refusing due to concern for worsening kidney function as patient does not want to be on dialysis.  Decreased oral intake and difficulty swallowing noted.  Speech therapist to bedside.  Pt made NPO and General Surgery notified.      6/15  Patient worsening status. ET changed per Pulmonary CC. Patient s/p Code Blue - VFib with recovery. Cardiology taking patient to CATH lab. Discussed with CM plan for post acute care in progress.    6/16  Patient improved o/n. Patient awake and able to follow simple command. Breathing trials in progress. Discussed with Pulmonary  CC    6/17  Patient remains intubated.  at bedside. Cardiology at bedside as well. Discussed with CC Team    6/18  Patient responds to command but remains intubated. Swelling to neck continues to be prominent. Discussed with CC Team. Worsening renal function. Possible HD need.    6/19  Patient remains intubated. Worsening renal function. No events Discussed with CC Team    6/21  Positive cough leak and plan for extubation today .   6/22  Patient continue to be confused . Will consider ct head if didn't improve . Continue treat infection/c difficle .Consulted vascular surgery dr maciel to remove femoral dialysis and put new one subclavian ?. To avoid risk of infection in groin. Patient will continue to need dialysis    6/23  Patient is seen and examined today . More awake today. dialysis catheter was removed from groin and new one will be place in upper body ij vs subclavian. Patient diarrhea improved .   6/24  Patient very lethargic to participate in slp . S/p vascath for hemodialysis . SLP will visit tomorrow . Patient still has ng tube. Diarrhea improved         Review of Systems   Unable to perform ROS: Mental status change     Objective:     Vital Signs (Most Recent):  Temp: 98.5 °F (36.9 °C) (06/24/18 1105)  Pulse: 81 (06/24/18 1220)  Resp: (!) 22 (06/24/18 1220)  BP: (!) 98/43 (06/24/18 1220)  SpO2: 99 % (06/24/18 1220) Vital Signs (24h Range):  Temp:  [98.4 °F (36.9 °C)-99.2 °F (37.3 °C)] 98.5 °F (36.9 °C)  Pulse:  [78-98] 81  Resp:  [16-28] 22  SpO2:  [95 %-100 %] 99 %  BP: ()/(42-90) 98/43     Weight: 77 kg (169 lb 12.1 oz)  Body mass index is 31.05 kg/m².    Intake/Output Summary (Last 24 hours) at 06/24/18 1356  Last data filed at 06/24/18 1000   Gross per 24 hour   Intake             1020 ml   Output                0 ml   Net             1020 ml      Physical Exam   Constitutional: She appears well-developed and well-nourished. She is active and cooperative. She appears ill.   HENT:   Head:  Normocephalic and atraumatic.   Nose: Nose normal.   Mouth/Throat: Oropharynx is clear and moist. No oropharyngeal exudate.   Healing neck incision is noted.   Eyes: Pupils are equal, round, and reactive to light. No scleral icterus.   Neck: Neck supple. No thyromegaly present.   Cardiovascular: Normal rate, regular rhythm, normal heart sounds and intact distal pulses.    No murmur heard.  Pulmonary/Chest: Effort normal. No stridor. No respiratory distress. She has no wheezes. She has rhonchi. She has no rales.   Abdominal: Soft. Bowel sounds are normal. She exhibits no distension, no ascites and no mass. There is no hepatosplenomegaly. There is no tenderness. There is no rigidity, no rebound and no guarding.   Musculoskeletal: She exhibits no edema or tenderness.   Lymphadenopathy:     She has no cervical adenopathy.   Neurological: She is alert. No cranial nerve deficit. She exhibits normal muscle tone. Coordination normal.   Skin: Skin is warm and dry. Ecchymosis noted. No rash noted. No pallor.   Psychiatric: She is slowed. She is communicative.   Nursing note and vitals reviewed.      Significant Labs: All pertinent labs within the past 24 hours have been reviewed.    Significant Imaging: I have reviewed all pertinent imaging results/findings within the past 24 hours.    Assessment/Plan:      * Critical illness polyneuropathy    -pt/ot         C. difficile diarrhea    Continue with oral vancomycin         NSTEMI (non-ST elevated myocardial infarction)    ASA  Cardiology  LHC - Diffuse disease  No further intervention recommended  Medical Management  No statin due to allergy        S/P parathyroidectomy              Cardiac arrest with ventricular fibrillation    Cardiology  LHC 6/15  ASA  Statin Allergy  BB  No further interventions  Diffuse disease    6/18  ASA  BB  Diffuse disease No intervention    6/19  ASA  BB  No Statin due to allergy  Cardiology        Elevated troponin    -initial 0.113>>34>>50>>41  -pt  denies CP and SOB  - EKG results showed diffuse ST changes    -serial results revealed NSTEMI with Cardiology consulted     6/15  NSTEMI POA  Cardiology  [facility]  ASA  Hold Statin due to Elevated LFT's    6/16  Cardiology  ASA  ICU  Statin allergy noted    6/17  S/p LHC - Diffuse disease  ASA  Cards              Hyperparathyroidism    -Pt admitted to Extended Recovery then transferred to Inpatient due to NSTEMI  -s/p Parathyroidectomy   -managed by General Surgery -primary team  - PTH- 32  - Ca 11.4>>10.5  - analgesia prn   - antiemetics prn  - specimens sent for analysis    6/16  Ca 8.7 today  Monitor    6/18  Ca 8.2 today  Monitor    6/19  Ca 8.2 stable        Coronary artery disease involving native coronary artery    - ASA, Statin, and Lopressor continued   -Imdur dose increased   -previous Cath showed severe CAD (proximal LCX 99%, mid 50%, RCA mid 90%, distal with subtotal lesion).  - Cardiology consulted with medical management continued   - [facility] proposed pending Nephrology recommendations  -family refused LHC due to concern for worsening kidney function as pt had expressed that she did not want to be on dialysis     6/15  Family electing LHC and accepting risk of worsening renal function  Cardiology    6/16  S/p [facility]  Cardiology  ASA  Statin allergy noted    6/16  No interventions  Cardiology on consult    6/18  Cardiology on Consult  Medical Management    6/19  ASA  BB  6/22  Continue with medical management   6/23  Continue with medical management         Acute renal failure superimposed on stage 5 chronic kidney disease, not on chronic dialysis    -Creatinine 3.6>>4.1  -baseline 2.7-4.4  -will monitor   -sodium bicarb continued   -pt has been followed outpatient by Dr. Vazquez (Nephrology)  -Nephrology consulted - pt may benefit from repeat angio due to NSTEMI but at high risk of contrast induced nephropathy  - pt was candidate for renal transplant however work up discontinued due to progressive weakness  - [facility]  recommended         6/15  Monitor worsening Renal Function  S/p Cardiac VFib Arrest  [facility] today  IVF's  Monitor      6/16  [facility] diffuse disease  Cardiology  Monitor    6/17  Worsening  No further Cardiac intervention  Nephrology on consult  Monitor  Cr 6.0 today vs 4.7  Low Urine O/P    6/18  COntinues to worsen. Cr 7.3 today  Nephrology guidance for HD vs Monitoring    6/19  Worsening  Cr 8.3 today - K is 4.6  Nephrology on board  No HD indicated at present  Monitor  6/22  Patient will continue with dialysis . Dr Lara for vascath change .          Hypertension associated with diabetes    - home medications continued   -hx of noncompliance and inconsistent dosing at home per   - uncontrolled upon arrival- improved with Hydralazine in PACU   - Hydralazine prn  6/22  Controlled         Diabetes mellitus, type 2 - only on oral medications    Controlled  NICC  Accuchecks          Acute blood loss anemia    -stable post procedure  -will monitor  -CBC in am   -pt followed outpatient by Heme/Onc    6/16  Stable  Monitor    6/17  Improving  Monitor    6/18  Hgb 7.7 today  Transfuse PRBC's per sx  Monitor    6/23  Hb 8 stable   Monitor  6/24  No signs of acute blood loss anema           VTE Risk Mitigation         Ordered     heparin (porcine) injection 2,000 Units  Once      06/23/18 0153     heparin (porcine) injection 5,000 Units  Every 8 hours      06/21/18 0838     heparin (porcine) injection 3,000 Units  As needed (PRN)      06/19/18 1408     Place sequential compression device  Until discontinued      06/12/18 0571          .    Dominguez Levy MD  Department of Hospital Medicine   [facility]

## 2018-06-24 NOTE — ASSESSMENT & PLAN NOTE
Status post parathyroidectomy  Monitor calcium  S/p neck washout  Neck is supple, no further swelling since drain out.   Extubated yesterday  Speech therapy for swallow eval may need alternative feeding access if unable to take in po, cont  Tube feeds  PT/OT

## 2018-06-24 NOTE — PLAN OF CARE
Problem: Patient Care Overview  Goal: Plan of Care Review  Outcome: Ongoing (interventions implemented as appropriate)  Pt stable. Pt is NSR with 1st degree block on the heart monitor.  Pt denied pain.  Pt more alert, following commands and saying simple sentences.  Pt rested throughout the night.  Tube feeding continued at goal 40ml/hr with minimal residual to left nare.  Blood sugar monitored and covered with SSI.  Vanc per Ng tube continued.  Pt turned and repositioned with use of pillows and wedge; heels floating.  PIV and vas cath intact with no redness, swelling or drainage.  Bed low, wheels locked, bed alarm on, call light in reach.  Pt instructed to call for assistance.  Plan of care reviewed.  Pt nods head to understanding. Will continue to monitor.

## 2018-06-24 NOTE — PROGRESS NOTES
[facility] -   General Surgery  Progress Note    Subjective:     History of Present Illness:  No notes on file    Post-Op Info:  Procedure(s) (LRB):  VASCULAR CATH EXCHANGE (N/A)   Day of Surgery     Interval History: dialysis catheter revision this morning, plan for dialysis today    Medications:  Continuous Infusions:  Scheduled Meds:   amiodarone  200 mg Oral BID    aspirin  81 mg Oral Daily    epoetin carmen (PROCRIT) injection  10,000 Units Intravenous Once    epoetin carmen (PROCRIT) injection  4,000 Units Intravenous Every Tues, Thurs, Sat    famotidine  20 mg Oral Daily    heparin (porcine)  2,000 Units Intravenous Once    heparin (porcine)  5,000 Units Subcutaneous Q8H    metoprolol tartrate  25 mg Oral BID    sodium bicarbonate  650 mg Oral BID    vancomycin  125 mg Oral Q6H     PRN Meds:bisacodyl, cloNIDine, dextrose 50%, dextrose 50%, glucagon (human recombinant), glucose, glucose, heparin (porcine), hydrALAZINE, insulin aspart U-100, lactulose, lorazepam, morphine, ondansetron, sodium bicarbonate, sodium chloride 0.9%     Review of patient's allergies indicates:   Allergen Reactions    Lipitor [atorvastatin] Swelling     Lips       Objective:     Vital Signs (Most Recent):  Temp: 98.5 °F (36.9 °C) (06/24/18 0938)  Pulse: 86 (06/24/18 0938)  Resp: 20 (06/24/18 0938)  BP: 117/64 (06/24/18 0938)  SpO2: 100 % (06/24/18 0938) Vital Signs (24h Range):  Temp:  [98.4 °F (36.9 °C)-99.2 °F (37.3 °C)] 98.5 °F (36.9 °C)  Pulse:  [84-98] 86  Resp:  [16-28] 20  SpO2:  [95 %-100 %] 100 %  BP: ()/(42-90) 117/64     Weight: 77 kg (169 lb 12.1 oz)  Body mass index is 31.05 kg/m².    Intake/Output - Last 3 Shifts       06/22 0700 - 06/23 0659 06/23 0700 - 06/24 0659 06/24 0700 - 06/25 0659    NG/ 1160 180    Total Intake(mL/kg) 630 (8.4) 1160 (15.1) 180 (2.3)    Urine (mL/kg/hr) 25 (0)      Total Output 25        Net +605 +1160 +180           Urine Occurrence  3 x 1 x    Stool Occurrence 0  x  0 x          Physical Exam   Constitutional: She appears well-developed and well-nourished.   HENT:   Head: Normocephalic and atraumatic.   Eyes: EOM are normal.   Neck: Neck supple.   Cardiovascular: Normal rate, regular rhythm and intact distal pulses.    Pulmonary/Chest: Effort normal. No respiratory distress.   Abdominal: Soft.   Neurological: She is alert.   Skin: Skin is warm and dry.   Vitals reviewed.      Significant Labs:  CBC:     Recent Labs  Lab 06/24/18  0401   WBC 10.89   RBC 2.76*   HGB 7.5*   HCT 23.5*      MCV 85   MCH 27.2   MCHC 31.9*     CMP:     Recent Labs  Lab 06/24/18  0401   *   CALCIUM 7.8*   ALBUMIN 2.2*   PROT 5.5*      K 4.1   CO2 21*      BUN 93*   CREATININE 6.9*   ALKPHOS 63   ALT <5*   AST 17   BILITOT 0.4       Significant Diagnostics:  I have reviewed all pertinent imaging results/findings within the past 24 hours.    Assessment/Plan:     C. difficile diarrhea    Antibiotics        ESRD (end stage renal disease)    Dialysis per Nephrology        Hyperparathyroidism    Status post parathyroidectomy  Monitor calcium  S/p neck washout  Neck is supple, no further swelling since drain out.   Extubated yesterday  Speech therapy for swallow eval may need alternative feeding access if unable to take in po, cont  Tube feeds  PT/OT              Coronary artery disease involving native coronary artery    Cardiology following  On anticoagulation        Acute renal failure superimposed on stage 5 chronic kidney disease, not on chronic dialysis    Pt tolerating hemo-dialysis well        Hypertension associated with diabetes    Antihypertensive medications            Levy Samuel MD  General Surgery  [facility]

## 2018-06-24 NOTE — SUBJECTIVE & OBJECTIVE
Interval History:  The patient is more alert and awake today.  She is able to answer a few questions but is not able to consistently do this.  Overall her mental status does appear to be slowly improving.    Oncology Treatment Plan:   [No treatment plan]    Medications:  Continuous Infusions:  Scheduled Meds:   amiodarone  200 mg Oral BID    aspirin  81 mg Oral Daily    epoetin carmen (PROCRIT) injection  10,000 Units Intravenous Once    epoetin carmen (PROCRIT) injection  4,000 Units Intravenous Every Tues, Thurs, Sat    famotidine  20 mg Oral Daily    heparin (porcine)  2,000 Units Intravenous Once    heparin (porcine)  5,000 Units Subcutaneous Q8H    metoprolol tartrate  25 mg Oral BID    sodium bicarbonate  650 mg Oral BID    vancomycin  125 mg Oral Q6H     PRN Meds:bisacodyl, cloNIDine, dextrose 50%, dextrose 50%, glucagon (human recombinant), glucose, glucose, heparin (porcine), hydrALAZINE, insulin aspart U-100, lactulose, lorazepam, morphine, ondansetron, sodium bicarbonate, sodium chloride 0.9%     Review of Systems   Unable to perform ROS: Mental status change     Objective:     Vital Signs (Most Recent):  Temp: 98.5 °F (36.9 °C) (06/24/18 1105)  Pulse: 81 (06/24/18 1220)  Resp: (!) 22 (06/24/18 1220)  BP: (!) 98/43 (06/24/18 1220)  SpO2: 99 % (06/24/18 1220) Vital Signs (24h Range):  Temp:  [98.4 °F (36.9 °C)-99.2 °F (37.3 °C)] 98.5 °F (36.9 °C)  Pulse:  [78-98] 81  Resp:  [16-28] 22  SpO2:  [95 %-100 %] 99 %  BP: ()/(42-90) 98/43     Weight: 77 kg (169 lb 12.1 oz)  Body mass index is 31.05 kg/m².  Body surface area is 1.84 meters squared.      Intake/Output Summary (Last 24 hours) at 06/24/18 1225  Last data filed at 06/24/18 1000   Gross per 24 hour   Intake             1040 ml   Output                0 ml   Net             1040 ml       Physical Exam   Constitutional: She appears well-developed and well-nourished. She is active and cooperative. She appears ill.   HENT:   Head: Normocephalic  and atraumatic.   Nose: Nose normal.   Mouth/Throat: Oropharynx is clear and moist. No oropharyngeal exudate.   Healing neck incision is noted.   Eyes: Pupils are equal, round, and reactive to light. No scleral icterus.   Neck: Neck supple. No thyromegaly present.   Cardiovascular: Normal rate, regular rhythm, normal heart sounds and intact distal pulses.    No murmur heard.  Pulmonary/Chest: Effort normal. No stridor. No respiratory distress. She has no wheezes. She has rhonchi. She has no rales.   Abdominal: Soft. Bowel sounds are normal. She exhibits no distension, no ascites and no mass. There is no hepatosplenomegaly. There is no tenderness. There is no rigidity, no rebound and no guarding.   Musculoskeletal: She exhibits no edema or tenderness.   Lymphadenopathy:     She has no cervical adenopathy.   Neurological: She is unresponsive. No cranial nerve deficit. She exhibits normal muscle tone. Coordination normal.   Skin: Skin is warm and dry. Ecchymosis noted. No rash noted. No pallor.   Psychiatric: She is slowed. She is noncommunicative.   Nursing note and vitals reviewed.      Significant Labs:   I have reviewed all of the patient's relevant lab work available in the medical record and have utilized this in my evaluation and management recommendations today    Diagnostic Results:  I have reviewed all of the patient's diagnostic/imaging results available in the medical record and have utilized this in my evaluation and management recommendations today.

## 2018-06-25 ENCOUNTER — ANESTHESIA EVENT (OUTPATIENT)
Dept: SURGERY | Facility: HOSPITAL | Age: 68
End: 2018-06-25

## 2018-06-25 LAB
ALBUMIN SERPL BCP-MCNC: 2.3 G/DL
ALP SERPL-CCNC: 58 U/L
ALT SERPL W/O P-5'-P-CCNC: 7 U/L
ANION GAP SERPL CALC-SCNC: 11 MMOL/L
AST SERPL-CCNC: 24 U/L
BASOPHILS # BLD AUTO: 0.01 K/UL
BASOPHILS NFR BLD: 0.1 %
BILIRUB SERPL-MCNC: 0.6 MG/DL
BUN SERPL-MCNC: 51 MG/DL
CALCIUM SERPL-MCNC: 8.2 MG/DL
CHLORIDE SERPL-SCNC: 100 MMOL/L
CO2 SERPL-SCNC: 26 MMOL/L
CREAT SERPL-MCNC: 4.3 MG/DL
DIFFERENTIAL METHOD: ABNORMAL
EOSINOPHIL # BLD AUTO: 0.1 K/UL
EOSINOPHIL NFR BLD: 0.3 %
ERYTHROCYTE [DISTWIDTH] IN BLOOD BY AUTOMATED COUNT: 17.1 %
EST. GFR  (AFRICAN AMERICAN): 11 ML/MIN/1.73 M^2
EST. GFR  (NON AFRICAN AMERICAN): 10 ML/MIN/1.73 M^2
GLUCOSE SERPL-MCNC: 150 MG/DL
HCT VFR BLD AUTO: 25.2 %
HGB BLD-MCNC: 8.1 G/DL
LYMPHOCYTES # BLD AUTO: 1.2 K/UL
LYMPHOCYTES NFR BLD: 8.5 %
MAGNESIUM SERPL-MCNC: 2 MG/DL
MCH RBC QN AUTO: 27.6 PG
MCHC RBC AUTO-ENTMCNC: 32.1 G/DL
MCV RBC AUTO: 86 FL
MONOCYTES # BLD AUTO: 1.1 K/UL
MONOCYTES NFR BLD: 7.4 %
NEUTROPHILS # BLD AUTO: 12 K/UL
NEUTROPHILS NFR BLD: 83.7 %
PHOSPHATE SERPL-MCNC: 2.4 MG/DL
PLATELET # BLD AUTO: 126 K/UL
PMV BLD AUTO: 10.1 FL
POCT GLUCOSE: 177 MG/DL (ref 70–110)
POTASSIUM SERPL-SCNC: 4.3 MMOL/L
PROT SERPL-MCNC: 5.9 G/DL
RBC # BLD AUTO: 2.93 M/UL
SODIUM SERPL-SCNC: 137 MMOL/L
WBC # BLD AUTO: 14.31 K/UL

## 2018-06-25 PROCEDURE — 63600175 PHARM REV CODE 636 W HCPCS: Performed by: INTERNAL MEDICINE

## 2018-06-25 PROCEDURE — 83735 ASSAY OF MAGNESIUM: CPT

## 2018-06-25 PROCEDURE — 99232 SBSQ HOSP IP/OBS MODERATE 35: CPT | Mod: ,,, | Performed by: INTERNAL MEDICINE

## 2018-06-25 PROCEDURE — 99233 SBSQ HOSP IP/OBS HIGH 50: CPT | Mod: ,,, | Performed by: INTERNAL MEDICINE

## 2018-06-25 PROCEDURE — G8978 MOBILITY CURRENT STATUS: HCPCS | Mod: CM

## 2018-06-25 PROCEDURE — G8979 MOBILITY GOAL STATUS: HCPCS | Mod: CL

## 2018-06-25 PROCEDURE — 25000003 PHARM REV CODE 250: Performed by: NURSE PRACTITIONER

## 2018-06-25 PROCEDURE — 36415 COLL VENOUS BLD VENIPUNCTURE: CPT

## 2018-06-25 PROCEDURE — 85025 COMPLETE CBC W/AUTO DIFF WBC: CPT

## 2018-06-25 PROCEDURE — 92526 ORAL FUNCTION THERAPY: CPT

## 2018-06-25 PROCEDURE — 97530 THERAPEUTIC ACTIVITIES: CPT

## 2018-06-25 PROCEDURE — 97803 MED NUTRITION INDIV SUBSEQ: CPT

## 2018-06-25 PROCEDURE — 94761 N-INVAS EAR/PLS OXIMETRY MLT: CPT

## 2018-06-25 PROCEDURE — 21400001 HC TELEMETRY ROOM

## 2018-06-25 PROCEDURE — 80053 COMPREHEN METABOLIC PANEL: CPT

## 2018-06-25 PROCEDURE — 25000003 PHARM REV CODE 250: Performed by: SURGERY

## 2018-06-25 PROCEDURE — 84100 ASSAY OF PHOSPHORUS: CPT

## 2018-06-25 PROCEDURE — 97163 PT EVAL HIGH COMPLEX 45 MIN: CPT

## 2018-06-25 RX ORDER — LIDOCAINE HYDROCHLORIDE 10 MG/ML
1 INJECTION, SOLUTION EPIDURAL; INFILTRATION; INTRACAUDAL; PERINEURAL ONCE
Status: CANCELLED | OUTPATIENT
Start: 2018-06-25 | End: 2018-06-25

## 2018-06-25 RX ADMIN — METOPROLOL TARTRATE 25 MG: 25 TABLET, FILM COATED ORAL at 09:06

## 2018-06-25 RX ADMIN — AMIODARONE HYDROCHLORIDE 200 MG: 200 TABLET ORAL at 10:06

## 2018-06-25 RX ADMIN — SODIUM BICARBONATE 650 MG TABLET 650 MG: at 09:06

## 2018-06-25 RX ADMIN — AMIODARONE HYDROCHLORIDE 200 MG: 200 TABLET ORAL at 09:06

## 2018-06-25 RX ADMIN — HEPARIN SODIUM 5000 UNITS: 5000 INJECTION, SOLUTION INTRAVENOUS; SUBCUTANEOUS at 02:06

## 2018-06-25 RX ADMIN — Medication 125 MG: at 12:06

## 2018-06-25 RX ADMIN — HEPARIN SODIUM 5000 UNITS: 5000 INJECTION, SOLUTION INTRAVENOUS; SUBCUTANEOUS at 09:06

## 2018-06-25 RX ADMIN — FAMOTIDINE 20 MG: 20 TABLET ORAL at 10:06

## 2018-06-25 RX ADMIN — METOPROLOL TARTRATE 25 MG: 25 TABLET, FILM COATED ORAL at 10:06

## 2018-06-25 RX ADMIN — Medication 125 MG: at 06:06

## 2018-06-25 RX ADMIN — HEPARIN SODIUM 5000 UNITS: 5000 INJECTION, SOLUTION INTRAVENOUS; SUBCUTANEOUS at 06:06

## 2018-06-25 RX ADMIN — Medication 125 MG: at 07:06

## 2018-06-25 RX ADMIN — ASPIRIN 81 MG 81 MG: 81 TABLET ORAL at 10:06

## 2018-06-25 RX ADMIN — SODIUM BICARBONATE 650 MG TABLET 650 MG: at 10:06

## 2018-06-25 NOTE — ASSESSMENT & PLAN NOTE
Status post parathyroidectomy  Monitor calcium  S/p neck washout  Neck is supple, no further swelling since drain out.   Surgery following.

## 2018-06-25 NOTE — PT/OT/SLP PROGRESS
Physical Therapy  Treatment    Citlali Karimi   MRN: [id number]   Admitting Diagnosis: Critical illness polyneuropathy    PT Received On: 06/25/18  PT Start Time: 1247     PT Stop Time: 1310    PT Total Time (min): 23 min       Billable Minutes:  Therapeutic Activity 23 mins    Treatment Type: Treatment  PT/PTA: PT             General Precautions: Standard, fall, aspiration  Orthopedic Precautions: N/A   Braces: N/A    Do you have any cultural, spiritual, [employer] conflicts, given your current situation?: NONE    Subjective:  Communicated with EPIC and nurse (José) prior to session.  Patient agreeable to participating with therapy.    Pain/Comfort  Pain Rating 1: 0/10    Objective:   Patient found with: peripheral IV, NG tube, oxygen, telemetry    Functional Mobility:  Bed Mobility:    Rolling to left total assist; supine <-> sit total assist x 2; seated and supine/drawsheet scooting total assist x 2.  Patient unable/unwilling to even lift head from surface of bed for drawsheet up toward head of bed.           Balance:   Static Sit: 0: Needs MAXIMAL assist to maintain sitting without back support  Dynamic Sit: 0: N/A    Therapeutic Activities and Exercises:  Patient sat EOB x 15 minutes with max/total assist for static sitting balance.  Tends to sit with rounded shoulders, head down, posterior pelvic tilt.  Responded to 2 of 10 verbal cues to lift head.  Made no attempts to self-correct any losses of balance.      AM-PAC 6 CLICK MOBILITY  How much help from another person does this patient currently need?   1 = Unable, Total/Dependent Assistance  2 = A lot, Maximum/Moderate Assistance  3 = A little, Minimum/Contact Guard/Supervision  4 = None, Modified [place]/Independent    Turning over in bed (including adjusting bedclothes, sheets and blankets)?: 2  Sitting down on and standing up from a chair with arms (e.g., wheelchair, bedside commode, etc.): 1  Moving from lying on back to sitting on the side of the  bed?: 2  Moving to and from a bed to a chair (including a wheelchair)?: 1  Need to walk in hospital room?: 1  Climbing 3-5 steps with a railing?: 1  Total Score: 8    AM-PAC Raw Score CMS G-Code Modifier Level of Impairment Assistance   6 % Total / Unable   7 - 9 CM 80 - 100% Maximal Assist   10 - 14 CL 60 - 80% Moderate Assist   15 - 19 CK 40 - 60% Moderate Assist   20 - 22 CJ 20 - 40% Minimal Assist   23 CI 1-20% SBA / CGA   24 CH 0% Independent/ Mod I     Patient left HOB elevated with all lines intact, call button in reach, bed alarm on and nurse Shakesia notified.    Assessment:  Patient with minimal participation in today's treatment session.  Minimal response to verbal commands.  Total assist x 2 for all mobility/transitional movements.    Rehab identified problem list/impairments: Rehab identified problem list/impairments: weakness, impaired endurance, impaired self care skills, impaired functional mobilty, gait instability, impaired balance, decreased coordination, decreased lower extremity function, decreased upper extremity function, decreased safety awareness, decreased ROM    Rehab potential is fair. (dependent upon participation/motivation)    Activity tolerance: Fair    Discharge recommendations: Discharge Facility/Level Of Care Needs: nursing facility, skilled     Barriers to discharge:      Equipment recommendations: Equipment Needed After Discharge: none     GOALS:    Physical Therapy Goals        Problem: Physical Therapy Goal           Problem: Physical Therapy Goal    Goal Priority Disciplines Outcome Goal Variances Interventions   Physical Therapy Goal     PT/OT, PT Ongoing (interventions implemented as appropriate)     Description:  LTG'S TO BE MET IN 7 DAYS (7-2-18)  1. PT WILL REQUIRE MAXA FOR BED MOBILITY  2. PT WILL REQUIRE MAXA FOR SIT<>STAND TF USING RW  3. PT WILL SIT EOB WITH FAIR SITTING BALANCE X 8 MINUTES WITH NO C/O FATIGUE  4. PT WILL TOLERATE BLE THEREX X 20 REPS  KEELY  5. PT WILL PARTICIPATE IN GAIT ASSESSMENT                     PLAN:    Patient to be seen 5 x/week  to address the above listed problems via gait training, therapeutic exercises, therapeutic activities  Plan of Care expires: 07/02/18  Plan of Care reviewed with: patient    PT G-Codes  Functional Assessment Tool Used: [facility]  Score: 8  Functional Limitation: Changing and maintaining body position  Mobility: Walking and Moving Around Current Status (): CM  Mobility: Walking and Moving Around Goal Status (): CL    Rose Cesar, PT, DPT  06/25/2018

## 2018-06-25 NOTE — ASSESSMENT & PLAN NOTE
- ASA, Statin, and Lopressor continued   -Imdur dose increased   -previous Cath showed severe CAD (proximal LCX 99%, mid 50%, RCA mid 90%, distal with subtotal lesion).  - Cardiology consulted with medical management continued   - LHC proposed pending Nephrology recommendations  -family refused LHC due to concern for worsening kidney function as pt had expressed that she did not want to be on dialysis     6/15  Family electing LHC and accepting risk of worsening renal function  Cardiology    6/16  S/p LHC  Cardiology  ASA  Statin allergy noted    6/16  No interventions  Cardiology on consult    6/18  Cardiology on Consult  Medical Management    6/19  ASA  BB  6/22  Continue with medical management   6/23  Continue with medical management   6/25  Continue with medical management

## 2018-06-25 NOTE — NURSING
Received from Nolan anderson  Vital signs stable  Awake and alert to self oriented to situation   No c/o pain or discomfort at this time   Will continue to monitor

## 2018-06-25 NOTE — SUBJECTIVE & OBJECTIVE
Review of Systems   Unable to perform ROS: Mental status change     Objective:     Vital Signs (Most Recent):  Temp: 99.8 °F (37.7 °C) (06/25/18 0423)  Pulse: 82 (06/25/18 1300)  Resp: 18 (06/25/18 1202)  BP: (!) 110/55 (06/25/18 1202)  SpO2: 99 % (06/25/18 1202) Vital Signs (24h Range):  Temp:  [98.1 °F (36.7 °C)-100.5 °F (38.1 °C)] 99.8 °F (37.7 °C)  Pulse:  [] 82  Resp:  [17-27] 18  SpO2:  [93 %-100 %] 99 %  BP: (110-142)/(55-68) 110/55     Weight: 77 kg (169 lb 12.1 oz)  Body mass index is 31.05 kg/m².    Intake/Output Summary (Last 24 hours) at 06/25/18 1505  Last data filed at 06/24/18 2100   Gross per 24 hour   Intake              420 ml   Output             1900 ml   Net            -1480 ml      Physical Exam   Constitutional: She appears well-developed and well-nourished. She is active and cooperative. She appears ill.   HENT:   Head: Normocephalic and atraumatic.   Nose: Nose normal.   Mouth/Throat: Oropharynx is clear and moist. No oropharyngeal exudate.   Healing neck incision is noted.   Eyes: Pupils are equal, round, and reactive to light. No scleral icterus.   Neck: Neck supple. No thyromegaly present.   Cardiovascular: Normal rate, regular rhythm, normal heart sounds and intact distal pulses.    No murmur heard.  Pulmonary/Chest: Effort normal. No stridor. No respiratory distress. She has no wheezes. She has rhonchi. She has no rales.   Abdominal: Soft. Bowel sounds are normal. She exhibits no distension, no ascites and no mass. There is no hepatosplenomegaly. There is no tenderness. There is no rigidity, no rebound and no guarding.   Musculoskeletal: She exhibits no edema or tenderness.   Lymphadenopathy:     She has no cervical adenopathy.   Neurological: She is alert. No cranial nerve deficit. She exhibits normal muscle tone. Coordination normal.   Skin: Skin is warm and dry. Ecchymosis noted. No rash noted. No pallor.   Psychiatric: She is slowed. She is communicative.   Nursing note  and vitals reviewed.      Significant Labs: All pertinent labs within the past 24 hours have been reviewed.    Significant Imaging: I have reviewed all pertinent imaging results/findings within the past 24 hours.

## 2018-06-25 NOTE — SUBJECTIVE & OBJECTIVE
Interval History: somewhat more lethargic today. Responds to some questions. Tube feeds are tolerated. No further loose BM's    Medications:  Continuous Infusions:  Scheduled Meds:   amiodarone  200 mg Oral BID    aspirin  81 mg Oral Daily    epoetin carmen (PROCRIT) injection  4,000 Units Intravenous Every Tues, Thurs, Sat    famotidine  20 mg Oral Daily    heparin (porcine)  2,000 Units Intravenous Once    heparin (porcine)  5,000 Units Subcutaneous Q8H    metoprolol tartrate  25 mg Oral BID    sodium bicarbonate  650 mg Oral BID    vancomycin  125 mg Oral Q6H     PRN Meds:acetaminophen, bisacodyl, cloNIDine, dextrose 50%, dextrose 50%, glucagon (human recombinant), glucose, glucose, heparin (porcine), hydrALAZINE, insulin aspart U-100, lactulose, lorazepam, morphine, ondansetron, sodium bicarbonate, sodium chloride 0.9%     Review of patient's allergies indicates:   Allergen Reactions    Lipitor [atorvastatin] Swelling     Lips       Objective:     Vital Signs (Most Recent):  Temp: 99.8 °F (37.7 °C) (06/25/18 0423)  Pulse: 85 (06/25/18 0822)  Resp: 18 (06/25/18 0822)  BP: (!) 142/67 (06/25/18 0821)  SpO2: 98 % (06/25/18 0822) Vital Signs (24h Range):  Temp:  [98.1 °F (36.7 °C)-100.5 °F (38.1 °C)] 99.8 °F (37.7 °C)  Pulse:  [] 85  Resp:  [17-27] 18  SpO2:  [93 %-100 %] 98 %  BP: ()/(40-71) 142/67     Weight: 77 kg (169 lb 12.1 oz)  Body mass index is 31.05 kg/m².    Intake/Output - Last 3 Shifts       06/23 0700 - 06/24 0659 06/24 0700 - 06/25 0659 06/25 0700 - 06/26 0659    Other  250     NG/GT 1160 350     Total Intake(mL/kg) 1160 (15.1) 600 (7.8)     Other  1900     Total Output   1900      Net +1160 -1300             Urine Occurrence 3 x 1 x     Stool Occurrence  0 x           Physical Exam   Constitutional: She appears well-developed and well-nourished.   HENT:   Head: Normocephalic and atraumatic.   Eyes: EOM are normal.   Neck: Neck supple.   Cardiovascular: Normal rate and regular  rhythm.    Pulmonary/Chest: Effort normal. No respiratory distress.   Abdominal: Soft.   Musculoskeletal: Normal range of motion.   Neurological: She is alert.   Skin: Skin is warm and dry.   Psychiatric: She has a normal mood and affect. Thought content normal.   Vitals reviewed.      Significant Labs:  CBC:   Recent Labs  Lab 06/25/18  0551   WBC 14.31*   RBC 2.93*   HGB 8.1*   HCT 25.2*   *   MCV 86   MCH 27.6   MCHC 32.1     CMP:   Recent Labs  Lab 06/25/18  0551   *   CALCIUM 8.2*   ALBUMIN 2.3*   PROT 5.9*      K 4.3   CO2 26      BUN 51*   CREATININE 4.3*   ALKPHOS 58   ALT 7*   AST 24   BILITOT 0.6       Significant Diagnostics:  I have reviewed all pertinent imaging results/findings within the past 24 hours.

## 2018-06-25 NOTE — PLAN OF CARE
Problem: SLP Goal  Goal: SLP Goal  POC established 6/22/2018  1.  Ongoing swallow assessment to determine po readiness  2. Oral and pharyngeal ex's with modA for increased strength and ROM    Outcome: Ongoing (interventions implemented as appropriate)  Pt made no attempt to follow directions for oral or pharyngeal ex's and she refuses any trial po intake.  Pt to remain NPO and will require long-term source for nutrition/hydration.

## 2018-06-25 NOTE — PROGRESS NOTES
Pt completed 3hr HD tx without complications. Removed 1.4L of fluid per Crit Line monitoring. Pt received Procrit 10,000 units ivp with HD.No complications with HD catheter.

## 2018-06-25 NOTE — PROGRESS NOTES
Transferred to Tele rm 212.  Phone report and bedside report given to Violeta CAPUTO.  In stable condition.

## 2018-06-25 NOTE — PLAN OF CARE
Problem: Patient Care Overview  Goal: Plan of Care Review  Outcome: Ongoing (interventions implemented as appropriate)  Vital signs stable  Delayed responses to verbal command   Patient rounds assessed; free of falls on current shift   No c/o pain or discomfort   Sinus rhythm on the monitor   Turn every 2 hours   NG tube feeding instilling ar 40ml/hr   Will continue to monitor for any signs or symptoms of vital decline

## 2018-06-25 NOTE — PROGRESS NOTES
[facility]  General Surgery  Progress Note    Subjective:     History of Present Illness:  No notes on file    Post-Op Info:  Procedure(s) (LRB):  VASCULAR CATH EXCHANGE (N/A)   1 Day Post-Op     Interval History: somewhat more lethargic today. Responds to some questions. Tube feeds are tolerated. No further loose BM's    Medications:  Continuous Infusions:  Scheduled Meds:   amiodarone  200 mg Oral BID    aspirin  81 mg Oral Daily    epoetin carmen (PROCRIT) injection  4,000 Units Intravenous Every Tues, Thurs, Sat    famotidine  20 mg Oral Daily    heparin (porcine)  2,000 Units Intravenous Once    heparin (porcine)  5,000 Units Subcutaneous Q8H    metoprolol tartrate  25 mg Oral BID    sodium bicarbonate  650 mg Oral BID    vancomycin  125 mg Oral Q6H     PRN Meds:acetaminophen, bisacodyl, cloNIDine, dextrose 50%, dextrose 50%, glucagon (human recombinant), glucose, glucose, heparin (porcine), hydrALAZINE, insulin aspart U-100, lactulose, lorazepam, morphine, ondansetron, sodium bicarbonate, sodium chloride 0.9%     Review of patient's allergies indicates:   Allergen Reactions    Lipitor [atorvastatin] Swelling     Lips       Objective:     Vital Signs (Most Recent):  Temp: 99.8 °F (37.7 °C) (06/25/18 0423)  Pulse: 85 (06/25/18 0822)  Resp: 18 (06/25/18 0822)  BP: (!) 142/67 (06/25/18 0821)  SpO2: 98 % (06/25/18 0822) Vital Signs (24h Range):  Temp:  [98.1 °F (36.7 °C)-100.5 °F (38.1 °C)] 99.8 °F (37.7 °C)  Pulse:  [] 85  Resp:  [17-27] 18  SpO2:  [93 %-100 %] 98 %  BP: ()/(40-71) 142/67     Weight: 77 kg (169 lb 12.1 oz)  Body mass index is 31.05 kg/m².    Intake/Output - Last 3 Shifts       06/23 0700 - 06/24 0659 06/24 0700 - 06/25 0659 06/25 0700 - 06/26 0659    Other  250     NG/GT 1160 350     Total Intake(mL/kg) 1160 (15.1) 600 (7.8)     Other  1900     Total Output   1900      Net +1160 -1300             Urine Occurrence 3 x 1 x     Stool Occurrence  0 x            Physical Exam   Constitutional: She appears well-developed and well-nourished.   HENT:   Head: Normocephalic and atraumatic.   Eyes: EOM are normal.   Neck: Neck supple.   Cardiovascular: Normal rate and regular rhythm.    Pulmonary/Chest: Effort normal. No respiratory distress.   Abdominal: Soft.   Musculoskeletal: Normal range of motion.   Neurological: She is alert.   Skin: Skin is warm and dry.   Psychiatric: She has a normal mood and affect. Thought content normal.   Vitals reviewed.      Significant Labs:  CBC:   Recent Labs  Lab 06/25/18  0551   WBC 14.31*   RBC 2.93*   HGB 8.1*   HCT 25.2*   *   MCV 86   MCH 27.6   MCHC 32.1     CMP:   Recent Labs  Lab 06/25/18  0551   *   CALCIUM 8.2*   ALBUMIN 2.3*   PROT 5.9*      K 4.3   CO2 26      BUN 51*   CREATININE 4.3*   ALKPHOS 58   ALT 7*   AST 24   BILITOT 0.6       Significant Diagnostics:  I have reviewed all pertinent imaging results/findings within the past 24 hours.    Assessment/Plan:     C. difficile diarrhea    Antibiotics        ESRD (end stage renal disease)    Dialysis per Nephrology        Hyperparathyroidism    Status post parathyroidectomy  Monitor calcium  S/p neck washout  Neck is supple, no further swelling since drain out.   Speech therapy for swallow eval may need alternative feeding access if unable to take in po, cont  Tube feeds  PT/OT              Coronary artery disease involving native coronary artery    Cardiology following  On anticoagulation        Acute renal failure superimposed on stage 5 chronic kidney disease, not on chronic dialysis    Pt tolerating hemo-dialysis well        Hypertension associated with diabetes    Antihypertensive medications            Becka Raygoza PA-C  General Surgery  [facility]

## 2018-06-25 NOTE — PT/OT/SLP PROGRESS
Speech Language Pathology Treatment    Patient Name:  Citlali Karimi   MRN:  [id number]  Admitting Diagnosis: Critical illness polyneuropathy    Recommendations:                 General Recommendations:  Dysphagia therapy  Diet recommendations:  NPO, Liquid Diet Level: NPO   Aspiration Precautions: Frequent oral care   General Precautions: Standard, aspiration    Subjective   Pt was seen at bedside with her  present.    Patient goals: none stated    Pain/Comfort:  · Pain Rating 1: 0/10    Objective:     Has the patient been evaluated by SLP for swallowing?   Yes  Keep patient NPO? Yes   Current Respiratory Status: room air      Pt made no attempt to participate in oral care, oral ex's, or pharyngeal ex's despite max cueing.  She kept her mouth closed, turned her head, and would close her eyes.  PO trial of puree was presented to the pt, however the pt exhibited the same avoidance behavior.  Throat clearing, however weak, was produced due to wet vocal quality.  No swallow was initiated.  ST educated pt's  on recommendations for long term nutrition (PEG) to which he is in agreement with.     Assessment:     Citlali Karimi is a 68 y.o. female with an SLP diagnosis of Dysphagia.  She presents with oropharyngeal dysphagia and will require long term nutrition plan.    Goals:    SLP Goals        Problem: SLP Goal    Goal Priority Disciplines Outcome   SLP Goal     SLP Ongoing (interventions implemented as appropriate)   Description:  POC established 6/22/2018  1.  Ongoing swallow assessment to determine po readiness  2. Oral and pharyngeal ex's with modA for increased strength and ROM                     Plan:     · Patient to be seen:  2 x/week   · Plan of Care expires:  06/29/18  · Plan of Care reviewed with:  spouse   · SLP Follow-Up:  Yes       Discharge recommendations:  nursing facility, skilled   Barriers to Discharge:  Pt's participation    Time Tracking:     SLP Treatment Date:   06/25/18  Speech  Start Time:  1050  Speech Stop Time:  1105     Speech Total Time (min):  15 min    Billable Minutes: Treatment Swallowing Dysfunction 15    Jacque Bernstein CCC-SLP  06/25/2018

## 2018-06-25 NOTE — PROGRESS NOTES
[facility] -   Pulmonology  Progress Note    Patient Name: Citlali Karimi  MRN: [id number]  Admission Date: 6/12/2018  Hospital Length of Stay: 12 days  Code Status: DNR  Attending Provider: Levy Samuel MD  Primary Care Provider: Evelio Schuster MD   Principal Problem: Critical illness polyneuropathy    Subjective:     Interval History: Seen and examined at bedside. Hospital chart reviewed. No acute interval detrimental events noted.     Review of Systems   Unable to perform ROS: Acuity of condition   Constitutional: Negative for malaise/fatigue and weight loss.   HENT: Negative for nosebleeds and tinnitus.    Eyes: Negative for photophobia and pain.   Respiratory: Negative for hemoptysis and sputum production.    Cardiovascular: Negative for palpitations and orthopnea.   Gastrointestinal: Negative for abdominal pain and vomiting.   Genitourinary: Negative for frequency and urgency.   Musculoskeletal: Negative for back pain and neck pain.   Skin: Negative for rash.   Neurological: Positive for speech change and focal weakness.   Endo/Heme/Allergies: Negative for environmental allergies.   Psychiatric/Behavioral: Negative for substance abuse and suicidal ideas.       Objective:     Vital Signs (Most Recent):  Temp: 99.8 °F (37.7 °C) (06/25/18 0423)  Pulse: 77 (06/25/18 1202)  Resp: 18 (06/25/18 1202)  BP: (!) 110/55 (06/25/18 1202)  SpO2: 99 % (06/25/18 1202) Vital Signs (24h Range):  Temp:  [98.1 °F (36.7 °C)-100.5 °F (38.1 °C)] 99.8 °F (37.7 °C)  Pulse:  [] 77  Resp:  [17-27] 18  SpO2:  [93 %-100 %] 99 %  BP: ()/(52-71) 110/55     Weight: 77 kg (169 lb 12.1 oz)  Body mass index is 31.05 kg/m².      Intake/Output Summary (Last 24 hours) at 06/25/18 1339  Last data filed at 06/24/18 2100   Gross per 24 hour   Intake              420 ml   Output             1900 ml   Net            -1480 ml       Physical Exam   Constitutional: She appears well-developed and well-nourished. She is active and  cooperative. She appears ill.   HENT:   Head: Normocephalic and atraumatic.   Nose: Nose normal.   Mouth/Throat: Oropharynx is clear and moist. No oropharyngeal exudate.   Healing neck incision is noted.   Eyes: Pupils are equal, round, and reactive to light. No scleral icterus.   Neck: Neck supple. No thyromegaly present.   Cardiovascular: Normal rate, regular rhythm, normal heart sounds and intact distal pulses.    No murmur heard.  Pulmonary/Chest: Effort normal. No stridor. No respiratory distress. She has no wheezes. She has rhonchi. She has no rales.   Abdominal: Soft. Bowel sounds are normal. She exhibits no distension, no ascites and no mass. There is no hepatosplenomegaly. There is no tenderness. There is no rigidity, no rebound and no guarding.   Musculoskeletal: She exhibits no edema or tenderness.   Lymphadenopathy:     She has no cervical adenopathy.   Neurological: She is alert. No cranial nerve deficit. She exhibits normal muscle tone. Coordination normal.   Skin: Skin is warm and dry. Ecchymosis noted. No rash noted. No pallor.   Psychiatric: She is slowed. She is communicative.   Nursing note and vitals reviewed.          Significant Labs:    Laboratory Data:  Reviewed recent labs. Appear stable other than abnormalities addressed in plan.     Radiology:  Reviewed recent imaging studies. Appear stable other than abnormalities addressed in plan.     Assessment/Plan:     Acute hypoxemic respiratory failure    SP extubation 06/21. Oxygen supplementation . Keep Sat> 92%. Bronchodilators as needed.           C. difficile diarrhea    continue Vancomycin per OG        Coronary artery disease involving native coronary artery    Per Cards.  Medical management  [facility] 6/15 done revealing diffuse CAD not amenable for PCI, no changes from 2016  ASA, Lopressor  Allergy to statin noted            Acute renal failure superimposed on stage 5 chronic kidney disease, not on chronic dialysis      Renal following. RRT per  nephrology.           Hyperparathyroidism, secondary renal    Status post parathyroidectomy  Monitor calcium  S/p neck washout  Neck is supple, no further swelling since drain out.   Surgery following.             Acute blood loss anemia    S/P 2 units PRBCs 6/15 and 1 unit PRBC 6/18. Monitor hemogram. Transfuse as needed.                  Anjel Prather MD  Pulmonology  [facility] -

## 2018-06-25 NOTE — PROGRESS NOTES
[facility] -   Adult Nutrition  Progress Note    SUMMARY     Recommendations    Recommendation/Intervention:  1.Continue current TF (Novasource at 40 ml/hr) and recommend discontinuing beneprotein supplement. 2. Will continue to monitor.    Goals: Meet > 85 % EEN/EPN while admitted  Nutrition Goal Status: goal met   Communication of RD Recs: POC, sticky note    Reason for Assessment    Reason for Assessment:  RD follow-up  Dx:  1. Abnormal chest x-ray    2. Hyperparathyroidism    3. Hypercalcemia    4. SOB (shortness of breath)    5. NSTEMI (non-ST elevated myocardial infarction)    6. Elevated troponin    7. S/P parathyroidectomy    8. Respiratory distress    9. Cardiogenic shock    10. Acute hypoxemic respiratory failure    11. Cardiac arrest with ventricular fibrillation    12. CKD (chronic kidney disease) stage 5, GFR less than 15 ml/min    13. Type 2 diabetes mellitus with stage 4 chronic kidney disease, without long-term current use of insulin    14. Electrolyte imbalance    15. H/O aortic valve replacement    16. Persistent atrial fibrillation    17. S/P evacuation of hematoma    18. Shock liver    19. ST elevation myocardial infarction (STEMI), unspecified artery    20. MI (myocardial infarction)    21. Acute airway obstruction    22. Acute blood loss anemia    23. On mechanically assisted ventilation    24. STEMI (ST elevation myocardial infarction)    25. Personal history of sudden cardiac arrest    26. Presence of prosthetic heart valve    27. Coronary artery disease involving native coronary artery without angina pectoris, unspecified whether native or transplanted heart    28. Acute renal failure superimposed on stage 5 chronic kidney disease, not on chronic dialysis, unspecified acute renal failure type    29. C. difficile diarrhea    30. Acute renal failure, unspecified acute renal failure type    31. Chronic kidney disease    32. ESRD (end stage renal disease)      Past Medical  "History:   Diagnosis Date    Acid reflux 1/7/2015    Anxiety 1/7/2015    Aortic valvar stenosis     Arthritis     osteo    Callus     Chronic anemia     followed by Dr. Addison    CKD (chronic kidney disease) stage 5, GFR less than 15 ml/min 8/7/2015    Combined hyperlipidemia associated with type 2 diabetes mellitus     Coronary artery disease     Diabetes mellitus type II, controlled, with no complications     LBSL 108 today    Encounter for blood transfusion     Frail elderly with impaired mobility, wheel chair bound 8/25/2017    Gallbladder problem     gallbladder surgery    Heart murmur     Hyperparathyroidism, secondary renal 1/7/2015    Hypertension 8/7/2015    Hypertension associated with diabetes 11/12/2012    Kidney transplant candidate 1/7/2015    Overweight(278.02)     Urinary tract infection        General Information Comments:   6.15.18.S/p parathyroidectomy (6/12).  ST recs: NPO (6/14).  Pt was taken back to OR and had post op neck hematoma evacuation (6/14). Patient extubated and immediately reintubated. OG tube placed for PO med access. LHC planned for later today.   6.18.18. S/p CATHETERIZATION, HEART, LEFT. Patient remains intubated. Currently on TF. Worsening renal function. Per ICU rounds,  plans for extubation and possible plans for dialysis.   6.21.18. Per ICU staff: Pt remains intubated,Pt on TF, tolerating,  Pt on HD, plans to extubate after dialysis today.    6.25.18.Pt extubated. Pt completed 3 hrs HD yesterday.  Per SLP recs: NPO at this time (6/22). SLP following. Pt on TF, tolerating.   Nutrition Discharge Planning: too soon to determine      Nutrition Risk Screen    Nutrition Risk Screen: dysphagia or difficulty swallowing    Nutrition/Diet History    Do you have any cultural, spiritual, Buddhist conflicts, given your current situation?: none    Anthropometrics    Temp: 99.8 °F (37.7 °C)  Height Method: Stated  Height: 5' 2" (157.5 cm)  Height (inches): 62 " in  Weight Method: Bed Scale  Weight: 77 kg (169 lb 12.1 oz)  Weight (lb): 169.76 lb  Ideal Body Weight (IBW), Female: 110 lb  % Ideal Body Weight, Female (lb): 154.33 lb  BMI (Calculated): 31.1  BMI Grade: 30 - 34.9- obesity - grade I       Lab/Procedures/Meds    Pertinent Labs Reviewed: reviewed  BMP  Lab Results   Component Value Date     06/25/2018    K 4.3 06/25/2018     06/25/2018    CO2 26 06/25/2018    BUN 51 (H) 06/25/2018    CREATININE 4.3 (H) 06/25/2018    CALCIUM 8.2 (L) 06/25/2018    ANIONGAP 11 06/25/2018    ESTGFRAFRICA 11 (A) 06/25/2018    EGFRNONAA 10 (A) 06/25/2018     Lab Results   Component Value Date    CALCIUM 8.2 (L) 06/25/2018    PHOS 2.4 (L) 06/25/2018     Lab Results   Component Value Date    ALBUMIN 2.3 (L) 06/25/2018       Recent Labs  Lab 06/24/18  2346   POCTGLUCOSE 168*     Lab Results   Component Value Date    HGBA1C 5.4 06/12/2018       Pertinent Medications Reviewed: reviewed    Physical Findings/Assessment    Overall Physical Appearance: obese   Tubes:  NG  Oral/Mouth Cavity: decay/carries, tooth/teeth missing, no dental appliances present  Skin: incision(s)    Estimated/Assessed Needs    Weight Used For Calorie Calculations: 64 kg (141 lb 1.5 oz) (SBW)  Energy Calorie Requirements (kcal): 1920   Energy Need Method: kcal/kg   Protein Requirements: 76- 96 g/day  Weight Used For Protein Calculations: 64 kg (141 lb 1.5 oz) (SBW)     Fluid Need Method: RDA Method (or per MD)  RDA Method (mL): 1920  CHO Requirement: 50 % EEN      Nutrition Prescription Ordered    Current Diet Order: NPO  Current Nutrition Support Formula Ordered:  (Novasource at 40 ml/hr + 5 pack of beneprotein)    Evaluation of Received Nutrient/Fluid Intake    Enteral Calories (kcal): 2045  Enteral Protein (gm): 116    % kcal needs: 106  % protein needs: 120    Intake/Output Summary (Last 24 hours) at 06/25/18 0942  Last data filed at 06/24/18 2100   Gross per 24 hour   Intake              460 ml   Output              1900 ml   Net            -1440 ml       % Intake of Estimated Energy Needs: 75 - 100 %  % Meal Intake: NPO    Nutrition Risk    Level of Risk/Frequency of Follow-up:  (F/U x2 weekly)     Assessment and Plan    Nutrition Problem:  Inadequate energy intake     Related to (etiology):   Inability to consume sufficient energy     Signs and Symptoms (as evidenced by):   Intubated, NPO, no alternative means of nutrition.      Interventions/Recommendations (treatment strategy):  Please see RD recs above.     Nutrition Diagnosis Status:   Resolved.  6.25.18. Extubated. Pt on TF, meeting needs.       Monitor and Evaluation    Food and Nutrient Intake: energy intake, enteral nutrition intake  Food and Nutrient Adminstration: enteral and parenteral nutrition administration  Anthropometric Measurements: weight  Biochemical Data, Medical Tests and Procedures: electrolyte and renal panel, glucose/endocrine profile  Nutrition-Focused Physical Findings: overall appearance, skin     Nutrition Follow-Up    RD Follow-up?: Yes (2xweekly)

## 2018-06-25 NOTE — PHYSICIAN QUERY
PT Name: Citlali Karimi  MR #: [id number]    Physician Query Form - Neurological Condition Clarification      Flor Peacock RN, CCDS  Contact Info: [phone] [email or web address]      This form is a permanent document in the medical record.     Query Date: June 25, 2018    By submitting this query, we are merely seeking further clarification of documentation. Please utilize your independent clinical judgment when addressing the question(s) below.    The Medical record contains the following:   Indicators  Supporting Clinical Findings Location in Medical Record   x AMS, Confusion, LOC, etc. Delayed response to verbal command    Patient very lethargic to participate in slp     The patient is more alert and awake today.  She is able to answer a few more questions today but is not able to consistently do this.  Overall her mental status does appear to be better.    opens her eyes when spoken to and acknowledges presence   Unable to perform ROS: Mental status change   Psychiatric: She is slowed. She is noncommunicative    Continues to be Confused    Unresponsive    Pt drowsy, arouses to voice, inconsistently follows commands, responds to name but does not answer questions.     Mental status change   Nsg Plan of Care 6/25    Hosp med Pn 6/24      Heme/Onc Pn 6/23      Progress Notes MD Cyn at 6/22/2018  7:29 AM       Levy PN 6/22-24    Heme/Onc Pn 6/22-24      Nsg Plan of Care 6/22    Heme/Onc Pn 6/22 -24   x Acute / Chronic Illness PMHx of CKD (IV), HTN, DM, CVA (left sided weakness), and CAD who initially presented for hyperparathyroidism and hypercalcemia requiring surgical intervention    6/15: Code Blue - Vfib.           Cards taking pt to cath lab  6/17: remains intubated           Worsening renal function  6/21: extubated  6/22: continue to treat cdiff           Will continue to need dialysis    Critical Illness polyneuropathy  C.Difficile diarrhea  NSTEMI  LHC - diffuse disease  S/p  parathyroidectomy  Cardiac Arrest w/Vfib  Hyperparathyroidism  CAD  Acute Renal Failure superimposed on CKD 5  HTN  DMT2 controlled only on oral meds  ABLA - stable post procedure    Airway obstruction   IV antibiotics for suspected aspiration continued  CVA hx of 1 yr ago; residual L sided weakness    Even at baseline, pt was pre-HD, pre-ESRD  Likely cause of further renal failure was contrast nephropathy vs prerenal azotemia due to NSTEMI and hypotension.    SHPT  Mild hypocalcemia as expected  Anemia of ckd    Acute airway obstruction     Intubated for neck swelling  Neck swelling and hematoma due to the neck surgery   Diastolic CHF  Afib    Hypercalcemia   Hyperparathyroidism    Resp Distress  S/p parathyroidectomy  Liquid blood causing tracheal compression, ooze no active bleeding    L groin vas Cath placement  RIJ vas cath insertion; removal L Fem Vas Cath Hosp med PN 6/24                                                  Hosp Med Pn 6/21          Nephro Pn 6/19                Neprho Pn 6/18            Op note 6/12      Op Note 6/14      Op Note 6/19  Op Note 6/24    Radiology Findings     x Electrolyte Imbalance 6/16 Ca 8.7 monitor  6/18 Ca 8.2 monitor  6/19 stable  Hosp Med Pn 6/24    Medication     x Treatment Neuro Checks every 15 min times 1 hour, every 30 min times 1 hour, every hour times 4 hours then routine per unit    Will consider CT Head if no improvement     Neuro checks every 15 minutes  Neuro checks every 30 min Orders       Levy Pn 6/22    Orders    Other       Provider, please specify the diagnosis or diagnoses associated with above clinical findings.    [  ] Metabolic Encephalopathy    [  ] Toxic Encephalopathy - specify drug(s): ___________________    [  ] Other Encephalopathy - specify: _________________    [  ] Unspecified Encephalopathy    [  ] Other (please specify): _____________________________________    [X  ] Clinically Undetermined      Please document in your progress notes daily  for the duration of treatment until resolved, and  include in your discharge summary.

## 2018-06-25 NOTE — SUBJECTIVE & OBJECTIVE
Interval History:  Patient is receiving tube feeds.  No evidence of shortness of breath.  Patient is not complaining of anything.  Drowsy but arousable.  Full review of systems not available patient is confused.    Review of patient's allergies indicates:   Allergen Reactions    Lipitor [atorvastatin] Swelling     Lips       Current Facility-Administered Medications   Medication Frequency    acetaminophen oral solution 650 mg Q4H PRN    amiodarone tablet 200 mg BID    aspirin chewable tablet 81 mg Daily    bisacodyl suppository 10 mg Daily PRN    cloNIDine tablet 0.1 mg Q6H PRN    dextrose 50% injection 12.5 g PRN    dextrose 50% injection 25 g PRN    epoetin carmen injection 4,000 Units Every Tues, Thurs, Sat    famotidine tablet 20 mg Daily    glucagon (human recombinant) injection 1 mg PRN    glucose chewable tablet 16 g PRN    glucose chewable tablet 24 g PRN    heparin (porcine) injection 2,000 Units Once    heparin (porcine) injection 3,000 Units PRN    heparin (porcine) injection 5,000 Units Q8H    hydrALAZINE injection 10 mg Q8H PRN    insulin aspart U-100 pen 0-5 Units QID (AC + HS) PRN    lactulose 20 gram/30 mL solution Soln 20 g Q6H PRN    lorazepam (ATIVAN) injection 2 mg Q4H PRN    metoprolol tartrate (LOPRESSOR) tablet 25 mg BID    morphine injection 4 mg Q4H PRN    ondansetron injection 4 mg Q8H PRN    sodium bicarbonate 8.4 % (1 mEq/mL) injection PRN    sodium bicarbonate tablet 650 mg BID    sodium chloride 0.9% flush 3 mL PRN    vancomycin 250mg / 10ml oral suspension 125 mg Q6H       Objective:     Vital Signs (Most Recent):  Temp: 98.8 °F (37.1 °C) (06/25/18 0821)  Pulse: 82 (06/25/18 1300)  Resp: 18 (06/25/18 1202)  BP: (!) 110/55 (06/25/18 1202)  SpO2: 99 % (06/25/18 1202)  O2 Device (Oxygen Therapy): room air (06/25/18 1202) Vital Signs (24h Range):  Temp:  [98.1 °F (36.7 °C)-100.5 °F (38.1 °C)] 98.8 °F (37.1 °C)  Pulse:  [] 82  Resp:  [17-27] 18  SpO2:  [93  %-100 %] 99 %  BP: (110-142)/(55-68) 110/55     Weight: 77 kg (169 lb 12.1 oz) (06/24/18 0545)  Body mass index is 31.05 kg/m².  Body surface area is 1.84 meters squared.    I/O last 3 completed shifts:  In: 1220 [Other:250; NG/GT:970]  Out: 1900 [Other:1900]    Physical Exam   Constitutional: She appears well-developed and well-nourished. She is active and cooperative. She appears ill.   HENT:   Head: Normocephalic and atraumatic.   Nose: Nose normal.   Mouth/Throat: Oropharynx is clear and moist. No oropharyngeal exudate.   Healing neck incision is noted.   Eyes: Pupils are equal, round, and reactive to light. No scleral icterus.   Neck: Neck supple. No thyromegaly present.   Cardiovascular: Normal rate, regular rhythm, normal heart sounds and intact distal pulses.    No murmur heard.  Pulmonary/Chest: Effort normal. No stridor. No respiratory distress. She has no wheezes. She has rhonchi. She has no rales.   Abdominal: Soft. Bowel sounds are normal. She exhibits no distension, no ascites and no mass. There is no hepatosplenomegaly. There is no tenderness. There is no rigidity, no rebound and no guarding.   Musculoskeletal: She exhibits no edema or tenderness.   Lymphadenopathy:     She has no cervical adenopathy.   Neurological: No cranial nerve deficit. She exhibits normal muscle tone. Coordination normal.   Patient is drowsy but arousable and is disoriented and confused which is unchanged from yesterday     Skin: Skin is warm and dry. Ecchymosis noted. No rash noted. No pallor.   Psychiatric: She is slowed. She is communicative.   Nursing note and vitals reviewed.      Significant Labs:  All labs within the past 24 hours have been reviewed.     Significant Imaging:  Labs: Reviewed

## 2018-06-25 NOTE — PT/OT/SLP EVAL
Physical Therapy Evaluation    Patient Name:  Citlali Karimi   MRN:  [id number]    Recommendations:     Discharge Recommendations:  nursing facility, skilled   Discharge Equipment Recommendations: none   Barriers to discharge: None    Assessment:     Citlali Karimi is a 68 y.o. female admitted with a medical diagnosis of Critical illness polyneuropathy.  She presents with the following impairments/functional limitations:  weakness, impaired endurance, impaired self care skills, impaired functional mobilty, gait instability, impaired balance, impaired cognition, decreased coordination, decreased lower extremity function, decreased upper extremity function, decreased safety awareness, decreased ROM.    Rehab Prognosis:  FAIR; patient would benefit from acute skilled PT services to address these deficits and reach maximum level of function.      Recent Surgery: Procedure(s) (LRB):  VASCULAR CATH EXCHANGE (N/A) 1 Day Post-Op    Plan:     During this hospitalization, patient to be seen 5 x/week to address the above listed problems via gait training, therapeutic activities, therapeutic exercises  · Plan of Care Expires:  07/02/18   Plan of Care Reviewed with: patient, spouse    Subjective     Communicated with EPIC prior to session.  Patient found supine in bed upon PT entry to room, agreeable to evaluation.      Chief Complaint: None voiced; patient's spouse feels she has given up on walking and getting better  Patient comments/goals: None voiced; patient's spouse would like to see her get stronger and more mobile  Pain/Comfort:  · Pain Rating 1: 0/10    Patients cultural, spiritual, Bahai conflicts given the current situation: NONE    Living Environment:  Patient lives home with her spouse in a one-story home with a W/C ramp to enter  Prior to admission, patients level of function was assist with ambulation and ADLs.  Patient has the following equipment: walker, rolling, rollator, bedside commode, bath bench,  wheelchair.  DME owned (not currently used): none.  Upon discharge, patient will have assistance from her spouse.    Objective:     Patient found with: peripheral IV, oxygen, NG tube, telemetry     General Precautions: Standard, fall   Orthopedic Precautions:N/A   Braces: N/A     Exams:  · Cognitive Exam:  Patient is oriented to Person and follows 25% of one-step commands   · Gross Motor Coordination:  decreased  · Postural Exam:  Patient presented with the following abnormalities:    · -       Rounded shoulders  · -       Forward head  · -       Posterior pelvic tilt  · RLE ROM: Deficits: Tightness of gastroc, hamstrings, hip flexors  · RLE Strength: Grossly 1/5 to 2-/5  · LLE ROM: Deficits: Tightness of gastroc, hamstrings, hip flexors  · LLE Strength: Grossly 1/5 to 2-/5    Functional Mobility:  · Bed Mobility:     · Rolling Left:  total assistance and of 2 persons  · Rolling Right: total assistance and of 2 persons  · Scooting: total assistance and of 2 persons  · Supine to Sit: total assistance and of 2 persons  · Sit to Supine: total assistance and of 2 persons  · Balance: Poor static sitting balance    AM-PAC 6 CLICK MOBILITY  Total Score:8       Patient left supine with all lines intact, call button in reach, bed alarm on and spouse present.    GOALS:    Physical Therapy Goals        Problem: Physical Therapy Goal           Problem: Physical Therapy Goal    Goal Priority Disciplines Outcome Goal Variances Interventions   Physical Therapy Goal     PT/OT, PT Ongoing (interventions implemented as appropriate)     Description:  LTG'S TO BE MET IN 7 DAYS (7-2-18)  1. PT WILL REQUIRE [facility] FOR BED MOBILITY  2. PT WILL REQUIRE [facility] FOR SIT<>STAND TF USING RW  3. PT WILL SIT EOB WITH FAIR SITTING BALANCE X 8 MINUTES WITH NO C/O FATIGUE  4. PT WILL TOLERATE BLE THEREX X 20 REPS AAROM  5. PT WILL PARTICIPATE IN GAIT ASSESSMENT                     History:     Past Medical History:   Diagnosis Date    Acid reflux  1/7/2015    Anxiety 1/7/2015    Aortic valvar stenosis     Arthritis     osteo    Callus     Chronic anemia     followed by Dr. Addison    CKD (chronic kidney disease) stage 5, GFR less than 15 ml/min 8/7/2015    Combined hyperlipidemia associated with type 2 diabetes mellitus     Coronary artery disease     Diabetes mellitus type II, controlled, with no complications     LBSL 108 today    Encounter for blood transfusion     Frail elderly with impaired mobility, wheel chair bound 8/25/2017    Gallbladder problem     gallbladder surgery    Heart murmur     Hyperparathyroidism, secondary renal 1/7/2015    Hypertension 8/7/2015    Hypertension associated with diabetes 11/12/2012    Kidney transplant candidate 1/7/2015    Overweight(278.02)     Urinary tract infection        Past Surgical History:   Procedure Laterality Date    ANKLE FRACTURE SURGERY Left 1985    AORTIC VALVE REPLACEMENT  05/2016    BONE BIOPSY      CARDIAC SURGERY      CATARACT EXTRACTION W/  INTRAOCULAR LENS IMPLANT  2009    CHOLECYSTECTOMY      CYSTOSCOPY      EVACUATION OF HEMATOMA N/A 6/14/2018    Procedure: EVACUATION, HEMATOMA;  Surgeon: Levy Samuel MD;  Location: [facility] OR;  Service: General;  Laterality: N/A;    EYE SURGERY      FRACTURE SURGERY      HYSTERECTOMY  unknown    LEFT HEART CATHETERIZATION Left 6/15/2018    Procedure: CATHETERIZATION, HEART, LEFT;  Surgeon: Galindo Louis MD;  Location: [facility] CATH LAB;  Service: Cardiology;  Laterality: Left;    OOPHORECTOMY      PARATHYROIDECTOMY N/A 6/12/2018    Procedure: PARATHYROIDECTOMY;  Surgeon: Levy Samuel MD;  Location: [facility] OR;  Service: General;  Laterality: N/A;    removal of colon polyps      RENAL BIOPSY  10/2013    WOUND EXPLORATION N/A 6/14/2018    Procedure: EXPLORATION, WOUND;  Surgeon: Levy Samuel MD;  Location: [facility] OR;  Service: General;  Laterality: N/A;    YAG cap -OD         Clinical Decision Making:     History  Co-morbidities  and personal factors that may impact the plan of care Examination  Body Structures and Functions, activity limitations and participation restrictions that may impact the plan of care Clinical Presentation   Decision Making/ Complexity Score   Co-morbidities:   [] Time since onset of injury / illness / exacerbation  [] Status of current condition  []Patient's cognitive status and safety concerns    [] Multiple Medical Problems (see med hx)  Personal Factors:   [] Patient's age  [] Prior Level of function   [] Patient's home situation (environment and family support)  [] Patient's level of motivation  [] Expected progression of patient      HISTORY:(criteria)    [] 02111 - no personal factors/history    [] 01630 - has 1-2 personal factor/comorbidity     [] 31267 - has >3 personal factor/comorbidity     Body Regions:  [] Objective examination findings  [] Head     []  Neck  [] Trunk   [] Upper Extremity  [] Lower Extremity    Body Systems:  [] For communication ability, affect, cognition, language, and learning style: the assessment of the ability to make needs known, consciousness, orientation (person, place, and time), expected emotional /behavioral responses, and learning preferences (eg, learning barriers, education  needs)  [] For the neuromuscular system: a general assessment of gross coordinated movement (eg, balance, gait, locomotion, transfers, and transitions) and motor function  (motor control and motor learning)  [] For the musculoskeletal system: the assessment of gross symmetry, gross range of motion, gross strength, height, and weight  [] For the integumentary system: the assessment of pliability(texture), presence of scar formation, skin color, and skin integrity  [] For cardiovascular/pulmonary system: the assessment of heart rate, respiratory rate, blood pressure, and edema     Activity limitations:    [] Patient's cognitive status and saf ety concerns          [] Status of current condition      []  Weight bearing restriction  [] Cardiopulmunary Restriction    Participation Restrictions:   [] Goals and goal agreement with the patient     [] Rehab potential (prognosis) and probable outcome      Examination of Body System: (criteria)    [] 57520 - addressing 1-2 elements    [] 90512 - addressing a total of 3 or more elements     [] 17110 -  Addressing a total of 4 or more elements         Clinical Presentation: (criteria)  Choose one     On examination of body system using standardized tests and measures patient presents with (CHOOSE ONE) elements from any of the following: body structures and functions, activity limitations, and/or participation restrictions.  Leading to a clinical presentation that is considered (CHOOSE ONE)                              Clinical Decision Making  (Eval Complexity):  Choose One     Time Tracking:     PT Received On: 06/25/18  PT Start Time: 0842     PT Stop Time: 0856  PT Total Time (min): 14 min     Billable Minutes: Evaluation 14 mins      Rose Cesar, PT, DPT  06/25/2018

## 2018-06-25 NOTE — PLAN OF CARE
Problem: Patient Care Overview  Goal: Plan of Care Review  Outcome: Ongoing (interventions implemented as appropriate)  Patient remained free from injury. No c/o pain at this time. Calm. Resting with eyes closed. No distress noted. Oriented x3. Respirations even and non labored. IV patent and saline locked. Tube feeding @40mls. Bed locked and low. Call light in reach. Safety measures in place. Will continue to monitor. Reviewed plan of care. Patient verbalized understanding .    12hr chart check complete.

## 2018-06-25 NOTE — SUBJECTIVE & OBJECTIVE
Review of Systems   Unable to perform ROS: Mental status change     Objective:     Vital Signs (Most Recent):  Temp: 99.8 °F (37.7 °C) (06/25/18 0423)  Pulse: 82 (06/25/18 1300)  Resp: 18 (06/25/18 1202)  BP: (!) 110/55 (06/25/18 1202)  SpO2: 99 % (06/25/18 1202) Vital Signs (24h Range):  Temp:  [98.1 °F (36.7 °C)-100.5 °F (38.1 °C)] 99.8 °F (37.7 °C)  Pulse:  [] 82  Resp:  [17-27] 18  SpO2:  [93 %-100 %] 99 %  BP: (110-142)/(55-68) 110/55     Weight: 77 kg (169 lb 12.1 oz)  Body mass index is 31.05 kg/m².    Intake/Output Summary (Last 24 hours) at 06/25/18 1517  Last data filed at 06/24/18 2100   Gross per 24 hour   Intake              420 ml   Output             1900 ml   Net            -1480 ml      Physical Exam   Constitutional: She appears well-developed and well-nourished. She is active and cooperative. She appears ill.   HENT:   Head: Normocephalic and atraumatic.   Nose: Nose normal.   Mouth/Throat: Oropharynx is clear and moist. No oropharyngeal exudate.   Healing neck incision is noted.   Eyes: Pupils are equal, round, and reactive to light. No scleral icterus.   Neck: Neck supple. No thyromegaly present.   Cardiovascular: Normal rate, regular rhythm, normal heart sounds and intact distal pulses.    No murmur heard.  Pulmonary/Chest: Effort normal. No stridor. No respiratory distress. She has no wheezes. She has rhonchi. She has no rales.   Abdominal: Soft. Bowel sounds are normal. She exhibits no distension, no ascites and no mass. There is no hepatosplenomegaly. There is no tenderness. There is no rigidity, no rebound and no guarding.   Musculoskeletal: She exhibits no edema or tenderness.   Lymphadenopathy:     She has no cervical adenopathy.   Neurological: She is alert. No cranial nerve deficit. She exhibits normal muscle tone. Coordination normal.   Skin: Skin is warm and dry. Ecchymosis noted. No rash noted. No pallor.   Psychiatric: She is slowed. She is communicative.   Nursing note  and vitals reviewed.      Significant Labs: All pertinent labs within the past 24 hours have been reviewed.    Significant Imaging: I have reviewed all pertinent imaging results/findings within the past 24 hours.

## 2018-06-25 NOTE — SUBJECTIVE & OBJECTIVE
Interval History: Seen and examined at bedside. Hospital chart reviewed. No acute interval detrimental events noted.     Review of Systems   Unable to perform ROS: Acuity of condition   Constitutional: Negative for malaise/fatigue and weight loss.   HENT: Negative for nosebleeds and tinnitus.    Eyes: Negative for photophobia and pain.   Respiratory: Negative for hemoptysis and sputum production.    Cardiovascular: Negative for palpitations and orthopnea.   Gastrointestinal: Negative for abdominal pain and vomiting.   Genitourinary: Negative for frequency and urgency.   Musculoskeletal: Negative for back pain and neck pain.   Skin: Negative for rash.   Neurological: Positive for speech change and focal weakness.   Endo/Heme/Allergies: Negative for environmental allergies.   Psychiatric/Behavioral: Negative for substance abuse and suicidal ideas.       Objective:     Vital Signs (Most Recent):  Temp: 99.8 °F (37.7 °C) (06/25/18 0423)  Pulse: 77 (06/25/18 1202)  Resp: 18 (06/25/18 1202)  BP: (!) 110/55 (06/25/18 1202)  SpO2: 99 % (06/25/18 1202) Vital Signs (24h Range):  Temp:  [98.1 °F (36.7 °C)-100.5 °F (38.1 °C)] 99.8 °F (37.7 °C)  Pulse:  [] 77  Resp:  [17-27] 18  SpO2:  [93 %-100 %] 99 %  BP: ()/(52-71) 110/55     Weight: 77 kg (169 lb 12.1 oz)  Body mass index is 31.05 kg/m².      Intake/Output Summary (Last 24 hours) at 06/25/18 1339  Last data filed at 06/24/18 2100   Gross per 24 hour   Intake              420 ml   Output             1900 ml   Net            -1480 ml       Physical Exam   Constitutional: She appears well-developed and well-nourished. She is active and cooperative. She appears ill.   HENT:   Head: Normocephalic and atraumatic.   Nose: Nose normal.   Mouth/Throat: Oropharynx is clear and moist. No oropharyngeal exudate.   Healing neck incision is noted.   Eyes: Pupils are equal, round, and reactive to light. No scleral icterus.   Neck: Neck supple. No thyromegaly present.    Cardiovascular: Normal rate, regular rhythm, normal heart sounds and intact distal pulses.    No murmur heard.  Pulmonary/Chest: Effort normal. No stridor. No respiratory distress. She has no wheezes. She has rhonchi. She has no rales.   Abdominal: Soft. Bowel sounds are normal. She exhibits no distension, no ascites and no mass. There is no hepatosplenomegaly. There is no tenderness. There is no rigidity, no rebound and no guarding.   Musculoskeletal: She exhibits no edema or tenderness.   Lymphadenopathy:     She has no cervical adenopathy.   Neurological: She is alert. No cranial nerve deficit. She exhibits normal muscle tone. Coordination normal.   Skin: Skin is warm and dry. Ecchymosis noted. No rash noted. No pallor.   Psychiatric: She is slowed. She is communicative.   Nursing note and vitals reviewed.          Significant Labs:    Laboratory Data:  Reviewed recent labs. Appear stable other than abnormalities addressed in plan.     Radiology:  Reviewed recent imaging studies. Appear stable other than abnormalities addressed in plan.

## 2018-06-25 NOTE — ASSESSMENT & PLAN NOTE
Status post parathyroidectomy  Monitor calcium  S/p neck washout  Neck is supple, no further swelling since drain out.   Speech therapy for swallow eval may need alternative feeding access if unable to take in po, cont  Tube feeds  PT/OT

## 2018-06-25 NOTE — PLAN OF CARE
Problem: Patient Care Overview  Goal: Plan of Care Review  Outcome: Ongoing (interventions implemented as appropriate)  Recommendations     Recommendation/Intervention:  1.Continue current TF (Novasource at 40 ml/hr) and recommend discontinuing beneprotein supplement. 2. Will continue to monitor.    Goals: Meet > 85 % EEN/EPN while admitted  Nutrition Goal Status: goal met   Communication of RD Recs: POC, sticky note

## 2018-06-25 NOTE — ASSESSMENT & PLAN NOTE
Patient is now dialysis dependent 3 times a week.  Her next dialysis will be on Tuesday Thursday Saturday schedule.  Orders are discussed in detail with the dialysis nurses.  Placement pending.

## 2018-06-25 NOTE — PROGRESS NOTES
[facility] Hospital Medicine  Progress Note    Patient Name: Citlali Karimi  MRN: [id number]  Patient Class: IP- Inpatient   Admission Date: 6/12/2018  Length of Stay: 12 days  Attending Physician: Levy Samuel MD  Primary Care Provider: Evelio Schuster MD        Subjective:     Principal Problem:Critical illness polyneuropathy    HPI:  Citlali Karimi is a 68 y.o female with PMHx of CKD (IV), HTN, DM, CVA (left sided weakness), and CAD who initially presented for hyperparathyroidism and hypercalcemia requiring surgical intervention.  Pt underwent parathyroidectomy today per Dr. Tracy (General Surgery). Pt admitted to Medical Surgical Unit post procedure and Hospital Medicine consulted for medical management.  Chest xray post procedure showed mild asymmetric CHF versus RUL pneumonia. Troponin 0.113 and BNP >270 noted.  Pt given IV Lasix in PACU prior to unit arrival.      Hospital Course:  Pt admitted to Outpatient Extended Recovery post procedure.  Elevated noted post procedure and results trended yielding significant positive response.  Cardiology consulted and Heparin infusion initiated.  Hx of CAD, multiple vessels noted with home medications continued and Imdur dose increased.  Nephrology consulted due to elevated creatinine and [facility] recommended.  Echo results pending.  Heart catheterization procedure considered with family refusing due to concern for worsening kidney function as patient does not want to be on dialysis.  Decreased oral intake and difficulty swallowing noted.  Speech therapist to bedside.  Pt made NPO and General Surgery notified.      6/15  Patient worsening status. ET changed per Pulmonary CC. Patient s/p Code Blue - VFib with recovery. Cardiology taking patient to CATH lab. Discussed with CM plan for post acute care in progress.    6/16  Patient improved o/n. Patient awake and able to follow simple command. Breathing trials in progress. Discussed with Pulmonary  CC    6/17  Patient remains intubated.  at bedside. Cardiology at bedside as well. Discussed with CC Team    6/18  Patient responds to command but remains intubated. Swelling to neck continues to be prominent. Discussed with CC Team. Worsening renal function. Possible HD need.    6/19  Patient remains intubated. Worsening renal function. No events Discussed with CC Team    6/21  Positive cough leak and plan for extubation today .   6/22  Patient continue to be confused . Will consider ct head if didn't improve . Continue treat infection/c difficle .Consulted vascular surgery dr maciel to remove femoral dialysis and put new one subclavian ?. To avoid risk of infection in groin. Patient will continue to need dialysis    6/23  Patient is seen and examined today . More awake today. dialysis catheter was removed from groin and new one will be place in upper body ij vs subclavian. Patient diarrhea improved .   6/24  Patient very lethargic to participate in slp . S/p vascath for hemodialysis . SLP will visit tomorrow . Patient still has ng tube. Diarrhea improved   6/25  Patient lethargic . SLP recommended peg tube . Placement of peg tube tomorrow . After peg tube possible discharge to ltac         Review of Systems   Unable to perform ROS: Mental status change     Objective:     Vital Signs (Most Recent):  Temp: 99.8 °F (37.7 °C) (06/25/18 0423)  Pulse: 82 (06/25/18 1300)  Resp: 18 (06/25/18 1202)  BP: (!) 110/55 (06/25/18 1202)  SpO2: 99 % (06/25/18 1202) Vital Signs (24h Range):  Temp:  [98.1 °F (36.7 °C)-100.5 °F (38.1 °C)] 99.8 °F (37.7 °C)  Pulse:  [] 82  Resp:  [17-27] 18  SpO2:  [93 %-100 %] 99 %  BP: (110-142)/(55-68) 110/55     Weight: 77 kg (169 lb 12.1 oz)  Body mass index is 31.05 kg/m².    Intake/Output Summary (Last 24 hours) at 06/25/18 1505  Last data filed at 06/24/18 2100   Gross per 24 hour   Intake              420 ml   Output             1900 ml   Net            -1480 ml      Physical  Exam   Constitutional: She appears well-developed and well-nourished. She is active and cooperative. She appears ill.   HENT:   Head: Normocephalic and atraumatic.   Nose: Nose normal.   Mouth/Throat: Oropharynx is clear and moist. No oropharyngeal exudate.   Healing neck incision is noted.   Eyes: Pupils are equal, round, and reactive to light. No scleral icterus.   Neck: Neck supple. No thyromegaly present.   Cardiovascular: Normal rate, regular rhythm, normal heart sounds and intact distal pulses.    No murmur heard.  Pulmonary/Chest: Effort normal. No stridor. No respiratory distress. She has no wheezes. She has rhonchi. She has no rales.   Abdominal: Soft. Bowel sounds are normal. She exhibits no distension, no ascites and no mass. There is no hepatosplenomegaly. There is no tenderness. There is no rigidity, no rebound and no guarding.   Musculoskeletal: She exhibits no edema or tenderness.   Lymphadenopathy:     She has no cervical adenopathy.   Neurological: She is alert. No cranial nerve deficit. She exhibits normal muscle tone. Coordination normal.   Skin: Skin is warm and dry. Ecchymosis noted. No rash noted. No pallor.   Psychiatric: She is slowed. She is communicative.   Nursing note and vitals reviewed.      Significant Labs: All pertinent labs within the past 24 hours have been reviewed.    Significant Imaging: I have reviewed all pertinent imaging results/findings within the past 24 hours.    Assessment/Plan:      * Critical illness polyneuropathy    -pt/ot         Acute hypoxemic respiratory failure              C. difficile diarrhea    Continue with oral vancomycin   6/20 started vancomycin   -will need 14 days   -diarrhea improved         NSTEMI (non-ST elevated myocardial infarction)    ASA  Cardiology  [facility] - Diffuse disease  No further intervention recommended  Medical Management  No statin due to allergy        S/P parathyroidectomy              Cardiac arrest with ventricular fibrillation     Cardiology  [facility] 6/15  ASA  Statin Allergy  BB  No further interventions  Diffuse disease    6/18  ASA  BB  Diffuse disease No intervention    6/19  ASA  BB  No Statin due to allergy  Cardiology        Elevated troponin    -initial 0.113>>34>>50>>41  -pt denies CP and SOB  - EKG results showed diffuse ST changes    -serial results revealed NSTEMI with Cardiology consulted     6/15  NSTEMI POA  Cardiology  [facility]  ASA  Hold Statin due to Elevated LFT's    6/16  Cardiology  ASA  ICU  Statin allergy noted    6/17  S/p [facility] - Diffuse disease  ASA  Cards              Hyperparathyroidism    -Pt admitted to Extended Recovery then transferred to Inpatient due to NSTEMI  -s/p Parathyroidectomy   -managed by General Surgery -primary team  - PTH- 32  - Ca 11.4>>10.5  - analgesia prn   - antiemetics prn  - specimens sent for analysis    6/16  Ca 8.7 today  Monitor    6/18  Ca 8.2 today  Monitor    6/19  Ca 8.2 stable        Coronary artery disease involving native coronary artery    - ASA, Statin, and Lopressor continued   -Imdur dose increased   -previous Cath showed severe CAD (proximal LCX 99%, mid 50%, RCA mid 90%, distal with subtotal lesion).  - Cardiology consulted with medical management continued   - C proposed pending Nephrology recommendations  -family refused LHC due to concern for worsening kidney function as pt had expressed that she did not want to be on dialysis     6/15  Family electing LHC and accepting risk of worsening renal function  Cardiology    6/16  S/p [facility]  Cardiology  ASA  Statin allergy noted    6/16  No interventions  Cardiology on consult    6/18  Cardiology on Consult  Medical Management    6/19  ASA  BB  6/22  Continue with medical management   6/23  Continue with medical management   6/25  Continue with medical management         Acute renal failure superimposed on stage 5 chronic kidney disease, not on chronic dialysis    -Creatinine 3.6>>4.1  -baseline 2.7-4.4  -will monitor   -sodium bicarb  continued   -pt has been followed outpatient by Dr. Vazquez (Nephrology)  -Nephrology consulted - pt may benefit from repeat angio due to NSTEMI but at high risk of contrast induced nephropathy  - pt was candidate for renal transplant however work up discontinued due to progressive weakness  - [facility] recommended         6/15  Monitor worsening Renal Function  S/p Cardiac VFib Arrest  [facility] today  IVF's  Monitor      6/16  [facility] diffuse disease  Cardiology  Monitor    6/17  Worsening  No further Cardiac intervention  Nephrology on consult  Monitor  Cr 6.0 today vs 4.7  Low Urine O/P    6/18  COntinues to worsen. Cr 7.3 today  Nephrology guidance for HD vs Monitoring    6/19  Worsening  Cr 8.3 today - K is 4.6  Nephrology on board  No HD indicated at present  Monitor  6/22  Patient will continue with dialysis . Dr Lara for vascath change .          Hypertension associated with diabetes    - home medications continued   -hx of noncompliance and inconsistent dosing at home per   - uncontrolled upon arrival- improved with Hydralazine in PACU   - Hydralazine prn  6/22  Controlled         Diabetes mellitus, type 2 - only on oral medications    Controlled  NICC  Accuchecks          Acute blood loss anemia    -stable post procedure  -will monitor  -CBC in am   -pt followed outpatient by Heme/Onc    6/16  Stable  Monitor    6/17  Improving  Monitor    6/18  Hgb 7.7 today  Transfuse PRBC's per sx  Monitor    6/23  Hb 8 stable   Monitor  6/24  No signs of acute blood loss anema           VTE Risk Mitigation         Ordered     heparin (porcine) injection 2,000 Units  Once      06/23/18 0153     heparin (porcine) injection 5,000 Units  Every 8 hours      06/21/18 0838     heparin (porcine) injection 3,000 Units  As needed (PRN)      06/19/18 1408     Place sequential compression device  Until discontinued      06/12/18 2290              Dominguez Levy MD  Department of Hospital Medicine   [facility]

## 2018-06-25 NOTE — PROGRESS NOTES
[facility]   Nephrology  Progress Note    Patient Name: Citlali Karimi  MRN: [id number]  Admission Date: 6/12/2018  Hospital Length of Stay: 12 days  Attending Provider: Levy Samuel MD   Primary Care Physician: Evelio Schuster MD  Principal Problem:Critical illness polyneuropathy    Subjective:     HPI:  Citlali Karimi Is a pleasant 68 -year-old  woman with history of chronic kidney disease stage 4, patient was admitted to the hospital for an elective  partial parathyroidectomy, following her surgery during observation.  She had some chest pain, workup revealed non ST elevated MI, patient was admitted to the hospital for further management.  We were consulted due to advanced renal insufficiency, baseline serum creatinine about 3.5-4,            Important Info :        She underwent a native kidney biopsy on 10/10/13. Final report of the kidney biopsy is negative for any specific etiology for acute renal failure. 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         Interval History:  Patient is receiving tube feeds.  No evidence of shortness of breath.  Patient is not complaining of anything.  Drowsy but arousable.  Full review of systems not available patient is confused.    Review of patient's allergies indicates:   Allergen Reactions    Lipitor [atorvastatin] Swelling     Lips       Current Facility-Administered Medications   Medication Frequency    acetaminophen oral solution 650 mg Q4H PRN    amiodarone tablet 200 mg BID    aspirin chewable tablet 81 mg Daily    bisacodyl suppository 10 mg Daily PRN    cloNIDine tablet 0.1 mg Q6H PRN    dextrose 50% injection 12.5 g PRN    dextrose 50% injection 25 g PRN    epoetin carmen injection 4,000 Units Every Tues, Thurs, Sat    famotidine tablet 20 mg Daily    glucagon (human recombinant) injection 1 mg PRN    glucose chewable tablet 16 g PRN    glucose chewable tablet 24 g PRN    heparin  (porcine) injection 2,000 Units Once    heparin (porcine) injection 3,000 Units PRN    heparin (porcine) injection 5,000 Units Q8H    hydrALAZINE injection 10 mg Q8H PRN    insulin aspart U-100 pen 0-5 Units QID (AC + HS) PRN    lactulose 20 gram/30 mL solution Soln 20 g Q6H PRN    lorazepam (ATIVAN) injection 2 mg Q4H PRN    metoprolol tartrate (LOPRESSOR) tablet 25 mg BID    morphine injection 4 mg Q4H PRN    ondansetron injection 4 mg Q8H PRN    sodium bicarbonate 8.4 % (1 mEq/mL) injection PRN    sodium bicarbonate tablet 650 mg BID    sodium chloride 0.9% flush 3 mL PRN    vancomycin 250mg / 10ml oral suspension 125 mg Q6H       Objective:     Vital Signs (Most Recent):  Temp: 98.8 °F (37.1 °C) (06/25/18 0821)  Pulse: 82 (06/25/18 1300)  Resp: 18 (06/25/18 1202)  BP: (!) 110/55 (06/25/18 1202)  SpO2: 99 % (06/25/18 1202)  O2 Device (Oxygen Therapy): room air (06/25/18 1202) Vital Signs (24h Range):  Temp:  [98.1 °F (36.7 °C)-100.5 °F (38.1 °C)] 98.8 °F (37.1 °C)  Pulse:  [] 82  Resp:  [17-27] 18  SpO2:  [93 %-100 %] 99 %  BP: (110-142)/(55-68) 110/55     Weight: 77 kg (169 lb 12.1 oz) (06/24/18 0545)  Body mass index is 31.05 kg/m².  Body surface area is 1.84 meters squared.    I/O last 3 completed shifts:  In: 1220 [Other:250; NG/GT:970]  Out: 1900 [Other:1900]    Physical Exam   Constitutional: She appears well-developed and well-nourished. She is active and cooperative. She appears ill.   HENT:   Head: Normocephalic and atraumatic.   Nose: Nose normal.   Mouth/Throat: Oropharynx is clear and moist. No oropharyngeal exudate.   Healing neck incision is noted.   Eyes: Pupils are equal, round, and reactive to light. No scleral icterus.   Neck: Neck supple. No thyromegaly present.   Cardiovascular: Normal rate, regular rhythm, normal heart sounds and intact distal pulses.    No murmur heard.  Pulmonary/Chest: Effort normal. No stridor. No respiratory distress. She has no wheezes. She has  rhonchi. She has no rales.   Abdominal: Soft. Bowel sounds are normal. She exhibits no distension, no ascites and no mass. There is no hepatosplenomegaly. There is no tenderness. There is no rigidity, no rebound and no guarding.   Musculoskeletal: She exhibits no edema or tenderness.   Lymphadenopathy:     She has no cervical adenopathy.   Neurological: No cranial nerve deficit. She exhibits normal muscle tone. Coordination normal.   Patient is drowsy but arousable and is disoriented and confused which is unchanged from yesterday     Skin: Skin is warm and dry. Ecchymosis noted. No rash noted. No pallor.   Psychiatric: She is slowed. She is communicative.   Nursing note and vitals reviewed.      Significant Labs:  All labs within the past 24 hours have been reviewed.     Significant Imaging:  Labs: Reviewed    Assessment/Plan:     ESRD (end stage renal disease)    Patient is now dialysis dependent 3 times a week.  Her next dialysis will be on Tuesday Thursday Saturday schedule.  Orders are discussed in detail with the dialysis nurses.  Placement pending.                Thank you for your consult.     Chinmay Lyles MD  Nephrology  [facility]

## 2018-06-25 NOTE — PT/OT/SLP PROGRESS
"Occupational Therapy  Treatment    Citlali Karimi   MRN: [id number]   Admitting Diagnosis: Critical illness polyneuropathy    OT Date of Treatment: 06/25/18   OT Start Time: 1240  OT Stop Time: 1310  OT Total Time (min): 30 min    Billable Minutes:  Therapeutic Activity 30 minutes    General Precautions: Standard, fall  Orthopedic Precautions: N/A  Braces: N/A         Subjective:  Communicated with nurse Darshana and epic chart review prior to session.    Pain/Comfort  Pain Rating 1: 0/10    Objective:  Patient found with: peripheral IV, oxygen, NG tube, telemetry     Functional Mobility:  Bed Mobility:       Transfers:        Functional Ambulation: na    Activities of Daily Living:     Feeding adaptive equipment: na     UE adaptive equipment: na     LE adaptive equipment: na                    Bathing adaptive equipment: na    Balance:   Static Sit: POOR: Needs MODERATE assist to maintain  Dynamic Sit: 0: N/A  Static Stand: na  Dynamic stand: na    Therapeutic Activities and Exercises:      AM-PAC 6 CLICK ADL   How much help from another person does this patient currently need?   1 = Unable, Total/Dependent Assistance  2 = A lot, Maximum/Moderate Assistance  3 = A little, Minimum/Contact Guard/Supervision  4 = None, Modified Fred/Independent    Putting on and taking off regular lower body clothing? : 1  Bathing (including washing, rinsing, drying)?: 1  Toileting, which includes using toilet, bedpan, or urinal? : 1  Putting on and taking off regular upper body clothing?: 1  Taking care of personal grooming such as brushing teeth?: 1  Eating meals?: 1  Total Score: 6     AM-PAC Raw Score CMS "G-Code Modifier Level of Impairment Assistance   6 % Total / Unable   7 - 8 CM 80 - 100% Maximal Assist   9-13 CL 60 - 80% Moderate Assist   14 - 19 CK 40 - 60% Moderate Assist   20 - 22 CJ 20 - 40% Minimal Assist   23 CI 1-20% SBA / CGA   24 CH 0% Independent/ Mod I       Patient left HOB elevated with all lines " intact, call button in reach, bed alarm on and nurse notified    ASSESSMENT:  Citlali Karimi is a 68 y.o. female with a medical diagnosis of Critical illness polyneuropathy and presents with debility and generalized weakness.    Rehab identified problem list/impairments: Rehab identified problem list/impairments: weakness, impaired self care skills, impaired balance, decreased coordination, decreased safety awareness, decreased ROM, impaired endurance, impaired functional mobilty, decreased upper extremity function, gait instability, decreased lower extremity function    Rehab potential is good.    Activity tolerance: Fair    Discharge recommendations: Discharge Facility/Level Of Care Needs: nursing facility, skilled     Barriers to discharge: Barriers to Discharge: Inaccessible home environment    Equipment recommendations: none     GOALS:    Occupational Therapy Goals        Problem: Occupational Therapy Goal    Goal Priority Disciplines Outcome Interventions   Occupational Therapy Goal     OT, PT/OT Ongoing (interventions implemented as appropriate)    Description:  Goals to be met by: 6-29-18    Patient will increase functional independence with ADLs by performing:    UE Dressing with Moderate Assistance.  LE Dressing with Moderate Assistance.  Sitting at edge of bed x10 minutes with Moderate Assistance.  Upper extremity exercise program x10 reps            Problem: Occupational Therapy Goal    Goal Priority Disciplines Outcome Interventions   Occupational Therapy Goal     OT, PT/OT                     Plan:  Patient to be seen 3 x/week to address the above listed problems via self-care/home management, therapeutic activities, therapeutic exercises  Plan of Care expires:    Plan of Care reviewed with: patient         Joie Isabel OT  06/25/2018

## 2018-06-25 NOTE — PROGRESS NOTES
[facility] Hospital Medicine  Progress Note    Patient Name: Citlali Karimi  MRN: [id number]  Patient Class: IP- Inpatient   Admission Date: 6/12/2018  Length of Stay: 12 days  Attending Physician: Levy Samuel MD  Primary Care Provider: Evelio Schuster MD        Subjective:     Principal Problem:Critical illness polyneuropathy    HPI:  Citlali Karimi is a 68 y.o female with PMHx of CKD (IV), HTN, DM, CVA (left sided weakness), and CAD who initially presented for hyperparathyroidism and hypercalcemia requiring surgical intervention.  Pt underwent parathyroidectomy today per Dr. Tracy (General Surgery). Pt admitted to Medical Surgical Unit post procedure and Hospital Medicine consulted for medical management.  Chest xray post procedure showed mild asymmetric CHF versus RUL pneumonia. Troponin 0.113 and BNP >270 noted.  Pt given IV Lasix in PACU prior to unit arrival.      Hospital Course:  Pt admitted to Outpatient Extended Recovery post procedure.  Elevated noted post procedure and results trended yielding significant positive response.  Cardiology consulted and Heparin infusion initiated.  Hx of CAD, multiple vessels noted with home medications continued and Imdur dose increased.  Nephrology consulted due to elevated creatinine and [facility] recommended.  Echo results pending.  Heart catheterization procedure considered with family refusing due to concern for worsening kidney function as patient does not want to be on dialysis.  Decreased oral intake and difficulty swallowing noted.  Speech therapist to bedside.  Pt made NPO and General Surgery notified.      6/15  Patient worsening status. ET changed per Pulmonary CC. Patient s/p Code Blue - VFib with recovery. Cardiology taking patient to CATH lab. Discussed with CM plan for post acute care in progress.    6/16  Patient improved o/n. Patient awake and able to follow simple command. Breathing trials in progress. Discussed with Pulmonary  CC    6/17  Patient remains intubated.  at bedside. Cardiology at bedside as well. Discussed with CC Team    6/18  Patient responds to command but remains intubated. Swelling to neck continues to be prominent. Discussed with CC Team. Worsening renal function. Possible HD need.    6/19  Patient remains intubated. Worsening renal function. No events Discussed with CC Team    6/21  Positive cough leak and plan for extubation today .   6/22  Patient continue to be confused . Will consider ct head if didn't improve . Continue treat infection/c difficle .Consulted vascular surgery dr maciel to remove femoral dialysis and put new one subclavian ?. To avoid risk of infection in groin. Patient will continue to need dialysis    6/23  Patient is seen and examined today . More awake today. dialysis catheter was removed from groin and new one will be place in upper body ij vs subclavian. Patient diarrhea improved .   6/24  Patient very lethargic to participate in slp . S/p vascath for hemodialysis . SLP will visit tomorrow . Patient still has ng tube. Diarrhea improved   6/25  Patient lethargic . SLP recommended peg tube . Placement of peg tube tomorrow . After peg tube possible discharge to ltac         Review of Systems   Unable to perform ROS: Mental status change     Objective:     Vital Signs (Most Recent):  Temp: 99.8 °F (37.7 °C) (06/25/18 0423)  Pulse: 82 (06/25/18 1300)  Resp: 18 (06/25/18 1202)  BP: (!) 110/55 (06/25/18 1202)  SpO2: 99 % (06/25/18 1202) Vital Signs (24h Range):  Temp:  [98.1 °F (36.7 °C)-100.5 °F (38.1 °C)] 99.8 °F (37.7 °C)  Pulse:  [] 82  Resp:  [17-27] 18  SpO2:  [93 %-100 %] 99 %  BP: (110-142)/(55-68) 110/55     Weight: 77 kg (169 lb 12.1 oz)  Body mass index is 31.05 kg/m².    Intake/Output Summary (Last 24 hours) at 06/25/18 1517  Last data filed at 06/24/18 2100   Gross per 24 hour   Intake              420 ml   Output             1900 ml   Net            -1480 ml      Physical  Exam   Constitutional: She appears well-developed and well-nourished. She is active and cooperative. She appears ill.   HENT:   Head: Normocephalic and atraumatic.   Nose: Nose normal.   Mouth/Throat: Oropharynx is clear and moist. No oropharyngeal exudate.   Healing neck incision is noted.   Eyes: Pupils are equal, round, and reactive to light. No scleral icterus.   Neck: Neck supple. No thyromegaly present.   Cardiovascular: Normal rate, regular rhythm, normal heart sounds and intact distal pulses.    No murmur heard.  Pulmonary/Chest: Effort normal. No stridor. No respiratory distress. She has no wheezes. She has rhonchi. She has no rales.   Abdominal: Soft. Bowel sounds are normal. She exhibits no distension, no ascites and no mass. There is no hepatosplenomegaly. There is no tenderness. There is no rigidity, no rebound and no guarding.   Musculoskeletal: She exhibits no edema or tenderness.   Lymphadenopathy:     She has no cervical adenopathy.   Neurological: She is alert. No cranial nerve deficit. She exhibits normal muscle tone. Coordination normal.   Skin: Skin is warm and dry. Ecchymosis noted. No rash noted. No pallor.   Psychiatric: She is slowed. She is communicative.   Nursing note and vitals reviewed.      Significant Labs: All pertinent labs within the past 24 hours have been reviewed.    Significant Imaging: I have reviewed all pertinent imaging results/findings within the past 24 hours.    Assessment/Plan:      * Critical illness polyneuropathy    -pt/ot         Acute hypoxemic respiratory failure              C. difficile diarrhea    Continue with oral vancomycin   6/20 started vancomycin   -will need 14 days   -diarrhea improved         NSTEMI (non-ST elevated myocardial infarction)    ASA  Cardiology  [facility] - Diffuse disease  No further intervention recommended  Medical Management  No statin due to allergy        S/P parathyroidectomy              Cardiac arrest with ventricular fibrillation     Cardiology  [facility] 6/15  ASA  Statin Allergy  BB  No further interventions  Diffuse disease    6/18  ASA  BB  Diffuse disease No intervention    6/19  ASA  BB  No Statin due to allergy  Cardiology        Elevated troponin    -initial 0.113>>34>>50>>41  -pt denies CP and SOB  - EKG results showed diffuse ST changes    -serial results revealed NSTEMI with Cardiology consulted     6/15  NSTEMI POA  Cardiology  [facility]  ASA  Hold Statin due to Elevated LFT's    6/16  Cardiology  ASA  ICU  Statin allergy noted    6/17  S/p [facility] - Diffuse disease  ASA  Cards              Hyperparathyroidism    -Pt admitted to Extended Recovery then transferred to Inpatient due to NSTEMI  -s/p Parathyroidectomy   -managed by General Surgery -primary team  - PTH- 32  - Ca 11.4>>10.5  - analgesia prn   - antiemetics prn  - specimens sent for analysis    6/16  Ca 8.7 today  Monitor    6/18  Ca 8.2 today  Monitor    6/19  Ca 8.2 stable        Coronary artery disease involving native coronary artery    - ASA, Statin, and Lopressor continued   -Imdur dose increased   -previous Cath showed severe CAD (proximal LCX 99%, mid 50%, RCA mid 90%, distal with subtotal lesion).  - Cardiology consulted with medical management continued   - C proposed pending Nephrology recommendations  -family refused LHC due to concern for worsening kidney function as pt had expressed that she did not want to be on dialysis     6/15  Family electing LHC and accepting risk of worsening renal function  Cardiology    6/16  S/p [facility]  Cardiology  ASA  Statin allergy noted    6/16  No interventions  Cardiology on consult    6/18  Cardiology on Consult  Medical Management    6/19  ASA  BB  6/22  Continue with medical management   6/23  Continue with medical management   6/25  Continue with medical management         Acute renal failure superimposed on stage 5 chronic kidney disease, not on chronic dialysis    -Creatinine 3.6>>4.1  -baseline 2.7-4.4  -will monitor   -sodium bicarb  continued   -pt has been followed outpatient by Dr. Vazquez (Nephrology)  -Nephrology consulted - pt may benefit from repeat angio due to NSTEMI but at high risk of contrast induced nephropathy  - pt was candidate for renal transplant however work up discontinued due to progressive weakness  - [facility] recommended         6/15  Monitor worsening Renal Function  S/p Cardiac VFib Arrest  [facility] today  IVF's  Monitor      6/16  [facility] diffuse disease  Cardiology  Monitor    6/17  Worsening  No further Cardiac intervention  Nephrology on consult  Monitor  Cr 6.0 today vs 4.7  Low Urine O/P    6/18  COntinues to worsen. Cr 7.3 today  Nephrology guidance for HD vs Monitoring    6/19  Worsening  Cr 8.3 today - K is 4.6  Nephrology on board  No HD indicated at present  Monitor  6/22  Patient will continue with dialysis . Dr Lara for vascath change .          Hypertension associated with diabetes    - home medications continued   -hx of noncompliance and inconsistent dosing at home per   - uncontrolled upon arrival- improved with Hydralazine in PACU   - Hydralazine prn  6/22  Controlled         Diabetes mellitus, type 2 - only on oral medications    Controlled  NICC  Accuchecks          Acute blood loss anemia    -stable post procedure  -will monitor  -CBC in am   -pt followed outpatient by Heme/Onc    6/16  Stable  Monitor    6/17  Improving  Monitor    6/18  Hgb 7.7 today  Transfuse PRBC's per sx  Monitor    6/23  Hb 8 stable   Monitor  6/24  No signs of acute blood loss anema           VTE Risk Mitigation         Ordered     heparin (porcine) injection 2,000 Units  Once      06/23/18 0153     heparin (porcine) injection 5,000 Units  Every 8 hours      06/21/18 0838     heparin (porcine) injection 3,000 Units  As needed (PRN)      06/19/18 1408     Place sequential compression device  Until discontinued      06/12/18 0324              Dominguez Levy MD  Department of Hospital Medicine   [facility]

## 2018-06-25 NOTE — ASSESSMENT & PLAN NOTE
Per Cards.  Medical management  [facility] 6/15 done revealing diffuse CAD not amenable for PCI, no changes from 2016  ASA, Lopressor  Allergy to statin noted

## 2018-06-25 NOTE — ASSESSMENT & PLAN NOTE
Continue with oral vancomycin   6/20 started vancomycin   -will need 14 days   -diarrhea improved

## 2018-06-26 ENCOUNTER — ANESTHESIA (OUTPATIENT)
Dept: SURGERY | Facility: HOSPITAL | Age: 68
End: 2018-06-26

## 2018-06-26 PROBLEM — D69.6 DECREASED PLATELET COUNT: Status: ACTIVE | Noted: 2018-06-26

## 2018-06-26 PROBLEM — J69.0 ASPIRATION PNEUMONIA: Status: ACTIVE | Noted: 2018-06-26

## 2018-06-26 PROBLEM — D69.6 THROMBOCYTOPENIA: Status: ACTIVE | Noted: 2018-06-26

## 2018-06-26 LAB
ALBUMIN SERPL BCP-MCNC: 2.4 G/DL
ALP SERPL-CCNC: 59 U/L
ALT SERPL W/O P-5'-P-CCNC: 8 U/L
ANION GAP SERPL CALC-SCNC: 14 MMOL/L
AST SERPL-CCNC: 23 U/L
BASOPHILS # BLD AUTO: 0.03 K/UL
BASOPHILS NFR BLD: 0.2 %
BILIRUB SERPL-MCNC: 0.7 MG/DL
BUN SERPL-MCNC: 76 MG/DL
CALCIUM SERPL-MCNC: 8.3 MG/DL
CHLORIDE SERPL-SCNC: 100 MMOL/L
CO2 SERPL-SCNC: 22 MMOL/L
CREAT SERPL-MCNC: 5.7 MG/DL
DIFFERENTIAL METHOD: ABNORMAL
EOSINOPHIL # BLD AUTO: 0.1 K/UL
EOSINOPHIL NFR BLD: 0.4 %
ERYTHROCYTE [DISTWIDTH] IN BLOOD BY AUTOMATED COUNT: 17.4 %
EST. GFR  (AFRICAN AMERICAN): 8 ML/MIN/1.73 M^2
EST. GFR  (NON AFRICAN AMERICAN): 7 ML/MIN/1.73 M^2
GLUCOSE SERPL-MCNC: 116 MG/DL
HCT VFR BLD AUTO: 27.1 %
HGB BLD-MCNC: 8.6 G/DL
LYMPHOCYTES # BLD AUTO: 1.3 K/UL
LYMPHOCYTES NFR BLD: 8.4 %
MAGNESIUM SERPL-MCNC: 2 MG/DL
MCH RBC QN AUTO: 27.8 PG
MCHC RBC AUTO-ENTMCNC: 31.7 G/DL
MCV RBC AUTO: 88 FL
MONOCYTES # BLD AUTO: 1.1 K/UL
MONOCYTES NFR BLD: 7.1 %
NEUTROPHILS # BLD AUTO: 13.5 K/UL
NEUTROPHILS NFR BLD: 83.9 %
PHOSPHATE SERPL-MCNC: 3.3 MG/DL
PLATELET # BLD AUTO: 96 K/UL
PMV BLD AUTO: 10.2 FL
POCT GLUCOSE: 115 MG/DL (ref 70–110)
POCT GLUCOSE: 117 MG/DL (ref 70–110)
POCT GLUCOSE: 136 MG/DL (ref 70–110)
POTASSIUM SERPL-SCNC: 4.8 MMOL/L
PROCALCITONIN SERPL IA-MCNC: 1.98 NG/ML
PROT SERPL-MCNC: 6.1 G/DL
RBC # BLD AUTO: 3.09 M/UL
SODIUM SERPL-SCNC: 136 MMOL/L
WBC # BLD AUTO: 16.03 K/UL

## 2018-06-26 PROCEDURE — 99232 SBSQ HOSP IP/OBS MODERATE 35: CPT | Mod: ,,, | Performed by: INTERNAL MEDICINE

## 2018-06-26 PROCEDURE — 94761 N-INVAS EAR/PLS OXIMETRY MLT: CPT

## 2018-06-26 PROCEDURE — 25000003 PHARM REV CODE 250: Performed by: NURSE PRACTITIONER

## 2018-06-26 PROCEDURE — 25000003 PHARM REV CODE 250: Performed by: INTERNAL MEDICINE

## 2018-06-26 PROCEDURE — 63600175 PHARM REV CODE 636 W HCPCS: Performed by: INTERNAL MEDICINE

## 2018-06-26 PROCEDURE — 80100016 HC MAINTENANCE HEMODIALYSIS

## 2018-06-26 PROCEDURE — 25000003 PHARM REV CODE 250: Performed by: SURGERY

## 2018-06-26 PROCEDURE — 80053 COMPREHEN METABOLIC PANEL: CPT

## 2018-06-26 PROCEDURE — 21400001 HC TELEMETRY ROOM

## 2018-06-26 PROCEDURE — 83735 ASSAY OF MAGNESIUM: CPT

## 2018-06-26 PROCEDURE — 84145 PROCALCITONIN (PCT): CPT

## 2018-06-26 PROCEDURE — 63600175 PHARM REV CODE 636 W HCPCS: Performed by: NURSE PRACTITIONER

## 2018-06-26 PROCEDURE — 86022 PLATELET ANTIBODIES: CPT

## 2018-06-26 PROCEDURE — 36415 COLL VENOUS BLD VENIPUNCTURE: CPT

## 2018-06-26 PROCEDURE — 99233 SBSQ HOSP IP/OBS HIGH 50: CPT | Mod: ,,, | Performed by: INTERNAL MEDICINE

## 2018-06-26 PROCEDURE — 90935 HEMODIALYSIS ONE EVALUATION: CPT | Mod: ,,, | Performed by: INTERNAL MEDICINE

## 2018-06-26 PROCEDURE — 85025 COMPLETE CBC W/AUTO DIFF WBC: CPT

## 2018-06-26 PROCEDURE — 84100 ASSAY OF PHOSPHORUS: CPT

## 2018-06-26 RX ORDER — SODIUM CHLORIDE 9 MG/ML
INJECTION, SOLUTION INTRAVENOUS ONCE
Status: DISCONTINUED | OUTPATIENT
Start: 2018-06-26 | End: 2018-06-30 | Stop reason: HOSPADM

## 2018-06-26 RX ORDER — SODIUM CHLORIDE 9 MG/ML
INJECTION, SOLUTION INTRAVENOUS
Status: DISCONTINUED | OUTPATIENT
Start: 2018-06-26 | End: 2018-06-30 | Stop reason: HOSPADM

## 2018-06-26 RX ORDER — HEPARIN SODIUM 1000 [USP'U]/ML
3000 INJECTION, SOLUTION INTRAVENOUS; SUBCUTANEOUS
Status: DISCONTINUED | OUTPATIENT
Start: 2018-06-26 | End: 2018-06-30 | Stop reason: HOSPADM

## 2018-06-26 RX ADMIN — AMIODARONE HYDROCHLORIDE 200 MG: 200 TABLET ORAL at 08:06

## 2018-06-26 RX ADMIN — Medication 125 MG: at 05:06

## 2018-06-26 RX ADMIN — AMPICILLIN SODIUM AND SULBACTAM SODIUM 3 G: 2; 1 INJECTION, POWDER, FOR SOLUTION INTRAMUSCULAR; INTRAVENOUS at 02:06

## 2018-06-26 RX ADMIN — METOPROLOL TARTRATE 25 MG: 25 TABLET, FILM COATED ORAL at 08:06

## 2018-06-26 RX ADMIN — SODIUM BICARBONATE 650 MG TABLET 650 MG: at 08:06

## 2018-06-26 RX ADMIN — HEPARIN SODIUM 3000 UNITS: 1000 INJECTION, SOLUTION INTRAVENOUS; SUBCUTANEOUS at 09:06

## 2018-06-26 RX ADMIN — APIXABAN 5 MG: 2.5 TABLET, FILM COATED ORAL at 08:06

## 2018-06-26 RX ADMIN — ERYTHROPOIETIN 4000 UNITS: 10000 INJECTION, SOLUTION INTRAVENOUS; SUBCUTANEOUS at 09:06

## 2018-06-26 RX ADMIN — Medication 125 MG: at 06:06

## 2018-06-26 RX ADMIN — Medication 125 MG: at 01:06

## 2018-06-26 RX ADMIN — Medication 125 MG: at 12:06

## 2018-06-26 RX ADMIN — Medication 125 MG: at 11:06

## 2018-06-26 RX ADMIN — HEPARIN SODIUM 5000 UNITS: 5000 INJECTION, SOLUTION INTRAVENOUS; SUBCUTANEOUS at 06:06

## 2018-06-26 RX ADMIN — MORPHINE SULFATE 4 MG: 4 INJECTION INTRAVENOUS at 11:06

## 2018-06-26 RX ADMIN — APIXABAN 5 MG: 2.5 TABLET, FILM COATED ORAL at 01:06

## 2018-06-26 RX ADMIN — MORPHINE SULFATE 4 MG: 4 INJECTION INTRAVENOUS at 01:06

## 2018-06-26 NOTE — ASSESSMENT & PLAN NOTE
June 22, 2018-patient has anemia now due to end-stage renal disease.  Treatment of this will be as per Nephrology protocol with IV iron/Depo supplementation with hemodialysis.  Please call with any questions.  June 23, 2018-patient has end-stage renal disease and is on RRT.  Management of anemia will be as per Nephrology.  June 24, 2018-the patient is on RRT for end-stage renal disease.  Management of EPO/IV iron will be as per Nephrology protocol.  June 26, 2018-the patient is on RRT for end-stage renal disease.  She will continue EPO/IV iron as per Nephrology/dialysis protocol.

## 2018-06-26 NOTE — PROGRESS NOTES
[facility]   Pulmonology  Progress Note    Patient Name: Citlali Karimi  MRN: [id number]  Admission Date: 6/12/2018  Hospital Length of Stay: 13 days  Code Status: DNR  Attending Provider: Levy Samuel MD  Primary Care Provider: Evelio Schuster MD   Principal Problem: Critical illness polyneuropathy    Subjective:     Interval History: Seen and examined at bedside. Hospital chart reviewed. No acute interval detrimental events noted.     Review of Systems   Unable to perform ROS: Acuity of condition   Constitutional: Negative for malaise/fatigue and weight loss.   HENT: Negative for nosebleeds and tinnitus.    Eyes: Negative for photophobia and pain.   Respiratory: Negative for hemoptysis and sputum production.    Cardiovascular: Negative for palpitations and orthopnea.   Gastrointestinal: Negative for abdominal pain and vomiting.   Genitourinary: Negative for frequency and urgency.   Musculoskeletal: Negative for back pain and neck pain.   Skin: Negative for rash.   Neurological: Positive for speech change and focal weakness.   Endo/Heme/Allergies: Negative for environmental allergies.   Psychiatric/Behavioral: Negative for substance abuse and suicidal ideas.       Objective:     Vital Signs (Most Recent):  Temp: 97.6 °F (36.4 °C) (06/26/18 1140)  Pulse: 87 (06/26/18 1140)  Resp: 16 (06/26/18 0739)  BP: 107/64 (06/26/18 1140)  SpO2: 96 % (06/26/18 0739) Vital Signs (24h Range):  Temp:  [96.9 °F (36.1 °C)-98.5 °F (36.9 °C)] 97.6 °F (36.4 °C)  Pulse:  [77-92] 87  Resp:  [16-19] 16  SpO2:  [95 %-99 %] 96 %  BP: (104-131)/(55-74) 107/64     Weight: 72.9 kg (160 lb 11.5 oz)  Body mass index is 29.4 kg/m².      Intake/Output Summary (Last 24 hours) at 06/26/18 1157  Last data filed at 06/26/18 1140   Gross per 24 hour   Intake                0 ml   Output             1500 ml   Net            -1500 ml       Physical Exam   Constitutional: She appears well-developed and well-nourished. She is active and  cooperative. She appears ill.   HENT:   Head: Normocephalic and atraumatic.   Nose: Nose normal.   Mouth/Throat: Oropharynx is clear and moist. No oropharyngeal exudate.   Healing neck incision is noted.   Eyes: Pupils are equal, round, and reactive to light. No scleral icterus.   Neck: Neck supple. No thyromegaly present.   Cardiovascular: Normal rate, regular rhythm, normal heart sounds and intact distal pulses.    No murmur heard.  Pulmonary/Chest: Effort normal. No stridor. No respiratory distress. She has no wheezes. She has rhonchi. She has no rales.   Abdominal: Soft. Bowel sounds are normal. She exhibits no distension, no ascites and no mass. There is no hepatosplenomegaly. There is no tenderness. There is no rigidity, no rebound and no guarding.   Musculoskeletal: She exhibits no edema or tenderness.   Lymphadenopathy:     She has no cervical adenopathy.   Neurological: She is alert. No cranial nerve deficit. She exhibits normal muscle tone. Coordination normal.   Skin: Skin is warm and dry. Ecchymosis noted. No rash noted. No pallor.   Psychiatric: She is slowed. She is communicative.   Nursing note and vitals reviewed.          Significant Labs:    Laboratory Data:  Reviewed recent labs. Appear stable other than abnormalities addressed in plan.     Radiology:  Reviewed recent imaging studies. Appear stable other than abnormalities addressed in plan.     Assessment/Plan:     Acute hypoxemic respiratory failure    SP extubation 06/21. Oxygen supplementation . Keep Sat> 92%. Bronchodilators as needed.           C. difficile diarrhea    continue Vancomycin per OG        Coronary artery disease involving native coronary artery    Per Cards.  Medical management  [facility] 6/15 done revealing diffuse CAD not amenable for PCI, no changes from 2016  ASA, Lopressor  Allergy to statin noted            Acute renal failure superimposed on stage 5 chronic kidney disease, not on chronic dialysis      Renal following. RRT per  nephrology.           Acute blood loss anemia    S/P 2 units PRBCs 6/15 and 1 unit PRBC 6/18. Monitor hemogram. Transfuse as needed.             Will sign off for now. Please feel free to re consult if further pulmonary issues arise.     Anjel Prather MD  Pulmonology  [facility]

## 2018-06-26 NOTE — SUBJECTIVE & OBJECTIVE
Interval History: platelets decreased today. Pt started on Eliquis and wait for HIT antibody    Medications:  Continuous Infusions:  Scheduled Meds:   sodium chloride 0.9%   Intravenous Once    amiodarone  200 mg Oral BID    apixaban  10 mg Oral BID    aspirin  81 mg Oral Daily    epoetin carmen (PROCRIT) injection  4,000 Units Intravenous Every Tues, Thurs, Sat    famotidine  20 mg Oral Daily    metoprolol tartrate  25 mg Oral BID    sodium bicarbonate  650 mg Oral BID    vancomycin  125 mg Oral Q6H     PRN Meds:sodium chloride 0.9%, acetaminophen, bisacodyl, cloNIDine, dextrose 50%, dextrose 50%, glucagon (human recombinant), glucose, glucose, heparin (porcine), hydrALAZINE, insulin aspart U-100, lactulose, lorazepam, morphine, ondansetron, sodium bicarbonate, sodium chloride 0.9%     Review of patient's allergies indicates:   Allergen Reactions    Lipitor [atorvastatin] Swelling     Lips       Objective:     Vital Signs (Most Recent):  Temp: 97.2 °F (36.2 °C) (06/26/18 0826)  Pulse: 90 (06/26/18 0930)  Resp: 16 (06/26/18 0739)  BP: 112/60 (06/26/18 0930)  SpO2: 96 % (06/26/18 0739) Vital Signs (24h Range):  Temp:  [96.9 °F (36.1 °C)-98.5 °F (36.9 °C)] 97.2 °F (36.2 °C)  Pulse:  [77-92] 90  Resp:  [16-19] 16  SpO2:  [95 %-99 %] 96 %  BP: (105-131)/(55-74) 112/60     Weight: 72.9 kg (160 lb 11.5 oz)  Body mass index is 29.4 kg/m².    Intake/Output - Last 3 Shifts       06/24 0700 - 06/25 0659 06/25 0700 - 06/26 0659 06/26 0700 - 06/27 0659    Other 250      NG/      Total Intake(mL/kg) 600 (7.8)      Other 1900      Total Output 1900        Net -1300               Urine Occurrence 1 x 2 x 1 x    Stool Occurrence 0 x  1 x          Physical Exam   Constitutional: She appears well-developed and well-nourished.   HENT:   Head: Normocephalic and atraumatic.   Eyes: EOM are normal.   Cardiovascular: Normal rate and regular rhythm.    Pulmonary/Chest: Effort normal. No respiratory distress.   Abdominal:  Soft.   Musculoskeletal: Normal range of motion.   Skin: Skin is warm and dry.   Vitals reviewed.      Significant Labs:  CBC:   Recent Labs  Lab 06/26/18  0533   WBC 16.03*   RBC 3.09*   HGB 8.6*   HCT 27.1*   PLT 96*   MCV 88   MCH 27.8   MCHC 31.7*     CMP:   Recent Labs  Lab 06/26/18  0533   *   CALCIUM 8.3*   ALBUMIN 2.4*   PROT 6.1      K 4.8   CO2 22*      BUN 76*   CREATININE 5.7*   ALKPHOS 59   ALT 8*   AST 23   BILITOT 0.7       Significant Diagnostics:  I have reviewed all pertinent imaging results/findings within the past 24 hours.

## 2018-06-26 NOTE — ASSESSMENT & PLAN NOTE
Per Cards.  Medical management  [facility] 6/15 done revealing diffuse CAD not amenable for PCI, no changes from 2016  ASA, Lopressor  Allergy to statin noted

## 2018-06-26 NOTE — PROGRESS NOTES
Pt completed 3 hours of HD removing 1L. BP remained stable throughout tx (see flowsheet). Pt tolerated HD well. No signs of distress noted. Vas cath de accessed per p&p  Hep locked 1.6ml x 2. Epo given. Dr. Lyles made rounds during tx.

## 2018-06-26 NOTE — SUBJECTIVE & OBJECTIVE
Review of Systems   Unable to perform ROS: Mental status change     Objective:     Vital Signs (Most Recent):  Temp: 97.7 °F (36.5 °C) (06/26/18 1227)  Pulse: 95 (06/26/18 1227)  Resp: 18 (06/26/18 1227)  BP: 132/67 (06/26/18 1227)  SpO2: 95 % (06/26/18 1227) Vital Signs (24h Range):  Temp:  [96.9 °F (36.1 °C)-98.5 °F (36.9 °C)] 97.7 °F (36.5 °C)  Pulse:  [77-95] 95  Resp:  [16-19] 18  SpO2:  [95 %-97 %] 95 %  BP: (104-132)/(57-74) 132/67     Weight: 72.9 kg (160 lb 11.5 oz)  Body mass index is 29.4 kg/m².    Intake/Output Summary (Last 24 hours) at 06/26/18 1351  Last data filed at 06/26/18 1140   Gross per 24 hour   Intake                0 ml   Output             1500 ml   Net            -1500 ml      Physical Exam   Constitutional: She appears well-developed and well-nourished. She is active and cooperative. She appears ill.   HENT:   Head: Normocephalic and atraumatic.   Nose: Nose normal.   Mouth/Throat: Oropharynx is clear and moist. No oropharyngeal exudate.   Healing neck incision is noted.   Eyes: Pupils are equal, round, and reactive to light. No scleral icterus.   Neck: Neck supple. No thyromegaly present.   Cardiovascular: Normal rate, regular rhythm, normal heart sounds and intact distal pulses.    No murmur heard.  Pulmonary/Chest: Effort normal. No stridor. No respiratory distress. She has no wheezes. She has rhonchi. She has no rales.   Abdominal: Soft. Bowel sounds are normal. She exhibits no distension, no ascites and no mass. There is no hepatosplenomegaly. There is no tenderness. There is no rigidity, no rebound and no guarding.   Musculoskeletal: She exhibits no edema or tenderness.   Lymphadenopathy:     She has no cervical adenopathy.   Neurological: She is unresponsive. No cranial nerve deficit. She exhibits normal muscle tone. Coordination normal.   Skin: Skin is warm and dry. Ecchymosis noted. No rash noted. No pallor.   Psychiatric: She is slowed. She is noncommunicative.   Nursing  note and vitals reviewed.      Significant Labs: reviewed labs in past 24 hours     Significant Imaging: I have reviewed all pertinent imaging results/findings within the past 24 hours.

## 2018-06-26 NOTE — ASSESSMENT & PLAN NOTE
6/22/18 - Patient has been treated with Procrit for maintenance therapy for anemia due to chronic kidney disease. Patient with hemoglobin of 8.2 this morning.  No current evidence of bleeding noted.  We will continue to monitor closely.  June 23, 2018-the patient has acute blood loss anemia is stable with hemoglobin greater than 8 grams/deciliter today.  She will continue Procrit as per HD protocol.  No urgent indication for PRBC transfusion today.  June 24, 2018-CBC from today shows hemoglobin greater than 7 grams/deciliter.  I would recommend transfusion of PRBCs for supportive care if hemoglobin less than 7 grams/deciliter.  June 26, 2018-CBC from today shows hemoglobin greater than 7 grams/deciliter.  No urgent indication for PRBC transfusion at this time.

## 2018-06-26 NOTE — PROGRESS NOTES
[facility] -   Hematology/Oncology  Progress Note    Patient Name: Citlali Karimi  Admission Date: 6/12/2018  Hospital Length of Stay: 13 days  Code Status: DNR     Subjective:     HPI:  68 year old female, PMHx of CKD (IV), HTN, DM, CVA (left sided weakness), and CAD who initially presented for hyperparathyroidism and hypercalcemia requiring surgical intervention.   She was seen in clinic by surgery for hyperparathyroidism and hypercalcemia and was electively admitted 6/12 taken to OR undergoing parathyroidectomy finding 2 right sided enlarged parathyroid glands.  Intra and post op patient developed EKG changes and had elevated troponin.  Cards consulted.  Patient admitted due to swallowing issues she was not taking all her meds as prescribed and had BP as high as 225/103 during hospitalization.  Troponin increased to > 50.  Cards diagnosed with NSTEMI and she was Rx with ASA, Imdur, Lopressor, statin and Heparin infusion with plans for repeat LHC if cleared by Renal given CKD5.  Today, she had progressively worsening neck swelling and SOB with worsening dysphagia.  She was taken back to OR and had post op neck hematoma evacuation.  In PACU she had witnessed V-Fib cardiac arrest and CODE BLUE called.  After 3 rounds of CPR, Epi X 3 and Defib X 3 ROSC was attained.  She was still intubated and on vent post op    Interval History:  The patient is moaning in bed and is unable to answer specific questions.  She is able to open her eyes when her name is called.     Oncology Treatment Plan:   [No treatment plan]    Medications:  Continuous Infusions:  Scheduled Meds:   sodium chloride 0.9%   Intravenous Once    amiodarone  200 mg Oral BID    apixaban  5 mg Oral BID    aspirin  81 mg Oral Daily    epoetin carmen (PROCRIT) injection  4,000 Units Intravenous Every Tues, Thurs, Sat    famotidine  20 mg Oral Daily    metoprolol tartrate  25 mg Oral BID    sodium bicarbonate  650 mg Oral BID    vancomycin   125 mg Oral Q6H     PRN Meds:sodium chloride 0.9%, acetaminophen, bisacodyl, cloNIDine, dextrose 50%, dextrose 50%, glucagon (human recombinant), glucose, glucose, heparin (porcine), hydrALAZINE, insulin aspart U-100, lactulose, lorazepam, morphine, ondansetron, sodium bicarbonate, sodium chloride 0.9%     Review of Systems   Unable to perform ROS: Mental status change     Objective:     Vital Signs (Most Recent):  Temp: 97.7 °F (36.5 °C) (06/26/18 1227)  Pulse: 95 (06/26/18 1227)  Resp: 18 (06/26/18 1227)  BP: 132/67 (06/26/18 1227)  SpO2: 95 % (06/26/18 1227) Vital Signs (24h Range):  Temp:  [96.9 °F (36.1 °C)-98.5 °F (36.9 °C)] 97.7 °F (36.5 °C)  Pulse:  [77-95] 95  Resp:  [16-19] 18  SpO2:  [95 %-97 %] 95 %  BP: (104-132)/(57-74) 132/67     Weight: 72.9 kg (160 lb 11.5 oz)  Body mass index is 29.4 kg/m².  Body surface area is 1.79 meters squared.      Intake/Output Summary (Last 24 hours) at 06/26/18 1312  Last data filed at 06/26/18 1140   Gross per 24 hour   Intake                0 ml   Output             1500 ml   Net            -1500 ml       Physical Exam   Constitutional: She appears well-developed and well-nourished. She is active and cooperative. She appears ill.   HENT:   Head: Normocephalic and atraumatic.   Nose: Nose normal.   Mouth/Throat: Oropharynx is clear and moist. No oropharyngeal exudate.   Healing neck incision is noted.   Eyes: Pupils are equal, round, and reactive to light. No scleral icterus.   Neck: Neck supple. No thyromegaly present.   Cardiovascular: Normal rate, regular rhythm, normal heart sounds and intact distal pulses.    No murmur heard.  Pulmonary/Chest: Effort normal. No stridor. No respiratory distress. She has no wheezes. She has rhonchi. She has no rales.   Abdominal: Soft. Bowel sounds are normal. She exhibits no distension, no ascites and no mass. There is no hepatosplenomegaly. There is no tenderness. There is no rigidity, no rebound and no guarding.   Musculoskeletal:  She exhibits no edema or tenderness.   Lymphadenopathy:     She has no cervical adenopathy.   Neurological: She is unresponsive. No cranial nerve deficit. She exhibits normal muscle tone. Coordination normal.   Skin: Skin is warm and dry. Ecchymosis noted. No rash noted. No pallor.   Psychiatric: She is slowed. She is noncommunicative.   Nursing note and vitals reviewed.      Significant Labs:   I have reviewed all of the patient's relevant lab work available in the medical record and have utilized this in my evaluation and management recommendations today    Diagnostic Results:  I have reviewed all of the patient's diagnostic/imaging results available in the medical record and have utilized this in my evaluation and management recommendations today.    Assessment/Plan:     Thrombocytopenia    June 26, 2018-the patient has new onset thrombocytopenia with history of heparin exposure.  HIT needs to be considered in the differential diagnosis.  Recommend discontinuing all heparin.  Recommend initiation of Eliquis while results of HIT panel are pending.  I have recommended Eliquis instead of argatroban in this clinical situation because of the patient's critical illness which likely will result in significant variability in clinical efficacy with argatroban and likely put her at higher risk for bleeding than Eliquis.  Risks/benefits were reviewed in detail and discussed with General surgery and Hospital Medicine.  I would recommend holding off on PEG tube placement until HIT panel result is available.  I will continue follow the patient with an assist with the patient's management as needed.  Please call with any questions.          Acute renal failure superimposed on stage 5 chronic kidney disease, not on chronic dialysis    June 22, 2018-patient has anemia now due to end-stage renal disease.  Treatment of this will be as per Nephrology protocol with IV iron/Depo supplementation with hemodialysis.  Please call with any  questions.  June 23, 2018-patient has end-stage renal disease and is on RRT.  Management of anemia will be as per Nephrology.  June 24, 2018-the patient is on RRT for end-stage renal disease.  Management of EPO/IV iron will be as per Nephrology protocol.  June 26, 2018-the patient is on RRT for end-stage renal disease.  She will continue EPO/IV iron as per Nephrology/dialysis protocol.        Acute blood loss anemia    6/22/18 - Patient has been treated with Procrit for maintenance therapy for anemia due to chronic kidney disease. Patient with hemoglobin of 8.2 this morning.  No current evidence of bleeding noted.  We will continue to monitor closely.  June 23, 2018-the patient has acute blood loss anemia is stable with hemoglobin greater than 8 grams/deciliter today.  She will continue Procrit as per HD protocol.  No urgent indication for PRBC transfusion today.  June 24, 2018-CBC from today shows hemoglobin greater than 7 grams/deciliter.  I would recommend transfusion of PRBCs for supportive care if hemoglobin less than 7 grams/deciliter.  June 26, 2018-CBC from today shows hemoglobin greater than 7 grams/deciliter.  No urgent indication for PRBC transfusion at this time.            Thank you for your consult.      David Addison MD  Hematology/Oncology  [facility]

## 2018-06-26 NOTE — PLAN OF CARE
Assessment complete per flow sheet   Alert and oriented to name  Vital signs stable   Tolerated NG tube reinsertion well   Chest X-ray confirmed proper placement   Normal sinus rhythm on the monitor   No c/o pain or discomfort   Tolerated all scheduled care   Will continue to monitor for any signs or symptoms of vital decline

## 2018-06-26 NOTE — PT/OT/SLP PROGRESS
Physical Therapy      Patient Name:  Citlali Karimi   MRN:  [id number]    ATTEMPTED P.T. TX THIS AM, PT JUST LEFT FOR HEMODIALYSIS, WILL ASSESS NEXT VISIT    Aditi Robledo, PT   6/26/2018  0900

## 2018-06-26 NOTE — PROGRESS NOTES
Pharmacist Renal Dose Adjustment Note    Citlali Karimi is a 68 y.o. female being treated with the medication ampicillin-sulbactam (Unasyn) for aspiration pneumonia.     Patient Data:    Vital Signs (Most Recent):  Temp: 97.7 °F (36.5 °C) (06/26/18 1227)  Pulse: 95 (06/26/18 1227)  Resp: 18 (06/26/18 1227)  BP: 132/67 (06/26/18 1227)  SpO2: 95 % (06/26/18 1227) Vital Signs (72h Range):  Temp:  [96.9 °F (36.1 °C)-100.5 °F (38.1 °C)]   Pulse:  []   Resp:  [16-28]   BP: ()/(40-90)   SpO2:  [93 %-100 %]        Recent Labs     Lab 06/24/18  0401 06/25/18  0551 06/26/18  0533   CREATININE 6.9* 4.3* 5.7*     Serum creatinine: 5.7 mg/dL (H) 06/26/18 0533  Estimated creatinine clearance: 8.6 mL/min (A)    Unasyn 1.5 gm IV every 6 hours will be changed to Unasyn 3 gm IV every 24 hours due to ESRD on HD. On dialysis days, the dose will be given after HD.     Pharmacist's Name: Katherine E Mcardle  Pharmacist's Extension: [phone]

## 2018-06-26 NOTE — ASSESSMENT & PLAN NOTE
June 26, 2018-the patient has new onset thrombocytopenia with history of heparin exposure.  HIT needs to be considered in the differential diagnosis.  Recommend discontinuing all heparin.  Recommend initiation of Eliquis while results of HIT panel are pending.  I have recommended Eliquis instead of argatroban in this clinical situation because of the patient's critical illness which likely will result in significant variability in clinical efficacy with argatroban and likely put her at higher risk for bleeding than Eliquis.  Risks/benefits were reviewed in detail and discussed with General surgery and Hospital Medicine.  I would recommend holding off on PEG tube placement until HIT panel result is available.  I will continue follow the patient with an assist with the patient's management as needed.  Please call with any questions.

## 2018-06-26 NOTE — PROGRESS NOTES
[facility] -   Nephrology  Progress Note    Patient Name: Citlali Karimi  MRN: [id number]  Admission Date: 6/12/2018  Hospital Length of Stay: 13 days  Attending Provider: Levy Samuel MD   Primary Care Physician: Evelio Schuster MD  Principal Problem:Critical illness polyneuropathy    Subjective:     HPI:  Citlali Karimi Is a pleasant 68 -year-old  woman with history of chronic kidney disease stage 4, patient was admitted to the hospital for an elective  partial parathyroidectomy, following her surgery during observation.  She had some chest pain, workup revealed non ST elevated MI, patient was admitted to the hospital for further management.  We were consulted due to advanced renal insufficiency, baseline serum creatinine about 3.5-4,            Important Info :        She underwent a native kidney biopsy on 10/10/13. Final report of the kidney biopsy is negative for any specific etiology for acute renal failure. 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         Interval History:  Questionable history of heparin-induced thrombocytopenia.  Hold heparin today.  Seen on dialysis.  Patient has worsening mental status changes with minimal response verbally.  Discussed with Hospital Medicine pending CT scan of the head.    Review of patient's allergies indicates:   Allergen Reactions    Lipitor [atorvastatin] Swelling     Lips       Current Facility-Administered Medications   Medication Frequency    0.9%  NaCl infusion PRN    0.9%  NaCl infusion Once    acetaminophen oral solution 650 mg Q4H PRN    amiodarone tablet 200 mg BID    apixaban tablet 10 mg BID    aspirin chewable tablet 81 mg Daily    bisacodyl suppository 10 mg Daily PRN    cloNIDine tablet 0.1 mg Q6H PRN    dextrose 50% injection 12.5 g PRN    dextrose 50% injection 25 g PRN    epoetin carmen injection 4,000 Units Every Tues, Thurs, Sat    famotidine tablet 20 mg Daily     glucagon (human recombinant) injection 1 mg PRN    glucose chewable tablet 16 g PRN    glucose chewable tablet 24 g PRN    heparin (porcine) injection 3,000 Units PRN    hydrALAZINE injection 10 mg Q8H PRN    insulin aspart U-100 pen 0-5 Units QID (AC + HS) PRN    lactulose 20 gram/30 mL solution Soln 20 g Q6H PRN    lorazepam (ATIVAN) injection 2 mg Q4H PRN    metoprolol tartrate (LOPRESSOR) tablet 25 mg BID    morphine injection 4 mg Q4H PRN    ondansetron injection 4 mg Q8H PRN    sodium bicarbonate 8.4 % (1 mEq/mL) injection PRN    sodium bicarbonate tablet 650 mg BID    sodium chloride 0.9% flush 3 mL PRN    vancomycin 250mg / 10ml oral suspension 125 mg Q6H       Objective:     Vital Signs (Most Recent):  Temp: 97.2 °F (36.2 °C) (06/26/18 0826)  Pulse: 90 (06/26/18 0930)  Resp: 16 (06/26/18 0739)  BP: 112/60 (06/26/18 0930)  SpO2: 96 % (06/26/18 0739)  O2 Device (Oxygen Therapy): room air (06/26/18 0739) Vital Signs (24h Range):  Temp:  [96.9 °F (36.1 °C)-98.5 °F (36.9 °C)] 97.2 °F (36.2 °C)  Pulse:  [77-92] 90  Resp:  [16-19] 16  SpO2:  [95 %-99 %] 96 %  BP: (105-131)/(55-74) 112/60     Weight: 72.9 kg (160 lb 11.5 oz) (06/26/18 0410)  Body mass index is 29.4 kg/m².  Body surface area is 1.79 meters squared.    I/O last 3 completed shifts:  In: 420 [Other:250; NG/GT:170]  Out: 1900 [Other:1900]    Physical Exam   Constitutional: She appears well-developed and well-nourished. She is active and cooperative. She appears ill.   HENT:   Head: Normocephalic and atraumatic.   Nose: Nose normal.   Mouth/Throat: Oropharynx is clear and moist. No oropharyngeal exudate.   Healing neck incision is noted.   Eyes: Pupils are equal, round, and reactive to light. No scleral icterus.   Neck: Neck supple. No thyromegaly present.   Cardiovascular: Normal rate, regular rhythm, normal heart sounds and intact distal pulses.    No murmur heard.  Pulmonary/Chest: Effort normal. No stridor. No respiratory distress. She  has no wheezes. She has rhonchi. She has no rales.   Abdominal: Soft. Bowel sounds are normal. She exhibits no distension, no ascites and no mass. There is no hepatosplenomegaly. There is no tenderness. There is no rigidity, no rebound and no guarding.   Musculoskeletal: She exhibits no edema or tenderness.   Lymphadenopathy:     She has no cervical adenopathy.   Neurological: She displays abnormal reflex. A sensory deficit is present. No cranial nerve deficit. She exhibits abnormal muscle tone. Coordination abnormal.   Patient is drowsy but arousable and is disoriented and confused which is worse from yesterday    Non verbal grimace now     Arms and legs getting weaker      Skin: Skin is warm and dry. Ecchymosis noted. No rash noted. No pallor.   Psychiatric: She is slowed. She is communicative.   Nursing note and vitals reviewed.      Significant Labs:  All labs within the past 24 hours have been reviewed.     Significant Imaging:  Labs: Reviewed    Assessment/Plan:     ESRD (end stage renal disease)    Patient is now dialysis dependent 3 times a week.  Her next dialysis will be on Tuesday Thursday Saturday schedule.  Orders are discussed in detail with the dialysis nurses.  Placement pending.    Patient Seen on HD ; HD is for ESRD ; HD orders written and reviewed with HD nurse and nursing staff ;   Access is functioning well ; UF Goal is 2 litres as tolerated ; Will Give Epogen for anemia ; F-180 dialyzer ;  DFR is 500 ; patient is tolerating HD well ; next HD will be scheduled based on daily rounding and patient's clinical situation       No Heparin     Parameters for Hypotension outlined in orders and communications with nurses     Worsening neuro status d/w dr. Levy                   Thank you for your consult.     Chinmay Lyles MD  Nephrology  [facility]

## 2018-06-26 NOTE — PT/OT/SLP PROGRESS
Speech Language Pathology      Citlali Karimi  MRN: [id number]    Patient not seen today secondary to dialysis. Will follow-up at next visit.    Jacque Bernstein CCC-SLP

## 2018-06-26 NOTE — PLAN OF CARE
D/C planning discussed with Aditi marroquin OTIS and dr. Levy. Patient developed thrombocytopenia. Hematology oncology also ordered eliquis and HIT panel . Peg tube surg delayed . D/C plan is to transition to LTAC     06/26/18 1535   Discharge Reassessment   Assessment Type Discharge Planning Reassessment   Provided patient/caregiver education on the expected discharge date and the discharge plan No   Do you have any problems affording any of your prescribed medications? No   Discharge Plan A Long-term acute care facility (LTAC)   Discharge Plan B Long-term acute care facility (LTAC)   Patient choice form signed by patient/caregiver N/A   Can the patient answer the patient profile reliably? Yes, cognitively intact   How does the patient rate their overall health at the present time? Fair   Describe the patient's ability to walk at the present time. Major restrictions/daily assistance from another person   How often would a person be available to care for the patient? Whenever needed   Number of comorbid conditions (as recorded on the chart) Five or more   During the past month, has the patient often been bothered by feeling down, depressed or hopeless? No   During the past month, has the patient often been bothered by little interest or pleasure in doing things? No

## 2018-06-26 NOTE — ASSESSMENT & PLAN NOTE
Discussed with Dr. Addison.   Plt decreased by ~50% over last 2 days.   Need to rule out HIT   Heparin products stopped and pt started on Eliquis   Check antibody  PEG tube procedure held for now

## 2018-06-26 NOTE — PROGRESS NOTES
[facility]  General Surgery  Progress Note    Subjective:     History of Present Illness:  No notes on file    Post-Op Info:  Procedure(s) (LRB):  VASCULAR CATH EXCHANGE (N/A)   2 Days Post-Op     Interval History: platelets decreased today. Pt started on Eliquis and wait for HIT antibody    Medications:  Continuous Infusions:  Scheduled Meds:   sodium chloride 0.9%   Intravenous Once    amiodarone  200 mg Oral BID    apixaban  10 mg Oral BID    aspirin  81 mg Oral Daily    epoetin carmen (PROCRIT) injection  4,000 Units Intravenous Every Tues, Thurs, Sat    famotidine  20 mg Oral Daily    metoprolol tartrate  25 mg Oral BID    sodium bicarbonate  650 mg Oral BID    vancomycin  125 mg Oral Q6H     PRN Meds:sodium chloride 0.9%, acetaminophen, bisacodyl, cloNIDine, dextrose 50%, dextrose 50%, glucagon (human recombinant), glucose, glucose, heparin (porcine), hydrALAZINE, insulin aspart U-100, lactulose, lorazepam, morphine, ondansetron, sodium bicarbonate, sodium chloride 0.9%     Review of patient's allergies indicates:   Allergen Reactions    Lipitor [atorvastatin] Swelling     Lips       Objective:     Vital Signs (Most Recent):  Temp: 97.2 °F (36.2 °C) (06/26/18 0826)  Pulse: 90 (06/26/18 0930)  Resp: 16 (06/26/18 0739)  BP: 112/60 (06/26/18 0930)  SpO2: 96 % (06/26/18 0739) Vital Signs (24h Range):  Temp:  [96.9 °F (36.1 °C)-98.5 °F (36.9 °C)] 97.2 °F (36.2 °C)  Pulse:  [77-92] 90  Resp:  [16-19] 16  SpO2:  [95 %-99 %] 96 %  BP: (105-131)/(55-74) 112/60     Weight: 72.9 kg (160 lb 11.5 oz)  Body mass index is 29.4 kg/m².    Intake/Output - Last 3 Shifts       06/24 0700 - 06/25 0659 06/25 0700 - 06/26 0659 06/26 0700 - 06/27 0659    Other 250      NG/      Total Intake(mL/kg) 600 (7.8)      Other 1900      Total Output 1900        Net -1300               Urine Occurrence 1 x 2 x 1 x    Stool Occurrence 0 x  1 x          Physical Exam   Constitutional: She appears well-developed and  well-nourished.   HENT:   Head: Normocephalic and atraumatic.   Eyes: EOM are normal.   Cardiovascular: Normal rate and regular rhythm.    Pulmonary/Chest: Effort normal. No respiratory distress.   Abdominal: Soft.   Musculoskeletal: Normal range of motion.   Skin: Skin is warm and dry.   Vitals reviewed.      Significant Labs:  CBC:   Recent Labs  Lab 06/26/18  0533   WBC 16.03*   RBC 3.09*   HGB 8.6*   HCT 27.1*   PLT 96*   MCV 88   MCH 27.8   MCHC 31.7*     CMP:   Recent Labs  Lab 06/26/18  0533   *   CALCIUM 8.3*   ALBUMIN 2.4*   PROT 6.1      K 4.8   CO2 22*      BUN 76*   CREATININE 5.7*   ALKPHOS 59   ALT 8*   AST 23   BILITOT 0.7       Significant Diagnostics:  I have reviewed all pertinent imaging results/findings within the past 24 hours.    Assessment/Plan:     Decreased platelet count    Discussed with Dr. Addison.   Plt decreased by ~50% over last 2 days.   Need to rule out HIT   Heparin products stopped and pt started on Eliquis   Check antibody  PEG tube procedure held for now        C. difficile diarrhea    Antibiotics        ESRD (end stage renal disease)    Dialysis per Nephrology        Hyperparathyroidism    Status post parathyroidectomy  Monitor calcium  S/p neck washout  Neck is supple, no further swelling since drain out.   PEG tube on hold today until rule out HIT. Pt started on Eliquis                Coronary artery disease involving native coronary artery    Cardiology following  On anticoagulation        Acute renal failure superimposed on stage 5 chronic kidney disease, not on chronic dialysis    Pt tolerating hemo-dialysis well        Hypertension associated with diabetes    Antihypertensive medications            Becka Raygoza PA-C  General Surgery  [facility]

## 2018-06-26 NOTE — SUBJECTIVE & OBJECTIVE
Interval History:  The patient is moaning in bed and is unable to answer specific questions.  She is able to open her eyes when her name is called.     Oncology Treatment Plan:   [No treatment plan]    Medications:  Continuous Infusions:  Scheduled Meds:   sodium chloride 0.9%   Intravenous Once    amiodarone  200 mg Oral BID    apixaban  5 mg Oral BID    aspirin  81 mg Oral Daily    epoetin carmen (PROCRIT) injection  4,000 Units Intravenous Every Tues, Thurs, Sat    famotidine  20 mg Oral Daily    metoprolol tartrate  25 mg Oral BID    sodium bicarbonate  650 mg Oral BID    vancomycin  125 mg Oral Q6H     PRN Meds:sodium chloride 0.9%, acetaminophen, bisacodyl, cloNIDine, dextrose 50%, dextrose 50%, glucagon (human recombinant), glucose, glucose, heparin (porcine), hydrALAZINE, insulin aspart U-100, lactulose, lorazepam, morphine, ondansetron, sodium bicarbonate, sodium chloride 0.9%     Review of Systems   Unable to perform ROS: Mental status change     Objective:     Vital Signs (Most Recent):  Temp: 97.7 °F (36.5 °C) (06/26/18 1227)  Pulse: 95 (06/26/18 1227)  Resp: 18 (06/26/18 1227)  BP: 132/67 (06/26/18 1227)  SpO2: 95 % (06/26/18 1227) Vital Signs (24h Range):  Temp:  [96.9 °F (36.1 °C)-98.5 °F (36.9 °C)] 97.7 °F (36.5 °C)  Pulse:  [77-95] 95  Resp:  [16-19] 18  SpO2:  [95 %-97 %] 95 %  BP: (104-132)/(57-74) 132/67     Weight: 72.9 kg (160 lb 11.5 oz)  Body mass index is 29.4 kg/m².  Body surface area is 1.79 meters squared.      Intake/Output Summary (Last 24 hours) at 06/26/18 1312  Last data filed at 06/26/18 1140   Gross per 24 hour   Intake                0 ml   Output             1500 ml   Net            -1500 ml       Physical Exam   Constitutional: She appears well-developed and well-nourished. She is active and cooperative. She appears ill.   HENT:   Head: Normocephalic and atraumatic.   Nose: Nose normal.   Mouth/Throat: Oropharynx is clear and moist. No oropharyngeal exudate.   Healing  neck incision is noted.   Eyes: Pupils are equal, round, and reactive to light. No scleral icterus.   Neck: Neck supple. No thyromegaly present.   Cardiovascular: Normal rate, regular rhythm, normal heart sounds and intact distal pulses.    No murmur heard.  Pulmonary/Chest: Effort normal. No stridor. No respiratory distress. She has no wheezes. She has rhonchi. She has no rales.   Abdominal: Soft. Bowel sounds are normal. She exhibits no distension, no ascites and no mass. There is no hepatosplenomegaly. There is no tenderness. There is no rigidity, no rebound and no guarding.   Musculoskeletal: She exhibits no edema or tenderness.   Lymphadenopathy:     She has no cervical adenopathy.   Neurological: She is unresponsive. No cranial nerve deficit. She exhibits normal muscle tone. Coordination normal.   Skin: Skin is warm and dry. Ecchymosis noted. No rash noted. No pallor.   Psychiatric: She is slowed. She is noncommunicative.   Nursing note and vitals reviewed.      Significant Labs:   I have reviewed all of the patient's relevant lab work available in the medical record and have utilized this in my evaluation and management recommendations today    Diagnostic Results:  I have reviewed all of the patient's diagnostic/imaging results available in the medical record and have utilized this in my evaluation and management recommendations today.

## 2018-06-26 NOTE — ASSESSMENT & PLAN NOTE
Patient is now dialysis dependent 3 times a week.  Her next dialysis will be on Tuesday Thursday Saturday schedule.  Orders are discussed in detail with the dialysis nurses.  Placement pending.    Patient Seen on HD ; HD is for ESRD ; HD orders written and reviewed with HD nurse and nursing staff ;   Access is functioning well ; UF Goal is 2 litres as tolerated ; Will Give Epogen for anemia ; F-180 dialyzer ;  DFR is 500 ; patient is tolerating HD well ; next HD will be scheduled based on daily rounding and patient's clinical situation       No Heparin     Parameters for Hypotension outlined in orders and communications with nurses     Worsening neuro status d/w dr. Levy

## 2018-06-26 NOTE — SUBJECTIVE & OBJECTIVE
Interval History: Seen and examined at bedside. Hospital chart reviewed. No acute interval detrimental events noted.     Review of Systems   Unable to perform ROS: Acuity of condition   Constitutional: Negative for malaise/fatigue and weight loss.   HENT: Negative for nosebleeds and tinnitus.    Eyes: Negative for photophobia and pain.   Respiratory: Negative for hemoptysis and sputum production.    Cardiovascular: Negative for palpitations and orthopnea.   Gastrointestinal: Negative for abdominal pain and vomiting.   Genitourinary: Negative for frequency and urgency.   Musculoskeletal: Negative for back pain and neck pain.   Skin: Negative for rash.   Neurological: Positive for speech change and focal weakness.   Endo/Heme/Allergies: Negative for environmental allergies.   Psychiatric/Behavioral: Negative for substance abuse and suicidal ideas.       Objective:     Vital Signs (Most Recent):  Temp: 97.6 °F (36.4 °C) (06/26/18 1140)  Pulse: 87 (06/26/18 1140)  Resp: 16 (06/26/18 0739)  BP: 107/64 (06/26/18 1140)  SpO2: 96 % (06/26/18 0739) Vital Signs (24h Range):  Temp:  [96.9 °F (36.1 °C)-98.5 °F (36.9 °C)] 97.6 °F (36.4 °C)  Pulse:  [77-92] 87  Resp:  [16-19] 16  SpO2:  [95 %-99 %] 96 %  BP: (104-131)/(55-74) 107/64     Weight: 72.9 kg (160 lb 11.5 oz)  Body mass index is 29.4 kg/m².      Intake/Output Summary (Last 24 hours) at 06/26/18 1157  Last data filed at 06/26/18 1140   Gross per 24 hour   Intake                0 ml   Output             1500 ml   Net            -1500 ml       Physical Exam   Constitutional: She appears well-developed and well-nourished. She is active and cooperative. She appears ill.   HENT:   Head: Normocephalic and atraumatic.   Nose: Nose normal.   Mouth/Throat: Oropharynx is clear and moist. No oropharyngeal exudate.   Healing neck incision is noted.   Eyes: Pupils are equal, round, and reactive to light. No scleral icterus.   Neck: Neck supple. No thyromegaly present.    Cardiovascular: Normal rate, regular rhythm, normal heart sounds and intact distal pulses.    No murmur heard.  Pulmonary/Chest: Effort normal. No stridor. No respiratory distress. She has no wheezes. She has rhonchi. She has no rales.   Abdominal: Soft. Bowel sounds are normal. She exhibits no distension, no ascites and no mass. There is no hepatosplenomegaly. There is no tenderness. There is no rigidity, no rebound and no guarding.   Musculoskeletal: She exhibits no edema or tenderness.   Lymphadenopathy:     She has no cervical adenopathy.   Neurological: She is alert. No cranial nerve deficit. She exhibits normal muscle tone. Coordination normal.   Skin: Skin is warm and dry. Ecchymosis noted. No rash noted. No pallor.   Psychiatric: She is slowed. She is communicative.   Nursing note and vitals reviewed.          Significant Labs:    Laboratory Data:  Reviewed recent labs. Appear stable other than abnormalities addressed in plan.     Radiology:  Reviewed recent imaging studies. Appear stable other than abnormalities addressed in plan.

## 2018-06-26 NOTE — ASSESSMENT & PLAN NOTE
Status post parathyroidectomy  Monitor calcium  S/p neck washout  Neck is supple, no further swelling since drain out.   PEG tube on hold today until rule out HIT. Pt started on Eliquis

## 2018-06-26 NOTE — PROGRESS NOTES
[facility] Hospital Medicine  Progress Note    Patient Name: Citlali Karimi  MRN: [id number]  Patient Class: IP- Inpatient   Admission Date: 6/12/2018  Length of Stay: 13 days  Attending Physician: Levy Samuel MD  Primary Care Provider: Evelio Schuster MD        Subjective:     Principal Problem:Critical illness polyneuropathy    HPI:  Citlali Karimi is a 68 y.o female with PMHx of CKD (IV), HTN, DM, CVA (left sided weakness), and CAD who initially presented for hyperparathyroidism and hypercalcemia requiring surgical intervention.  Pt underwent parathyroidectomy today per Dr. Tracy (General Surgery). Pt admitted to Medical Surgical Unit post procedure and Hospital Medicine consulted for medical management.  Chest xray post procedure showed mild asymmetric CHF versus RUL pneumonia. Troponin 0.113 and BNP >270 noted.  Pt given IV Lasix in PACU prior to unit arrival.      Hospital Course:  Pt admitted to Outpatient Extended Recovery post procedure.  Elevated noted post procedure and results trended yielding significant positive response.  Cardiology consulted and Heparin infusion initiated.  Hx of CAD, multiple vessels noted with home medications continued and Imdur dose increased.  Nephrology consulted due to elevated creatinine and [facility] recommended.  Echo results pending.  Heart catheterization procedure considered with family refusing due to concern for worsening kidney function as patient does not want to be on dialysis.  Decreased oral intake and difficulty swallowing noted.  Speech therapist to bedside.  Pt made NPO and General Surgery notified.      6/15  Patient worsening status. ET changed per Pulmonary CC. Patient s/p Code Blue - VFib with recovery. Cardiology taking patient to CATH lab. Discussed with CM plan for post acute care in progress.    6/16  Patient improved o/n. Patient awake and able to follow simple command. Breathing trials in progress. Discussed with Pulmonary  CC    6/17  Patient remains intubated.  at bedside. Cardiology at bedside as well. Discussed with CC Team    6/18  Patient responds to command but remains intubated. Swelling to neck continues to be prominent. Discussed with CC Team. Worsening renal function. Possible HD need.    6/19  Patient remains intubated. Worsening renal function. No events Discussed with CC Team    6/21  Positive cough leak and plan for extubation today .   6/22  Patient continue to be confused . Will consider ct head if didn't improve . Continue treat infection/c difficle .Consulted vascular surgery dr maciel to remove femoral dialysis and put new one subclavian ?. To avoid risk of infection in groin. Patient will continue to need dialysis    6/23  Patient is seen and examined today . More awake today. dialysis catheter was removed from groin and new one will be place in upper body ij vs subclavian. Patient diarrhea improved .   6/24  Patient very lethargic to participate in slp . S/p vascath for hemodialysis . SLP will visit tomorrow . Patient still has ng tube. Diarrhea improved   6/25  Patient lethargic . SLP recommended peg tube . Placement of peg tube tomorrow . After peg tube possible discharge to ltac   6/26  Patient was seen and examined today. cxr shows mid and upper right lobe . Very weak cough reflex. Will start treatment with unisyn for aspiration pneumonia and follow up culture ,procal if no improvement will broaden coverage . Patient also being treated for  c diff . Hematology oncology also ordered eliquis and hit panel . Peg tube delayed         Review of Systems   Unable to perform ROS: Mental status change     Objective:     Vital Signs (Most Recent):  Temp: 97.7 °F (36.5 °C) (06/26/18 1227)  Pulse: 95 (06/26/18 1227)  Resp: 18 (06/26/18 1227)  BP: 132/67 (06/26/18 1227)  SpO2: 95 % (06/26/18 1227) Vital Signs (24h Range):  Temp:  [96.9 °F (36.1 °C)-98.5 °F (36.9 °C)] 97.7 °F (36.5 °C)  Pulse:  [77-95] 95  Resp:   [16-19] 18  SpO2:  [95 %-97 %] 95 %  BP: (104-132)/(57-74) 132/67     Weight: 72.9 kg (160 lb 11.5 oz)  Body mass index is 29.4 kg/m².    Intake/Output Summary (Last 24 hours) at 06/26/18 1351  Last data filed at 06/26/18 1140   Gross per 24 hour   Intake                0 ml   Output             1500 ml   Net            -1500 ml      Physical Exam   Constitutional: She appears well-developed and well-nourished. She is active and cooperative. She appears ill.   HENT:   Head: Normocephalic and atraumatic.   Nose: Nose normal.   Mouth/Throat: Oropharynx is clear and moist. No oropharyngeal exudate.   Healing neck incision is noted.   Eyes: Pupils are equal, round, and reactive to light. No scleral icterus.   Neck: Neck supple. No thyromegaly present.   Cardiovascular: Normal rate, regular rhythm, normal heart sounds and intact distal pulses.    No murmur heard.  Pulmonary/Chest: Effort normal. No stridor. No respiratory distress. She has no wheezes. She has rhonchi. She has no rales.   Abdominal: Soft. Bowel sounds are normal. She exhibits no distension, no ascites and no mass. There is no hepatosplenomegaly. There is no tenderness. There is no rigidity, no rebound and no guarding.   Musculoskeletal: She exhibits no edema or tenderness.   Lymphadenopathy:     She has no cervical adenopathy.   Neurological: She is unresponsive. No cranial nerve deficit. She exhibits normal muscle tone. Coordination normal.   Skin: Skin is warm and dry. Ecchymosis noted. No rash noted. No pallor.   Psychiatric: She is slowed. She is noncommunicative.   Nursing note and vitals reviewed.      Significant Labs: reviewed labs in past 24 hours     Significant Imaging: I have reviewed all pertinent imaging results/findings within the past 24 hours.    Assessment/Plan:      * Critical illness polyneuropathy    -pt/ot         Aspiration pneumonia    Continue with  Respiratory culture   Started unisyn will continue to follow. procal and esr           Thrombocytopenia    -hit panel ordered   -started on eliquis         Acute hypoxemic respiratory failure              C. difficile diarrhea    Continue with oral vancomycin   6/20 started vancomycin   -will need 14 days   -diarrhea improved         NSTEMI (non-ST elevated myocardial infarction)    ASA  Cardiology  LH - Diffuse disease  No further intervention recommended  Medical Management  No statin due to allergy        S/P parathyroidectomy              Cardiac arrest with ventricular fibrillation    Cardiology  [facility] 6/15  ASA  Statin Allergy  BB  No further interventions  Diffuse disease    6/18  ASA  BB  Diffuse disease No intervention    6/19  ASA  BB  No Statin due to allergy  Cardiology        Elevated troponin    -initial 0.113>>34>>50>>41  -pt denies CP and SOB  - EKG results showed diffuse ST changes    -serial results revealed NSTEMI with Cardiology consulted     6/15  NSTEMI POA  Cardiology  [facility]  ASA  Hold Statin due to Elevated LFT's    6/16  Cardiology  ASA  ICU  Statin allergy noted    6/17  S/p LHC - Diffuse disease  ASA  Cards              Hyperparathyroidism    -Pt admitted to Extended Recovery then transferred to Inpatient due to NSTEMI  -s/p Parathyroidectomy   -managed by General Surgery -primary team  - PTH- 32  - Ca 11.4>>10.5  - analgesia prn   - antiemetics prn  - specimens sent for analysis    6/16  Ca 8.7 today  Monitor    6/18  Ca 8.2 today  Monitor    6/19  Ca 8.2 stable        Coronary artery disease involving native coronary artery    - ASA, Statin, and Lopressor continued   -Imdur dose increased   -previous Cath showed severe CAD (proximal LCX 99%, mid 50%, RCA mid 90%, distal with subtotal lesion).  - Cardiology consulted with medical management continued   - [facility] proposed pending Nephrology recommendations  -family refused [facility] due to concern for worsening kidney function as pt had expressed that she did not want to be on dialysis     6/15  Family electing [facility] and accepting  risk of worsening renal function  Cardiology    6/16  S/p [facility]  Cardiology  ASA  Statin allergy noted    6/16  No interventions  Cardiology on consult    6/18  Cardiology on Consult  Medical Management    6/19  ASA  BB  6/22  Continue with medical management   6/23  Continue with medical management   6/25  Continue with medical management         Acute renal failure superimposed on stage 5 chronic kidney disease, not on chronic dialysis    -Creatinine 3.6>>4.1  -baseline 2.7-4.4  -will monitor   -sodium bicarb continued   -pt has been followed outpatient by Dr. Vazquez (Nephrology)  -Nephrology consulted - pt may benefit from repeat angio due to NSTEMI but at high risk of contrast induced nephropathy  - pt was candidate for renal transplant however work up discontinued due to progressive weakness  - [facility] recommended         6/15  Monitor worsening Renal Function  S/p Cardiac VFib Arrest  [facility] today  IVF's  Monitor      6/16  [facility] diffuse disease  Cardiology  Monitor    6/17  Worsening  No further Cardiac intervention  Nephrology on consult  Monitor  Cr 6.0 today vs 4.7  Low Urine O/P    6/18  COntinues to worsen. Cr 7.3 today  Nephrology guidance for HD vs Monitoring    6/19  Worsening  Cr 8.3 today - K is 4.6  Nephrology on board  No HD indicated at present  Monitor  6/22  Patient will continue with dialysis . Dr Lara for vascath change .          Hypertension associated with diabetes    - home medications continued   -hx of noncompliance and inconsistent dosing at home per   - uncontrolled upon arrival- improved with Hydralazine in PACU   - Hydralazine prn  6/22  Controlled         Diabetes mellitus, type 2 - only on oral medications    Controlled  NICC  Accuchecks          Acute blood loss anemia    -stable post procedure  -will monitor  -CBC in am   -pt followed outpatient by Heme/Onc    6/16  Stable  Monitor    6/17  Improving  Monitor    6/18  Hgb 7.7 today  Transfuse PRBC's per sx  Monitor    6/23  Hb  8 stable   Monitor  6/24  No signs of acute blood loss anema           VTE Risk Mitigation         Ordered     apixaban tablet 5 mg  2 times daily      06/26/18 1231     heparin (porcine) injection 3,000 Units  As needed (PRN)      06/26/18 0907     Place sequential compression device  Until discontinued      06/12/18 5305              Dominguez Levy MD  Department of Hospital Medicine   [facility]

## 2018-06-26 NOTE — SUBJECTIVE & OBJECTIVE
Interval History:  Questionable history of heparin-induced thrombocytopenia.  Hold heparin today.  Seen on dialysis.  Patient has worsening mental status changes with minimal response verbally.  Discussed with Hospital Medicine pending CT scan of the head.    Review of patient's allergies indicates:   Allergen Reactions    Lipitor [atorvastatin] Swelling     Lips       Current Facility-Administered Medications   Medication Frequency    0.9%  NaCl infusion PRN    0.9%  NaCl infusion Once    acetaminophen oral solution 650 mg Q4H PRN    amiodarone tablet 200 mg BID    apixaban tablet 10 mg BID    aspirin chewable tablet 81 mg Daily    bisacodyl suppository 10 mg Daily PRN    cloNIDine tablet 0.1 mg Q6H PRN    dextrose 50% injection 12.5 g PRN    dextrose 50% injection 25 g PRN    epoetin carmen injection 4,000 Units Every Tues, Thurs, Sat    famotidine tablet 20 mg Daily    glucagon (human recombinant) injection 1 mg PRN    glucose chewable tablet 16 g PRN    glucose chewable tablet 24 g PRN    heparin (porcine) injection 3,000 Units PRN    hydrALAZINE injection 10 mg Q8H PRN    insulin aspart U-100 pen 0-5 Units QID (AC + HS) PRN    lactulose 20 gram/30 mL solution Soln 20 g Q6H PRN    lorazepam (ATIVAN) injection 2 mg Q4H PRN    metoprolol tartrate (LOPRESSOR) tablet 25 mg BID    morphine injection 4 mg Q4H PRN    ondansetron injection 4 mg Q8H PRN    sodium bicarbonate 8.4 % (1 mEq/mL) injection PRN    sodium bicarbonate tablet 650 mg BID    sodium chloride 0.9% flush 3 mL PRN    vancomycin 250mg / 10ml oral suspension 125 mg Q6H       Objective:     Vital Signs (Most Recent):  Temp: 97.2 °F (36.2 °C) (06/26/18 0826)  Pulse: 90 (06/26/18 0930)  Resp: 16 (06/26/18 0739)  BP: 112/60 (06/26/18 0930)  SpO2: 96 % (06/26/18 0739)  O2 Device (Oxygen Therapy): room air (06/26/18 0739) Vital Signs (24h Range):  Temp:  [96.9 °F (36.1 °C)-98.5 °F (36.9 °C)] 97.2 °F (36.2 °C)  Pulse:  [77-92]  90  Resp:  [16-19] 16  SpO2:  [95 %-99 %] 96 %  BP: (105-131)/(55-74) 112/60     Weight: 72.9 kg (160 lb 11.5 oz) (06/26/18 0410)  Body mass index is 29.4 kg/m².  Body surface area is 1.79 meters squared.    I/O last 3 completed shifts:  In: 420 [Other:250; NG/GT:170]  Out: 1900 [Other:1900]    Physical Exam   Constitutional: She appears well-developed and well-nourished. She is active and cooperative. She appears ill.   HENT:   Head: Normocephalic and atraumatic.   Nose: Nose normal.   Mouth/Throat: Oropharynx is clear and moist. No oropharyngeal exudate.   Healing neck incision is noted.   Eyes: Pupils are equal, round, and reactive to light. No scleral icterus.   Neck: Neck supple. No thyromegaly present.   Cardiovascular: Normal rate, regular rhythm, normal heart sounds and intact distal pulses.    No murmur heard.  Pulmonary/Chest: Effort normal. No stridor. No respiratory distress. She has no wheezes. She has rhonchi. She has no rales.   Abdominal: Soft. Bowel sounds are normal. She exhibits no distension, no ascites and no mass. There is no hepatosplenomegaly. There is no tenderness. There is no rigidity, no rebound and no guarding.   Musculoskeletal: She exhibits no edema or tenderness.   Lymphadenopathy:     She has no cervical adenopathy.   Neurological: She displays abnormal reflex. A sensory deficit is present. No cranial nerve deficit. She exhibits abnormal muscle tone. Coordination abnormal.   Patient is drowsy but arousable and is disoriented and confused which is worse from yesterday    Non verbal grimace now     Arms and legs getting weaker      Skin: Skin is warm and dry. Ecchymosis noted. No rash noted. No pallor.   Psychiatric: She is slowed. She is communicative.   Nursing note and vitals reviewed.      Significant Labs:  All labs within the past 24 hours have been reviewed.     Significant Imaging:  Labs: Reviewed
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
## 2018-06-27 PROBLEM — J18.9 HAP (HOSPITAL-ACQUIRED PNEUMONIA): Status: ACTIVE | Noted: 2018-06-26

## 2018-06-27 LAB
ALBUMIN SERPL BCP-MCNC: 2.4 G/DL
ALLENS TEST: ABNORMAL
ALP SERPL-CCNC: 73 U/L
ALT SERPL W/O P-5'-P-CCNC: 12 U/L
ANION GAP SERPL CALC-SCNC: 14 MMOL/L
AST SERPL-CCNC: 21 U/L
BACTERIA #/AREA URNS HPF: NORMAL /HPF
BASOPHILS # BLD AUTO: 0.03 K/UL
BASOPHILS NFR BLD: 0.2 %
BILIRUB SERPL-MCNC: 0.7 MG/DL
BILIRUB UR QL STRIP: NEGATIVE
BNP SERPL-MCNC: >4900 PG/ML
BUN SERPL-MCNC: 56 MG/DL
CALCIUM SERPL-MCNC: 9 MG/DL
CHLORIDE SERPL-SCNC: 100 MMOL/L
CLARITY UR: CLEAR
CO2 SERPL-SCNC: 25 MMOL/L
COLOR UR: YELLOW
CREAT SERPL-MCNC: 4.6 MG/DL
DELSYS: ABNORMAL
DIFFERENTIAL METHOD: ABNORMAL
EOSINOPHIL # BLD AUTO: 0 K/UL
EOSINOPHIL NFR BLD: 0.1 %
ERYTHROCYTE [DISTWIDTH] IN BLOOD BY AUTOMATED COUNT: 17 %
EST. GFR  (AFRICAN AMERICAN): 10 ML/MIN/1.73 M^2
EST. GFR  (NON AFRICAN AMERICAN): 9 ML/MIN/1.73 M^2
FIO2: 28
GLUCOSE SERPL-MCNC: 195 MG/DL
GLUCOSE UR QL STRIP: NEGATIVE
HCO3 UR-SCNC: 27.2 MMOL/L (ref 24–28)
HCT VFR BLD AUTO: 24.6 %
HEPARIN INDUCED THROMBOCYTOPENIA: NORMAL
HGB BLD-MCNC: 7.8 G/DL
HGB UR QL STRIP: ABNORMAL
HYALINE CASTS #/AREA URNS LPF: 0 /LPF
KETONES UR QL STRIP: NEGATIVE
LACTATE SERPL-SCNC: 2.5 MMOL/L
LEUKOCYTE ESTERASE UR QL STRIP: NEGATIVE
LYMPHOCYTES # BLD AUTO: 1.1 K/UL
LYMPHOCYTES NFR BLD: 5.8 %
MAGNESIUM SERPL-MCNC: 2 MG/DL
MCH RBC QN AUTO: 27.9 PG
MCHC RBC AUTO-ENTMCNC: 31.7 G/DL
MCV RBC AUTO: 88 FL
MICROSCOPIC COMMENT: NORMAL
MODE: ABNORMAL
MONOCYTES # BLD AUTO: 1.3 K/UL
MONOCYTES NFR BLD: 7.3 %
NEUTROPHILS # BLD AUTO: 15.8 K/UL
NEUTROPHILS NFR BLD: 86.6 %
NITRITE UR QL STRIP: NEGATIVE
PCO2 BLDA: 34.3 MMHG (ref 35–45)
PH SMN: 7.51 [PH] (ref 7.35–7.45)
PH UR STRIP: 6 [PH] (ref 5–8)
PHOSPHATE SERPL-MCNC: 3.2 MG/DL
PLATELET # BLD AUTO: 59 K/UL
PMV BLD AUTO: 10.2 FL
PO2 BLDA: 73 MMHG (ref 80–100)
POC BE: 4 MMOL/L
POC SATURATED O2: 96 % (ref 95–100)
POCT GLUCOSE: 158 MG/DL (ref 70–110)
POCT GLUCOSE: 176 MG/DL (ref 70–110)
POCT GLUCOSE: 180 MG/DL (ref 70–110)
POCT GLUCOSE: 220 MG/DL (ref 70–110)
POTASSIUM SERPL-SCNC: 4.1 MMOL/L
PROCALCITONIN SERPL IA-MCNC: 2.14 NG/ML
PROT SERPL-MCNC: 6.1 G/DL
PROT UR QL STRIP: ABNORMAL
RBC # BLD AUTO: 2.8 M/UL
RBC #/AREA URNS HPF: 1 /HPF (ref 0–4)
SAMPLE: ABNORMAL
SITE: ABNORMAL
SODIUM SERPL-SCNC: 139 MMOL/L
SP GR UR STRIP: 1.02 (ref 1–1.03)
SQUAMOUS #/AREA URNS HPF: 10 /HPF
URN SPEC COLLECT METH UR: ABNORMAL
UROBILINOGEN UR STRIP-ACNC: NEGATIVE EU/DL
WBC # BLD AUTO: 18.26 K/UL
WBC #/AREA URNS HPF: 3 /HPF (ref 0–5)

## 2018-06-27 PROCEDURE — 63600175 PHARM REV CODE 636 W HCPCS: Performed by: SURGERY

## 2018-06-27 PROCEDURE — 93010 ELECTROCARDIOGRAM REPORT: CPT | Mod: ,,, | Performed by: INTERNAL MEDICINE

## 2018-06-27 PROCEDURE — 99900035 HC TECH TIME PER 15 MIN (STAT)

## 2018-06-27 PROCEDURE — 87086 URINE CULTURE/COLONY COUNT: CPT

## 2018-06-27 PROCEDURE — G8988 SELF CARE GOAL STATUS: HCPCS | Mod: CL

## 2018-06-27 PROCEDURE — 94667 MNPJ CHEST WALL 1ST: CPT

## 2018-06-27 PROCEDURE — G8987 SELF CARE CURRENT STATUS: HCPCS | Mod: CN

## 2018-06-27 PROCEDURE — 25000003 PHARM REV CODE 250: Performed by: NURSE PRACTITIONER

## 2018-06-27 PROCEDURE — 84145 PROCALCITONIN (PCT): CPT

## 2018-06-27 PROCEDURE — 99233 SBSQ HOSP IP/OBS HIGH 50: CPT | Mod: ,,, | Performed by: INTERNAL MEDICINE

## 2018-06-27 PROCEDURE — 36600 WITHDRAWAL OF ARTERIAL BLOOD: CPT

## 2018-06-27 PROCEDURE — 83735 ASSAY OF MAGNESIUM: CPT

## 2018-06-27 PROCEDURE — 82803 BLOOD GASES ANY COMBINATION: CPT

## 2018-06-27 PROCEDURE — 97530 THERAPEUTIC ACTIVITIES: CPT

## 2018-06-27 PROCEDURE — 84100 ASSAY OF PHOSPHORUS: CPT

## 2018-06-27 PROCEDURE — 25000242 PHARM REV CODE 250 ALT 637 W/ HCPCS: Performed by: SURGERY

## 2018-06-27 PROCEDURE — 87040 BLOOD CULTURE FOR BACTERIA: CPT

## 2018-06-27 PROCEDURE — 83880 ASSAY OF NATRIURETIC PEPTIDE: CPT

## 2018-06-27 PROCEDURE — 94640 AIRWAY INHALATION TREATMENT: CPT

## 2018-06-27 PROCEDURE — 81000 URINALYSIS NONAUTO W/SCOPE: CPT

## 2018-06-27 PROCEDURE — 80053 COMPREHEN METABOLIC PANEL: CPT

## 2018-06-27 PROCEDURE — 99232 SBSQ HOSP IP/OBS MODERATE 35: CPT | Mod: ,,, | Performed by: INTERNAL MEDICINE

## 2018-06-27 PROCEDURE — 25000003 PHARM REV CODE 250: Performed by: INTERNAL MEDICINE

## 2018-06-27 PROCEDURE — 85025 COMPLETE CBC W/AUTO DIFF WBC: CPT

## 2018-06-27 PROCEDURE — 93005 ELECTROCARDIOGRAM TRACING: CPT

## 2018-06-27 PROCEDURE — 25000003 PHARM REV CODE 250: Performed by: SURGERY

## 2018-06-27 PROCEDURE — 21400001 HC TELEMETRY ROOM

## 2018-06-27 PROCEDURE — 83605 ASSAY OF LACTIC ACID: CPT

## 2018-06-27 PROCEDURE — 63600175 PHARM REV CODE 636 W HCPCS: Performed by: INTERNAL MEDICINE

## 2018-06-27 PROCEDURE — 36415 COLL VENOUS BLD VENIPUNCTURE: CPT

## 2018-06-27 RX ORDER — HYDROCODONE BITARTRATE AND ACETAMINOPHEN 7.5; 325 MG/1; MG/1
1 TABLET ORAL EVERY 6 HOURS PRN
Status: DISCONTINUED | OUTPATIENT
Start: 2018-06-27 | End: 2018-06-28

## 2018-06-27 RX ORDER — IPRATROPIUM BROMIDE AND ALBUTEROL SULFATE 2.5; .5 MG/3ML; MG/3ML
3 SOLUTION RESPIRATORY (INHALATION)
Status: DISCONTINUED | OUTPATIENT
Start: 2018-06-27 | End: 2018-06-30 | Stop reason: HOSPADM

## 2018-06-27 RX ORDER — MORPHINE SULFATE 4 MG/ML
2 INJECTION, SOLUTION INTRAMUSCULAR; INTRAVENOUS EVERY 6 HOURS PRN
Status: DISCONTINUED | OUTPATIENT
Start: 2018-06-27 | End: 2018-06-30 | Stop reason: HOSPADM

## 2018-06-27 RX ADMIN — FAMOTIDINE 20 MG: 20 TABLET ORAL at 09:06

## 2018-06-27 RX ADMIN — SODIUM BICARBONATE 650 MG TABLET 650 MG: at 08:06

## 2018-06-27 RX ADMIN — Medication 125 MG: at 05:06

## 2018-06-27 RX ADMIN — HYDROCODONE BITARTRATE AND ACETAMINOPHEN 1 TABLET: 7.5; 325 TABLET ORAL at 04:06

## 2018-06-27 RX ADMIN — VANCOMYCIN HYDROCHLORIDE 1250 MG: 1 INJECTION, POWDER, LYOPHILIZED, FOR SOLUTION INTRAVENOUS at 02:06

## 2018-06-27 RX ADMIN — INSULIN ASPART 1 UNITS: 100 INJECTION, SOLUTION INTRAVENOUS; SUBCUTANEOUS at 12:06

## 2018-06-27 RX ADMIN — SODIUM BICARBONATE 650 MG TABLET 650 MG: at 09:06

## 2018-06-27 RX ADMIN — PIPERACILLIN AND TAZOBACTAM 4.5 G: 4; .5 INJECTION, POWDER, LYOPHILIZED, FOR SOLUTION INTRAVENOUS; PARENTERAL at 08:06

## 2018-06-27 RX ADMIN — HYDROCODONE BITARTRATE AND ACETAMINOPHEN 1 TABLET: 7.5; 325 TABLET ORAL at 10:06

## 2018-06-27 RX ADMIN — APIXABAN 5 MG: 2.5 TABLET, FILM COATED ORAL at 09:06

## 2018-06-27 RX ADMIN — APIXABAN 5 MG: 2.5 TABLET, FILM COATED ORAL at 08:06

## 2018-06-27 RX ADMIN — ASPIRIN 81 MG 81 MG: 81 TABLET ORAL at 09:06

## 2018-06-27 RX ADMIN — AMIODARONE HYDROCHLORIDE 200 MG: 200 TABLET ORAL at 09:06

## 2018-06-27 RX ADMIN — IPRATROPIUM BROMIDE AND ALBUTEROL SULFATE 3 ML: .5; 3 SOLUTION RESPIRATORY (INHALATION) at 11:06

## 2018-06-27 RX ADMIN — METOPROLOL TARTRATE 25 MG: 25 TABLET, FILM COATED ORAL at 08:06

## 2018-06-27 RX ADMIN — Medication 125 MG: at 12:06

## 2018-06-27 RX ADMIN — IPRATROPIUM BROMIDE AND ALBUTEROL SULFATE 3 ML: .5; 3 SOLUTION RESPIRATORY (INHALATION) at 03:06

## 2018-06-27 RX ADMIN — IPRATROPIUM BROMIDE AND ALBUTEROL SULFATE 3 ML: .5; 3 SOLUTION RESPIRATORY (INHALATION) at 07:06

## 2018-06-27 RX ADMIN — AMIODARONE HYDROCHLORIDE 200 MG: 200 TABLET ORAL at 08:06

## 2018-06-27 NOTE — ASSESSMENT & PLAN NOTE
June 26, 2018-the patient has new onset thrombocytopenia with history of heparin exposure.  HIT needs to be considered in the differential diagnosis.  Recommend discontinuing all heparin.  Recommend initiation of Eliquis while results of HIT panel are pending.  I have recommended Eliquis instead of argatroban in this clinical situation because of the patient's critical illness which likely will result in significant variability in clinical efficacy with argatroban and likely put her at higher risk for bleeding than Eliquis.  Risks/benefits were reviewed in detail and discussed with General surgery and Hospital Medicine.  I would recommend holding off on PEG tube placement until HIT panel result is available.  I will continue follow the patient with an assist with the patient's management as needed.  Please call with any questions.  June 27, 2018-patient continues to have worsening thrombocytopenia.  I will follow up with pending HIT panel.  Continue Eliquis.  I will continue follow the patient with you and assist with the patient's management as needed.  Please call with any questions.

## 2018-06-27 NOTE — ASSESSMENT & PLAN NOTE
Status post parathyroidectomy  Monitor calcium  S/p neck washout  Neck is supple, no further swelling since drain out.   PEG tube on hold at this time.  Bilateral pneumonia, increased wbc - tx per Hospital Medicine and Pulm

## 2018-06-27 NOTE — PROGRESS NOTES
[facility] -   Hematology/Oncology  Progress Note    Patient Name: Citlali Karimi  Admission Date: 6/12/2018  Hospital Length of Stay: 14 days  Code Status: DNR     Subjective:     HPI:  68 year old female, PMHx of CKD (IV), HTN, DM, CVA (left sided weakness), and CAD who initially presented for hyperparathyroidism and hypercalcemia requiring surgical intervention.   She was seen in clinic by surgery for hyperparathyroidism and hypercalcemia and was electively admitted 6/12 taken to OR undergoing parathyroidectomy finding 2 right sided enlarged parathyroid glands.  Intra and post op patient developed EKG changes and had elevated troponin.  Cards consulted.  Patient admitted due to swallowing issues she was not taking all her meds as prescribed and had BP as high as 225/103 during hospitalization.  Troponin increased to > 50.  Cards diagnosed with NSTEMI and she was Rx with ASA, Imdur, Lopressor, statin and Heparin infusion with plans for repeat LHC if cleared by Renal given CKD5.  Today, she had progressively worsening neck swelling and SOB with worsening dysphagia.  She was taken back to OR and had post op neck hematoma evacuation.  In PACU she had witnessed V-Fib cardiac arrest and CODE BLUE called.  After 3 rounds of CPR, Epi X 3 and Defib X 3 ROSC was attained.  She was still intubated and on vent post op    Interval History:  The patient is moaning in bed and is unable to answer specific questions.  She is able to open her eyes when her name is called.     Oncology Treatment Plan:   [No treatment plan]    Medications:  Continuous Infusions:  Scheduled Meds:   sodium chloride 0.9%   Intravenous Once    albuterol-ipratropium  3 mL Nebulization Q4H WAKE    amiodarone  200 mg Oral BID    apixaban  5 mg Oral BID    aspirin  81 mg Oral Daily    epoetin carmen (PROCRIT) injection  4,000 Units Intravenous Every Tues, Thurs, Sat    famotidine  20 mg Oral Daily    metoprolol tartrate  25 mg Oral BID     piperacillin-tazobactam (ZOSYN) IVPB  4.5 g Intravenous Q12H    sodium bicarbonate  650 mg Oral BID    vancomycin (VANCOCIN) IVPB  1,250 mg Intravenous Once    [START ON 6/29/2018] vancomycin (VANCOCIN) IVPB  1,250 mg Intravenous Q48H    vancomycin  125 mg Oral Q6H     PRN Meds:sodium chloride 0.9%, acetaminophen, bisacodyl, cloNIDine, dextrose 50%, dextrose 50%, glucagon (human recombinant), glucose, glucose, heparin (porcine), hydrALAZINE, HYDROcodone-acetaminophen, insulin aspart U-100, lactulose, lorazepam, ondansetron, sodium bicarbonate, sodium chloride 0.9%     Review of Systems   Unable to perform ROS: Mental status change     Objective:     Vital Signs (Most Recent):  Temp: 98.4 °F (36.9 °C) (06/27/18 1224)  Pulse: 87 (06/27/18 1511)  Resp: 20 (06/27/18 1511)  BP: (!) 108/56 (06/27/18 1224)  SpO2: (!) 94 % (06/27/18 1511) Vital Signs (24h Range):  Temp:  [96.7 °F (35.9 °C)-99 °F (37.2 °C)] 98.4 °F (36.9 °C)  Pulse:  [78-94] 87  Resp:  [16-24] 20  SpO2:  [90 %-98 %] 94 %  BP: ()/(50-63) 108/56     Weight: 71.8 kg (158 lb 4.6 oz)  Body mass index is 28.95 kg/m².  Body surface area is 1.77 meters squared.      Intake/Output Summary (Last 24 hours) at 06/27/18 1543  Last data filed at 06/27/18 0600   Gross per 24 hour   Intake              832 ml   Output                0 ml   Net              832 ml       Physical Exam   Constitutional: She appears well-developed and well-nourished. She is active and cooperative. She appears ill.   HENT:   Head: Normocephalic and atraumatic.   Nose: Nose normal.   Mouth/Throat: Oropharynx is clear and moist. No oropharyngeal exudate.   Healing neck incision is noted.   Eyes: Pupils are equal, round, and reactive to light. No scleral icterus.   Neck: Neck supple. No thyromegaly present.   Cardiovascular: Normal rate, regular rhythm, normal heart sounds and intact distal pulses.    No murmur heard.  Pulmonary/Chest: Effort normal. No stridor. No respiratory  distress. She has no wheezes. She has rhonchi. She has no rales.   Abdominal: Soft. Bowel sounds are normal. She exhibits no distension, no ascites and no mass. There is no hepatosplenomegaly. There is no tenderness. There is no rigidity, no rebound and no guarding.   Musculoskeletal: She exhibits no edema or tenderness.   Lymphadenopathy:     She has no cervical adenopathy.   Neurological: She is unresponsive. No cranial nerve deficit. She exhibits normal muscle tone. Coordination normal.   Skin: Skin is warm and dry. Ecchymosis noted. No rash noted. No pallor.   Psychiatric: She is slowed. She is noncommunicative.   Nursing note and vitals reviewed.      Significant Labs:   I have reviewed all of the patient's relevant lab work available in the medical record and have utilized this in my evaluation and management recommendations today    Diagnostic Results:  I have reviewed all of the patient's diagnostic/imaging results available in the medical record and have utilized this in my evaluation and management recommendations today.    Assessment/Plan:     Thrombocytopenia    June 26, 2018-the patient has new onset thrombocytopenia with history of heparin exposure.  HIT needs to be considered in the differential diagnosis.  Recommend discontinuing all heparin.  Recommend initiation of Eliquis while results of HIT panel are pending.  I have recommended Eliquis instead of argatroban in this clinical situation because of the patient's critical illness which likely will result in significant variability in clinical efficacy with argatroban and likely put her at higher risk for bleeding than Eliquis.  Risks/benefits were reviewed in detail and discussed with General surgery and Hospital Medicine.  I would recommend holding off on PEG tube placement until HIT panel result is available.  I will continue follow the patient with an assist with the patient's management as needed.  Please call with any questions.  June 27,  2018-patient continues to have worsening thrombocytopenia.  I will follow up with pending HIT panel.  Continue Eliquis.  I will continue follow the patient with you and assist with the patient's management as needed.  Please call with any questions.          Acute renal failure superimposed on stage 5 chronic kidney disease, not on chronic dialysis    June 22, 2018-patient has anemia now due to end-stage renal disease.  Treatment of this will be as per Nephrology protocol with IV iron/Depo supplementation with hemodialysis.  Please call with any questions.  June 23, 2018-patient has end-stage renal disease and is on RRT.  Management of anemia will be as per Nephrology.  June 24, 2018-the patient is on RRT for end-stage renal disease.  Management of EPO/IV iron will be as per Nephrology protocol.  June 26, 2018-the patient is on RRT for end-stage renal disease.  She will continue EPO/IV iron as per Nephrology/dialysis protocol.  June 27, 2018-the patient is on RRT for end-stage renal disease.  She will continue epo/IV iron as per Nephrology/dialysis protocol.        Acute blood loss anemia    6/22/18 - Patient has been treated with Procrit for maintenance therapy for anemia due to chronic kidney disease. Patient with hemoglobin of 8.2 this morning.  No current evidence of bleeding noted.  We will continue to monitor closely.  June 23, 2018-the patient has acute blood loss anemia is stable with hemoglobin greater than 8 grams/deciliter today.  She will continue Procrit as per HD protocol.  No urgent indication for PRBC transfusion today.  June 24, 2018-CBC from today shows hemoglobin greater than 7 grams/deciliter.  I would recommend transfusion of PRBCs for supportive care if hemoglobin less than 7 grams/deciliter.  June 26, 2018-CBC from today shows hemoglobin greater than 7 grams/deciliter.  No urgent indication for PRBC transfusion at this time.  June 27, 2018-CBC from today shows hemoglobin greater than 7  grams/deciliter.  No urgent indication for PRBC transfusion at this time.            Thank you for your consult.      David Addison MD  Hematology/Oncology  [facility]

## 2018-06-27 NOTE — PROGRESS NOTES
[facility]  General Surgery  Progress Note    Subjective:     History of Present Illness:  No notes on file    Post-Op Info:  Procedure(s) (LRB):  VASCULAR CATH EXCHANGE (N/A)   3 Days Post-Op     Interval History: increased wbc, bilateral pneumonia, negative HIT    Medications:  Continuous Infusions:  Scheduled Meds:   sodium chloride 0.9%   Intravenous Once    albuterol-ipratropium  3 mL Nebulization Q4H WAKE    amiodarone  200 mg Oral BID    apixaban  5 mg Oral BID    aspirin  81 mg Oral Daily    epoetin carmen (PROCRIT) injection  4,000 Units Intravenous Every Tues, Thurs, Sat    famotidine  20 mg Oral Daily    metoprolol tartrate  25 mg Oral BID    piperacillin-tazobactam (ZOSYN) IVPB  4.5 g Intravenous Q12H    sodium bicarbonate  650 mg Oral BID    vancomycin (VANCOCIN) IVPB  1,250 mg Intravenous Once    [START ON 6/29/2018] vancomycin (VANCOCIN) IVPB  1,250 mg Intravenous Q48H    vancomycin  125 mg Oral Q6H     PRN Meds:sodium chloride 0.9%, acetaminophen, bisacodyl, cloNIDine, dextrose 50%, dextrose 50%, glucagon (human recombinant), glucose, glucose, heparin (porcine), hydrALAZINE, HYDROcodone-acetaminophen, insulin aspart U-100, lactulose, lorazepam, ondansetron, sodium bicarbonate, sodium chloride 0.9%     Review of patient's allergies indicates:   Allergen Reactions    Lipitor [atorvastatin] Swelling     Lips       Objective:     Vital Signs (Most Recent):  Temp: 98.4 °F (36.9 °C) (06/27/18 1224)  Pulse: 86 (06/27/18 1224)  Resp: (!) 24 (06/27/18 1224)  BP: (!) 108/56 (06/27/18 1224)  SpO2: (!) 94 % (06/27/18 1224) Vital Signs (24h Range):  Temp:  [96.7 °F (35.9 °C)-99 °F (37.2 °C)] 98.4 °F (36.9 °C)  Pulse:  [78-94] 86  Resp:  [16-24] 24  SpO2:  [90 %-98 %] 94 %  BP: ()/(50-63) 108/56     Weight: 71.8 kg (158 lb 4.6 oz)  Body mass index is 28.95 kg/m².    Intake/Output - Last 3 Shifts       06/25 0700 - 06/26 0659 06/26 0700 - 06/27 0659 06/27 0700 - 06/28 0659     NG/GT  762     IV Piggyback  100     Total Intake(mL/kg)  862 (12)     Other  1500     Total Output   1500      Net   -638             Urine Occurrence 2 x 2 x     Stool Occurrence  3 x 1 x          Physical Exam   Constitutional: She appears well-developed. She appears distressed.   HENT:   Head: Normocephalic and atraumatic.   Eyes: EOM are normal.   Cardiovascular: Normal rate and regular rhythm.    Pulmonary/Chest: Effort normal. No respiratory distress.   Rhonci   Abdominal: Soft.   Musculoskeletal: Normal range of motion.   Neurological:   unresponsive   Skin: Skin is warm and dry.   Psychiatric: She has a normal mood and affect. Thought content normal.   Vitals reviewed.      Significant Labs:  CBC:   Recent Labs  Lab 06/27/18  0514   WBC 18.26*   RBC 2.80*   HGB 7.8*   HCT 24.6*   PLT 59*   MCV 88   MCH 27.9   MCHC 31.7*     CMP:   Recent Labs  Lab 06/27/18  0514   *   CALCIUM 9.0   ALBUMIN 2.4*   PROT 6.1      K 4.1   CO2 25      BUN 56*   CREATININE 4.6*   ALKPHOS 73   ALT 12   AST 21   BILITOT 0.7       Significant Diagnostics:  I have reviewed all pertinent imaging results/findings within the past 24 hours.    Assessment/Plan:     Decreased platelet count    Discussed with Dr. Addison.   Plt decreased by ~50% over last 2 days.   Need to rule out HIT   Heparin products stopped and pt started on Eliquis   Check antibody  PEG tube procedure held for now        C. difficile diarrhea    Antibiotics        ESRD (end stage renal disease)    Dialysis per Nephrology        Hyperparathyroidism    Status post parathyroidectomy  Monitor calcium  S/p neck washout  Neck is supple, no further swelling since drain out.   PEG tube on hold at this time.  Bilateral pneumonia, increased wbc - tx per Hospital Medicine and Pulm            Coronary artery disease involving native coronary artery    Cardiology following  On anticoagulation        Acute renal failure superimposed on stage 5 chronic kidney  disease, not on chronic dialysis    Pt tolerating hemo-dialysis well        Hypertension associated with diabetes    Antihypertensive medications            Becka Raygoza PA-C  General Surgery  [facility]

## 2018-06-27 NOTE — PT/OT/SLP PROGRESS
"Occupational Therapy  Treatment    Citlali Karimi   MRN: [id number]   Admitting Diagnosis: Critical illness polyneuropathy    OT Date of Treatment: 06/27/18   OT Start Time: 1435  OT Stop Time: 1450  OT Total Time (min): 15 min    Billable Minutes:  Therapeutic Activity 15    General Precautions: Standard, fall (ELEVATE HOB DUE TO NG TUBE)  Orthopedic Precautions: N/A  Braces: N/A         Subjective:  Communicated with PHYSICAL THERAPY prior to session.      Pain/Comfort  Pain Rating 1: 8/10  Location 1:  (PT MOANING IN GENERAL.  NO VERBAL RESPONSE TO CLARIFY PAIN.)    Objective:  Patient found with: NG tube, peripheral IV, oxygen, telemetry     Functional Mobility:  Bed Mobility:       Transfers:        Functional Ambulation: N/A    Activities of Daily Living:     Feeding adaptive equipment: NG TUBE     UE adaptive equipment: NT     LE adaptive equipment: NT                    Bathing adaptive equipment: NT    Balance:   Static Sit: NT  Dynamic Sit: NT  Static Stand: NT  Dynamic stand: NT    Therapeutic Activities and Exercises:  BUE PROM PERFORMED ALL PLANES DUE TO PT NOT FOLLOWING COMMANDS FOR BUE AROM.  ORAL HYGIENE ATTEMPTED, HOWEVER, PT DEMO'ED ABILITY TO USE LUE TO PUSH THERAPIST'S HAND AWAY FROM MOUTH.    AM-PAC 6 CLICK ADL   How much help from another person does this patient currently need?   1 = Unable, Total/Dependent Assistance  2 = A lot, Maximum/Moderate Assistance  3 = A little, Minimum/Contact Guard/Supervision  4 = None, Modified [place]/Independent    Putting on and taking off regular lower body clothing? : 1  Bathing (including washing, rinsing, drying)?: 1  Toileting, which includes using toilet, bedpan, or urinal? : 1  Putting on and taking off regular upper body clothing?: 1  Taking care of personal grooming such as brushing teeth?: 1  Eating meals?: 1  Daily Activity Total Score: 6     AM-PAC Raw Score CMS "G-Code Modifier Level of Impairment Assistance   6 % Total / Unable   7 - 8 " CM 80 - 100% Maximal Assist   9-13 CL 60 - 80% Moderate Assist   14 - 19 CK 40 - 60% Moderate Assist   20 - 22 CJ 20 - 40% Minimal Assist   23 CI 1-20% SBA / CGA   24 CH 0% Independent/ Mod I       Patient left HOB elevated with all lines intact and  present    ASSESSMENT:  Citlali Karimi is a 68 y.o. female with a medical diagnosis of Critical illness polyneuropathy and presents with SELF CARE DEBILITY.    Rehab identified problem list/impairments: Rehab identified problem list/impairments: weakness, impaired endurance, impaired self care skills, impaired functional mobilty, gait instability, impaired balance, decreased upper extremity function, pain, decreased ROM    Rehab potential is fair.    Activity tolerance: Poor    Discharge recommendations: Discharge Facility/Level Of Care Needs: LTACH (long term acute care hospital), nursing facility, skilled     Barriers to discharge: Barriers to Discharge: Inaccessible home environment    Equipment recommendations:  (TBD)     GOALS:    Occupational Therapy Goals        Problem: Occupational Therapy Goal    Goal Priority Disciplines Outcome Interventions   Occupational Therapy Goal     OT, PT/OT Ongoing (interventions implemented as appropriate)    Description:  Goals to be met by: 6-29-18    Patient will increase functional independence with ADLs by performing:    UE Dressing with Moderate Assistance.  LE Dressing with Moderate Assistance.  Sitting at edge of bed x10 minutes with Moderate Assistance.  Upper extremity exercise program x10 reps            Problem: Occupational Therapy Goal    Goal Priority Disciplines Outcome Interventions   Occupational Therapy Goal     OT, PT/OT                     Plan:  Patient to be seen 3 x/week to address the above listed problems via self-care/home management, therapeutic activities, therapeutic exercises  Plan of Care expires:    Plan of Care reviewed with: patient    OT G-codes  Functional Assessment Tool Used: BOSTON  AM-PAC  Score:  (6)  Functional Limitation: Self care  Self Care Current Status (): CN  Self Care Goal Status (): CL    Doris Jorgensen OT  06/27/2018

## 2018-06-27 NOTE — ASSESSMENT & PLAN NOTE
Follow up with culture   Broad the antibotics to vanco and zosyn   cxr worse   Discussed goals of care with family

## 2018-06-27 NOTE — SUBJECTIVE & OBJECTIVE
Interval History:  Patient is becoming more hypotensive with mental status changes and worsening shortness of breath.  Chest x-ray shows bilateral lung infiltrates consistent with bilateral extensive pneumonia.  Pending CT scan of the chest.  Patient had last hemodialysis done yesterday.  Pending further workup of shortness of breath and lung infiltrates.  As discussed with Hospital Medicine doctor Cam     Review of patient's allergies indicates:   Allergen Reactions    Lipitor [atorvastatin] Swelling     Lips       Current Facility-Administered Medications   Medication Frequency    0.9%  NaCl infusion PRN    0.9%  NaCl infusion Once    acetaminophen oral solution 650 mg Q4H PRN    albuterol-ipratropium 2.5 mg-0.5 mg/3 mL nebulizer solution 3 mL Q4H WAKE    amiodarone tablet 200 mg BID    apixaban tablet 5 mg BID    aspirin chewable tablet 81 mg Daily    bisacodyl suppository 10 mg Daily PRN    cloNIDine tablet 0.1 mg Q6H PRN    dextrose 50% injection 12.5 g PRN    dextrose 50% injection 25 g PRN    epoetin carmen injection 4,000 Units Every Tues, Thurs, Sat    famotidine tablet 20 mg Daily    glucagon (human recombinant) injection 1 mg PRN    glucose chewable tablet 16 g PRN    glucose chewable tablet 24 g PRN    heparin (porcine) injection 3,000 Units PRN    hydrALAZINE injection 10 mg Q8H PRN    HYDROcodone-acetaminophen 7.5-325 mg per tablet 1 tablet Q6H PRN    insulin aspart U-100 pen 0-5 Units QID (AC + HS) PRN    lactulose 20 gram/30 mL solution Soln 20 g Q6H PRN    lorazepam (ATIVAN) injection 2 mg Q4H PRN    metoprolol tartrate (LOPRESSOR) tablet 25 mg BID    ondansetron injection 4 mg Q8H PRN    piperacillin-tazobactam 4.5 g in dextrose 5 % 100 mL IVPB (ready to mix system) Q12H    sodium bicarbonate 8.4 % (1 mEq/mL) injection PRN    sodium bicarbonate tablet 650 mg BID    sodium chloride 0.9% flush 3 mL PRN    vancomycin (VANCOCIN) 1,250 mg in dextrose 5 % 250 mL IVPB Once     vancomycin 250mg / 10ml oral suspension 125 mg Q6H       Objective:     Vital Signs (Most Recent):  Temp: 98.4 °F (36.9 °C) (06/27/18 1224)  Pulse: 86 (06/27/18 1224)  Resp: (!) 24 (06/27/18 1224)  BP: (!) 108/56 (06/27/18 1224)  SpO2: (!) 94 % (06/27/18 1224)  O2 Device (Oxygen Therapy): room air (06/27/18 1224) Vital Signs (24h Range):  Temp:  [96.7 °F (35.9 °C)-99 °F (37.2 °C)] 98.4 °F (36.9 °C)  Pulse:  [78-94] 86  Resp:  [16-24] 24  SpO2:  [90 %-98 %] 94 %  BP: ()/(50-63) 108/56     Weight: 71.8 kg (158 lb 4.6 oz) (06/27/18 0405)  Body mass index is 28.95 kg/m².  Body surface area is 1.77 meters squared.    I/O last 3 completed shifts:  In: 862 [NG/GT:762; IV Piggyback:100]  Out: 1500 [Other:1500]    Physical Exam   Constitutional: She appears well-developed and well-nourished. She is active and cooperative. She appears ill.   HENT:   Head: Normocephalic and atraumatic.   Nose: Nose normal.   Mouth/Throat: Oropharynx is clear and moist. No oropharyngeal exudate.   Healing neck incision is noted.   Eyes: Pupils are equal, round, and reactive to light. No scleral icterus.   Neck: Neck supple. No thyromegaly present.   Cardiovascular: Normal rate, regular rhythm, normal heart sounds and intact distal pulses.    No murmur heard.  Pulmonary/Chest: Effort normal. No stridor. No respiratory distress. She has no wheezes. She has rhonchi. She has no rales.   Abdominal: Soft. Bowel sounds are normal. She exhibits no distension, no ascites and no mass. There is no hepatosplenomegaly. There is no tenderness. There is no rigidity, no rebound and no guarding.   Musculoskeletal: She exhibits no edema or tenderness.   Lymphadenopathy:     She has no cervical adenopathy.   Neurological: She is unresponsive. No cranial nerve deficit. She exhibits normal muscle tone. Coordination normal.   Skin: Skin is warm and dry. Ecchymosis noted. No rash noted. No pallor.   Psychiatric: She is slowed. She is noncommunicative.    Nursing note and vitals reviewed.      Significant Labs:  All labs within the past 24 hours have been reviewed.     Significant Imaging:  Labs: Reviewed  X-Ray: Reviewed  CT: Reviewed

## 2018-06-27 NOTE — ASSESSMENT & PLAN NOTE
June 22, 2018-patient has anemia now due to end-stage renal disease.  Treatment of this will be as per Nephrology protocol with IV iron/Depo supplementation with hemodialysis.  Please call with any questions.  June 23, 2018-patient has end-stage renal disease and is on RRT.  Management of anemia will be as per Nephrology.  June 24, 2018-the patient is on RRT for end-stage renal disease.  Management of EPO/IV iron will be as per Nephrology protocol.  June 26, 2018-the patient is on RRT for end-stage renal disease.  She will continue EPO/IV iron as per Nephrology/dialysis protocol.  June 27, 2018-the patient is on RRT for end-stage renal disease.  She will continue epo/IV iron as per Nephrology/dialysis protocol.

## 2018-06-27 NOTE — SUBJECTIVE & OBJECTIVE
Review of Systems   Unable to perform ROS: Mental status change     Objective:     Vital Signs (Most Recent):  Temp: 96.7 °F (35.9 °C) (06/27/18 0748)  Pulse: 87 (06/27/18 0748)  Resp: (!) 24 (06/27/18 0748)  BP: (!) 93/50 (06/27/18 0748)  SpO2: (!) 93 % (06/27/18 0748) Vital Signs (24h Range):  Temp:  [96.7 °F (35.9 °C)-99 °F (37.2 °C)] 96.7 °F (35.9 °C)  Pulse:  [81-95] 87  Resp:  [16-24] 24  SpO2:  [90 %-98 %] 93 %  BP: ()/(50-67) 93/50     Weight: 71.8 kg (158 lb 4.6 oz)  Body mass index is 28.95 kg/m².    Intake/Output Summary (Last 24 hours) at 06/27/18 1130  Last data filed at 06/27/18 0600   Gross per 24 hour   Intake              862 ml   Output             1500 ml   Net             -638 ml      Physical Exam   Constitutional: She appears well-developed and well-nourished. She is active and cooperative. She appears ill.   HENT:   Head: Normocephalic and atraumatic.   Nose: Nose normal.   Mouth/Throat: Oropharynx is clear and moist. No oropharyngeal exudate.   Healing neck incision is noted.   Eyes: Pupils are equal, round, and reactive to light. No scleral icterus.   Neck: Neck supple. No thyromegaly present.   Cardiovascular: Normal rate, regular rhythm, normal heart sounds and intact distal pulses.    No murmur heard.  Pulmonary/Chest: Effort normal. No stridor. No respiratory distress. She has no wheezes. She has rhonchi. She has no rales.   Abdominal: Soft. Bowel sounds are normal. She exhibits no distension, no ascites and no mass. There is no hepatosplenomegaly. There is no tenderness. There is no rigidity, no rebound and no guarding.   Musculoskeletal: She exhibits no edema or tenderness.   Lymphadenopathy:     She has no cervical adenopathy.   Neurological: She is unresponsive. No cranial nerve deficit. She exhibits normal muscle tone. Coordination normal.   Skin: Skin is warm and dry. Ecchymosis noted. No rash noted. No pallor.   Psychiatric: She is slowed. She is noncommunicative.    Nursing note and vitals reviewed.      Significant Labs: All pertinent labs within the past 24 hours have been reviewed.    Significant Imaging: I have reviewed all pertinent imaging results/findings within the past 24 hours.

## 2018-06-27 NOTE — PLAN OF CARE
Problem: Occupational Therapy Goal  Goal: Occupational Therapy Goal  Goals to be met by: 6-29-18    Patient will increase functional independence with ADLs by performing:    UE Dressing with Moderate Assistance.  LE Dressing with Moderate Assistance.  Sitting at edge of bed x10 minutes with Moderate Assistance.  Upper extremity exercise program x10 reps    Outcome: Ongoing (interventions implemented as appropriate)  CONT WITH ESTABLISHED GOALS WITH FOCUS ON INCREASED FX STRENGTH/ENDURANCE.    Comments: CONT WITH ESTABLISHED GOALS WITH FOCUS ON INCREASED FX STRENGTH/ENDURANCE.

## 2018-06-27 NOTE — PLAN OF CARE
Problem: Physical Therapy Goal  Goal: Physical Therapy Goal  LTG'S TO BE MET IN 7 DAYS (7-2-18)  1. PT WILL REQUIRE MAXA FOR BED MOBILITY  2. PT WILL REQUIRE MAXA FOR SIT<>STAND TF USING RW  3. PT WILL SIT EOB WITH FAIR SITTING BALANCE X 8 MINUTES WITH NO C/O FATIGUE  4. PT WILL TOLERATE BLE THEREX X 20 REPS AAROM  5. PT WILL PARTICIPATE IN GAIT ASSESSMENT    Outcome: Ongoing (interventions implemented as appropriate)  NO VERBALIZATION, NO PARTICIPATION, TOTAL ASSIST X 2 TO ASSIST WITH CLEANING DUE TO INCONTINENT OF BOWEL IN BED

## 2018-06-27 NOTE — SUBJECTIVE & OBJECTIVE
Interval History:  The patient is moaning in bed and is unable to answer specific questions.  She is able to open her eyes when her name is called.     Oncology Treatment Plan:   [No treatment plan]    Medications:  Continuous Infusions:  Scheduled Meds:   sodium chloride 0.9%   Intravenous Once    albuterol-ipratropium  3 mL Nebulization Q4H WAKE    amiodarone  200 mg Oral BID    apixaban  5 mg Oral BID    aspirin  81 mg Oral Daily    epoetin carmen (PROCRIT) injection  4,000 Units Intravenous Every Tues, Thurs, Sat    famotidine  20 mg Oral Daily    metoprolol tartrate  25 mg Oral BID    piperacillin-tazobactam (ZOSYN) IVPB  4.5 g Intravenous Q12H    sodium bicarbonate  650 mg Oral BID    vancomycin (VANCOCIN) IVPB  1,250 mg Intravenous Once    [START ON 6/29/2018] vancomycin (VANCOCIN) IVPB  1,250 mg Intravenous Q48H    vancomycin  125 mg Oral Q6H     PRN Meds:sodium chloride 0.9%, acetaminophen, bisacodyl, cloNIDine, dextrose 50%, dextrose 50%, glucagon (human recombinant), glucose, glucose, heparin (porcine), hydrALAZINE, HYDROcodone-acetaminophen, insulin aspart U-100, lactulose, lorazepam, ondansetron, sodium bicarbonate, sodium chloride 0.9%     Review of Systems   Unable to perform ROS: Mental status change     Objective:     Vital Signs (Most Recent):  Temp: 98.4 °F (36.9 °C) (06/27/18 1224)  Pulse: 87 (06/27/18 1511)  Resp: 20 (06/27/18 1511)  BP: (!) 108/56 (06/27/18 1224)  SpO2: (!) 94 % (06/27/18 1511) Vital Signs (24h Range):  Temp:  [96.7 °F (35.9 °C)-99 °F (37.2 °C)] 98.4 °F (36.9 °C)  Pulse:  [78-94] 87  Resp:  [16-24] 20  SpO2:  [90 %-98 %] 94 %  BP: ()/(50-63) 108/56     Weight: 71.8 kg (158 lb 4.6 oz)  Body mass index is 28.95 kg/m².  Body surface area is 1.77 meters squared.      Intake/Output Summary (Last 24 hours) at 06/27/18 1543  Last data filed at 06/27/18 0600   Gross per 24 hour   Intake              832 ml   Output                0 ml   Net              832 ml        Physical Exam   Constitutional: She appears well-developed and well-nourished. She is active and cooperative. She appears ill.   HENT:   Head: Normocephalic and atraumatic.   Nose: Nose normal.   Mouth/Throat: Oropharynx is clear and moist. No oropharyngeal exudate.   Healing neck incision is noted.   Eyes: Pupils are equal, round, and reactive to light. No scleral icterus.   Neck: Neck supple. No thyromegaly present.   Cardiovascular: Normal rate, regular rhythm, normal heart sounds and intact distal pulses.    No murmur heard.  Pulmonary/Chest: Effort normal. No stridor. No respiratory distress. She has no wheezes. She has rhonchi. She has no rales.   Abdominal: Soft. Bowel sounds are normal. She exhibits no distension, no ascites and no mass. There is no hepatosplenomegaly. There is no tenderness. There is no rigidity, no rebound and no guarding.   Musculoskeletal: She exhibits no edema or tenderness.   Lymphadenopathy:     She has no cervical adenopathy.   Neurological: She is unresponsive. No cranial nerve deficit. She exhibits normal muscle tone. Coordination normal.   Skin: Skin is warm and dry. Ecchymosis noted. No rash noted. No pallor.   Psychiatric: She is slowed. She is noncommunicative.   Nursing note and vitals reviewed.      Significant Labs:   I have reviewed all of the patient's relevant lab work available in the medical record and have utilized this in my evaluation and management recommendations today    Diagnostic Results:  I have reviewed all of the patient's diagnostic/imaging results available in the medical record and have utilized this in my evaluation and management recommendations today.

## 2018-06-27 NOTE — PLAN OF CARE
Problem: Patient Care Overview  Goal: Plan of Care Review  Outcome: Ongoing (interventions implemented as appropriate)  Remained free from injury. Turning q2 with assist. Continuous tube feeding to NG tube at 40ml/hr. 3 bm this shift. Dialysis this shift. NSR on monitor. 12 hour chart check complete.

## 2018-06-27 NOTE — PLAN OF CARE
Patient  Has no family at the bedside. CM called spouse and left a message for a return call. CM to f/u for discussion with d/c plans

## 2018-06-27 NOTE — ASSESSMENT & PLAN NOTE
Patient is deteriorating hemodynamically with worsening sepsis.  Recommend CT scan of the chest and further workup on bilateral lung infiltrates.  Hold off on dialysis for the time being and re-evaluate in a few hours after the evaluation is complete.  Patient carries a poor prognosis at this time.  Low threshold for ICU transfer.

## 2018-06-27 NOTE — SUBJECTIVE & OBJECTIVE
Interval History: increased wbc, bilateral pneumonia, negative HIT    Medications:  Continuous Infusions:  Scheduled Meds:   sodium chloride 0.9%   Intravenous Once    albuterol-ipratropium  3 mL Nebulization Q4H WAKE    amiodarone  200 mg Oral BID    apixaban  5 mg Oral BID    aspirin  81 mg Oral Daily    epoetin carmen (PROCRIT) injection  4,000 Units Intravenous Every Tues, Thurs, Sat    famotidine  20 mg Oral Daily    metoprolol tartrate  25 mg Oral BID    piperacillin-tazobactam (ZOSYN) IVPB  4.5 g Intravenous Q12H    sodium bicarbonate  650 mg Oral BID    vancomycin (VANCOCIN) IVPB  1,250 mg Intravenous Once    [START ON 6/29/2018] vancomycin (VANCOCIN) IVPB  1,250 mg Intravenous Q48H    vancomycin  125 mg Oral Q6H     PRN Meds:sodium chloride 0.9%, acetaminophen, bisacodyl, cloNIDine, dextrose 50%, dextrose 50%, glucagon (human recombinant), glucose, glucose, heparin (porcine), hydrALAZINE, HYDROcodone-acetaminophen, insulin aspart U-100, lactulose, lorazepam, ondansetron, sodium bicarbonate, sodium chloride 0.9%     Review of patient's allergies indicates:   Allergen Reactions    Lipitor [atorvastatin] Swelling     Lips       Objective:     Vital Signs (Most Recent):  Temp: 98.4 °F (36.9 °C) (06/27/18 1224)  Pulse: 86 (06/27/18 1224)  Resp: (!) 24 (06/27/18 1224)  BP: (!) 108/56 (06/27/18 1224)  SpO2: (!) 94 % (06/27/18 1224) Vital Signs (24h Range):  Temp:  [96.7 °F (35.9 °C)-99 °F (37.2 °C)] 98.4 °F (36.9 °C)  Pulse:  [78-94] 86  Resp:  [16-24] 24  SpO2:  [90 %-98 %] 94 %  BP: ()/(50-63) 108/56     Weight: 71.8 kg (158 lb 4.6 oz)  Body mass index is 28.95 kg/m².    Intake/Output - Last 3 Shifts       06/25 0700 - 06/26 0659 06/26 0700 - 06/27 0659 06/27 0700 - 06/28 0659    NG/GT  762     IV Piggyback  100     Total Intake(mL/kg)  862 (12)     Other  1500     Total Output   1500      Net   -638             Urine Occurrence 2 x 2 x     Stool Occurrence  3 x 1 x          Physical Exam    Constitutional: She appears well-developed. She appears distressed.   HENT:   Head: Normocephalic and atraumatic.   Eyes: EOM are normal.   Cardiovascular: Normal rate and regular rhythm.    Pulmonary/Chest: Effort normal. No respiratory distress.   Rhonci   Abdominal: Soft.   Musculoskeletal: Normal range of motion.   Neurological:   unresponsive   Skin: Skin is warm and dry.   Psychiatric: She has a normal mood and affect. Thought content normal.   Vitals reviewed.      Significant Labs:  CBC:   Recent Labs  Lab 06/27/18  0514   WBC 18.26*   RBC 2.80*   HGB 7.8*   HCT 24.6*   PLT 59*   MCV 88   MCH 27.9   MCHC 31.7*     CMP:   Recent Labs  Lab 06/27/18  0514   *   CALCIUM 9.0   ALBUMIN 2.4*   PROT 6.1      K 4.1   CO2 25      BUN 56*   CREATININE 4.6*   ALKPHOS 73   ALT 12   AST 21   BILITOT 0.7       Significant Diagnostics:  I have reviewed all pertinent imaging results/findings within the past 24 hours.

## 2018-06-27 NOTE — PLAN OF CARE
Problem: Patient Care Overview  Goal: Plan of Care Review  Outcome: Ongoing (interventions implemented as appropriate)  Plan of care discussed with patient AAOX4 Vital signs stable afebrile highest temp  99.0 axillary. No loose stool blood sugar at 0000 220 tolerating tube feeding at goal 40 no resduial placement verified and marked  Bed locked at 30 degrees for safety. free from falls bed alarm in use some non verbal pain morphine given as needed plan is for Peg tube then LTAC still need resp culture very weak cough suction at bedside will cont to monitor

## 2018-06-27 NOTE — ASSESSMENT & PLAN NOTE
6/22/18 - Patient has been treated with Procrit for maintenance therapy for anemia due to chronic kidney disease. Patient with hemoglobin of 8.2 this morning.  No current evidence of bleeding noted.  We will continue to monitor closely.  June 23, 2018-the patient has acute blood loss anemia is stable with hemoglobin greater than 8 grams/deciliter today.  She will continue Procrit as per HD protocol.  No urgent indication for PRBC transfusion today.  June 24, 2018-CBC from today shows hemoglobin greater than 7 grams/deciliter.  I would recommend transfusion of PRBCs for supportive care if hemoglobin less than 7 grams/deciliter.  June 26, 2018-CBC from today shows hemoglobin greater than 7 grams/deciliter.  No urgent indication for PRBC transfusion at this time.  June 27, 2018-CBC from today shows hemoglobin greater than 7 grams/deciliter.  No urgent indication for PRBC transfusion at this time.

## 2018-06-27 NOTE — PROGRESS NOTES
[facility] Hospital Medicine  Progress Note    Patient Name: Citlali Karimi  MRN: [id number]  Patient Class: IP- Inpatient   Admission Date: 6/12/2018  Length of Stay: 14 days  Attending Physician: Levy Samuel MD  Primary Care Provider: Evelio Schuster MD        Subjective:     Principal Problem:Critical illness polyneuropathy    HPI:  Citlali Karimi is a 68 y.o female with PMHx of CKD (IV), HTN, DM, CVA (left sided weakness), and CAD who initially presented for hyperparathyroidism and hypercalcemia requiring surgical intervention.  Pt underwent parathyroidectomy today per Dr. Tracy (General Surgery). Pt admitted to Medical Surgical Unit post procedure and Hospital Medicine consulted for medical management.  Chest xray post procedure showed mild asymmetric CHF versus RUL pneumonia. Troponin 0.113 and BNP >270 noted.  Pt given IV Lasix in PACU prior to unit arrival.      Hospital Course:  Pt admitted to Outpatient Extended Recovery post procedure.  Elevated noted post procedure and results trended yielding significant positive response.  Cardiology consulted and Heparin infusion initiated.  Hx of CAD, multiple vessels noted with home medications continued and Imdur dose increased.  Nephrology consulted due to elevated creatinine and [facility] recommended.  Echo results pending.  Heart catheterization procedure considered with family refusing due to concern for worsening kidney function as patient does not want to be on dialysis.  Decreased oral intake and difficulty swallowing noted.  Speech therapist to bedside.  Pt made NPO and General Surgery notified.      6/15  Patient worsening status. ET changed per Pulmonary CC. Patient s/p Code Blue - VFib with recovery. Cardiology taking patient to CATH lab. Discussed with CM plan for post acute care in progress.    6/16  Patient improved o/n. Patient awake and able to follow simple command. Breathing trials in progress. Discussed with Pulmonary  CC    6/17  Patient remains intubated.  at bedside. Cardiology at bedside as well. Discussed with CC Team    6/18  Patient responds to command but remains intubated. Swelling to neck continues to be prominent. Discussed with CC Team. Worsening renal function. Possible HD need.    6/19  Patient remains intubated. Worsening renal function. No events Discussed with CC Team    6/21  Positive cough leak and plan for extubation today .   6/22  Patient continue to be confused . Will consider ct head if didn't improve . Continue treat infection/c difficle .Consulted vascular surgery dr maciel to remove femoral dialysis and put new one subclavian ?. To avoid risk of infection in groin. Patient will continue to need dialysis    6/23  Patient is seen and examined today . More awake today. dialysis catheter was removed from groin and new one will be place in upper body ij vs subclavian. Patient diarrhea improved .   6/24  Patient very lethargic to participate in slp . S/p vascath for hemodialysis . SLP will visit tomorrow . Patient still has ng tube. Diarrhea improved   6/25  Patient lethargic . SLP recommended peg tube . Placement of peg tube tomorrow . After peg tube possible discharge to ltac   6/26  Patient was seen and examined today. cxr shows mid and upper right lobe . Very weak cough reflex. Will start treatment with unisyn for aspiration pneumonia and follow up culture ,procal if no improvement will broaden coverage . Patient also being treated for  c diff . Hematology oncology also ordered eliquis and hit panel . Peg tube delayed   6/27  Discussed critical illness with  bedside he said he will discuss with his niece to guide us with further plan of care  . Patient leukocytosis and platelet dropped - sepsis -pneumonia . Repeat cxr .         Review of Systems   Unable to perform ROS: Mental status change     Objective:     Vital Signs (Most Recent):  Temp: 96.7 °F (35.9 °C) (06/27/18 0748)  Pulse: 87  (06/27/18 0748)  Resp: (!) 24 (06/27/18 0748)  BP: (!) 93/50 (06/27/18 0748)  SpO2: (!) 93 % (06/27/18 0748) Vital Signs (24h Range):  Temp:  [96.7 °F (35.9 °C)-99 °F (37.2 °C)] 96.7 °F (35.9 °C)  Pulse:  [81-95] 87  Resp:  [16-24] 24  SpO2:  [90 %-98 %] 93 %  BP: ()/(50-67) 93/50     Weight: 71.8 kg (158 lb 4.6 oz)  Body mass index is 28.95 kg/m².    Intake/Output Summary (Last 24 hours) at 06/27/18 1130  Last data filed at 06/27/18 0600   Gross per 24 hour   Intake              862 ml   Output             1500 ml   Net             -638 ml      Physical Exam   Constitutional: She appears well-developed and well-nourished. She is active and cooperative. She appears ill.   HENT:   Head: Normocephalic and atraumatic.   Nose: Nose normal.   Mouth/Throat: Oropharynx is clear and moist. No oropharyngeal exudate.   Healing neck incision is noted.   Eyes: Pupils are equal, round, and reactive to light. No scleral icterus.   Neck: Neck supple. No thyromegaly present.   Cardiovascular: Normal rate, regular rhythm, normal heart sounds and intact distal pulses.    No murmur heard.  Pulmonary/Chest: Effort normal. No stridor. No respiratory distress. She has no wheezes. She has rhonchi. She has no rales.   Abdominal: Soft. Bowel sounds are normal. She exhibits no distension, no ascites and no mass. There is no hepatosplenomegaly. There is no tenderness. There is no rigidity, no rebound and no guarding.   Musculoskeletal: She exhibits no edema or tenderness.   Lymphadenopathy:     She has no cervical adenopathy.   Neurological: She is unresponsive. No cranial nerve deficit. She exhibits normal muscle tone. Coordination normal.   Skin: Skin is warm and dry. Ecchymosis noted. No rash noted. No pallor.   Psychiatric: She is slowed. She is noncommunicative.   Nursing note and vitals reviewed.      Significant Labs: All pertinent labs within the past 24 hours have been reviewed.    Significant Imaging: I have reviewed all  pertinent imaging results/findings within the past 24 hours.    Assessment/Plan:      * Critical illness polyneuropathy    -pt/ot         HAP (hospital-acquired pneumonia)    Follow up with culture   Broad the antibotics to vanco and zosyn   cxr worse   Discussed goals of care with family           Thrombocytopenia    -hit panel ordered   -started on eliquis         Acute hypoxemic respiratory failure              C. difficile diarrhea    Continue with oral vancomycin   6/20 started vancomycin   -will need 14 days   -diarrhea improved         NSTEMI (non-ST elevated myocardial infarction)    ASA  Cardiology  LH - Diffuse disease  No further intervention recommended  Medical Management  No statin due to allergy        S/P parathyroidectomy              Cardiac arrest with ventricular fibrillation    Cardiology  [facility] 6/15  ASA  Statin Allergy  BB  No further interventions  Diffuse disease    6/18  ASA  BB  Diffuse disease No intervention    6/19  ASA  BB  No Statin due to allergy  Cardiology        Elevated troponin    -initial 0.113>>34>>50>>41  -pt denies CP and SOB  - EKG results showed diffuse ST changes    -serial results revealed NSTEMI with Cardiology consulted     6/15  NSTEMI POA  Cardiology  [facility]  ASA  Hold Statin due to Elevated LFT's    6/16  Cardiology  ASA  ICU  Statin allergy noted    6/17  S/p LHC - Diffuse disease  ASA  Cards              Hyperparathyroidism    -Pt admitted to Extended Recovery then transferred to Inpatient due to NSTEMI  -s/p Parathyroidectomy   -managed by General Surgery -primary team  - PTH- 32  - Ca 11.4>>10.5  - analgesia prn   - antiemetics prn  - specimens sent for analysis    6/16  Ca 8.7 today  Monitor    6/18  Ca 8.2 today  Monitor    6/19  Ca 8.2 stable        Coronary artery disease involving native coronary artery    - ASA, Statin, and Lopressor continued   -Imdur dose increased   -previous Cath showed severe CAD (proximal LCX 99%, mid 50%, RCA mid 90%, distal with  subtotal lesion).  - Cardiology consulted with medical management continued   - [facility] proposed pending Nephrology recommendations  -family refused [facility] due to concern for worsening kidney function as pt had expressed that she did not want to be on dialysis     6/15  Family electing [facility] and accepting risk of worsening renal function  Cardiology    6/16  S/p LHC  Cardiology  ASA  Statin allergy noted    6/16  No interventions  Cardiology on consult    6/18  Cardiology on Consult  Medical Management    6/19  ASA  BB  6/22  Continue with medical management   6/23  Continue with medical management   6/25  Continue with medical management         Acute renal failure superimposed on stage 5 chronic kidney disease, not on chronic dialysis    -Creatinine 3.6>>4.1  -baseline 2.7-4.4  -will monitor   -sodium bicarb continued   -pt has been followed outpatient by Dr. Vazquez (Nephrology)  -Nephrology consulted - pt may benefit from repeat angio due to NSTEMI but at high risk of contrast induced nephropathy  - pt was candidate for renal transplant however work up discontinued due to progressive weakness  - [facility] recommended         6/15  Monitor worsening Renal Function  S/p Cardiac VFib Arrest  [facility] today  IVF's  Monitor      6/16  [facility] diffuse disease  Cardiology  Monitor    6/17  Worsening  No further Cardiac intervention  Nephrology on consult  Monitor  Cr 6.0 today vs 4.7  Low Urine O/P    6/18  COntinues to worsen. Cr 7.3 today  Nephrology guidance for HD vs Monitoring    6/19  Worsening  Cr 8.3 today - K is 4.6  Nephrology on board  No HD indicated at present  Monitor  6/22  Patient will continue with dialysis . Dr Lara for vascath change .          Hypertension associated with diabetes    - home medications continued   -hx of noncompliance and inconsistent dosing at home per   - uncontrolled upon arrival- improved with Hydralazine in PACU   - Hydralazine prn  6/22  Controlled         Diabetes mellitus, type 2 - only  on oral medications    Controlled  NICC  Accuchecks          Acute blood loss anemia    -stable post procedure  -will monitor  -CBC in am   -pt followed outpatient by Heme/Onc    6/16  Stable  Monitor    6/17  Improving  Monitor    6/18  Hgb 7.7 today  Transfuse PRBC's per sx  Monitor    6/23  Hb 8 stable   Monitor  6/24  No signs of acute blood loss anema           VTE Risk Mitigation         Ordered     apixaban tablet 5 mg  2 times daily      06/26/18 1231     heparin (porcine) injection 3,000 Units  As needed (PRN)      06/26/18 0907     Place sequential compression device  Until discontinued      06/12/18 6432              Dominguez Levy MD  Department of Hospital Medicine   [facility]

## 2018-06-27 NOTE — PROGRESS NOTES
[facility] -   Nephrology  Progress Note    Patient Name: Citlali Karimi  MRN: [id number]  Admission Date: 6/12/2018  Hospital Length of Stay: 14 days  Attending Provider: Levy Samuel MD   Primary Care Physician: Evelio Schuster MD  Principal Problem:Critical illness polyneuropathy    Subjective:     HPI:  Citlali Karimi Is a pleasant 68 -year-old  woman with history of chronic kidney disease stage 4, patient was admitted to the hospital for an elective  partial parathyroidectomy, following her surgery during observation.  She had some chest pain, workup revealed non ST elevated MI, patient was admitted to the hospital for further management.  We were consulted due to advanced renal insufficiency, baseline serum creatinine about 3.5-4,            Important Info :        She underwent a native kidney biopsy on 10/10/13. Final report of the kidney biopsy is negative for any specific etiology for acute renal failure. 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         Interval History:  Patient is becoming more hypotensive with mental status changes and worsening shortness of breath.  Chest x-ray shows bilateral lung infiltrates consistent with bilateral extensive pneumonia.  Pending CT scan of the chest.  Patient had last hemodialysis done yesterday.  Pending further workup of shortness of breath and lung infiltrates.  As discussed with Hospital Medicine doctor Cam     Review of patient's allergies indicates:   Allergen Reactions    Lipitor [atorvastatin] Swelling     Lips       Current Facility-Administered Medications   Medication Frequency    0.9%  NaCl infusion PRN    0.9%  NaCl infusion Once    acetaminophen oral solution 650 mg Q4H PRN    albuterol-ipratropium 2.5 mg-0.5 mg/3 mL nebulizer solution 3 mL Q4H WAKE    amiodarone tablet 200 mg BID    apixaban tablet 5 mg BID    aspirin chewable tablet 81 mg Daily    bisacodyl  suppository 10 mg Daily PRN    cloNIDine tablet 0.1 mg Q6H PRN    dextrose 50% injection 12.5 g PRN    dextrose 50% injection 25 g PRN    epoetin carmen injection 4,000 Units Every Tues, Thurs, Sat    famotidine tablet 20 mg Daily    glucagon (human recombinant) injection 1 mg PRN    glucose chewable tablet 16 g PRN    glucose chewable tablet 24 g PRN    heparin (porcine) injection 3,000 Units PRN    hydrALAZINE injection 10 mg Q8H PRN    HYDROcodone-acetaminophen 7.5-325 mg per tablet 1 tablet Q6H PRN    insulin aspart U-100 pen 0-5 Units QID (AC + HS) PRN    lactulose 20 gram/30 mL solution Soln 20 g Q6H PRN    lorazepam (ATIVAN) injection 2 mg Q4H PRN    metoprolol tartrate (LOPRESSOR) tablet 25 mg BID    ondansetron injection 4 mg Q8H PRN    piperacillin-tazobactam 4.5 g in dextrose 5 % 100 mL IVPB (ready to mix system) Q12H    sodium bicarbonate 8.4 % (1 mEq/mL) injection PRN    sodium bicarbonate tablet 650 mg BID    sodium chloride 0.9% flush 3 mL PRN    vancomycin (VANCOCIN) 1,250 mg in dextrose 5 % 250 mL IVPB Once    vancomycin 250mg / 10ml oral suspension 125 mg Q6H       Objective:     Vital Signs (Most Recent):  Temp: 98.4 °F (36.9 °C) (06/27/18 1224)  Pulse: 86 (06/27/18 1224)  Resp: (!) 24 (06/27/18 1224)  BP: (!) 108/56 (06/27/18 1224)  SpO2: (!) 94 % (06/27/18 1224)  O2 Device (Oxygen Therapy): room air (06/27/18 1224) Vital Signs (24h Range):  Temp:  [96.7 °F (35.9 °C)-99 °F (37.2 °C)] 98.4 °F (36.9 °C)  Pulse:  [78-94] 86  Resp:  [16-24] 24  SpO2:  [90 %-98 %] 94 %  BP: ()/(50-63) 108/56     Weight: 71.8 kg (158 lb 4.6 oz) (06/27/18 0405)  Body mass index is 28.95 kg/m².  Body surface area is 1.77 meters squared.    I/O last 3 completed shifts:  In: 862 [NG/GT:762; IV Piggyback:100]  Out: 1500 [Other:1500]    Physical Exam   Constitutional: She appears well-developed and well-nourished. She is active and cooperative. She appears ill.   HENT:   Head: Normocephalic and  atraumatic.   Nose: Nose normal.   Mouth/Throat: Oropharynx is clear and moist. No oropharyngeal exudate.   Healing neck incision is noted.   Eyes: Pupils are equal, round, and reactive to light. No scleral icterus.   Neck: Neck supple. No thyromegaly present.   Cardiovascular: Normal rate, regular rhythm, normal heart sounds and intact distal pulses.    No murmur heard.  Pulmonary/Chest: Effort normal. No stridor. No respiratory distress. She has no wheezes. She has rhonchi. She has no rales.   Abdominal: Soft. Bowel sounds are normal. She exhibits no distension, no ascites and no mass. There is no hepatosplenomegaly. There is no tenderness. There is no rigidity, no rebound and no guarding.   Musculoskeletal: She exhibits no edema or tenderness.   Lymphadenopathy:     She has no cervical adenopathy.   Neurological: She is unresponsive. No cranial nerve deficit. She exhibits normal muscle tone. Coordination normal.   Skin: Skin is warm and dry. Ecchymosis noted. No rash noted. No pallor.   Psychiatric: She is slowed. She is noncommunicative.   Nursing note and vitals reviewed.      Significant Labs:  All labs within the past 24 hours have been reviewed.     Significant Imaging:  Labs: Reviewed  X-Ray: Reviewed  CT: Reviewed    Assessment/Plan:     ESRD (end stage renal disease)    Patient is deteriorating hemodynamically with worsening sepsis.  Recommend CT scan of the chest and further workup on bilateral lung infiltrates.  Hold off on dialysis for the time being and re-evaluate in a few hours after the evaluation is complete.  Patient carries a poor prognosis at this time.  Low threshold for ICU transfer.              Thank you for your consult.     Chinmay Lyles MD  Nephrology  [facility] -

## 2018-06-27 NOTE — PT/OT/SLP PROGRESS
Physical Therapy  Treatment    Citlali Karimi   MRN: [id number]   Admitting Diagnosis: Critical illness polyneuropathy    PT Received On: 06/27/18  PT Start Time: 1000     PT Stop Time: 1025    PT Total Time (min): 25 min       Billable Minutes:  Therapeutic Activity 25    Treatment Type: Treatment  PT/PTA: PT        General Precautions: Standard, fall  Orthopedic Precautions: N/A   Braces: N/A    Subjective:  Communicated with NURSE ASH prior to session.  Pain/Comfort  Pain Rating 1:  (PT MOANING AND GROANING IN DISCOMFORT UPON ARRIVAL BUT UNABLE TO OR UNWILLING TO VERBALIZE PAIN NUMBER OR LOCATION)    Objective:   Patient found with: telemetry, oxygen, peripheral IV, NG tube    Functional Mobility:  Bed Mobility:   TOTAL ASSIST X 2 FOR LATERAL ROLLING AND SUP<>SIT TF, PT UNABLE/UNWILLING TO PARTICIPATE IN TF, UNABLE TO COMPLETE TF FULLY DUE TO INCONTINENT OF BOWEL IN BED, ASSISTED AIDE WITH ROLLING TO CLEAN BOTTOM AND CHANGE SOILED SHEETS, ALL TOTAL CARE    Transfers:  UNABLE TO ASSESS    Gait:   UNABLE TO ASSESS    Balance:   Static Sit:   Dynamic Sit:   Static Stand:   Dynamic stand:      Therapeutic Activities and Exercises:  ATTEMPTED RE-ORIENTATION WITH PT AND CONVERSATION, PT WITH NO VERBALIZATION, UNABLE TO FOLLOW ANY SIMPLE COMMAND    AM-PAC 6 CLICK MOBILITY  How much help from another person does this patient currently need?   1 = Unable, Total/Dependent Assistance  2 = A lot, Maximum/Moderate Assistance  3 = A little, Minimum/Contact Guard/Supervision  4 = None, Modified Kimberly/Independent    Turning over in bed (including adjusting bedclothes, sheets and blankets)?: 2  Sitting down on and standing up from a chair with arms (e.g., wheelchair, bedside commode, etc.): 1  Moving from lying on back to sitting on the side of the bed?: 1  Moving to and from a bed to a chair (including a wheelchair)?: 1  Need to walk in hospital room?: 1  Climbing 3-5 steps with a railing?: 1  Basic Mobility Total  Score: 7    AM-PAC Raw Score CMS G-Code Modifier Level of Impairment Assistance   6 % Total / Unable   7 - 9 CM 80 - 100% Maximal Assist   10 - 14 CL 60 - 80% Moderate Assist   15 - 19 CK 40 - 60% Moderate Assist   20 - 22 CJ 20 - 40% Minimal Assist   23 CI 1-20% SBA / CGA   24 CH 0% Independent/ Mod I     Patient left supine with all lines intact, call button in reach, bed alarm on, NURSE notified and AIDE present.    Assessment:  Citlali Karimi is a 68 y.o. female with a medical diagnosis of Critical illness polyneuropathy and presents with IMPAIRED FUNCTIONAL MOBILITY. PT WILL BENEFIT FROM CONT. SKILLED P.T. TO ADDRESS IMPAIRMENTS IF WILLING/ABLE TO PARTICIPATE IN P.T. POC    Rehab identified problem list/impairments: Rehab identified problem list/impairments: weakness, impaired endurance, impaired functional mobilty, impaired balance, decreased coordination    Rehab potential is poor.    Activity tolerance: Poor    Discharge recommendations: Discharge Facility/Level Of Care Needs: nursing facility, skilled, LTACH (long term acute care hospital)     Barriers to discharge:     Equipment recommendations: Equipment Needed After Discharge: none     GOALS:    Physical Therapy Goals        Problem: Physical Therapy Goal           Problem: Physical Therapy Goal    Goal Priority Disciplines Outcome Goal Variances Interventions   Physical Therapy Goal     PT/OT, PT Ongoing (interventions implemented as appropriate)     Description:  LTG'S TO BE MET IN 7 DAYS (7-2-18)  1. PT WILL REQUIRE MAXA FOR BED MOBILITY  2. PT WILL REQUIRE MAXA FOR SIT<>STAND TF USING RW  3. PT WILL SIT EOB WITH FAIR SITTING BALANCE X 8 MINUTES WITH NO C/O FATIGUE  4. PT WILL TOLERATE BLE THEREX X 20 REPS AAROM  5. PT WILL PARTICIPATE IN GAIT ASSESSMENT                     PLAN:    Patient to be seen 5 x/week  to address the above listed problems via therapeutic activities, therapeutic exercises  Plan of Care expires: 06/02/18  Plan of Care  reviewed with: patient    Aditi Robledo, PT  06/27/2018

## 2018-06-28 PROBLEM — G93.41 ENCEPHALOPATHY, METABOLIC: Status: ACTIVE | Noted: 2018-06-28

## 2018-06-28 LAB
ALBUMIN SERPL BCP-MCNC: 2.4 G/DL
ALP SERPL-CCNC: 73 U/L
ALT SERPL W/O P-5'-P-CCNC: 9 U/L
ANION GAP SERPL CALC-SCNC: 19 MMOL/L
AST SERPL-CCNC: 26 U/L
BACTERIA UR CULT: NO GROWTH
BASOPHILS # BLD AUTO: 0.02 K/UL
BASOPHILS NFR BLD: 0.1 %
BILIRUB SERPL-MCNC: 0.7 MG/DL
BUN SERPL-MCNC: 80 MG/DL
CALCIUM SERPL-MCNC: 8.7 MG/DL
CHLORIDE SERPL-SCNC: 96 MMOL/L
CO2 SERPL-SCNC: 23 MMOL/L
CREAT SERPL-MCNC: 6.2 MG/DL
DACRYOCYTES BLD QL SMEAR: ABNORMAL
DIFFERENTIAL METHOD: ABNORMAL
EOSINOPHIL # BLD AUTO: 0.1 K/UL
EOSINOPHIL NFR BLD: 0.3 %
ERYTHROCYTE [DISTWIDTH] IN BLOOD BY AUTOMATED COUNT: 17.2 %
EST. GFR  (AFRICAN AMERICAN): 7 ML/MIN/1.73 M^2
EST. GFR  (NON AFRICAN AMERICAN): 6 ML/MIN/1.73 M^2
GLUCOSE SERPL-MCNC: 188 MG/DL
HCT VFR BLD AUTO: 25.8 %
HGB BLD-MCNC: 8.2 G/DL
HYPOCHROMIA BLD QL SMEAR: ABNORMAL
LYMPHOCYTES # BLD AUTO: 1.1 K/UL
LYMPHOCYTES NFR BLD: 7.2 %
MAGNESIUM SERPL-MCNC: 2.3 MG/DL
MCH RBC QN AUTO: 28.2 PG
MCHC RBC AUTO-ENTMCNC: 31.8 G/DL
MCV RBC AUTO: 89 FL
MONOCYTES # BLD AUTO: 0.8 K/UL
MONOCYTES NFR BLD: 5.1 %
NEUTROPHILS # BLD AUTO: 13.5 K/UL
NEUTROPHILS NFR BLD: 88.5 %
OVALOCYTES BLD QL SMEAR: ABNORMAL
PHOSPHATE SERPL-MCNC: 3.8 MG/DL
PLATELET # BLD AUTO: 63 K/UL
PLATELET BLD QL SMEAR: ABNORMAL
PMV BLD AUTO: ABNORMAL FL
POCT GLUCOSE: 147 MG/DL (ref 70–110)
POCT GLUCOSE: 150 MG/DL (ref 70–110)
POCT GLUCOSE: 187 MG/DL (ref 70–110)
POCT GLUCOSE: 196 MG/DL (ref 70–110)
POCT GLUCOSE: 198 MG/DL (ref 70–110)
POIKILOCYTOSIS BLD QL SMEAR: ABNORMAL
POTASSIUM SERPL-SCNC: 4.3 MMOL/L
PROT SERPL-MCNC: 6.4 G/DL
RBC # BLD AUTO: 2.91 M/UL
SCHISTOCYTES BLD QL SMEAR: PRESENT
SODIUM SERPL-SCNC: 138 MMOL/L
STOMATOCYTES BLD QL SMEAR: PRESENT
T4 FREE SERPL-MCNC: 1.17 NG/DL
TARGETS BLD QL SMEAR: ABNORMAL
TSH SERPL DL<=0.005 MIU/L-ACNC: 0.11 UIU/ML
VANCOMYCIN SERPL-MCNC: 23.4 UG/ML
WBC # BLD AUTO: 15.45 K/UL

## 2018-06-28 PROCEDURE — 80100016 HC MAINTENANCE HEMODIALYSIS

## 2018-06-28 PROCEDURE — 83735 ASSAY OF MAGNESIUM: CPT

## 2018-06-28 PROCEDURE — 97530 THERAPEUTIC ACTIVITIES: CPT

## 2018-06-28 PROCEDURE — 25000003 PHARM REV CODE 250: Performed by: NURSE PRACTITIONER

## 2018-06-28 PROCEDURE — 99233 SBSQ HOSP IP/OBS HIGH 50: CPT | Mod: ,,, | Performed by: INTERNAL MEDICINE

## 2018-06-28 PROCEDURE — 21400001 HC TELEMETRY ROOM

## 2018-06-28 PROCEDURE — 94668 MNPJ CHEST WALL SBSQ: CPT

## 2018-06-28 PROCEDURE — 80053 COMPREHEN METABOLIC PANEL: CPT

## 2018-06-28 PROCEDURE — 63600175 PHARM REV CODE 636 W HCPCS: Performed by: INTERNAL MEDICINE

## 2018-06-28 PROCEDURE — 27000221 HC OXYGEN, UP TO 24 HOURS

## 2018-06-28 PROCEDURE — 63600175 PHARM REV CODE 636 W HCPCS: Performed by: SURGERY

## 2018-06-28 PROCEDURE — 99232 SBSQ HOSP IP/OBS MODERATE 35: CPT | Mod: ,,, | Performed by: INTERNAL MEDICINE

## 2018-06-28 PROCEDURE — 84443 ASSAY THYROID STIM HORMONE: CPT

## 2018-06-28 PROCEDURE — 36415 COLL VENOUS BLD VENIPUNCTURE: CPT

## 2018-06-28 PROCEDURE — 25000242 PHARM REV CODE 250 ALT 637 W/ HCPCS: Performed by: SURGERY

## 2018-06-28 PROCEDURE — 94640 AIRWAY INHALATION TREATMENT: CPT

## 2018-06-28 PROCEDURE — 85025 COMPLETE CBC W/AUTO DIFF WBC: CPT

## 2018-06-28 PROCEDURE — 97803 MED NUTRITION INDIV SUBSEQ: CPT

## 2018-06-28 PROCEDURE — 25000003 PHARM REV CODE 250: Performed by: INTERNAL MEDICINE

## 2018-06-28 PROCEDURE — 84100 ASSAY OF PHOSPHORUS: CPT

## 2018-06-28 PROCEDURE — 84439 ASSAY OF FREE THYROXINE: CPT

## 2018-06-28 PROCEDURE — 80202 ASSAY OF VANCOMYCIN: CPT

## 2018-06-28 PROCEDURE — 25000003 PHARM REV CODE 250: Performed by: SURGERY

## 2018-06-28 RX ADMIN — SODIUM BICARBONATE 650 MG TABLET 650 MG: at 08:06

## 2018-06-28 RX ADMIN — Medication 125 MG: at 11:06

## 2018-06-28 RX ADMIN — ERYTHROPOIETIN 10000 UNITS: 10000 INJECTION, SOLUTION INTRAVENOUS; SUBCUTANEOUS at 03:06

## 2018-06-28 RX ADMIN — Medication 125 MG: at 06:06

## 2018-06-28 RX ADMIN — PIPERACILLIN AND TAZOBACTAM 4.5 G: 4; .5 INJECTION, POWDER, LYOPHILIZED, FOR SOLUTION INTRAVENOUS; PARENTERAL at 08:06

## 2018-06-28 RX ADMIN — IPRATROPIUM BROMIDE AND ALBUTEROL SULFATE 3 ML: .5; 3 SOLUTION RESPIRATORY (INHALATION) at 07:06

## 2018-06-28 RX ADMIN — AMIODARONE HYDROCHLORIDE 200 MG: 200 TABLET ORAL at 08:06

## 2018-06-28 RX ADMIN — VANCOMYCIN HYDROCHLORIDE 500 MG: 500 INJECTION, POWDER, LYOPHILIZED, FOR SOLUTION INTRAVENOUS at 06:06

## 2018-06-28 RX ADMIN — Medication 125 MG: at 12:06

## 2018-06-28 RX ADMIN — FAMOTIDINE 20 MG: 20 TABLET ORAL at 08:06

## 2018-06-28 RX ADMIN — Medication 125 MG: at 05:06

## 2018-06-28 RX ADMIN — IPRATROPIUM BROMIDE AND ALBUTEROL SULFATE 3 ML: .5; 3 SOLUTION RESPIRATORY (INHALATION) at 12:06

## 2018-06-28 RX ADMIN — METOPROLOL TARTRATE 25 MG: 25 TABLET, FILM COATED ORAL at 08:06

## 2018-06-28 RX ADMIN — ASPIRIN 81 MG 81 MG: 81 TABLET ORAL at 08:06

## 2018-06-28 NOTE — ASSESSMENT & PLAN NOTE
- ASA, Statin, and Lopressor continued   -Imdur dose increased   -previous Cath showed severe CAD (proximal LCX 99%, mid 50%, RCA mid 90%, distal with subtotal lesion).  - Cardiology consulted with medical management continued   - LHC proposed pending Nephrology recommendations  -family refused LHC due to concern for worsening kidney function as pt had expressed that she did not want to be on dialysis     6/15  Family electing LHC and accepting risk of worsening renal function  Cardiology    6/16  S/p LHC  Cardiology  ASA  Statin allergy noted    6/16  No interventions  Cardiology on consult    6/18  Cardiology on Consult  Medical Management    6/19  ASA  BB  6/22  Continue with medical management      6/27  Continue with aspirin and betablocker .allergy to statin

## 2018-06-28 NOTE — ASSESSMENT & PLAN NOTE
6/22/18 - Patient has been treated with Procrit for maintenance therapy for anemia due to chronic kidney disease. Patient with hemoglobin of 8.2 this morning.  No current evidence of bleeding noted.  We will continue to monitor closely.  June 23, 2018-the patient has acute blood loss anemia is stable with hemoglobin greater than 8 grams/deciliter today.  She will continue Procrit as per HD protocol.  No urgent indication for PRBC transfusion today.  June 24, 2018-CBC from today shows hemoglobin greater than 7 grams/deciliter.  I would recommend transfusion of PRBCs for supportive care if hemoglobin less than 7 grams/deciliter.  June 26, 2018-CBC from today shows hemoglobin greater than 7 grams/deciliter.  No urgent indication for PRBC transfusion at this time.  June 27, 2018-CBC from today shows hemoglobin greater than 7 grams/deciliter.  No urgent indication for PRBC transfusion at this time.  June 28, 2018-CBC from today shows hemoglobin greater than 7 grams/deciliter.  No urgent indication for PRBC transfusion at this time.

## 2018-06-28 NOTE — PROGRESS NOTES
[facility]  General Surgery  Progress Note    Subjective:     History of Present Illness:  No notes on file    Post-Op Info:  Procedure(s) (LRB):  VASCULAR CATH EXCHANGE (N/A)   4 Days Post-Op     Interval History: bilateral pneumonia  Pt does not respond to commands or interact      Medications:  Continuous Infusions:  Scheduled Meds:   sodium chloride 0.9%   Intravenous Once    albuterol-ipratropium  3 mL Nebulization Q4H WAKE    amiodarone  200 mg Oral BID    aspirin  81 mg Oral Daily    epoetin carmen (PROCRIT) injection  10,000 Units Intravenous Once    epoetin carmen (PROCRIT) injection  4,000 Units Intravenous Every Tues, Thurs, Sat    famotidine  20 mg Oral Daily    metoprolol tartrate  25 mg Oral BID    piperacillin-tazobactam (ZOSYN) IVPB  4.5 g Intravenous Q12H    sodium bicarbonate  650 mg Oral BID    [START ON 7/2/2018] vancomycin (VANCOCIN) IVPB  1,250 mg Intravenous Q48H    vancomycin  125 mg Oral Q6H    vancomycin (VANCOCIN) IVPB  500 mg Intravenous Once     PRN Meds:sodium chloride 0.9%, acetaminophen, bisacodyl, cloNIDine, dextrose 50%, dextrose 50%, glucagon (human recombinant), glucose, glucose, heparin (porcine), hydrALAZINE, insulin aspart U-100, lactulose, lorazepam, morphine, ondansetron, sodium bicarbonate, sodium chloride 0.9%     Review of patient's allergies indicates:   Allergen Reactions    Lipitor [atorvastatin] Swelling     Lips       Objective:     Vital Signs (Most Recent):  Temp: 98.2 °F (36.8 °C) (06/28/18 1258)  Pulse: 73 (06/28/18 1330)  Resp: 18 (06/28/18 1258)  BP: (!) 109/59 (06/28/18 1258)  SpO2: 95 % (06/28/18 1258) Vital Signs (24h Range):  Temp:  [97.7 °F (36.5 °C)-98.2 °F (36.8 °C)] 98.2 °F (36.8 °C)  Pulse:  [73-88] 73  Resp:  [18-20] 18  SpO2:  [94 %-100 %] 95 %  BP: ()/(51-71) 109/59     Weight: 73.8 kg (162 lb 11.2 oz)  Body mass index is 29.76 kg/m².    Intake/Output - Last 3 Shifts       06/26 0700 - 06/27 0659 06/27 0700 - 06/28  0659 06/28 0700 - 06/29 0659    P.O.  0     NG/ 993     IV Piggyback 100 100     Total Intake(mL/kg) 862 (12) 1093 (14.8)     Urine (mL/kg/hr)  0 (0)     Other 1500 (0.9)      Total Output 1500 0      Net -638 +1093             Urine Occurrence 2 x      Stool Occurrence 3 x 3 x     Emesis Occurrence  0 x           Physical Exam   Constitutional: She appears well-developed and well-nourished.   HENT:   Head: Normocephalic and atraumatic.   Eyes: EOM are normal.   Cardiovascular: Normal rate and regular rhythm.    Pulmonary/Chest: No respiratory distress.   Musculoskeletal: Normal range of motion.   Skin: Skin is warm and dry.   Psychiatric: She has a normal mood and affect. Thought content normal.   Vitals reviewed.      Significant Labs:  CBC:   Recent Labs  Lab 06/28/18  0526   WBC 15.45*   RBC 2.91*   HGB 8.2*   HCT 25.8*   PLT 63*   MCV 89   MCH 28.2   MCHC 31.8*     CMP:   Recent Labs  Lab 06/28/18  0526   *   CALCIUM 8.7   ALBUMIN 2.4*   PROT 6.4      K 4.3   CO2 23   CL 96   BUN 80*   CREATININE 6.2*   ALKPHOS 73   ALT 9*   AST 26   BILITOT 0.7       Significant Diagnostics:  I have reviewed all pertinent imaging results/findings within the past 24 hours.    Assessment/Plan:     Decreased platelet count    Discussed with Dr. Addison.   Plt decreased by ~50% over last 2 days.   Need to rule out HIT   Heparin products stopped and pt started on Eliquis   Check antibody  PEG tube procedure held for now        C. difficile diarrhea    Antibiotics        ESRD (end stage renal disease)    Dialysis per Nephrology        Hyperparathyroidism    Status post parathyroidectomy  S/p neck washout  Neck is supple, no further swelling since drain out.   Bilateral pneumonia, increased wbc - tx per Hospital Medicine and Pulm  HIT was negative   Pt needs PEG tube. Wound need to d/c Eliquis 2 days prior to procedure which could possibly be done next week            Coronary artery disease involving native  coronary artery    Cardiology following  On anticoagulation        Acute renal failure superimposed on stage 5 chronic kidney disease, not on chronic dialysis    Pt tolerating hemo-dialysis well        Hypertension associated with diabetes    Antihypertensive medications            MANE VelaC  General Surgery  [facility]

## 2018-06-28 NOTE — PLAN OF CARE
Problem: Physical Therapy Goal  Goal: Physical Therapy Goal  LTG'S TO BE MET IN 7 DAYS (7-2-18)  1. PT WILL REQUIRE MAXA FOR BED MOBILITY  2. PT WILL REQUIRE MAXA FOR SIT<>STAND TF USING RW  3. PT WILL SIT EOB WITH FAIR SITTING BALANCE X 8 MINUTES WITH NO C/O FATIGUE  4. PT WILL TOLERATE BLE THEREX X 20 REPS AAROM  5. PT WILL PARTICIPATE IN GAIT ASSESSMENT    Outcome: Ongoing (interventions implemented as appropriate)  PT WITH TOTAL ASSIST FOR SUP<>SIT TF AS WELL AS SITTING AT EOB FOR APPROX. 15 TO 20 MINUTES, NO PARTICIPATION, NO VERBALIZATION.  PT WITH NO PROGRESS IN POC, SPOKE TO DR. RICE WHO AGREE'S TO D/C PT FROM P.T. SERVICES UNTIL PT BECOME MORE APPROPRIATE

## 2018-06-28 NOTE — PT/OT/SLP PROGRESS
Speech Language Pathology      Citlali Karimi  MRN: [id number]    Patient not seen today secondary to unable to arouse. Recommend discharge from  at this time due pt not participating in therapy.     Vicky Padilla CCC-SLP

## 2018-06-28 NOTE — HPI
68 year old  female with past medical history  of CKD (IV), hypertension, DM, CVA (left sided weakness), and CAD who initially presented for hyperparathyroidism s/p parathyroidectomy  per Dr. Tracy (General Surgery).    Hospital course complicated by acute MI,C difficle PCR,worsening leucocytosis .  CT scan of the chest showed -There is a moderate amount of alveolar consolidation scattered throughout both lungs.  This is characteristic of pneumonia  CBC showed -wbc of 18

## 2018-06-28 NOTE — SUBJECTIVE & OBJECTIVE
Interval History:  The patient is moaning in bed and is unable to answer specific questions.  She is able to open her eyes when her name is called.     Oncology Treatment Plan:   [No treatment plan]    Medications:  Continuous Infusions:  Scheduled Meds:   sodium chloride 0.9%   Intravenous Once    albuterol-ipratropium  3 mL Nebulization Q4H WAKE    amiodarone  200 mg Oral BID    aspirin  81 mg Oral Daily    epoetin carmen (PROCRIT) injection  4,000 Units Intravenous Every Tues, Thurs, Sat    famotidine  20 mg Oral Daily    metoprolol tartrate  25 mg Oral BID    piperacillin-tazobactam (ZOSYN) IVPB  4.5 g Intravenous Q12H    sodium bicarbonate  650 mg Oral BID    [START ON 7/2/2018] vancomycin (VANCOCIN) IVPB  1,250 mg Intravenous Q48H    vancomycin  125 mg Oral Q6H    vancomycin (VANCOCIN) IVPB  500 mg Intravenous Once     PRN Meds:sodium chloride 0.9%, acetaminophen, bisacodyl, cloNIDine, dextrose 50%, dextrose 50%, glucagon (human recombinant), glucose, glucose, heparin (porcine), hydrALAZINE, insulin aspart U-100, lactulose, lorazepam, morphine, ondansetron, sodium bicarbonate, sodium chloride 0.9%     Review of Systems   Unable to perform ROS: Mental status change     Objective:     Vital Signs (Most Recent):  Temp: 97.9 °F (36.6 °C) (06/28/18 1305)  Pulse: 86 (06/28/18 1606)  Resp: 18 (06/28/18 1305)  BP: (!) 101/26 (06/28/18 1606)  SpO2: 95 % (06/28/18 1258) Vital Signs (24h Range):  Temp:  [97.7 °F (36.5 °C)-98.2 °F (36.8 °C)] 97.9 °F (36.6 °C)  Pulse:  [50-88] 86  Resp:  [18-20] 18  SpO2:  [95 %-100 %] 95 %  BP: ()/(26-79) 101/26     Weight: 73.8 kg (162 lb 11.2 oz)  Body mass index is 29.76 kg/m².  Body surface area is 1.8 meters squared.      Intake/Output Summary (Last 24 hours) at 06/28/18 9927  Last data filed at 06/28/18 0500   Gross per 24 hour   Intake              520 ml   Output                0 ml   Net              520 ml       Physical Exam   Constitutional: She appears  well-developed and well-nourished. She is active and cooperative. She appears ill.   HENT:   Head: Normocephalic and atraumatic.   Nose: Nose normal.   Mouth/Throat: Oropharynx is clear and moist. No oropharyngeal exudate.   Healing neck incision is noted.   Eyes: Pupils are equal, round, and reactive to light. No scleral icterus.   Neck: Neck supple. No thyromegaly present.   Cardiovascular: Normal rate, regular rhythm, normal heart sounds and intact distal pulses.    No murmur heard.  Pulmonary/Chest: Effort normal. No stridor. No respiratory distress. She has no wheezes. She has rhonchi. She has no rales.   Abdominal: Soft. Bowel sounds are normal. She exhibits no distension, no ascites and no mass. There is no hepatosplenomegaly. There is no tenderness. There is no rigidity, no rebound and no guarding.   Musculoskeletal: She exhibits no edema or tenderness.   Lymphadenopathy:     She has no cervical adenopathy.   Neurological: She is unresponsive. No cranial nerve deficit. She exhibits normal muscle tone. Coordination normal.   Skin: Skin is warm and dry. Ecchymosis noted. No rash noted. No pallor.   Psychiatric: She is slowed. She is noncommunicative.   Nursing note and vitals reviewed.      Significant Labs:   I have reviewed all of the patient's relevant lab work available in the medical record and have utilized this in my evaluation and management recommendations today    Diagnostic Results:  I have reviewed all of the patient's diagnostic/imaging results available in the medical record and have utilized this in my evaluation and management recommendations today.

## 2018-06-28 NOTE — SUBJECTIVE & OBJECTIVE
Review of Systems   Unable to perform ROS: Mental status change     Objective:     Vital Signs (Most Recent):  Temp: 97.9 °F (36.6 °C) (06/28/18 1305)  Pulse: 80 (06/28/18 1530)  Resp: 18 (06/28/18 1305)  BP: (!) 97/55 (06/28/18 1530)  SpO2: 95 % (06/28/18 1258) Vital Signs (24h Range):  Temp:  [97.7 °F (36.5 °C)-98.2 °F (36.8 °C)] 97.9 °F (36.6 °C)  Pulse:  [50-88] 80  Resp:  [18-20] 18  SpO2:  [95 %-100 %] 95 %  BP: ()/(46-79) 97/55     Weight: 73.8 kg (162 lb 11.2 oz)  Body mass index is 29.76 kg/m².    Intake/Output Summary (Last 24 hours) at 06/28/18 1550  Last data filed at 06/28/18 0500   Gross per 24 hour   Intake              520 ml   Output                0 ml   Net              520 ml      Physical Exam   Constitutional: She appears well-developed and well-nourished. She is active and cooperative. She appears ill.   HENT:   Head: Normocephalic and atraumatic.   Nose: Nose normal.   Mouth/Throat: Oropharynx is clear and moist. No oropharyngeal exudate.   Healing neck incision is noted.   Eyes: Pupils are equal, round, and reactive to light. No scleral icterus.   Neck: Neck supple. No thyromegaly present.   Cardiovascular: Normal rate, regular rhythm, normal heart sounds and intact distal pulses.    No murmur heard.  Pulmonary/Chest: Effort normal. No stridor. No respiratory distress. She has no wheezes. She has rhonchi in the right upper field, the right middle field, the right lower field, the left upper field, the left middle field and the left lower field. She has rales in the right upper field, the right middle field, the right lower field, the left upper field, the left middle field and the left lower field.   Abdominal: Soft. Bowel sounds are normal. She exhibits no distension, no ascites and no mass. There is no hepatosplenomegaly. There is no tenderness. There is no rigidity, no rebound and no guarding.   Musculoskeletal: She exhibits no edema or tenderness.   Lymphadenopathy:     She has  no cervical adenopathy.   Neurological: She is unresponsive. No cranial nerve deficit. She exhibits normal muscle tone. Coordination normal.   Patient is some drowsy but arousable.  Responds to loud verbal commands and tactile stimuli.  Does not communicate.  Grimaces with pain. Neurological status is slowly worsening with time.   Skin: Skin is warm and dry. Ecchymosis noted. No rash noted. No pallor.   Psychiatric: She is slowed. She is noncommunicative.   Nursing note and vitals reviewed.      Significant Labs: All pertinent labs within the past 24 hours have been reviewed.    Significant Imaging: I have reviewed all pertinent imaging results/findings within the past 24 hours.

## 2018-06-28 NOTE — ASSESSMENT & PLAN NOTE
Follow up with culture   Broad the antibotics to vanco and zosyn   cxr worse   Discussed goals of care with family   6/28  No clinical improvement  Ct scan chest shows extensive consolidation   Continue with vancomycin and zosyn   Follow up respiratory culture   procal tomorrow   Discuss with family continue with current treatment plan

## 2018-06-28 NOTE — SUBJECTIVE & OBJECTIVE
Past Medical History:   Diagnosis Date    Acid reflux 1/7/2015    Anxiety 1/7/2015    Aortic valvar stenosis     Arthritis     osteo    Callus     Chronic anemia     followed by Dr. Addison    CKD (chronic kidney disease) stage 5, GFR less than 15 ml/min 8/7/2015    Combined hyperlipidemia associated with type 2 diabetes mellitus     Coronary artery disease     Diabetes mellitus type II, controlled, with no complications     LBSL 108 today    Encounter for blood transfusion     Frail elderly with impaired mobility, wheel chair bound 8/25/2017    Gallbladder problem     gallbladder surgery    Heart murmur     Hyperparathyroidism, secondary renal 1/7/2015    Hypertension 8/7/2015    Hypertension associated with diabetes 11/12/2012    Kidney transplant candidate 1/7/2015    Overweight(278.02)     Urinary tract infection        Past Surgical History:   Procedure Laterality Date    ANKLE FRACTURE SURGERY Left 1985    AORTIC VALVE REPLACEMENT  05/2016    BONE BIOPSY      CARDIAC SURGERY      CATARACT EXTRACTION W/  INTRAOCULAR LENS IMPLANT  2009    CHOLECYSTECTOMY      CYSTOSCOPY      EVACUATION OF HEMATOMA N/A 6/14/2018    Procedure: EVACUATION, HEMATOMA;  Surgeon: Levy Samuel MD;  Location: [facility] OR;  Service: General;  Laterality: N/A;    EYE SURGERY      FRACTURE SURGERY      HYSTERECTOMY  unknown    LEFT HEART CATHETERIZATION Left 6/15/2018    Procedure: CATHETERIZATION, HEART, LEFT;  Surgeon: Galindo Louis MD;  Location: [facility] CATH LAB;  Service: Cardiology;  Laterality: Left;    OOPHORECTOMY      PARATHYROIDECTOMY N/A 6/12/2018    Procedure: PARATHYROIDECTOMY;  Surgeon: Levy Samuel MD;  Location: [facility] OR;  Service: General;  Laterality: N/A;    removal of colon polyps      RENAL BIOPSY  10/2013    WOUND EXPLORATION N/A 6/14/2018    Procedure: EXPLORATION, WOUND;  Surgeon: Levy Samuel MD;  Location: [facility] OR;  Service: General;  Laterality: N/A;    YAG cap -OD          Review of patient's allergies indicates:   Allergen Reactions    Lipitor [atorvastatin] Swelling     Lips         Medications:  Prescriptions Prior to Admission   Medication Sig    cyanocobalamin (VITAMIN B-12) 1000 MCG tablet Take 1 tablet (1,000 mcg total) by mouth once daily.    isosorbide mononitrate (IMDUR) 60 MG 24 hr tablet TAKE  ONE TABLET BY MOUTH DAILY    lactulose (CEPHULAC) 20 gram Pack Take 20 g by mouth as needed.    losartan (COZAAR) 25 MG tablet TAKE ONE TABLET BY MOUTH DAILY    omeprazole (PRILOSEC) 40 MG capsule Take 1 capsule (40 mg total) by mouth once daily.    simvastatin (ZOCOR) 20 MG tablet Take 1 tablet (20 mg total) by mouth every evening.    sodium bicarbonate 650 MG tablet Take 1 tablet (650 mg total) by mouth 2 (two) times daily.    aspirin (ECOTRIN) 81 MG EC tablet Take 81 mg by mouth once daily.     Antibiotics     Start     Stop Route Frequency Ordered    06/29/18 1400  vancomycin (VANCOCIN) 1,250 mg in dextrose 5 % 250 mL IVPB      -- IV Every 48 hours (non-standard times) 06/27/18 1432    06/27/18 1300  piperacillin-tazobactam 4.5 g in dextrose 5 % 100 mL IVPB (ready to mix system)      -- IV Every 12 hours (non-standard times) 06/27/18 1154    06/20/18 1200  vancomycin 250mg / 10ml oral suspension 125 mg      -- Oral Every 6 hours 06/20/18 0921        Antifungals     None        Antivirals     None           Immunization History   Administered Date(s) Administered    Hepatitis A / Hepatitis B 01/07/2015    Influenza 10/29/2007, 10/26/2010, 11/12/2012    Influenza - High Dose 09/23/2013, 11/13/2014, 12/03/2015, 10/17/2017    Influenza Split 11/12/2012    Influenza Whole 10/24/2008    Pneumococcal Conjugate - 13 Valent 01/10/2017    Pneumococcal Polysaccharide - 23 Valent 02/28/2014    Tdap 01/07/2015       Family History     Problem Relation (Age of Onset)    Aneurysm Father    Cancer Paternal Grandmother    Colon cancer Maternal Grandmother    Diabetes  Mother, Brother, Sister    Glaucoma Mother    Heart disease Mother, Sister    Hypertension Mother, Brother, Brother    Kidney disease Sister, Brother, Sister    No Known Problems Maternal Aunt, Maternal Uncle, Paternal Aunt, Paternal Uncle, Maternal Grandfather, Paternal Grandfather    Stroke Father        Social History     Social History    Marital status:      Spouse name: N/A    Number of children: N/A    Years of education: N/A     Occupational History    Retired  ([employer])      Social History Main Topics    Smoking status: Never Smoker    Smokeless tobacco: Never Used    Alcohol use No    Drug use: No    Sexual activity: Not Currently     Birth control/ protection: See Surgical Hx     Other Topics Concern    None     Social History Narrative    Retired - former principal     for 39 years    No children    No history of blood transfusions    No donors     Review of Systems   Unable to perform ROS: Mental status change     Objective:     Vital Signs (Most Recent):  Temp: 97.9 °F (36.6 °C) (06/28/18 0427)  Pulse: 86 (06/28/18 0711)  Resp: 18 (06/28/18 0711)  BP: (!) 98/51 (06/28/18 0427)  SpO2: 96 % (06/28/18 0711) Vital Signs (24h Range):  Temp:  [96.7 °F (35.9 °C)-98.4 °F (36.9 °C)] 97.9 °F (36.6 °C)  Pulse:  [78-92] 86  Resp:  [18-24] 18  SpO2:  [93 %-100 %] 96 %  BP: ()/(50-71) 98/51     Weight: 73.8 kg (162 lb 11.2 oz)  Body mass index is 29.76 kg/m².    Estimated Creatinine Clearance: 7.9 mL/min (A) (based on SCr of 6.2 mg/dL (H)).    Physical Exam   Constitutional: She appears well-developed and well-nourished. She is active and cooperative. She appears ill.   HENT:   Head: Normocephalic and atraumatic.   Nose: Nose normal.   Mouth/Throat: Oropharynx is clear and moist. No oropharyngeal exudate.   Healing neck incision is noted.   Eyes: Pupils are equal, round, and reactive to light. No scleral icterus.   Neck: Neck supple. No thyromegaly present.    Cardiovascular: Normal rate, regular rhythm, normal heart sounds and intact distal pulses.    No murmur heard.  Pulmonary/Chest: Effort normal. No stridor. No respiratory distress. She has no wheezes. She has rhonchi. She has no rales.   Abdominal: Soft. Bowel sounds are normal. She exhibits no distension, no ascites and no mass. There is no hepatosplenomegaly. There is no tenderness. There is no rigidity, no rebound and no guarding.   Musculoskeletal: She exhibits no edema or tenderness.   Lymphadenopathy:     She has no cervical adenopathy.   Neurological: She is unresponsive. No cranial nerve deficit. She exhibits normal muscle tone. Coordination normal.   Skin: Skin is warm and dry. Ecchymosis noted. No rash noted. No pallor.   Psychiatric: She is slowed. She is noncommunicative.   Nursing note and vitals reviewed.      Significant Labs:   Blood Culture:   Recent Labs  Lab 06/12/18  2053 06/27/18  1015 06/27/18  1025   LABBLOO No growth after 5 days. No Growth to date No Growth to date     BMP:   Recent Labs  Lab 06/28/18  0526   *      K 4.3   CL 96   CO2 23   BUN 80*   CREATININE 6.2*   CALCIUM 8.7   MG 2.3     CBC:   Recent Labs  Lab 06/27/18  0514 06/28/18  0526   WBC 18.26* 15.45*   HGB 7.8* 8.2*   HCT 24.6* 25.8*   PLT 59* 63*     CMP:   Recent Labs  Lab 06/27/18  0514 06/28/18  0526    138   K 4.1 4.3    96   CO2 25 23   * 188*   BUN 56* 80*   CREATININE 4.6* 6.2*   CALCIUM 9.0 8.7   PROT 6.1 6.4   ALBUMIN 2.4* 2.4*   BILITOT 0.7 0.7   ALKPHOS 73 73   AST 21 26   ALT 12 9*   ANIONGAP 14 19*   EGFRNONAA 9* 6*     All pertinent labs within the past 24 hours have been reviewed.    Significant Imaging: I have reviewed all pertinent imaging results/findings within the past 24 hours.

## 2018-06-28 NOTE — PLAN OF CARE
Cm spoke with the spouse will patient is asleep. CM discussed d/c plans LTAC  CM discussed DNR and what is comfort care. Spouse verbalized understanding. He wants to continue everything possible for his wife at this time. He acknowledges the fact that she is very sick and has a long road ahead but wants to do everything possible for her at this time.      06/28/18 1051   Discharge Reassessment   Assessment Type Discharge Planning Reassessment   Provided patient/caregiver education on the expected discharge date and the discharge plan No   Do you have any problems affording any of your prescribed medications? No   Discharge Plan A Long-term acute care facility (LTAC)   Discharge Plan B Long-term acute care facility (LTAC)   Patient choice form signed by patient/caregiver N/A   Can the patient answer the patient profile reliably? Yes, cognitively intact   How does the patient rate their overall health at the present time? Fair   Describe the patient's ability to walk at the present time. Major restrictions/daily assistance from another person   How often would a person be available to care for the patient? Whenever needed   Number of comorbid conditions (as recorded on the chart) Five or more   During the past month, has the patient often been bothered by feeling down, depressed or hopeless? No   During the past month, has the patient often been bothered by little interest or pleasure in doing things? No

## 2018-06-28 NOTE — ASSESSMENT & PLAN NOTE
Patient is deteriorating hemodynamically with worsening sepsis.  Reviewed CT scan of the chest and further workup on bilateral lung infiltrates.      Planning on hemodialysis later today.  If the patient hemodynamically with worsens have a low threshold for ICU transfer.

## 2018-06-28 NOTE — PLAN OF CARE
Problem: Pain, Acute (Adult)  Goal: Acceptable Pain Control/Comfort Level  Patient will demonstrate the desired outcomes by discharge/transition of care.   Pt c/o of chest pain. Dr. Levy notified. EKG ordered.

## 2018-06-28 NOTE — PT/OT/SLP PROGRESS
Physical Therapy  Treatment    Citlali Karimi   MRN: [id number]   Admitting Diagnosis: Critical illness polyneuropathy    PT Received On: 06/28/18  PT Start Time: 0900     PT Stop Time: 0940    PT Total Time (min): 40 min       Billable Minutes:  Therapeutic Activity 40    Treatment Type: Treatment  PT/PTA: PT         General Precautions: Standard, fall, NPO, respiratory  Orthopedic Precautions: N/A   Braces: N/A    Subjective:  Communicated with NURSE SHAMEKA prior to session.  Pain/Comfort  Pain Rating 1: other (see comments) (PT IS CURRENTLY NON-VERBAL SO NO PAIN VERBALIZED)    Objective:   Patient found with: oxygen, NG tube, peripheral IV, telemetry, pandey catheter    Functional Mobility:  Bed Mobility:   TOTAL ASSIST X 2 STAFF FOR LATERAL ROLLING AND SUP<>SIT TF    Transfers:  UNABLE TO ASSESS    Gait:   UNABLE TO ASSESS    Balance:   Static Sit: POOR  Dynamic Sit: POOR  Static Stand:   Dynamic stand:      Therapeutic Activities and Exercises:  PT ASSISTED TO EOB WITH TOTAL ASSIST X 2 STAFF, PT UABLE TO ASSIST WITH TF DESPITE MAX ENCOURAGEMENT AND TACTILE CUES.  PT SAT AT EOB FOR APPROX. 15 TO 20 MINUTES REQUIRING TOTAL ASSIST FOR UPRIGHT POSTURE, ATTEMPTED TO FACILITATE PT'S TRUNK WITH TACTILE CUES, PT STILL UNABLE TO ASSIST, ATTEMPTED RE-ORIENTATION WITH PT USING  PRESENT, STILL NO VERBALIZATION OR PARTICIPATION.  PT WITH RUE PUSHING HARD IN SITTING SO RESTRAINED TO AVOID LOB, ATTEMPTED SUCTIONING OF SECRETIONS FROM MOUTH, PT MOSTLY HOLDING MOUTH CLOSED, REFUSED TO OPEN.  PT ASSISTED BACK TO SUPINE, TOTAL ASSIST X 2 FOR SUPINE SCOOT TOWARD HEAD OF BED    AM-PAC 6 CLICK MOBILITY  How much help from another person does this patient currently need?   1 = Unable, Total/Dependent Assistance  2 = A lot, Maximum/Moderate Assistance  3 = A little, Minimum/Contact Guard/Supervision  4 = None, Modified Dunn/Independent    Turning over in bed (including adjusting bedclothes, sheets and blankets)?:  2  Sitting down on and standing up from a chair with arms (e.g., wheelchair, bedside commode, etc.): 1  Moving from lying on back to sitting on the side of the bed?: 2  Moving to and from a bed to a chair (including a wheelchair)?: 1  Need to walk in hospital room?: 1  Climbing 3-5 steps with a railing?: 1  Basic Mobility Total Score: 8    AM-PAC Raw Score CMS G-Code Modifier Level of Impairment Assistance   6 % Total / Unable   7 - 9 CM 80 - 100% Maximal Assist   10 - 14 CL 60 - 80% Moderate Assist   15 - 19 CK 40 - 60% Moderate Assist   20 - 22 CJ 20 - 40% Minimal Assist   23 CI 1-20% SBA / CGA   24 CH 0% Independent/ Mod I     Patient left HOB elevated with all lines intact, call button in reach, NURSE notified and  present.    Assessment:  Citlali Karimi is a 68 y.o. female with a medical diagnosis of Critical illness polyneuropathy PT IS CURRENTLY NOT A CANDIDATE FOR INPATIENT SKILLED P.T. DUE TO DEPENDENT LEVEL OF FUNCTION, NO PROGRESS IN THERAPY POC.  SPOKE TO DR. RICE WHO IS AGREEABLE PT IS CURRENTLY NOT A CANDIDATE FOR P.T. SERVICES, HE WILL RE-CONSULT ONCE PT MORE APPROPRIATE.  PT MAY BENEFIT FROM [facility] FOR CONT. PROM TO BLE    Rehab identified problem list/impairments:     Rehab potential is POOR.    Activity tolerance: POOR    Discharge recommendations: Discharge Facility/Level Of Care Needs: LTACH (long term acute care hospital)     Barriers to discharge:     Equipment recommendations: Equipment Needed After Discharge:  (TO BE ASSESSED WITH PROGRESS)     GOALS:    Physical Therapy Goals        Problem: Physical Therapy Goal           Problem: Physical Therapy Goal    Goal Priority Disciplines Outcome Goal Variances Interventions   Physical Therapy Goal     PT/OT, PT Ongoing (interventions implemented as appropriate)     Description:  LTG'S TO BE MET IN 7 DAYS (7-2-18)  1. PT WILL REQUIRE MAXA FOR BED MOBILITY  2. PT WILL REQUIRE MAXA FOR SIT<>STAND TF USING RW  3. PT  WILL SIT EOB WITH FAIR SITTING BALANCE X 8 MINUTES WITH NO C/O FATIGUE  4. PT WILL TOLERATE BLE THEREX X 20 REPS AAROM  5. PT WILL PARTICIPATE IN GAIT ASSESSMENT                   PLAN:    D/C PT FROM P.T. SERVICES TO [facility]  Plan of Care reviewed with: patient, spouse    PT ENCOURAGED TO CALL FOR ASSISTANCE WITH ALL NEEDS DUE TO FALL RISK STATUS, PT/ AGREEABLE    Aditi Robledo, PT  06/28/2018

## 2018-06-28 NOTE — ASSESSMENT & PLAN NOTE
-stable post procedure  -will monitor  -CBC in am   -pt followed outpatient by Heme/Onc    6/16  Stable  Monitor    6/17  Improving  Monitor    6/18  Hgb 7.7 today  Transfuse PRBC's per sx  Monitor    6/23  Hb 8 stable   Monitor  6/24  No signs of acute blood loss anema   6/27  Hb stable with no signs of bleeding

## 2018-06-28 NOTE — ASSESSMENT & PLAN NOTE
June 22, 2018-patient has anemia now due to end-stage renal disease.  Treatment of this will be as per Nephrology protocol with IV iron/Depo supplementation with hemodialysis.  Please call with any questions.  June 23, 2018-patient has end-stage renal disease and is on RRT.  Management of anemia will be as per Nephrology.  June 24, 2018-the patient is on RRT for end-stage renal disease.  Management of EPO/IV iron will be as per Nephrology protocol.  June 26, 2018-the patient is on RRT for end-stage renal disease.  She will continue EPO/IV iron as per Nephrology/dialysis protocol.  June 27, 2018-the patient is on RRT for end-stage renal disease.  She will continue epo/IV iron as per Nephrology/dialysis protocol.  June 28, 2018-the patient is on RRT for end-stage renal disease.  She will continue epo/IV iron as per Nephrology/dialysis protocol.

## 2018-06-28 NOTE — ASSESSMENT & PLAN NOTE
June 26, 2018-the patient has new onset thrombocytopenia with history of heparin exposure.  HIT needs to be considered in the differential diagnosis.  Recommend discontinuing all heparin.  Recommend initiation of Eliquis while results of HIT panel are pending.  I have recommended Eliquis instead of argatroban in this clinical situation because of the patient's critical illness which likely will result in significant variability in clinical efficacy with argatroban and likely put her at higher risk for bleeding than Eliquis.  Risks/benefits were reviewed in detail and discussed with General surgery and Hospital Medicine.  I would recommend holding off on PEG tube placement until HIT panel result is available.  I will continue follow the patient with an assist with the patient's management as needed.  Please call with any questions.  June 27, 2018-patient continues to have worsening thrombocytopenia.  I will follow up with pending HIT panel.  Continue Eliquis.  I will continue follow the patient with you and assist with the patient's management as needed.  Please call with any questions.  June 28, 2018-the patient's thrombocytopenia is slightly improved today.  Head HIV panel is negative.  I will stop Eliquis at this time.  Okay to resume using heparin as indicated.  Patient's thrombocytopenia is most likely related to acute illness/sepsis.  No indication for platelet transfusion at this time.  I will continue to follow the patient with you and assist with the patient's management as needed.  Please call with any questions.

## 2018-06-28 NOTE — ASSESSMENT & PLAN NOTE
Status post parathyroidectomy  S/p neck washout  Neck is supple, no further swelling since drain out.   Bilateral pneumonia, increased wbc - tx per Hospital Medicine and Pulm  HIT was negative   Pt needs PEG tube. Wound need to d/c Eliquis 2 days prior to procedure which could possibly be done next week

## 2018-06-28 NOTE — PROGRESS NOTES
[facility] Hospital Medicine  Progress Note    Patient Name: Citlali Karimi  MRN: [id number]  Patient Class: IP- Inpatient   Admission Date: 6/12/2018  Length of Stay: 15 days  Attending Physician: Levy Samuel MD  Primary Care Provider: Evelio Schuster MD        Subjective:     Principal Problem:Critical illness polyneuropathy    HPI:  Citlali Karimi is a 68 y.o female with PMHx of CKD (IV), HTN, DM, CVA (left sided weakness), and CAD who initially presented for hyperparathyroidism and hypercalcemia requiring surgical intervention.  Pt underwent parathyroidectomy today per Dr. Tracy (General Surgery). Pt admitted to Medical Surgical Unit post procedure and Hospital Medicine consulted for medical management.  Chest xray post procedure showed mild asymmetric CHF versus RUL pneumonia. Troponin 0.113 and BNP >270 noted.  Pt given IV Lasix in PACU prior to unit arrival.      Hospital Course:  Pt admitted to Outpatient Extended Recovery post procedure.  Elevated noted post procedure and results trended yielding significant positive response.  Cardiology consulted and Heparin infusion initiated.  Hx of CAD, multiple vessels noted with home medications continued and Imdur dose increased.  Nephrology consulted due to elevated creatinine and [facility] recommended.  Echo results pending.  Heart catheterization procedure considered with family refusing due to concern for worsening kidney function as patient does not want to be on dialysis.  Decreased oral intake and difficulty swallowing noted.  Speech therapist to bedside.  Pt made NPO and General Surgery notified.      6/15  Patient worsening status. ET changed per Pulmonary CC. Patient s/p Code Blue - VFib with recovery. Cardiology taking patient to CATH lab. Discussed with CM plan for post acute care in progress.    6/16  Patient improved o/n. Patient awake and able to follow simple command. Breathing trials in progress. Discussed with Pulmonary  CC    6/17  Patient remains intubated.  at bedside. Cardiology at bedside as well. Discussed with CC Team    6/18  Patient responds to command but remains intubated. Swelling to neck continues to be prominent. Discussed with CC Team. Worsening renal function. Possible HD need.    6/19  Patient remains intubated. Worsening renal function. No events Discussed with CC Team    6/21  Positive cough leak and plan for extubation today .   6/22  Patient continue to be confused . Will consider ct head if didn't improve . Continue treat infection/c difficle .Consulted vascular surgery dr maciel to remove femoral dialysis and put new one subclavian ?. To avoid risk of infection in groin. Patient will continue to need dialysis    6/23  Patient is seen and examined today . More awake today. dialysis catheter was removed from groin and new one will be place in upper body ij vs subclavian. Patient diarrhea improved .   6/24  Patient very lethargic to participate in slp . S/p vascath for hemodialysis . SLP will visit tomorrow . Patient still has ng tube. Diarrhea improved   6/25  Patient lethargic . SLP recommended peg tube . Placement of peg tube tomorrow . After peg tube possible discharge to ltac   6/26  Patient was seen and examined today. cxr shows mid and upper right lobe . Very weak cough reflex. Will start treatment with unisyn for aspiration pneumonia and follow up culture ,procal if no improvement will broaden coverage . Patient also being treated for  c diff . Hematology oncology also ordered eliquis and hit panel . Peg tube delayed   6/27  Discussed critical illness with  bedside he said he will discuss with his niece to guide us with further plan of care  . Patient leukocytosis and platelet dropped - sepsis -pneumonia . Repeat cxr .   6/28  Plan of care was discussed with patient niece and  bedside in detail .They wish to continue with current treatment plan  And will  Wait on making decision  regarding palliative care .No clinical improvement . Patient more lethargic . Plan for dialysis today         Review of Systems   Unable to perform ROS: Mental status change     Objective:     Vital Signs (Most Recent):  Temp: 97.9 °F (36.6 °C) (06/28/18 1305)  Pulse: 80 (06/28/18 1530)  Resp: 18 (06/28/18 1305)  BP: (!) 97/55 (06/28/18 1530)  SpO2: 95 % (06/28/18 1258) Vital Signs (24h Range):  Temp:  [97.7 °F (36.5 °C)-98.2 °F (36.8 °C)] 97.9 °F (36.6 °C)  Pulse:  [50-88] 80  Resp:  [18-20] 18  SpO2:  [95 %-100 %] 95 %  BP: ()/(46-79) 97/55     Weight: 73.8 kg (162 lb 11.2 oz)  Body mass index is 29.76 kg/m².    Intake/Output Summary (Last 24 hours) at 06/28/18 1550  Last data filed at 06/28/18 0500   Gross per 24 hour   Intake              520 ml   Output                0 ml   Net              520 ml      Physical Exam   Constitutional: She appears well-developed and well-nourished. She is active and cooperative. She appears ill.   HENT:   Head: Normocephalic and atraumatic.   Nose: Nose normal.   Mouth/Throat: Oropharynx is clear and moist. No oropharyngeal exudate.   Healing neck incision is noted.   Eyes: Pupils are equal, round, and reactive to light. No scleral icterus.   Neck: Neck supple. No thyromegaly present.   Cardiovascular: Normal rate, regular rhythm, normal heart sounds and intact distal pulses.    No murmur heard.  Pulmonary/Chest: Effort normal. No stridor. No respiratory distress. She has no wheezes. She has rhonchi in the right upper field, the right middle field, the right lower field, the left upper field, the left middle field and the left lower field. She has rales in the right upper field, the right middle field, the right lower field, the left upper field, the left middle field and the left lower field.   Abdominal: Soft. Bowel sounds are normal. She exhibits no distension, no ascites and no mass. There is no hepatosplenomegaly. There is no tenderness. There is no rigidity, no  rebound and no guarding.   Musculoskeletal: She exhibits no edema or tenderness.   Lymphadenopathy:     She has no cervical adenopathy.   Neurological: She is unresponsive. No cranial nerve deficit. She exhibits normal muscle tone. Coordination normal.   Patient is some drowsy but arousable.  Responds to loud verbal commands and tactile stimuli.  Does not communicate.  Grimaces with pain. Neurological status is slowly worsening with time.   Skin: Skin is warm and dry. Ecchymosis noted. No rash noted. No pallor.   Psychiatric: She is slowed. She is noncommunicative.   Nursing note and vitals reviewed.      Significant Labs: All pertinent labs within the past 24 hours have been reviewed.    Significant Imaging: I have reviewed all pertinent imaging results/findings within the past 24 hours.    Assessment/Plan:      * Critical illness polyneuropathy    -pt/ot         Encephalopathy, metabolic    -most likely metabolic pneumonia   - also patient had cardiac arrest   - will neurology to evaluate patient in morning   - ct head negative for new acute events           HAP (hospital-acquired pneumonia)    Follow up with culture   Broad the antibotics to vanco and zosyn   cxr worse   Discussed goals of care with family   6/28  No clinical improvement  Ct scan chest shows extensive consolidation   Continue with vancomycin and zosyn   Follow up respiratory culture   procal tomorrow   Discuss with family continue with current treatment plan         Thrombocytopenia    -hit panel negative   - most likely due to sepsis          Acute hypoxemic respiratory failure    - aspiration pneumonia getting worse   - continue with antibotics             C. difficile diarrhea    Continue with oral vancomycin   6/20 started vancomycin   -will need 14 days   -diarrhea improved         NSTEMI (non-ST elevated myocardial infarction)    ASA  Cardiology  [facility] - Diffuse disease  No further intervention recommended  Medical Management  No statin due  to allergy        Cardiac arrest with ventricular fibrillation    Cardiology  [facility] 6/15  ASA  Statin Allergy  BB  No further interventions  Diffuse disease    6/18  ASA  BB  Diffuse disease No intervention    6/19  ASA  BB  No Statin due to allergy  Cardiology  6/27  Continue with medical management          Hyperparathyroidism    -Pt admitted to Extended Recovery then transferred to Inpatient due to NSTEMI  -s/p Parathyroidectomy   -managed by General Surgery -primary team  - PTH- 32  - Ca 11.4>>10.5  - analgesia prn   - antiemetics prn  - specimens sent for analysis    6/16  Ca 8.7 today  Monitor    6/18  Ca 8.2 today  Monitor    6/19  Ca 8.2 stable  6/27  Ca stable after surgery        Coronary artery disease involving native coronary artery    - ASA, Statin, and Lopressor continued   -Imdur dose increased   -previous Cath showed severe CAD (proximal LCX 99%, mid 50%, RCA mid 90%, distal with subtotal lesion).  - Cardiology consulted with medical management continued   - C proposed pending Nephrology recommendations  -family refused LHC due to concern for worsening kidney function as pt had expressed that she did not want to be on dialysis     6/15  Family electing LHC and accepting risk of worsening renal function  Cardiology    6/16  S/p LHC  Cardiology  ASA  Statin allergy noted    6/16  No interventions  Cardiology on consult    6/18  Cardiology on Consult  Medical Management    6/19  ASA  BB  6/22  Continue with medical management      6/27  Continue with aspirin and betablocker .allergy to statin         Acute renal failure superimposed on stage 5 chronic kidney disease, not on chronic dialysis    -Creatinine 3.6>>4.1  -baseline 2.7-4.4  -will monitor   -sodium bicarb continued   -pt has been followed outpatient by Dr. Vazquez (Nephrology)  -Nephrology consulted - pt may benefit from repeat angio due to NSTEMI but at high risk of contrast induced nephropathy  - pt was candidate for renal transplant  however work up discontinued due to progressive weakness  - [facility] recommended         6/15  Monitor worsening Renal Function  S/p Cardiac VFib Arrest  [facility] today  IVF's  Monitor      6/16  [facility] diffuse disease  Cardiology  Monitor    6/17  Worsening  No further Cardiac intervention  Nephrology on consult  Monitor  Cr 6.0 today vs 4.7  Low Urine O/P    6/18  COntinues to worsen. Cr 7.3 today  Nephrology guidance for HD vs Monitoring    6/19  Worsening  Cr 8.3 today - K is 4.6  Nephrology on board  No HD indicated at present  Monitor  6/22  Patient will continue with dialysis . Dr Lara for vascath change .  6/27  Patient getting dialysis today . will need outpatient dialysis           Hypertension associated with diabetes    - home medications continued   -hx of noncompliance and inconsistent dosing at home per   - uncontrolled upon arrival- improved with Hydralazine in PACU   - Hydralazine prn  6/22  Controlled         Diabetes mellitus, type 2 - only on oral medications    Controlled  NICC  Accuchecks          Acute blood loss anemia    -stable post procedure  -will monitor  -CBC in am   -pt followed outpatient by Heme/Onc    6/16  Stable  Monitor    6/17  Improving  Monitor    6/18  Hgb 7.7 today  Transfuse PRBC's per sx  Monitor    6/23  Hb 8 stable   Monitor  6/24  No signs of acute blood loss anema   6/27  Hb stable with no signs of bleeding          VTE Risk Mitigation         Ordered     heparin (porcine) injection 3,000 Units  As needed (PRN)      06/26/18 0907     Place sequential compression device  Until discontinued      06/12/18 7400              Dominguez Levy MD  Department of Hospital Medicine   [facility]

## 2018-06-28 NOTE — PLAN OF CARE
Problem: Patient Care Overview  Goal: Plan of Care Review  Outcome: Ongoing (interventions implemented as appropriate)  Plan of care discussed with patient Alert not oriented to time place or situation Vital signs stable afebrile.on 2l nc  No loose stool blood sugar at 0000 150 tolerating tube feeding at goal 40 100 resduial at shift change  placement verified and marked  Bed locked at 30 degrees for safety. free from falls bed alarm in use  plan is for Peg tube then LTAC still need resp culture very weak cough suction at bedside cp t done per respattory  will cont to monitor

## 2018-06-28 NOTE — ASSESSMENT & PLAN NOTE
Cardiology  [facility] 6/15  ASA  Statin Allergy  BB  No further interventions  Diffuse disease    6/18  ASA  BB  Diffuse disease No intervention    6/19  ASA  BB  No Statin due to allergy  Cardiology  6/27  Continue with medical management

## 2018-06-28 NOTE — PROGRESS NOTES
[facility]   Hematology/Oncology  Progress Note    Patient Name: Citlali Karimi  Admission Date: 6/12/2018  Hospital Length of Stay: 15 days  Code Status: DNR     Subjective:     HPI:  68 year old female, PMHx of CKD (IV), HTN, DM, CVA (left sided weakness), and CAD who initially presented for hyperparathyroidism and hypercalcemia requiring surgical intervention.   She was seen in clinic by surgery for hyperparathyroidism and hypercalcemia and was electively admitted 6/12 taken to OR undergoing parathyroidectomy finding 2 right sided enlarged parathyroid glands.  Intra and post op patient developed EKG changes and had elevated troponin.  Cards consulted.  Patient admitted due to swallowing issues she was not taking all her meds as prescribed and had BP as high as 225/103 during hospitalization.  Troponin increased to > 50.  Cards diagnosed with NSTEMI and she was Rx with ASA, Imdur, Lopressor, statin and Heparin infusion with plans for repeat LHC if cleared by Renal given CKD5.  Today, she had progressively worsening neck swelling and SOB with worsening dysphagia.  She was taken back to OR and had post op neck hematoma evacuation.  In PACU she had witnessed V-Fib cardiac arrest and CODE BLUE called.  After 3 rounds of CPR, Epi X 3 and Defib X 3 ROSC was attained.  She was still intubated and on vent post op    Interval History:  The patient is moaning in bed and is unable to answer specific questions.  She is able to open her eyes when her name is called.     Oncology Treatment Plan:   [No treatment plan]    Medications:  Continuous Infusions:  Scheduled Meds:   sodium chloride 0.9%   Intravenous Once    albuterol-ipratropium  3 mL Nebulization Q4H WAKE    amiodarone  200 mg Oral BID    aspirin  81 mg Oral Daily    epoetin carmen (PROCRIT) injection  4,000 Units Intravenous Every Tues, Thurs, Sat    famotidine  20 mg Oral Daily    metoprolol tartrate  25 mg Oral BID    piperacillin-tazobactam  (ZOSYN) IVPB  4.5 g Intravenous Q12H    sodium bicarbonate  650 mg Oral BID    [START ON 7/2/2018] vancomycin (VANCOCIN) IVPB  1,250 mg Intravenous Q48H    vancomycin  125 mg Oral Q6H    vancomycin (VANCOCIN) IVPB  500 mg Intravenous Once     PRN Meds:sodium chloride 0.9%, acetaminophen, bisacodyl, cloNIDine, dextrose 50%, dextrose 50%, glucagon (human recombinant), glucose, glucose, heparin (porcine), hydrALAZINE, insulin aspart U-100, lactulose, lorazepam, morphine, ondansetron, sodium bicarbonate, sodium chloride 0.9%     Review of Systems   Unable to perform ROS: Mental status change     Objective:     Vital Signs (Most Recent):  Temp: 97.9 °F (36.6 °C) (06/28/18 1305)  Pulse: 86 (06/28/18 1606)  Resp: 18 (06/28/18 1305)  BP: (!) 101/26 (06/28/18 1606)  SpO2: 95 % (06/28/18 1258) Vital Signs (24h Range):  Temp:  [97.7 °F (36.5 °C)-98.2 °F (36.8 °C)] 97.9 °F (36.6 °C)  Pulse:  [50-88] 86  Resp:  [18-20] 18  SpO2:  [95 %-100 %] 95 %  BP: ()/(26-79) 101/26     Weight: 73.8 kg (162 lb 11.2 oz)  Body mass index is 29.76 kg/m².  Body surface area is 1.8 meters squared.      Intake/Output Summary (Last 24 hours) at 06/28/18 1657  Last data filed at 06/28/18 0500   Gross per 24 hour   Intake              520 ml   Output                0 ml   Net              520 ml       Physical Exam   Constitutional: She appears well-developed and well-nourished. She is active and cooperative. She appears ill.   HENT:   Head: Normocephalic and atraumatic.   Nose: Nose normal.   Mouth/Throat: Oropharynx is clear and moist. No oropharyngeal exudate.   Healing neck incision is noted.   Eyes: Pupils are equal, round, and reactive to light. No scleral icterus.   Neck: Neck supple. No thyromegaly present.   Cardiovascular: Normal rate, regular rhythm, normal heart sounds and intact distal pulses.    No murmur heard.  Pulmonary/Chest: Effort normal. No stridor. No respiratory distress. She has no wheezes. She has rhonchi. She has  no rales.   Abdominal: Soft. Bowel sounds are normal. She exhibits no distension, no ascites and no mass. There is no hepatosplenomegaly. There is no tenderness. There is no rigidity, no rebound and no guarding.   Musculoskeletal: She exhibits no edema or tenderness.   Lymphadenopathy:     She has no cervical adenopathy.   Neurological: She is unresponsive. No cranial nerve deficit. She exhibits normal muscle tone. Coordination normal.   Skin: Skin is warm and dry. Ecchymosis noted. No rash noted. No pallor.   Psychiatric: She is slowed. She is noncommunicative.   Nursing note and vitals reviewed.      Significant Labs:   I have reviewed all of the patient's relevant lab work available in the medical record and have utilized this in my evaluation and management recommendations today    Diagnostic Results:  I have reviewed all of the patient's diagnostic/imaging results available in the medical record and have utilized this in my evaluation and management recommendations today.    Assessment/Plan:     Thrombocytopenia    June 26, 2018-the patient has new onset thrombocytopenia with history of heparin exposure.  HIT needs to be considered in the differential diagnosis.  Recommend discontinuing all heparin.  Recommend initiation of Eliquis while results of HIT panel are pending.  I have recommended Eliquis instead of argatroban in this clinical situation because of the patient's critical illness which likely will result in significant variability in clinical efficacy with argatroban and likely put her at higher risk for bleeding than Eliquis.  Risks/benefits were reviewed in detail and discussed with General surgery and Hospital Medicine.  I would recommend holding off on PEG tube placement until HIT panel result is available.  I will continue follow the patient with an assist with the patient's management as needed.  Please call with any questions.  June 27, 2018-patient continues to have worsening thrombocytopenia.   I will follow up with pending HIT panel.  Continue Eliquis.  I will continue follow the patient with you and assist with the patient's management as needed.  Please call with any questions.  June 28, 2018-the patient's thrombocytopenia is slightly improved today.  Head HIV panel is negative.  I will stop Eliquis at this time.  Okay to resume using heparin as indicated.  Patient's thrombocytopenia is most likely related to acute illness/sepsis.  No indication for platelet transfusion at this time.  I will continue to follow the patient with you and assist with the patient's management as needed.  Please call with any questions.          Acute renal failure superimposed on stage 5 chronic kidney disease, not on chronic dialysis    June 22, 2018-patient has anemia now due to end-stage renal disease.  Treatment of this will be as per Nephrology protocol with IV iron/Depo supplementation with hemodialysis.  Please call with any questions.  June 23, 2018-patient has end-stage renal disease and is on RRT.  Management of anemia will be as per Nephrology.  June 24, 2018-the patient is on RRT for end-stage renal disease.  Management of EPO/IV iron will be as per Nephrology protocol.  June 26, 2018-the patient is on RRT for end-stage renal disease.  She will continue EPO/IV iron as per Nephrology/dialysis protocol.  June 27, 2018-the patient is on RRT for end-stage renal disease.  She will continue epo/IV iron as per Nephrology/dialysis protocol.  June 28, 2018-the patient is on RRT for end-stage renal disease.  She will continue epo/IV iron as per Nephrology/dialysis protocol.        Acute blood loss anemia    6/22/18 - Patient has been treated with Procrit for maintenance therapy for anemia due to chronic kidney disease. Patient with hemoglobin of 8.2 this morning.  No current evidence of bleeding noted.  We will continue to monitor closely.  June 23, 2018-the patient has acute blood loss anemia is stable with hemoglobin  greater than 8 grams/deciliter today.  She will continue Procrit as per HD protocol.  No urgent indication for PRBC transfusion today.  June 24, 2018-CBC from today shows hemoglobin greater than 7 grams/deciliter.  I would recommend transfusion of PRBCs for supportive care if hemoglobin less than 7 grams/deciliter.  June 26, 2018-CBC from today shows hemoglobin greater than 7 grams/deciliter.  No urgent indication for PRBC transfusion at this time.  June 27, 2018-CBC from today shows hemoglobin greater than 7 grams/deciliter.  No urgent indication for PRBC transfusion at this time.  June 28, 2018-CBC from today shows hemoglobin greater than 7 grams/deciliter.  No urgent indication for PRBC transfusion at this time.            Thank you for your consult.      David Addison MD  Hematology/Oncology  [facility]

## 2018-06-28 NOTE — SUBJECTIVE & OBJECTIVE
Interval History: bilateral pneumonia  Pt does not respond to commands or interact      Medications:  Continuous Infusions:  Scheduled Meds:   sodium chloride 0.9%   Intravenous Once    albuterol-ipratropium  3 mL Nebulization Q4H WAKE    amiodarone  200 mg Oral BID    aspirin  81 mg Oral Daily    epoetin carmen (PROCRIT) injection  10,000 Units Intravenous Once    epoetin carmen (PROCRIT) injection  4,000 Units Intravenous Every Tues, Thurs, Sat    famotidine  20 mg Oral Daily    metoprolol tartrate  25 mg Oral BID    piperacillin-tazobactam (ZOSYN) IVPB  4.5 g Intravenous Q12H    sodium bicarbonate  650 mg Oral BID    [START ON 7/2/2018] vancomycin (VANCOCIN) IVPB  1,250 mg Intravenous Q48H    vancomycin  125 mg Oral Q6H    vancomycin (VANCOCIN) IVPB  500 mg Intravenous Once     PRN Meds:sodium chloride 0.9%, acetaminophen, bisacodyl, cloNIDine, dextrose 50%, dextrose 50%, glucagon (human recombinant), glucose, glucose, heparin (porcine), hydrALAZINE, insulin aspart U-100, lactulose, lorazepam, morphine, ondansetron, sodium bicarbonate, sodium chloride 0.9%     Review of patient's allergies indicates:   Allergen Reactions    Lipitor [atorvastatin] Swelling     Lips       Objective:     Vital Signs (Most Recent):  Temp: 98.2 °F (36.8 °C) (06/28/18 1258)  Pulse: 73 (06/28/18 1330)  Resp: 18 (06/28/18 1258)  BP: (!) 109/59 (06/28/18 1258)  SpO2: 95 % (06/28/18 1258) Vital Signs (24h Range):  Temp:  [97.7 °F (36.5 °C)-98.2 °F (36.8 °C)] 98.2 °F (36.8 °C)  Pulse:  [73-88] 73  Resp:  [18-20] 18  SpO2:  [94 %-100 %] 95 %  BP: ()/(51-71) 109/59     Weight: 73.8 kg (162 lb 11.2 oz)  Body mass index is 29.76 kg/m².    Intake/Output - Last 3 Shifts       06/26 0700 - 06/27 0659 06/27 0700 - 06/28 0659 06/28 0700 - 06/29 0659    P.O.  0     NG/ 993     IV Piggyback 100 100     Total Intake(mL/kg) 862 (12) 1093 (14.8)     Urine (mL/kg/hr)  0 (0)     Other 1500 (0.9)      Total Output 1500 0      Net -638  +1093             Urine Occurrence 2 x      Stool Occurrence 3 x 3 x     Emesis Occurrence  0 x           Physical Exam   Constitutional: She appears well-developed and well-nourished.   HENT:   Head: Normocephalic and atraumatic.   Eyes: EOM are normal.   Cardiovascular: Normal rate and regular rhythm.    Pulmonary/Chest: No respiratory distress.   Musculoskeletal: Normal range of motion.   Skin: Skin is warm and dry.   Psychiatric: She has a normal mood and affect. Thought content normal.   Vitals reviewed.      Significant Labs:  CBC:   Recent Labs  Lab 06/28/18  0526   WBC 15.45*   RBC 2.91*   HGB 8.2*   HCT 25.8*   PLT 63*   MCV 89   MCH 28.2   MCHC 31.8*     CMP:   Recent Labs  Lab 06/28/18  0526   *   CALCIUM 8.7   ALBUMIN 2.4*   PROT 6.4      K 4.3   CO2 23   CL 96   BUN 80*   CREATININE 6.2*   ALKPHOS 73   ALT 9*   AST 26   BILITOT 0.7       Significant Diagnostics:  I have reviewed all pertinent imaging results/findings within the past 24 hours.

## 2018-06-28 NOTE — ASSESSMENT & PLAN NOTE
-Creatinine 3.6>>4.1  -baseline 2.7-4.4  -will monitor   -sodium bicarb continued   -pt has been followed outpatient by Dr. Vazquez (Nephrology)  -Nephrology consulted - pt may benefit from repeat angio due to NSTEMI but at high risk of contrast induced nephropathy  - pt was candidate for renal transplant however work up discontinued due to progressive weakness  - [facility] recommended         6/15  Monitor worsening Renal Function  S/p Cardiac VFib Arrest  [facility] today  IVF's  Monitor      6/16  [facility] diffuse disease  Cardiology  Monitor    6/17  Worsening  No further Cardiac intervention  Nephrology on consult  Monitor  Cr 6.0 today vs 4.7  Low Urine O/P    6/18  COntinues to worsen. Cr 7.3 today  Nephrology guidance for HD vs Monitoring    6/19  Worsening  Cr 8.3 today - K is 4.6  Nephrology on board  No HD indicated at present  Monitor  6/22  Patient will continue with dialysis . Dr Lara for vascath change .  6/27  Patient getting dialysis today . will need outpatient dialysis

## 2018-06-28 NOTE — PROGRESS NOTES
Pt completed 3 hours of HD tx with 0.5 L net UF.  Pt became hypotensive with UF and UF goal was decreased, otherwise, pt tolerated tx ok. No access issues. During tx, pt received epo. Dr. Lyles rounded on pt at bedside during tx.  Post tx, blood rinsed back, CVC flushed with normal saline, locked with 1.6 ml 1000u/ml of heparin, lumens clamped, and sterile caps applied x2. Report to nurse attending.

## 2018-06-28 NOTE — CONSULTS
[facility]  Infectious Disease  Consult Note    Patient Name: Citlali Karimi  MRN: [id number]  Admission Date: 6/12/2018  Hospital Length of Stay: 15 days  Attending Physician: Levy Samuel MD  Primary Care Provider: Evelio Schuster MD     Isolation Status: Contact     Patient information was obtained from past medical records and ER records.      Consults  Assessment/Plan:     * Critical illness polyneuropathy    PT/OT AS TOLERATED        HAP (hospital-acquired pneumonia)    Likely from aspiration pneumonia-will add  Vanco/zosyn and closely monitor wbc.        C. difficile diarrhea    06/27- will use PO vancomycin and complete 10 days of therapy          ESRD (end stage renal disease)    HD as tolerated        Hyperparathyroidism    06/26-s/p parathyroidectomy         Diabetes mellitus, type 2 - only on oral medications    06/27 -will continue insulin sliding scale , closely monitor glucose.            Thank you for your consult. I will follow-up with patient. Please contact us if you have any additional questions.    Joseph Geller MD  Infectious Disease  [facility]    Subjective:     Principal Problem: Critical illness polyneuropathy    HPI:  68  year old  female with past medical history  of CKD (IV), hypertension, DM, CVA (left sided weakness), and CAD who initially presented for hyperparathyroidism s/p parathyroidectomy  per Dr. Tracy (General Surgery).    Hospital course complicated by acute MI,C difficle PCR,worsening leucocytosis .  CT scan of the chest showed -There is a moderate amount of alveolar consolidation scattered throughout both lungs.  This is characteristic of pneumonia  CBC showed -wbc of 18    Past Medical History:   Diagnosis Date    Acid reflux 1/7/2015    Anxiety 1/7/2015    Aortic valvar stenosis     Arthritis     osteo    Callus     Chronic anemia     followed by Dr. Addison    CKD (chronic kidney disease) stage 5, GFR less than 15 ml/min 8/7/2015     Combined hyperlipidemia associated with type 2 diabetes mellitus     Coronary artery disease     Diabetes mellitus type II, controlled, with no complications     LBSL 108 today    Encounter for blood transfusion     Frail elderly with impaired mobility, wheel chair bound 8/25/2017    Gallbladder problem     gallbladder surgery    Heart murmur     Hyperparathyroidism, secondary renal 1/7/2015    Hypertension 8/7/2015    Hypertension associated with diabetes 11/12/2012    Kidney transplant candidate 1/7/2015    Overweight(278.02)     Urinary tract infection        Past Surgical History:   Procedure Laterality Date    ANKLE FRACTURE SURGERY Left 1985    AORTIC VALVE REPLACEMENT  05/2016    BONE BIOPSY      CARDIAC SURGERY      CATARACT EXTRACTION W/  INTRAOCULAR LENS IMPLANT  2009    CHOLECYSTECTOMY      CYSTOSCOPY      EVACUATION OF HEMATOMA N/A 6/14/2018    Procedure: EVACUATION, HEMATOMA;  Surgeon: Levy Samuel MD;  Location: [facility] OR;  Service: General;  Laterality: N/A;    EYE SURGERY      FRACTURE SURGERY      HYSTERECTOMY  unknown    LEFT HEART CATHETERIZATION Left 6/15/2018    Procedure: CATHETERIZATION, HEART, LEFT;  Surgeon: Galindo Louis MD;  Location: [facility] CATH LAB;  Service: Cardiology;  Laterality: Left;    OOPHORECTOMY      PARATHYROIDECTOMY N/A 6/12/2018    Procedure: PARATHYROIDECTOMY;  Surgeon: Levy Samuel MD;  Location: [facility] OR;  Service: General;  Laterality: N/A;    removal of colon polyps      RENAL BIOPSY  10/2013    WOUND EXPLORATION N/A 6/14/2018    Procedure: EXPLORATION, WOUND;  Surgeon: Levy Samuel MD;  Location: [facility] OR;  Service: General;  Laterality: N/A;    YAG cap -OD         Review of patient's allergies indicates:   Allergen Reactions    Lipitor [atorvastatin] Swelling     Lips         Medications:  Prescriptions Prior to Admission   Medication Sig    cyanocobalamin (VITAMIN B-12) 1000 MCG tablet Take 1 tablet (1,000 mcg total) by mouth  once daily.    isosorbide mononitrate (IMDUR) 60 MG 24 hr tablet TAKE  ONE TABLET BY MOUTH DAILY    lactulose (CEPHULAC) 20 gram Pack Take 20 g by mouth as needed.    losartan (COZAAR) 25 MG tablet TAKE ONE TABLET BY MOUTH DAILY    omeprazole (PRILOSEC) 40 MG capsule Take 1 capsule (40 mg total) by mouth once daily.    simvastatin (ZOCOR) 20 MG tablet Take 1 tablet (20 mg total) by mouth every evening.    sodium bicarbonate 650 MG tablet Take 1 tablet (650 mg total) by mouth 2 (two) times daily.    aspirin (ECOTRIN) 81 MG EC tablet Take 81 mg by mouth once daily.     Antibiotics     Start     Stop Route Frequency Ordered    06/29/18 1400  vancomycin (VANCOCIN) 1,250 mg in dextrose 5 % 250 mL IVPB      -- IV Every 48 hours (non-standard times) 06/27/18 1432    06/27/18 1300  piperacillin-tazobactam 4.5 g in dextrose 5 % 100 mL IVPB (ready to mix system)      -- IV Every 12 hours (non-standard times) 06/27/18 1154    06/20/18 1200  vancomycin 250mg / 10ml oral suspension 125 mg      -- Oral Every 6 hours 06/20/18 0921        Antifungals     None        Antivirals     None           Immunization History   Administered Date(s) Administered    Hepatitis A / Hepatitis B 01/07/2015    Influenza 10/29/2007, 10/26/2010, 11/12/2012    Influenza - High Dose 09/23/2013, 11/13/2014, 12/03/2015, 10/17/2017    Influenza Split 11/12/2012    Influenza Whole 10/24/2008    Pneumococcal Conjugate - 13 Valent 01/10/2017    Pneumococcal Polysaccharide - 23 Valent 02/28/2014    Tdap 01/07/2015       Family History     Problem Relation (Age of Onset)    Aneurysm Father    Cancer Paternal Grandmother    Colon cancer Maternal Grandmother    Diabetes Mother, Brother, Sister    Glaucoma Mother    Heart disease Mother, Sister    Hypertension Mother, Brother, Brother    Kidney disease Sister, Brother, Sister    No Known Problems Maternal Aunt, Maternal Uncle, Paternal Aunt, Paternal Uncle, Maternal Grandfather, Paternal  Grandfather    Stroke Father        Social History     Social History    Marital status:      Spouse name: N/A    Number of children: N/A    Years of education: N/A     Occupational History    Retired  ([employer])      Social History Main Topics    Smoking status: Never Smoker    Smokeless tobacco: Never Used    Alcohol use No    Drug use: No    Sexual activity: Not Currently     Birth control/ protection: See Surgical Hx     Other Topics Concern    None     Social History Narrative    Retired - former principal     for 39 years    No children    No history of blood transfusions    No donors     Review of Systems   Unable to perform ROS: Mental status change     Objective:     Vital Signs (Most Recent):  Temp: 97.9 °F (36.6 °C) (06/28/18 0427)  Pulse: 86 (06/28/18 0711)  Resp: 18 (06/28/18 0711)  BP: (!) 98/51 (06/28/18 0427)  SpO2: 96 % (06/28/18 0711) Vital Signs (24h Range):  Temp:  [96.7 °F (35.9 °C)-98.4 °F (36.9 °C)] 97.9 °F (36.6 °C)  Pulse:  [78-92] 86  Resp:  [18-24] 18  SpO2:  [93 %-100 %] 96 %  BP: ()/(50-71) 98/51     Weight: 73.8 kg (162 lb 11.2 oz)  Body mass index is 29.76 kg/m².    Estimated Creatinine Clearance: 7.9 mL/min (A) (based on SCr of 6.2 mg/dL (H)).    Physical Exam   Constitutional: She appears well-developed and well-nourished. She is active and cooperative. She appears ill.   HENT:   Head: Normocephalic and atraumatic.   Nose: Nose normal.   Mouth/Throat: Oropharynx is clear and moist. No oropharyngeal exudate.   Healing neck incision is noted.   Eyes: Pupils are equal, round, and reactive to light. No scleral icterus.   Neck: Neck supple. No thyromegaly present.   Cardiovascular: Normal rate, regular rhythm, normal heart sounds and intact distal pulses.    No murmur heard.  Pulmonary/Chest: Effort normal. No stridor. No respiratory distress. She has no wheezes. She has rhonchi. She has no rales.   Abdominal: Soft. Bowel sounds are normal. She  exhibits no distension, no ascites and no mass. There is no hepatosplenomegaly. There is no tenderness. There is no rigidity, no rebound and no guarding.   Musculoskeletal: She exhibits no edema or tenderness.   Lymphadenopathy:     She has no cervical adenopathy.   Neurological: She is unresponsive. No cranial nerve deficit. She exhibits normal muscle tone. Coordination normal.   Skin: Skin is warm and dry. Ecchymosis noted. No rash noted. No pallor.   Psychiatric: She is slowed. She is noncommunicative.   Nursing note and vitals reviewed.      Significant Labs:   Blood Culture:   Recent Labs  Lab 06/12/18  2053 06/27/18  1015 06/27/18  1025   LABBLOO No growth after 5 days. No Growth to date No Growth to date     BMP:   Recent Labs  Lab 06/28/18  0526   *      K 4.3   CL 96   CO2 23   BUN 80*   CREATININE 6.2*   CALCIUM 8.7   MG 2.3     CBC:   Recent Labs  Lab 06/27/18  0514 06/28/18  0526   WBC 18.26* 15.45*   HGB 7.8* 8.2*   HCT 24.6* 25.8*   PLT 59* 63*     CMP:   Recent Labs  Lab 06/27/18  0514 06/28/18  0526    138   K 4.1 4.3    96   CO2 25 23   * 188*   BUN 56* 80*   CREATININE 4.6* 6.2*   CALCIUM 9.0 8.7   PROT 6.1 6.4   ALBUMIN 2.4* 2.4*   BILITOT 0.7 0.7   ALKPHOS 73 73   AST 21 26   ALT 12 9*   ANIONGAP 14 19*   EGFRNONAA 9* 6*     All pertinent labs within the past 24 hours have been reviewed.    Significant Imaging: I have reviewed all pertinent imaging results/findings within the past 24 hours.

## 2018-06-28 NOTE — PLAN OF CARE
Problem: Patient Care Overview  Goal: Plan of Care Review  Outcome: Ongoing (interventions implemented as appropriate)  Recommendations     Recommendation/Intervention:  1.Continue current TF (Novasource at 40 ml/hr). 2. Will continue to monitor.    Goals: Meet > 85 % EEN/EPN while admitted  Nutrition Goal Status: goal met   Communication of RD Recs: Reviewed with RN

## 2018-06-28 NOTE — SUBJECTIVE & OBJECTIVE
Interval History:  Patient still is short of breath pending PEG tube placement.  Not responding to verbal commands.  Still early sepsis worsening with time with bilateral pneumonia confirmed on CT scan.  ESRD on hemodialysis on a [facility].  On contact precautions    Review of patient's allergies indicates:   Allergen Reactions    Lipitor [atorvastatin] Swelling     Lips       Current Facility-Administered Medications   Medication Frequency    0.9%  NaCl infusion PRN    0.9%  NaCl infusion Once    acetaminophen oral solution 650 mg Q4H PRN    albuterol-ipratropium 2.5 mg-0.5 mg/3 mL nebulizer solution 3 mL Q4H WAKE    amiodarone tablet 200 mg BID    aspirin chewable tablet 81 mg Daily    bisacodyl suppository 10 mg Daily PRN    cloNIDine tablet 0.1 mg Q6H PRN    dextrose 50% injection 12.5 g PRN    dextrose 50% injection 25 g PRN    epoetin carmen injection 10,000 Units Once    epoetin carmen injection 4,000 Units Every Tues, Thurs, Sat    famotidine tablet 20 mg Daily    glucagon (human recombinant) injection 1 mg PRN    glucose chewable tablet 16 g PRN    glucose chewable tablet 24 g PRN    heparin (porcine) injection 3,000 Units PRN    hydrALAZINE injection 10 mg Q8H PRN    HYDROcodone-acetaminophen 7.5-325 mg per tablet 1 tablet Q6H PRN    insulin aspart U-100 pen 0-5 Units QID (AC + HS) PRN    lactulose 20 gram/30 mL solution Soln 20 g Q6H PRN    lorazepam (ATIVAN) injection 2 mg Q4H PRN    metoprolol tartrate (LOPRESSOR) tablet 25 mg BID    morphine injection 2 mg Q6H PRN    ondansetron injection 4 mg Q8H PRN    piperacillin-tazobactam 4.5 g in dextrose 5 % 100 mL IVPB (ready to mix system) Q12H    sodium bicarbonate 8.4 % (1 mEq/mL) injection PRN    sodium bicarbonate tablet 650 mg BID    sodium chloride 0.9% flush 3 mL PRN    [START ON 7/2/2018] vancomycin (VANCOCIN) 1,250 mg in dextrose 5 % 250 mL IVPB Q48H    vancomycin 250mg / 10ml oral suspension 125 mg Q6H     vancomycin 500 mg in dextrose 5 % 100 mL IVPB (ready to mix system) Once       Objective:     Vital Signs (Most Recent):  Temp: 97.7 °F (36.5 °C) (06/28/18 0822)  Pulse: 85 (06/28/18 0940)  Resp: 20 (06/28/18 0822)  BP: 114/60 (06/28/18 0822)  SpO2: 97 % (06/28/18 0822)  O2 Device (Oxygen Therapy): nasal cannula (06/28/18 0822) Vital Signs (24h Range):  Temp:  [97.7 °F (36.5 °C)-98.4 °F (36.9 °C)] 97.7 °F (36.5 °C)  Pulse:  [78-88] 85  Resp:  [18-24] 20  SpO2:  [94 %-100 %] 97 %  BP: ()/(51-71) 114/60     Weight: 73.8 kg (162 lb 11.2 oz) (06/28/18 0427)  Body mass index is 29.76 kg/m².  Body surface area is 1.8 meters squared.    I/O last 3 completed shifts:  In: 1825 [NG/GT:1725; IV Piggyback:100]  Out: 0     Physical Exam   Constitutional: She appears well-developed and well-nourished. She is active and cooperative. She appears ill.   HENT:   Head: Normocephalic and atraumatic.   Nose: Nose normal.   Mouth/Throat: Oropharynx is clear and moist. No oropharyngeal exudate.   Healing neck incision is noted.   Eyes: Pupils are equal, round, and reactive to light. No scleral icterus.   Neck: Neck supple. No thyromegaly present.   Cardiovascular: Normal rate, regular rhythm, normal heart sounds and intact distal pulses.    No murmur heard.  Pulmonary/Chest: Effort normal. No stridor. No respiratory distress. She has wheezes in the right upper field, the right middle field, the right lower field, the left upper field, the left middle field and the left lower field. She has rhonchi in the right upper field, the right middle field, the right lower field, the left upper field, the left middle field and the left lower field. She has rales in the right upper field, the right middle field, the right lower field, the left upper field, the left middle field and the left lower field.   Abdominal: Soft. Bowel sounds are normal. She exhibits no distension, no ascites and no mass. There is no hepatosplenomegaly. There is no  tenderness. There is no rigidity, no rebound and no guarding.   Musculoskeletal: She exhibits no edema or tenderness.   Lymphadenopathy:     She has no cervical adenopathy.   Neurological: She is unresponsive. No cranial nerve deficit. She exhibits normal muscle tone. Coordination normal.   Patient is some drowsy but arousable.  Responds to loud verbal commands and tactile stimuli.  Does not communicate.  Grimaces with pain. Neurological status is slowly worsening with time.   Skin: Skin is warm and dry. Ecchymosis noted. No rash noted. No pallor.   Psychiatric: She is slowed. She is noncommunicative.   Nursing note and vitals reviewed.      Significant Labs:  All labs within the past 24 hours have been reviewed.     Significant Imaging:  Labs: Reviewed  X-Ray: Reviewed  CT: Reviewed

## 2018-06-28 NOTE — PLAN OF CARE
Problem: Patient Care Overview  Goal: Plan of Care Review  Outcome: Ongoing (interventions implemented as appropriate)  Pt remains free from injury at this time. Respirations even and unlabored. Bed locked and in lowest position. Call light within reach. Side rails up x2. Bed alarm active. Tube feeding set at 40 ml/hr. Pt tolerating well. Nasal canula 3L. Pt receiving HD today. Disoriented to place, time, situation. Telemetry monitor in place running sinus rhythm. Turing Q 2 and elevating heels. NG tube placement verified by pH of 5. Chart check complete.

## 2018-06-28 NOTE — ASSESSMENT & PLAN NOTE
-most likely metabolic pneumonia   - also patient had cardiac arrest   - will neurology to evaluate patient in morning   - ct head negative for new acute events

## 2018-06-28 NOTE — ASSESSMENT & PLAN NOTE
-Pt admitted to Extended Recovery then transferred to Inpatient due to NSTEMI  -s/p Parathyroidectomy   -managed by General Surgery -primary team  - PTH- 32  - Ca 11.4>>10.5  - analgesia prn   - antiemetics prn  - specimens sent for analysis    6/16  Ca 8.7 today  Monitor    6/18  Ca 8.2 today  Monitor    6/19  Ca 8.2 stable  6/27  Ca stable after surgery

## 2018-06-28 NOTE — PROGRESS NOTES
Vancomycin Progress Note    Indication: aspiration pneumonia  WBC = 15.45 (down from 18.26 yesterday)  Tmax = 98.4  Cultures:     Blood x 2 (6/27) -- No growth so far     Urine (6/27) -- pending     Stool (6/18) -- C diff POSITIVE (on oral vancomycin)    1250 mg dose given 6/27 @ 1415  Random level @ 0526 = 23.4 mcg/ml  Spoke with Dr. Lyles who said pt will receive HD this AM  Per HD protocol for a pre-HD random of 19-25 mcg/ml, we will give a 500 mg dose today after completion of HD    Will order next random for Sat 6/30 with AM labs assuming next HD will be then (waiting for confirmation from nephrology)     Thank you for allowing us to participate in this patient's care.   Katherine McArdle, Pharm.D. 6/28/2018 9:12 AM

## 2018-06-28 NOTE — PROGRESS NOTES
[facility] -   Adult Nutrition  Progress Note    SUMMARY     Recommendations    Recommendation/Intervention:  1.Continue current TF (Novasource at 40 ml/hr). 2. Will continue to monitor.    Goals: Meet > 85 % EEN/EPN while admitted  Nutrition Goal Status: goal met   Communication of RD Recs: Reviewed with RN    Reason for Assessment    Reason for Assessment:  RD follow-up  Dx:  1. Abnormal chest x-ray    2. Hyperparathyroidism    3. Hypercalcemia    4. SOB (shortness of breath)    5. NSTEMI (non-ST elevated myocardial infarction)    6. Elevated troponin    7. S/P parathyroidectomy    8. Respiratory distress    9. Cardiogenic shock    10. Acute hypoxemic respiratory failure    11. Cardiac arrest with ventricular fibrillation    12. CKD (chronic kidney disease) stage 5, GFR less than 15 ml/min    13. Type 2 diabetes mellitus with stage 4 chronic kidney disease, without long-term current use of insulin    14. Electrolyte imbalance    15. H/O aortic valve replacement    16. Persistent atrial fibrillation    17. S/P evacuation of hematoma    18. Shock liver    19. ST elevation myocardial infarction (STEMI), unspecified artery    20. MI (myocardial infarction)    21. Acute airway obstruction    22. Acute blood loss anemia    23. On mechanically assisted ventilation    24. STEMI (ST elevation myocardial infarction)    25. Personal history of sudden cardiac arrest    26. Presence of prosthetic heart valve    27. Coronary artery disease involving native coronary artery without angina pectoris, unspecified whether native or transplanted heart    28. Acute renal failure superimposed on stage 5 chronic kidney disease, not on chronic dialysis, unspecified acute renal failure type    29. C. difficile diarrhea    30. Acute renal failure, unspecified acute renal failure type    31. Chronic kidney disease    32. ESRD (end stage renal disease)    33. Critical illness polyneuropathy    34. Frail elderly with impaired  mobility, wheel chair bound    35. Chest pain      Past Medical History:   Diagnosis Date    Acid reflux 1/7/2015    Anxiety 1/7/2015    Aortic valvar stenosis     Arthritis     osteo    Callus     Chronic anemia     followed by Dr. Addison    CKD (chronic kidney disease) stage 5, GFR less than 15 ml/min 8/7/2015    Combined hyperlipidemia associated with type 2 diabetes mellitus     Coronary artery disease     Diabetes mellitus type II, controlled, with no complications     LBSL 108 today    Encounter for blood transfusion     Frail elderly with impaired mobility, wheel chair bound 8/25/2017    Gallbladder problem     gallbladder surgery    Heart murmur     Hyperparathyroidism, secondary renal 1/7/2015    Hypertension 8/7/2015    Hypertension associated with diabetes 11/12/2012    Kidney transplant candidate 1/7/2015    Overweight(278.02)     Urinary tract infection        General Information Comments:   6.15.18.S/p parathyroidectomy (6/12).  ST recs: NPO (6/14).  Pt was taken back to OR and had post op neck hematoma evacuation (6/14). Patient extubated and immediately reintubated. OG tube placed for PO med access. LHC planned for later today.   6.18.18. S/p CATHETERIZATION, HEART, LEFT. Patient remains intubated. Currently on TF. Worsening renal function. Per ICU rounds,  plans for extubation and possible plans for dialysis.   6.21.18. Per ICU staff: Pt remains intubated,Pt on TF, tolerating,  Pt on HD, plans to extubate after dialysis today.    6.25.18.Pt extubated. Pt completed 3 hrs HD yesterday.  Per SLP recs: NPO at this time (6/22). SLP following. Pt on TF, tolerating.   6.28.18. Pt on HD. Pending PEG tube placement. Tolerating TF (novasource at 40 ml/hr).     Nutrition Discharge Planning: Patient to receive adequate intake via TF with tolerance.     Nutrition Risk Screen    Nutrition Risk Screen: dysphagia or difficulty swallowing    Nutrition/Diet History    Do you have any cultural,  "spiritual, Yazdanism conflicts, given your current situation?: NONE    Anthropometrics    Temp: 97.7 °F (36.5 °C)  Height Method: Stated  Height: 5' 2" (157.5 cm)  Height (inches): 62 in  Weight Method: Bed Scale  Weight: 73.8 kg (162 lb 11.2 oz)  Weight (lb): 162.7 lb  Ideal Body Weight (IBW), Female: 110 lb  % Ideal Body Weight, Female (lb): 154.33 lb  BMI (Calculated): 31.1  BMI Grade: 30 - 34.9- obesity - grade I       Lab/Procedures/Meds    Pertinent Labs Reviewed: reviewed  BMP  Lab Results   Component Value Date     06/28/2018    K 4.3 06/28/2018    CL 96 06/28/2018    CO2 23 06/28/2018    BUN 80 (H) 06/28/2018    CREATININE 6.2 (H) 06/28/2018    CALCIUM 8.7 06/28/2018    ANIONGAP 19 (H) 06/28/2018    ESTGFRAFRICA 7 (A) 06/28/2018    EGFRNONAA 6 (A) 06/28/2018     Lab Results   Component Value Date    CALCIUM 8.7 06/28/2018    PHOS 3.8 06/28/2018     Lab Results   Component Value Date    ALBUMIN 2.4 (L) 06/28/2018       Recent Labs  Lab 06/28/18  1101   POCTGLUCOSE 198*     Lab Results   Component Value Date    HGBA1C 5.4 06/12/2018       Pertinent Medications Reviewed: reviewed    Physical Findings/Assessment    Overall Physical Appearance: obese   Tubes:  NG  Oral/Mouth Cavity: decay/carries, tooth/teeth missing, no dental appliances present  Skin: incision(s)    Estimated/Assessed Needs    Weight Used For Calorie Calculations: 64 kg (141 lb 1.5 oz) (SBW)  Energy Calorie Requirements (kcal): 1920   Energy Need Method: kcal/kg   Protein Requirements: 76- 96 g/day  Weight Used For Protein Calculations: 64 kg (141 lb 1.5 oz) (SBW)     Fluid Need Method: RDA Method (or per MD)  RDA Method (mL): 1920  CHO Requirement: 50 % EEN      Nutrition Prescription Ordered    Current Diet Order: NPO  Current Nutrition Support Formula Ordered:  Novasource at 40 ml/hr    Evaluation of Received Nutrient/Fluid Intake    Enteral Calories (kcal): 1920  Enteral Protein (gm): 86    % kcal needs: 100  % protein needs: " 100    Intake/Output Summary (Last 24 hours) at 06/28/18 1215  Last data filed at 06/28/18 0500   Gross per 24 hour   Intake              913 ml   Output                0 ml   Net              913 ml       % Intake of Estimated Energy Needs: 75 - 100 %  % Meal Intake: NPO    Nutrition Risk    Level of Risk/Frequency of Follow-up:  (F/U x2 weekly)     Assessment and Plan    Nutrition Problem:  Inadequate energy intake     Related to (etiology):   Inability to consume sufficient energy     Signs and Symptoms (as evidenced by):   Intubated, NPO, no alternative means of nutrition.      Interventions/Recommendations (treatment strategy):  Please see RD recs above.     Nutrition Diagnosis Status:   Resolved.  6.28.18. Pt on TF, meeting needs.       Monitor and Evaluation    Food and Nutrient Intake: energy intake, enteral nutrition intake  Food and Nutrient Adminstration: enteral and parenteral nutrition administration  Anthropometric Measurements: weight  Biochemical Data, Medical Tests and Procedures: electrolyte and renal panel, glucose/endocrine profile  Nutrition-Focused Physical Findings: overall appearance, skin     Nutrition Follow-Up    RD Follow-up?: Yes (2xweekly)

## 2018-06-28 NOTE — PROGRESS NOTES
[facility] -   Nephrology  Progress Note    Patient Name: Citlali Karimi  MRN: [id number]  Admission Date: 6/12/2018  Hospital Length of Stay: 15 days  Attending Provider: Levy Samuel MD   Primary Care Physician: Evelio Schuster MD  Principal Problem:Critical illness polyneuropathy    Subjective:     HPI:  Citlali Karimi Is a pleasant 68 -year-old  woman with history of chronic kidney disease stage 4, patient was admitted to the hospital for an elective  partial parathyroidectomy, following her surgery during observation.  She had some chest pain, workup revealed non ST elevated MI, patient was admitted to the hospital for further management.  We were consulted due to advanced renal insufficiency, baseline serum creatinine about 3.5-4,            Important Info :        She underwent a native kidney biopsy on 10/10/13. Final report of the kidney biopsy is negative for any specific etiology for acute renal failure. 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         Interval History:  Patient still is short of breath pending PEG tube placement.  Not responding to verbal commands.  Still early sepsis worsening with time with bilateral pneumonia confirmed on CT scan.  ESRD on hemodialysis on a [facility]yp[facility]yn[facility]-Ge[facility]ved.  On contact precautions    Review of patient's allergies indicates:   Allergen Reactions    Lipitor [atorvastatin] Swelling     Lips       Current Facility-Administered Medications   Medication Frequency    0.9%  NaCl infusion PRN    0.9%  NaCl infusion Once    acetaminophen oral solution 650 mg Q4H PRN    albuterol-ipratropium 2.5 mg-0.5 mg/3 mL nebulizer solution 3 mL Q4H WAKE    amiodarone tablet 200 mg BID    aspirin chewable tablet 81 mg Daily    bisacodyl suppository 10 mg Daily PRN    cloNIDine tablet 0.1 mg Q6H PRN    dextrose 50% injection 12.5 g PRN    dextrose 50% injection 25 g PRN    epoetin carmen injection  10,000 Units Once    epoetin carmen injection 4,000 Units Every Tues, Thurs, Sat    famotidine tablet 20 mg Daily    glucagon (human recombinant) injection 1 mg PRN    glucose chewable tablet 16 g PRN    glucose chewable tablet 24 g PRN    heparin (porcine) injection 3,000 Units PRN    hydrALAZINE injection 10 mg Q8H PRN    HYDROcodone-acetaminophen 7.5-325 mg per tablet 1 tablet Q6H PRN    insulin aspart U-100 pen 0-5 Units QID (AC + HS) PRN    lactulose 20 gram/30 mL solution Soln 20 g Q6H PRN    lorazepam (ATIVAN) injection 2 mg Q4H PRN    metoprolol tartrate (LOPRESSOR) tablet 25 mg BID    morphine injection 2 mg Q6H PRN    ondansetron injection 4 mg Q8H PRN    piperacillin-tazobactam 4.5 g in dextrose 5 % 100 mL IVPB (ready to mix system) Q12H    sodium bicarbonate 8.4 % (1 mEq/mL) injection PRN    sodium bicarbonate tablet 650 mg BID    sodium chloride 0.9% flush 3 mL PRN    [START ON 7/2/2018] vancomycin (VANCOCIN) 1,250 mg in dextrose 5 % 250 mL IVPB Q48H    vancomycin 250mg / 10ml oral suspension 125 mg Q6H    vancomycin 500 mg in dextrose 5 % 100 mL IVPB (ready to mix system) Once       Objective:     Vital Signs (Most Recent):  Temp: 97.7 °F (36.5 °C) (06/28/18 0822)  Pulse: 85 (06/28/18 0940)  Resp: 20 (06/28/18 0822)  BP: 114/60 (06/28/18 0822)  SpO2: 97 % (06/28/18 0822)  O2 Device (Oxygen Therapy): nasal cannula (06/28/18 0822) Vital Signs (24h Range):  Temp:  [97.7 °F (36.5 °C)-98.4 °F (36.9 °C)] 97.7 °F (36.5 °C)  Pulse:  [78-88] 85  Resp:  [18-24] 20  SpO2:  [94 %-100 %] 97 %  BP: ()/(51-71) 114/60     Weight: 73.8 kg (162 lb 11.2 oz) (06/28/18 5357)  Body mass index is 29.76 kg/m².  Body surface area is 1.8 meters squared.    I/O last 3 completed shifts:  In: 1825 [NG/GT:1725; IV Piggyback:100]  Out: 0     Physical Exam   Constitutional: She appears well-developed and well-nourished. She is active and cooperative. She appears ill.   HENT:   Head: Normocephalic and  atraumatic.   Nose: Nose normal.   Mouth/Throat: Oropharynx is clear and moist. No oropharyngeal exudate.   Healing neck incision is noted.   Eyes: Pupils are equal, round, and reactive to light. No scleral icterus.   Neck: Neck supple. No thyromegaly present.   Cardiovascular: Normal rate, regular rhythm, normal heart sounds and intact distal pulses.    No murmur heard.  Pulmonary/Chest: Effort normal. No stridor. No respiratory distress. She has wheezes in the right upper field, the right middle field, the right lower field, the left upper field, the left middle field and the left lower field. She has rhonchi in the right upper field, the right middle field, the right lower field, the left upper field, the left middle field and the left lower field. She has rales in the right upper field, the right middle field, the right lower field, the left upper field, the left middle field and the left lower field.   Abdominal: Soft. Bowel sounds are normal. She exhibits no distension, no ascites and no mass. There is no hepatosplenomegaly. There is no tenderness. There is no rigidity, no rebound and no guarding.   Musculoskeletal: She exhibits no edema or tenderness.   Lymphadenopathy:     She has no cervical adenopathy.   Neurological: She is unresponsive. No cranial nerve deficit. She exhibits normal muscle tone. Coordination normal.   Patient is some drowsy but arousable.  Responds to loud verbal commands and tactile stimuli.  Does not communicate.  Grimaces with pain. Neurological status is slowly worsening with time.   Skin: Skin is warm and dry. Ecchymosis noted. No rash noted. No pallor.   Psychiatric: She is slowed. She is noncommunicative.   Nursing note and vitals reviewed.      Significant Labs:  All labs within the past 24 hours have been reviewed.     Significant Imaging:  Labs: Reviewed  X-Ray: Reviewed  CT: Reviewed    Assessment/Plan:     ESRD (end stage renal disease)    Patient is deteriorating  hemodynamically with worsening sepsis.  Reviewed CT scan of the chest and further workup on bilateral lung infiltrates.      Planning on hemodialysis later today.  If the patient hemodynamically with worsens have a low threshold for ICU transfer.            Thank you for your consult.     Chinmay Lyles MD  Nephrology  [facility]

## 2018-06-29 PROBLEM — N18.6 ESRD (END STAGE RENAL DISEASE): Status: ACTIVE | Noted: 2018-06-29

## 2018-06-29 LAB
ALBUMIN SERPL BCP-MCNC: 2 G/DL
ALP SERPL-CCNC: 76 U/L
ALT SERPL W/O P-5'-P-CCNC: 20 U/L
ANION GAP SERPL CALC-SCNC: 16 MMOL/L
ANISOCYTOSIS BLD QL SMEAR: SLIGHT
AST SERPL-CCNC: 40 U/L
BASOPHILS # BLD AUTO: 0.01 K/UL
BASOPHILS NFR BLD: 0.1 %
BILIRUB SERPL-MCNC: 0.7 MG/DL
BUN SERPL-MCNC: 51 MG/DL
CALCIUM SERPL-MCNC: 8.4 MG/DL
CHLORIDE SERPL-SCNC: 99 MMOL/L
CO2 SERPL-SCNC: 23 MMOL/L
CREAT SERPL-MCNC: 4.2 MG/DL
DACRYOCYTES BLD QL SMEAR: ABNORMAL
DIFFERENTIAL METHOD: ABNORMAL
EOSINOPHIL # BLD AUTO: 0 K/UL
EOSINOPHIL NFR BLD: 0.2 %
ERYTHROCYTE [DISTWIDTH] IN BLOOD BY AUTOMATED COUNT: 17.3 %
EST. GFR  (AFRICAN AMERICAN): 12 ML/MIN/1.73 M^2
EST. GFR  (NON AFRICAN AMERICAN): 10 ML/MIN/1.73 M^2
GLUCOSE SERPL-MCNC: 205 MG/DL
HCT VFR BLD AUTO: 23.1 %
HGB BLD-MCNC: 7.3 G/DL
HYPOCHROMIA BLD QL SMEAR: ABNORMAL
LYMPHOCYTES # BLD AUTO: 0.7 K/UL
LYMPHOCYTES NFR BLD: 4.2 %
MAGNESIUM SERPL-MCNC: 1.9 MG/DL
MCH RBC QN AUTO: 28.2 PG
MCHC RBC AUTO-ENTMCNC: 31.6 G/DL
MCV RBC AUTO: 89 FL
MONOCYTES # BLD AUTO: 0.9 K/UL
MONOCYTES NFR BLD: 5 %
NEUTROPHILS # BLD AUTO: 15.6 K/UL
NEUTROPHILS NFR BLD: 91.3 %
OVALOCYTES BLD QL SMEAR: ABNORMAL
PHOSPHATE SERPL-MCNC: 2.8 MG/DL
PLATELET # BLD AUTO: 52 K/UL
PLATELET BLD QL SMEAR: ABNORMAL
PMV BLD AUTO: ABNORMAL FL
POCT GLUCOSE: 175 MG/DL (ref 70–110)
POCT GLUCOSE: 203 MG/DL (ref 70–110)
POIKILOCYTOSIS BLD QL SMEAR: ABNORMAL
POLYCHROMASIA BLD QL SMEAR: ABNORMAL
POTASSIUM SERPL-SCNC: 3.7 MMOL/L
PROCALCITONIN SERPL IA-MCNC: 2.54 NG/ML
PROT SERPL-MCNC: 5.8 G/DL
RBC # BLD AUTO: 2.59 M/UL
SCHISTOCYTES BLD QL SMEAR: PRESENT
SODIUM SERPL-SCNC: 138 MMOL/L
STOMATOCYTES BLD QL SMEAR: PRESENT
TARGETS BLD QL SMEAR: ABNORMAL
WBC # BLD AUTO: 17.18 K/UL

## 2018-06-29 PROCEDURE — 25000242 PHARM REV CODE 250 ALT 637 W/ HCPCS: Performed by: SURGERY

## 2018-06-29 PROCEDURE — 80053 COMPREHEN METABOLIC PANEL: CPT

## 2018-06-29 PROCEDURE — 25000003 PHARM REV CODE 250: Performed by: NURSE PRACTITIONER

## 2018-06-29 PROCEDURE — 84145 PROCALCITONIN (PCT): CPT

## 2018-06-29 PROCEDURE — 63600175 PHARM REV CODE 636 W HCPCS: Performed by: SURGERY

## 2018-06-29 PROCEDURE — 94668 MNPJ CHEST WALL SBSQ: CPT

## 2018-06-29 PROCEDURE — 83735 ASSAY OF MAGNESIUM: CPT

## 2018-06-29 PROCEDURE — 25000003 PHARM REV CODE 250: Performed by: INTERNAL MEDICINE

## 2018-06-29 PROCEDURE — 36415 COLL VENOUS BLD VENIPUNCTURE: CPT

## 2018-06-29 PROCEDURE — 84100 ASSAY OF PHOSPHORUS: CPT

## 2018-06-29 PROCEDURE — 25000003 PHARM REV CODE 250: Performed by: SURGERY

## 2018-06-29 PROCEDURE — 99291 CRITICAL CARE FIRST HOUR: CPT | Mod: ,,, | Performed by: INTERNAL MEDICINE

## 2018-06-29 PROCEDURE — 94761 N-INVAS EAR/PLS OXIMETRY MLT: CPT

## 2018-06-29 PROCEDURE — 99900026 HC AIRWAY MAINTENANCE (STAT)

## 2018-06-29 PROCEDURE — 21400001 HC TELEMETRY ROOM

## 2018-06-29 PROCEDURE — 63600175 PHARM REV CODE 636 W HCPCS: Performed by: INTERNAL MEDICINE

## 2018-06-29 PROCEDURE — 27000221 HC OXYGEN, UP TO 24 HOURS

## 2018-06-29 PROCEDURE — 85025 COMPLETE CBC W/AUTO DIFF WBC: CPT

## 2018-06-29 PROCEDURE — 94640 AIRWAY INHALATION TREATMENT: CPT

## 2018-06-29 PROCEDURE — 99233 SBSQ HOSP IP/OBS HIGH 50: CPT | Mod: ,,, | Performed by: INTERNAL MEDICINE

## 2018-06-29 RX ORDER — FLUCONAZOLE 2 MG/ML
100 INJECTION, SOLUTION INTRAVENOUS
Status: DISCONTINUED | OUTPATIENT
Start: 2018-06-29 | End: 2018-06-29

## 2018-06-29 RX ADMIN — IPRATROPIUM BROMIDE AND ALBUTEROL SULFATE 3 ML: .5; 3 SOLUTION RESPIRATORY (INHALATION) at 08:06

## 2018-06-29 RX ADMIN — ASPIRIN 81 MG 81 MG: 81 TABLET ORAL at 10:06

## 2018-06-29 RX ADMIN — IPRATROPIUM BROMIDE AND ALBUTEROL SULFATE 3 ML: .5; 3 SOLUTION RESPIRATORY (INHALATION) at 07:06

## 2018-06-29 RX ADMIN — IPRATROPIUM BROMIDE AND ALBUTEROL SULFATE 3 ML: .5; 3 SOLUTION RESPIRATORY (INHALATION) at 03:06

## 2018-06-29 RX ADMIN — FAMOTIDINE 20 MG: 20 TABLET ORAL at 10:06

## 2018-06-29 RX ADMIN — METOPROLOL TARTRATE 25 MG: 25 TABLET, FILM COATED ORAL at 10:06

## 2018-06-29 RX ADMIN — SODIUM BICARBONATE 650 MG TABLET 650 MG: at 08:06

## 2018-06-29 RX ADMIN — INSULIN ASPART 2 UNITS: 100 INJECTION, SOLUTION INTRAVENOUS; SUBCUTANEOUS at 05:06

## 2018-06-29 RX ADMIN — Medication 125 MG: at 11:06

## 2018-06-29 RX ADMIN — METOPROLOL TARTRATE 25 MG: 25 TABLET, FILM COATED ORAL at 08:06

## 2018-06-29 RX ADMIN — PIPERACILLIN AND TAZOBACTAM 4.5 G: 4; .5 INJECTION, POWDER, LYOPHILIZED, FOR SOLUTION INTRAVENOUS; PARENTERAL at 08:06

## 2018-06-29 RX ADMIN — PIPERACILLIN AND TAZOBACTAM 4.5 G: 4; .5 INJECTION, POWDER, LYOPHILIZED, FOR SOLUTION INTRAVENOUS; PARENTERAL at 10:06

## 2018-06-29 RX ADMIN — SODIUM BICARBONATE 650 MG TABLET 650 MG: at 10:06

## 2018-06-29 RX ADMIN — AMIODARONE HYDROCHLORIDE 200 MG: 200 TABLET ORAL at 10:06

## 2018-06-29 RX ADMIN — AMIODARONE HYDROCHLORIDE 200 MG: 200 TABLET ORAL at 08:06

## 2018-06-29 RX ADMIN — MICAFUNGIN SODIUM 100 MG: 20 INJECTION, POWDER, LYOPHILIZED, FOR SOLUTION INTRAVENOUS at 07:06

## 2018-06-29 RX ADMIN — IPRATROPIUM BROMIDE AND ALBUTEROL SULFATE 3 ML: .5; 3 SOLUTION RESPIRATORY (INHALATION) at 11:06

## 2018-06-29 RX ADMIN — Medication 125 MG: at 06:06

## 2018-06-29 RX ADMIN — Medication 125 MG: at 05:06

## 2018-06-29 NOTE — PLAN OF CARE
Problem: Patient Care Overview  Goal: Plan of Care Review  Outcome: Ongoing (interventions implemented as appropriate)    Reviewed plan of care, including indications and possible side effects of administered medications. Remains free from injury at this time. Respirations even and unlabored. Bed locked and in lowest position. Call light within reach. Side rails up x2. Pt has no verbal complaints of pain. Suction at bedside. Kangaroo pump running at 40 ml/hr, pt tolerating well.Turining Q2. Telemetry monitor in place, pt running SR. Chart check complete.

## 2018-06-29 NOTE — ASSESSMENT & PLAN NOTE
Status post parathyroidectomy  S/p neck washout  Neck is supple, no further swelling since drain out.   HIT was negative so restarting heparin products   Pt needs PEG tube, possibly next week

## 2018-06-29 NOTE — PROGRESS NOTES
[facility] -   Hematology/Oncology  Progress Note    Patient Name: Citlali Karimi  Admission Date: 6/12/2018  Hospital Length of Stay: 16 days  Code Status: DNR     Subjective:     HPI:  68 year old female, PMHx of CKD (IV), HTN, DM, CVA (left sided weakness), and CAD who initially presented for hyperparathyroidism and hypercalcemia requiring surgical intervention.   She was seen in clinic by surgery for hyperparathyroidism and hypercalcemia and was electively admitted 6/12 taken to OR undergoing parathyroidectomy finding 2 right sided enlarged parathyroid glands.  Intra and post op patient developed EKG changes and had elevated troponin.  Cards consulted.  Patient admitted due to swallowing issues she was not taking all her meds as prescribed and had BP as high as 225/103 during hospitalization.  Troponin increased to > 50.  Cards diagnosed with NSTEMI and she was Rx with ASA, Imdur, Lopressor, statin and Heparin infusion with plans for repeat LHC if cleared by Renal given CKD5.  Today, she had progressively worsening neck swelling and SOB with worsening dysphagia.  She was taken back to OR and had post op neck hematoma evacuation.  In PACU she had witnessed V-Fib cardiac arrest and CODE BLUE called.  After 3 rounds of CPR, Epi X 3 and Defib X 3 ROSC was attained.  She was still intubated and on vent post op    Interval History:  The patient is moaning in bed and is unable to answer specific questions.  She is able to open her eyes when her name is called.     Oncology Treatment Plan:   [No treatment plan]    Medications:  Continuous Infusions:  Scheduled Meds:   sodium chloride 0.9%   Intravenous Once    albuterol-ipratropium  3 mL Nebulization Q4H WAKE    amiodarone  200 mg Oral BID    aspirin  81 mg Oral Daily    epoetin carmen (PROCRIT) injection  4,000 Units Intravenous Every Tues, Thurs, Sat    famotidine  20 mg Oral Daily    metoprolol tartrate  25 mg Oral BID    piperacillin-tazobactam  (ZOSYN) IVPB  4.5 g Intravenous Q12H    sodium bicarbonate  650 mg Oral BID    [START ON 7/2/2018] vancomycin (VANCOCIN) IVPB  1,250 mg Intravenous Q48H    vancomycin  125 mg Oral Q6H     PRN Meds:sodium chloride 0.9%, acetaminophen, bisacodyl, cloNIDine, dextrose 50%, dextrose 50%, glucagon (human recombinant), glucose, glucose, heparin (porcine), hydrALAZINE, insulin aspart U-100, lactulose, lorazepam, morphine, ondansetron, sodium chloride 0.9%     Review of Systems   Unable to perform ROS: Mental status change     Objective:     Vital Signs (Most Recent):  Temp: 98.4 °F (36.9 °C) (06/29/18 0444)  Pulse: 80 (06/29/18 0444)  Resp: (!) 22 (06/29/18 0444)  BP: (!) 101/55 (06/29/18 0444)  SpO2: 100 % (06/29/18 0046) Vital Signs (24h Range):  Temp:  [97.1 °F (36.2 °C)-99.4 °F (37.4 °C)] 98.4 °F (36.9 °C)  Pulse:  [50-86] 80  Resp:  [18-24] 22  SpO2:  [95 %-100 %] 100 %  BP: ()/(26-79) 101/55     Weight: 74.7 kg (164 lb 10.9 oz)  Body mass index is 30.12 kg/m².  Body surface area is 1.81 meters squared.      Intake/Output Summary (Last 24 hours) at 06/29/18 0837  Last data filed at 06/28/18 1913   Gross per 24 hour   Intake              130 ml   Output                0 ml   Net              130 ml       Physical Exam   Constitutional: She appears well-developed and well-nourished. She is active and cooperative. She appears ill.   HENT:   Head: Normocephalic and atraumatic.   Nose: Nose normal.   Mouth/Throat: Oropharynx is clear and moist. No oropharyngeal exudate.   Healing neck incision is noted.   Eyes: Pupils are equal, round, and reactive to light. No scleral icterus.   Neck: Neck supple. No thyromegaly present.   Cardiovascular: Normal rate, regular rhythm, normal heart sounds and intact distal pulses.    No murmur heard.  Pulmonary/Chest: Effort normal. No stridor. No respiratory distress. She has no wheezes. She has rhonchi. She has no rales.   Abdominal: Soft. Bowel sounds are normal. She exhibits no  distension, no ascites and no mass. There is no hepatosplenomegaly. There is no tenderness. There is no rigidity, no rebound and no guarding.   Musculoskeletal: She exhibits no edema or tenderness.   Lymphadenopathy:     She has no cervical adenopathy.   Neurological: She is unresponsive. No cranial nerve deficit. She exhibits normal muscle tone. Coordination normal.   Skin: Skin is warm and dry. Ecchymosis noted. No rash noted. No pallor.   Psychiatric: She is slowed. She is noncommunicative.   Nursing note and vitals reviewed.      Significant Labs:   I have reviewed all of the patient's relevant lab work available in the medical record and have utilized this in my evaluation and management recommendations today    Diagnostic Results:  I have reviewed all of the patient's diagnostic/imaging results available in the medical record and have utilized this in my evaluation and management recommendations today.    Assessment/Plan:     Thrombocytopenia    June 26, 2018-the patient has new onset thrombocytopenia with history of heparin exposure.  HIT needs to be considered in the differential diagnosis.  Recommend discontinuing all heparin.  Recommend initiation of Eliquis while results of HIT panel are pending.  I have recommended Eliquis instead of argatroban in this clinical situation because of the patient's critical illness which likely will result in significant variability in clinical efficacy with argatroban and likely put her at higher risk for bleeding than Eliquis.  Risks/benefits were reviewed in detail and discussed with General surgery and Hospital Medicine.  I would recommend holding off on PEG tube placement until HIT panel result is available.  I will continue follow the patient with an assist with the patient's management as needed.  Please call with any questions.  June 27, 2018-patient continues to have worsening thrombocytopenia.  I will follow up with pending HIT panel.  Continue Eliquis.  I will  continue follow the patient with you and assist with the patient's management as needed.  Please call with any questions.  June 28, 2018-the patient's thrombocytopenia is slightly improved today.  Her HIT panel is negative.  I will stop Eliquis at this time.  Okay to resume using heparin as indicated.  Patient's thrombocytopenia is most likely related to acute illness/sepsis.  No indication for platelet transfusion at this time.  I will continue to follow the patient with you and assist with the patient's management as needed.  Please call with any questions.  June 29, 2018-the patient's thrombocytopenia is slightly worse today. HIT was negative.  This is most likely related to acute illness/sepsis.  No urgent indication for platelet transfusion at this time as she has no signs/symptoms of excessive bleeding noted.  I will continue to follow the patient with you and assist with the patient's management as needed.  Please call with any questions.          Acute renal failure superimposed on stage 5 chronic kidney disease, not on chronic dialysis    June 22, 2018-patient has anemia now due to end-stage renal disease.  Treatment of this will be as per Nephrology protocol with IV iron/Depo supplementation with hemodialysis.  Please call with any questions.  June 23, 2018-patient has end-stage renal disease and is on RRT.  Management of anemia will be as per Nephrology.  June 24, 2018-the patient is on RRT for end-stage renal disease.  Management of EPO/IV iron will be as per Nephrology protocol.  June 26, 2018-the patient is on RRT for end-stage renal disease.  She will continue EPO/IV iron as per Nephrology/dialysis protocol.  June 27, 2018-the patient is on RRT for end-stage renal disease.  She will continue epo/IV iron as per Nephrology/dialysis protocol.  June 28, 2018-the patient is on RRT for end-stage renal disease.  She will continue epo/IV iron as per Nephrology/dialysis protocol.  June 29, 2018-the patient is  on RRT for end-stage renal disease.  She will continue epo/IV iron as per Nephrology/dialysis protocol.        Acute blood loss anemia    6/22/18 - Patient has been treated with Procrit for maintenance therapy for anemia due to chronic kidney disease. Patient with hemoglobin of 8.2 this morning.  No current evidence of bleeding noted.  We will continue to monitor closely.  June 23, 2018-the patient has acute blood loss anemia is stable with hemoglobin greater than 8 grams/deciliter today.  She will continue Procrit as per HD protocol.  No urgent indication for PRBC transfusion today.  June 24, 2018-CBC from today shows hemoglobin greater than 7 grams/deciliter.  I would recommend transfusion of PRBCs for supportive care if hemoglobin less than 7 grams/deciliter.  June 26, 2018-CBC from today shows hemoglobin greater than 7 grams/deciliter.  No urgent indication for PRBC transfusion at this time.  June 27, 2018-CBC from today shows hemoglobin greater than 7 grams/deciliter.  No urgent indication for PRBC transfusion at this time.  June 28, 2018-CBC from today shows hemoglobin greater than 7 grams/deciliter.  No urgent indication for PRBC transfusion at this time.  June 28, 2019-review of lab work from today shows interval decrease in hemoglobin/hematocrit.  Consideration can be given for transfusion of 1 unit of PRBCs at the time of her next hemodialysis treatment.            Thank you for your consult.      David Addison MD  Hematology/Oncology  [facility]

## 2018-06-29 NOTE — ASSESSMENT & PLAN NOTE
June 26, 2018-the patient has new onset thrombocytopenia with history of heparin exposure.  HIT needs to be considered in the differential diagnosis.  Recommend discontinuing all heparin.  Recommend initiation of Eliquis while results of HIT panel are pending.  I have recommended Eliquis instead of argatroban in this clinical situation because of the patient's critical illness which likely will result in significant variability in clinical efficacy with argatroban and likely put her at higher risk for bleeding than Eliquis.  Risks/benefits were reviewed in detail and discussed with General surgery and Hospital Medicine.  I would recommend holding off on PEG tube placement until HIT panel result is available.  I will continue follow the patient with an assist with the patient's management as needed.  Please call with any questions.  June 27, 2018-patient continues to have worsening thrombocytopenia.  I will follow up with pending HIT panel.  Continue Eliquis.  I will continue follow the patient with you and assist with the patient's management as needed.  Please call with any questions.  June 28, 2018-the patient's thrombocytopenia is slightly improved today.  Her HIT panel is negative.  I will stop Eliquis at this time.  Okay to resume using heparin as indicated.  Patient's thrombocytopenia is most likely related to acute illness/sepsis.  No indication for platelet transfusion at this time.  I will continue to follow the patient with you and assist with the patient's management as needed.  Please call with any questions.  June 29, 2018-the patient's thrombocytopenia is slightly worse today. HIT was negative.  This is most likely related to acute illness/sepsis.  No urgent indication for platelet transfusion at this time as she has no signs/symptoms of excessive bleeding noted.  I will continue to follow the patient with you and assist with the patient's management as needed.  Please call with any questions.

## 2018-06-29 NOTE — PROGRESS NOTES
[facility]  General Surgery  Progress Note    Subjective:     History of Present Illness:  No notes on file    Post-Op Info:  Procedure(s) (LRB):  VASCULAR CATH EXCHANGE (N/A)   5 Days Post-Op     Interval History: no new events overnight. Wbc decreasing. Restarting heparin products after stop eliquis.     Medications:  Continuous Infusions:  Scheduled Meds:   sodium chloride 0.9%   Intravenous Once    albuterol-ipratropium  3 mL Nebulization Q4H WAKE    amiodarone  200 mg Oral BID    aspirin  81 mg Oral Daily    epoetin carmen (PROCRIT) injection  4,000 Units Intravenous Every Tues, Thurs, Sat    famotidine  20 mg Oral Daily    metoprolol tartrate  25 mg Oral BID    piperacillin-tazobactam (ZOSYN) IVPB  4.5 g Intravenous Q12H    sodium bicarbonate  650 mg Oral BID    [START ON 7/2/2018] vancomycin (VANCOCIN) IVPB  1,250 mg Intravenous Q48H    vancomycin  125 mg Oral Q6H     PRN Meds:sodium chloride 0.9%, acetaminophen, bisacodyl, cloNIDine, dextrose 50%, dextrose 50%, glucagon (human recombinant), glucose, glucose, heparin (porcine), hydrALAZINE, insulin aspart U-100, lactulose, lorazepam, morphine, ondansetron, sodium chloride 0.9%     Review of patient's allergies indicates:   Allergen Reactions    Lipitor [atorvastatin] Swelling     Lips       Objective:     Vital Signs (Most Recent):  Temp: 98.4 °F (36.9 °C) (06/29/18 0444)  Pulse: 69 (06/29/18 0852)  Resp: 18 (06/29/18 0852)  BP: (!) 101/55 (06/29/18 0444)  SpO2: 97 % (06/29/18 0852) Vital Signs (24h Range):  Temp:  [97.1 °F (36.2 °C)-99.4 °F (37.4 °C)] 98.4 °F (36.9 °C)  Pulse:  [50-86] 69  Resp:  [18-24] 18  SpO2:  [95 %-100 %] 97 %  BP: ()/(26-79) 101/55     Weight: 74.7 kg (164 lb 10.9 oz)  Body mass index is 30.12 kg/m².    Intake/Output - Last 3 Shifts       06/27 0700 - 06/28 0659 06/28 0700 - 06/29 0659 06/29 0700 - 06/30 0659    P.O. 0 0     NG/ 30     IV Piggyback 100 100     Total Intake(mL/kg) 1093 (14.8) 130  (1.7)     Urine (mL/kg/hr) 0 (0)      Total Output 0        Net +1093 +130             Urine Occurrence  1 x     Stool Occurrence 3 x 2 x     Emesis Occurrence 0 x            Physical Exam   Constitutional: She is oriented to person, place, and time. She appears well-developed, chronically ill  HENT:   Head: Normocephalic and atraumatic. Neck supple  Cardiovascular: Normal rate and regular rhythm.    Pulmonary/Chest: Effort normal. Coarse breath sounds. No respiratory distress.   Abdominal: Soft.   Neurological: She is unresponsive  Skin: Skin is warm and dry.   Vitals reviewed.      Significant Labs:  CBC:   Recent Labs  Lab 06/29/18  0500   WBC 17.18*   RBC 2.59*   HGB 7.3*   HCT 23.1*   PLT 52*   MCV 89   MCH 28.2   MCHC 31.6*     CMP:   Recent Labs  Lab 06/29/18  0500   *   CALCIUM 8.4*   ALBUMIN 2.0*   PROT 5.8*      K 3.7   CO2 23   CL 99   BUN 51*   CREATININE 4.2*   ALKPHOS 76   ALT 20   AST 40   BILITOT 0.7       Significant Diagnostics:  I have reviewed all pertinent imaging results/findings within the past 24 hours.    Assessment/Plan:     Decreased platelet count    Likely 2/2 sepsis  Restarting heparin products        C. difficile diarrhea    Antibiotics        Hyperparathyroidism    Status post parathyroidectomy  S/p neck washout  Neck is supple, no further swelling since drain out.   HIT was negative so restarting heparin products   Pt needs PEG tube, possibly next week          Coronary artery disease involving native coronary artery    Cardiology following  On anticoagulation        Acute renal failure superimposed on stage 5 chronic kidney disease, not on chronic dialysis    HD per nephrology        Hypertension associated with diabetes    Antihypertensive medications            Becka Raygoza PA-C  General Surgery  [facility]

## 2018-06-29 NOTE — SUBJECTIVE & OBJECTIVE
Interval History:  Patient is becoming more hypotensive with worsening procalcitonin and worsening hypotension.  White count is higher today.  Patient sometimes able to respond but does not talk.  Workup shows acute delirium from metabolic causes and sepsis.  Bilateral lung pneumonia which is getting worse clinically.  Discussion about inpatient hospice today with patient's family and Hospital Medicine doctor Cam    Review of patient's allergies indicates:   Allergen Reactions    Lipitor [atorvastatin] Swelling     Lips       Current Facility-Administered Medications   Medication Frequency    0.9%  NaCl infusion PRN    0.9%  NaCl infusion Once    acetaminophen oral solution 650 mg Q4H PRN    albuterol-ipratropium 2.5 mg-0.5 mg/3 mL nebulizer solution 3 mL Q4H WAKE    amiodarone tablet 200 mg BID    aspirin chewable tablet 81 mg Daily    bisacodyl suppository 10 mg Daily PRN    cloNIDine tablet 0.1 mg Q6H PRN    dextrose 50% injection 12.5 g PRN    dextrose 50% injection 25 g PRN    epoetin carmen injection 4,000 Units Every Tues, Thurs, Sat    famotidine tablet 20 mg Daily    glucagon (human recombinant) injection 1 mg PRN    glucose chewable tablet 16 g PRN    glucose chewable tablet 24 g PRN    heparin (porcine) injection 3,000 Units PRN    hydrALAZINE injection 10 mg Q8H PRN    insulin aspart U-100 pen 0-5 Units QID (AC + HS) PRN    lactulose 20 gram/30 mL solution Soln 20 g Q6H PRN    lorazepam (ATIVAN) injection 2 mg Q4H PRN    metoprolol tartrate (LOPRESSOR) tablet 25 mg BID    morphine injection 2 mg Q6H PRN    ondansetron injection 4 mg Q8H PRN    piperacillin-tazobactam 4.5 g in dextrose 5 % 100 mL IVPB (ready to mix system) Q12H    sodium bicarbonate tablet 650 mg BID    sodium chloride 0.9% flush 3 mL PRN    [START ON 7/2/2018] vancomycin (VANCOCIN) 1,250 mg in dextrose 5 % 250 mL IVPB Q48H    vancomycin 250mg / 10ml oral suspension 125 mg Q6H       Objective:     Vital Signs  (Most Recent):  Temp: 98.4 °F (36.9 °C) (06/29/18 0444)  Pulse: 69 (06/29/18 0852)  Resp: 18 (06/29/18 0852)  BP: (!) 101/55 (06/29/18 0444)  SpO2: 97 % (06/29/18 0852)  O2 Device (Oxygen Therapy): nasal cannula (06/29/18 0852) Vital Signs (24h Range):  Temp:  [97.1 °F (36.2 °C)-99.4 °F (37.4 °C)] 98.4 °F (36.9 °C)  Pulse:  [50-86] 69  Resp:  [18-24] 18  SpO2:  [95 %-100 %] 97 %  BP: ()/(26-79) 101/55     Weight: 74.7 kg (164 lb 10.9 oz) (06/29/18 0444)  Body mass index is 30.12 kg/m².  Body surface area is 1.81 meters squared.    I/O last 3 completed shifts:  In: 260 [NG/GT:60; IV Piggyback:200]  Out: 0     Physical Exam   Constitutional: She appears well-developed and well-nourished. She is active and cooperative. She appears ill.   HENT:   Head: Normocephalic and atraumatic.   Nose: Nose normal.   Mouth/Throat: Oropharynx is clear and moist. No oropharyngeal exudate.   Healing neck incision is noted.   Eyes: Pupils are equal, round, and reactive to light. No scleral icterus.   Neck: Neck supple. No thyromegaly present.   Cardiovascular: Normal rate, regular rhythm, normal heart sounds and intact distal pulses.    No murmur heard.  Pulmonary/Chest: Effort normal. No stridor. No respiratory distress. She has no wheezes. She has rhonchi. She has no rales.   Abdominal: Soft. Bowel sounds are normal. She exhibits no distension, no ascites and no mass. There is no hepatosplenomegaly. There is no tenderness. There is no rigidity, no rebound and no guarding.   Musculoskeletal: She exhibits no edema or tenderness.   Lymphadenopathy:     She has no cervical adenopathy.   Neurological: She is unresponsive. No cranial nerve deficit. She exhibits normal muscle tone. Coordination normal.   Skin: Skin is warm and dry. Ecchymosis noted. No rash noted. No pallor.   Psychiatric: She is slowed. She is noncommunicative.   Nursing note and vitals reviewed.      Significant Labs:  All labs within the past 24 hours have been  reviewed.     Significant Imaging:  Labs: Reviewed  CT: Reviewed

## 2018-06-29 NOTE — ASSESSMENT & PLAN NOTE
June 22, 2018-patient has anemia now due to end-stage renal disease.  Treatment of this will be as per Nephrology protocol with IV iron/Depo supplementation with hemodialysis.  Please call with any questions.  June 23, 2018-patient has end-stage renal disease and is on RRT.  Management of anemia will be as per Nephrology.  June 24, 2018-the patient is on RRT for end-stage renal disease.  Management of EPO/IV iron will be as per Nephrology protocol.  June 26, 2018-the patient is on RRT for end-stage renal disease.  She will continue EPO/IV iron as per Nephrology/dialysis protocol.  June 27, 2018-the patient is on RRT for end-stage renal disease.  She will continue epo/IV iron as per Nephrology/dialysis protocol.  June 28, 2018-the patient is on RRT for end-stage renal disease.  She will continue epo/IV iron as per Nephrology/dialysis protocol.  June 29, 2018-the patient is on RRT for end-stage renal disease.  She will continue epo/IV iron as per Nephrology/dialysis protocol.

## 2018-06-29 NOTE — CONSULTS
INPATIENT   NEUROLOGY CONSULT NOTE    Citlali Karimi   68 y.o. female  Consult Requested By: Levy Samuel MD  Reason for Consult:  Unresponsive   DATE 6/29/2018            Chief Complaint: Unresponsive to verbal commands  History of Present Illness:  History obtained from the medical staff and extensive chart review.  Patient has been seen by neurologist Dr. Kristopher Hurd  9/12/2016, 1/26/2017, 8/23/2017 for ataxia and gait disturbance. Patient is on anticoagulation for AVR  5/20/2016. She was admitted for parathyroidectomy  Followed by cardiac arrest. She has been progressively declining post surgery. Not sure to what her baseline neurological status is, no family members at the bedside. Patient had a prolonged stay in the ICU.  Patient is in renal failure and has been getting dialysis, however, she gets significantly hypotension and dialysis has to be discontinued prematurely. She is in isolation secondary to c.diff. She has derangement of electrolytes. Patient seen at the bedside, she does not participate in the exam. She withdraws symmetrically in all the extremities. She moans and groans to painful stimulation, does not vocalize and does not participate in the exam.               STROKE RISK FACTORS:    Renal Failure YES   Hypertension YES   Diabetes YES   Coronary Artery Disease NO   Other Cardiac Disease YES   Hyperlipidemia YES   Insulin Resistance NO   Previous stroke/TIA YES   White Matter disease YES   Smoking NO   Sleep Apnea NO   Family History NO   Migraine headaches NO   Hormonal Supplement NA   Obesity NO   PVD   NO   Atrial Fibrillation YES   Hypercoagulability      NO               Denise Coma Scale (GCS)    Score   EYE OPENING   Spontaneous 4   Response to verbal command 3   Response to pain 2   No eye opening 1             VERBAL response   Oriented 5   Confused 4   Inappropriate words 3   Incomprehensible sounds 2   No verbal response 1       MOTOR response   Obeys commands 6   Localizing  response to pain 5   Withdrawal response to pain 4   Flexion to pain 3   Extension to pain 2   No motor response 1   Total  7   The GCS is scored between 3 and 15, 3 being the worst and 15 the best. It is composed of three parameters: best eye response (E), best verbal response (V), and best motor response (M). The components of the GCS should be recorded individually; for example, [id number] results in a GCS score of 9. A score of 13 or higher correlates with mild brain injury, a score of 9 to 12 correlates with moderate injury, and a score of 8 or less represents severe brain injury.  Past Medical History:   Diagnosis Date    Acid reflux 1/7/2015    Anxiety 1/7/2015    Aortic valvar stenosis     Arthritis     osteo    Callus     Chronic anemia     followed by Dr. Addison    CKD (chronic kidney disease) stage 5, GFR less than 15 ml/min 8/7/2015    Combined hyperlipidemia associated with type 2 diabetes mellitus     Coronary artery disease     Diabetes mellitus type II, controlled, with no complications     LBSL 108 today    Encounter for blood transfusion     Frail elderly with impaired mobility, wheel chair bound 8/25/2017    Gallbladder problem     gallbladder surgery    Heart murmur     Hyperparathyroidism, secondary renal 1/7/2015    Hypertension 8/7/2015    Hypertension associated with diabetes 11/12/2012    Kidney transplant candidate 1/7/2015    Overweight(278.02)     Urinary tract infection      Past Surgical History:   Procedure Laterality Date    ANKLE FRACTURE SURGERY Left 1985    AORTIC VALVE REPLACEMENT  05/2016    BONE BIOPSY      CARDIAC SURGERY      CATARACT EXTRACTION W/  INTRAOCULAR LENS IMPLANT  2009    CHOLECYSTECTOMY      CYSTOSCOPY      EVACUATION OF HEMATOMA N/A 6/14/2018    Procedure: EVACUATION, HEMATOMA;  Surgeon: Levy Samuel MD;  Location: [facility];  Service: General;  Laterality: N/A;    EYE SURGERY      FRACTURE SURGERY      HYSTERECTOMY  unknown    LEFT  HEART CATHETERIZATION Left 6/15/2018    Procedure: CATHETERIZATION, HEART, LEFT;  Surgeon: Galindo Louis MD;  Location: [facility] CATH LAB;  Service: Cardiology;  Laterality: Left;    OOPHORECTOMY      PARATHYROIDECTOMY N/A 6/12/2018    Procedure: PARATHYROIDECTOMY;  Surgeon: Levy Samuel MD;  Location: [facility] OR;  Service: General;  Laterality: N/A;    removal of colon polyps      RENAL BIOPSY  10/2013    WOUND EXPLORATION N/A 6/14/2018    Procedure: EXPLORATION, WOUND;  Surgeon: Levy Samuel MD;  Location: [facility] OR;  Service: General;  Laterality: N/A;    YAG cap -OD       Family History   Problem Relation Age of Onset    Hypertension Mother     Heart disease Mother     Diabetes Mother     Glaucoma Mother     Aneurysm Father     Stroke Father     Kidney disease Sister     Heart disease Sister     Kidney disease Brother     Hypertension Brother     Diabetes Brother     Diabetes Sister     Kidney disease Sister     Hypertension Brother     Cancer Paternal Grandmother         breast    No Known Problems Maternal Aunt     No Known Problems Maternal Uncle     No Known Problems Paternal Aunt     No Known Problems Paternal Uncle     Colon cancer Maternal Grandmother     No Known Problems Maternal Grandfather     No Known Problems Paternal Grandfather     Anemia Neg Hx     Arrhythmia Neg Hx     Asthma Neg Hx     Clotting disorder Neg Hx     Fainting Neg Hx     Heart attack Neg Hx     Heart failure Neg Hx     Hyperlipidemia Neg Hx     Atrial Septal Defect Neg Hx      Social History   Substance Use Topics    Smoking status: Never Smoker    Smokeless tobacco: Never Used    Alcohol use No       Review of patient's allergies indicates:   Allergen Reactions    Lipitor [atorvastatin] Swelling     Lips          Scheduled Meds:   sodium chloride 0.9%   Intravenous Once    albuterol-ipratropium  3 mL Nebulization Q4H WAKE    amiodarone  200 mg Oral BID    aspirin  81 mg Oral Daily     epoetin carmen (PROCRIT) injection  4,000 Units Intravenous Every Tues, Thurs, Sat    famotidine  20 mg Oral Daily    metoprolol tartrate  25 mg Oral BID    piperacillin-tazobactam (ZOSYN) IVPB  4.5 g Intravenous Q12H    sodium bicarbonate  650 mg Oral BID    [START ON 7/2/2018] vancomycin (VANCOCIN) IVPB  1,250 mg Intravenous Q48H    vancomycin  125 mg Oral Q6H     Continuous Infusions:  PRN Meds:sodium chloride 0.9%, acetaminophen, bisacodyl, cloNIDine, dextrose 50%, dextrose 50%, glucagon (human recombinant), glucose, glucose, heparin (porcine), hydrALAZINE, insulin aspart U-100, lactulose, lorazepam, morphine, ondansetron, sodium chloride 0.9%      Review of Systems:  Unable to obtain, given her mentation           OBJECTIVE:     Vital Signs (Most Recent)  Temp: 98.4 °F (36.9 °C) (06/29/18 1021)  Pulse: 80 (06/29/18 1156)  Resp: 20 (06/29/18 1156)  BP: 113/60 (06/29/18 1021)  SpO2: 100 % (06/29/18 1156)     Vital Signs Range (Last 24H):  Temp:  [97.1 °F (36.2 °C)-99.4 °F (37.4 °C)]   Pulse:  [50-86]   Resp:  [16-24]   BP: ()/(26-79)   SpO2:  [95 %-100 %]     Physical Exam:  General:  Unresponsive to verbal commands. Fluctuates between being obtunded and stuporous   HEENT:   Acephalic, Atraumatic,  Holds the eye lids shut and resists opening   Neck: supple, no JVD, no Carotid bruit,  Lungs: CTA,  No rhonchi, no rales  Heart: Regular Rate and rhythm, no murmurs, rubs and or gallops  Abdomen, Soft, non tender, non distended, no abdominal  bruit, bowel sounds present  Extremities: edema,   Musculoskeletal: no joint effusion  Skin: No rash, no ecchymoses,         NEURO    Mental Status:     Fluctuating between confused, stuporous and being obtunded      SPEECH:   Does not vocalize, moans and groans.   CRANIAL NERVES:  Limited due to lack of participation    No facial asymmetry  Withdraws to facial pain symmetrically  Wrinkles forehead symmetrically to painful stimuli  Turns her head symmetrically  Other  modalities could not be evaluated lack of participation      MOTOR: moves all the extremities symmetrically to painful stimuli only  TONE: decreased through out    MUSCLE MASS:  Atrophy   REFLEXES: Deep tendon reflexes tested:  B/L  Upper extremities:               biceps (C5, C6):1               brachioradialis (C5, C6):1               triceps (C6, C7),0     Lower extremities:                knee or patellar (L2, 3, 4):1               ankle (S1, S2):0            Plantar responses: flexors b/l  Clonus: negative     SENSORY  PIN PRICK and TEMP:withdraws to pain  Soft TOUCH: does not participate  VIBRATION:does not participate in the examination  GRAPHESTHESIA:           CEREBELLAR: unable to evaluate    ROMBERG and  GAIT: deferred    EXTRAPYRAMIDALS: no          Laboratory:  Lab Results   Component Value Date    WBC 17.18 (H) 06/29/2018    HGB 7.3 (L) 06/29/2018    HCT 23.1 (L) 06/29/2018    PLT 52 (L) 06/29/2018    CHOL 216 (H) 03/28/2017    TRIG 100 03/28/2017    HDL 54 03/28/2017    ALT 20 06/29/2018    AST 40 06/29/2018     06/29/2018    K 3.7 06/29/2018    CL 99 06/29/2018    CREATININE 4.2 (H) 06/29/2018    BUN 51 (H) 06/29/2018    CO2 23 06/29/2018    TSH 0.112 (L) 06/28/2018    INR 1.1 06/15/2018    GLUF 84 04/28/2006    HGBA1C 5.4 06/12/2018      Results for orders placed or performed during the hospital encounter of 06/12/18   Blood culture   Result Value Ref Range    Blood Culture, Routine No growth after 5 days.    Clostridium difficile EIA   Result Value Ref Range    C. diff Antigen Positive (A) Negative    C difficile Toxins A+B, EIA Negative Negative   C Diff Toxin by PCR   Result Value Ref Range    C. diff PCR Positive (A) Negative   Blood culture   Result Value Ref Range    Blood Culture, Routine No Growth to date     Blood Culture, Routine No Growth to date    Blood culture   Result Value Ref Range    Blood Culture, Routine No Growth to date     Blood Culture, Routine No Growth to date     Urine culture   Result Value Ref Range    Urine Culture, Routine No growth    PTH, Intraoperative   Result Value Ref Range    PTH Intraoperative 170.1 (H) 9.0 - 77.0 pg/mL   PTH, Intraoperative   Result Value Ref Range    PTH Intraoperative 32.0 9.0 - 77.0 pg/mL   Troponin I   Result Value Ref Range    Troponin I 0.113 (H) 0.000 - 0.026 ng/mL   CK-MB   Result Value Ref Range     (H) 20 - 180 U/L    CPK MB 8.1 (H) 0.1 - 6.5 ng/mL    MB% 1.2 0.0 - 5.0 %   Brain natriuretic peptide   Result Value Ref Range     (H) 0 - 99 pg/mL   Basic metabolic panel   Result Value Ref Range    Sodium 140 136 - 145 mmol/L    Potassium 3.8 3.5 - 5.1 mmol/L    Chloride 107 95 - 110 mmol/L    CO2 22 (L) 23 - 29 mmol/L    Glucose 160 (H) 70 - 110 mg/dL    BUN, Bld 33 (H) 8 - 23 mg/dL    Creatinine 3.5 (H) 0.5 - 1.4 mg/dL    Calcium 11.4 (H) 8.7 - 10.5 mg/dL    Anion Gap 11 8 - 16 mmol/L    eGFR if African American 15 (A) >60 mL/min/1.73 m^2    eGFR if non African American 13 (A) >60 mL/min/1.73 m^2   CBC auto differential   Result Value Ref Range    WBC 10.72 3.90 - 12.70 K/uL    RBC 4.36 4.00 - 5.40 M/uL    Hemoglobin 12.6 12.0 - 16.0 g/dL    Hematocrit 40.6 37.0 - 48.5 %    MCV 93 82 - 98 fL    MCH 28.9 27.0 - 31.0 pg    MCHC 31.0 (L) 32.0 - 36.0 g/dL    RDW 16.1 (H) 11.5 - 14.5 %    Platelets 159 150 - 350 K/uL    MPV 9.6 9.2 - 12.9 fL    Gran # (ANC) 9.7 (H) 1.8 - 7.7 K/uL    Lymph # 0.5 (L) 1.0 - 4.8 K/uL    Mono # 0.5 0.3 - 1.0 K/uL    Eos # 0.0 0.0 - 0.5 K/uL    Baso # 0.01 0.00 - 0.20 K/uL    Gran% 90.6 (H) 38.0 - 73.0 %    Lymph% 4.6 (L) 18.0 - 48.0 %    Mono% 5.0 4.0 - 15.0 %    Eosinophil% 0.0 0.0 - 8.0 %    Basophil% 0.1 0.0 - 1.9 %    Differential Method Automated    Hemoglobin A1c if not done in past 6 weeks   Result Value Ref Range    Hemoglobin A1C 5.4 4.0 - 5.6 %    Estimated Avg Glucose 108 68 - 131 mg/dL   Troponin I   Result Value Ref Range    Troponin I 34.681 (H) 0.000 - 0.026 ng/mL   PTH, intact    Result Value Ref Range    PTH, Intact 18.5 9.0 - 77.0 pg/mL   Basic metabolic panel   Result Value Ref Range    Sodium 141 136 - 145 mmol/L    Potassium 4.3 3.5 - 5.1 mmol/L    Chloride 107 95 - 110 mmol/L    CO2 24 23 - 29 mmol/L    Glucose 105 70 - 110 mg/dL    BUN, Bld 37 (H) 8 - 23 mg/dL    Creatinine 3.6 (H) 0.5 - 1.4 mg/dL    Calcium 10.5 8.7 - 10.5 mg/dL    Anion Gap 10 8 - 16 mmol/L    eGFR if African American 14 (A) >60 mL/min/1.73 m^2    eGFR if non African American 12 (A) >60 mL/min/1.73 m^2   CBC auto differential   Result Value Ref Range    WBC 11.22 3.90 - 12.70 K/uL    RBC 3.77 (L) 4.00 - 5.40 M/uL    Hemoglobin 10.9 (L) 12.0 - 16.0 g/dL    Hematocrit 34.2 (L) 37.0 - 48.5 %    MCV 91 82 - 98 fL    MCH 28.9 27.0 - 31.0 pg    MCHC 31.9 (L) 32.0 - 36.0 g/dL    RDW 16.4 (H) 11.5 - 14.5 %    Platelets 183 150 - 350 K/uL    MPV 10.0 9.2 - 12.9 fL    Gran # (ANC) 9.0 (H) 1.8 - 7.7 K/uL    Lymph # 1.3 1.0 - 4.8 K/uL    Mono # 0.9 0.3 - 1.0 K/uL    Eos # 0.0 0.0 - 0.5 K/uL    Baso # 0.01 0.00 - 0.20 K/uL    Gran% 80.0 (H) 38.0 - 73.0 %    Lymph% 11.7 (L) 18.0 - 48.0 %    Mono% 8.2 4.0 - 15.0 %    Eosinophil% 0.0 0.0 - 8.0 %    Basophil% 0.1 0.0 - 1.9 %    Differential Method Automated    Troponin I   Result Value Ref Range    Troponin I >50.000 (H) 0.000 - 0.026 ng/mL   APTT   Result Value Ref Range    aPTT 28.5 21.0 - 32.0 sec   Protime-INR   Result Value Ref Range    Prothrombin Time 10.7 9.0 - 12.5 sec    INR 1.0 0.8 - 1.2   Platelet count   Result Value Ref Range    Platelets 179 150 - 350 K/uL    MPV 9.6 9.2 - 12.9 fL   CBC auto differential   Result Value Ref Range    WBC 11.39 3.90 - 12.70 K/uL    RBC 3.67 (L) 4.00 - 5.40 M/uL    Hemoglobin 10.7 (L) 12.0 - 16.0 g/dL    Hematocrit 33.3 (L) 37.0 - 48.5 %    MCV 91 82 - 98 fL    MCH 29.2 27.0 - 31.0 pg    MCHC 32.1 32.0 - 36.0 g/dL    RDW 16.6 (H) 11.5 - 14.5 %    Platelets 179 150 - 350 K/uL    MPV 9.6 9.2 - 12.9 fL    Gran # (ANC) 9.0 (H) 1.8 - 7.7  K/uL    Lymph # 1.6 1.0 - 4.8 K/uL    Mono # 0.9 0.3 - 1.0 K/uL    Eos # 0.0 0.0 - 0.5 K/uL    Baso # 0.01 0.00 - 0.20 K/uL    Gran% 78.7 (H) 38.0 - 73.0 %    Lymph% 13.6 (L) 18.0 - 48.0 %    Mono% 7.6 4.0 - 15.0 %    Eosinophil% 0.0 0.0 - 8.0 %    Basophil% 0.1 0.0 - 1.9 %    Differential Method Automated    APTT   Result Value Ref Range    aPTT >150.0 (AA) 21.0 - 32.0 sec   Troponin I   Result Value Ref Range    Troponin I 41.662 (H) 0.000 - 0.026 ng/mL   Renal function panel   Result Value Ref Range    Glucose 126 (H) 70 - 110 mg/dL    Sodium 141 136 - 145 mmol/L    Potassium 4.2 3.5 - 5.1 mmol/L    Chloride 106 95 - 110 mmol/L    CO2 23 23 - 29 mmol/L    BUN, Bld 42 (H) 8 - 23 mg/dL    Calcium 10.4 8.7 - 10.5 mg/dL    Creatinine 4.1 (H) 0.5 - 1.4 mg/dL    Albumin 3.2 (L) 3.5 - 5.2 g/dL    Phosphorus 5.1 (H) 2.7 - 4.5 mg/dL    eGFR if African American 12 (A) >60 mL/min/1.73 m^2    eGFR if non African American 10 (A) >60 mL/min/1.73 m^2    Anion Gap 12 8 - 16 mmol/L   Lactic acid, plasma   Result Value Ref Range    Lactate (Lactic Acid) 1.5 0.5 - 2.2 mmol/L   APTT   Result Value Ref Range    aPTT 150.0 (HH) 21.0 - 32.0 sec   APTT   Result Value Ref Range    aPTT 37.8 (H) 21.0 - 32.0 sec   Anti-Xa Heparin Monitoring   Result Value Ref Range    Heparin Anti-Xa 0.94 (H) 0.30 - 0.70 IU/mL   APTT   Result Value Ref Range    aPTT 112.3 (H) 21.0 - 32.0 sec   CBC auto differential   Result Value Ref Range    WBC 8.86 3.90 - 12.70 K/uL    RBC 3.47 (L) 4.00 - 5.40 M/uL    Hemoglobin 9.8 (L) 12.0 - 16.0 g/dL    Hematocrit 31.5 (L) 37.0 - 48.5 %    MCV 91 82 - 98 fL    MCH 28.2 27.0 - 31.0 pg    MCHC 31.1 (L) 32.0 - 36.0 g/dL    RDW 16.6 (H) 11.5 - 14.5 %    Platelets 171 150 - 350 K/uL    MPV 9.9 9.2 - 12.9 fL    Gran # (ANC) 6.1 1.8 - 7.7 K/uL    Lymph # 1.8 1.0 - 4.8 K/uL    Mono # 0.9 0.3 - 1.0 K/uL    Eos # 0.0 0.0 - 0.5 K/uL    Baso # 0.01 0.00 - 0.20 K/uL    Gran% 68.3 38.0 - 73.0 %    Lymph% 20.8 18.0 - 48.0 %     Mono% 10.6 4.0 - 15.0 %    Eosinophil% 0.2 0.0 - 8.0 %    Basophil% 0.1 0.0 - 1.9 %    Differential Method Automated    Renal function panel   Result Value Ref Range    Glucose 103 70 - 110 mg/dL    Sodium 143 136 - 145 mmol/L    Potassium 3.7 3.5 - 5.1 mmol/L    Chloride 107 95 - 110 mmol/L    CO2 20 (L) 23 - 29 mmol/L    BUN, Bld 43 (H) 8 - 23 mg/dL    Calcium 10.1 8.7 - 10.5 mg/dL    Creatinine 4.1 (H) 0.5 - 1.4 mg/dL    Albumin 3.1 (L) 3.5 - 5.2 g/dL    Phosphorus 5.2 (H) 2.7 - 4.5 mg/dL    eGFR if African American 12 (A) >60 mL/min/1.73 m^2    eGFR if non African American 10 (A) >60 mL/min/1.73 m^2    Anion Gap 16 8 - 16 mmol/L   APTT   Result Value Ref Range    aPTT 119.3 (H) 21.0 - 32.0 sec   APTT   Result Value Ref Range    aPTT 86.5 (H) 21.0 - 32.0 sec   Brain natriuretic peptide   Result Value Ref Range     (H) 0 - 99 pg/mL   CBC auto differential   Result Value Ref Range    WBC 6.13 3.90 - 12.70 K/uL    RBC 3.29 (L) 4.00 - 5.40 M/uL    Hemoglobin 9.4 (L) 12.0 - 16.0 g/dL    Hematocrit 29.5 (L) 37.0 - 48.5 %    MCV 90 82 - 98 fL    MCH 28.6 27.0 - 31.0 pg    MCHC 31.9 (L) 32.0 - 36.0 g/dL    RDW 16.7 (H) 11.5 - 14.5 %    Platelets 141 (L) 150 - 350 K/uL    MPV 9.7 9.2 - 12.9 fL    Gran # (ANC) 5.3 1.8 - 7.7 K/uL    Lymph # 0.5 (L) 1.0 - 4.8 K/uL    Mono # 0.4 0.3 - 1.0 K/uL    Eos # 0.0 0.0 - 0.5 K/uL    Baso # 0.01 0.00 - 0.20 K/uL    Gran% 85.9 (H) 38.0 - 73.0 %    Lymph% 8.0 (L) 18.0 - 48.0 %    Mono% 5.9 4.0 - 15.0 %    Eosinophil% 0.0 0.0 - 8.0 %    Basophil% 0.2 0.0 - 1.9 %    Differential Method Automated    Comprehensive metabolic panel   Result Value Ref Range    Sodium 142 136 - 145 mmol/L    Potassium 3.2 (L) 3.5 - 5.1 mmol/L    Chloride 107 95 - 110 mmol/L    CO2 17 (L) 23 - 29 mmol/L    Glucose 192 (H) 70 - 110 mg/dL    BUN, Bld 40 (H) 8 - 23 mg/dL    Creatinine 3.7 (H) 0.5 - 1.4 mg/dL    Calcium 9.3 8.7 - 10.5 mg/dL    Total Protein 5.2 (L) 6.0 - 8.4 g/dL    Albumin 2.7 (L) 3.5 - 5.2  g/dL    Total Bilirubin 0.4 0.1 - 1.0 mg/dL    Alkaline Phosphatase 48 (L) 55 - 135 U/L     (H) 10 - 40 U/L     (H) 10 - 44 U/L    Anion Gap 18 (H) 8 - 16 mmol/L    eGFR if African American 14 (A) >60 mL/min/1.73 m^2    eGFR if non African American 12 (A) >60 mL/min/1.73 m^2   Magnesium   Result Value Ref Range    Magnesium 1.3 (L) 1.6 - 2.6 mg/dL   APTT   Result Value Ref Range    aPTT 77.0 (H) 21.0 - 32.0 sec   CBC auto differential   Result Value Ref Range    WBC 11.22 3.90 - 12.70 K/uL    RBC 2.83 (L) 4.00 - 5.40 M/uL    Hemoglobin 8.0 (L) 12.0 - 16.0 g/dL    Hematocrit 25.2 (L) 37.0 - 48.5 %    MCV 89 82 - 98 fL    MCH 28.3 27.0 - 31.0 pg    MCHC 31.7 (L) 32.0 - 36.0 g/dL    RDW 16.5 (H) 11.5 - 14.5 %    Platelets 160 150 - 350 K/uL    MPV 9.7 9.2 - 12.9 fL    Gran # (ANC) 9.8 (H) 1.8 - 7.7 K/uL    Lymph # 0.8 (L) 1.0 - 4.8 K/uL    Mono # 0.6 0.3 - 1.0 K/uL    Eos # 0.0 0.0 - 0.5 K/uL    Baso # 0.01 0.00 - 0.20 K/uL    Gran% 87.4 (H) 38.0 - 73.0 %    Lymph% 7.2 (L) 18.0 - 48.0 %    Mono% 5.3 4.0 - 15.0 %    Eosinophil% 0.0 0.0 - 8.0 %    Basophil% 0.1 0.0 - 1.9 %    Differential Method Automated    Basic metabolic panel   Result Value Ref Range    Sodium 139 136 - 145 mmol/L    Potassium 4.4 3.5 - 5.1 mmol/L    Chloride 105 95 - 110 mmol/L    CO2 19 (L) 23 - 29 mmol/L    Glucose 220 (H) 70 - 110 mg/dL    BUN, Bld 41 (H) 8 - 23 mg/dL    Creatinine 3.8 (H) 0.5 - 1.4 mg/dL    Calcium 9.5 8.7 - 10.5 mg/dL    Anion Gap 15 8 - 16 mmol/L    eGFR if African American 13 (A) >60 mL/min/1.73 m^2    eGFR if non African American 11 (A) >60 mL/min/1.73 m^2   Magnesium   Result Value Ref Range    Magnesium 2.1 1.6 - 2.6 mg/dL   Renal function panel   Result Value Ref Range    Glucose 136 (H) 70 - 110 mg/dL    Sodium 142 136 - 145 mmol/L    Potassium 3.7 3.5 - 5.1 mmol/L    Chloride 106 95 - 110 mmol/L    CO2 20 (L) 23 - 29 mmol/L    BUN, Bld 45 (H) 8 - 23 mg/dL    Calcium 9.5 8.7 - 10.5 mg/dL    Creatinine 4.2  (H) 0.5 - 1.4 mg/dL    Albumin 2.8 (L) 3.5 - 5.2 g/dL    Phosphorus 3.5 2.7 - 4.5 mg/dL    eGFR if African American 12 (A) >60 mL/min/1.73 m^2    eGFR if non African American 10 (A) >60 mL/min/1.73 m^2    Anion Gap 16 8 - 16 mmol/L   Comprehensive metabolic panel   Result Value Ref Range    Sodium 142 136 - 145 mmol/L    Potassium 3.7 3.5 - 5.1 mmol/L    Chloride 106 95 - 110 mmol/L    CO2 20 (L) 23 - 29 mmol/L    Glucose 136 (H) 70 - 110 mg/dL    BUN, Bld 45 (H) 8 - 23 mg/dL    Creatinine 4.2 (H) 0.5 - 1.4 mg/dL    Calcium 9.5 8.7 - 10.5 mg/dL    Total Protein 5.8 (L) 6.0 - 8.4 g/dL    Albumin 2.8 (L) 3.5 - 5.2 g/dL    Total Bilirubin 0.4 0.1 - 1.0 mg/dL    Alkaline Phosphatase 66 55 - 135 U/L     (H) 10 - 40 U/L    ALT 90 (H) 10 - 44 U/L    Anion Gap 16 8 - 16 mmol/L    eGFR if African American 12 (A) >60 mL/min/1.73 m^2    eGFR if non African American 10 (A) >60 mL/min/1.73 m^2   Magnesium   Result Value Ref Range    Magnesium 2.0 1.6 - 2.6 mg/dL   CBC auto differential   Result Value Ref Range    WBC 9.15 3.90 - 12.70 K/uL    RBC 2.72 (L) 4.00 - 5.40 M/uL    Hemoglobin 7.9 (L) 12.0 - 16.0 g/dL    Hematocrit 23.7 (L) 37.0 - 48.5 %    MCV 87 82 - 98 fL    MCH 29.0 27.0 - 31.0 pg    MCHC 33.3 32.0 - 36.0 g/dL    RDW 16.4 (H) 11.5 - 14.5 %    Platelets 142 (L) 150 - 350 K/uL    MPV 9.5 9.2 - 12.9 fL    Gran # (ANC) 7.7 1.8 - 7.7 K/uL    Lymph # 0.7 (L) 1.0 - 4.8 K/uL    Mono # 0.7 0.3 - 1.0 K/uL    Eos # 0.0 0.0 - 0.5 K/uL    Baso # 0.00 0.00 - 0.20 K/uL    Gran% 84.6 (H) 38.0 - 73.0 %    Lymph% 8.1 (L) 18.0 - 48.0 %    Mono% 7.3 4.0 - 15.0 %    Eosinophil% 0.0 0.0 - 8.0 %    Basophil% 0.0 0.0 - 1.9 %    Differential Method Automated    Protime-INR   Result Value Ref Range    Prothrombin Time 12.0 9.0 - 12.5 sec    INR 1.1 0.8 - 1.2   Anti-Xa Heparin Monitoring   Result Value Ref Range    Heparin Anti-Xa 0.39 0.30 - 0.70 IU/mL   APTT   Result Value Ref Range    aPTT 97.9 (H) 21.0 - 32.0 sec   APTT   Result  Value Ref Range    aPTT 79.0 (H) 21.0 - 32.0 sec   APTT   Result Value Ref Range    aPTT 38.5 (H) 21.0 - 32.0 sec   Renal function panel   Result Value Ref Range    Glucose 101 70 - 110 mg/dL    Sodium 140 136 - 145 mmol/L    Potassium 3.6 3.5 - 5.1 mmol/L    Chloride 105 95 - 110 mmol/L    CO2 20 (L) 23 - 29 mmol/L    BUN, Bld 49 (H) 8 - 23 mg/dL    Calcium 8.7 8.7 - 10.5 mg/dL    Creatinine 4.7 (H) 0.5 - 1.4 mg/dL    Albumin 2.3 (L) 3.5 - 5.2 g/dL    Phosphorus 4.5 2.7 - 4.5 mg/dL    eGFR if African American 10 (A) >60 mL/min/1.73 m^2    eGFR if non African American 9 (A) >60 mL/min/1.73 m^2    Anion Gap 15 8 - 16 mmol/L   Comprehensive metabolic panel   Result Value Ref Range    Sodium 140 136 - 145 mmol/L    Potassium 3.6 3.5 - 5.1 mmol/L    Chloride 105 95 - 110 mmol/L    CO2 20 (L) 23 - 29 mmol/L    Glucose 101 70 - 110 mg/dL    BUN, Bld 49 (H) 8 - 23 mg/dL    Creatinine 4.7 (H) 0.5 - 1.4 mg/dL    Calcium 8.7 8.7 - 10.5 mg/dL    Total Protein 5.3 (L) 6.0 - 8.4 g/dL    Albumin 2.3 (L) 3.5 - 5.2 g/dL    Total Bilirubin 0.4 0.1 - 1.0 mg/dL    Alkaline Phosphatase 57 55 - 135 U/L     (H) 10 - 40 U/L    ALT 13 10 - 44 U/L    Anion Gap 15 8 - 16 mmol/L    eGFR if African American 10 (A) >60 mL/min/1.73 m^2    eGFR if non African American 9 (A) >60 mL/min/1.73 m^2   Magnesium   Result Value Ref Range    Magnesium 1.9 1.6 - 2.6 mg/dL   CBC auto differential   Result Value Ref Range    WBC 8.99 3.90 - 12.70 K/uL    RBC 3.27 (L) 4.00 - 5.40 M/uL    Hemoglobin 8.9 (L) 12.0 - 16.0 g/dL    Hematocrit 27.1 (L) 37.0 - 48.5 %    MCV 83 82 - 98 fL    MCH 27.2 27.0 - 31.0 pg    MCHC 32.8 32.0 - 36.0 g/dL    RDW 19.3 (H) 11.5 - 14.5 %    Platelets 137 (L) 150 - 350 K/uL    MPV 9.9 9.2 - 12.9 fL    Gran # (ANC) 7.2 1.8 - 7.7 K/uL    Lymph # 1.1 1.0 - 4.8 K/uL    Mono # 0.7 0.3 - 1.0 K/uL    Eos # 0.0 0.0 - 0.5 K/uL    Baso # 0.01 0.00 - 0.20 K/uL    Gran% 79.7 (H) 38.0 - 73.0 %    Lymph% 11.7 (L) 18.0 - 48.0 %    Mono% 8.2  4.0 - 15.0 %    Eosinophil% 0.3 0.0 - 8.0 %    Basophil% 0.1 0.0 - 1.9 %    Differential Method Automated    Renal function panel   Result Value Ref Range    Glucose 149 (H) 70 - 110 mg/dL    Sodium 138 136 - 145 mmol/L    Potassium 4.3 3.5 - 5.1 mmol/L    Chloride 103 95 - 110 mmol/L    CO2 19 (L) 23 - 29 mmol/L    BUN, Bld 68 (H) 8 - 23 mg/dL    Calcium 8.7 8.7 - 10.5 mg/dL    Creatinine 6.0 (H) 0.5 - 1.4 mg/dL    Albumin 2.5 (L) 3.5 - 5.2 g/dL    Phosphorus 6.7 (H) 2.7 - 4.5 mg/dL    eGFR if African American 8 (A) >60 mL/min/1.73 m^2    eGFR if non African American 7 (A) >60 mL/min/1.73 m^2    Anion Gap 16 8 - 16 mmol/L   Comprehensive metabolic panel   Result Value Ref Range    Sodium 138 136 - 145 mmol/L    Potassium 4.3 3.5 - 5.1 mmol/L    Chloride 103 95 - 110 mmol/L    CO2 19 (L) 23 - 29 mmol/L    Glucose 149 (H) 70 - 110 mg/dL    BUN, Bld 68 (H) 8 - 23 mg/dL    Creatinine 6.0 (H) 0.5 - 1.4 mg/dL    Calcium 8.7 8.7 - 10.5 mg/dL    Total Protein 6.2 6.0 - 8.4 g/dL    Albumin 2.5 (L) 3.5 - 5.2 g/dL    Total Bilirubin 0.3 0.1 - 1.0 mg/dL    Alkaline Phosphatase 59 55 - 135 U/L    AST 51 (H) 10 - 40 U/L    ALT <5 (L) 10 - 44 U/L    Anion Gap 16 8 - 16 mmol/L    eGFR if African American 8 (A) >60 mL/min/1.73 m^2    eGFR if non African American 7 (A) >60 mL/min/1.73 m^2   Magnesium   Result Value Ref Range    Magnesium 2.1 1.6 - 2.6 mg/dL   CBC auto differential   Result Value Ref Range    WBC 11.99 3.90 - 12.70 K/uL    RBC 3.33 (L) 4.00 - 5.40 M/uL    Hemoglobin 9.0 (L) 12.0 - 16.0 g/dL    Hematocrit 28.0 (L) 37.0 - 48.5 %    MCV 84 82 - 98 fL    MCH 27.0 27.0 - 31.0 pg    MCHC 32.1 32.0 - 36.0 g/dL    RDW 19.0 (H) 11.5 - 14.5 %    Platelets 173 150 - 350 K/uL    MPV 10.3 9.2 - 12.9 fL    Gran # (ANC) 11.0 (H) 1.8 - 7.7 K/uL    Lymph # 0.7 (L) 1.0 - 4.8 K/uL    Mono # 0.3 0.3 - 1.0 K/uL    Eos # 0.0 0.0 - 0.5 K/uL    Baso # 0.00 0.00 - 0.20 K/uL    Gran% 91.8 (H) 38.0 - 73.0 %    Lymph% 5.5 (L) 18.0 - 48.0 %     Mono% 2.7 (L) 4.0 - 15.0 %    Eosinophil% 0.0 0.0 - 8.0 %    Basophil% 0.0 0.0 - 1.9 %    Differential Method Automated    Comprehensive metabolic panel   Result Value Ref Range    Sodium 137 136 - 145 mmol/L    Potassium 4.5 3.5 - 5.1 mmol/L    Chloride 102 95 - 110 mmol/L    CO2 18 (L) 23 - 29 mmol/L    Glucose 140 (H) 70 - 110 mg/dL    BUN, Bld 102 (H) 8 - 23 mg/dL    Creatinine 7.3 (H) 0.5 - 1.4 mg/dL    Calcium 8.2 (L) 8.7 - 10.5 mg/dL    Total Protein 5.6 (L) 6.0 - 8.4 g/dL    Albumin 2.3 (L) 3.5 - 5.2 g/dL    Total Bilirubin 0.3 0.1 - 1.0 mg/dL    Alkaline Phosphatase 51 (L) 55 - 135 U/L    AST 31 10 - 40 U/L    ALT <5 (L) 10 - 44 U/L    Anion Gap 17 (H) 8 - 16 mmol/L    eGFR if African American 6 (A) >60 mL/min/1.73 m^2    eGFR if non African American 5 (A) >60 mL/min/1.73 m^2   Magnesium   Result Value Ref Range    Magnesium 2.3 1.6 - 2.6 mg/dL   CBC auto differential   Result Value Ref Range    WBC 10.27 3.90 - 12.70 K/uL    RBC 2.84 (L) 4.00 - 5.40 M/uL    Hemoglobin 7.7 (L) 12.0 - 16.0 g/dL    Hematocrit 23.9 (L) 37.0 - 48.5 %    MCV 84 82 - 98 fL    MCH 27.1 27.0 - 31.0 pg    MCHC 32.2 32.0 - 36.0 g/dL    RDW 18.8 (H) 11.5 - 14.5 %    Platelets 182 150 - 350 K/uL    MPV 9.9 9.2 - 12.9 fL    Gran # (ANC) 9.1 (H) 1.8 - 7.7 K/uL    Lymph # 0.5 (L) 1.0 - 4.8 K/uL    Mono # 0.6 0.3 - 1.0 K/uL    Eos # 0.0 0.0 - 0.5 K/uL    Baso # 0.00 0.00 - 0.20 K/uL    Gran% 89.0 (H) 38.0 - 73.0 %    Lymph% 4.9 (L) 18.0 - 48.0 %    Mono% 6.1 4.0 - 15.0 %    Eosinophil% 0.0 0.0 - 8.0 %    Basophil% 0.0 0.0 - 1.9 %    Differential Method Automated    Sodium, urine, random   Result Value Ref Range    Sodium Urine Random 22 20 - 250 mmol/L   Creatinine, urine, random   Result Value Ref Range    Creatinine, Random Ur 140.2 15.0 - 325.0 mg/dL   Comprehensive metabolic panel   Result Value Ref Range    Sodium 135 (L) 136 - 145 mmol/L    Potassium 4.6 3.5 - 5.1 mmol/L    Chloride 99 95 - 110 mmol/L    CO2 16 (L) 23 - 29 mmol/L     Glucose 154 (H) 70 - 110 mg/dL    BUN, Bld 120 (H) 8 - 23 mg/dL    Creatinine 8.3 (H) 0.5 - 1.4 mg/dL    Calcium 8.2 (L) 8.7 - 10.5 mg/dL    Total Protein 6.2 6.0 - 8.4 g/dL    Albumin 2.6 (L) 3.5 - 5.2 g/dL    Total Bilirubin 0.4 0.1 - 1.0 mg/dL    Alkaline Phosphatase 58 55 - 135 U/L    AST 27 10 - 40 U/L    ALT <5 (L) 10 - 44 U/L    Anion Gap 20 (H) 8 - 16 mmol/L    eGFR if African American 5 (A) >60 mL/min/1.73 m^2    eGFR if non African American 4 (A) >60 mL/min/1.73 m^2   Magnesium   Result Value Ref Range    Magnesium 2.6 1.6 - 2.6 mg/dL   CBC auto differential   Result Value Ref Range    WBC 15.10 (H) 3.90 - 12.70 K/uL    RBC 3.51 (L) 4.00 - 5.40 M/uL    Hemoglobin 9.7 (L) 12.0 - 16.0 g/dL    Hematocrit 29.2 (L) 37.0 - 48.5 %    MCV 83 82 - 98 fL    MCH 27.6 27.0 - 31.0 pg    MCHC 33.2 32.0 - 36.0 g/dL    RDW 17.9 (H) 11.5 - 14.5 %    Platelets 227 150 - 350 K/uL    MPV 10.0 9.2 - 12.9 fL    Gran # (ANC) 13.5 (H) 1.8 - 7.7 K/uL    Lymph # 0.7 (L) 1.0 - 4.8 K/uL    Mono # 0.9 0.3 - 1.0 K/uL    Eos # 0.0 0.0 - 0.5 K/uL    Baso # 0.01 0.00 - 0.20 K/uL    Gran% 89.5 (H) 38.0 - 73.0 %    Lymph% 4.4 (L) 18.0 - 48.0 %    Mono% 6.0 4.0 - 15.0 %    Eosinophil% 0.0 0.0 - 8.0 %    Basophil% 0.1 0.0 - 1.9 %    Differential Method Automated    Comprehensive metabolic panel   Result Value Ref Range    Sodium 136 136 - 145 mmol/L    Potassium 4.1 3.5 - 5.1 mmol/L    Chloride 102 95 - 110 mmol/L    CO2 19 (L) 23 - 29 mmol/L    Glucose 129 (H) 70 - 110 mg/dL    BUN, Bld 93 (H) 8 - 23 mg/dL    Creatinine 6.2 (H) 0.5 - 1.4 mg/dL    Calcium 8.3 (L) 8.7 - 10.5 mg/dL    Total Protein 5.3 (L) 6.0 - 8.4 g/dL    Albumin 2.3 (L) 3.5 - 5.2 g/dL    Total Bilirubin 0.4 0.1 - 1.0 mg/dL    Alkaline Phosphatase 49 (L) 55 - 135 U/L    AST 27 10 - 40 U/L    ALT 6 (L) 10 - 44 U/L    Anion Gap 15 8 - 16 mmol/L    eGFR if African American 7 (A) >60 mL/min/1.73 m^2    eGFR if non African American 6 (A) >60 mL/min/1.73 m^2   Magnesium   Result  Value Ref Range    Magnesium 2.2 1.6 - 2.6 mg/dL   CBC auto differential   Result Value Ref Range    WBC 11.95 3.90 - 12.70 K/uL    RBC 3.09 (L) 4.00 - 5.40 M/uL    Hemoglobin 8.6 (L) 12.0 - 16.0 g/dL    Hematocrit 25.5 (L) 37.0 - 48.5 %    MCV 83 82 - 98 fL    MCH 27.8 27.0 - 31.0 pg    MCHC 33.7 32.0 - 36.0 g/dL    RDW 17.8 (H) 11.5 - 14.5 %    Platelets 193 150 - 350 K/uL    MPV 9.7 9.2 - 12.9 fL    Gran # (ANC) 9.6 (H) 1.8 - 7.7 K/uL    Lymph # 1.3 1.0 - 4.8 K/uL    Mono # 1.1 (H) 0.3 - 1.0 K/uL    Eos # 0.0 0.0 - 0.5 K/uL    Baso # 0.01 0.00 - 0.20 K/uL    Gran% 80.0 (H) 38.0 - 73.0 %    Lymph% 10.5 (L) 18.0 - 48.0 %    Mono% 9.4 4.0 - 15.0 %    Eosinophil% 0.0 0.0 - 8.0 %    Basophil% 0.1 0.0 - 1.9 %    Differential Method Automated    PTH, intact   Result Value Ref Range    PTH, Intact 134.5 (H) 9.0 - 77.0 pg/mL   Iron and TIBC   Result Value Ref Range    Iron 24 (L) 30 - 160 ug/dL    Transferrin 70 (L) 200 - 375 mg/dL    TIBC 104 (L) 250 - 450 ug/dL    Saturated Iron 23 20 - 50 %   Ferritin   Result Value Ref Range    Ferritin 1,532 (H) 20.0 - 300.0 ng/mL   Hepatitis B surface antibody   Result Value Ref Range    Hep B S Ab Negative    Hepatitis B surface antigen   Result Value Ref Range    Hepatitis B Surface Ag Negative    Hepatitis B core antibody, total   Result Value Ref Range    Hep B Core Total Ab Negative    Comprehensive metabolic panel   Result Value Ref Range    Sodium 135 (L) 136 - 145 mmol/L    Potassium 3.9 3.5 - 5.1 mmol/L    Chloride 100 95 - 110 mmol/L    CO2 23 23 - 29 mmol/L    Glucose 162 (H) 70 - 110 mg/dL    BUN, Bld 77 (H) 8 - 23 mg/dL    Creatinine 5.5 (H) 0.5 - 1.4 mg/dL    Calcium 7.8 (L) 8.7 - 10.5 mg/dL    Total Protein 5.0 (L) 6.0 - 8.4 g/dL    Albumin 2.2 (L) 3.5 - 5.2 g/dL    Total Bilirubin 0.4 0.1 - 1.0 mg/dL    Alkaline Phosphatase 46 (L) 55 - 135 U/L    AST 29 10 - 40 U/L    ALT 10 10 - 44 U/L    Anion Gap 12 8 - 16 mmol/L    eGFR if African American 8 (A) >60 mL/min/1.73  m^2    eGFR if non African American 7 (A) >60 mL/min/1.73 m^2   Magnesium   Result Value Ref Range    Magnesium 1.9 1.6 - 2.6 mg/dL   CBC auto differential   Result Value Ref Range    WBC 12.33 3.90 - 12.70 K/uL    RBC 2.94 (L) 4.00 - 5.40 M/uL    Hemoglobin 8.3 (L) 12.0 - 16.0 g/dL    Hematocrit 24.8 (L) 37.0 - 48.5 %    MCV 84 82 - 98 fL    MCH 28.2 27.0 - 31.0 pg    MCHC 33.5 32.0 - 36.0 g/dL    RDW 17.5 (H) 11.5 - 14.5 %    Platelets 170 150 - 350 K/uL    MPV 9.4 9.2 - 12.9 fL    Gran # (ANC) 9.8 (H) 1.8 - 7.7 K/uL    Lymph # 1.6 1.0 - 4.8 K/uL    Mono # 0.9 0.3 - 1.0 K/uL    Eos # 0.1 0.0 - 0.5 K/uL    Baso # 0.00 0.00 - 0.20 K/uL    Gran% 79.2 (H) 38.0 - 73.0 %    Lymph% 13.3 (L) 18.0 - 48.0 %    Mono% 6.9 4.0 - 15.0 %    Eosinophil% 0.6 0.0 - 8.0 %    Basophil% 0.0 0.0 - 1.9 %    Differential Method Automated    Phosphorus   Result Value Ref Range    Phosphorus 3.2 2.7 - 4.5 mg/dL   Comprehensive metabolic panel   Result Value Ref Range    Sodium 136 136 - 145 mmol/L    Potassium 4.3 3.5 - 5.1 mmol/L    Chloride 103 95 - 110 mmol/L    CO2 23 23 - 29 mmol/L    Glucose 105 70 - 110 mg/dL    BUN, Bld 44 (H) 8 - 23 mg/dL    Creatinine 4.1 (H) 0.5 - 1.4 mg/dL    Calcium 8.1 (L) 8.7 - 10.5 mg/dL    Total Protein 5.3 (L) 6.0 - 8.4 g/dL    Albumin 2.3 (L) 3.5 - 5.2 g/dL    Total Bilirubin 0.6 0.1 - 1.0 mg/dL    Alkaline Phosphatase 46 (L) 55 - 135 U/L    AST 29 10 - 40 U/L    ALT 10 10 - 44 U/L    Anion Gap 10 8 - 16 mmol/L    eGFR if African American 12 (A) >60 mL/min/1.73 m^2    eGFR if non African American 10 (A) >60 mL/min/1.73 m^2   Magnesium   Result Value Ref Range    Magnesium 1.9 1.6 - 2.6 mg/dL   CBC auto differential   Result Value Ref Range    WBC 14.42 (H) 3.90 - 12.70 K/uL    RBC 2.96 (L) 4.00 - 5.40 M/uL    Hemoglobin 8.2 (L) 12.0 - 16.0 g/dL    Hematocrit 25.6 (L) 37.0 - 48.5 %    MCV 87 82 - 98 fL    MCH 27.7 27.0 - 31.0 pg    MCHC 32.0 32.0 - 36.0 g/dL    RDW 17.7 (H) 11.5 - 14.5 %    Platelets 181  150 - 350 K/uL    MPV 10.1 9.2 - 12.9 fL    Gran # (ANC) 12.0 (H) 1.8 - 7.7 K/uL    Lymph # 1.5 1.0 - 4.8 K/uL    Mono # 0.8 0.3 - 1.0 K/uL    Eos # 0.1 0.0 - 0.5 K/uL    Baso # 0.01 0.00 - 0.20 K/uL    Gran% 82.9 (H) 38.0 - 73.0 %    Lymph% 10.3 (L) 18.0 - 48.0 %    Mono% 5.8 4.0 - 15.0 %    Eosinophil% 0.9 0.0 - 8.0 %    Basophil% 0.1 0.0 - 1.9 %    Differential Method Automated    Phosphorus   Result Value Ref Range    Phosphorus 3.9 2.7 - 4.5 mg/dL   Comprehensive metabolic panel   Result Value Ref Range    Sodium 138 136 - 145 mmol/L    Potassium 4.1 3.5 - 5.1 mmol/L    Chloride 102 95 - 110 mmol/L    CO2 24 23 - 29 mmol/L    Glucose 147 (H) 70 - 110 mg/dL    BUN, Bld 69 (H) 8 - 23 mg/dL    Creatinine 5.7 (H) 0.5 - 1.4 mg/dL    Calcium 8.0 (L) 8.7 - 10.5 mg/dL    Total Protein 5.5 (L) 6.0 - 8.4 g/dL    Albumin 2.3 (L) 3.5 - 5.2 g/dL    Total Bilirubin 0.4 0.1 - 1.0 mg/dL    Alkaline Phosphatase 54 (L) 55 - 135 U/L    AST 19 10 - 40 U/L    ALT 9 (L) 10 - 44 U/L    Anion Gap 12 8 - 16 mmol/L    eGFR if African American 8 (A) >60 mL/min/1.73 m^2    eGFR if non African American 7 (A) >60 mL/min/1.73 m^2   Magnesium   Result Value Ref Range    Magnesium 2.1 1.6 - 2.6 mg/dL   CBC auto differential   Result Value Ref Range    WBC 14.60 (H) 3.90 - 12.70 K/uL    RBC 2.85 (L) 4.00 - 5.40 M/uL    Hemoglobin 8.0 (L) 12.0 - 16.0 g/dL    Hematocrit 24.7 (L) 37.0 - 48.5 %    MCV 87 82 - 98 fL    MCH 28.1 27.0 - 31.0 pg    MCHC 32.4 32.0 - 36.0 g/dL    RDW 17.4 (H) 11.5 - 14.5 %    Platelets 200 150 - 350 K/uL    MPV 10.1 9.2 - 12.9 fL    Gran # (ANC) 12.2 (H) 1.8 - 7.7 K/uL    Lymph # 1.4 1.0 - 4.8 K/uL    Mono # 0.9 0.3 - 1.0 K/uL    Eos # 0.1 0.0 - 0.5 K/uL    Baso # 0.01 0.00 - 0.20 K/uL    Gran% 83.5 (H) 38.0 - 73.0 %    Lymph% 9.8 (L) 18.0 - 48.0 %    Mono% 5.8 4.0 - 15.0 %    Eosinophil% 0.8 0.0 - 8.0 %    Basophil% 0.1 0.0 - 1.9 %    Differential Method Automated    Phosphorus   Result Value Ref Range    Phosphorus 4.8  (H) 2.7 - 4.5 mg/dL   CBC auto differential   Result Value Ref Range    WBC 10.89 3.90 - 12.70 K/uL    RBC 2.76 (L) 4.00 - 5.40 M/uL    Hemoglobin 7.5 (L) 12.0 - 16.0 g/dL    Hematocrit 23.5 (L) 37.0 - 48.5 %    MCV 85 82 - 98 fL    MCH 27.2 27.0 - 31.0 pg    MCHC 31.9 (L) 32.0 - 36.0 g/dL    RDW 17.4 (H) 11.5 - 14.5 %    Platelets 199 150 - 350 K/uL    MPV 10.9 9.2 - 12.9 fL    Gran # (ANC) 9.2 (H) 1.8 - 7.7 K/uL    Lymph # 1.0 1.0 - 4.8 K/uL    Mono # 0.6 0.3 - 1.0 K/uL    Eos # 0.1 0.0 - 0.5 K/uL    Baso # 0.01 0.00 - 0.20 K/uL    Gran% 84.2 (H) 38.0 - 73.0 %    Lymph% 9.5 (L) 18.0 - 48.0 %    Mono% 5.7 4.0 - 15.0 %    Eosinophil% 0.5 0.0 - 8.0 %    Basophil% 0.1 0.0 - 1.9 %    Differential Method Automated    Comprehensive metabolic panel   Result Value Ref Range    Sodium 137 136 - 145 mmol/L    Potassium 4.1 3.5 - 5.1 mmol/L    Chloride 102 95 - 110 mmol/L    CO2 21 (L) 23 - 29 mmol/L    Glucose 155 (H) 70 - 110 mg/dL    BUN, Bld 93 (H) 8 - 23 mg/dL    Creatinine 6.9 (H) 0.5 - 1.4 mg/dL    Calcium 7.8 (L) 8.7 - 10.5 mg/dL    Total Protein 5.5 (L) 6.0 - 8.4 g/dL    Albumin 2.2 (L) 3.5 - 5.2 g/dL    Total Bilirubin 0.4 0.1 - 1.0 mg/dL    Alkaline Phosphatase 63 55 - 135 U/L    AST 17 10 - 40 U/L    ALT <5 (L) 10 - 44 U/L    Anion Gap 14 8 - 16 mmol/L    eGFR if African American 6 (A) >60 mL/min/1.73 m^2    eGFR if non African American 6 (A) >60 mL/min/1.73 m^2   Magnesium   Result Value Ref Range    Magnesium 2.1 1.6 - 2.6 mg/dL   Phosphorus   Result Value Ref Range    Phosphorus 3.1 2.7 - 4.5 mg/dL   CBC auto differential   Result Value Ref Range    WBC 14.31 (H) 3.90 - 12.70 K/uL    RBC 2.93 (L) 4.00 - 5.40 M/uL    Hemoglobin 8.1 (L) 12.0 - 16.0 g/dL    Hematocrit 25.2 (L) 37.0 - 48.5 %    MCV 86 82 - 98 fL    MCH 27.6 27.0 - 31.0 pg    MCHC 32.1 32.0 - 36.0 g/dL    RDW 17.1 (H) 11.5 - 14.5 %    Platelets 126 (L) 150 - 350 K/uL    MPV 10.1 9.2 - 12.9 fL    Gran # (ANC) 12.0 (H) 1.8 - 7.7 K/uL    Lymph # 1.2  1.0 - 4.8 K/uL    Mono # 1.1 (H) 0.3 - 1.0 K/uL    Eos # 0.1 0.0 - 0.5 K/uL    Baso # 0.01 0.00 - 0.20 K/uL    Gran% 83.7 (H) 38.0 - 73.0 %    Lymph% 8.5 (L) 18.0 - 48.0 %    Mono% 7.4 4.0 - 15.0 %    Eosinophil% 0.3 0.0 - 8.0 %    Basophil% 0.1 0.0 - 1.9 %    Differential Method Automated    Comprehensive metabolic panel   Result Value Ref Range    Sodium 137 136 - 145 mmol/L    Potassium 4.3 3.5 - 5.1 mmol/L    Chloride 100 95 - 110 mmol/L    CO2 26 23 - 29 mmol/L    Glucose 150 (H) 70 - 110 mg/dL    BUN, Bld 51 (H) 8 - 23 mg/dL    Creatinine 4.3 (H) 0.5 - 1.4 mg/dL    Calcium 8.2 (L) 8.7 - 10.5 mg/dL    Total Protein 5.9 (L) 6.0 - 8.4 g/dL    Albumin 2.3 (L) 3.5 - 5.2 g/dL    Total Bilirubin 0.6 0.1 - 1.0 mg/dL    Alkaline Phosphatase 58 55 - 135 U/L    AST 24 10 - 40 U/L    ALT 7 (L) 10 - 44 U/L    Anion Gap 11 8 - 16 mmol/L    eGFR if African American 11 (A) >60 mL/min/1.73 m^2    eGFR if non African American 10 (A) >60 mL/min/1.73 m^2   Magnesium   Result Value Ref Range    Magnesium 2.0 1.6 - 2.6 mg/dL   Phosphorus   Result Value Ref Range    Phosphorus 2.4 (L) 2.7 - 4.5 mg/dL   CBC auto differential   Result Value Ref Range    WBC 16.03 (H) 3.90 - 12.70 K/uL    RBC 3.09 (L) 4.00 - 5.40 M/uL    Hemoglobin 8.6 (L) 12.0 - 16.0 g/dL    Hematocrit 27.1 (L) 37.0 - 48.5 %    MCV 88 82 - 98 fL    MCH 27.8 27.0 - 31.0 pg    MCHC 31.7 (L) 32.0 - 36.0 g/dL    RDW 17.4 (H) 11.5 - 14.5 %    Platelets 96 (L) 150 - 350 K/uL    MPV 10.2 9.2 - 12.9 fL    Gran # (ANC) 13.5 (H) 1.8 - 7.7 K/uL    Lymph # 1.3 1.0 - 4.8 K/uL    Mono # 1.1 (H) 0.3 - 1.0 K/uL    Eos # 0.1 0.0 - 0.5 K/uL    Baso # 0.03 0.00 - 0.20 K/uL    Gran% 83.9 (H) 38.0 - 73.0 %    Lymph% 8.4 (L) 18.0 - 48.0 %    Mono% 7.1 4.0 - 15.0 %    Eosinophil% 0.4 0.0 - 8.0 %    Basophil% 0.2 0.0 - 1.9 %    Differential Method Automated    Comprehensive metabolic panel   Result Value Ref Range    Sodium 136 136 - 145 mmol/L    Potassium 4.8 3.5 - 5.1 mmol/L    Chloride  100 95 - 110 mmol/L    CO2 22 (L) 23 - 29 mmol/L    Glucose 116 (H) 70 - 110 mg/dL    BUN, Bld 76 (H) 8 - 23 mg/dL    Creatinine 5.7 (H) 0.5 - 1.4 mg/dL    Calcium 8.3 (L) 8.7 - 10.5 mg/dL    Total Protein 6.1 6.0 - 8.4 g/dL    Albumin 2.4 (L) 3.5 - 5.2 g/dL    Total Bilirubin 0.7 0.1 - 1.0 mg/dL    Alkaline Phosphatase 59 55 - 135 U/L    AST 23 10 - 40 U/L    ALT 8 (L) 10 - 44 U/L    Anion Gap 14 8 - 16 mmol/L    eGFR if African American 8 (A) >60 mL/min/1.73 m^2    eGFR if non African American 7 (A) >60 mL/min/1.73 m^2   Magnesium   Result Value Ref Range    Magnesium 2.0 1.6 - 2.6 mg/dL   Phosphorus   Result Value Ref Range    Phosphorus 3.3 2.7 - 4.5 mg/dL   Heparin-induced platelet antibody   Result Value Ref Range    Heparin Induced Thrombocytopenia SEE COMMENT    Procalcitonin   Result Value Ref Range    Procalcitonin 1.98 (H) <0.25 ng/mL   CBC auto differential   Result Value Ref Range    WBC 18.26 (H) 3.90 - 12.70 K/uL    RBC 2.80 (L) 4.00 - 5.40 M/uL    Hemoglobin 7.8 (L) 12.0 - 16.0 g/dL    Hematocrit 24.6 (L) 37.0 - 48.5 %    MCV 88 82 - 98 fL    MCH 27.9 27.0 - 31.0 pg    MCHC 31.7 (L) 32.0 - 36.0 g/dL    RDW 17.0 (H) 11.5 - 14.5 %    Platelets 59 (L) 150 - 350 K/uL    MPV 10.2 9.2 - 12.9 fL    Gran # (ANC) 15.8 (H) 1.8 - 7.7 K/uL    Lymph # 1.1 1.0 - 4.8 K/uL    Mono # 1.3 (H) 0.3 - 1.0 K/uL    Eos # 0.0 0.0 - 0.5 K/uL    Baso # 0.03 0.00 - 0.20 K/uL    Gran% 86.6 (H) 38.0 - 73.0 %    Lymph% 5.8 (L) 18.0 - 48.0 %    Mono% 7.3 4.0 - 15.0 %    Eosinophil% 0.1 0.0 - 8.0 %    Basophil% 0.2 0.0 - 1.9 %    Differential Method Automated    Comprehensive metabolic panel   Result Value Ref Range    Sodium 139 136 - 145 mmol/L    Potassium 4.1 3.5 - 5.1 mmol/L    Chloride 100 95 - 110 mmol/L    CO2 25 23 - 29 mmol/L    Glucose 195 (H) 70 - 110 mg/dL    BUN, Bld 56 (H) 8 - 23 mg/dL    Creatinine 4.6 (H) 0.5 - 1.4 mg/dL    Calcium 9.0 8.7 - 10.5 mg/dL    Total Protein 6.1 6.0 - 8.4 g/dL    Albumin 2.4 (L) 3.5 -  5.2 g/dL    Total Bilirubin 0.7 0.1 - 1.0 mg/dL    Alkaline Phosphatase 73 55 - 135 U/L    AST 21 10 - 40 U/L    ALT 12 10 - 44 U/L    Anion Gap 14 8 - 16 mmol/L    eGFR if African American 10 (A) >60 mL/min/1.73 m^2    eGFR if non African American 9 (A) >60 mL/min/1.73 m^2   Magnesium   Result Value Ref Range    Magnesium 2.0 1.6 - 2.6 mg/dL   Phosphorus   Result Value Ref Range    Phosphorus 3.2 2.7 - 4.5 mg/dL   Procalcitonin   Result Value Ref Range    Procalcitonin 2.14 (H) <0.25 ng/mL   Urinalysis   Result Value Ref Range    Specimen UA Urine, Catheterized     Color, UA Yellow Yellow, Straw, Catherine    Appearance, UA Clear Clear    pH, UA 6.0 5.0 - 8.0    Specific Gravity, UA 1.025 1.005 - 1.030    Protein, UA 2+ (A) Negative    Glucose, UA Negative Negative    Ketones, UA Negative Negative    Bilirubin (UA) Negative Negative    Occult Blood UA Trace (A) Negative    Nitrite, UA Negative Negative    Urobilinogen, UA Negative <2.0 EU/dL    Leukocytes, UA Negative Negative   Lactic acid, plasma   Result Value Ref Range    Lactate (Lactic Acid) 2.5 (H) 0.5 - 2.2 mmol/L   Brain natriuretic peptide   Result Value Ref Range    BNP >4,900 (H) 0 - 99 pg/mL   Urinalysis Microscopic   Result Value Ref Range    RBC, UA 1 0 - 4 /hpf    WBC, UA 3 0 - 5 /hpf    Bacteria, UA Occasional None-Occ /hpf    Squam Epithel, UA 10 /hpf    Hyaline Casts, UA 0 0-1/lpf /lpf    Microscopic Comment SEE COMMENT    CBC auto differential   Result Value Ref Range    WBC 15.45 (H) 3.90 - 12.70 K/uL    RBC 2.91 (L) 4.00 - 5.40 M/uL    Hemoglobin 8.2 (L) 12.0 - 16.0 g/dL    Hematocrit 25.8 (L) 37.0 - 48.5 %    MCV 89 82 - 98 fL    MCH 28.2 27.0 - 31.0 pg    MCHC 31.8 (L) 32.0 - 36.0 g/dL    RDW 17.2 (H) 11.5 - 14.5 %    Platelets 63 (L) 150 - 350 K/uL    MPV SEE COMMENT 9.2 - 12.9 fL    Gran # (ANC) 13.5 (H) 1.8 - 7.7 K/uL    Lymph # 1.1 1.0 - 4.8 K/uL    Mono # 0.8 0.3 - 1.0 K/uL    Eos # 0.1 0.0 - 0.5 K/uL    Baso # 0.02 0.00 - 0.20 K/uL     Gran% 88.5 (H) 38.0 - 73.0 %    Lymph% 7.2 (L) 18.0 - 48.0 %    Mono% 5.1 4.0 - 15.0 %    Eosinophil% 0.3 0.0 - 8.0 %    Basophil% 0.1 0.0 - 1.9 %    Platelet Estimate Decreased (A)     Poik Moderate     Hypo Occasional     Ovalocytes Occasional     Target Cells Occasional     Tear Drop Cells Occasional     Stomatocytes Present     Schistocytes Present     Differential Method Automated    Comprehensive metabolic panel   Result Value Ref Range    Sodium 138 136 - 145 mmol/L    Potassium 4.3 3.5 - 5.1 mmol/L    Chloride 96 95 - 110 mmol/L    CO2 23 23 - 29 mmol/L    Glucose 188 (H) 70 - 110 mg/dL    BUN, Bld 80 (H) 8 - 23 mg/dL    Creatinine 6.2 (H) 0.5 - 1.4 mg/dL    Calcium 8.7 8.7 - 10.5 mg/dL    Total Protein 6.4 6.0 - 8.4 g/dL    Albumin 2.4 (L) 3.5 - 5.2 g/dL    Total Bilirubin 0.7 0.1 - 1.0 mg/dL    Alkaline Phosphatase 73 55 - 135 U/L    AST 26 10 - 40 U/L    ALT 9 (L) 10 - 44 U/L    Anion Gap 19 (H) 8 - 16 mmol/L    eGFR if African American 7 (A) >60 mL/min/1.73 m^2    eGFR if non African American 6 (A) >60 mL/min/1.73 m^2   Magnesium   Result Value Ref Range    Magnesium 2.3 1.6 - 2.6 mg/dL   Phosphorus   Result Value Ref Range    Phosphorus 3.8 2.7 - 4.5 mg/dL   Vancomycin, random   Result Value Ref Range    Vancomycin, Random 23.4 Not established ug/mL   TSH   Result Value Ref Range    TSH 0.112 (L) 0.400 - 4.000 uIU/mL   T4, free   Result Value Ref Range    Free T4 1.17 0.71 - 1.51 ng/dL   CBC auto differential   Result Value Ref Range    WBC 17.18 (H) 3.90 - 12.70 K/uL    RBC 2.59 (L) 4.00 - 5.40 M/uL    Hemoglobin 7.3 (L) 12.0 - 16.0 g/dL    Hematocrit 23.1 (L) 37.0 - 48.5 %    MCV 89 82 - 98 fL    MCH 28.2 27.0 - 31.0 pg    MCHC 31.6 (L) 32.0 - 36.0 g/dL    RDW 17.3 (H) 11.5 - 14.5 %    Platelets 52 (L) 150 - 350 K/uL    MPV SEE COMMENT 9.2 - 12.9 fL    Gran # (ANC) 15.6 (H) 1.8 - 7.7 K/uL    Lymph # 0.7 (L) 1.0 - 4.8 K/uL    Mono # 0.9 0.3 - 1.0 K/uL    Eos # 0.0 0.0 - 0.5 K/uL    Baso # 0.01 0.00 -  0.20 K/uL    Gran% 91.3 (H) 38.0 - 73.0 %    Lymph% 4.2 (L) 18.0 - 48.0 %    Mono% 5.0 4.0 - 15.0 %    Eosinophil% 0.2 0.0 - 8.0 %    Basophil% 0.1 0.0 - 1.9 %    Platelet Estimate Decreased (A)     Aniso Slight     Poik Moderate     Poly Occasional     Hypo Moderate     Ovalocytes Occasional     Target Cells Moderate     Tear Drop Cells Occasional     Stomatocytes Present     Schistocytes Present     Differential Method Automated    Comprehensive metabolic panel   Result Value Ref Range    Sodium 138 136 - 145 mmol/L    Potassium 3.7 3.5 - 5.1 mmol/L    Chloride 99 95 - 110 mmol/L    CO2 23 23 - 29 mmol/L    Glucose 205 (H) 70 - 110 mg/dL    BUN, Bld 51 (H) 8 - 23 mg/dL    Creatinine 4.2 (H) 0.5 - 1.4 mg/dL    Calcium 8.4 (L) 8.7 - 10.5 mg/dL    Total Protein 5.8 (L) 6.0 - 8.4 g/dL    Albumin 2.0 (L) 3.5 - 5.2 g/dL    Total Bilirubin 0.7 0.1 - 1.0 mg/dL    Alkaline Phosphatase 76 55 - 135 U/L    AST 40 10 - 40 U/L    ALT 20 10 - 44 U/L    Anion Gap 16 8 - 16 mmol/L    eGFR if African American 12 (A) >60 mL/min/1.73 m^2    eGFR if non African American 10 (A) >60 mL/min/1.73 m^2   Magnesium   Result Value Ref Range    Magnesium 1.9 1.6 - 2.6 mg/dL   Phosphorus   Result Value Ref Range    Phosphorus 2.8 2.7 - 4.5 mg/dL         Diagnostic Results:CT scan films   ( All images reviewed Independently)   Imaging Results    None             06/29/2018    Assessment and Recommendations:  Patient with Anemia, Renal failure, s/p cardiac arrest, sepsis, prolonged ICU stay. She has been confused and does not follow significant commands. Dialysis was on hold because of hypotension.   She had fever, anemia, thrombocytopenia, Renal and CNS      Differential diagnosis:  1) Metabolic encephalopathy  ( Elevated BUN/cr, anemia, hypocalcemia, )  2) Hypoxic Encephalopathy  3) Anemic Encephalopathy  4) Sepsis encephalopathy        Recommendations:  1) Medical management  2) Rule out TTP  3) Check Ammonia, Uric acid  4) MRI of the  brain,  5) LP cannot be done  Patient is thrombocytopenic         Thank you for the consult      Francis Russell MD., Ph.D., MS

## 2018-06-29 NOTE — PLAN OF CARE
Problem: Patient Care Overview  Goal: Interdisciplinary Rounds/Family Conf  Outcome: Ongoing (interventions implemented as appropriate)  Pt stable. Plan of care reviewed with patient. Patient verbalized understanding. Bed low, wheels locked, bed alarm on, call light within reach. Patient instructed to call for assistance. Will continue to monitor.     Turning Pt every 2 hours.

## 2018-06-29 NOTE — ASSESSMENT & PLAN NOTE
6/22/18 - Patient has been treated with Procrit for maintenance therapy for anemia due to chronic kidney disease. Patient with hemoglobin of 8.2 this morning.  No current evidence of bleeding noted.  We will continue to monitor closely.  June 23, 2018-the patient has acute blood loss anemia is stable with hemoglobin greater than 8 grams/deciliter today.  She will continue Procrit as per HD protocol.  No urgent indication for PRBC transfusion today.  June 24, 2018-CBC from today shows hemoglobin greater than 7 grams/deciliter.  I would recommend transfusion of PRBCs for supportive care if hemoglobin less than 7 grams/deciliter.  June 26, 2018-CBC from today shows hemoglobin greater than 7 grams/deciliter.  No urgent indication for PRBC transfusion at this time.  June 27, 2018-CBC from today shows hemoglobin greater than 7 grams/deciliter.  No urgent indication for PRBC transfusion at this time.  June 28, 2018-CBC from today shows hemoglobin greater than 7 grams/deciliter.  No urgent indication for PRBC transfusion at this time.  June 28, 2019-review of lab work from today shows interval decrease in hemoglobin/hematocrit.  Consideration can be given for transfusion of 1 unit of PRBCs at the time of her next hemodialysis treatment.

## 2018-06-29 NOTE — PLAN OF CARE
Cm spoke with spouse and Dr. Hua ( niece telephonically) regarding hospice. They want to speak with [facility] tomorrow and [facility] on Sunday. CM called and notified the perspective person.

## 2018-06-29 NOTE — SUBJECTIVE & OBJECTIVE
Interval History: no new events overnight. Wbc decreasing. Restarting heparin products after stop eliquis.     Medications:  Continuous Infusions:  Scheduled Meds:   sodium chloride 0.9%   Intravenous Once    albuterol-ipratropium  3 mL Nebulization Q4H WAKE    amiodarone  200 mg Oral BID    aspirin  81 mg Oral Daily    epoetin carmen (PROCRIT) injection  4,000 Units Intravenous Every Tues, Thurs, Sat    famotidine  20 mg Oral Daily    metoprolol tartrate  25 mg Oral BID    piperacillin-tazobactam (ZOSYN) IVPB  4.5 g Intravenous Q12H    sodium bicarbonate  650 mg Oral BID    [START ON 7/2/2018] vancomycin (VANCOCIN) IVPB  1,250 mg Intravenous Q48H    vancomycin  125 mg Oral Q6H     PRN Meds:sodium chloride 0.9%, acetaminophen, bisacodyl, cloNIDine, dextrose 50%, dextrose 50%, glucagon (human recombinant), glucose, glucose, heparin (porcine), hydrALAZINE, insulin aspart U-100, lactulose, lorazepam, morphine, ondansetron, sodium chloride 0.9%     Review of patient's allergies indicates:   Allergen Reactions    Lipitor [atorvastatin] Swelling     Lips       Objective:     Vital Signs (Most Recent):  Temp: 98.4 °F (36.9 °C) (06/29/18 0444)  Pulse: 69 (06/29/18 0852)  Resp: 18 (06/29/18 0852)  BP: (!) 101/55 (06/29/18 0444)  SpO2: 97 % (06/29/18 0852) Vital Signs (24h Range):  Temp:  [97.1 °F (36.2 °C)-99.4 °F (37.4 °C)] 98.4 °F (36.9 °C)  Pulse:  [50-86] 69  Resp:  [18-24] 18  SpO2:  [95 %-100 %] 97 %  BP: ()/(26-79) 101/55     Weight: 74.7 kg (164 lb 10.9 oz)  Body mass index is 30.12 kg/m².    Intake/Output - Last 3 Shifts       06/27 0700 - 06/28 0659 06/28 0700 - 06/29 0659 06/29 0700 - 06/30 0659    P.O. 0 0     NG/ 30     IV Piggyback 100 100     Total Intake(mL/kg) 1093 (14.8) 130 (1.7)     Urine (mL/kg/hr) 0 (0)      Total Output 0        Net +1093 +130             Urine Occurrence  1 x     Stool Occurrence 3 x 2 x     Emesis Occurrence 0 x            Physical Exam   Constitutional: She is  oriented to person, place, and time. She appears well-developed and well-nourished.   HENT:   Head: Normocephalic and atraumatic.   Eyes: EOM are normal.   Cardiovascular: Normal rate and regular rhythm.    Pulmonary/Chest: Effort normal. No respiratory distress.   Abdominal: Soft.   Musculoskeletal: Normal range of motion.   Neurological: She is alert and oriented to person, place, and time.   Skin: Skin is warm and dry.   Psychiatric: She has a normal mood and affect. Thought content normal.   Vitals reviewed.      Significant Labs:  CBC:   Recent Labs  Lab 06/29/18  0500   WBC 17.18*   RBC 2.59*   HGB 7.3*   HCT 23.1*   PLT 52*   MCV 89   MCH 28.2   MCHC 31.6*     CMP:   Recent Labs  Lab 06/29/18  0500   *   CALCIUM 8.4*   ALBUMIN 2.0*   PROT 5.8*      K 3.7   CO2 23   CL 99   BUN 51*   CREATININE 4.2*   ALKPHOS 76   ALT 20   AST 40   BILITOT 0.7       Significant Diagnostics:  I have reviewed all pertinent imaging results/findings within the past 24 hours.

## 2018-06-29 NOTE — PROGRESS NOTES
[facility] -   Nephrology  Progress Note    Patient Name: Citlali Karimi  MRN: [id number]  Admission Date: 6/12/2018  Hospital Length of Stay: 16 days  Attending Provider: Levy Samuel MD   Primary Care Physician: Evelio Schuster MD  Principal Problem:Critical illness polyneuropathy    Subjective:     HPI:  Citlali Karimi Is a pleasant 68 -year-old  woman with history of chronic kidney disease stage 4, patient was admitted to the hospital for an elective  partial parathyroidectomy, following her surgery during observation.  She had some chest pain, workup revealed non ST elevated MI, patient was admitted to the hospital for further management.  We were consulted due to advanced renal insufficiency, baseline serum creatinine about 3.5-4,            Important Info :        She underwent a native kidney biopsy on 10/10/13. Final report of the kidney biopsy is negative for any specific etiology for acute renal failure. 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         Interval History:  Patient is becoming more hypotensive with worsening procalcitonin and worsening hypotension.  White count is higher today.  Patient sometimes able to respond but does not talk.  Workup shows acute delirium from metabolic causes and sepsis.  Bilateral lung pneumonia which is getting worse clinically.  Discussion about inpatient hospice today with patient's family and Hospital Medicine doctor Cam    Review of patient's allergies indicates:   Allergen Reactions    Lipitor [atorvastatin] Swelling     Lips       Current Facility-Administered Medications   Medication Frequency    0.9%  NaCl infusion PRN    0.9%  NaCl infusion Once    acetaminophen oral solution 650 mg Q4H PRN    albuterol-ipratropium 2.5 mg-0.5 mg/3 mL nebulizer solution 3 mL Q4H WAKE    amiodarone tablet 200 mg BID    aspirin chewable tablet 81 mg Daily    bisacodyl suppository 10 mg Daily PRN     cloNIDine tablet 0.1 mg Q6H PRN    dextrose 50% injection 12.5 g PRN    dextrose 50% injection 25 g PRN    epoetin carmen injection 4,000 Units Every Tues, Thurs, Sat    famotidine tablet 20 mg Daily    glucagon (human recombinant) injection 1 mg PRN    glucose chewable tablet 16 g PRN    glucose chewable tablet 24 g PRN    heparin (porcine) injection 3,000 Units PRN    hydrALAZINE injection 10 mg Q8H PRN    insulin aspart U-100 pen 0-5 Units QID (AC + HS) PRN    lactulose 20 gram/30 mL solution Soln 20 g Q6H PRN    lorazepam (ATIVAN) injection 2 mg Q4H PRN    metoprolol tartrate (LOPRESSOR) tablet 25 mg BID    morphine injection 2 mg Q6H PRN    ondansetron injection 4 mg Q8H PRN    piperacillin-tazobactam 4.5 g in dextrose 5 % 100 mL IVPB (ready to mix system) Q12H    sodium bicarbonate tablet 650 mg BID    sodium chloride 0.9% flush 3 mL PRN    [START ON 7/2/2018] vancomycin (VANCOCIN) 1,250 mg in dextrose 5 % 250 mL IVPB Q48H    vancomycin 250mg / 10ml oral suspension 125 mg Q6H       Objective:     Vital Signs (Most Recent):  Temp: 98.4 °F (36.9 °C) (06/29/18 0444)  Pulse: 69 (06/29/18 0852)  Resp: 18 (06/29/18 0852)  BP: (!) 101/55 (06/29/18 0444)  SpO2: 97 % (06/29/18 0852)  O2 Device (Oxygen Therapy): nasal cannula (06/29/18 0852) Vital Signs (24h Range):  Temp:  [97.1 °F (36.2 °C)-99.4 °F (37.4 °C)] 98.4 °F (36.9 °C)  Pulse:  [50-86] 69  Resp:  [18-24] 18  SpO2:  [95 %-100 %] 97 %  BP: ()/(26-79) 101/55     Weight: 74.7 kg (164 lb 10.9 oz) (06/29/18 0444)  Body mass index is 30.12 kg/m².  Body surface area is 1.81 meters squared.    I/O last 3 completed shifts:  In: 260 [NG/GT:60; IV Piggyback:200]  Out: 0     Physical Exam   Constitutional: She appears well-developed and well-nourished. She is active and cooperative. She appears ill.   HENT:   Head: Normocephalic and atraumatic.   Nose: Nose normal.   Mouth/Throat: Oropharynx is clear and moist. No oropharyngeal exudate.    Healing neck incision is noted.   Eyes: Pupils are equal, round, and reactive to light. No scleral icterus.   Neck: Neck supple. No thyromegaly present.   Cardiovascular: Normal rate, regular rhythm, normal heart sounds and intact distal pulses.    No murmur heard.  Pulmonary/Chest: Effort normal. No stridor. No respiratory distress. She has no wheezes. She has rhonchi. She has no rales.   Abdominal: Soft. Bowel sounds are normal. She exhibits no distension, no ascites and no mass. There is no hepatosplenomegaly. There is no tenderness. There is no rigidity, no rebound and no guarding.   Musculoskeletal: She exhibits no edema or tenderness.   Lymphadenopathy:     She has no cervical adenopathy.   Neurological: She is unresponsive. No cranial nerve deficit. She exhibits normal muscle tone. Coordination normal.   Skin: Skin is warm and dry. Ecchymosis noted. No rash noted. No pallor.   Psychiatric: She is slowed. She is noncommunicative.   Nursing note and vitals reviewed.      Significant Labs:  All labs within the past 24 hours have been reviewed.     Significant Imaging:  Labs: Reviewed  CT: Reviewed    Assessment/Plan:     ESRD (end stage renal disease)    Patient has now become dialysis dependent but recently has worsening bilateral pneumonia with acute delirium.  Patient is not improving.  Patient has been in the hospital for 16 days.  Extensive discussion today with the patient's .  Patient blood pressure has been running  systolic.  With the patient's deterioration in hemodynamic status, worsening sepsis and acute delirium which is not getting any better the chances of recovery are minimal at this time.  Very poor prognosis at this time discussed in detail with the patient's .  Patient carries a poor prognosis long-term at this time.  A with hemodynamic compromise and the patient's status of do not resuscitate I would hold off on dialysis and recommend comfort measures for now.  Patient's   will discuss with his niece who is a physician in [place] and get back to us about inpatient hospice options.  All the above therapeutic options and plans discussed in detail with Hospital Medicine-    Critical care time spent today is about 35 minutes total in multiple visits.  Greater than 50% of the time was spent in counseling with the patient's  and discussing the therapeutic options with all specialties.              Thank you for your consult.     Chinmay Lyles MD  Nephrology  [facility]

## 2018-06-29 NOTE — PROGRESS NOTES
[facility] Hospital Medicine  Progress Note    Patient Name: Citlali Karimi  MRN: [id number]  Patient Class: IP- Inpatient   Admission Date: 6/12/2018  Length of Stay: 16 days  Attending Physician: Levy Samuel MD  Primary Care Provider: Evelio Schuster MD        Subjective:     Principal Problem:Critical illness polyneuropathy    HPI:  Citlali Karimi is a 68 y.o female with PMHx of CKD (IV), HTN, DM, CVA (left sided weakness), and CAD who initially presented for hyperparathyroidism and hypercalcemia requiring surgical intervention.  Pt underwent parathyroidectomy today per Dr. Tracy (General Surgery). Pt admitted to Medical Surgical Unit post procedure and Hospital Medicine consulted for medical management.  Chest xray post procedure showed mild asymmetric CHF versus RUL pneumonia. Troponin 0.113 and BNP >270 noted.  Pt given IV Lasix in PACU prior to unit arrival.      Hospital Course:  Pt admitted to Outpatient Extended Recovery post procedure.  Elevated noted post procedure and results trended yielding significant positive response.  Cardiology consulted and Heparin infusion initiated.  Hx of CAD, multiple vessels noted with home medications continued and Imdur dose increased.  Nephrology consulted due to elevated creatinine and [facility] recommended.  Echo results pending.  Heart catheterization procedure considered with family refusing due to concern for worsening kidney function as patient does not want to be on dialysis.  Decreased oral intake and difficulty swallowing noted.  Speech therapist to bedside.  Pt made NPO and General Surgery notified.      6/15  Patient worsening status. ET changed per Pulmonary CC. Patient s/p Code Blue - VFib with recovery. Cardiology taking patient to CATH lab. Discussed with CM plan for post acute care in progress.    6/16  Patient improved o/n. Patient awake and able to follow simple command. Breathing trials in progress. Discussed with Pulmonary  CC    6/17  Patient remains intubated.  at bedside. Cardiology at bedside as well. Discussed with CC Team    6/18  Patient responds to command but remains intubated. Swelling to neck continues to be prominent. Discussed with CC Team. Worsening renal function. Possible HD need.    6/19  Patient remains intubated. Worsening renal function. No events Discussed with CC Team    6/21  Positive cough leak and plan for extubation today .   6/22  Patient continue to be confused . Will consider ct head if didn't improve . Continue treat infection/c difficle .Consulted vascular surgery dr maciel to remove femoral dialysis and put new one subclavian ?. To avoid risk of infection in groin. Patient will continue to need dialysis    6/23  Patient is seen and examined today . More awake today. dialysis catheter was removed from groin and new one will be place in upper body ij vs subclavian. Patient diarrhea improved .   6/24  Patient very lethargic to participate in slp . S/p vascath for hemodialysis . SLP will visit tomorrow . Patient still has ng tube. Diarrhea improved   6/25  Patient lethargic . SLP recommended peg tube . Placement of peg tube tomorrow . After peg tube possible discharge to ltac   6/26  Patient was seen and examined today. cxr shows mid and upper right lobe . Very weak cough reflex. Will start treatment with unisyn for aspiration pneumonia and follow up culture ,procal if no improvement will broaden coverage . Patient also being treated for  c diff . Hematology oncology also ordered eliquis and hit panel . Peg tube delayed   6/27  Discussed critical illness with  bedside he said he will discuss with his niece to guide us with further plan of care  . Patient leukocytosis and platelet dropped - sepsis -pneumonia . Repeat cxr .   6/28  Plan of care was discussed with patient niece and  bedside in detail .They wish to continue with current treatment plan  And will  Wait on making decision  regarding palliative care .No clinical improvement . Patient more lethargic . Plan for dialysis today  6/29  Patient was seen and examined today. Continue to decline with broad spectrum antibotic. Wbc and procal continue to trend up . Her pneumonia continue to get worse she has very weak cough reflex and can not clear up secretions . Nephrology decided comfort care with  bedside .  decided on inpatient hospice . Case management for arrangements       Review of Systems   Unable to perform ROS: Mental status change     Objective:     Vital Signs (Most Recent):  Temp: 98.4 °F (36.9 °C) (06/29/18 1021)  Pulse: 85 (06/29/18 1021)  Resp: 16 (06/29/18 1021)  BP: 113/60 (06/29/18 1021)  SpO2: 98 % (06/29/18 1021) Vital Signs (24h Range):  Temp:  [97.1 °F (36.2 °C)-99.4 °F (37.4 °C)] 98.4 °F (36.9 °C)  Pulse:  [50-86] 85  Resp:  [16-24] 16  SpO2:  [95 %-100 %] 98 %  BP: ()/(26-79) 113/60     Weight: 74.7 kg (164 lb 10.9 oz)  Body mass index is 30.12 kg/m².    Intake/Output Summary (Last 24 hours) at 06/29/18 1102  Last data filed at 06/28/18 1913   Gross per 24 hour   Intake              130 ml   Output                0 ml   Net              130 ml      Physical Exam   Constitutional: She appears well-developed and well-nourished. She is active and cooperative. She appears ill.   HENT:   Head: Normocephalic and atraumatic.   Nose: Nose normal.   Mouth/Throat: Oropharynx is clear and moist. No oropharyngeal exudate.   Healing neck incision is noted.   Eyes: Pupils are equal, round, and reactive to light. No scleral icterus.   Neck: Neck supple. No thyromegaly present.   Cardiovascular: Normal rate, regular rhythm, normal heart sounds and intact distal pulses.    No murmur heard.  Pulmonary/Chest: Effort normal. No stridor. No respiratory distress. She has no wheezes. She has rhonchi. She has no rales.   Abdominal: Soft. Bowel sounds are normal. She exhibits no distension, no ascites and no mass. There is  no hepatosplenomegaly. There is no tenderness. There is no rigidity, no rebound and no guarding.   Musculoskeletal: She exhibits no edema or tenderness.   Lymphadenopathy:     She has no cervical adenopathy.   Neurological: She is unresponsive. No cranial nerve deficit. She exhibits normal muscle tone. Coordination normal.   Skin: Skin is warm and dry. Ecchymosis noted. No rash noted. No pallor.   Psychiatric: She is slowed. She is noncommunicative.   Nursing note and vitals reviewed.      Significant Labs: All pertinent labs within the past 24 hours have been reviewed.    Significant Imaging: I have reviewed all pertinent imaging results/findings within the past 24 hours.    Assessment/Plan:      * Critical illness polyneuropathy    -pt/ot         Encephalopathy, metabolic    -most likely metabolic pneumonia   - also patient had cardiac arrest   - will neurology to evaluate patient in morning   - ct head negative for new acute events           HAP (hospital-acquired pneumonia)    Follow up with culture   Broad the antibotics to vanco and zosyn   cxr worse   Discussed goals of care with family   6/28  No clinical improvement  Ct scan chest shows extensive consolidation   Continue with vancomycin and zosyn   Follow up respiratory culture   procal tomorrow   Discuss with family continue with current treatment plan         Thrombocytopenia    -hit panel negative   - most likely due to sepsis          Acute hypoxemic respiratory failure    - aspiration pneumonia getting worse   - continue with antibotics             C. difficile diarrhea    Continue with oral vancomycin   6/20 started vancomycin   -will need 14 days   -diarrhea improved         NSTEMI (non-ST elevated myocardial infarction)    ASA  Cardiology  LH - Diffuse disease  No further intervention recommended  Medical Management  No statin due to allergy        Cardiac arrest with ventricular fibrillation    Cardiology  LHC 6/15  ASA  Statin Allergy  BB  No  further interventions  Diffuse disease    6/18  ASA  BB  Diffuse disease No intervention    6/19  ASA  BB  No Statin due to allergy  Cardiology  6/27  Continue with medical management          Hyperparathyroidism    -Pt admitted to Extended Recovery then transferred to Inpatient due to NSTEMI  -s/p Parathyroidectomy   -managed by General Surgery -primary team  - PTH- 32  - Ca 11.4>>10.5  - analgesia prn   - antiemetics prn  - specimens sent for analysis    6/16  Ca 8.7 today  Monitor    6/18  Ca 8.2 today  Monitor    6/19  Ca 8.2 stable  6/27  Ca stable after surgery        Coronary artery disease involving native coronary artery    - ASA, Statin, and Lopressor continued   -Imdur dose increased   -previous Cath showed severe CAD (proximal LCX 99%, mid 50%, RCA mid 90%, distal with subtotal lesion).  - Cardiology consulted with medical management continued   - [facility] proposed pending Nephrology recommendations  -family refused LHC due to concern for worsening kidney function as pt had expressed that she did not want to be on dialysis     6/15  Family electing LHC and accepting risk of worsening renal function  Cardiology    6/16  S/p LHC  Cardiology  ASA  Statin allergy noted    6/16  No interventions  Cardiology on consult    6/18  Cardiology on Consult  Medical Management    6/19  ASA  BB  6/22  Continue with medical management      6/27  Continue with aspirin and betablocker .allergy to statin           Acute renal failure superimposed on stage 5 chronic kidney disease, not on chronic dialysis    -Creatinine 3.6>>4.1  -baseline 2.7-4.4  -will monitor   -sodium bicarb continued   -pt has been followed outpatient by Dr. Vazquez (Nephrology)  -Nephrology consulted - pt may benefit from repeat angio due to NSTEMI but at high risk of contrast induced nephropathy  - pt was candidate for renal transplant however work up discontinued due to progressive weakness  - [facility] recommended         6/15  Monitor worsening Renal  Function  S/p Cardiac VFib Arrest  [facility] today  IVF's  Monitor      6/16  [facility] diffuse disease  Cardiology  Monitor    6/17  Worsening  No further Cardiac intervention  Nephrology on consult  Monitor  Cr 6.0 today vs 4.7  Low Urine O/P    6/18  COntinues to worsen. Cr 7.3 today  Nephrology guidance for HD vs Monitoring    6/19  Worsening  Cr 8.3 today - K is 4.6  Nephrology on board  No HD indicated at present  Monitor  6/22  Patient will continue with dialysis . Dr Lara for vascath change .  6/27  Patient getting dialysis today . will need outpatient dialysis           Hypertension associated with diabetes    - home medications continued   -hx of noncompliance and inconsistent dosing at home per   - uncontrolled upon arrival- improved with Hydralazine in PACU   - Hydralazine prn  6/22  Controlled         Diabetes mellitus, type 2 - only on oral medications    Controlled  NICC  Accuchecks          Acute blood loss anemia    -stable post procedure  -will monitor  -CBC in am   -pt followed outpatient by Heme/Onc    6/16  Stable  Monitor    6/17  Improving  Monitor    6/18  Hgb 7.7 today  Transfuse PRBC's per sx  Monitor    6/23  Hb 8 stable   Monitor  6/24  No signs of acute blood loss anema   6/27  Hb stable with no signs of bleeding          VTE Risk Mitigation         Ordered     heparin (porcine) injection 3,000 Units  As needed (PRN)      06/26/18 0907     Place sequential compression device  Until discontinued      06/12/18 4799              Dominguez Levy MD  Department of Hospital Medicine   [facility]

## 2018-06-29 NOTE — SUBJECTIVE & OBJECTIVE
Review of Systems   Unable to perform ROS: Mental status change     Objective:     Vital Signs (Most Recent):  Temp: 98.4 °F (36.9 °C) (06/29/18 1021)  Pulse: 85 (06/29/18 1021)  Resp: 16 (06/29/18 1021)  BP: 113/60 (06/29/18 1021)  SpO2: 98 % (06/29/18 1021) Vital Signs (24h Range):  Temp:  [97.1 °F (36.2 °C)-99.4 °F (37.4 °C)] 98.4 °F (36.9 °C)  Pulse:  [50-86] 85  Resp:  [16-24] 16  SpO2:  [95 %-100 %] 98 %  BP: ()/(26-79) 113/60     Weight: 74.7 kg (164 lb 10.9 oz)  Body mass index is 30.12 kg/m².    Intake/Output Summary (Last 24 hours) at 06/29/18 1102  Last data filed at 06/28/18 1913   Gross per 24 hour   Intake              130 ml   Output                0 ml   Net              130 ml      Physical Exam   Constitutional: She appears well-developed and well-nourished. She is active and cooperative. She appears ill.   HENT:   Head: Normocephalic and atraumatic.   Nose: Nose normal.   Mouth/Throat: Oropharynx is clear and moist. No oropharyngeal exudate.   Healing neck incision is noted.   Eyes: Pupils are equal, round, and reactive to light. No scleral icterus.   Neck: Neck supple. No thyromegaly present.   Cardiovascular: Normal rate, regular rhythm, normal heart sounds and intact distal pulses.    No murmur heard.  Pulmonary/Chest: Effort normal. No stridor. No respiratory distress. She has no wheezes. She has rhonchi. She has no rales.   Abdominal: Soft. Bowel sounds are normal. She exhibits no distension, no ascites and no mass. There is no hepatosplenomegaly. There is no tenderness. There is no rigidity, no rebound and no guarding.   Musculoskeletal: She exhibits no edema or tenderness.   Lymphadenopathy:     She has no cervical adenopathy.   Neurological: She is unresponsive. No cranial nerve deficit. She exhibits normal muscle tone. Coordination normal.   Skin: Skin is warm and dry. Ecchymosis noted. No rash noted. No pallor.   Psychiatric: She is slowed. She is noncommunicative.   Nursing note  and vitals reviewed.      Significant Labs: All pertinent labs within the past 24 hours have been reviewed.    Significant Imaging: I have reviewed all pertinent imaging results/findings within the past 24 hours.

## 2018-06-29 NOTE — PHYSICIAN QUERY
PT Name: Citlali Karimi  MR #: [id number]     Physician Query Form - Documentation Clarification      Flor Peacock RN, CCDS  Contact Info: [phone] [email or web address]      This form is a permanent document in the medical record.     Query Date: June 29, 2018    By submitting this query, we are merely seeking further clarification of documentation. Please utilize your independent clinical judgment when addressing the question(s) below.    The Medical record reflects the following:    Supporting Clinical Findings Location in Medical Record    Patient is becoming more hypotensive with worsening procalcitonin and worsening hypotension.      White count is higher today.      Workup shows acute delirium from metabolic causes and sepsis.     Bilateral lung pneumonia which is getting worse clinically.      piperacillin-tazobactam 4.5 g in dextrose 5 % 100 mL IVPB (ready to mix system) Q12H      [START ON 7/2/2018] vancomycin (VANCOCIN) 1,250 mg in dextrose 5 % 250 mL IVPB Q48H     vancomycin 250mg / 10ml oral suspension 125 mg Q6H     Nephro PN 6/29   Patient leukocytosis and platelet dropped - sepsis -pneumonia  . Repeat cxr .    Hosp Med PN 6/27   Blood Cultures: ngtd 6/12; 6/27  Urine Cultures: No growth 6/27   Lab   Sepsis  C. Diff Diarrhea  Hyerparathyroidism s/p parathyroidectomy; s/p neck washout   CAD involving native coronary artery  ARF superimposed on ckd 5  HTN  DM   Gen Sx Pn 6/29   NSTEMI  Progressive neck swelling, sob w/worsening dysphagia  Post op neck hematoma evacuation  In PACU - vfib cardiac arrest; CPR; EPx3; Lisa x3; ROSC  ESRD  PMHx of CKD (IV), HTN, DM, CVA (left sided weakness), and CAD  Hem Onc Pn 6/28   Encephalopathy, metabolic   -most likely metabolic pneumonia   - also patient had cardiac arrest   - will neurology to evaluate patient in morning   - ct head negative for new acute events     Thrombocytopenia  -hit panel negative   - most likely due to sepsis      Acute hypoxemic  "respiratory failure  - aspiration pneumonia getting worse   - continue with antibotics  Hosp med Pn 6/28                                                                         Doctor, Please specify diagnosis or diagnoses associated with above clinical findings.      Please specify "Sepsis" source:     Provider Use Only    (  ) Aspiration pna - specify likely organism: ______________    (  ) Aspiration pna - likely gram negative organisms    (  ) Pneunomia - specify likely organism: ______________    (  ) C.Diff Diarrhea    (  ) Other - specify: _________________                                                                                                             [ X ] Clinically undetermined            "

## 2018-06-29 NOTE — ASSESSMENT & PLAN NOTE
Patient has now become dialysis dependent but recently has worsening bilateral pneumonia with acute delirium.  Patient is not improving.  Patient has been in the hospital for 16 days.  Extensive discussion today with the patient's .  Patient blood pressure has been running  systolic.  With the patient's deterioration in hemodynamic status, worsening sepsis and acute delirium which is not getting any better the chances of recovery are minimal at this time.  Very poor prognosis at this time discussed in detail with the patient's .  Patient carries a poor prognosis long-term at this time.  A with hemodynamic compromise and the patient's status of do not resuscitate I would hold off on dialysis and recommend comfort measures for now.  Patient's  will discuss with his niece who is a physician in [place] and get back to us about inpatient hospice options.  All the above therapeutic options and plans discussed in detail with Hospital Medicine-    Critical care time spent today is about 35 minutes total in multiple visits.  Greater than 50% of the time was spent in counseling with the patient's  and discussing the therapeutic options with all specialties.

## 2018-06-29 NOTE — SUBJECTIVE & OBJECTIVE
Interval History:  The patient is moaning in bed and is unable to answer specific questions.  She is able to open her eyes when her name is called.     Oncology Treatment Plan:   [No treatment plan]    Medications:  Continuous Infusions:  Scheduled Meds:   sodium chloride 0.9%   Intravenous Once    albuterol-ipratropium  3 mL Nebulization Q4H WAKE    amiodarone  200 mg Oral BID    aspirin  81 mg Oral Daily    epoetin carmen (PROCRIT) injection  4,000 Units Intravenous Every Tues, Thurs, Sat    famotidine  20 mg Oral Daily    metoprolol tartrate  25 mg Oral BID    piperacillin-tazobactam (ZOSYN) IVPB  4.5 g Intravenous Q12H    sodium bicarbonate  650 mg Oral BID    [START ON 7/2/2018] vancomycin (VANCOCIN) IVPB  1,250 mg Intravenous Q48H    vancomycin  125 mg Oral Q6H     PRN Meds:sodium chloride 0.9%, acetaminophen, bisacodyl, cloNIDine, dextrose 50%, dextrose 50%, glucagon (human recombinant), glucose, glucose, heparin (porcine), hydrALAZINE, insulin aspart U-100, lactulose, lorazepam, morphine, ondansetron, sodium chloride 0.9%     Review of Systems   Unable to perform ROS: Mental status change     Objective:     Vital Signs (Most Recent):  Temp: 98.4 °F (36.9 °C) (06/29/18 0444)  Pulse: 80 (06/29/18 0444)  Resp: (!) 22 (06/29/18 0444)  BP: (!) 101/55 (06/29/18 0444)  SpO2: 100 % (06/29/18 0046) Vital Signs (24h Range):  Temp:  [97.1 °F (36.2 °C)-99.4 °F (37.4 °C)] 98.4 °F (36.9 °C)  Pulse:  [50-86] 80  Resp:  [18-24] 22  SpO2:  [95 %-100 %] 100 %  BP: ()/(26-79) 101/55     Weight: 74.7 kg (164 lb 10.9 oz)  Body mass index is 30.12 kg/m².  Body surface area is 1.81 meters squared.      Intake/Output Summary (Last 24 hours) at 06/29/18 0837  Last data filed at 06/28/18 1913   Gross per 24 hour   Intake              130 ml   Output                0 ml   Net              130 ml       Physical Exam   Constitutional: She appears well-developed and well-nourished. She is active and cooperative. She  appears ill.   HENT:   Head: Normocephalic and atraumatic.   Nose: Nose normal.   Mouth/Throat: Oropharynx is clear and moist. No oropharyngeal exudate.   Healing neck incision is noted.   Eyes: Pupils are equal, round, and reactive to light. No scleral icterus.   Neck: Neck supple. No thyromegaly present.   Cardiovascular: Normal rate, regular rhythm, normal heart sounds and intact distal pulses.    No murmur heard.  Pulmonary/Chest: Effort normal. No stridor. No respiratory distress. She has no wheezes. She has rhonchi. She has no rales.   Abdominal: Soft. Bowel sounds are normal. She exhibits no distension, no ascites and no mass. There is no hepatosplenomegaly. There is no tenderness. There is no rigidity, no rebound and no guarding.   Musculoskeletal: She exhibits no edema or tenderness.   Lymphadenopathy:     She has no cervical adenopathy.   Neurological: She is unresponsive. No cranial nerve deficit. She exhibits normal muscle tone. Coordination normal.   Skin: Skin is warm and dry. Ecchymosis noted. No rash noted. No pallor.   Psychiatric: She is slowed. She is noncommunicative.   Nursing note and vitals reviewed.      Significant Labs:   I have reviewed all of the patient's relevant lab work available in the medical record and have utilized this in my evaluation and management recommendations today    Diagnostic Results:  I have reviewed all of the patient's diagnostic/imaging results available in the medical record and have utilized this in my evaluation and management recommendations today.

## 2018-06-30 VITALS
RESPIRATION RATE: 28 BRPM | TEMPERATURE: 97 F | OXYGEN SATURATION: 94 % | SYSTOLIC BLOOD PRESSURE: 116 MMHG | WEIGHT: 164.69 LBS | HEART RATE: 78 BPM | DIASTOLIC BLOOD PRESSURE: 56 MMHG | HEIGHT: 62 IN | BODY MASS INDEX: 30.31 KG/M2

## 2018-06-30 LAB
ALBUMIN SERPL BCP-MCNC: 1.8 G/DL
ALP SERPL-CCNC: 86 U/L
ALT SERPL W/O P-5'-P-CCNC: 22 U/L
ANION GAP SERPL CALC-SCNC: 19 MMOL/L
ANISOCYTOSIS BLD QL SMEAR: SLIGHT
AST SERPL-CCNC: 43 U/L
BASOPHILS # BLD AUTO: 0.02 K/UL
BASOPHILS NFR BLD: 0.2 %
BILIRUB SERPL-MCNC: 0.7 MG/DL
BUN SERPL-MCNC: 90 MG/DL
CALCIUM SERPL-MCNC: 8.1 MG/DL
CHLORIDE SERPL-SCNC: 101 MMOL/L
CO2 SERPL-SCNC: 19 MMOL/L
CREAT SERPL-MCNC: 5.8 MG/DL
DACRYOCYTES BLD QL SMEAR: ABNORMAL
DIFFERENTIAL METHOD: ABNORMAL
EOSINOPHIL # BLD AUTO: 0.1 K/UL
EOSINOPHIL NFR BLD: 0.4 %
ERYTHROCYTE [DISTWIDTH] IN BLOOD BY AUTOMATED COUNT: 17.3 %
EST. GFR  (AFRICAN AMERICAN): 8 ML/MIN/1.73 M^2
EST. GFR  (NON AFRICAN AMERICAN): 7 ML/MIN/1.73 M^2
GLUCOSE SERPL-MCNC: 172 MG/DL
HCT VFR BLD AUTO: 30.8 %
HGB BLD-MCNC: 9.1 G/DL
HYPOCHROMIA BLD QL SMEAR: ABNORMAL
LYMPHOCYTES # BLD AUTO: 0.5 K/UL
LYMPHOCYTES NFR BLD: 4.2 %
MAGNESIUM SERPL-MCNC: 2 MG/DL
MCH RBC QN AUTO: 25.9 PG
MCHC RBC AUTO-ENTMCNC: 29.5 G/DL
MCV RBC AUTO: 88 FL
MONOCYTES # BLD AUTO: 0.4 K/UL
MONOCYTES NFR BLD: 3.5 %
NEUTROPHILS # BLD AUTO: 11.7 K/UL
NEUTROPHILS NFR BLD: 91.7 %
OVALOCYTES BLD QL SMEAR: ABNORMAL
PHOSPHATE SERPL-MCNC: 3.6 MG/DL
PLATELET # BLD AUTO: 26 K/UL
PLATELET BLD QL SMEAR: ABNORMAL
PMV BLD AUTO: ABNORMAL FL
POCT GLUCOSE: 174 MG/DL (ref 70–110)
POCT GLUCOSE: 205 MG/DL (ref 70–110)
POIKILOCYTOSIS BLD QL SMEAR: SLIGHT
POLYCHROMASIA BLD QL SMEAR: ABNORMAL
POTASSIUM SERPL-SCNC: 4.5 MMOL/L
PROT SERPL-MCNC: 5.6 G/DL
RBC # BLD AUTO: 3.52 M/UL
SCHISTOCYTES BLD QL SMEAR: PRESENT
SODIUM SERPL-SCNC: 139 MMOL/L
VANCOMYCIN SERPL-MCNC: 23.5 UG/ML
WBC # BLD AUTO: 12.75 K/UL

## 2018-06-30 PROCEDURE — 85025 COMPLETE CBC W/AUTO DIFF WBC: CPT

## 2018-06-30 PROCEDURE — 25000003 PHARM REV CODE 250: Performed by: NURSE PRACTITIONER

## 2018-06-30 PROCEDURE — 83735 ASSAY OF MAGNESIUM: CPT

## 2018-06-30 PROCEDURE — 25000003 PHARM REV CODE 250: Performed by: SURGERY

## 2018-06-30 PROCEDURE — 80053 COMPREHEN METABOLIC PANEL: CPT

## 2018-06-30 PROCEDURE — 94668 MNPJ CHEST WALL SBSQ: CPT

## 2018-06-30 PROCEDURE — 25000003 PHARM REV CODE 250: Performed by: INTERNAL MEDICINE

## 2018-06-30 PROCEDURE — 80202 ASSAY OF VANCOMYCIN: CPT

## 2018-06-30 PROCEDURE — 94640 AIRWAY INHALATION TREATMENT: CPT

## 2018-06-30 PROCEDURE — 99233 SBSQ HOSP IP/OBS HIGH 50: CPT | Mod: ,,, | Performed by: INTERNAL MEDICINE

## 2018-06-30 PROCEDURE — 27000221 HC OXYGEN, UP TO 24 HOURS

## 2018-06-30 PROCEDURE — 25000242 PHARM REV CODE 250 ALT 637 W/ HCPCS: Performed by: SURGERY

## 2018-06-30 PROCEDURE — 94761 N-INVAS EAR/PLS OXIMETRY MLT: CPT

## 2018-06-30 PROCEDURE — 63600175 PHARM REV CODE 636 W HCPCS: Performed by: INTERNAL MEDICINE

## 2018-06-30 PROCEDURE — 63600175 PHARM REV CODE 636 W HCPCS: Performed by: NURSE PRACTITIONER

## 2018-06-30 PROCEDURE — 84100 ASSAY OF PHOSPHORUS: CPT

## 2018-06-30 RX ADMIN — IPRATROPIUM BROMIDE AND ALBUTEROL SULFATE 3 ML: .5; 3 SOLUTION RESPIRATORY (INHALATION) at 07:06

## 2018-06-30 RX ADMIN — INSULIN ASPART 1 UNITS: 100 INJECTION, SOLUTION INTRAVENOUS; SUBCUTANEOUS at 12:06

## 2018-06-30 RX ADMIN — LORAZEPAM 2 MG: 2 INJECTION INTRAMUSCULAR; INTRAVENOUS at 12:06

## 2018-06-30 RX ADMIN — PIPERACILLIN AND TAZOBACTAM 4.5 G: 4; .5 INJECTION, POWDER, LYOPHILIZED, FOR SOLUTION INTRAVENOUS; PARENTERAL at 08:06

## 2018-06-30 RX ADMIN — IPRATROPIUM BROMIDE AND ALBUTEROL SULFATE 3 ML: .5; 3 SOLUTION RESPIRATORY (INHALATION) at 11:06

## 2018-06-30 RX ADMIN — AMIODARONE HYDROCHLORIDE 200 MG: 200 TABLET ORAL at 08:06

## 2018-06-30 RX ADMIN — LORAZEPAM 2 MG: 2 INJECTION INTRAMUSCULAR; INTRAVENOUS at 01:06

## 2018-06-30 RX ADMIN — ASPIRIN 81 MG 81 MG: 81 TABLET ORAL at 08:06

## 2018-06-30 RX ADMIN — METOPROLOL TARTRATE 25 MG: 25 TABLET, FILM COATED ORAL at 08:06

## 2018-06-30 RX ADMIN — Medication 125 MG: at 05:06

## 2018-06-30 RX ADMIN — Medication 125 MG: at 12:06

## 2018-06-30 RX ADMIN — FAMOTIDINE 20 MG: 20 TABLET ORAL at 08:06

## 2018-06-30 RX ADMIN — LORAZEPAM 2 MG: 2 INJECTION INTRAMUSCULAR; INTRAVENOUS at 08:06

## 2018-06-30 RX ADMIN — SODIUM BICARBONATE 650 MG TABLET 650 MG: at 08:06

## 2018-06-30 RX ADMIN — MORPHINE SULFATE 2 MG: 4 INJECTION INTRAVENOUS at 01:06

## 2018-06-30 NOTE — PROGRESS NOTES
INPATIENT   NEUROLOGY  PROGRESS NOTE    Citlali Karimi   68 y.o. female  DATE 6/30/2018      Patient remains obtunded and unresponsive to verbal commands. She withdraws to pain in all the extremities and facial area.        Denise Coma Scale (GCS)    Score   EYE OPENING   Spontaneous 4   Response to verbal command 3   Response to pain 2   No eye opening 1             VERBAL response   Oriented 5   Confused 4   Inappropriate words 3   Incomprehensible sounds 2   No verbal response 1       MOTOR response   Obeys commands 6   Localizing response to pain 5   Withdrawal response to pain 4   Flexion to pain 3   Extension to pain 2   No motor response 1   Total  7   The GCS is scored between 3 and 15, 3 being the worst and 15 the best. It is composed of three parameters: best eye response (E), best verbal response (V), and best motor response (M). The components of the GCS should be recorded individually; for example, [id number] results in a GCS score of 9. A score of 13 or higher correlates with mild brain injury, a score of 9 to 12 correlates with moderate injury, and a score of 8 or less represents severe brain injury.  Past Medical History:   Diagnosis Date    Acid reflux 1/7/2015    Anxiety 1/7/2015    Aortic valvar stenosis     Arthritis     osteo    Callus     Chronic anemia     followed by Dr. Addison    CKD (chronic kidney disease) stage 5, GFR less than 15 ml/min 8/7/2015    Combined hyperlipidemia associated with type 2 diabetes mellitus     Coronary artery disease     Diabetes mellitus type II, controlled, with no complications     LBSL 108 today    Encounter for blood transfusion     Frail elderly with impaired mobility, wheel chair bound 8/25/2017    Gallbladder problem     gallbladder surgery    Heart murmur     Hyperparathyroidism, secondary renal 1/7/2015    Hypertension 8/7/2015    Hypertension associated with diabetes 11/12/2012    Kidney transplant candidate 1/7/2015     Overweight(278.02)     Urinary tract infection      Past Surgical History:   Procedure Laterality Date    ANKLE FRACTURE SURGERY Left 1985    AORTIC VALVE REPLACEMENT  05/2016    BONE BIOPSY      CARDIAC SURGERY      CATARACT EXTRACTION W/  INTRAOCULAR LENS IMPLANT  2009    CHOLECYSTECTOMY      CYSTOSCOPY      EVACUATION OF HEMATOMA N/A 6/14/2018    Procedure: EVACUATION, HEMATOMA;  Surgeon: Levy Samuel MD;  Location: [facility];  Service: General;  Laterality: N/A;    EYE SURGERY      FRACTURE SURGERY      HYSTERECTOMY  unknown    LEFT HEART CATHETERIZATION Left 6/15/2018    Procedure: CATHETERIZATION, HEART, LEFT;  Surgeon: Galindo Louis MD;  Location: [facility] CATH LAB;  Service: Cardiology;  Laterality: Left;    OOPHORECTOMY      PARATHYROIDECTOMY N/A 6/12/2018    Procedure: PARATHYROIDECTOMY;  Surgeon: Levy Samuel MD;  Location: [facility] OR;  Service: General;  Laterality: N/A;    removal of colon polyps      RENAL BIOPSY  10/2013    WOUND EXPLORATION N/A 6/14/2018    Procedure: EXPLORATION, WOUND;  Surgeon: Levy Samuel MD;  Location: [facility] OR;  Service: General;  Laterality: N/A;    YAG cap -OD       Family History   Problem Relation Age of Onset    Hypertension Mother     Heart disease Mother     Diabetes Mother     Glaucoma Mother     Aneurysm Father     Stroke Father     Kidney disease Sister     Heart disease Sister     Kidney disease Brother     Hypertension Brother     Diabetes Brother     Diabetes Sister     Kidney disease Sister     Hypertension Brother     Cancer Paternal Grandmother         breast    No Known Problems Maternal Aunt     No Known Problems Maternal Uncle     No Known Problems Paternal Aunt     No Known Problems Paternal Uncle     Colon cancer Maternal Grandmother     No Known Problems Maternal Grandfather     No Known Problems Paternal Grandfather     Anemia Neg Hx     Arrhythmia Neg Hx     Asthma Neg Hx     Clotting disorder Neg Hx      Fainting Neg Hx     Heart attack Neg Hx     Heart failure Neg Hx     Hyperlipidemia Neg Hx     Atrial Septal Defect Neg Hx      Social History   Substance Use Topics    Smoking status: Never Smoker    Smokeless tobacco: Never Used    Alcohol use No       Review of patient's allergies indicates:   Allergen Reactions    Lipitor [atorvastatin] Swelling     Lips          Scheduled Meds:   sodium chloride 0.9%   Intravenous Once    albuterol-ipratropium  3 mL Nebulization Q4H WAKE    amiodarone  200 mg Oral BID    aspirin  81 mg Oral Daily    epoetin carmen (PROCRIT) injection  4,000 Units Intravenous Every Tues, Thurs, Sat    famotidine  20 mg Oral Daily    metoprolol tartrate  25 mg Oral BID    micafungin (MYCAMINE) IVPB  100 mg Intravenous Q24H    piperacillin-tazobactam (ZOSYN) IVPB  4.5 g Intravenous Q12H    sodium bicarbonate  650 mg Oral BID    [START ON 7/2/2018] vancomycin (VANCOCIN) IVPB  1,250 mg Intravenous Q48H    vancomycin  125 mg Oral Q6H     Continuous Infusions:  PRN Meds:sodium chloride 0.9%, acetaminophen, bisacodyl, cloNIDine, dextrose 50%, dextrose 50%, glucagon (human recombinant), glucose, glucose, heparin (porcine), hydrALAZINE, insulin aspart U-100, lactulose, lorazepam, morphine, ondansetron, sodium chloride 0.9%      Review of Systems:  Unable to obtain         OBJECTIVE:     Vital Signs (Most Recent)  Temp: 97.6 °F (36.4 °C) (06/30/18 0803)  Pulse: 77 (06/30/18 0803)  Resp: 20 (06/30/18 0803)  BP: (!) 104/52 (06/30/18 0803)  SpO2: 95 % (06/30/18 0803)     Vital Signs Range (Last 24H):  Temp:  [96.5 °F (35.8 °C)-98.4 °F (36.9 °C)]   Pulse:  [74-85]   Resp:  [16-22]   BP: ()/(52-62)   SpO2:  [94 %-100 %]     Physical Exam:  General: Patient is obtunded  Gets agitated to painful stimulation  HEENT: Pupils isocoria, reactive, no gaze preference. Corneal   Lungs: Rales b/l  Heart Regular Rate and rhythm  Abdomen, tender,  non distended, bowel sounds present  Skin: No rash,  no ecchymoses,         NEURO      Mental Status:   Patient is obtunded    SPEECH:   nonverbal    CRANIAL NERVES:  Pupils isocoria, reactive  Corneal reactive  Withdraws to facial pain  No facial asymmetry    Other modalities could not be evaluated because of lack of participation    MOTOR:Upper Extremities and Lower  Extremities  Withdraws to painful stimuli symmetrically.   Tone: Decreased    No clonus      SENSORY: Withdraws to noxious stimuli      CEREBELLAR: unable to evaluate due to lack of participation    ROMBERG and  GAIT:  Deferred    EXTRAPYRAMIDALS:  none        Laboratory:  Lab Results   Component Value Date    WBC 12.75 (H) 06/30/2018    HGB 9.1 (L) 06/30/2018    HCT 30.8 (L) 06/30/2018    PLT 26 (LL) 06/30/2018    CHOL 216 (H) 03/28/2017    TRIG 100 03/28/2017    HDL 54 03/28/2017    ALT 22 06/30/2018    AST 43 (H) 06/30/2018     06/30/2018    K 4.5 06/30/2018     06/30/2018    CREATININE 5.8 (H) 06/30/2018    BUN 90 (H) 06/30/2018    CO2 19 (L) 06/30/2018    TSH 0.112 (L) 06/28/2018    INR 1.1 06/15/2018    GLUF 84 04/28/2006    HGBA1C 5.4 06/12/2018          Diagnostic Results:CT scan films   ( All images reviewed Independently)   Imaging Results    None         06/30/2018    Assessment and Recommendations:  Patient unresponsive to verbal commands.  Labs were ordered yesterday, talked to Lab- Discussed with Funmilayo she mentions that she could not draw the labs yesterday.  I was not notified.       Differential diagnosis:  1) Acute Encephalopathy  2) Hypoxic Encephalopathy  3) Sepsis  4) Renal failure      Recommendations:  1) Medical management       No family members at the bedside.         Francis Russell MD., Ph.D., MS

## 2018-06-30 NOTE — PROGRESS NOTES
[facility] -   General Surgery  Progress Note    Subjective:     History of Present Illness:  Citlali Karimi presentes for parathyroidectomy    Post-Op Info:  Procedure(s) (LRB):  VASCULAR CATH EXCHANGE (N/A)   6 Days Post-Op     Interval History:  Family wants to me with Nephrology and hospice care to determine the direction of this Trev's care    Medications:  Continuous Infusions:  Scheduled Meds:   sodium chloride 0.9%   Intravenous Once    albuterol-ipratropium  3 mL Nebulization Q4H WAKE    amiodarone  200 mg Oral BID    aspirin  81 mg Oral Daily    epoetin carmen (PROCRIT) injection  4,000 Units Intravenous Every Tues, Thurs, Sat    famotidine  20 mg Oral Daily    metoprolol tartrate  25 mg Oral BID    micafungin (MYCAMINE) IVPB  100 mg Intravenous Q24H    piperacillin-tazobactam (ZOSYN) IVPB  4.5 g Intravenous Q12H    sodium bicarbonate  650 mg Oral BID    [START ON 7/2/2018] vancomycin (VANCOCIN) IVPB  1,250 mg Intravenous Q48H    vancomycin  125 mg Oral Q6H     PRN Meds:sodium chloride 0.9%, acetaminophen, bisacodyl, cloNIDine, dextrose 50%, dextrose 50%, glucagon (human recombinant), glucose, glucose, heparin (porcine), hydrALAZINE, insulin aspart U-100, lactulose, lorazepam, morphine, ondansetron, sodium chloride 0.9%     Review of patient's allergies indicates:   Allergen Reactions    Lipitor [atorvastatin] Swelling     Lips       Objective:     Vital Signs (Most Recent):  Temp: 97.6 °F (36.4 °C) (06/30/18 0803)  Pulse: 77 (06/30/18 0803)  Resp: 20 (06/30/18 0803)  BP: (!) 104/52 (06/30/18 0803)  SpO2: 95 % (06/30/18 0803) Vital Signs (24h Range):  Temp:  [96.5 °F (35.8 °C)-98.4 °F (36.9 °C)] 97.6 °F (36.4 °C)  Pulse:  [74-85] 77  Resp:  [16-22] 20  SpO2:  [94 %-100 %] 95 %  BP: ()/(52-62) 104/52     Weight: 74.7 kg (164 lb 10.9 oz)  Body mass index is 30.12 kg/m².    Intake/Output - Last 3 Shifts       06/28 0700 - 06/29 0659 06/29 0700 - 06/30 0659 06/30 0700 - 07/01  0659    P.O. 0 400     NG/GT 30      IV Piggyback 100 100     Total Intake(mL/kg) 130 (1.7) 500 (6.7)     Net +130 +500             Urine Occurrence 1 x      Stool Occurrence 2 x 3 x           Physical Exam   Constitutional:   Minimally arousable    HENT:   Head: Normocephalic and atraumatic.   Nasogastric feeding tube in place the   Neck:   Incision is healing   Dialysis catheter in place   Cardiovascular: Normal rate, regular rhythm and normal heart sounds.    Pulmonary/Chest: Effort normal. She has rales.   Course breath sounds   Abdominal: Soft. Bowel sounds are normal. She exhibits no distension. There is no tenderness.   Skin: Skin is dry. Capillary refill takes less than 2 seconds.   Nursing note and vitals reviewed.      Significant Labs:  CBC:   Recent Labs  Lab 06/30/18 0618   WBC 12.75*   RBC 3.52*   HGB 9.1*   HCT 30.8*   PLT 26*   MCV 88   MCH 25.9*   MCHC 29.5*     BMP:   Recent Labs  Lab 06/30/18 0618   *      K 4.5      CO2 19*   BUN 90*   CREATININE 5.8*   CALCIUM 8.1*   MG 2.0     CMP:   Recent Labs  Lab 06/30/18 0618   *   CALCIUM 8.1*   ALBUMIN 1.8*   PROT 5.6*      K 4.5   CO2 19*      BUN 90*   CREATININE 5.8*   ALKPHOS 86   ALT 22   AST 43*   BILITOT 0.7       Significant Diagnostics:  No new    Assessment/Plan:     Encephalopathy, metabolic    Given the patient's mental status the family is considering hospice care as she does not show what signs of improvement         Decreased platelet count    Likely 2/2 sepsis  Restarting heparin products        Acute hypoxemic respiratory failure    Patient is no longer intubated.  She does however have bilateral pneumonia         C. difficile diarrhea    Antibiotics        Hyperparathyroidism    Status post parathyroidectomy  S/p neck washout  Neck is supple, no further swelling since drain out.   HIT was negative so restarting heparin products   Pt needs PEG tube, possibly next week          Coronary artery  disease involving native coronary artery    Cardiology following  On anticoagulation  Patient has had 2 myocardial infarctions during this admission         Acute renal failure superimposed on stage 5 chronic kidney disease, not on chronic dialysis    HD per nephrology  Family is considering hospice and what role dialysis with plain the setting        Hypertension associated with diabetes    Antihypertensive medications          I met with the  and her Niece.  I explained that they had a very difficult decision to make.  I explained that only they knew miss Karimi and what her wishes would be in this type of situation.    I feel that hospice care would be a good choice.  I explained that if she did not go to hospice she would require a gastrostomy tube placed for feeding it would likely end up in a nursing home     Henrry Pineda MD  General Surgery  [facility]

## 2018-06-30 NOTE — DISCHARGE SUMMARY
[facility] Hospital Medicine  Discharge Summary      Patient Name: Citlali Karimi  MRN: [id number]  Admission Date: 6/12/2018  Hospital Length of Stay: 17 days  Discharge Date and Time:  06/30/2018 11:19 AM  Attending Physician: Levy Samuel MD   Discharging Provider: Hudson Saunders DO  Primary Care Provider: Evelio Schuster MD      HPI:   Citlali Karimi is a 68 y.o female with PMHx of CKD (IV), HTN, DM, CVA (left sided weakness), and CAD who initially presented for hyperparathyroidism and hypercalcemia requiring surgical intervention.  Pt underwent parathyroidectomy today per Dr. Tracy (General Surgery). Pt admitted to Medical Surgical Unit post procedure and Hospital Medicine consulted for medical management.  Chest xray post procedure showed mild asymmetric CHF versus RUL pneumonia. Troponin 0.113 and BNP >270 noted.  Pt given IV Lasix in PACU prior to unit arrival.      Procedure(s) (LRB):  VASCULAR CATH EXCHANGE (N/A)      Hospital Course:   Pt admitted to Outpatient Extended Recovery post procedure.  Elevated noted post procedure and results trended yielding significant positive response.  Cardiology consulted and Heparin infusion initiated.  Hx of CAD, multiple vessels noted with home medications continued and Imdur dose increased.  Nephrology consulted due to elevated creatinine and [facility] recommended.  Echo results pending.  Heart catheterization procedure considered with family refusing due to concern for worsening kidney function as patient does not want to be on dialysis.  Decreased oral intake and difficulty swallowing noted.  Speech therapist to bedside.  Pt made NPO and General Surgery notified.      6/15  Patient worsening status. ET changed per Pulmonary CC. Patient s/p Code Blue - VFib with recovery. Cardiology taking patient to CATH lab. Discussed with CM plan for post acute care in progress.    6/16  Patient improved o/n. Patient awake and able to follow simple command. Breathing  trials in progress. Discussed with Pulmonary CC    6/17  Patient remains intubated.  at bedside. Cardiology at bedside as well. Discussed with CC Team    6/18  Patient responds to command but remains intubated. Swelling to neck continues to be prominent. Discussed with CC Team. Worsening renal function. Possible HD need.    6/19  Patient remains intubated. Worsening renal function. No events Discussed with CC Team    6/21  Positive cough leak and plan for extubation today .   6/22  Patient continue to be confused . Will consider ct head if didn't improve . Continue treat infection/c difficle .Consulted vascular surgery dr maciel to remove femoral dialysis and put new one subclavian ?. To avoid risk of infection in groin. Patient will continue to need dialysis    6/23  Patient is seen and examined today . More awake today. dialysis catheter was removed from groin and new one will be place in upper body ij vs subclavian. Patient diarrhea improved .   6/24  Patient very lethargic to participate in slp . S/p vascath for hemodialysis . SLP will visit tomorrow . Patient still has ng tube. Diarrhea improved   6/25  Patient lethargic . SLP recommended peg tube . Placement of peg tube tomorrow . After peg tube possible discharge to ltac   6/26  Patient was seen and examined today. cxr shows mid and upper right lobe . Very weak cough reflex. Will start treatment with unisyn for aspiration pneumonia and follow up culture ,procal if no improvement will broaden coverage . Patient also being treated for  c diff . Hematology oncology also ordered eliquis and hit panel . Peg tube delayed   6/27  Discussed critical illness with  bedside he said he will discuss with his niece to guide us with further plan of care  . Patient leukocytosis and platelet dropped - sepsis -pneumonia . Repeat cxr .   6/28  Plan of care was discussed with patient niece and  bedside in detail .They wish to continue with current treatment  plan  And will  Wait on making decision regarding palliative care .No clinical improvement . Patient more lethargic . Plan for dialysis today  6/29  Patient was seen and examined today. Continue to decline with broad spectrum antibotic. Wbc and procal continue to trend up . Her pneumonia continue to get worse she has very weak cough reflex and can not clear up secretions . Nephrology decided comfort care with  bedside .  decided on inpatient hospice . Case management for arrangements    6/30   Home with hospice, [facility]     Consults:   Consults         Status Ordering Provider     Consult to Case Management/Social Work  Once     Provider:  (Not yet assigned)    Completed LAN LEAHY     Inpatient consult to Cardiology  Once     Provider:  Vaibhav Payton MD    Completed LAN LEAHY     Inpatient consult to Cardiology  Once     Provider:  Galindo Louis MD    Completed ADELINA LOFTON     Inpatient consult to Infectious Diseases  Once     Provider:  Joseph Geller MD    Acknowledged JASMINA RICE     Inpatient consult to Internal Medicine  Once     Provider:  GERRI Victor    Acknowledged LAN LEAHY     Inpatient consult to Nephrology  Once     Provider:  Magnus Vazquez MD    Completed ANA DEE     Inpatient consult to Neurology  Once     Provider:  Francis Plunkett MD PhD    Completed JASMINA RICE     Inpatient consult to Pulmonology  Once     Provider:  (Not yet assigned)    Completed LAN LEAHY     Inpatient consult to Registered Dietitian/Nutritionist  Once     Provider:  (Not yet assigned)    Completed LAN LEAHY     Inpatient consult to Social Work  Once     Provider:  (Not yet assigned)    Completed GABO MORGAN     Inpatient consult to Vascular Surgery  Once     Provider:  Reilly Mendez MD    Acknowledged GABO MORGAN     Inpatient consult to Vascular Surgery  Once     Provider:  Demond Lara MD    Acknowledged JASMINA RICE      IP consult to case management  Once     Provider:  (Not yet assigned)    Completed LAN LEAHY     Pharmacy to dose Vancomycin consult  Once     Provider:  (Not yet assigned)    Acknowledged JASMINA RICE.          No new Assessment & Plan notes have been filed under this hospital service since the last note was generated.  Service: Hospital Medicine    Final Active Diagnoses:    Diagnosis Date Noted POA    PRINCIPAL PROBLEM:  Critical illness polyneuropathy [G62.81] 06/22/2018 No    ESRD (end stage renal disease) [N18.6] 06/29/2018 No    Encephalopathy, metabolic [G93.41] 06/28/2018 Unknown    Decreased platelet count [D69.6] 06/26/2018 No    Thrombocytopenia [D69.6] 06/26/2018 No    HAP (hospital-acquired pneumonia) [J18.9] 06/26/2018 No    Acute hypoxemic respiratory failure [J96.01]  Yes    C. difficile diarrhea [A04.72] 06/20/2018 No    NSTEMI (non-ST elevated myocardial infarction) [I21.4] 06/16/2018 Yes    Cardiac arrest with ventricular fibrillation [I46.9, I49.01] 06/14/2018 No    Hyperparathyroidism [E21.3] 06/12/2018 Yes     Chronic    Coronary artery disease involving native coronary artery [I25.10] 03/19/2018 Yes     Chronic    Acute renal failure superimposed on stage 5 chronic kidney disease, not on chronic dialysis [N17.9, N18.5] 08/07/2015 Yes    Hypertension associated with diabetes [E11.59, I10] 08/07/2015 Yes    Diabetes mellitus, type 2 - only on oral medications [E11.9] 01/07/2015 Yes     Chronic    Acute blood loss anemia [D62] 11/06/2013 Yes      Problems Resolved During this Admission:    Diagnosis Date Noted Date Resolved POA    Acute airway obstruction [J98.8] 06/15/2018 06/22/2018 Yes    Cardiogenic shock [R57.0] 06/14/2018 06/15/2018 No    Electrolyte imbalance [E87.8] 06/14/2018 06/17/2018 No    On mechanically assisted ventilation [Z99.11] 06/14/2018 06/22/2018 Not Applicable    Persistent atrial fibrillation [I48.1] 06/14/2018 06/16/2018 No    Shock  liver [K72.00] 06/14/2018 06/16/2018 No    STEMI (ST elevation myocardial infarction) [I21.3] 06/13/2018 06/16/2018 No    Hypercalcemia [E83.52] 06/12/2018 06/16/2018 Yes    H/O aortic valve replacement [Z95.2] 12/07/2011 06/17/2018 Not Applicable     Chronic       Discharged Condition: fair    Disposition: Hospice/Medical Facility    Follow Up:    Patient Instructions:     Diet NPO     Other restrictions (specify):   Scheduling Instructions: bedrest         Significant Diagnostic Studies: Labs: All labs within the past 24 hours have been reviewed    Pending Diagnostic Studies:     Procedure Component Value Units Date/Time    Ammonia [[id number]]     Order Status:  Sent Lab Status:  No result     Specimen:  Blood from Blood     Haptoglobin [[id number]]     Order Status:  Sent Lab Status:  No result     Specimen:  Blood from Blood     Lactate dehydrogenase [[id number]]     Order Status:  Sent Lab Status:  No result     Specimen:  Blood from Blood     Lyme disease DNA probe, direct [[id number]]     Order Status:  Sent Lab Status:  No result     Specimen:  Blood from Blood     Uric acid [[id number]]     Order Status:  Sent Lab Status:  No result     Specimen:  Blood from Blood          Medications:  Reconciled Home Medications:      Medication List      CONTINUE taking these medications    aspirin 81 MG EC tablet  Commonly known as:  ECOTRIN  Take 81 mg by mouth once daily.     cyanocobalamin 1000 MCG tablet  Commonly known as:  VITAMIN B-12  Take 1 tablet (1,000 mcg total) by mouth once daily.     isosorbide mononitrate 60 MG 24 hr tablet  Commonly known as:  IMDUR  TAKE  ONE TABLET BY MOUTH DAILY     lactulose 20 gram Pack  Commonly known as:  CEPHULAC  Take 20 g by mouth as needed.     losartan 25 MG tablet  Commonly known as:  COZAAR  TAKE ONE TABLET BY MOUTH DAILY     metoprolol tartrate 25 MG tablet  Commonly known as:  LOPRESSOR  Take 1 tablet (25 mg total) by mouth 2 (two) times daily.     NIFEdipine 90 MG  Tbsr  Commonly known as:  ADALAT CC  Take 1 tablet (90 mg total) by mouth once daily.     omeprazole 40 MG capsule  Commonly known as:  PRILOSEC  Take 1 capsule (40 mg total) by mouth once daily.     simvastatin 20 MG tablet  Commonly known as:  ZOCOR  Take 1 tablet (20 mg total) by mouth every evening.     sodium bicarbonate 650 MG tablet  Take 1 tablet (650 mg total) by mouth 2 (two) times daily.            Indwelling Lines/Drains at time of discharge:   Lines/Drains/Airways     Central Venous Catheter Line                 Hemodialysis Catheter 06/24/18 0900 right internal jugular 6 days          Drain                 NG/OG Tube 06/22/18 1635 Ledger sump 16 Fr. Left nostril 7 days                Time spent on the discharge of patient: 30 minutes  Patient was seen and examined on the date of discharge and determined to be suitable for discharge.         Hudson Saunders DO  Department of Hospital Medicine  [facility]

## 2018-06-30 NOTE — ASSESSMENT & PLAN NOTE
-Creatinine 3.6>>4.1  -baseline 2.7-4.4  -will monitor   -sodium bicarb continued   -pt has been followed outpatient by Dr. Vazquez (Nephrology)  -Nephrology consulted - pt may benefit from repeat angio due to NSTEMI but at high risk of contrast induced nephropathy  - pt was candidate for renal transplant however work up discontinued due to progressive weakness  - [facility] recommended         6/15  Monitor worsening Renal Function  S/p Cardiac VFib Arrest  [facility] today  IVF's  Monitor      6/16  [facility] diffuse disease  Cardiology  Monitor    6/17  Worsening  No further Cardiac intervention  Nephrology on consult  Monitor  Cr 6.0 today vs 4.7  Low Urine O/P    6/18  COntinues to worsen. Cr 7.3 today  Nephrology guidance for HD vs Monitoring    6/19  Worsening  Cr 8.3 today - K is 4.6  Nephrology on board  No HD indicated at present  Monitor  6/22  Patient will continue with dialysis . Dr Lara for vascath change .  6/27  Patient getting dialysis today . will need outpatient dialysis

## 2018-06-30 NOTE — ASSESSMENT & PLAN NOTE
June 22, 2018-patient has anemia now due to end-stage renal disease.  Treatment of this will be as per Nephrology protocol with IV iron/Depo supplementation with hemodialysis.  Please call with any questions.  June 23, 2018-patient has end-stage renal disease and is on RRT.  Management of anemia will be as per Nephrology.  June 24, 2018-the patient is on RRT for end-stage renal disease.  Management of EPO/IV iron will be as per Nephrology protocol.  June 26, 2018-the patient is on RRT for end-stage renal disease.  She will continue EPO/IV iron as per Nephrology/dialysis protocol.  June 27, 2018-the patient is on RRT for end-stage renal disease.  She will continue epo/IV iron as per Nephrology/dialysis protocol.  June 28, 2018-the patient is on RRT for end-stage renal disease.  She will continue epo/IV iron as per Nephrology/dialysis protocol.  June 29, 2018-the patient is on RRT for end-stage renal disease.  She will continue epo/IV iron as per Nephrology/dialysis protocol.      --continue weekly Procrit

## 2018-06-30 NOTE — ASSESSMENT & PLAN NOTE
6/22/18 - Patient has been treated with Procrit for maintenance therapy for anemia due to chronic kidney disease. Patient with hemoglobin of 8.2 this morning.  No current evidence of bleeding noted.  We will continue to monitor closely.  June 23, 2018-the patient has acute blood loss anemia is stable with hemoglobin greater than 8 grams/deciliter today.  She will continue Procrit as per HD protocol.  No urgent indication for PRBC transfusion today.  June 24, 2018-CBC from today shows hemoglobin greater than 7 grams/deciliter.  I would recommend transfusion of PRBCs for supportive care if hemoglobin less than 7 grams/deciliter.  June 26, 2018-CBC from today shows hemoglobin greater than 7 grams/deciliter.  No urgent indication for PRBC transfusion at this time.  June 27, 2018-CBC from today shows hemoglobin greater than 7 grams/deciliter.  No urgent indication for PRBC transfusion at this time.  June 28, 2018-CBC from today shows hemoglobin greater than 7 grams/deciliter.  No urgent indication for PRBC transfusion at this time.  June 28, 2019-review of lab work from today shows interval decrease in hemoglobin/hematocrit.  Consideration can be given for transfusion of 1 unit of PRBCs at the time of her next hemodialysis treatment.      --continue to monitor and transfuse as needed

## 2018-06-30 NOTE — SUBJECTIVE & OBJECTIVE
Interval History:   No acute events overnight    Oncology Treatment Plan:   [No treatment plan]    Medications:  Continuous Infusions:  Scheduled Meds:   sodium chloride 0.9%   Intravenous Once    albuterol-ipratropium  3 mL Nebulization Q4H WAKE    amiodarone  200 mg Oral BID    aspirin  81 mg Oral Daily    epoetin carmen (PROCRIT) injection  4,000 Units Intravenous Every Tues, Thurs, Sat    famotidine  20 mg Oral Daily    metoprolol tartrate  25 mg Oral BID    micafungin (MYCAMINE) IVPB  100 mg Intravenous Q24H    piperacillin-tazobactam (ZOSYN) IVPB  4.5 g Intravenous Q12H    sodium bicarbonate  650 mg Oral BID    [START ON 7/2/2018] vancomycin (VANCOCIN) IVPB  1,250 mg Intravenous Q48H    vancomycin  125 mg Oral Q6H     PRN Meds:sodium chloride 0.9%, acetaminophen, bisacodyl, cloNIDine, dextrose 50%, dextrose 50%, glucagon (human recombinant), glucose, glucose, heparin (porcine), hydrALAZINE, insulin aspart U-100, lactulose, lorazepam, morphine, ondansetron, sodium chloride 0.9%     Review of Systems   Unable to perform ROS: Mental status change     Objective:     Vital Signs (Most Recent):  Temp: 97.6 °F (36.4 °C) (06/30/18 0803)  Pulse: 77 (06/30/18 0803)  Resp: 20 (06/30/18 0803)  BP: (!) 104/52 (06/30/18 0803)  SpO2: 95 % (06/30/18 0803) Vital Signs (24h Range):  Temp:  [96.5 °F (35.8 °C)-98.4 °F (36.9 °C)] 97.6 °F (36.4 °C)  Pulse:  [74-85] 77  Resp:  [16-22] 20  SpO2:  [94 %-100 %] 95 %  BP: ()/(52-62) 104/52     Weight: 74.7 kg (164 lb 10.9 oz)  Body mass index is 30.12 kg/m².  Body surface area is 1.81 meters squared.      Intake/Output Summary (Last 24 hours) at 06/30/18 1008  Last data filed at 06/29/18 1700   Gross per 24 hour   Intake              500 ml   Output                0 ml   Net              500 ml       Physical Exam   Constitutional: She appears well-developed and well-nourished. She is active and cooperative. She appears ill.   HENT:   Head: Normocephalic and atraumatic.    Nose: Nose normal.   Mouth/Throat: Oropharynx is clear and moist. No oropharyngeal exudate.   Healing neck incision is noted.   Eyes: Pupils are equal, round, and reactive to light. No scleral icterus.   Neck: Neck supple. No thyromegaly present.   Cardiovascular: Normal rate, regular rhythm and intact distal pulses.  Exam reveals no gallop and no friction rub.    Murmur heard.  Pulmonary/Chest: Effort normal. No respiratory distress. She has no wheezes. She has rhonchi. She has no rales.   Abdominal: Soft. Bowel sounds are normal. She exhibits no distension, no ascites and no mass. There is no hepatosplenomegaly. There is no tenderness. There is no rigidity, no rebound and no guarding.   Musculoskeletal: She exhibits no edema, tenderness or deformity.   Neurological: She is unresponsive. No cranial nerve deficit. She exhibits normal muscle tone. Coordination normal.   Skin: Skin is warm and dry. Ecchymosis noted. No rash noted. No pallor.   Psychiatric: She is slowed. She is noncommunicative.   Nursing note and vitals reviewed.      Significant Labs:   CBC:   Recent Labs  Lab 06/29/18  0500 06/30/18  0618   WBC 17.18* 12.75*   HGB 7.3* 9.1*   HCT 23.1* 30.8*   PLT 52* 26*   , CMP:   Recent Labs  Lab 06/29/18  0500 06/30/18  0618    139   K 3.7 4.5   CL 99 101   CO2 23 19*   * 172*   BUN 51* 90*   CREATININE 4.2* 5.8*   CALCIUM 8.4* 8.1*   PROT 5.8* 5.6*   ALBUMIN 2.0* 1.8*   BILITOT 0.7 0.7   ALKPHOS 76 86   AST 40 43*   ALT 20 22   ANIONGAP 16 19*   EGFRNONAA 10* 7*   , Coagulation: No results for input(s): PT, INR, APTT in the last 48 hours., Haptoglobin: No results for input(s): HAPTOGLOBIN in the last 48 hours., Immunology: No results for input(s): SPEP, SUZE, TRISTON, FREELAMBDALI in the last 48 hours., LDH: No results for input(s): LDHCSF, BFSOURCE in the last 48 hours. and LFTs:   Recent Labs  Lab 06/29/18  0500 06/30/18  0618   ALT 20 22   AST 40 43*   ALKPHOS 76 86   BILITOT 0.7 0.7   PROT 5.8*  5.6*   ALBUMIN 2.0* 1.8*       Diagnostic Results:  I have reviewed all pertinent imaging results/findings within the past 24 hours.

## 2018-06-30 NOTE — PROGRESS NOTES
Pharmacist Renal Dose Adjustment Note    Citlali Karimi is a 68 y.o. female being treated with the medication fluconazole.     Patient Data:  Vital Signs (Most Recent):  Temp: 96.5 °F (35.8 °C) (06/29/18 1915)  Pulse: 75 (06/29/18 1915)  Resp: 18 (06/29/18 1915)  BP: (!) 96/55 (06/29/18 1915)  SpO2: 98 % (06/29/18 1915)   Vital Signs (72h Range):  Temp:  [96.5 °F (35.8 °C)-99.4 °F (37.4 °C)]   Pulse:  [50-92]   Resp:  [16-24]   BP: ()/(26-79)   SpO2:  [90 %-100 %]        Recent Labs     Lab 06/27/18  0514 06/28/18  0526 06/29/18  0500   CREATININE 4.6* 6.2* 4.2*     Serum creatinine: 4.2 mg/dL (H) 06/29/18 0500  Estimated creatinine clearance: 11.8 mL/min (A)    Medication dose will be changed to 200 mg IV every 24 hours.     Pharmacist's Name: Lianet Lui  Pharmacist's Extension: [phone]

## 2018-06-30 NOTE — DISCHARGE SUMMARY
[facility] -   General Surgery  Discharge Summary      Patient Name: Citlali Karimi  MRN: [id number]  Admission Date: 6/12/2018  Hospital Length of Stay: 17 days  Discharge Date and Time:  06/30/2018 12:24 PM  Attending Physician: Levy Samuel MD   Discharging Provider: Henrry Pineda MD  Primary Care Provider: Evelio Schuster MD    HPI:   Citlali Karimi presentes for parathyroidectomy     Patient is being discharged to [facility] for inpatient care    Procedure(s) (LRB):  VASCULAR CATH EXCHANGE (N/A)      Indwelling Lines/Drains at time of discharge:   Lines/Drains/Airways     Central Venous Catheter Line                 Hemodialysis Catheter 06/24/18 0900 right internal jugular 6 days          Drain                 NG/OG Tube 06/22/18 1635 O'Brien sump 16 Fr. Left nostril 7 days              Hospital Course: Status post parathyroidectomy  Postop day 1 incisional pain controlled with medications, no hoarseness, troponins elevated starting on heparin drip, patient denies any shortness of breath or chest pain  Postop day 2 patient with progressively worsened neck swelling, feeling short of breath and difficulty swallowing, to OR for neck exploration/washout  POD3 Pt coded yesterday and kept intubated post surgery in ICU, stable overnight, heart cath today    On 06/16/2018 the patient remained intubated with some neck swelling on the left side.    06/18/2018 ultrasound neck yesterday shows no fluid collections, patient remains on minimal vent settings  06/19/2018 awaiting decrease in airway swelling for extubation, dialysis catheter placed today  06/20/2018 mily removed. She remains intubated. Stool + for c.diff and pt started on PO vancomycin.   06/21/2018: she remained intubated. Her neck was soft and mildly tender.   06/22/2018 extubated yesterday, somewhat disoriented  06/23/2018 mentation improving, loose stools resolving, tolerating tube feeds  06/24/2018 dialysis catheter revision this  morning, plan for dialysis today  06/25/2018: no further loose bm. tolerating tube feeds.   06/26/2018: plt decreased today. PEG tube procedure held to rule out HIT. Pt started on Eliquis  06/27/2018: HIT negative. Pt has bilateral pneumonia, incr wbc. Chest physiotherapy. Wbc decr  06/28/2018 : restart heparin, dc eliquis. PEG possibly next week  06/30/2018.  Patient this family is meeting with hospice to consider hospice care.  They report patient is becoming less responsive     After meeting with the representative from [facility] the family has elected to have the patient transferred to [facility] in the inpatient setting    Consults:   Consults         Status Ordering Provider     Consult to Case Management/Social Work  Once     Provider:  (Not yet assigned)    Completed LAN LEAHY     Inpatient consult to Cardiology  Once     Provider:  Vaibhav Payton MD    Completed LAN LEAHY     Inpatient consult to Cardiology  Once     Provider:  Galindo Louis MD    Completed ADELINA LOFTON     Inpatient consult to Infectious Diseases  Once     Provider:  Joseph Geller MD    Acknowledged JASMINA RICE     Inpatient consult to Internal Medicine  Once     Provider:  GERRI Victor    Acknowledged LAN LEAHY     Inpatient consult to Nephrology  Once     Provider:  Magnus Vazquez MD    Completed ANA DEE     Inpatient consult to Neurology  Once     Provider:  Francis Plunkett MD PhD    Completed JASMINA RICE     Inpatient consult to Pulmonology  Once     Provider:  (Not yet assigned)    Completed LAN LEAHY     Inpatient consult to Registered Dietitian/Nutritionist  Once     Provider:  (Not yet assigned)    Completed LAN LEAHY     Inpatient consult to Social Work  Once     Provider:  (Not yet assigned)    Completed GABO MORGAN     Inpatient consult to Vascular Surgery  Once     Provider:  Reilly Mendez MD    Acknowledged GABO MORGAN     Inpatient  consult to Vascular Surgery  Once     Provider:  Demond Lara MD    Acknowledged JASMINA RICE     IP consult to case management  Once     Provider:  (Not yet assigned)    LAN Carmen     Pharmacy to dose Vancomycin consult  Once     Provider:  (Not yet assigned)    Acknowledged JASMINA RICE          Significant Diagnostic Studies:   Labs:   BMP:   Recent Labs  Lab 06/29/18  0500 06/30/18  0618   * 172*    139   K 3.7 4.5   CL 99 101   CO2 23 19*   BUN 51* 90*   CREATININE 4.2* 5.8*   CALCIUM 8.4* 8.1*   MG 1.9 2.0   , CMP   Recent Labs  Lab 06/29/18  0500 06/30/18  0618    139   K 3.7 4.5   CL 99 101   CO2 23 19*   * 172*   BUN 51* 90*   CREATININE 4.2* 5.8*   CALCIUM 8.4* 8.1*   PROT 5.8* 5.6*   ALBUMIN 2.0* 1.8*   BILITOT 0.7 0.7   ALKPHOS 76 86   AST 40 43*   ALT 20 22   ANIONGAP 16 19*   ESTGFRAFRICA 12* 8*   EGFRNONAA 10* 7*    and CBC   Recent Labs  Lab 06/29/18  0500 06/30/18  0618   WBC 17.18* 12.75*   HGB 7.3* 9.1*   HCT 23.1* 30.8*   PLT 52* 26*     Microbiology:  Culture results were reviewed  Radiology: CT scan: CT ABDOMEN PELVIS WITH CONTRAST: No results found for this visit on 06/12/18. and CT ABDOMEN PELVIS WITHOUT CONTRAST: No results found for this visit on 06/12/18.  Specimen (12h ago through future)    None          Pending Diagnostic Studies:     Procedure Component Value Units Date/Time    Ammonia [[id number]]     Order Status:  Sent Lab Status:  No result     Specimen:  Blood from Blood     Haptoglobin [[id number]]     Order Status:  Sent Lab Status:  No result     Specimen:  Blood from Blood     Lactate dehydrogenase [[id number]]     Order Status:  Sent Lab Status:  No result     Specimen:  Blood from Blood     Lyme disease DNA probe, direct [[id number]]     Order Status:  Sent Lab Status:  No result     Specimen:  Blood from Blood     Uric acid [[id number]]     Order Status:  Sent Lab Status:  No result     Specimen:  Blood from Blood          Final Active Diagnoses:    Diagnosis Date Noted POA    PRINCIPAL PROBLEM:  Critical illness polyneuropathy [G62.81] 06/22/2018 No    ESRD (end stage renal disease) [N18.6] 06/29/2018 No    Encephalopathy, metabolic [G93.41] 06/28/2018 Unknown    Decreased platelet count [D69.6] 06/26/2018 No    Thrombocytopenia [D69.6] 06/26/2018 No    HAP (hospital-acquired pneumonia) [J18.9] 06/26/2018 No    Acute hypoxemic respiratory failure [J96.01]  Yes    C. difficile diarrhea [A04.72] 06/20/2018 No    NSTEMI (non-ST elevated myocardial infarction) [I21.4] 06/16/2018 Yes    Cardiac arrest with ventricular fibrillation [I46.9, I49.01] 06/14/2018 No    Hyperparathyroidism [E21.3] 06/12/2018 Yes     Chronic    Coronary artery disease involving native coronary artery [I25.10] 03/19/2018 Yes     Chronic    Acute renal failure superimposed on stage 5 chronic kidney disease, not on chronic dialysis [N17.9, N18.5] 08/07/2015 Yes    Hypertension associated with diabetes [E11.59, I10] 08/07/2015 Yes    Diabetes mellitus, type 2 - only on oral medications [E11.9] 01/07/2015 Yes     Chronic    Acute blood loss anemia [D62] 11/06/2013 Yes      Problems Resolved During this Admission:    Diagnosis Date Noted Date Resolved POA    Acute airway obstruction [J98.8] 06/15/2018 06/22/2018 Yes    Cardiogenic shock [R57.0] 06/14/2018 06/15/2018 No    Electrolyte imbalance [E87.8] 06/14/2018 06/17/2018 No    On mechanically assisted ventilation [Z99.11] 06/14/2018 06/22/2018 Not Applicable    Persistent atrial fibrillation [I48.1] 06/14/2018 06/16/2018 No    Shock liver [K72.00] 06/14/2018 06/16/2018 No    STEMI (ST elevation myocardial infarction) [I21.3] 06/13/2018 06/16/2018 No    Hypercalcemia [E83.52] 06/12/2018 06/16/2018 Yes    H/O aortic valve replacement [Z95.2] 12/07/2011 06/17/2018 Not Applicable     Chronic      Discharged Condition: poor    Disposition: Hospice/Medical Facility    Follow Up:    Patient  Instructions:     Diet NPO     Other restrictions (specify):   Scheduling Instructions: bedrest       Medications:  Transfer Medications (for Discharge Readmit only):   Current Facility-Administered Medications   Medication Dose Route Frequency Provider Last Rate Last Dose    0.9%  NaCl infusion   Intravenous PRN Chinmay Lyles MD        0.9%  NaCl infusion   Intravenous Once Chinmay Lyles MD        acetaminophen oral solution 650 mg  650 mg Per NG tube Q4H PRN Dominguez Levy MD   650 mg at 06/24/18 1522    albuterol-ipratropium 2.5 mg-0.5 mg/3 mL nebulizer solution 3 mL  3 mL Nebulization Q4H WAKE Levy Samuel MD   3 mL at 06/30/18 1110    amiodarone tablet 200 mg  200 mg Oral BID Joseph Talavera NP   200 mg at 06/30/18 0819    aspirin chewable tablet 81 mg  81 mg Oral Daily Joseph Talavera NP   81 mg at 06/30/18 0819    bisacodyl suppository 10 mg  10 mg Rectal Daily PRN Levy Samuel MD        cloNIDine tablet 0.1 mg  0.1 mg Oral Q6H PRN Levy Samuel MD        dextrose 50% injection 12.5 g  12.5 g Intravenous PRN Rach DENNIS. Corry, NP        dextrose 50% injection 25 g  25 g Intravenous PRN Rach EDSON Wheatley, NP        epoetin carmen injection 4,000 Units  4,000 Units Intravenous Every Tues, Thurs, Sat Jordyn Lima MD   4,000 Units at 06/26/18 0950    famotidine tablet 20 mg  20 mg Oral Daily Joseph Talavera NP   20 mg at 06/30/18 0819    glucagon (human recombinant) injection 1 mg  1 mg Intramuscular PRN Rach EDSON Lackat, NP        glucose chewable tablet 16 g  16 g Oral PRN Rach J. Lacy, NP        glucose chewable tablet 24 g  24 g Oral PRN Rach J. Lackat, NP        heparin (porcine) injection 3,000 Units  3,000 Units Intravenous PRN Chinmay Lyles MD   3,000 Units at 06/26/18 0951    hydrALAZINE injection 10 mg  10 mg Intravenous Q8H PRN Rach EDSON Wheatley NP   10 mg at 06/14/18 1040    insulin aspart U-100 pen 0-5 Units  0-5 Units Subcutaneous QID (AC + HS) PRN Rach Wheatley, NP   1 Units  at 06/30/18 0017    lactulose 20 gram/30 mL solution Soln 20 g  20 g Oral Q6H PRN Levy Samuel MD        lorazepam (ATIVAN) injection 2 mg  2 mg Intravenous Q4H PRN Joseph Talavera NP   2 mg at 06/30/18 0821    metoprolol tartrate (LOPRESSOR) tablet 25 mg  25 mg Oral BID Levy Samuel MD   25 mg at 06/30/18 0819    micafungin 100 mg in sodium chloride 0.9 % 100 mL IVPB (ready to mix system)  100 mg Intravenous Q24H Levy Samuel  mL/hr at 06/29/18 1945 100 mg at 06/29/18 1945    morphine injection 2 mg  2 mg Intravenous Q6H PRN Dominguez Levy MD        ondansetron injection 4 mg  4 mg Intravenous Q8H PRN Jered Calderon NP        piperacillin-tazobactam 4.5 g in dextrose 5 % 100 mL IVPB (ready to mix system)  4.5 g Intravenous Q12H Dominguez Levy MD 25 mL/hr at 06/30/18 0819 4.5 g at 06/30/18 0819    sodium bicarbonate tablet 650 mg  650 mg Oral BID Levy Samuel MD   650 mg at 06/30/18 0819    sodium chloride 0.9% flush 3 mL  3 mL Intravenous PRN Ella Pierre MD        [START ON 7/2/2018] vancomycin (VANCOCIN) 1,250 mg in dextrose 5 % 250 mL IVPB  1,250 mg Intravenous Q48H Levy Samuel MD        vancomycin 250mg / 10ml oral suspension 125 mg  125 mg Oral Q6H Joseph Talavera NP   125 mg at 06/30/18 0534     Time spent on the discharge of patient: 5 minutes    Henrry Pineda MD  General Surgery  [facility] -

## 2018-06-30 NOTE — PROGRESS NOTES
NG and peripheral IV removed. Vascath to ellen per Dr. Saunedrs and [facility]. Report called to HARSHAL Martinez.

## 2018-06-30 NOTE — SUBJECTIVE & OBJECTIVE
Interval History:  Family wants to me with Nephrology and hospice care to determine the direction of this Trev's care    Medications:  Continuous Infusions:  Scheduled Meds:   sodium chloride 0.9%   Intravenous Once    albuterol-ipratropium  3 mL Nebulization Q4H WAKE    amiodarone  200 mg Oral BID    aspirin  81 mg Oral Daily    epoetin carmen (PROCRIT) injection  4,000 Units Intravenous Every Tues, Thurs, Sat    famotidine  20 mg Oral Daily    metoprolol tartrate  25 mg Oral BID    micafungin (MYCAMINE) IVPB  100 mg Intravenous Q24H    piperacillin-tazobactam (ZOSYN) IVPB  4.5 g Intravenous Q12H    sodium bicarbonate  650 mg Oral BID    [START ON 7/2/2018] vancomycin (VANCOCIN) IVPB  1,250 mg Intravenous Q48H    vancomycin  125 mg Oral Q6H     PRN Meds:sodium chloride 0.9%, acetaminophen, bisacodyl, cloNIDine, dextrose 50%, dextrose 50%, glucagon (human recombinant), glucose, glucose, heparin (porcine), hydrALAZINE, insulin aspart U-100, lactulose, lorazepam, morphine, ondansetron, sodium chloride 0.9%     Review of patient's allergies indicates:   Allergen Reactions    Lipitor [atorvastatin] Swelling     Lips       Objective:     Vital Signs (Most Recent):  Temp: 97.6 °F (36.4 °C) (06/30/18 0803)  Pulse: 77 (06/30/18 0803)  Resp: 20 (06/30/18 0803)  BP: (!) 104/52 (06/30/18 0803)  SpO2: 95 % (06/30/18 0803) Vital Signs (24h Range):  Temp:  [96.5 °F (35.8 °C)-98.4 °F (36.9 °C)] 97.6 °F (36.4 °C)  Pulse:  [74-85] 77  Resp:  [16-22] 20  SpO2:  [94 %-100 %] 95 %  BP: ()/(52-62) 104/52     Weight: 74.7 kg (164 lb 10.9 oz)  Body mass index is 30.12 kg/m².    Intake/Output - Last 3 Shifts       06/28 0700 - 06/29 0659 06/29 0700 - 06/30 0659 06/30 0700 - 07/01 0659    P.O. 0 400     NG/GT 30      IV Piggyback 100 100     Total Intake(mL/kg) 130 (1.7) 500 (6.7)     Net +130 +500             Urine Occurrence 1 x      Stool Occurrence 2 x 3 x           Physical Exam   Constitutional:   Minimally  arousable    HENT:   Head: Normocephalic and atraumatic.   Nasogastric feeding tube in place the   Neck:   Incision is healing   Dialysis catheter in place   Cardiovascular: Normal rate, regular rhythm and normal heart sounds.    Pulmonary/Chest: Effort normal. She has rales.   Course breath sounds   Abdominal: Soft. Bowel sounds are normal. She exhibits no distension. There is no tenderness.   Skin: Skin is dry. Capillary refill takes less than 2 seconds.   Nursing note and vitals reviewed.      Significant Labs:  CBC:   Recent Labs  Lab 06/30/18 0618   WBC 12.75*   RBC 3.52*   HGB 9.1*   HCT 30.8*   PLT 26*   MCV 88   MCH 25.9*   MCHC 29.5*     BMP:   Recent Labs  Lab 06/30/18 0618   *      K 4.5      CO2 19*   BUN 90*   CREATININE 5.8*   CALCIUM 8.1*   MG 2.0     CMP:   Recent Labs  Lab 06/30/18 0618   *   CALCIUM 8.1*   ALBUMIN 1.8*   PROT 5.6*      K 4.5   CO2 19*      BUN 90*   CREATININE 5.8*   ALKPHOS 86   ALT 22   AST 43*   BILITOT 0.7       Significant Diagnostics:  No new

## 2018-06-30 NOTE — PT/OT/SLP PROGRESS
Occupational Therapy      Patient Name:  Citlali Karimi   MRN:  [id number]    Patient not seen today secondary to PT UNABLE TO PARTICIPATE. PT DISCHARGED FROM SKILLED O.T. AND PLACED ON PEOPLE 'S PROGRAM FOR GENTLE B UE ROM EXERCISE    Joie Isabel OT  6/30/2018

## 2018-06-30 NOTE — ASSESSMENT & PLAN NOTE
Cardiology following  On anticoagulation  Patient has had 2 myocardial infarctions during this admission

## 2018-06-30 NOTE — SUBJECTIVE & OBJECTIVE
Interval History:   Seen and examined in bed  No acute events overnight   Remains obtunded     Review of Systems   Unable to perform ROS: Acuity of condition     Objective:     Vital Signs (Most Recent):  Temp: 97.6 °F (36.4 °C) (06/30/18 0803)  Pulse: 77 (06/30/18 0803)  Resp: 20 (06/30/18 0803)  BP: (!) 104/52 (06/30/18 0803)  SpO2: 95 % (06/30/18 0803) Vital Signs (24h Range):  Temp:  [96.5 °F (35.8 °C)-98.2 °F (36.8 °C)] 97.6 °F (36.4 °C)  Pulse:  [74-83] 77  Resp:  [18-22] 20  SpO2:  [94 %-100 %] 95 %  BP: ()/(52-62) 104/52     Weight: 74.7 kg (164 lb 10.9 oz)  Body mass index is 30.12 kg/m².    Intake/Output Summary (Last 24 hours) at 06/30/18 1102  Last data filed at 06/29/18 1700   Gross per 24 hour   Intake              500 ml   Output                0 ml   Net              500 ml      Physical Exam   Constitutional: She appears well-developed and well-nourished.   obtunded   HENT:   Head: Normocephalic and atraumatic.   Eyes: Pupils are equal, round, and reactive to light.   Neck: JVD present.   Cardiovascular: Normal rate, regular rhythm and normal heart sounds.  Exam reveals no friction rub.    No murmur heard.  Pulmonary/Chest: She is in respiratory distress. She has no wheezes. She has no rales.   tachypnic    Abdominal: Soft. She exhibits no distension. There is no tenderness.   Neurological:   Obtunded, non responsive, withdrawals to nociceptive stimuli    Skin: Skin is warm.   Nursing note and vitals reviewed.      Significant Labs: All pertinent labs within the past 24 hours have been reviewed.    Significant Imaging: I have reviewed and interpreted all pertinent imaging results/findings within the past 24 hours.

## 2018-06-30 NOTE — PROGRESS NOTES
[facility] Hospital Medicine  Progress Note    Patient Name: Citlali Karimi  MRN: [id number]  Patient Class: IP- Inpatient   Admission Date: 6/12/2018  Length of Stay: 17 days  Attending Physician: Levy Samuel MD  Primary Care Provider: Evelio Schuster MD        Subjective:     Principal Problem:Critical illness polyneuropathy    HPI:  Citlali Karimi is a 68 y.o female with PMHx of CKD (IV), HTN, DM, CVA (left sided weakness), and CAD who initially presented for hyperparathyroidism and hypercalcemia requiring surgical intervention.  Pt underwent parathyroidectomy today per Dr. Tracy (General Surgery). Pt admitted to Medical Surgical Unit post procedure and Hospital Medicine consulted for medical management.  Chest xray post procedure showed mild asymmetric CHF versus RUL pneumonia. Troponin 0.113 and BNP >270 noted.  Pt given IV Lasix in PACU prior to unit arrival.      Hospital Course:  Pt admitted to Outpatient Extended Recovery post procedure.  Elevated noted post procedure and results trended yielding significant positive response.  Cardiology consulted and Heparin infusion initiated.  Hx of CAD, multiple vessels noted with home medications continued and Imdur dose increased.  Nephrology consulted due to elevated creatinine and [facility] recommended.  Echo results pending.  Heart catheterization procedure considered with family refusing due to concern for worsening kidney function as patient does not want to be on dialysis.  Decreased oral intake and difficulty swallowing noted.  Speech therapist to bedside.  Pt made NPO and General Surgery notified.      6/15  Patient worsening status. ET changed per Pulmonary CC. Patient s/p Code Blue - VFib with recovery. Cardiology taking patient to CATH lab. Discussed with CM plan for post acute care in progress.    6/16  Patient improved o/n. Patient awake and able to follow simple command. Breathing trials in progress. Discussed with Pulmonary  CC    6/17  Patient remains intubated.  at bedside. Cardiology at bedside as well. Discussed with CC Team    6/18  Patient responds to command but remains intubated. Swelling to neck continues to be prominent. Discussed with CC Team. Worsening renal function. Possible HD need.    6/19  Patient remains intubated. Worsening renal function. No events Discussed with CC Team    6/21  Positive cough leak and plan for extubation today .   6/22  Patient continue to be confused . Will consider ct head if didn't improve . Continue treat infection/c difficle .Consulted vascular surgery dr maciel to remove femoral dialysis and put new one subclavian ?. To avoid risk of infection in groin. Patient will continue to need dialysis    6/23  Patient is seen and examined today . More awake today. dialysis catheter was removed from groin and new one will be place in upper body ij vs subclavian. Patient diarrhea improved .   6/24  Patient very lethargic to participate in slp . S/p vascath for hemodialysis . SLP will visit tomorrow . Patient still has ng tube. Diarrhea improved   6/25  Patient lethargic . SLP recommended peg tube . Placement of peg tube tomorrow . After peg tube possible discharge to [facility]ac   6/26  Patient was seen and examined today. cxr shows mid and upper right lobe . Very weak cough reflex. Will start treatment with unisyn for aspiration pneumonia and follow up culture ,procal if no improvement will broaden coverage . Patient also being treated for  c diff . Hematology oncology also ordered eliquis and hit panel . Peg tube delayed   6/27  Discussed critical illness with  bedside he said he will discuss with his niece to guide us with further plan of care  . Patient leukocytosis and platelet dropped - sepsis -pneumonia . Repeat cxr .   6/28  Plan of care was discussed with patient niece and  bedside in detail .They wish to continue with current treatment plan  And will  Wait on making decision  regarding palliative care .No clinical improvement . Patient more lethargic . Plan for dialysis today  6/29  Patient was seen and examined today. Continue to decline with broad spectrum antibotic. Wbc and procal continue to trend up . Her pneumonia continue to get worse she has very weak cough reflex and can not clear up secretions . Nephrology decided comfort care with  bedside .  decided on inpatient hospice . Case management for arrangements      Family decided on hospice care and will discharge to [facility].     Interval History:   Seen and examined in bed  No acute events overnight   Remains obtunded     Review of Systems   Unable to perform ROS: Acuity of condition     Objective:     Vital Signs (Most Recent):  Temp: 97.6 °F (36.4 °C) (06/30/18 0803)  Pulse: 77 (06/30/18 0803)  Resp: 20 (06/30/18 0803)  BP: (!) 104/52 (06/30/18 0803)  SpO2: 95 % (06/30/18 0803) Vital Signs (24h Range):  Temp:  [96.5 °F (35.8 °C)-98.2 °F (36.8 °C)] 97.6 °F (36.4 °C)  Pulse:  [74-83] 77  Resp:  [18-22] 20  SpO2:  [94 %-100 %] 95 %  BP: ()/(52-62) 104/52     Weight: 74.7 kg (164 lb 10.9 oz)  Body mass index is 30.12 kg/m².    Intake/Output Summary (Last 24 hours) at 06/30/18 1102  Last data filed at 06/29/18 1700   Gross per 24 hour   Intake              500 ml   Output                0 ml   Net              500 ml      Physical Exam   Constitutional: She appears well-developed and well-nourished.   obtunded   HENT:   Head: Normocephalic and atraumatic.   Eyes: Pupils are equal, round, and reactive to light.   Neck: JVD present.   Cardiovascular: Normal rate, regular rhythm and normal heart sounds.  Exam reveals no friction rub.    No murmur heard.  Pulmonary/Chest: She is in respiratory distress. She has no wheezes. She has no rales.   tachypnic    Abdominal: Soft. She exhibits no distension. There is no tenderness.   Neurological:   Obtunded, non responsive, withdrawals to  nociceptive stimuli    Skin: Skin is warm.   Nursing note and vitals reviewed.      Significant Labs: All pertinent labs within the past 24 hours have been reviewed.    Significant Imaging: I have reviewed and interpreted all pertinent imaging results/findings within the past 24 hours.    Assessment/Plan:      * Critical illness polyneuropathy    -pt/ot         Encephalopathy, metabolic    -most likely metabolic pneumonia   - also patient had cardiac arrest   - will neurology to evaluate patient in morning   - ct head negative for new acute events           HAP (hospital-acquired pneumonia)    Follow up with culture   Broad the antibotics to vanco and zosyn   cxr worse   Discussed goals of care with family   6/28  No clinical improvement  Ct scan chest shows extensive consolidation   Continue with vancomycin and zosyn   Follow up respiratory culture   procal tomorrow   Discuss with family continue with current treatment plan         Thrombocytopenia    -hit panel negative   - most likely due to sepsis          Acute hypoxemic respiratory failure    - aspiration pneumonia getting worse   - continue with antibotics             C. difficile diarrhea    Continue with oral vancomycin   6/20 started vancomycin   -will need 14 days   -diarrhea improved         NSTEMI (non-ST elevated myocardial infarction)    ASA  Cardiology  [facility] - Diffuse disease  No further intervention recommended  Medical Management  No statin due to allergy        Cardiac arrest with ventricular fibrillation    Cardiology  [facility] 6/15  ASA  Statin Allergy  BB  No further interventions  Diffuse disease    6/18  ASA  BB  Diffuse disease No intervention    6/19  ASA  BB  No Statin due to allergy  Cardiology  6/27  Continue with medical management          Hyperparathyroidism    -Pt admitted to Extended Recovery then transferred to Inpatient due to NSTEMI  -s/p Parathyroidectomy   -managed by General Surgery -primary team  - PTH- 32  - Ca 11.4>>10.5  -  analgesia prn   - antiemetics prn  - specimens sent for analysis    6/16  Ca 8.7 today  Monitor    6/18  Ca 8.2 today  Monitor    6/19  Ca 8.2 stable  6/27  Ca stable after surgery        Coronary artery disease involving native coronary artery    - ASA, Statin, and Lopressor continued   -Imdur dose increased   -previous Cath showed severe CAD (proximal LCX 99%, mid 50%, RCA mid 90%, distal with subtotal lesion).  - Cardiology consulted with medical management continued   - [facility] proposed pending Nephrology recommendations  -family refused LHC due to concern for worsening kidney function as pt had expressed that she did not want to be on dialysis     6/15  Family electing [facility] and accepting risk of worsening renal function  Cardiology    6/16  S/p C  Cardiology  ASA  Statin allergy noted    6/16  No interventions  Cardiology on consult    6/18  Cardiology on Consult  Medical Management    6/19  ASA  BB  6/22  Continue with medical management      6/27  Continue with aspirin and betablocker .allergy to statin           Acute renal failure superimposed on stage 5 chronic kidney disease, not on chronic dialysis    -Creatinine 3.6>>4.1  -baseline 2.7-4.4  -will monitor   -sodium bicarb continued   -pt has been followed outpatient by Dr. Vazquez (Nephrology)  -Nephrology consulted - pt may benefit from repeat angio due to NSTEMI but at high risk of contrast induced nephropathy  - pt was candidate for renal transplant however work up discontinued due to progressive weakness  - [facility] recommended         6/15  Monitor worsening Renal Function  S/p Cardiac VFib Arrest  [facility] today  IVF's  Monitor      6/16  [facility] diffuse disease  Cardiology  Monitor    6/17  Worsening  No further Cardiac intervention  Nephrology on consult  Monitor  Cr 6.0 today vs 4.7  Low Urine O/P    6/18  COntinues to worsen. Cr 7.3 today  Nephrology guidance for HD vs Monitoring    6/19  Worsening  Cr 8.3 today - K is 4.6  Nephrology on board  No HD indicated  at present  Monitor  6/22  Patient will continue with dialysis . Dr Lara for vascath change .  6/27  Patient getting dialysis today . will need outpatient dialysis           Hypertension associated with diabetes    - home medications continued   -hx of noncompliance and inconsistent dosing at home per   - uncontrolled upon arrival- improved with Hydralazine in PACU   - Hydralazine prn  6/22  Controlled         Diabetes mellitus, type 2 - only on oral medications    Controlled  NICC  Accuchecks          Acute blood loss anemia    -stable post procedure  -will monitor  -CBC in am   -pt followed outpatient by Heme/Onc    6/16  Stable  Monitor    6/17  Improving  Monitor    6/18  Hgb 7.7 today  Transfuse PRBC's per sx  Monitor    6/23  Hb 8 stable   Monitor  6/24  No signs of acute blood loss anema   6/27  Hb stable with no signs of bleeding          VTE Risk Mitigation         Ordered     heparin (porcine) injection 3,000 Units  As needed (PRN)      06/26/18 0907     Place sequential compression device  Until discontinued      06/12/18 1321              Hudson Saunders DO  Department of Hospital Medicine   [facility]

## 2018-06-30 NOTE — PROGRESS NOTES
Pt transported via stretcher to [facility] by [facility] paramedics.

## 2018-06-30 NOTE — ASSESSMENT & PLAN NOTE
June 26, 2018-the patient has new onset thrombocytopenia with history of heparin exposure.  HIT needs to be considered in the differential diagnosis.  Recommend discontinuing all heparin.  Recommend initiation of Eliquis while results of HIT panel are pending.  I have recommended Eliquis instead of argatroban in this clinical situation because of the patient's critical illness which likely will result in significant variability in clinical efficacy with argatroban and likely put her at higher risk for bleeding than Eliquis.  Risks/benefits were reviewed in detail and discussed with General surgery and Hospital Medicine.  I would recommend holding off on PEG tube placement until HIT panel result is available.  I will continue follow the patient with an assist with the patient's management as needed.  Please call with any questions.  June 27, 2018-patient continues to have worsening thrombocytopenia.  I will follow up with pending HIT panel.  Continue Eliquis.  I will continue follow the patient with you and assist with the patient's management as needed.  Please call with any questions.  June 28, 2018-the patient's thrombocytopenia is slightly improved today.  Her HIT panel is negative.  I will stop Eliquis at this time.  Okay to resume using heparin as indicated.  Patient's thrombocytopenia is most likely related to acute illness/sepsis.  No indication for platelet transfusion at this time.  I will continue to follow the patient with you and assist with the patient's management as needed.  Please call with any questions.  June 29, 2018-the patient's thrombocytopenia is slightly worse today. HIT was negative.  This is most likely related to acute illness/sepsis.  No urgent indication for platelet transfusion at this time as she has no signs/symptoms of excessive bleeding noted.  I will continue to follow the patient with you and assist with the patient's management as needed.  Please call with any  questions.    Thrombocytopenia continues to worsen with negative HIT panel.  The etiology of thrombocytopenia is multifactorial from acute illness and numerous medications.  Recommend holding anticoagulation and transfuse for platelet count less than 15 or active bleeding

## 2018-06-30 NOTE — PLAN OF CARE
Call received from Sonia MORGAN at [facility]. Consents were signed , patient to be discharged today to the [facility]. # for report given to Eloisa primary nurse. Primary nurse to contact [facility] when ready for transport. Ambulance medical necessity flow chart initiated.

## 2018-06-30 NOTE — ASSESSMENT & PLAN NOTE
Given the patient's mental status the family is considering hospice care as she does not show what signs of improvement

## 2018-06-30 NOTE — PLAN OF CARE
06/30/18 1200   Transport Information   Transport Diagnosis (no longer receiveing HD)   Transportation Date 06/30/18   Transported From [facility]   Transported To [facility]   Supporting Clinical Information   Unable to sit or travel in a seated position because Bed Confined (unable to ambulate/sit);Postural Instability   Select all that would support previous selection Oxygen administration required;Severe Altered Mental Status;Decreased Level of Consciousness;Frail, debilitated, or extreme muscle atrophy;Risk of falling out of wheelchair while in motion;Chronic GSC < 14   Comment (Patient meets medical necessity for transportation to hospic)

## 2018-06-30 NOTE — ASSESSMENT & PLAN NOTE
Cardiology  [facility] 6/15  ASA  Statin Allergy  BB  No further interventions  Diffuse disease    6/18  ASA  BB  Diffuse disease No intervention    6/19  ASA  BB  No Statin due to allergy  Cardiology  6/27  Continue with medical management

## 2018-06-30 NOTE — PROGRESS NOTES
[facility] -   Hematology/Oncology  Progress Note    Patient Name: Citlali Karimi  Admission Date: 6/12/2018  Hospital Length of Stay: 17 days  Code Status: DNR     Subjective:     HPI:  68 year old female, PMHx of CKD (IV), HTN, DM, CVA (left sided weakness), and CAD who initially presented for hyperparathyroidism and hypercalcemia requiring surgical intervention.   She was seen in clinic by surgery for hyperparathyroidism and hypercalcemia and was electively admitted 6/12 taken to OR undergoing parathyroidectomy finding 2 right sided enlarged parathyroid glands.  Intra and post op patient developed EKG changes and had elevated troponin.  Cards consulted.  Patient admitted due to swallowing issues she was not taking all her meds as prescribed and had BP as high as 225/103 during hospitalization.  Troponin increased to > 50.  Cards diagnosed with NSTEMI and she was Rx with ASA, Imdur, Lopressor, statin and Heparin infusion with plans for repeat LHC if cleared by Renal given CKD5.  Today, she had progressively worsening neck swelling and SOB with worsening dysphagia.  She was taken back to OR and had post op neck hematoma evacuation.  In PACU she had witnessed V-Fib cardiac arrest and CODE BLUE called.  After 3 rounds of CPR, Epi X 3 and Defib X 3 ROSC was attained.  She was still intubated and on vent post op    Interval History:   No acute events overnight    Oncology Treatment Plan:   [No treatment plan]    Medications:  Continuous Infusions:  Scheduled Meds:   sodium chloride 0.9%   Intravenous Once    albuterol-ipratropium  3 mL Nebulization Q4H WAKE    amiodarone  200 mg Oral BID    aspirin  81 mg Oral Daily    epoetin carmen (PROCRIT) injection  4,000 Units Intravenous Every Tues, Thurs, Sat    famotidine  20 mg Oral Daily    metoprolol tartrate  25 mg Oral BID    micafungin (MYCAMINE) IVPB  100 mg Intravenous Q24H    piperacillin-tazobactam (ZOSYN) IVPB  4.5 g Intravenous Q12H    sodium  bicarbonate  650 mg Oral BID    [START ON 7/2/2018] vancomycin (VANCOCIN) IVPB  1,250 mg Intravenous Q48H    vancomycin  125 mg Oral Q6H     PRN Meds:sodium chloride 0.9%, acetaminophen, bisacodyl, cloNIDine, dextrose 50%, dextrose 50%, glucagon (human recombinant), glucose, glucose, heparin (porcine), hydrALAZINE, insulin aspart U-100, lactulose, lorazepam, morphine, ondansetron, sodium chloride 0.9%     Review of Systems   Unable to perform ROS: Mental status change     Objective:     Vital Signs (Most Recent):  Temp: 97.6 °F (36.4 °C) (06/30/18 0803)  Pulse: 77 (06/30/18 0803)  Resp: 20 (06/30/18 0803)  BP: (!) 104/52 (06/30/18 0803)  SpO2: 95 % (06/30/18 0803) Vital Signs (24h Range):  Temp:  [96.5 °F (35.8 °C)-98.4 °F (36.9 °C)] 97.6 °F (36.4 °C)  Pulse:  [74-85] 77  Resp:  [16-22] 20  SpO2:  [94 %-100 %] 95 %  BP: ()/(52-62) 104/52     Weight: 74.7 kg (164 lb 10.9 oz)  Body mass index is 30.12 kg/m².  Body surface area is 1.81 meters squared.      Intake/Output Summary (Last 24 hours) at 06/30/18 1008  Last data filed at 06/29/18 1700   Gross per 24 hour   Intake              500 ml   Output                0 ml   Net              500 ml       Physical Exam   Constitutional: She appears well-developed and well-nourished. She is active and cooperative. She appears ill.   HENT:   Head: Normocephalic and atraumatic.   Nose: Nose normal.   Mouth/Throat: Oropharynx is clear and moist. No oropharyngeal exudate.   Healing neck incision is noted.   Eyes: Pupils are equal, round, and reactive to light. No scleral icterus.   Neck: Neck supple. No thyromegaly present.   Cardiovascular: Normal rate, regular rhythm and intact distal pulses.  Exam reveals no gallop and no friction rub.    Murmur heard.  Pulmonary/Chest: Effort normal. No respiratory distress. She has no wheezes. She has rhonchi. She has no rales.   Abdominal: Soft. Bowel sounds are normal. She exhibits no distension, no ascites and no mass. There is  no hepatosplenomegaly. There is no tenderness. There is no rigidity, no rebound and no guarding.   Musculoskeletal: She exhibits no edema, tenderness or deformity.   Neurological: She is unresponsive. No cranial nerve deficit. She exhibits normal muscle tone. Coordination normal.   Skin: Skin is warm and dry. Ecchymosis noted. No rash noted. No pallor.   Psychiatric: She is slowed. She is noncommunicative.   Nursing note and vitals reviewed.      Significant Labs:   CBC:   Recent Labs  Lab 06/29/18  0500 06/30/18 0618   WBC 17.18* 12.75*   HGB 7.3* 9.1*   HCT 23.1* 30.8*   PLT 52* 26*   , CMP:   Recent Labs  Lab 06/29/18  0500 06/30/18 0618    139   K 3.7 4.5   CL 99 101   CO2 23 19*   * 172*   BUN 51* 90*   CREATININE 4.2* 5.8*   CALCIUM 8.4* 8.1*   PROT 5.8* 5.6*   ALBUMIN 2.0* 1.8*   BILITOT 0.7 0.7   ALKPHOS 76 86   AST 40 43*   ALT 20 22   ANIONGAP 16 19*   EGFRNONAA 10* 7*   , Coagulation: No results for input(s): PT, INR, APTT in the last 48 hours., Haptoglobin: No results for input(s): HAPTOGLOBIN in the last 48 hours., Immunology: No results for input(s): SPEP, SUZE, TRISTON, FREELAMBDALI in the last 48 hours., LDH: No results for input(s): LDHCSF, BFSOURCE in the last 48 hours. and LFTs:   Recent Labs  Lab 06/29/18  0500 06/30/18 0618   ALT 20 22   AST 40 43*   ALKPHOS 76 86   BILITOT 0.7 0.7   PROT 5.8* 5.6*   ALBUMIN 2.0* 1.8*       Diagnostic Results:  I have reviewed all pertinent imaging results/findings within the past 24 hours.    Assessment/Plan:     Thrombocytopenia    June 26, 2018-the patient has new onset thrombocytopenia with history of heparin exposure.  HIT needs to be considered in the differential diagnosis.  Recommend discontinuing all heparin.  Recommend initiation of Eliquis while results of HIT panel are pending.  I have recommended Eliquis instead of argatroban in this clinical situation because of the patient's critical illness which likely will result in significant  variability in clinical efficacy with argatroban and likely put her at higher risk for bleeding than Eliquis.  Risks/benefits were reviewed in detail and discussed with General surgery and Hospital Medicine.  I would recommend holding off on PEG tube placement until HIT panel result is available.  I will continue follow the patient with an assist with the patient's management as needed.  Please call with any questions.  June 27, 2018-patient continues to have worsening thrombocytopenia.  I will follow up with pending HIT panel.  Continue Eliquis.  I will continue follow the patient with you and assist with the patient's management as needed.  Please call with any questions.  June 28, 2018-the patient's thrombocytopenia is slightly improved today.  Her HIT panel is negative.  I will stop Eliquis at this time.  Okay to resume using heparin as indicated.  Patient's thrombocytopenia is most likely related to acute illness/sepsis.  No indication for platelet transfusion at this time.  I will continue to follow the patient with you and assist with the patient's management as needed.  Please call with any questions.  June 29, 2018-the patient's thrombocytopenia is slightly worse today. HIT was negative.  This is most likely related to acute illness/sepsis.  No urgent indication for platelet transfusion at this time as she has no signs/symptoms of excessive bleeding noted.  I will continue to follow the patient with you and assist with the patient's management as needed.  Please call with any questions.    Thrombocytopenia continues to worsen with negative HIT panel.  The etiology of thrombocytopenia is multifactorial from acute illness and numerous medications.  Recommend holding anticoagulation and transfuse for platelet count less than 15 or active bleeding          Acute renal failure superimposed on stage 5 chronic kidney disease, not on chronic dialysis    June 22, 2018-patient has anemia now due to end-stage renal  disease.  Treatment of this will be as per Nephrology protocol with IV iron/Depo supplementation with hemodialysis.  Please call with any questions.  June 23, 2018-patient has end-stage renal disease and is on RRT.  Management of anemia will be as per Nephrology.  June 24, 2018-the patient is on RRT for end-stage renal disease.  Management of EPO/IV iron will be as per Nephrology protocol.  June 26, 2018-the patient is on RRT for end-stage renal disease.  She will continue EPO/IV iron as per Nephrology/dialysis protocol.  June 27, 2018-the patient is on RRT for end-stage renal disease.  She will continue epo/IV iron as per Nephrology/dialysis protocol.  June 28, 2018-the patient is on RRT for end-stage renal disease.  She will continue epo/IV iron as per Nephrology/dialysis protocol.  June 29, 2018-the patient is on RRT for end-stage renal disease.  She will continue epo/IV iron as per Nephrology/dialysis protocol.      --continue weekly Procrit        Acute blood loss anemia    6/22/18 - Patient has been treated with Procrit for maintenance therapy for anemia due to chronic kidney disease. Patient with hemoglobin of 8.2 this morning.  No current evidence of bleeding noted.  We will continue to monitor closely.  June 23, 2018-the patient has acute blood loss anemia is stable with hemoglobin greater than 8 grams/deciliter today.  She will continue Procrit as per HD protocol.  No urgent indication for PRBC transfusion today.  June 24, 2018-CBC from today shows hemoglobin greater than 7 grams/deciliter.  I would recommend transfusion of PRBCs for supportive care if hemoglobin less than 7 grams/deciliter.  June 26, 2018-CBC from today shows hemoglobin greater than 7 grams/deciliter.  No urgent indication for PRBC transfusion at this time.  June 27, 2018-CBC from today shows hemoglobin greater than 7 grams/deciliter.  No urgent indication for PRBC transfusion at this time.  June 28, 2018-CBC from today shows hemoglobin  greater than 7 grams/deciliter.  No urgent indication for PRBC transfusion at this time.  June 28, 2019-review of lab work from today shows interval decrease in hemoglobin/hematocrit.  Consideration can be given for transfusion of 1 unit of PRBCs at the time of her next hemodialysis treatment.      --continue to monitor and transfuse as needed            Thank you for your consult. I will follow-up with patient. Please contact us if you have any additional questions.     Sunny Hearn Jr, MD  Hematology/Oncology  [facility]

## 2018-07-02 LAB
BACTERIA BLD CULT: NORMAL
BACTERIA BLD CULT: NORMAL

## 2018-07-05 ENCOUNTER — TELEPHONE (OUTPATIENT)
Dept: NEPHROLOGY | Facility: CLINIC | Age: 68
End: 2018-07-05

## 2018-07-05 NOTE — TELEPHONE ENCOUNTER
----- Message from Magnus Vazquez MD sent at 7/4/2018 12:32 PM CDT -----  Pl cancel my appt, pt now in hospice

## 2018-07-08 NOTE — PLAN OF CARE
07/08/18 1623   Final Note   Assessment Type Final Discharge Note   Discharge Disposition [facility]   Discharge plans and expectations educations in teach back method with documentation complete? Yes   Right Care Referral Info   Post Acute Recommendation Other  ([facility])

## 2018-07-10 ENCOUNTER — TELEPHONE (OUTPATIENT)
Dept: FAMILY MEDICINE | Facility: CLINIC | Age: 68
End: 2018-07-10

## 2018-07-10 NOTE — TELEPHONE ENCOUNTER
----- Message from Flor Wolf sent at 7/10/2018 10:42 AM CDT -----  Contact: Patients'  Luis F  Patients'   Mr Luis F Karimi wanted Dr Schuster to know that Mrs Citlali Karimi passed away last Sunday.

## 2018-08-19 NOTE — ASSESSMENT & PLAN NOTE
Status post parathyroidectomy  Monitor calcium  S/p neck washout  Neck is supple, no further swelling since drain out.   Extubated yesterday  Speech therapy for swallow eval may need alternative feeding access if unable to take in po, cont  Tube feeds  PT/OT         3

## 2022-09-09 NOTE — PLAN OF CARE
Problem: Patient Care Overview  Goal: Plan of Care Review  Patient is only oriented to person. NG to the right nare with Novasource infusing at 40 mL/ hr. 25 ml residual. Patient is being turned every two hours. Heels are floated. Mouth care performed. NSR. Bed alarm activated. Side rails up x 3, and bed in lowest position. Special contact isolation for c-diff. Will continue to monitor.         No

## 2023-01-06 NOTE — ASSESSMENT & PLAN NOTE
-initial 0.113>>34>>50>>41  -pt denies CP and SOB  - EKG results showed diffuse ST changes    -serial results revealed NSTEMI with Cardiology consulted     6/15  NSTEMI POA  Cardiology  LHC  ASA  Hold Statin due to Elevated LFT's    6/16  Cardiology  ASA  ICU  Statin allergy noted    6/17  S/p LHC - Diffuse disease  ASA  Cards         no

## 2023-10-03 NOTE — PLAN OF CARE
Problem: Patient Care Overview  Goal: Plan of Care Review  Outcome: Ongoing (interventions implemented as appropriate)  Pt unable to be extubated; will attempt tomorrow; HD today in process; VSS; NADN.       GEN: Awake, alert, interactive, NAD.   HEAD AND NECK: NC/AT. Airway patent. Neck supple. No c spine tenderness   EYES:  Clear b/l. EOMI..  Right eye blindness   ENT: Moist mucus membranes.   CARDIAC: Regular rate, regular rhythm. No evident pedal edema.    RESP/CHEST: Normal respiratory effort with no use of accessory muscles or retractions. Clear throughout on auscultation.  ABD: soft, non-distended, non-tender. No rebound, no guarding.   BACK: No midline spinal TTP. No CVAT.   EXTREMITIES: Moving all extremities with no apparent deformities.   SKIN: Warm, dry, intact normal color. No rash. No bruising   NEURO: AOx2, CN II-XII grossly intact, no focal deficits. NO tongue fasciculations or tremors.   PSYCH: Appropriate mood and affect.

## 2024-01-29 NOTE — PROGRESS NOTES
Problem: Discharge Planning  Goal: Discharge to home or other facility with appropriate resources  1/29/2024 0315 by Cathy Greenfield RN  Outcome: Progressing     Problem: Safety - Adult  Goal: Free from fall injury  1/29/2024 0315 by Cathy Greenfield, RN  Outcome: Progressing     Problem: ABCDS Injury Assessment  Goal: Absence of physical injury  1/29/2024 0315 by Cathy Greenfield, RN  Outcome: Progressing     Problem: Skin/Tissue Integrity  Goal: Absence of new skin breakdown  Description: 1.  Monitor for areas of redness and/or skin breakdown  1/29/2024 0315 by Cathy Greenfield, RN  Outcome: Progressing      [facility] -   General Surgery  Progress Note    Subjective:     History of Present Illness:  No notes on file    Post-Op Info:  Procedure(s) (LRB):  CATHETERIZATION, HEART, LEFT (Left)   Day of Surgery     Interval History:  Pt coded yesterday and kept intubated post surgery in ICU, stable overnight, heart cath today    Medications:  Continuous Infusions:   sodium chloride 0.9% 50 mL/hr at 06/15/18 1715     Scheduled Meds:   amiodarone  200 mg Oral BID    aspirin  81 mg Oral Daily    chlorhexidine  10 mL Mouth/Throat BID    clopidogrel  75 mg Oral Daily    metoprolol tartrate  25 mg Oral BID    nozaseptin   Each Nare BID    pantoprazole 40 mg in dextrose 5 % 100 mL IVPB (over 15 minutes) (ready to mix system)  40 mg Intravenous Daily    piperacillin-tazobactam (ZOSYN) IVPB  4.5 g Intravenous Q12H    senna-docusate 8.6-50 mg  1 tablet Oral BID    sodium bicarbonate  650 mg Oral BID     PRN Meds:acetaminophen, atropine, bisacodyl, cloNIDine, dextrose 50%, dextrose 50%, glucagon (human recombinant), glucose, glucose, hydrALAZINE, HYDROcodone-acetaminophen, insulin aspart U-100, lactulose, lorazepam, morphine, nitroGLYCERIN, ondansetron, oxyCODONE, sodium bicarbonate, sodium chloride 0.9%     Review of patient's allergies indicates:   Allergen Reactions    Lipitor [atorvastatin] Swelling     Lips       Objective:     Vital Signs (Most Recent):  Temp: 98.2 °F (36.8 °C) (06/15/18 1505)  Pulse: 70 (06/15/18 1745)  Resp: 16 (06/15/18 1745)  BP: (!) 105/59 (06/15/18 1505)  SpO2: 100 % (06/15/18 1745) Vital Signs (24h Range):  Temp:  [97.8 °F (36.6 °C)-99.7 °F (37.6 °C)] 98.2 °F (36.8 °C)  Pulse:  [] 70  Resp:  [12-20] 16  SpO2:  [99 %-100 %] 100 %  BP: ()/(34-66) 105/59     Weight: 75.7 kg (166 lb 14.2 oz)  Body mass index is 30.52 kg/m².    Intake/Output - Last 3 Shifts       06/13 0700 - 06/14 0659 06/14 0700 - 06/15 0659 06/15 0700 - 06/16 0659    P.O. 240      I.V. (mL/kg) 229.1  (3.3) 2333.4 (30.8) 363.6 (4.8)    Blood   687.5    IV Piggyback 248.8 300 200    Total Intake(mL/kg) 717.9 (10.3) 2633.4 (34.8) 1251.1 (16.5)    Urine (mL/kg/hr)  660 (0.4) 335 (0.4)    Other  14 (0) 15 (0)    Blood  20 (0)     Total Output   694 350    Net +717.9 +1939.4 +901.1           Urine Occurrence 3 x 1 x     Stool Occurrence 0 x 0 x           Physical Exam   Constitutional: She appears well-developed and well-nourished. No distress.   Neck: Neck supple.   Incision dressing intact, mily intact with minimal output   Cardiovascular: Normal rate and regular rhythm.    Pulmonary/Chest: Effort normal and breath sounds normal.   Vitals reviewed.      Significant Labs:  CBC:     Recent Labs  Lab 06/15/18  0352   WBC 9.15   RBC 2.72*   HGB 7.9*   HCT 23.7*   *   MCV 87   MCH 29.0   MCHC 33.3     BMP:     Recent Labs  Lab 06/15/18  0352   *  136*     142   K 3.7  3.7     106   CO2 20*  20*   BUN 45*  45*   CREATININE 4.2*  4.2*   CALCIUM 9.5  9.5   MG 2.0         Assessment/Plan:     CVA (cerebral vascular accident)    Prior CVA, patient with increased difficulty swelling medications at home prior to hospitalization, underwent evaluation with ENT prior to surgery  Speech therapy consult for swallowing        Hyperparathyroidism    Monitor calcium, PTH levels normal  Tums taper  S/p neck washout/drain placement        Coronary artery disease involving native coronary artery    Cardiology consult elevated troponin   Heparin GTT started  Angio today        CKD (chronic kidney disease) stage 5, GFR less than 15 ml/min    Monitor urine output creatinine and electrolytes        Hypertension associated with diabetes    Antihypertensive medications            Levy Samuel MD  General Surgery  [facility]

## 2024-03-07 NOTE — ASSESSMENT & PLAN NOTE
S/P contrast with LHC 6/15  Renal following.    RRT started 6/19   Accurate I/Os  No Fair  New VASCATH right IJ    used

## 2024-08-30 NOTE — PLAN OF CARE
Problem: Patient Care Overview  Goal: Plan of Care Review  Outcome: Ongoing (interventions implemented as appropriate)  Pt remained afebrile throughout this shift.   Heparin gtt administered per order.   Pt remained free of falls this shift.   Pt c/o incisional pain this shift. Pain meds administered as ordered.   Plan of care reviewed. Patient and  verbalizes understanding.   Blood glucose monitored.   Pt moving/turing every 2 hours with wedge.   Patient SR on monitor.   Bed low, side rails up x 2, wheels locked, call light in reach.   Patient instructed to call for assistance.   Hourly rounding completed.   Will continue to monitor.           92

## 2025-04-14 NOTE — TELEPHONE ENCOUNTER
----- Message from Caitlin Cifuentes MA sent at 5/17/2018  3:58 PM CDT -----      ----- Message -----  From: David Addison MD  Sent: 5/17/2018   2:30 PM  To: Levy Samuel MD, Cyn Hernandez Staff    Please schedule her to follow up with Dr. Levy Samuel next week to review her results. thanks  ----- Message -----  From: Levy Samuel MD  Sent: 5/17/2018   2:22 PM  To: David Addison MD    Can we set her up witth an appointment next week so I can go over her test results. Thanks  ----- Message -----  From: David Addison MD  Sent: 5/17/2018  10:54 AM  To: MD Cinthya Chapa. She said she was waiting to hear back from your office about her parathyroid. I told her that I would send you a message. Thanks!       Updated orders to obtain T Cell subset profile to check AbsoluteCD4 with next set of transplant  labs

## (undated) DEVICE — CLIP LIGATING TITANIUM SMALL

## (undated) DEVICE — SUT 2/0 30IN SILK BLK BRAI

## (undated) DEVICE — LINER GLOVE POWDERFREE SZ 7

## (undated) DEVICE — SEE MEDLINE ITEM 152622

## (undated) DEVICE — DRAPE STERI INSTRUMENT 1018

## (undated) DEVICE — SEE MEDLINE ITEM 157027

## (undated) DEVICE — GLOVE PROTEXIS LTX MICRO  7

## (undated) DEVICE — GAUZE SPONGE PEANUT STRL

## (undated) DEVICE — SYR 10CC LUER LOCK

## (undated) DEVICE — ELECTRODE REM PLYHSV RETURN 9

## (undated) DEVICE — SUT VICRYL 3-0 27 SH

## (undated) DEVICE — SOL NS 1000CC

## (undated) DEVICE — BLADE SURG CARBON STEEL SZ11

## (undated) DEVICE — NDL SAFETY 25G X 1.5 ECLIPSE

## (undated) DEVICE — GAUZE SPONGE 4X4 12PLY

## (undated) DEVICE — APPLICATOR CHLORAPREP ORN 26ML

## (undated) DEVICE — SHEET THYROID W/ISO-BAC

## (undated) DEVICE — SYS CLSR DERMABOND PRINEO 22CM

## (undated) DEVICE — SUT MONOCRYL 4.0 PS2 CP496G

## (undated) DEVICE — HEMOSTAT SURGICEL 4X8IN

## (undated) DEVICE — EVACUATOR WOUND BULB 100CC

## (undated) DEVICE — DRAIN BLAKE HUBLS 10F RD

## (undated) DEVICE — COVER OVERHEAD SURG LT BLUE

## (undated) DEVICE — GLOVE PROTEXIS HYDROGEL SZ7.5

## (undated) DEVICE — SPONGE GAUZE 16PLY 4X4

## (undated) DEVICE — SHEARS HARMONIC CRVD 9 CM

## (undated) DEVICE — SEE MEDLINE ITEM 157117

## (undated) DEVICE — CLIP LIGACLIP XTRA TITANIUM

## (undated) DEVICE — MANIFOLD 4 PORT